# Patient Record
Sex: FEMALE | Race: WHITE | Employment: OTHER | URBAN - NONMETROPOLITAN AREA
[De-identification: names, ages, dates, MRNs, and addresses within clinical notes are randomized per-mention and may not be internally consistent; named-entity substitution may affect disease eponyms.]

---

## 2017-01-04 ENCOUNTER — OFFICE VISIT (OUTPATIENT)
Dept: UROLOGY | Age: 67
End: 2017-01-04

## 2017-01-04 VITALS
DIASTOLIC BLOOD PRESSURE: 78 MMHG | SYSTOLIC BLOOD PRESSURE: 144 MMHG | WEIGHT: 270 LBS | HEIGHT: 64 IN | BODY MASS INDEX: 46.1 KG/M2

## 2017-01-04 DIAGNOSIS — N39.0 RECURRENT UTI: ICD-10-CM

## 2017-01-04 DIAGNOSIS — N39.41 URGE INCONTINENCE OF URINE: Primary | ICD-10-CM

## 2017-01-04 LAB
BILIRUBIN URINE: NORMAL
BLOOD URINE, POC: NEGATIVE
CHARACTER, URINE: CLEAR
COLOR, URINE: YELLOW
GLUCOSE URINE: NEGATIVE MG/DL
KETONES, URINE: NEGATIVE
LEUKOCYTE CLUMPS, URINE: NEGATIVE
NITRITE, URINE: NEGATIVE
PH, URINE: 7
PROTEIN, URINE: NEGATIVE MG/DL
SPECIFIC GRAVITY, URINE: 1.02 (ref 1–1.03)
UROBILINOGEN, URINE: 0.2 EU/DL

## 2017-01-04 PROCEDURE — 81003 URINALYSIS AUTO W/O SCOPE: CPT | Performed by: UROLOGY

## 2017-01-04 PROCEDURE — 99213 OFFICE O/P EST LOW 20 MIN: CPT | Performed by: UROLOGY

## 2017-01-17 RX ORDER — METOPROLOL SUCCINATE 25 MG/1
TABLET, EXTENDED RELEASE ORAL
Qty: 30 TABLET | Refills: 11 | Status: SHIPPED | OUTPATIENT
Start: 2017-01-17 | End: 2017-03-16 | Stop reason: SDUPTHER

## 2017-01-27 PROBLEM — N76.6 ULCER OF GENITAL LABIA: Status: ACTIVE | Noted: 2017-01-27

## 2017-01-27 PROBLEM — L97.929 VENOUS ULCER OF LEFT LEG (HCC): Status: ACTIVE | Noted: 2017-01-27

## 2017-01-27 PROBLEM — I83.029 VENOUS ULCER OF LEFT LEG (HCC): Status: ACTIVE | Noted: 2017-01-27

## 2017-02-17 ENCOUNTER — TELEPHONE (OUTPATIENT)
Dept: CARDIOLOGY | Age: 67
End: 2017-02-17

## 2017-02-21 ENCOUNTER — OFFICE VISIT (OUTPATIENT)
Dept: PULMONOLOGY | Age: 67
End: 2017-02-21

## 2017-02-21 VITALS
HEART RATE: 70 BPM | OXYGEN SATURATION: 98 % | DIASTOLIC BLOOD PRESSURE: 60 MMHG | BODY MASS INDEX: 44.56 KG/M2 | TEMPERATURE: 97.7 F | SYSTOLIC BLOOD PRESSURE: 107 MMHG | HEIGHT: 64 IN | WEIGHT: 261 LBS

## 2017-02-21 DIAGNOSIS — J45.20 MILD INTERMITTENT ASTHMA WITHOUT COMPLICATION: Primary | ICD-10-CM

## 2017-02-21 PROCEDURE — G8427 DOCREV CUR MEDS BY ELIG CLIN: HCPCS | Performed by: INTERNAL MEDICINE

## 2017-02-21 PROCEDURE — 3017F COLORECTAL CA SCREEN DOC REV: CPT | Performed by: INTERNAL MEDICINE

## 2017-02-21 PROCEDURE — 1123F ACP DISCUSS/DSCN MKR DOCD: CPT | Performed by: INTERNAL MEDICINE

## 2017-02-21 PROCEDURE — G8598 ASA/ANTIPLAT THER USED: HCPCS | Performed by: INTERNAL MEDICINE

## 2017-02-21 PROCEDURE — 1090F PRES/ABSN URINE INCON ASSESS: CPT | Performed by: INTERNAL MEDICINE

## 2017-02-21 PROCEDURE — G8417 CALC BMI ABV UP PARAM F/U: HCPCS | Performed by: INTERNAL MEDICINE

## 2017-02-21 PROCEDURE — 4040F PNEUMOC VAC/ADMIN/RCVD: CPT | Performed by: INTERNAL MEDICINE

## 2017-02-21 PROCEDURE — 3014F SCREEN MAMMO DOC REV: CPT | Performed by: INTERNAL MEDICINE

## 2017-02-21 PROCEDURE — G8484 FLU IMMUNIZE NO ADMIN: HCPCS | Performed by: INTERNAL MEDICINE

## 2017-02-21 PROCEDURE — 1036F TOBACCO NON-USER: CPT | Performed by: INTERNAL MEDICINE

## 2017-02-21 PROCEDURE — G8400 PT W/DXA NO RESULTS DOC: HCPCS | Performed by: INTERNAL MEDICINE

## 2017-02-21 PROCEDURE — 99214 OFFICE O/P EST MOD 30 MIN: CPT | Performed by: INTERNAL MEDICINE

## 2017-03-16 ENCOUNTER — TELEPHONE (OUTPATIENT)
Age: 67
End: 2017-03-16

## 2017-03-16 ENCOUNTER — OFFICE VISIT (OUTPATIENT)
Age: 67
End: 2017-03-16

## 2017-03-16 VITALS
HEART RATE: 78 BPM | TEMPERATURE: 98.8 F | RESPIRATION RATE: 16 BRPM | HEIGHT: 64 IN | DIASTOLIC BLOOD PRESSURE: 76 MMHG | WEIGHT: 260 LBS | BODY MASS INDEX: 44.39 KG/M2 | SYSTOLIC BLOOD PRESSURE: 120 MMHG | OXYGEN SATURATION: 98 %

## 2017-03-16 DIAGNOSIS — K82.8 SLUDGE IN GALLBLADDER: Primary | ICD-10-CM

## 2017-03-16 PROCEDURE — G8417 CALC BMI ABV UP PARAM F/U: HCPCS | Performed by: SURGERY

## 2017-03-16 PROCEDURE — 1036F TOBACCO NON-USER: CPT | Performed by: SURGERY

## 2017-03-16 PROCEDURE — G8598 ASA/ANTIPLAT THER USED: HCPCS | Performed by: SURGERY

## 2017-03-16 PROCEDURE — G8484 FLU IMMUNIZE NO ADMIN: HCPCS | Performed by: SURGERY

## 2017-03-16 PROCEDURE — 3017F COLORECTAL CA SCREEN DOC REV: CPT | Performed by: SURGERY

## 2017-03-16 PROCEDURE — G8400 PT W/DXA NO RESULTS DOC: HCPCS | Performed by: SURGERY

## 2017-03-16 PROCEDURE — 3014F SCREEN MAMMO DOC REV: CPT | Performed by: SURGERY

## 2017-03-16 PROCEDURE — 1123F ACP DISCUSS/DSCN MKR DOCD: CPT | Performed by: SURGERY

## 2017-03-16 PROCEDURE — G8427 DOCREV CUR MEDS BY ELIG CLIN: HCPCS | Performed by: SURGERY

## 2017-03-16 PROCEDURE — 99214 OFFICE O/P EST MOD 30 MIN: CPT | Performed by: SURGERY

## 2017-03-16 PROCEDURE — 4040F PNEUMOC VAC/ADMIN/RCVD: CPT | Performed by: SURGERY

## 2017-03-16 PROCEDURE — 1090F PRES/ABSN URINE INCON ASSESS: CPT | Performed by: SURGERY

## 2017-03-16 ASSESSMENT — ENCOUNTER SYMPTOMS
CONSTIPATION: 1
EYE ITCHING: 0
ABDOMINAL PAIN: 1
STRIDOR: 0
NAUSEA: 0
SHORTNESS OF BREATH: 0
VOMITING: 0
CHEST TIGHTNESS: 0
SORE THROAT: 0
VOICE CHANGE: 0
PHOTOPHOBIA: 0
EYE PAIN: 0
COUGH: 0
EYE DISCHARGE: 0
EYE REDNESS: 0
COLOR CHANGE: 0
CHOKING: 0
ABDOMINAL DISTENTION: 0
DIARRHEA: 0
RHINORRHEA: 0
ANAL BLEEDING: 0
TROUBLE SWALLOWING: 0
SINUS PRESSURE: 0
WHEEZING: 0
FACIAL SWELLING: 0
BLOOD IN STOOL: 0
RECTAL PAIN: 0
APNEA: 0
BACK PAIN: 0

## 2017-07-05 RX ORDER — OMEPRAZOLE 40 MG/1
CAPSULE, DELAYED RELEASE ORAL
Qty: 30 CAPSULE | Refills: 5 | OUTPATIENT
Start: 2017-07-05

## 2017-12-12 ENCOUNTER — OFFICE VISIT (OUTPATIENT)
Dept: PULMONOLOGY | Age: 67
End: 2017-12-12
Payer: MEDICARE

## 2017-12-12 VITALS
WEIGHT: 278 LBS | DIASTOLIC BLOOD PRESSURE: 72 MMHG | TEMPERATURE: 97.9 F | BODY MASS INDEX: 46.32 KG/M2 | HEART RATE: 76 BPM | OXYGEN SATURATION: 96 % | SYSTOLIC BLOOD PRESSURE: 136 MMHG | HEIGHT: 65 IN

## 2017-12-12 DIAGNOSIS — J45.20 MILD INTERMITTENT ASTHMA WITHOUT COMPLICATION: Primary | ICD-10-CM

## 2017-12-12 DIAGNOSIS — R94.2 ABNORMAL PFTS: ICD-10-CM

## 2017-12-12 DIAGNOSIS — J98.4 RESTRICTIVE LUNG DISEASE: ICD-10-CM

## 2017-12-12 PROCEDURE — 99214 OFFICE O/P EST MOD 30 MIN: CPT | Performed by: NURSE PRACTITIONER

## 2017-12-12 ASSESSMENT — ENCOUNTER SYMPTOMS
BLURRED VISION: 0
DIARRHEA: 0
RHINORRHEA: 1
NAUSEA: 0
SPUTUM PRODUCTION: 0
DOUBLE VISION: 0
HEMOPTYSIS: 0
SHORTNESS OF BREATH: 1
WHEEZING: 0
VOMITING: 0
COUGH: 1

## 2017-12-12 NOTE — PROGRESS NOTES
[facility]    Patient: Marika Bhatt, 75 y.o.   : 2017    Pt of Dr. Alina Cintron   Patient presents with    Asthma     f/u for breathing issues, Fpt Dr. Mukul Michele seen PRN        Asthma   She complains of cough and shortness of breath. There is no hemoptysis, sputum production or wheezing. This is a chronic problem. The current episode started more than 1 year ago. The problem occurs 2 to 4 times per day. The problem has been gradually worsening. Associated symptoms include orthopnea, postnasal drip and rhinorrhea. Pertinent negatives include no chest pain, fever or weight loss. Her symptoms are aggravated by change in weather, strenuous activity and exposure to smoke. Her symptoms are alleviated by rest and beta-agonist. She reports significant improvement on treatment. Risk factors for lung disease include smoking/tobacco exposure. Her past medical history is significant for asthma. Jennifre Tomlinson is here for follow up for asthma. Patient was initially instructed to follow-up PRN was recently being evaluated at [facility] and was told that it looked like she was not oxygenating well despite her SpO2 of 99% on RA per patient report. Patient is here to follow-up and be certain that there was no respiratory issue. She is currently using Spiriva and albuterol MDI. Reports using albuterol MDI 1-2 times per week on avg. Patient follows with Dr. Eyal Hayes for CAD. Progress History:   Since last visit any new medical issues? No   New ER or hospital visits? No  Any new or changes in medicines? No  Using inhalers? Yes spiriva and albuterol MDI 1-2 a week  Are they helpful? Yes   Flu vaccine? No does not want   Pneumonia vaccine?  No does not want vaccine  Past Medical hx   PMH:  Past Medical History:   Diagnosis Date    Acid reflux     Arrhythmia     Arthritis     CAD (coronary artery disease)     NON-OBSTRUCTIVE    Chronic Javier Maul 18 MCG inhalation capsule inhale the contents of one capsule in the handihaler once daily 30 capsule 5    loratadine (CLARITIN) 10 MG tablet take 1 tablet by mouth once daily 30 tablet 11    hydrOXYzine (ATARAX) 50 MG tablet take 1 tablet by mouth at bedtime 30 tablet 5    metFORMIN (GLUCOPHAGE) 1000 MG tablet take 1 tablet by mouth twice a day with food 60 tablet 5    Magic Mouthwash (MIRACLE MOUTHWASH) Swish and spit 5 mLs 4 times daily as needed for Irritation 50 mL 0    gabapentin (NEURONTIN) 300 MG capsule Take 1 capsule by mouth 3 times daily (Patient taking differently: Take 800 mg by mouth 4 times daily ) 90 capsule 5    albuterol sulfate HFA (PROAIR HFA) 108 (90 BASE) MCG/ACT inhaler Inhale 2 puffs into the lungs every 4 hours as needed 1 Inhaler 2    FREESTYLE LANCETS MISC Test 1 time daily. Dx: 250.00-Type 2 Diabetes. Freestyle 28G Lancets 100 each 11    nystatin-triamcinolone (MYCOLOG II) 711662-2.1 UNIT/GM-% cream Apply topically 2 times daily. 1 Tube 3    metoprolol (TOPROL-XL) 25 MG XL tablet Take 25 mg by mouth daily      tiZANidine (ZANAFLEX) 4 MG tablet Take 4 mg by mouth every 8 hours as needed      Omega-3 Fatty Acids (FISH OIL) 1200 MG CAPS Take 1,200 mg by mouth daily      ASPIRIN LOW DOSE 81 MG EC tablet take 1 tablet by mouth once daily 30 tablet 11    clobetasol (TEMOVATE) 0.05 % cream Apply topically 2 times daily. 15 g 1    glucose blood VI test strips (FREESTYLE LITE) strip Test once daily 100 each 11    CICLOPIROX TREATMENT EX Apply  topically. 10.0 mL Ciclopirox-terbinafine-fluconazole  Fungal Free Nails Fungicidal solution      polyvinyl alcohol (ARTIFICIAL TEARS) 1.4 % ophthalmic solution 1 drop 2 times daily.  halcinonide (HALOG) 0.1 % CREA Apply  topically daily.  citalopram (CELEXA) 40 MG tablet Take 40 mg by mouth every morning.  diphenhydrAMINE (BENADRYL) 50 MG capsule Take 50 mg by mouth nightly as needed for Itching.       QUEtiapine (SEROQUEL) 50 MG tablet Take 50 mg by mouth nightly.  acetaminophen (TYLENOL) 500 MG tablet Take 500 mg by mouth every 6 hours as needed for Pain.  nabumetone (RELAFEN) 500 MG tablet Take 500 mg by mouth 2 times daily      TRADJENTA 5 MG tablet take 1 tablet by mouth daily 30 tablet 5    simvastatin (ZOCOR) 40 MG tablet take 1 tablet by mouth at bedtime 30 tablet 5    spironolactone (ALDACTONE) 25 MG tablet take 1-2 tablets by mouth once daily if needed (TAKE 2 TABS ON ANY DAY THERE IS SWELLING IN LEGS OR ANKLES) 60 tablet 5    glucose monitoring kit (FREESTYLE) monitoring kit 1 each by Does not apply route once for 1 dose. This is a prescription for all required glucometer supplies to check blood sugar once daily. Any brand device is acceptable. Dx: 250.00 1 kit 11     No current facility-administered medications for this visit. Ludwig BYRD   Review of Systems   Constitutional: Negative for chills, fever and weight loss. HENT: Positive for postnasal drip and rhinorrhea. Negative for hearing loss and tinnitus. Eyes: Negative for blurred vision and double vision. Respiratory: Positive for cough and shortness of breath. Negative for hemoptysis, sputum production and wheezing. Cardiovascular: Negative for chest pain. Gastrointestinal: Negative for diarrhea, nausea and vomiting. Genitourinary: Negative for dysuria. Physical exam   /72   Pulse 76   Temp 97.9 °F (36.6 °C) (Oral)   Ht 5' 4.75\" (1.645 m)   Wt 278 lb (126.1 kg)   LMP  (LMP Unknown)   SpO2 96% Comment: RA at rest  BMI 46.62 kg/m²        Physical Exam   Constitutional: She is oriented to person, place, and time and well-developed, well-nourished, and in no distress. No distress. HENT:   Head: Normocephalic and atraumatic. Eyes: Conjunctivae are normal. Pupils are equal, round, and reactive to light. Neck: Normal range of motion. Neck supple. No tracheal deviation present.    Cardiovascular:

## 2018-01-19 RX ORDER — METOPROLOL SUCCINATE 25 MG/1
TABLET, EXTENDED RELEASE ORAL
Qty: 30 TABLET | Refills: 4 | Status: SHIPPED | OUTPATIENT
Start: 2018-01-19 | End: 2018-06-09 | Stop reason: SDUPTHER

## 2018-03-28 ENCOUNTER — OFFICE VISIT (OUTPATIENT)
Dept: CARDIOLOGY CLINIC | Age: 68
End: 2018-03-28
Payer: MEDICARE

## 2018-03-28 VITALS
BODY MASS INDEX: 45.75 KG/M2 | SYSTOLIC BLOOD PRESSURE: 122 MMHG | HEART RATE: 63 BPM | WEIGHT: 268 LBS | DIASTOLIC BLOOD PRESSURE: 72 MMHG | HEIGHT: 64 IN

## 2018-03-28 DIAGNOSIS — I47.1 SVT (SUPRAVENTRICULAR TACHYCARDIA) (HCC): ICD-10-CM

## 2018-03-28 DIAGNOSIS — I25.10 CORONARY ARTERY DISEASE INVOLVING NATIVE CORONARY ARTERY OF NATIVE HEART WITHOUT ANGINA PECTORIS: Primary | ICD-10-CM

## 2018-03-28 DIAGNOSIS — I10 ESSENTIAL HYPERTENSION: ICD-10-CM

## 2018-03-28 PROCEDURE — 99213 OFFICE O/P EST LOW 20 MIN: CPT | Performed by: NUCLEAR MEDICINE

## 2018-03-28 PROCEDURE — 93000 ELECTROCARDIOGRAM COMPLETE: CPT | Performed by: NUCLEAR MEDICINE

## 2018-03-28 RX ORDER — POTASSIUM CHLORIDE 750 MG/1
10 CAPSULE, EXTENDED RELEASE ORAL DAILY
Status: ON HOLD | COMMUNITY
End: 2019-09-01 | Stop reason: HOSPADM

## 2018-03-28 NOTE — PROGRESS NOTES
Pt here for 1 year fu     Denies chest pain   States she gets SOB on exertion and resting   Also has dizziness
09/01/2017    A1C test (Diabetic or Prediabetic)  12/21/2017    Colon cancer screen colonoscopy  07/07/2019    DEXA (modify frequency per FRAX score)  Addressed       Subjective:  Review of Systems  General:   No fever, no chills, No fatigue or weight loss  Pulmonary:    No dyspnea, no wheezing  Cardiac:    Denies recent chest pain,   GI:     No nausea or vomiting, no abdominal pain  Neuro:    No dizziness or light headedness,   Musculoskeletal: Back issues   Extremities:   No edema, good peripheral pulses      Objective:  Physical Exam  /72   Pulse 63   Ht 5' 4\" (1.626 m)   Wt 268 lb (121.6 kg)   LMP  (LMP Unknown)   BMI 46.00 kg/m²   General:   Well developed, well nourished  Lungs:   Clear to auscultation  Heart:    Normal S1 S2, Slight murmur. no rubs, no gallops  Abdomen:   Soft, non tender, no organomegalies, positive bowel sounds  Extremities:   No edema, no cyanosis, good peripheral pulses  Neurological:   Awake, alert, oriented. No obvious focal deficits  Musculoskelatal:  No obvious deformities    Assessment:     1. Coronary artery disease involving native coronary artery of native heart without angina pectoris  EKG 12 Lead   2. Essential hypertension     3. SVT (supraventricular tachycardia) (HCC)     cardiac fair for now   ECG in office was done today. I reviewed the ECG. No acute findings      Plan:  No Follow-up on file. As above  Continue risk factor modification and medical management  Thank you for allowing me to participate in the care of your patient. Please don't hesitate to contact me regarding any further issues related to the patient care    Orders Placed:  Orders Placed This Encounter   Procedures    EKG 12 Lead     Order Specific Question:   Reason for Exam?     Answer: Other       Medications Prescribed:  No orders of the defined types were placed in this encounter. Discussed use, benefit, and side effects of prescribed medications. All patient questions answered.

## 2018-04-10 ENCOUNTER — TELEPHONE (OUTPATIENT)
Dept: CARDIOLOGY CLINIC | Age: 68
End: 2018-04-10

## 2018-05-09 ENCOUNTER — TELEPHONE (OUTPATIENT)
Dept: CARDIOLOGY CLINIC | Age: 68
End: 2018-05-09

## 2018-05-16 ENCOUNTER — HOSPITAL ENCOUNTER (OUTPATIENT)
Age: 68
Discharge: HOME | End: 2018-05-16
Payer: MEDICARE

## 2018-05-16 LAB
ALBUMIN SERPL-MCNC: 3.8 G/DL (ref 3.5–5.1)
ALP BLD-CCNC: 84 U/L (ref 38–126)
ALT SERPL-CCNC: 9 U/L (ref 11–66)
ANION GAP SERPL CALCULATED.3IONS-SCNC: 14 MEQ/L (ref 8–16)
ANISOCYTOSIS: ABNORMAL
AST SERPL-CCNC: 13 U/L (ref 5–40)
AVERAGE GLUCOSE: 120 MG/DL (ref 70–126)
BASOPHILS # BLD: 0.3 %
BASOPHILS ABSOLUTE: 0 THOU/MM3 (ref 0–0.1)
BILIRUB SERPL-MCNC: 0.4 MG/DL (ref 0.3–1.2)
BUN BLDV-MCNC: 32 MG/DL (ref 7–22)
CALCIUM SERPL-MCNC: 8.8 MG/DL (ref 8.5–10.5)
CHLORIDE BLD-SCNC: 110 MEQ/L (ref 98–111)
CHOLESTEROL, TOTAL: 114 MG/DL (ref 100–199)
CO2: 19 MEQ/L (ref 23–33)
CREAT SERPL-MCNC: 0.7 MG/DL (ref 0.4–1.2)
EOSINOPHIL # BLD: 3 %
EOSINOPHILS ABSOLUTE: 0.3 THOU/MM3 (ref 0–0.4)
GFR SERPL CREATININE-BSD FRML MDRD: 83 ML/MIN/1.73M2
GLUCOSE BLD-MCNC: 86 MG/DL (ref 70–108)
HBA1C MFR BLD: 6 % (ref 4.4–6.4)
HCT VFR BLD CALC: 34.3 % (ref 37–47)
HDLC SERPL-MCNC: 51 MG/DL
HEMOGLOBIN: 11.4 GM/DL (ref 12–16)
HYPOCHROMIA: ABNORMAL
LDL CHOLESTEROL CALCULATED: 48 MG/DL
LYMPHOCYTES # BLD: 25.2 %
LYMPHOCYTES ABSOLUTE: 2.2 THOU/MM3 (ref 1–4.8)
MCH RBC QN AUTO: 26 PG (ref 27–31)
MCHC RBC AUTO-ENTMCNC: 33.3 GM/DL (ref 33–37)
MCV RBC AUTO: 78.1 FL (ref 81–99)
MICROCYTES: ABNORMAL
MONOCYTES # BLD: 7.3 %
MONOCYTES ABSOLUTE: 0.6 THOU/MM3 (ref 0.4–1.3)
NUCLEATED RED BLOOD CELLS: 0 /100 WBC
PDW BLD-RTO: 19.1 % (ref 11.5–14.5)
PLATELET # BLD: 276 THOU/MM3 (ref 130–400)
PMV BLD AUTO: 9.5 FL (ref 7.4–10.4)
POTASSIUM SERPL-SCNC: 4.6 MEQ/L (ref 3.5–5.2)
RBC # BLD: 4.39 MILL/MM3 (ref 4.2–5.4)
SEG NEUTROPHILS: 64.2 %
SEGMENTED NEUTROPHILS ABSOLUTE COUNT: 5.6 THOU/MM3 (ref 1.8–7.7)
SODIUM BLD-SCNC: 143 MEQ/L (ref 135–145)
TOTAL PROTEIN: 7 G/DL (ref 6.1–8)
TRIGL SERPL-MCNC: 75 MG/DL (ref 0–199)
TSH SERPL DL<=0.05 MIU/L-ACNC: 1.84 UIU/ML (ref 0.4–4.2)
WBC # BLD: 8.7 THOU/MM3 (ref 4.8–10.8)

## 2018-05-16 PROCEDURE — 83036 HEMOGLOBIN GLYCOSYLATED A1C: CPT

## 2018-05-16 PROCEDURE — 80061 LIPID PANEL: CPT

## 2018-05-16 PROCEDURE — 84443 ASSAY THYROID STIM HORMONE: CPT

## 2018-05-16 PROCEDURE — 80053 COMPREHEN METABOLIC PANEL: CPT

## 2018-05-16 PROCEDURE — 85025 COMPLETE CBC W/AUTO DIFF WBC: CPT

## 2018-05-16 PROCEDURE — 36415 COLL VENOUS BLD VENIPUNCTURE: CPT

## 2018-06-11 RX ORDER — METOPROLOL SUCCINATE 25 MG/1
TABLET, EXTENDED RELEASE ORAL
Qty: 30 TABLET | Refills: 10 | Status: SHIPPED | OUTPATIENT
Start: 2018-06-11 | End: 2019-07-06 | Stop reason: SDUPTHER

## 2018-08-02 ENCOUNTER — TELEPHONE (OUTPATIENT)
Dept: CARDIOLOGY CLINIC | Age: 68
End: 2018-08-02

## 2018-08-02 NOTE — TELEPHONE ENCOUNTER
FYI  Patient called and stated she passed out \"sometime last week\". She called her PCP and they told her to let Dr Rodgers Phoenix know. She states she was sitting in her wheelchair felling fine then \"blacked out\". She states she felt fine right after that and has had no problems since. Notified pt Dr Sadie Moreau. Advised pt that she should go to ER or PCP and get check out if it should happen again.

## 2018-10-18 ENCOUNTER — TELEPHONE (OUTPATIENT)
Dept: SURGERY | Age: 68
End: 2018-10-18

## 2018-10-18 ENCOUNTER — OFFICE VISIT (OUTPATIENT)
Dept: SURGERY | Age: 68
End: 2018-10-18
Payer: MEDICARE

## 2018-10-18 VITALS
SYSTOLIC BLOOD PRESSURE: 134 MMHG | DIASTOLIC BLOOD PRESSURE: 80 MMHG | OXYGEN SATURATION: 96 % | HEART RATE: 73 BPM | TEMPERATURE: 97.7 F | WEIGHT: 267 LBS | RESPIRATION RATE: 16 BRPM | HEIGHT: 63 IN | BODY MASS INDEX: 47.31 KG/M2

## 2018-10-18 DIAGNOSIS — R10.84 GENERALIZED ABDOMINAL PAIN: ICD-10-CM

## 2018-10-18 DIAGNOSIS — K82.8 GALLBLADDER SLUDGE: Primary | ICD-10-CM

## 2018-10-18 DIAGNOSIS — I10 ESSENTIAL HYPERTENSION: ICD-10-CM

## 2018-10-18 DIAGNOSIS — Z86.14 HISTORY OF MRSA INFECTION: ICD-10-CM

## 2018-10-18 DIAGNOSIS — Z01.818 PRE-OP TESTING: ICD-10-CM

## 2018-10-18 PROCEDURE — 99214 OFFICE O/P EST MOD 30 MIN: CPT | Performed by: SURGERY

## 2018-10-27 ENCOUNTER — HOSPITAL ENCOUNTER (OUTPATIENT)
Age: 68
Discharge: HOME | End: 2018-10-27
Payer: MEDICARE

## 2018-10-27 LAB
ANION GAP SERPL CALCULATED.3IONS-SCNC: 18 MEQ/L (ref 8–16)
BUN BLDV-MCNC: 24 MG/DL (ref 7–22)
CALCIUM SERPL-MCNC: 9.7 MG/DL (ref 8.5–10.5)
CHLORIDE BLD-SCNC: 105 MEQ/L (ref 98–111)
CO2: 19 MEQ/L (ref 23–33)
CREAT SERPL-MCNC: 0.7 MG/DL (ref 0.4–1.2)
GFR SERPL CREATININE-BSD FRML MDRD: 83 ML/MIN/1.73M2
GLUCOSE BLD-MCNC: 103 MG/DL (ref 70–108)
HCT VFR BLD CALC: 39.1 % (ref 37–47)
HEMOGLOBIN: 11.6 GM/DL (ref 12–16)
POTASSIUM SERPL-SCNC: 4.8 MEQ/L (ref 3.5–5.2)
SODIUM BLD-SCNC: 142 MEQ/L (ref 135–145)

## 2018-10-27 PROCEDURE — 85018 HEMOGLOBIN: CPT

## 2018-10-27 PROCEDURE — 87081 CULTURE SCREEN ONLY: CPT

## 2018-10-27 PROCEDURE — 80048 BASIC METABOLIC PNL TOTAL CA: CPT

## 2018-10-27 PROCEDURE — 85014 HEMATOCRIT: CPT

## 2018-10-27 PROCEDURE — 36415 COLL VENOUS BLD VENIPUNCTURE: CPT

## 2018-10-28 LAB — MRSA SCREEN: NORMAL

## 2018-10-29 LAB — MRSA SCREEN: NORMAL

## 2018-10-29 ASSESSMENT — ENCOUNTER SYMPTOMS
STRIDOR: 0
EYE ITCHING: 0
EYES NEGATIVE: 1
BACK PAIN: 1
COUGH: 0
EYE REDNESS: 0
RHINORRHEA: 0
ABDOMINAL DISTENTION: 1
APNEA: 0
ALLERGIC/IMMUNOLOGIC NEGATIVE: 1
COLOR CHANGE: 0
ANAL BLEEDING: 1
VOICE CHANGE: 0
CHOKING: 0
WHEEZING: 0
BLOOD IN STOOL: 1
PHOTOPHOBIA: 0
SINUS PRESSURE: 0
SHORTNESS OF BREATH: 1
SINUS PAIN: 0
EYE DISCHARGE: 0
TROUBLE SWALLOWING: 0
FACIAL SWELLING: 0
EYE PAIN: 0
SORE THROAT: 0
CHEST TIGHTNESS: 1
ABDOMINAL PAIN: 1

## 2018-10-29 NOTE — PROGRESS NOTES
[address] [facility]. [address] [facility] [address]  Dept: [phone]  Dept Fax: [phone]  Loc: [phone]    Visit Date: 10/18/2018    Lamin Lai is a 70 y.o. female who presentstoday for:  Chief Complaint   Patient presents with    Surgical Consult     est pt-ready to schedule surgery-gallbladder sludge-       HPI:     HPI 79-year-old white female who is been seen previously in March of this year who has a long history of abdominal problems recurrent ventral hernia repairs he basically is found to have sludge and is symptomatic with gallbladder disease the patient at that time when described a difficult surgery wanted to wait however she's now having increasing symptoms of right upper quadrant pain and epigastric pain and is here to discuss potential cholecystectomy patient does have significant medical issues she also is obese and also has a history of MRSA infection of the abdominal wall    Past Medical History:   Diagnosis Date    Acid reflux     Arrhythmia     Arthritis     CAD (coronary artery disease)     NON-OBSTRUCTIVE    Chronic bronchitis ([address]     COPD (chronic obstructive pulmonary disease) ([address]     Depression     Diabetes mellitus ([address]     Dizziness - light-headed     Fall 10-15-13    left rib fracture and shoulder contusion     Falls frequently     Hyperlipidemia     Hypertension     Kidney disease     MDRO (multiple drug resistant organisms) resistance 2/14    MRSA ABDOMEN    Numbness and tingling     HX OF:    Swelling     Tinnitus       Past Surgical History:   Procedure Laterality Date    BACK SURGERY  2007    CARDIOVASCULAR STRESS TEST  04/03/2009    Persantine test associated w/non specific symptoms. EKG non diagnostic. no obvious stress induced ischemia. EF 63%. normal wall motion.     CARDIOVASCULAR STRESS TEST  03/31/2010    tilt table associated w/fluctuation of BP w/relatively low BP at bedtime 30 tablet 5    VESICARE 10 MG tablet take 1 tablet by mouth once daily 30 tablet 5    spironolactone (ALDACTONE) 25 MG tablet take 1-2 tablets by mouth once daily if needed (TAKE 2 TABS ON ANY DAY THERE IS SWELLING IN LEGS OR ANKLES) 60 tablet 5    etodolac (LODINE) 500 MG tablet take 1 tablet by mouth twice a day 60 tablet 5    pioglitazone (ACTOS) 45 MG tablet take 1 tablet by mouth once daily 30 tablet 5    SPIRIVA HANDIHALER 18 MCG inhalation capsule inhale the contents of one capsule in the handihaler once daily 30 capsule 5    loratadine (CLARITIN) 10 MG tablet take 1 tablet by mouth once daily 30 tablet 11    hydrOXYzine (ATARAX) 50 MG tablet take 1 tablet by mouth at bedtime 30 tablet 5    metFORMIN (GLUCOPHAGE) 1000 MG tablet take 1 tablet by mouth twice a day with food 60 tablet 5    gabapentin (NEURONTIN) 300 MG capsule Take 1 capsule by mouth 3 times daily (Patient taking differently: Take 800 mg by mouth 3 times daily .) 90 capsule 5    albuterol sulfate HFA (PROAIR HFA) 108 (90 BASE) MCG/ACT inhaler Inhale 2 puffs into the lungs every 4 hours as needed 1 Inhaler 2    FREESTYLE LANCETS MISC Test 1 time daily. Dx: 250.00-Type 2 Diabetes. Freestyle 28G Lancets 100 each 11    tiZANidine (ZANAFLEX) 4 MG tablet Take 4 mg by mouth every 8 hours as needed      Omega-3 Fatty Acids (FISH OIL) 1200 MG CAPS Take 1,200 mg by mouth daily      ASPIRIN LOW DOSE 81 MG EC tablet take 1 tablet by mouth once daily 30 tablet 11    clobetasol (TEMOVATE) 0.05 % cream Apply topically 2 times daily. 15 g 1    glucose blood VI test strips (FREESTYLE LITE) strip Test once daily 100 each 11    CICLOPIROX TREATMENT EX Apply  topically. 10.0 mL Ciclopirox-terbinafine-fluconazole  Fungal Free Nails Fungicidal solution      citalopram (CELEXA) 40 MG tablet Take 40 mg by mouth every morning.  diphenhydrAMINE (BENADRYL) 50 MG capsule Take 50 mg by mouth nightly as needed for Itching.       1+ Absent    Segs Absolute 5.6 1.8 - 7.7 thou/mm3    Lymphocytes # 2.2 1.0 - 4.8 thou/mm3    Monocytes # 0.6 0.4 - 1.3 thou/mm3    Eosinophils # 0.3 0.0 - 0.4 thou/mm3    Basophils # 0.0 0.0 - 0.1 thou/mm3   Anion Gap   Result Value Ref Range    Anion Gap 14.0 8.0 - 16.0 meq/L   Glomerular Filtration Rate, Estimated   Result Value Ref Range    Est, Glom Filt Rate 83 (A) ml/min/1.73m2       Assessment:     Symptomatic biliary colic discussed with patient the rather challenge of surgery given her size and medical issues we also discussed that is likely she will need an open procedure given her hernia repairs however it would be reasonable to attempt to get the scope into the abdominal cavity to see if this could be performed laparoscopically she is agreeable and wants to proceed    Plan:     1. Schedule Elian Mccloud for attempted laparoscopic cholecystectomy possible open  2. The risks, benefits and alternatives were discussed with Elian Mccloud. She understands and wishes to proceed with surgical intervention. 3. Restrictions discussed with Elian Mccloud and she expresses understanding. 4. She is advised to call back directly if there are further questions/concerns, or if her symptoms worsen prior to surgery.             Electronicallysigned by Vasyl Moody MD on 10/29/2018 at 11:57 AM

## 2018-11-02 ENCOUNTER — OFFICE VISIT (OUTPATIENT)
Dept: CARDIOLOGY CLINIC | Age: 68
End: 2018-11-02
Payer: MEDICARE

## 2018-11-02 ENCOUNTER — TELEPHONE (OUTPATIENT)
Dept: CARDIOLOGY CLINIC | Age: 68
End: 2018-11-02

## 2018-11-02 VITALS
SYSTOLIC BLOOD PRESSURE: 112 MMHG | HEART RATE: 71 BPM | BODY MASS INDEX: 48.83 KG/M2 | HEIGHT: 62 IN | DIASTOLIC BLOOD PRESSURE: 68 MMHG

## 2018-11-02 DIAGNOSIS — I47.1 SVT (SUPRAVENTRICULAR TACHYCARDIA) (HCC): ICD-10-CM

## 2018-11-02 DIAGNOSIS — R06.09 DYSPNEA ON EXERTION: ICD-10-CM

## 2018-11-02 DIAGNOSIS — I10 ESSENTIAL HYPERTENSION: ICD-10-CM

## 2018-11-02 DIAGNOSIS — I25.10 CORONARY ARTERY DISEASE INVOLVING NATIVE CORONARY ARTERY OF NATIVE HEART WITHOUT ANGINA PECTORIS: Primary | ICD-10-CM

## 2018-11-02 PROCEDURE — 99213 OFFICE O/P EST LOW 20 MIN: CPT | Performed by: NUCLEAR MEDICINE

## 2018-11-02 PROCEDURE — 93000 ELECTROCARDIOGRAM COMPLETE: CPT | Performed by: NUCLEAR MEDICINE

## 2018-11-02 NOTE — PROGRESS NOTES
tablet take 1 tablet by mouth twice a day 60 tablet 5    pioglitazone (ACTOS) 45 MG tablet take 1 tablet by mouth once daily 30 tablet 5    SPIRIVA HANDIHALER 18 MCG inhalation capsule inhale the contents of one capsule in the handihaler once daily 30 capsule 5    loratadine (CLARITIN) 10 MG tablet take 1 tablet by mouth once daily 30 tablet 11    hydrOXYzine (ATARAX) 50 MG tablet take 1 tablet by mouth at bedtime 30 tablet 5    metFORMIN (GLUCOPHAGE) 1000 MG tablet take 1 tablet by mouth twice a day with food 60 tablet 5    gabapentin (NEURONTIN) 300 MG capsule Take 1 capsule by mouth 3 times daily (Patient taking differently: Take 800 mg by mouth 3 times daily .) 90 capsule 5    albuterol sulfate HFA (PROAIR HFA) 108 (90 BASE) MCG/ACT inhaler Inhale 2 puffs into the lungs every 4 hours as needed 1 Inhaler 2    FREESTYLE LANCETS MISC Test 1 time daily. Dx: 250.00-Type 2 Diabetes. Freestyle 28G Lancets 100 each 11    tiZANidine (ZANAFLEX) 4 MG tablet Take 4 mg by mouth every 8 hours as needed      Omega-3 Fatty Acids (FISH OIL) 1200 MG CAPS Take 1,200 mg by mouth daily      ASPIRIN LOW DOSE 81 MG EC tablet take 1 tablet by mouth once daily 30 tablet 11    clobetasol (TEMOVATE) 0.05 % cream Apply topically 2 times daily. 15 g 1    glucose blood VI test strips (FREESTYLE LITE) strip Test once daily 100 each 11    CICLOPIROX TREATMENT EX Apply  topically. 10.0 mL Ciclopirox-terbinafine-fluconazole  Fungal Free Nails Fungicidal solution      citalopram (CELEXA) 40 MG tablet Take 40 mg by mouth every morning.  diphenhydrAMINE (BENADRYL) 50 MG capsule Take 50 mg by mouth nightly as needed for Itching.  QUEtiapine (SEROQUEL) 50 MG tablet Take 50 mg by mouth nightly.  acetaminophen (TYLENOL) 500 MG tablet Take 500 mg by mouth every 6 hours as needed for Pain.  glucose monitoring kit (FREESTYLE) monitoring kit 1 each by Does not apply route once for 1 dose.  This is a prescription for all edema, no cyanosis, good peripheral pulses  Neurological:   Awake, alert, oriented. No obvious focal deficits  Musculoskelatal:  No obvious deformities    Assessment:      Diagnosis Orders   1. Coronary artery disease involving native coronary artery of native heart without angina pectoris  EKG 12 lead    ECHO Complete 2D W Doppler W Color    Stress test, lexiscan   2. Essential hypertension  EKG 12 lead    ECHO Complete 2D W Doppler W Color    Stress test, lexiscan   3. Dyspnea on exertion  ECHO Complete 2D W Doppler W Color    Stress test, lexiscan   4. SVT (supraventricular tachycardia) (HCC)     cardiac fair for now  Higher risk patient  ECG in office was done today. I reviewed the ECG. No acute findings        Plan:  No Follow-up on file. As above  Non invasive cardiac testing will be ordered to further evaluate for any ischemic or structural heart disease as a cause of the patient symptoms. We will proceed with a Stress Cardiolite test and echo soon. Continue risk factor modification and medical management  Thank you for allowing me to participate in the care of your patient. Please don't hesitate to contact me regarding any further issues related to the patient care    Orders Placed:  Orders Placed This Encounter   Procedures    Stress test, lexiscan     Standing Status:   Future     Standing Expiration Date:   11/2/2019     Order Specific Question:   Reason for Exam?     Answer:   Shortness of breath    EKG 12 lead     Order Specific Question:   Reason for Exam?     Answer: Other    ECHO Complete 2D W Doppler W Color     Standing Status:   Future     Standing Expiration Date:   11/2/2019     Order Specific Question:   Reason for exam:     Answer:   dyspnea       Medications Prescribed:  No orders of the defined types were placed in this encounter. Discussed use, benefit, and side effects of prescribed medications. All patient questions answered. Pt voicedunderstanding.  Instructed to continue current medications, diet and exercise. Continue risk factor modification and medical management. Patient agreed with treatment plan. Follow up as directed.     Electronically signedby Thalia Luis MD on 11/2/2018 at 10:38 AM

## 2018-11-09 ENCOUNTER — HOSPITAL ENCOUNTER (OUTPATIENT)
Dept: NON INVASIVE DIAGNOSTICS | Age: 68
Discharge: HOME | End: 2018-11-09
Payer: MEDICARE

## 2018-11-09 VITALS — BODY MASS INDEX: 48.83 KG/M2 | WEIGHT: 267 LBS

## 2018-11-09 DIAGNOSIS — R06.09 DYSPNEA ON EXERTION: ICD-10-CM

## 2018-11-09 DIAGNOSIS — I25.10 CORONARY ARTERY DISEASE INVOLVING NATIVE CORONARY ARTERY OF NATIVE HEART WITHOUT ANGINA PECTORIS: ICD-10-CM

## 2018-11-09 DIAGNOSIS — I10 ESSENTIAL HYPERTENSION: ICD-10-CM

## 2018-11-09 LAB
LV EF: 60 %
LVEF MODALITY: NORMAL

## 2018-11-09 PROCEDURE — 93017 CV STRESS TEST TRACING ONLY: CPT | Performed by: NUCLEAR MEDICINE

## 2018-11-09 PROCEDURE — 6360000002 HC RX W HCPCS

## 2018-11-09 PROCEDURE — 93306 TTE W/DOPPLER COMPLETE: CPT

## 2018-11-09 PROCEDURE — 2709999900 HC NON-CHARGEABLE SUPPLY

## 2018-11-09 PROCEDURE — 3430000000 HC RX DIAGNOSTIC RADIOPHARMACEUTICAL: Performed by: NUCLEAR MEDICINE

## 2018-11-09 PROCEDURE — A9500 TC99M SESTAMIBI: HCPCS | Performed by: NUCLEAR MEDICINE

## 2018-11-09 PROCEDURE — 78452 HT MUSCLE IMAGE SPECT MULT: CPT

## 2018-11-09 RX ADMIN — Medication 8.2 MILLICURIE: at 12:03

## 2018-11-09 RX ADMIN — Medication 31.4 MILLICURIE: at 13:10

## 2018-11-13 ENCOUNTER — TELEPHONE (OUTPATIENT)
Dept: CARDIOLOGY CLINIC | Age: 68
End: 2018-11-13

## 2018-11-15 ENCOUNTER — TELEPHONE (OUTPATIENT)
Dept: SURGERY | Age: 68
End: 2018-11-15

## 2018-11-26 ENCOUNTER — HOSPITAL ENCOUNTER (OUTPATIENT)
Age: 68
Discharge: HOME | End: 2018-11-27
Attending: SURGERY | Admitting: SURGERY
Payer: MEDICARE

## 2018-11-26 ENCOUNTER — ANESTHESIA EVENT (OUTPATIENT)
Dept: OPERATING ROOM | Age: 68
End: 2018-11-26
Payer: MEDICARE

## 2018-11-26 ENCOUNTER — ANESTHESIA (OUTPATIENT)
Dept: OPERATING ROOM | Age: 68
End: 2018-11-26
Payer: MEDICARE

## 2018-11-26 VITALS
DIASTOLIC BLOOD PRESSURE: 110 MMHG | SYSTOLIC BLOOD PRESSURE: 205 MMHG | RESPIRATION RATE: 2 BRPM | OXYGEN SATURATION: 88 % | TEMPERATURE: 97.2 F

## 2018-11-26 PROBLEM — R10.9 ABDOMINAL PAIN: Status: ACTIVE | Noted: 2018-11-26

## 2018-11-26 LAB
GLUCOSE BLD-MCNC: 126 MG/DL (ref 70–108)
GLUCOSE BLD-MCNC: 93 MG/DL (ref 70–108)
GLUCOSE BLD-MCNC: 98 MG/DL (ref 70–108)
POTASSIUM SERPL-SCNC: 4.1 MEQ/L (ref 3.5–5.2)

## 2018-11-26 PROCEDURE — 6370000000 HC RX 637 (ALT 250 FOR IP): Performed by: SURGERY

## 2018-11-26 PROCEDURE — 94760 N-INVAS EAR/PLS OXIMETRY 1: CPT

## 2018-11-26 PROCEDURE — 2700000000 HC OXYGEN THERAPY PER DAY

## 2018-11-26 PROCEDURE — 7100000001 HC PACU RECOVERY - ADDTL 15 MIN: Performed by: SURGERY

## 2018-11-26 PROCEDURE — 7100000010 HC PHASE II RECOVERY - FIRST 15 MIN: Performed by: SURGERY

## 2018-11-26 PROCEDURE — 36415 COLL VENOUS BLD VENIPUNCTURE: CPT

## 2018-11-26 PROCEDURE — 84132 ASSAY OF SERUM POTASSIUM: CPT

## 2018-11-26 PROCEDURE — 2580000003 HC RX 258

## 2018-11-26 PROCEDURE — 3700000000 HC ANESTHESIA ATTENDED CARE: Performed by: SURGERY

## 2018-11-26 PROCEDURE — 2500000003 HC RX 250 WO HCPCS: Performed by: NURSE ANESTHETIST, CERTIFIED REGISTERED

## 2018-11-26 PROCEDURE — 88304 TISSUE EXAM BY PATHOLOGIST: CPT

## 2018-11-26 PROCEDURE — 7100000000 HC PACU RECOVERY - FIRST 15 MIN: Performed by: SURGERY

## 2018-11-26 PROCEDURE — 3700000001 HC ADD 15 MINUTES (ANESTHESIA): Performed by: SURGERY

## 2018-11-26 PROCEDURE — 2709999900 HC NON-CHARGEABLE SUPPLY: Performed by: SURGERY

## 2018-11-26 PROCEDURE — 6370000000 HC RX 637 (ALT 250 FOR IP)

## 2018-11-26 PROCEDURE — 47562 LAPAROSCOPIC CHOLECYSTECTOMY: CPT | Performed by: SURGERY

## 2018-11-26 PROCEDURE — 6360000002 HC RX W HCPCS: Performed by: NURSE ANESTHETIST, CERTIFIED REGISTERED

## 2018-11-26 PROCEDURE — 3600000004 HC SURGERY LEVEL 4 BASE: Performed by: SURGERY

## 2018-11-26 PROCEDURE — 7100000011 HC PHASE II RECOVERY - ADDTL 15 MIN: Performed by: SURGERY

## 2018-11-26 PROCEDURE — 2709999900 HC NON-CHARGEABLE SUPPLY

## 2018-11-26 PROCEDURE — 2500000003 HC RX 250 WO HCPCS: Performed by: SURGERY

## 2018-11-26 PROCEDURE — 3600000014 HC SURGERY LEVEL 4 ADDTL 15MIN: Performed by: SURGERY

## 2018-11-26 PROCEDURE — 2780000010 HC IMPLANT OTHER: Performed by: SURGERY

## 2018-11-26 PROCEDURE — 82948 REAGENT STRIP/BLOOD GLUCOSE: CPT

## 2018-11-26 DEVICE — FLOSEAL HEMOSTATIC MATRIX, 5 ML
Type: IMPLANTABLE DEVICE | Site: ABDOMEN | Status: FUNCTIONAL
Brand: FLOSEAL

## 2018-11-26 RX ORDER — HYDRALAZINE HYDROCHLORIDE 20 MG/ML
5 INJECTION INTRAMUSCULAR; INTRAVENOUS EVERY 10 MIN PRN
Status: DISCONTINUED | OUTPATIENT
Start: 2018-11-26 | End: 2018-11-26 | Stop reason: HOSPADM

## 2018-11-26 RX ORDER — HYDROXYZINE HYDROCHLORIDE 25 MG/1
25 TABLET, FILM COATED ORAL EVERY 6 HOURS PRN
Status: DISCONTINUED | OUTPATIENT
Start: 2018-11-26 | End: 2018-11-27 | Stop reason: HOSPADM

## 2018-11-26 RX ORDER — METOPROLOL TARTRATE 5 MG/5ML
INJECTION INTRAVENOUS PRN
Status: DISCONTINUED | OUTPATIENT
Start: 2018-11-26 | End: 2018-11-26 | Stop reason: SDUPTHER

## 2018-11-26 RX ORDER — FENTANYL CITRATE 50 UG/ML
50 INJECTION, SOLUTION INTRAMUSCULAR; INTRAVENOUS EVERY 5 MIN PRN
Status: DISCONTINUED | OUTPATIENT
Start: 2018-11-26 | End: 2018-11-26 | Stop reason: HOSPADM

## 2018-11-26 RX ORDER — GLYCOPYRROLATE 1 MG/5 ML
SYRINGE (ML) INTRAVENOUS PRN
Status: DISCONTINUED | OUTPATIENT
Start: 2018-11-26 | End: 2018-11-26 | Stop reason: SDUPTHER

## 2018-11-26 RX ORDER — METOPROLOL SUCCINATE 25 MG/1
25 TABLET, EXTENDED RELEASE ORAL ONCE
Status: DISCONTINUED | OUTPATIENT
Start: 2018-11-26 | End: 2018-11-27 | Stop reason: HOSPADM

## 2018-11-26 RX ORDER — SODIUM CHLORIDE 0.9 % (FLUSH) 0.9 %
10 SYRINGE (ML) INJECTION EVERY 12 HOURS SCHEDULED
Status: DISCONTINUED | OUTPATIENT
Start: 2018-11-26 | End: 2018-11-27 | Stop reason: HOSPADM

## 2018-11-26 RX ORDER — MEPERIDINE HYDROCHLORIDE 25 MG/ML
12.5 INJECTION INTRAMUSCULAR; INTRAVENOUS; SUBCUTANEOUS EVERY 5 MIN PRN
Status: DISCONTINUED | OUTPATIENT
Start: 2018-11-26 | End: 2018-11-26 | Stop reason: HOSPADM

## 2018-11-26 RX ORDER — SPIRONOLACTONE 25 MG/1
25 TABLET ORAL DAILY
Status: DISCONTINUED | OUTPATIENT
Start: 2018-11-27 | End: 2018-11-27 | Stop reason: HOSPADM

## 2018-11-26 RX ORDER — CITALOPRAM 40 MG/1
40 TABLET ORAL EVERY MORNING
Status: DISCONTINUED | OUTPATIENT
Start: 2018-11-27 | End: 2018-11-27 | Stop reason: HOSPADM

## 2018-11-26 RX ORDER — ARGININE HCL 1000 MG
1000 TABLET ORAL DAILY PRN
COMMUNITY
End: 2022-01-13

## 2018-11-26 RX ORDER — ROCURONIUM BROMIDE 10 MG/ML
INJECTION, SOLUTION INTRAVENOUS PRN
Status: DISCONTINUED | OUTPATIENT
Start: 2018-11-26 | End: 2018-11-26 | Stop reason: SDUPTHER

## 2018-11-26 RX ORDER — DIPHENHYDRAMINE HCL 50 MG
50 CAPSULE ORAL NIGHTLY PRN
Status: DISCONTINUED | OUTPATIENT
Start: 2018-11-26 | End: 2018-11-26 | Stop reason: SDUPTHER

## 2018-11-26 RX ORDER — OXYCODONE HYDROCHLORIDE AND ACETAMINOPHEN 5; 325 MG/1; MG/1
TABLET ORAL
Status: COMPLETED
Start: 2018-11-26 | End: 2018-11-26

## 2018-11-26 RX ORDER — OXYCODONE HYDROCHLORIDE AND ACETAMINOPHEN 5; 325 MG/1; MG/1
1 TABLET ORAL EVERY 4 HOURS PRN
Status: DISCONTINUED | OUTPATIENT
Start: 2018-11-26 | End: 2018-11-27 | Stop reason: HOSPADM

## 2018-11-26 RX ORDER — ONDANSETRON 2 MG/ML
INJECTION INTRAMUSCULAR; INTRAVENOUS PRN
Status: DISCONTINUED | OUTPATIENT
Start: 2018-11-26 | End: 2018-11-26 | Stop reason: SDUPTHER

## 2018-11-26 RX ORDER — ONDANSETRON 2 MG/ML
4 INJECTION INTRAMUSCULAR; INTRAVENOUS EVERY 6 HOURS PRN
Status: DISCONTINUED | OUTPATIENT
Start: 2018-11-26 | End: 2018-11-27 | Stop reason: HOSPADM

## 2018-11-26 RX ORDER — SUCCINYLCHOLINE CHLORIDE 20 MG/ML
INJECTION INTRAMUSCULAR; INTRAVENOUS PRN
Status: DISCONTINUED | OUTPATIENT
Start: 2018-11-26 | End: 2018-11-26 | Stop reason: SDUPTHER

## 2018-11-26 RX ORDER — SODIUM CHLORIDE 9 MG/ML
INJECTION, SOLUTION INTRAVENOUS CONTINUOUS
Status: DISCONTINUED | OUTPATIENT
Start: 2018-11-26 | End: 2018-11-27 | Stop reason: HOSPADM

## 2018-11-26 RX ORDER — GABAPENTIN 400 MG/1
800 CAPSULE ORAL 2 TIMES DAILY
Status: DISCONTINUED | OUTPATIENT
Start: 2018-11-27 | End: 2018-11-27 | Stop reason: HOSPADM

## 2018-11-26 RX ORDER — PANTOPRAZOLE SODIUM 40 MG/1
40 TABLET, DELAYED RELEASE ORAL
Status: DISCONTINUED | OUTPATIENT
Start: 2018-11-27 | End: 2018-11-27 | Stop reason: HOSPADM

## 2018-11-26 RX ORDER — ACETAMINOPHEN 325 MG/1
650 TABLET ORAL EVERY 4 HOURS PRN
Status: DISCONTINUED | OUTPATIENT
Start: 2018-11-26 | End: 2018-11-27 | Stop reason: HOSPADM

## 2018-11-26 RX ORDER — ALBUTEROL SULFATE 90 UG/1
2 AEROSOL, METERED RESPIRATORY (INHALATION) EVERY 6 HOURS PRN
Status: DISCONTINUED | OUTPATIENT
Start: 2018-11-26 | End: 2018-11-27 | Stop reason: HOSPADM

## 2018-11-26 RX ORDER — SODIUM CHLORIDE 9 MG/ML
INJECTION, SOLUTION INTRAVENOUS CONTINUOUS
Status: DISCONTINUED | OUTPATIENT
Start: 2018-11-26 | End: 2018-11-26

## 2018-11-26 RX ORDER — BUPIVACAINE HYDROCHLORIDE AND EPINEPHRINE 5; 5 MG/ML; UG/ML
INJECTION, SOLUTION EPIDURAL; INTRACAUDAL; PERINEURAL PRN
Status: DISCONTINUED | OUTPATIENT
Start: 2018-11-26 | End: 2018-11-26 | Stop reason: HOSPADM

## 2018-11-26 RX ORDER — FUROSEMIDE 20 MG/1
20 TABLET ORAL DAILY
Status: DISCONTINUED | OUTPATIENT
Start: 2018-11-26 | End: 2018-11-27 | Stop reason: HOSPADM

## 2018-11-26 RX ORDER — NEOSTIGMINE METHYLSULFATE 1 MG/ML
INJECTION, SOLUTION INTRAVENOUS PRN
Status: DISCONTINUED | OUTPATIENT
Start: 2018-11-26 | End: 2018-11-26 | Stop reason: SDUPTHER

## 2018-11-26 RX ORDER — HYDRALAZINE HYDROCHLORIDE 20 MG/ML
INJECTION INTRAMUSCULAR; INTRAVENOUS PRN
Status: DISCONTINUED | OUTPATIENT
Start: 2018-11-26 | End: 2018-11-26 | Stop reason: SDUPTHER

## 2018-11-26 RX ORDER — FENTANYL CITRATE 50 UG/ML
INJECTION, SOLUTION INTRAMUSCULAR; INTRAVENOUS PRN
Status: DISCONTINUED | OUTPATIENT
Start: 2018-11-26 | End: 2018-11-26 | Stop reason: SDUPTHER

## 2018-11-26 RX ORDER — NICOTINE POLACRILEX 4 MG
15 LOZENGE BUCCAL PRN
Status: DISCONTINUED | OUTPATIENT
Start: 2018-11-26 | End: 2018-11-27 | Stop reason: HOSPADM

## 2018-11-26 RX ORDER — METOPROLOL SUCCINATE 25 MG/1
25 TABLET, EXTENDED RELEASE ORAL DAILY
Status: DISCONTINUED | OUTPATIENT
Start: 2018-11-27 | End: 2018-11-27 | Stop reason: HOSPADM

## 2018-11-26 RX ORDER — DEXTROSE MONOHYDRATE 50 MG/ML
100 INJECTION, SOLUTION INTRAVENOUS PRN
Status: DISCONTINUED | OUTPATIENT
Start: 2018-11-26 | End: 2018-11-27 | Stop reason: HOSPADM

## 2018-11-26 RX ORDER — DEXTROSE MONOHYDRATE 25 G/50ML
12.5 INJECTION, SOLUTION INTRAVENOUS PRN
Status: DISCONTINUED | OUTPATIENT
Start: 2018-11-26 | End: 2018-11-27 | Stop reason: HOSPADM

## 2018-11-26 RX ORDER — ONDANSETRON 2 MG/ML
4 INJECTION INTRAMUSCULAR; INTRAVENOUS
Status: DISCONTINUED | OUTPATIENT
Start: 2018-11-26 | End: 2018-11-26 | Stop reason: HOSPADM

## 2018-11-26 RX ORDER — LIDOCAINE HYDROCHLORIDE 20 MG/ML
INJECTION, SOLUTION INFILTRATION; PERINEURAL PRN
Status: DISCONTINUED | OUTPATIENT
Start: 2018-11-26 | End: 2018-11-26 | Stop reason: SDUPTHER

## 2018-11-26 RX ORDER — DIPHENHYDRAMINE HCL 25 MG
50 TABLET ORAL NIGHTLY PRN
Status: DISCONTINUED | OUTPATIENT
Start: 2018-11-26 | End: 2018-11-27 | Stop reason: HOSPADM

## 2018-11-26 RX ORDER — GABAPENTIN 300 MG/1
300 CAPSULE ORAL 3 TIMES DAILY
Status: DISCONTINUED | OUTPATIENT
Start: 2018-11-26 | End: 2018-11-26

## 2018-11-26 RX ORDER — SODIUM CHLORIDE 0.9 % (FLUSH) 0.9 %
10 SYRINGE (ML) INJECTION PRN
Status: DISCONTINUED | OUTPATIENT
Start: 2018-11-26 | End: 2018-11-27 | Stop reason: HOSPADM

## 2018-11-26 RX ORDER — ASPIRIN 81 MG/1
81 TABLET ORAL DAILY
Status: DISCONTINUED | OUTPATIENT
Start: 2018-11-27 | End: 2018-11-27 | Stop reason: HOSPADM

## 2018-11-26 RX ORDER — QUETIAPINE FUMARATE 25 MG/1
50 TABLET, FILM COATED ORAL NIGHTLY
Status: DISCONTINUED | OUTPATIENT
Start: 2018-11-26 | End: 2018-11-27 | Stop reason: HOSPADM

## 2018-11-26 RX ORDER — PROPOFOL 10 MG/ML
INJECTION, EMULSION INTRAVENOUS PRN
Status: DISCONTINUED | OUTPATIENT
Start: 2018-11-26 | End: 2018-11-26 | Stop reason: SDUPTHER

## 2018-11-26 RX ORDER — TROSPIUM CHLORIDE 20 MG/1
20 TABLET, FILM COATED ORAL
Status: DISCONTINUED | OUTPATIENT
Start: 2018-11-27 | End: 2018-11-27 | Stop reason: HOSPADM

## 2018-11-26 RX ADMIN — Medication 0.8 MG: at 15:30

## 2018-11-26 RX ADMIN — ROCURONIUM BROMIDE 40 MG: 10 INJECTION INTRAVENOUS at 14:33

## 2018-11-26 RX ADMIN — GABAPENTIN 800 MG: 400 CAPSULE ORAL at 22:12

## 2018-11-26 RX ADMIN — ONDANSETRON HYDROCHLORIDE 4 MG: 4 INJECTION, SOLUTION INTRAMUSCULAR; INTRAVENOUS at 15:11

## 2018-11-26 RX ADMIN — SUCCINYLCHOLINE CHLORIDE 120 MG: 20 INJECTION, SOLUTION INTRAMUSCULAR; INTRAVENOUS at 14:23

## 2018-11-26 RX ADMIN — SODIUM CHLORIDE: 9 INJECTION, SOLUTION INTRAVENOUS at 12:25

## 2018-11-26 RX ADMIN — PROPOFOL 50 MG: 10 INJECTION, EMULSION INTRAVENOUS at 14:30

## 2018-11-26 RX ADMIN — FENTANYL CITRATE 50 MCG: 50 INJECTION INTRAMUSCULAR; INTRAVENOUS at 14:52

## 2018-11-26 RX ADMIN — OXYCODONE AND ACETAMINOPHEN 1 TABLET: 5; 325 TABLET ORAL at 21:43

## 2018-11-26 RX ADMIN — SODIUM CHLORIDE: 9 INJECTION, SOLUTION INTRAVENOUS at 18:20

## 2018-11-26 RX ADMIN — HYDRALAZINE HYDROCHLORIDE 4 MG: 20 INJECTION INTRAMUSCULAR; INTRAVENOUS at 15:04

## 2018-11-26 RX ADMIN — LIDOCAINE HYDROCHLORIDE 100 MG: 20 INJECTION, SOLUTION INFILTRATION; PERINEURAL at 14:23

## 2018-11-26 RX ADMIN — PROPOFOL 150 MG: 10 INJECTION, EMULSION INTRAVENOUS at 14:23

## 2018-11-26 RX ADMIN — METOPROLOL TARTRATE 1 MG: 5 INJECTION, SOLUTION INTRAVENOUS at 15:14

## 2018-11-26 RX ADMIN — OXYCODONE AND ACETAMINOPHEN 1 TABLET: 5; 325 TABLET ORAL at 15:55

## 2018-11-26 RX ADMIN — QUETIAPINE FUMARATE 50 MG: 25 TABLET ORAL at 21:43

## 2018-11-26 RX ADMIN — NEOSTIGMINE METHYLSULFATE 5 MG: 1 INJECTION, SOLUTION INTRAVENOUS at 15:30

## 2018-11-26 RX ADMIN — FENTANYL CITRATE 50 MCG: 50 INJECTION INTRAMUSCULAR; INTRAVENOUS at 14:55

## 2018-11-26 RX ADMIN — FENTANYL CITRATE 100 MCG: 50 INJECTION INTRAMUSCULAR; INTRAVENOUS at 14:19

## 2018-11-26 RX ADMIN — Medication 2 G: at 14:38

## 2018-11-26 ASSESSMENT — PAIN DESCRIPTION - PROGRESSION
CLINICAL_PROGRESSION: NOT CHANGED

## 2018-11-26 ASSESSMENT — PULMONARY FUNCTION TESTS
PIF_VALUE: 27
PIF_VALUE: 21
PIF_VALUE: 27
PIF_VALUE: 23
PIF_VALUE: 22
PIF_VALUE: 22
PIF_VALUE: 23
PIF_VALUE: 27
PIF_VALUE: 2
PIF_VALUE: 23
PIF_VALUE: 28
PIF_VALUE: 8
PIF_VALUE: 28
PIF_VALUE: 23
PIF_VALUE: 21
PIF_VALUE: 22
PIF_VALUE: 28
PIF_VALUE: 22
PIF_VALUE: 23
PIF_VALUE: 26
PIF_VALUE: 23
PIF_VALUE: 29
PIF_VALUE: 28
PIF_VALUE: 23
PIF_VALUE: 23
PIF_VALUE: 27
PIF_VALUE: 23
PIF_VALUE: 23
PIF_VALUE: 27
PIF_VALUE: 28
PIF_VALUE: 28
PIF_VALUE: 23
PIF_VALUE: 25
PIF_VALUE: 23
PIF_VALUE: 23
PIF_VALUE: 4
PIF_VALUE: 31
PIF_VALUE: 27
PIF_VALUE: 21
PIF_VALUE: 23
PIF_VALUE: 28
PIF_VALUE: 0
PIF_VALUE: 21
PIF_VALUE: 28
PIF_VALUE: 23
PIF_VALUE: 23
PIF_VALUE: 28
PIF_VALUE: 34
PIF_VALUE: 2
PIF_VALUE: 23
PIF_VALUE: 28
PIF_VALUE: 23
PIF_VALUE: 27
PIF_VALUE: 28
PIF_VALUE: 18
PIF_VALUE: 22
PIF_VALUE: 27
PIF_VALUE: 1
PIF_VALUE: 28
PIF_VALUE: 27
PIF_VALUE: 28
PIF_VALUE: 27
PIF_VALUE: 21
PIF_VALUE: 23
PIF_VALUE: 24
PIF_VALUE: 27
PIF_VALUE: 2
PIF_VALUE: 29
PIF_VALUE: 28

## 2018-11-26 ASSESSMENT — PAIN DESCRIPTION - PAIN TYPE
TYPE: SURGICAL PAIN
TYPE: CHRONIC PAIN

## 2018-11-26 ASSESSMENT — PAIN SCALES - GENERAL
PAINLEVEL_OUTOF10: 0
PAINLEVEL_OUTOF10: 3
PAINLEVEL_OUTOF10: 6
PAINLEVEL_OUTOF10: 4
PAINLEVEL_OUTOF10: 7
PAINLEVEL_OUTOF10: 3

## 2018-11-26 ASSESSMENT — PAIN DESCRIPTION - DESCRIPTORS
DESCRIPTORS: ACHING;DISCOMFORT
DESCRIPTORS: ACHING
DESCRIPTORS: ACHING;DISCOMFORT

## 2018-11-26 ASSESSMENT — PAIN DESCRIPTION - ONSET: ONSET: GRADUAL

## 2018-11-26 ASSESSMENT — PAIN DESCRIPTION - LOCATION
LOCATION: ABDOMEN
LOCATION: ABDOMEN

## 2018-11-26 ASSESSMENT — ENCOUNTER SYMPTOMS: SHORTNESS OF BREATH: 1

## 2018-11-26 ASSESSMENT — PAIN DESCRIPTION - FREQUENCY: FREQUENCY: INTERMITTENT

## 2018-11-26 ASSESSMENT — PAIN DESCRIPTION - ORIENTATION: ORIENTATION: RIGHT;LOWER

## 2018-11-26 NOTE — PROGRESS NOTES
Spoke to Dr Slava Smith in redness and edema of bilateral legs. Dr Sada Cardenas states is aware of redness and edema in bilateral room. Ok to proceed from surgery. Dr Sada Cardenas states patient is staying.

## 2018-11-26 NOTE — ANESTHESIA PRE PROCEDURE
daily if needed (TAKE 2 TABS ON ANY DAY THERE IS SWELLING IN LEGS OR ANKLES) 10/11/16  Yes Brett De Guzman MD   etodolac (LODINE) 500 MG tablet take 1 tablet by mouth twice a day 10/4/16  Yes Brett De Guzman MD   pioglitazone (ACTOS) 45 MG tablet take 1 tablet by mouth once daily 10/4/16  Yes Brett De Guzman MD   [facility] HAND[facility] 18 MCG inhalation capsule inhale the contents of one capsule in the handihaler once daily 8/18/16  Yes Brett De Guzman MD   hydrOXYzine (ATARAX) 50 MG tablet take 1 tablet by mouth at bedtime 7/25/16  Yes Brett De Guzman MD   gabapentin (NEURONTIN) 300 MG capsule Take 1 capsule by mouth 3 times daily  Patient taking differently: Take 800 mg by mouth 3 times daily . 6/8/16  Yes Brett De Guzman MD   albuterol sulfate HFA (PROAIR HFA) 108 (90 BASE) MCG/ACT inhaler Inhale 2 puffs into the lungs every 4 hours as needed 6/8/16  Yes Brett De Guzman MD   FREESTYLE LANCETS MISC Test 1 time daily. Dx: 250.00-Type 2 Diabetes. Freestyle 28G Lancets 3/17/16  Yes Brett De Guzman MD   tiZANidine (ZANAFLEX) 4 MG tablet Take 4 mg by mouth every 8 hours as needed   Yes Historical Provider, MD   Omega-3 Fatty Acids (FISH OIL) 1200 MG CAPS Take 1,200 mg by mouth daily   Yes Historical Provider, MD   ASPIRIN LOW DOSE 81 MG EC tablet take 1 tablet by mouth once daily 7/2/15  Yes Brett De Guzman MD   glucose blood VI test strips (FREESTYLE LITE) strip Test once daily 4/25/15  Yes Brett De Guzman MD   citalopram (CELEXA) 40 MG tablet Take 40 mg by mouth every morning. Yes CATE Cano - CNP   diphenhydrAMINE (BENADRYL) 50 MG capsule Take 50 mg by mouth nightly as needed for Itching. Yes Historical Provider, MD   QUEtiapine (SEROQUEL) 50 MG tablet Take 50 mg by mouth nightly.    Yes Historical Provider, MD   acetaminophen (TYLENOL) 500 MG tablet Take 500 mg by mouth every 6 hours as

## 2018-11-26 NOTE — PROGRESS NOTES
Oriented to sds       7  Refuses flu and pneumonia vaccine. Family updated to register with volunteer out at . Informed family to take belonging with them. Keep nothing of value in room unattended. Informed to get the case number for surgery from volunteer out front to be able to follow them through surgery.

## 2018-11-26 NOTE — H&P
[address] [place] [address]                       PREOPERATIVE HISTORY AND PHYSICAL    PATIENT NAME: Verenice Miller                   :        1950  MED REC NO:   [id number]                           ROOM:  ACCOUNT NO:   [de-identified]                           ADMIT DATE: 2018  PROVIDER:     Contreras Hernandez MD    CHIEF COMPLAINT:  Persistent chronic biliary colic. HISTORY OF PRESENT ILLNESS:  The patient is a 69-year white female who  has a long history of abdominal problems, recurrent hernia repairs, and  basically has been having symptomatic biliary colic symptoms for at  least a few years and was found to have a sludge in the gallbladder. The patient was felt to be a very high risk for surgery and basically it  has been held off, but now she is just having persistent right upper  quadrant pain. In spite of significant medical issues, she is being  admitted at this time for attempted laparoscopic cholecystectomy with  probable need for open cholecystectomy. She has as well a history of  MRSA infections in the past.    PAST MEDICAL HISTORY:  Positive for cardiac arrhythmias, coronary artery  disease, COPD, diabetes, hyperlipidemia, hypertension, chronic renal  disease, history of MRSA in the abdomen. PAST SURGICAL HISTORY:  Include a lumbar disk surgery in the past.  She  has had coronary angioplasties, cataract surgery. She had an umbilical  hernia repair at the age of 21 months. She has had ventral hernia  repair x2. She has had a hysterectomy, tonsillectomy. SOCIAL HISTORY:  She had previously smoked, but quit in . ALLERGIES:  Multiple including ADVAIR, CAFFEINE, PENICILLINS, ULTRAM,  WOOD ALCOHOL, LATEX and BEE VENOM. PHYSICAL EXAMINATION:  GENERAL:  The patient is an obese 69-year-old white female who appears  her age.   HEAD, EARS, EYES, NOSE, AND THROAT:  Pupils are equal.  EOMs are intact. Sclerae are clear. CARDIAC:  S1, S2.  LUNGS:  The respirations are clear, but distant. ABDOMEN:  Soft. Does have pain to palpation in the right upper  quadrant. ASSESSMENT AND PLAN:  Symptomatic sludge and biliary colic. A long  discussion was made with the patient regarding the risk of this  operation, the potential for bad outcome medically, also the most likely  need for open cholecystectomy. She voices understanding and would like  to proceed. We will attempt to get a laparoscope into the abdominal  cavity. The patient wants to proceed. THA MERCEDES [facility], MD    D: 11/25/2018 20:18:11       T: 11/25/2018 21:35:24     [email or web address]  Job#: [id number]     Doc#: [id number]    CC:

## 2018-11-26 NOTE — PROGRESS NOTES
5 Pt awake and oriented on arrival to PACU with o2 3L NC , HOB elevated , pt denies any pain   1550 pt states starting to have some discomfort   1555 pt medicated with 1 percocet tab  1615 Pt states pain tolerable   1624 meets criteria for discharge , transported to [facility] until room ready

## 2018-11-26 NOTE — BRIEF OP NOTE
Brief Postoperative Note  ______________________________________________________________    Patient: Adalgisa Adler  YOB: 1950  MRN: [id number]  Date of Procedure: 11/26/2018    Pre-Op Diagnosis: GALLBLADDER SLUDGE, ABDOMINAL PAIN    Post-Op Diagnosis: Same       Procedure(s):  LAPAROSCOPIC CHOLECYSTECTOMY    Anesthesia: General    Surgeon(s):  Felipe Marmolejo MD    Assistant:     Estimated Blood Loss 10 ML    Complications: NONE    Specimens:   ID Type Source Tests Collected by Time Destination   A : GALLBLADDER Tissue Gallbladder SURGICAL PATHOLOGY Felipe Marmolejo MD 11/26/2018 1503        Implants:    Implant Name Type Inv.  Item Serial No.  Lot No. LRB No. Used   FLOSEAL HEMOSTAT MATRX 5ML NEEDLE FREE Bone/Graft/Tissue FLOSEAL HEMOSTAT MATRX 5ML NEEDLE FREE  [employer] [id number] N/A 1         Drains:      Findings: SEE OP NOTE    Felipe Marmolejo MD  Date: 11/26/2018  Time: 3:41 PM

## 2018-11-27 VITALS
OXYGEN SATURATION: 92 % | TEMPERATURE: 98 F | HEIGHT: 63 IN | DIASTOLIC BLOOD PRESSURE: 91 MMHG | HEART RATE: 74 BPM | SYSTOLIC BLOOD PRESSURE: 130 MMHG | RESPIRATION RATE: 18 BRPM | BODY MASS INDEX: 47.91 KG/M2 | WEIGHT: 270.4 LBS

## 2018-11-27 PROBLEM — Z90.49 S/P LAPAROSCOPIC CHOLECYSTECTOMY: Status: ACTIVE | Noted: 2018-11-27

## 2018-11-27 PROBLEM — R10.9 ABDOMINAL PAIN: Status: RESOLVED | Noted: 2018-11-26 | Resolved: 2018-11-27

## 2018-11-27 LAB
ANION GAP SERPL CALCULATED.3IONS-SCNC: 11 MEQ/L (ref 8–16)
BASOPHILS # BLD: 0.4 %
BASOPHILS ABSOLUTE: 0 THOU/MM3 (ref 0–0.1)
BUN BLDV-MCNC: 16 MG/DL (ref 7–22)
CALCIUM SERPL-MCNC: 8.7 MG/DL (ref 8.5–10.5)
CHLORIDE BLD-SCNC: 106 MEQ/L (ref 98–111)
CO2: 21 MEQ/L (ref 23–33)
CREAT SERPL-MCNC: 0.7 MG/DL (ref 0.4–1.2)
EOSINOPHIL # BLD: 1.2 %
EOSINOPHILS ABSOLUTE: 0.1 THOU/MM3 (ref 0–0.4)
ERYTHROCYTE [DISTWIDTH] IN BLOOD BY AUTOMATED COUNT: 16.9 % (ref 11.5–14.5)
ERYTHROCYTE [DISTWIDTH] IN BLOOD BY AUTOMATED COUNT: 50.5 FL (ref 35–45)
GFR SERPL CREATININE-BSD FRML MDRD: 83 ML/MIN/1.73M2
GLUCOSE BLD-MCNC: 109 MG/DL (ref 70–108)
GLUCOSE BLD-MCNC: 111 MG/DL (ref 70–108)
GLUCOSE BLD-MCNC: 115 MG/DL (ref 70–108)
HCT VFR BLD CALC: 32.7 % (ref 37–47)
HEMOGLOBIN: 9.9 GM/DL (ref 12–16)
IMMATURE GRANS (ABS): 0.04 THOU/MM3 (ref 0–0.07)
IMMATURE GRANULOCYTES: 0.5 %
LYMPHOCYTES # BLD: 20.6 %
LYMPHOCYTES ABSOLUTE: 1.7 THOU/MM3 (ref 1–4.8)
MCH RBC QN AUTO: 25 PG (ref 26–33)
MCHC RBC AUTO-ENTMCNC: 30.3 GM/DL (ref 32.2–35.5)
MCV RBC AUTO: 82.6 FL (ref 81–99)
MONOCYTES # BLD: 10.2 %
MONOCYTES ABSOLUTE: 0.9 THOU/MM3 (ref 0.4–1.3)
NUCLEATED RED BLOOD CELLS: 0 /100 WBC
PLATELET # BLD: 254 THOU/MM3 (ref 130–400)
PMV BLD AUTO: 11.1 FL (ref 9.4–12.4)
POTASSIUM SERPL-SCNC: 4.1 MEQ/L (ref 3.5–5.2)
RBC # BLD: 3.96 MILL/MM3 (ref 4.2–5.4)
SEG NEUTROPHILS: 67.1 %
SEGMENTED NEUTROPHILS ABSOLUTE COUNT: 5.6 THOU/MM3 (ref 1.8–7.7)
SODIUM BLD-SCNC: 138 MEQ/L (ref 135–145)
WBC # BLD: 8.4 THOU/MM3 (ref 4.8–10.8)

## 2018-11-27 PROCEDURE — 82948 REAGENT STRIP/BLOOD GLUCOSE: CPT

## 2018-11-27 PROCEDURE — 96372 THER/PROPH/DIAG INJ SC/IM: CPT

## 2018-11-27 PROCEDURE — 80048 BASIC METABOLIC PNL TOTAL CA: CPT

## 2018-11-27 PROCEDURE — 6370000000 HC RX 637 (ALT 250 FOR IP): Performed by: SURGERY

## 2018-11-27 PROCEDURE — 36415 COLL VENOUS BLD VENIPUNCTURE: CPT

## 2018-11-27 PROCEDURE — 85025 COMPLETE CBC W/AUTO DIFF WBC: CPT

## 2018-11-27 PROCEDURE — 96360 HYDRATION IV INFUSION INIT: CPT

## 2018-11-27 PROCEDURE — 2580000003 HC RX 258: Performed by: SURGERY

## 2018-11-27 PROCEDURE — 99024 POSTOP FOLLOW-UP VISIT: CPT | Performed by: NURSE PRACTITIONER

## 2018-11-27 PROCEDURE — 94640 AIRWAY INHALATION TREATMENT: CPT

## 2018-11-27 PROCEDURE — 96361 HYDRATE IV INFUSION ADD-ON: CPT

## 2018-11-27 PROCEDURE — 6360000002 HC RX W HCPCS: Performed by: SURGERY

## 2018-11-27 RX ADMIN — OXYCODONE AND ACETAMINOPHEN 1 TABLET: 5; 325 TABLET ORAL at 06:47

## 2018-11-27 RX ADMIN — ENOXAPARIN SODIUM 40 MG: 40 INJECTION SUBCUTANEOUS at 12:47

## 2018-11-27 RX ADMIN — METOPROLOL SUCCINATE 25 MG: 25 TABLET, FILM COATED, EXTENDED RELEASE ORAL at 09:11

## 2018-11-27 RX ADMIN — CITALOPRAM 40 MG: 40 TABLET, FILM COATED ORAL at 09:11

## 2018-11-27 RX ADMIN — SPIRONOLACTONE 25 MG: 25 TABLET ORAL at 09:11

## 2018-11-27 RX ADMIN — PANTOPRAZOLE SODIUM 40 MG: 40 TABLET, DELAYED RELEASE ORAL at 06:47

## 2018-11-27 RX ADMIN — TIOTROPIUM BROMIDE 18 MCG: 18 CAPSULE ORAL; RESPIRATORY (INHALATION) at 10:10

## 2018-11-27 RX ADMIN — TROSPIUM CHLORIDE 20 MG: 20 TABLET, FILM COATED ORAL at 06:47

## 2018-11-27 RX ADMIN — FUROSEMIDE 20 MG: 20 TABLET ORAL at 09:11

## 2018-11-27 RX ADMIN — ASPIRIN 81 MG: 81 TABLET, COATED ORAL at 09:11

## 2018-11-27 RX ADMIN — GABAPENTIN 800 MG: 400 CAPSULE ORAL at 09:12

## 2018-11-27 RX ADMIN — SODIUM CHLORIDE: 9 INJECTION, SOLUTION INTRAVENOUS at 04:55

## 2018-11-27 ASSESSMENT — PAIN DESCRIPTION - PAIN TYPE
TYPE: SURGICAL PAIN
TYPE: SURGICAL PAIN

## 2018-11-27 ASSESSMENT — PAIN DESCRIPTION - PROGRESSION
CLINICAL_PROGRESSION: NOT CHANGED
CLINICAL_PROGRESSION: GRADUALLY IMPROVING
CLINICAL_PROGRESSION: NOT CHANGED

## 2018-11-27 ASSESSMENT — PAIN SCALES - GENERAL
PAINLEVEL_OUTOF10: 2
PAINLEVEL_OUTOF10: 4
PAINLEVEL_OUTOF10: 3
PAINLEVEL_OUTOF10: 3
PAINLEVEL_OUTOF10: 6

## 2018-11-27 ASSESSMENT — PAIN DESCRIPTION - LOCATION
LOCATION: ABDOMEN
LOCATION: ABDOMEN

## 2018-11-27 ASSESSMENT — PAIN DESCRIPTION - FREQUENCY
FREQUENCY: INTERMITTENT

## 2018-11-27 ASSESSMENT — PAIN DESCRIPTION - DESCRIPTORS
DESCRIPTORS: ACHING
DESCRIPTORS: ACHING

## 2018-11-27 ASSESSMENT — PAIN DESCRIPTION - ORIENTATION: ORIENTATION: RIGHT;LEFT

## 2018-11-27 ASSESSMENT — PAIN DESCRIPTION - ONSET: ONSET: GRADUAL

## 2018-11-27 NOTE — DISCHARGE INSTR - DIET

## 2018-11-27 NOTE — CARE COORDINATION
DISCHARGE BARRIERS  11/27/18, 10:53 AM    Reason for Referral: \"current [facility] [place] for RN & Aide\"  Mental Status: Patient is alert and oriented  Decision Making: Patient makes own decisions  Family/Social/Home Environment:  Spoke with patient, assessment completed. Patient lives alone at the [facility]. Patient is independent with sponge bathing but wants aide to assist with washing legs and back. Patient cooks, cleans and does her laundry, aide also assists with cleaning. [facility] is [facility] with [facility], RN and aide. Current Services: [facility], [facility] & aide  Current Equipment:cane, 2 walkers and wheelchair  Payment Source: [facility] Medicare and Medicaid  Concerns or Barriers to Discharge:  None noted  Collabrative List of ECF/HH were provided: no, current with [facility]    Teach Back Method used with patient regarding care plan and discharge planning. Patient  verbalize understanding of the plan of care and contribute to goal setting. Anticipated Needs/Discharge Plan: Patient plans to return home alone and resume [facility] RN & lainee. Spoke with Garo Tsang at [facility], confirmed services. Informed probable discharge today.     Electronically signed by SHONNA Montoya on 11/27/2018 at 10:53 AM

## 2018-11-27 NOTE — PROGRESS NOTES
Pt admitted from [facility] with normal saline infusing at 100ml/hr with 900ml left I bag steri strips x6 are dry and intact scds in place.

## 2018-11-27 NOTE — DISCHARGE INSTR - ACTIVITY
Continue to be up and walking at home  No pushing, pulling or tugging  No heavy lifting over 10-15 pounds  Shower daily using chlorhexidine soap

## 2018-11-27 NOTE — CARE COORDINATION
11/27/18 9:59 AM    OP in a bed. 11/26 LAPAROSCOPIC CHOLECYSTECTOMY    POD #1. On room air. Afebrile. IVF, lovenox, SSI ACHS. Prn percocet, inhaler. Hgb 9.9. Spoke with Arlen Lorenzo; states she lives at home alone in the [facility]. She has a cane, walker, and wheelchair. She is current with [facility] for RN and Aide. Arlen Lorenzo states she plans to return home at discharge and resume [facility] services as PTA. She denies any further needs at this time. SW consulted.

## 2018-11-27 NOTE — PLAN OF CARE
Problem: Falls - Risk of:  Goal: Will remain free from falls  Will remain free from falls   Outcome: Ongoing  Patient free from injury/harm this shift. Complying well with medical devices and following safety precautions. Fall risk continues to be assessed. Fall precautions in place, 5 P's used to provide safe environment. Bed in low and locked position, call light in reach, side rails upx2-3. Needs being met. Continuing to monitor. Problem: Pain:  Goal: Pain level will decrease  Pain level will decrease   Outcome: Met This Shift  Pt received oral pain which has allowed her to rest with eyes closed without noted,                                                                       Problem: Infection:  Goal: Will remain free from infection  Will remain free from infection  Outcome: Met This Shift  Pt has no s/s of infection    Problem: Daily Care:  Goal: Daily care needs are met  Daily care needs are met  Outcome: Met This Shift  Patient assessed as  1 assist to accomplish ADLs. Patient voicing needs appropriately. Encouraging and promoting as much  independence as possible per patient ability and assisting with ADLS and hygiene needs as needed. Skin and mucous membranes appropriate. Continuing to assess daily care needs. Problem: Skin Integrity:  Goal: Skin integrity will stabilize  Skin integrity will stabilize  Pt has surgical incisions that are clean dry and intact no breakdown noted to coccyx area    Comments: Care plan reviewed with patient. Patient does verbalize understanding of the plan of care and contribute to goal setting.

## 2018-11-27 NOTE — DISCHARGE INSTR - COC
Continuity of Care Form    Patient Name: Rizwan Mirza   :  1950  MRN:  [id number]    Admit date:  2018  Discharge date:  ***    Code Status Order: Full Code   Advance Directives:   Advance Care Flowsheet Documentation     Date/Time Healthcare Directive Type of Healthcare Directive Copy in [address] Agent's Name Healthcare Agent's Phone Number    18 1203  No, patient does not have an advance directive for healthcare treatment -- -- -- -- --          Admitting Physician: Lizeth Maradiaga MD  PCP: Benji Nash MD    Discharging Nurse: [facility] Unit/Room#: 5E-55/055-A  Discharging Unit Phone Number: ***    Emergency Contact:   Extended Emergency Contact Information  Primary Emergency Contact: Joe Ny [address] Phone: [phone]  Relation: Child  Secondary Emergency Contact: Alba Woods [address] Phone: [phone]  Relation: Child    Past Surgical History:  Past Surgical History:   Procedure Laterality Date    BACK SURGERY      CARDIOVASCULAR STRESS TEST  2009    Persantine test associated w/non specific symptoms. EKG non diagnostic. no obvious stress induced ischemia. EF 63%. normal wall motion.  CARDIOVASCULAR STRESS TEST  2010    tilt table associated w/fluctuation of BP w/relatively low BP throughout test. no significant symptoms during test. borderline tilt table test.    COLONOSCOPY      CORONARY ANGIOPLASTY  2009    EF 60%. nonobstructive CAD. normal LV function.     ENDOSCOPY, COLON, DIAGNOSTIC  over 5 years     Dr. Lokesh Garner      cataract   [address]  25 months old     umblilical    HERNIA REPAIR  14    Ventral Hernia Repair with Mesh - Dr. Guillaume Reasons    partial    OTHER SURGICAL HISTORY  2014    Left lower quadrant incision and drainage (Dr. Kirill Neal, [facility])    OTHER SURGICAL HISTORY  2/2014    I&D abd abscess Dr. Nick Angeles N/A 11/26/2018    LAPAROSCOPY EXPLORATORY, LAP LYLE performed by Patricia Tsang MD at [facility]  as a child     TOOTH EXTRACTION  4/16/2012    TRANSTHORACIC ECHOCARDIOGRAM  03/04/2009    EF 50-55%. normal LV global systolic funct. slight LVH. slight lt atrial enlargement. no obvious stenotic valves. no pericardial effusion. Immunization History:   Immunization History   Administered Date(s) Administered    Td 07/27/2013       Active Problems:  Patient Active Problem List   Diagnosis Code    CAD (coronary artery disease) I25.10    Hypertension I10    Hyperlipidemia E78.5    Diabetes mellitus ([address] E11.9    Arrhythmia I49.9    Kidney disease N28.9    Falls frequently R29.6    Incontinence of urine R32    DDD (degenerative disc disease), lumbar M51.36    Depression F32.9    Allergic rhinitis J30.9    Hypersomnolence G47.10    Fatigue R53.83    Arthritis M19.90    Seasonal allergies J30.2    Obesity, Class III, BMI 40-49.9 (morbid obesity) ([address] E66.01    Ventral hernia K43.9    Peripheral edema R60.9    Fall from other slipping, tripping, or stumbling W01. 0XXA    BMI 45.0-49.9, adult ([address] Z68.42    Venous (peripheral) insufficiency I87.2    Urinary incontinence, mixed N39.46    Grief at loss of child F45.21, Z63.4    MRSA infection (methicillin-resistant Staphylococcus aureus) A49.02    COPD with exacerbation ([facility]) J44.1    Venous stasis dermatitis I87.2    Chronic obstructive pulmonary disease (HCC) J44.9    Fungus infection B49    Boil, thigh L02.439    Ulcer of genital labia N76.6    Venous ulcer of left leg ([facility]) I83.029, U12.543    Biliary colic B83.43    Abdominal pain R10.9       Isolation/Infection:   Isolation          No Isolation        Patient Infection Status     Infection Encounter Level?  Added Added By Resolved Resolved By Review Date Onset Date    MRSA for the continuing treatment of the diagnosis listed and that she requires {Admit to Appropriate Level of Care:27088} for {GREATER/LESS:427045739} 30 days.      Update Admission H&P: {CHP DME Changes in AXLVC:607924296}    PHYSICIAN SIGNATURE:  {Esignature:321724199}

## 2018-11-27 NOTE — DISCHARGE SUMMARY
Discharge Summary     Patient Identification:  Aundrea Yost  : 1950  MRN: [id number]   Account: [de-identified]     Admit date: 2018  Discharge date: 18  Attending provider: Kate El MD        Primary care provider: Blake Ceballos MD     Discharge Diagnoses: Active Problems:    S/P laparoscopic cholecystectomy  Resolved Problems:    Biliary colic    Abdominal pain       Hospital Course: Aundrea Yost is a 70 y.o. female admitted to [address] [place] [address] on  for biliary colic symptoms. she was taken to the operative suite per Alexandre Turcios MD and the planned procedure was performed as noted above. She was admitted to [address] [place] [address] for postoperative care and was initially managed with  analgesics for pain control, IV fluid hydration, GI and DVT prophylaxis. Over the hospital stay she readily improved in ability to tolerate increasing levels of activity and to take po fluids, solid foods with evidence of returning bowel function, and spontaneously voiding. At the time of discharge she was able to tolerate pain with oral analgesic and was medically stable. Procedures:   DATE OF PROCEDURE:  2018     PREOPERATIVE DIAGNOSIS:  Biliary colic.     POSTOPERATIVE DIAGNOSIS:  Biliary colic.     OPERATION PERFORMED:  Laparoscopy with laparoscopic cholecystectomy.     SURGEON:  David Guadarrama. MD [place]     ANESTHESIA:  General.     COMPLICATIONS:  None.     INDICATION FOR PROCEDURE:  The patient is a 69-year-old white female  with biliary colic symptoms with known sludge, who also has significant  medical issues and has had multiple prior abdominal surgeries, including  hernia repairs with mesh.   It was felt that it was unlikely this  gallbladder would be able to be removed through the scope, but it was  felt that at least an attempt to place the scope in the left upper  quadrant to view the abdominal wall would be a reasonable thing to do.     FINDINGS:  We did evidence of  gallbladder at this time and I was able to find an opening in the  epigastric area to the right of the falciform ligament, where I placed  another port under direct visualization. We were then able to lift up  the edge of the liver, which was clearly enlarged from fatty  infiltration, but I saw what I felt to be the dome of the gallbladder,  we grasped it with one of the [facility] [place] retractors, elevated, there  was clearly adhesions up, and with electrocautery and with blunt  dissection, I was able to pull these adhesions down. We were then able  to grasp this very floppy gallbladder at its neck, began dissecting in  this area, and identified the cystic duct. I placed two clips on the  common duct side of the cystic duct, and one on the gallbladder side,  divided it, found the cystic artery, put two clips on it on the hepatic  artery side, one on the gallbladder side and then we started dissecting  the gallbladder off the liver bed. There was one area that was bleeding  and I placed another clip well above the hepatoduodenal ligament common  duct area, and I continued remove the gallbladder off the gallbladder  bed of the liver, and removed it through the epigastric port. There  appear to be no bleeding or bowel leakage. I did put Gianluca-Seal into the  gallbladder bed to help stop any bleeding, and then closure was begun. Trocars were removed as air was aspirated out of the abdominal cavity. Interrupted 4-0 Vicryl was used to close the skin and the subcuticular  fascia, and Steri-Strips were applied.           THA MERCEDES [facility], MD    Code Status: Full Code     Consults:   none    Examination:  Vitals:  Vitals:    11/27/18 0018 11/27/18 0426 11/27/18 0901 11/27/18 1053   BP: (!) 129/58 137/68 (!) 122/57 (!) 130/91   Pulse: 85 82 78 74   Resp: 16 18 18 18   Temp: 99.8 °F (37.7 °C) 97.3 °F (36.3 °C) 98.1 °F (36.7 °C) 98 °F (36.7 °C)   TempSrc: Oral Oral Oral Oral   SpO2: 93% 92% 92% 92% Weight:       Height:         Weight: Weight: 270 lb 6.4 oz (122.7 kg)     24 hour intake/output:    Intake/Output Summary (Last 24 hours) at 11/27/18 1443  Last data filed at 11/27/18 0442   Gross per 24 hour   Intake             2868 ml   Output             1485 ml   Net             1383 ml       General appearance - oriented to person, place, and time and overweight  Chest - clear to auscultation, no wheezes, rales or rhonchi, symmetric air entry  Heart - normal rate and regular rhythm  Abdomen - tenderness noted as expected, BS active, soft  Obese: Yes; Protuberant: Yes   Neurological - alert, oriented, normal speech, no focal findings or movement disorder noted  Extremities - peripheral pulses normal, no pedal edema, no clubbing or cyanosis  Skin - normal coloration and turgor, no rashes, no suspicious skin lesions noted  Surgical Incisions:  Look good, well approximated, no drainge steri strips intact     Significant Diagnostics:   Radiology: No results found.     Labs:   Recent Results (from the past 72 hour(s))   Potassium    Collection Time: 11/26/18 11:42 AM   Result Value Ref Range    Potassium 4.1 3.5 - 5.2 meq/L   POCT Glucose    Collection Time: 11/26/18 12:11 PM   Result Value Ref Range    POC Glucose 93 70 - 108 mg/dl   POCT Glucose    Collection Time: 11/26/18  4:01 PM   Result Value Ref Range    POC Glucose 126 (H) 70 - 108 mg/dl   POCT Glucose    Collection Time: 11/26/18  9:46 PM   Result Value Ref Range    POC Glucose 98 70 - 108 mg/dl   Basic Metabolic Panel    Collection Time: 11/27/18  5:37 AM   Result Value Ref Range    Sodium 138 135 - 145 meq/L    Potassium 4.1 3.5 - 5.2 meq/L    Chloride 106 98 - 111 meq/L    CO2 21 (L) 23 - 33 meq/L    Glucose 115 (H) 70 - 108 mg/dL    BUN 16 7 - 22 mg/dL    CREATININE 0.7 0.4 - 1.2 mg/dL    Calcium 8.7 8.5 - 10.5 mg/dL   CBC auto differential    Collection Time: 11/27/18  5:37 AM   Result Value Ref Range    WBC 8.4 4.8 - 10.8 thou/mm3    RBC 3.96 (L) (NEURONTIN) 300 MG capsule  Take 1 capsule by mouth 3 times daily             glucose blood VI test strips (FREESTYLE LITE) strip  Test once daily             HOMEOPATHIC PRODUCTS CO  550 mg by Combination route daily as needed Legatrin PM             hydrOXYzine (ATARAX) 50 MG tablet  take 1 tablet by mouth at bedtime             L-Arginine 1000 MG TABS  Take 1,000 mg by mouth daily as needed             loratadine (CLARITIN) 10 MG tablet  take 1 tablet by mouth once daily             Meloxicam (MOBIC PO)  Take by mouth daily             metFORMIN (GLUCOPHAGE) 1000 MG tablet  take 1 tablet by mouth twice a day with food             metoprolol succinate (TOPROL XL) 25 MG extended release tablet  take 1 tablet by mouth once daily             nystatin-triamcinolone (MYCOLOG II) 927219-2.1 UNIT/GM-% cream  Apply 1 applicator topically 4 times daily Apply topically 4 times daily. As needed             Omega-3 Fatty Acids (FISH OIL) 1200 MG CAPS  Take 1,200 mg by mouth daily             omeprazole (PRILOSEC) 40 MG delayed release capsule  take 1 capsule by mouth once daily             pioglitazone (ACTOS) 45 MG tablet  take 1 tablet by mouth once daily             potassium chloride (MICRO-K) 10 MEQ extended release capsule  Take 10 mEq by mouth daily             psyllium (KONSYL) 28.3 % PACK  Take 1 packet by mouth daily             QUEtiapine (SEROQUEL) 50 MG tablet  Take 50 mg by mouth nightly.              simvastatin (ZOCOR) 40 MG tablet  take 1 tablet by mouth at bedtime             SPIRIVA HANDIHALER 18 MCG inhalation capsule  inhale the contents of one capsule in the handihaler once daily             spironolactone (ALDACTONE) 25 MG tablet  take 1-2 tablets by mouth once daily if needed (TAKE 2 TABS ON ANY DAY THERE IS SWELLING IN LEGS OR ANKLES)             tiZANidine (ZANAFLEX) 4 MG tablet  Take 4 mg by mouth every 8 hours as needed             TRADJENTA 5 MG tablet  take 1 tablet by mouth daily VESICARE 10 MG tablet  take 1 tablet by mouth once daily               Daily progress Note:   Patient in chair this am, doing well. She states she has pain but also has chronic pain ans is controlled. The incisions look good, no drainage noted, steri strips intact. Patient tolerating meals having flatus. Patient has no concerns.   Medically stable for discharge   Electronically signed by CATE Dick CNP on 11/27/2018 at 2:43 PM

## 2018-11-28 NOTE — CARE COORDINATION
11/28/18, 7:31 AM    Discharge plan discussed by  and . Discharge plan reviewed with patient/ family. Patient/ family verbalize understanding of discharge plan and are in agreement with plan. Understanding was demonstrated using the teach back method. Late Entry  Patient discharged on 11/27 to home alone and resume [facility] and [facility]. [facility] notified of discharge, they can pull AVS from [facility].

## 2018-12-11 ENCOUNTER — OFFICE VISIT (OUTPATIENT)
Dept: SURGERY | Age: 68
End: 2018-12-11

## 2018-12-11 VITALS
RESPIRATION RATE: 18 BRPM | OXYGEN SATURATION: 97 % | HEIGHT: 63 IN | SYSTOLIC BLOOD PRESSURE: 120 MMHG | TEMPERATURE: 96.7 F | WEIGHT: 269 LBS | HEART RATE: 72 BPM | DIASTOLIC BLOOD PRESSURE: 80 MMHG | BODY MASS INDEX: 47.66 KG/M2

## 2018-12-11 DIAGNOSIS — Z90.49 S/P LAPAROSCOPIC CHOLECYSTECTOMY: Primary | ICD-10-CM

## 2018-12-11 PROCEDURE — 99024 POSTOP FOLLOW-UP VISIT: CPT | Performed by: NURSE PRACTITIONER

## 2018-12-11 ASSESSMENT — ENCOUNTER SYMPTOMS
COUGH: 0
ANAL BLEEDING: 0
APNEA: 0
EYE ITCHING: 0
CONSTIPATION: 0
VOICE CHANGE: 0
VOMITING: 0
RECTAL PAIN: 0
SORE THROAT: 0
CHEST TIGHTNESS: 0
EYE DISCHARGE: 0
PHOTOPHOBIA: 0
EYE REDNESS: 0
BLOOD IN STOOL: 0
EYE PAIN: 0
FACIAL SWELLING: 0
TROUBLE SWALLOWING: 0
SINUS PRESSURE: 0
SHORTNESS OF BREATH: 1
COLOR CHANGE: 0
BACK PAIN: 0
DIARRHEA: 0
STRIDOR: 0
CHOKING: 0
NAUSEA: 0
RHINORRHEA: 0
ABDOMINAL DISTENTION: 0
WHEEZING: 0
ABDOMINAL PAIN: 1

## 2019-01-24 ENCOUNTER — OFFICE VISIT (OUTPATIENT)
Dept: SURGERY | Age: 69
End: 2019-01-24

## 2019-01-24 VITALS
HEART RATE: 86 BPM | WEIGHT: 267 LBS | OXYGEN SATURATION: 98 % | DIASTOLIC BLOOD PRESSURE: 80 MMHG | SYSTOLIC BLOOD PRESSURE: 120 MMHG | BODY MASS INDEX: 49.13 KG/M2 | TEMPERATURE: 95.9 F | HEIGHT: 62 IN | RESPIRATION RATE: 18 BRPM

## 2019-01-24 DIAGNOSIS — Z90.49 S/P LAPAROSCOPIC CHOLECYSTECTOMY: Primary | ICD-10-CM

## 2019-01-24 PROCEDURE — 99024 POSTOP FOLLOW-UP VISIT: CPT | Performed by: SURGERY

## 2019-03-25 ENCOUNTER — OFFICE VISIT (OUTPATIENT)
Dept: CARDIOLOGY CLINIC | Age: 69
End: 2019-03-25
Payer: MEDICARE

## 2019-03-25 VITALS
WEIGHT: 271.6 LBS | DIASTOLIC BLOOD PRESSURE: 74 MMHG | BODY MASS INDEX: 48.12 KG/M2 | HEART RATE: 66 BPM | HEIGHT: 63 IN | SYSTOLIC BLOOD PRESSURE: 126 MMHG

## 2019-03-25 DIAGNOSIS — I47.1 PAROXYSMAL SVT (SUPRAVENTRICULAR TACHYCARDIA) (HCC): ICD-10-CM

## 2019-03-25 DIAGNOSIS — I10 ESSENTIAL HYPERTENSION: Primary | ICD-10-CM

## 2019-03-25 PROCEDURE — 99213 OFFICE O/P EST LOW 20 MIN: CPT | Performed by: PHYSICIAN ASSISTANT

## 2019-04-03 ENCOUNTER — OFFICE VISIT (OUTPATIENT)
Dept: CARDIOLOGY CLINIC | Age: 69
End: 2019-04-03
Payer: MEDICARE

## 2019-04-03 VITALS
DIASTOLIC BLOOD PRESSURE: 92 MMHG | HEIGHT: 62 IN | WEIGHT: 285 LBS | SYSTOLIC BLOOD PRESSURE: 166 MMHG | BODY MASS INDEX: 52.44 KG/M2 | HEART RATE: 84 BPM

## 2019-04-03 DIAGNOSIS — I10 ESSENTIAL HYPERTENSION: Primary | ICD-10-CM

## 2019-04-03 DIAGNOSIS — I47.1 SVT (SUPRAVENTRICULAR TACHYCARDIA) (HCC): ICD-10-CM

## 2019-04-03 PROCEDURE — 99213 OFFICE O/P EST LOW 20 MIN: CPT | Performed by: NUCLEAR MEDICINE

## 2019-04-03 RX ORDER — SPIRONOLACTONE 25 MG/1
TABLET ORAL
Qty: 60 TABLET | Refills: 11 | Status: SHIPPED | OUTPATIENT
Start: 2019-04-03 | End: 2020-03-23

## 2019-04-03 NOTE — PROGRESS NOTES
1 year follow up. C/o sanjuanita, LATISHA, cp when breathing, dizziness, lightheaded. Denies palpitations.

## 2019-04-03 NOTE — PROGRESS NOTES
[address] [facility] CARDIOLOGY  [address]  [address] [address]  Dept: [phone]  Dept Fax: [phone]  Loc: [phone]    Visit Date: 4/3/2019    Jennifer Chavez is a 70 y.o. female who presents todayfor:  Chief Complaint   Patient presents with    Hypertension    Palpitations     Had a low BP  Timo decreased aldactone  Did well after  Bp higher at times  Known SVT   No recent SVT episodes  No chest pain   Limited by weight      HPI:  HPI  Past Medical History:   Diagnosis Date    Acid reflux     Arrhythmia     Arthritis     CAD (coronary artery disease)     NON-OBSTRUCTIVE    Chronic bronchitis (HCC)     [facility]     Depression     Diabetes mellitus ([facility]     Dizziness - light-headed     Fall 10-15-13    left rib fracture and shoulder contusion     Falls frequently     Hyperlipidemia     Hypertension     Kidney disease     MDRO (multiple drug resistant organisms) resistance 2/14    MRSA ABDOMEN    Numbness and tingling     HX OF:    S/P laparoscopic cholecystectomy 11/27/2018    Swelling     Tinnitus       Past Surgical History:   Procedure Laterality Date    BACK SURGERY  2007    CARDIOVASCULAR STRESS TEST  04/03/2009    Persantine test associated w/non specific symptoms. EKG non diagnostic. no obvious stress induced ischemia. EF 63%. normal wall motion.  CARDIOVASCULAR STRESS TEST  03/31/2010    tilt table associated w/fluctuation of BP w/relatively low BP throughout test. no significant symptoms during test. borderline tilt table test.    COLONOSCOPY  2011    CORONARY ANGIOPLASTY  12/16/2009    EF 60%. nonobstructive CAD. normal LV function.     ENDOSCOPY, COLON, DIAGNOSTIC  over 5 years     Dr. Geraldo Moon  2004    cataract   [id number]  25 months old     umblilical    HERNIA REPAIR  9/8/14    Ventral Hernia Repair with Mesh - Dr. Mariana Barriga partial    OTHER SURGICAL HISTORY  2014    Left lower quadrant incision and drainage ([facility]. [facility] Douglass, [facility])    OTHER SURGICAL HISTORY  2014    I&D abd abscess Dr. Landy Ormond N/A 2018    LAPAROSCOPY EXPLORATORY, LAP LYLE performed by Aiden Steward MD [facility]  as a child     TOOTH EXTRACTION  2012    TRANSTHORACIC ECHOCARDIOGRAM  2009    EF 50-55%. normal LV global systolic funct. slight LVH. slight lt atrial enlargement. no obvious stenotic valves. no pericardial effusion. Family History   Problem Relation Age of Onset    Cancer Mother 28        uterine    Heart Disease Father     Cancer Father 48        lung    Diabetes Paternal Grandfather     Cancer Maternal Grandmother     Colon Cancer Neg Hx     Breast Cancer Neg Hx      Social History     Tobacco Use    Smoking status: Former Smoker     Years: 1.50     Types: Cigarettes     Last attempt to quit: 3/11/1993     Years since quittin.0    Smokeless tobacco: Never Used    Tobacco comment: 3 cigarettes a month--last smoked 20 yrs ago   Substance Use Topics    Alcohol use: No     Alcohol/week: 0.0 oz      Current Outpatient Medications   Medication Sig Dispense Refill    nystatin-triamcinolone (MYCOLOG II) 378890-4.1 UNIT/GM-% cream Apply 1 applicator topically 4 times daily Apply topically 4 times daily.  As needed      HOMEOPATHIC PRODUCTS  mg by Combination route daily as needed Legatrin PM      psyllium (KONSYL) 28.3 % PACK Take 1 packet by mouth daily      L-Arginine 1000 MG TABS Take 1,000 mg by mouth daily as needed      Artificial Saliva (BIOTENE MOISTURIZING MOUTH) SOLN Take 1 applicator by mouth as needed      Compression Stockings MISC 1 Package by Does not apply route Knee High 1 each 0    metoprolol succinate (TOPROL XL) 25 MG extended release tablet take 1 tablet by mouth once daily 30 tablet 10    potassium chloride (MICRO-K) 10 MEQ extended release capsule Take 10 mEq by mouth daily      Meloxicam (MOBIC PO) Take by mouth daily      furosemide (LASIX) 20 MG tablet Take 20 mg by mouth daily      omeprazole (PRILOSEC) 40 MG delayed release capsule take 1 capsule by mouth once daily 30 capsule 5    TRADJENTA 5 MG tablet take 1 tablet by mouth daily 30 tablet 5    simvastatin (ZOCOR) 40 MG tablet take 1 tablet by mouth at bedtime 30 tablet 5    VESICARE 10 MG tablet take 1 tablet by mouth once daily 30 tablet 5    spironolactone (ALDACTONE) 25 MG tablet take 1-2 tablets by mouth once daily if needed (TAKE 2 TABS ON ANY DAY THERE IS SWELLING IN LEGS OR ANKLES) 60 tablet 5    etodolac (LODINE) 500 MG tablet take 1 tablet by mouth twice a day 60 tablet 5    pioglitazone (ACTOS) 45 MG tablet take 1 tablet by mouth once daily 30 tablet 5    SPIRIVA HANDIHALER 18 MCG inhalation capsule inhale the contents of one capsule in the handihaler once daily 30 capsule 5    loratadine (CLARITIN) 10 MG tablet take 1 tablet by mouth once daily 30 tablet 11    hydrOXYzine (ATARAX) 50 MG tablet take 1 tablet by mouth at bedtime 30 tablet 5    metFORMIN (GLUCOPHAGE) 1000 MG tablet take 1 tablet by mouth twice a day with food 60 tablet 5    gabapentin (NEURONTIN) 300 MG capsule Take 1 capsule by mouth 3 times daily (Patient taking differently: Take 800 mg by mouth 2 times daily. Mendez Ashby ) 90 capsule 5    albuterol sulfate HFA (PROAIR HFA) 108 (90 BASE) MCG/ACT inhaler Inhale 2 puffs into the lungs every 4 hours as needed 1 Inhaler 2    FREESTYLE LANCETS MISC Test 1 time daily. Dx: 250.00-Type 2 Diabetes. Freestyle 28G Lancets 100 each 11    tiZANidine (ZANAFLEX) 4 MG tablet Take 4 mg by mouth every 8 hours as needed      Omega-3 Fatty Acids (FISH OIL) 1200 MG CAPS Take 1,200 mg by mouth daily      ASPIRIN LOW DOSE 81 MG EC tablet take 1 tablet by mouth once daily 30 tablet 11    clobetasol (TEMOVATE) 0.05 % cream Apply topically 2 times daily.  15 g 1  glucose blood VI test strips (FREESTYLE LITE) strip Test once daily 100 each 11    CICLOPIROX TREATMENT EX Apply  topically. 10.0 mL Ciclopirox-terbinafine-fluconazole  Fungal Free Nails Fungicidal solution      citalopram (CELEXA) 40 MG tablet Take 40 mg by mouth every morning.  diphenhydrAMINE (BENADRYL) 50 MG capsule Take 50 mg by mouth nightly as needed for Itching.  QUEtiapine (SEROQUEL) 50 MG tablet Take 50 mg by mouth nightly.  acetaminophen (TYLENOL) 500 MG tablet Take 500 mg by mouth every 6 hours as needed for Pain. No current facility-administered medications for this visit.       Allergies   Allergen Reactions    Latex Hives, Itching and Rash    Bee Venom Anaphylaxis    Advair [Fluticasone-Salmeterol]      Heart races     Caffeine Other (See Comments)     Bad kidney problems    Pcn [Penicillins] Hives    Ultram [Tramadol Hcl] Swelling    Fluticasone-Salmeterol Palpitations    Wool Alcohol [Lanolin Alcohol] Hives, Itching and Rash     Health Maintenance   Topic Date Due    Hepatitis C screen  1950    Shingles Vaccine (1 of 2) 04/24/1999    DTaP/Tdap/Td vaccine (1 - Tdap) 07/28/2013    Pneumococcal 65+ years Vaccine (1 of 2 - PCV13) 04/24/2014    Diabetic microalbuminuria test  08/01/2015    Diabetic foot exam  09/01/2015    Diabetic retinal exam  12/30/2015    Breast cancer screen  07/23/2016    A1C test (Diabetic or Prediabetic)  05/16/2019    Lipid screen  05/16/2019    Colon cancer screen colonoscopy  07/07/2019    Flu vaccine (Season Ended) 09/01/2019    Potassium monitoring  11/27/2019    Creatinine monitoring  11/27/2019    DEXA (modify frequency per FRAX score)  Addressed       Subjective:  Review of Systems  General:   No fever, no chills, some fatigue or weight loss  Pulmonary:    some dyspnea, no wheezing  Cardiac:    Denies recent chest pain,   GI:     No nausea or vomiting, no abdominal pain  Neuro:     some dizziness or light headedness, Musculoskeletal:  No recent active issues  Extremities:   No edema, good peripheral pulses      Objective:  Physical Exam  BP (!) 166/92   Pulse 84   Ht 5' 2\" (1.575 m)   Wt 285 lb (129.3 kg)   LMP  (LMP Unknown)   BMI 52.13 kg/m²   General:   Well developed, well nourished  Lungs:    Clear to auscultation  Heart:    Normal S1 S2, Slight murmur. no rubs, no gallops  Abdomen:   Soft, non tender, no organomegalies, positive bowel sounds  Extremities:   No edema, no cyanosis, good peripheral pulses  Neurological:   Awake, alert, oriented. No obvious focal deficits  Musculoskelatal:  No obvious deformities    Assessment:      Diagnosis Orders   1. Essential hypertension     2. SVT (supraventricular tachycardia) (HCC)     needs to get back on aldactone   Monitor the BP     Plan:  No follow-ups on file. Resume aldactone  Monitor the BP  Continue risk factor modification and medical management  Thank you for allowing me to participate in the care of your patient. Please don't hesitate to contact me regarding any further issues related to the patient care    Orders Placed:  No orders of the defined types were placed in this encounter. Medications Prescribed:  No orders of the defined types were placed in this encounter. Discussed use, benefit, and side effects of prescribed medications. All patient questions answered. Pt voicedunderstanding. Instructed to continue current medications, diet and exercise. Continue risk factor modification and medical management. Patient agreed with treatment plan. Follow up as directed.     Electronically signedby [address], MD on 4/3/2019 at 12:03 PM

## 2019-07-02 ENCOUNTER — OFFICE VISIT (OUTPATIENT)
Dept: PULMONOLOGY | Age: 69
End: 2019-07-02
Payer: MEDICARE

## 2019-07-02 VITALS
TEMPERATURE: 97.9 F | HEIGHT: 62 IN | WEIGHT: 284 LBS | DIASTOLIC BLOOD PRESSURE: 60 MMHG | SYSTOLIC BLOOD PRESSURE: 124 MMHG | BODY MASS INDEX: 52.26 KG/M2 | HEART RATE: 72 BPM | OXYGEN SATURATION: 96 %

## 2019-07-02 DIAGNOSIS — J45.20 MILD INTERMITTENT ASTHMA WITHOUT COMPLICATION: ICD-10-CM

## 2019-07-02 DIAGNOSIS — Z77.22 PASSIVE SMOKE EXPOSURE: ICD-10-CM

## 2019-07-02 DIAGNOSIS — Z87.09 H/O PULMONARY EMPHYSEMA: ICD-10-CM

## 2019-07-02 DIAGNOSIS — E66.01 MORBID OBESITY WITH BMI OF 50.0-59.9, ADULT (HCC): ICD-10-CM

## 2019-07-02 DIAGNOSIS — R05.8 COUGH PRESENT FOR GREATER THAN 3 WEEKS: Primary | ICD-10-CM

## 2019-07-02 PROCEDURE — 99214 OFFICE O/P EST MOD 30 MIN: CPT | Performed by: NURSE PRACTITIONER

## 2019-07-02 RX ORDER — BENZONATATE 100 MG/1
200 CAPSULE ORAL 3 TIMES DAILY PRN
Qty: 45 CAPSULE | Refills: 1 | Status: SHIPPED | OUTPATIENT
Start: 2019-07-02 | End: 2019-07-17

## 2019-07-02 ASSESSMENT — ENCOUNTER SYMPTOMS
VOMITING: 0
DIARRHEA: 0
CHEST TIGHTNESS: 0
COUGH: 1
STRIDOR: 0
WHEEZING: 0
SHORTNESS OF BREATH: 0
NAUSEA: 0

## 2019-07-02 NOTE — PROGRESS NOTES
Pleasant Female in NAD, MO BMI 51, in wheelchair   HENT:   Head: Normocephalic and atraumatic. Mouth/Throat: Oropharynx is clear and moist.   Neck: Neck supple. No tracheal deviation present. Cardiovascular: Normal rate, regular rhythm and normal heart sounds. No murmur heard. Pulmonary/Chest: Effort normal and breath sounds normal. No stridor. No respiratory distress. She has no wheezes. She exhibits no tenderness. Faint bibasilar rales difficult exam due to body habitus   Abdominal: Soft. Bowel sounds are normal. There is no tenderness. Musculoskeletal: She exhibits edema. BLE edema   Neurological: She is alert and oriented to person, place, and time. Skin: Skin is warm and dry. Capillary refill takes less than 2 seconds. There is erythema. Noted wound to RLE posterior calf PITO with serous drainage   Psychiatric: She has a normal mood and affect. Her behavior is normal. Judgment and thought content normal.   Nursing note and vitals reviewed. Test results   Lung Nodule Screening     [] Qualifies    [x] Does not qualify   [] Declined    [] Completed  Quit 1993   The USPSTF recommends annual screening for lung cancer with low-dose computed tomography (LDCT) in adults aged 54 to 80 years who have a 30 pack-year smoking history and currently smoke or have quit within the past 15 years. Screeningshould be discontinued once a person has not smoked for 15 years or develops a health problem that substantially limits life expectancy or the ability or willingness to have curative lung surgery. CT CHEST WO CONTRAST   2/13/2017   FINDINGS: The lung volumes are within normal limits, stable. There is no evidence of consolidative infiltrate. Minimal amounts of atelectatic change implied in both lower lobes. There is no evidence of abnormal mass. There is no pneumothorax or pleural effusion.  Multiple remote fractures of the right and left ribs  are again evident, similar to the prior pearls for cough until testing completed  -Patient afebrile with non-productive cough no strong indication for antibiotics at this time, patient with documented history of inability to tolerate inhaled steroids noted reaction needing transport via EMS with palpitations, unable to tolerate Flonase broke out in hive like rash on upper lip with use  -Continue Spiriva and PRN MERA   -Continue claritin for allergic rhinitis due to intolerance of inhaled steroids  -Following up with established Podiatrist regarding RLE wound  -Advised to maintain pneumonia vaccine with PCP and to take flu vaccine this coming season.  -Advised patient to call office with any changes, questions, or concerns regarding respiratory status    Will see Josefa Le in: 1 month CXR and Full PFT    Contreras Hernandez CNP  7/2/2019

## 2019-07-06 RX ORDER — METOPROLOL SUCCINATE 25 MG/1
TABLET, EXTENDED RELEASE ORAL
Qty: 30 TABLET | Refills: 10 | Status: SHIPPED | OUTPATIENT
Start: 2019-07-06 | End: 2020-05-19

## 2019-07-10 ENCOUNTER — HOSPITAL ENCOUNTER (OUTPATIENT)
Dept: WOMENS IMAGING | Age: 69
Discharge: HOME | End: 2019-07-10
Payer: MEDICARE

## 2019-07-10 ENCOUNTER — HOSPITAL ENCOUNTER (OUTPATIENT)
Age: 69
Discharge: HOME | End: 2019-07-10
Payer: MEDICARE

## 2019-07-10 ENCOUNTER — HOSPITAL ENCOUNTER (OUTPATIENT)
Dept: GENERAL RADIOLOGY | Age: 69
Discharge: HOME | End: 2019-07-10
Payer: MEDICARE

## 2019-07-10 DIAGNOSIS — Z87.09 H/O PULMONARY EMPHYSEMA: ICD-10-CM

## 2019-07-10 DIAGNOSIS — R05.8 COUGH PRESENT FOR GREATER THAN 3 WEEKS: ICD-10-CM

## 2019-07-10 DIAGNOSIS — Z12.31 VISIT FOR SCREENING MAMMOGRAM: ICD-10-CM

## 2019-07-10 PROCEDURE — 77063 BREAST TOMOSYNTHESIS BI: CPT

## 2019-07-10 PROCEDURE — 71046 X-RAY EXAM CHEST 2 VIEWS: CPT

## 2019-07-16 ENCOUNTER — HOSPITAL ENCOUNTER (OUTPATIENT)
Dept: WOMENS IMAGING | Age: 69
Discharge: HOME | End: 2019-07-16
Payer: MEDICARE

## 2019-07-16 DIAGNOSIS — R92.2 BREAST DENSITY: ICD-10-CM

## 2019-07-16 PROCEDURE — 76642 ULTRASOUND BREAST LIMITED: CPT

## 2019-07-23 ENCOUNTER — HOSPITAL ENCOUNTER (OUTPATIENT)
Dept: OCCUPATIONAL THERAPY | Age: 69
Setting detail: THERAPIES SERIES
Discharge: HOME | End: 2019-07-23
Payer: MEDICARE

## 2019-07-23 PROCEDURE — 97166 OT EVAL MOD COMPLEX 45 MIN: CPT

## 2019-07-23 ASSESSMENT — PAIN SCALES - GENERAL: PAINLEVEL_OUTOF10: 7

## 2019-07-23 ASSESSMENT — PAIN DESCRIPTION - PAIN TYPE: TYPE: CHRONIC PAIN

## 2019-07-23 ASSESSMENT — PAIN DESCRIPTION - LOCATION: LOCATION: BACK;HIP

## 2019-07-23 NOTE — FLOWSHEET NOTE
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 Takes metformen; Pt. Has neuropathy in feet  Kidney Problems: Yes per chart review, pt. Has stage 2 CKD    Acute/active infection (i.e.. Cellulitis):Yes - it appears that pt. Has cellulitis in left LE       Prior Lymphedema Treatment:  Prior treatment for Lymphedema:           Complete Decongestive Therapy/Manual Lymph Drainage:  No            Compression garments only:  Yes   And pt. States she does not like them and they won't stay up and hurt her legs. Sequential compression pumps:  No            OBJECTIVE    Physical Assessment:  Range of Motion: LLE is WFL; Right hip knee is stiff   Strength: LLE is WFL; Pt. demo's weakness in RLE due to back issues   Sensation: Impaired: due to neuropathy   Transfers: Pt. completed sit to stand and stand to sit independently   Ambulation: Impaired: Pt. was able to take 3 steps in clinic without a device but right leg is weak and pt. has difficulty lifting her leg. Hyperkeratosis: - Moderate bilateral lower legs  Hyperplasia:  Moderate bilateral lower legs  Hyperpigmentation:Severe on left lower leg - does appear to have cellulitis  Papillomas: None  Lymphorrhea (Weeping): None currently however pt.  Does have a history of this    Lymphedema Assessment:  Pitting Edema Moderate (+2) - left lower leg; right lower leg has an pola boot on up about 3 inches past ankle   Skin Appearance/Condition Moderate Redness  In left lower leg   Skin Condition Dry   Open Wound(s) Pt. states she has a wound on the back of her right leg however it is currently covered by [employer]; left leg does not have a wound but has blisters that appear to be cellulitis   Tissue Temperature Warm left lower leg is warm to the touch   Skin Folds None    Fibrotic

## 2019-07-24 ENCOUNTER — HOSPITAL ENCOUNTER (OUTPATIENT)
Dept: PULMONOLOGY | Age: 69
Discharge: HOME | End: 2019-07-24
Payer: MEDICARE

## 2019-07-24 DIAGNOSIS — Z87.09 H/O PULMONARY EMPHYSEMA: ICD-10-CM

## 2019-07-24 DIAGNOSIS — J45.20 MILD INTERMITTENT ASTHMA WITHOUT COMPLICATION: ICD-10-CM

## 2019-07-24 PROCEDURE — 94729 DIFFUSING CAPACITY: CPT

## 2019-07-24 PROCEDURE — 94726 PLETHYSMOGRAPHY LUNG VOLUMES: CPT

## 2019-07-24 PROCEDURE — 94060 EVALUATION OF WHEEZING: CPT

## 2019-07-25 ENCOUNTER — HOSPITAL ENCOUNTER (OUTPATIENT)
Dept: OCCUPATIONAL THERAPY | Age: 69
Setting detail: THERAPIES SERIES
End: 2019-07-25
Payer: MEDICARE

## 2019-07-30 ENCOUNTER — HOSPITAL ENCOUNTER (OUTPATIENT)
Dept: WOMENS IMAGING | Age: 69
Discharge: HOME | End: 2019-07-30
Payer: MEDICARE

## 2019-07-30 DIAGNOSIS — R59.9 ENLARGED LYMPH NODE: ICD-10-CM

## 2019-07-30 PROCEDURE — A4648 IMPLANTABLE TISSUE MARKER: HCPCS

## 2019-07-30 PROCEDURE — 2709999900 HC NON-CHARGEABLE SUPPLY

## 2019-07-30 PROCEDURE — C1894 INTRO/SHEATH, NON-LASER: HCPCS

## 2019-07-30 PROCEDURE — 38505 NEEDLE BIOPSY LYMPH NODES: CPT

## 2019-07-30 PROCEDURE — 88305 TISSUE EXAM BY PATHOLOGIST: CPT

## 2019-07-30 PROCEDURE — 77065 DX MAMMO INCL CAD UNI: CPT

## 2019-08-01 ENCOUNTER — HOSPITAL ENCOUNTER (OUTPATIENT)
Dept: OCCUPATIONAL THERAPY | Age: 69
Setting detail: THERAPIES SERIES
Discharge: HOME | End: 2019-08-01
Payer: MEDICARE

## 2019-08-01 PROCEDURE — 97110 THERAPEUTIC EXERCISES: CPT

## 2019-08-01 PROCEDURE — 97140 MANUAL THERAPY 1/> REGIONS: CPT

## 2019-08-01 PROCEDURE — 97535 SELF CARE MNGMENT TRAINING: CPT

## 2019-08-01 ASSESSMENT — PAIN DESCRIPTION - PAIN TYPE: TYPE: CHRONIC PAIN

## 2019-08-01 ASSESSMENT — PAIN DESCRIPTION - LOCATION: LOCATION: BACK;HIP

## 2019-08-01 ASSESSMENT — PAIN SCALES - GENERAL: PAINLEVEL_OUTOF10: 7

## 2019-08-05 ENCOUNTER — HOSPITAL ENCOUNTER (OUTPATIENT)
Dept: OCCUPATIONAL THERAPY | Age: 69
Setting detail: THERAPIES SERIES
Discharge: HOME | End: 2019-08-05
Payer: MEDICARE

## 2019-08-05 PROCEDURE — 97535 SELF CARE MNGMENT TRAINING: CPT

## 2019-08-05 PROCEDURE — 97140 MANUAL THERAPY 1/> REGIONS: CPT

## 2019-08-05 ASSESSMENT — PAIN SCALES - GENERAL: PAINLEVEL_OUTOF10: 5

## 2019-08-05 ASSESSMENT — PAIN DESCRIPTION - PAIN TYPE: TYPE: CHRONIC PAIN

## 2019-08-05 ASSESSMENT — PAIN DESCRIPTION - LOCATION: LOCATION: HIP;BACK

## 2019-08-08 ENCOUNTER — HOSPITAL ENCOUNTER (OUTPATIENT)
Dept: OCCUPATIONAL THERAPY | Age: 69
Setting detail: THERAPIES SERIES
Discharge: HOME | End: 2019-08-08
Payer: MEDICARE

## 2019-08-08 PROCEDURE — 97535 SELF CARE MNGMENT TRAINING: CPT

## 2019-08-08 PROCEDURE — 97140 MANUAL THERAPY 1/> REGIONS: CPT

## 2019-08-13 ENCOUNTER — HOSPITAL ENCOUNTER (OUTPATIENT)
Dept: OCCUPATIONAL THERAPY | Age: 69
Setting detail: THERAPIES SERIES
Discharge: HOME | End: 2019-08-13
Payer: MEDICARE

## 2019-08-13 PROCEDURE — 97140 MANUAL THERAPY 1/> REGIONS: CPT

## 2019-08-13 PROCEDURE — 97535 SELF CARE MNGMENT TRAINING: CPT

## 2019-08-13 PROCEDURE — 97110 THERAPEUTIC EXERCISES: CPT

## 2019-08-13 ASSESSMENT — PAIN DESCRIPTION - LOCATION: LOCATION: LEG

## 2019-08-13 ASSESSMENT — PAIN DESCRIPTION - ORIENTATION: ORIENTATION: RIGHT;LEFT

## 2019-08-13 ASSESSMENT — PAIN SCALES - GENERAL: PAINLEVEL_OUTOF10: 4

## 2019-08-16 ENCOUNTER — HOSPITAL ENCOUNTER (OUTPATIENT)
Dept: OCCUPATIONAL THERAPY | Age: 69
Setting detail: THERAPIES SERIES
Discharge: HOME | End: 2019-08-16
Payer: MEDICARE

## 2019-08-16 PROCEDURE — 97110 THERAPEUTIC EXERCISES: CPT

## 2019-08-16 PROCEDURE — 97140 MANUAL THERAPY 1/> REGIONS: CPT

## 2019-08-16 PROCEDURE — 97535 SELF CARE MNGMENT TRAINING: CPT

## 2019-08-16 NOTE — PROGRESS NOTES
[facility] SERVICES  OCCUPATIONAL THERAPY  DAILY NOTE               [    Date: 2019  Patient Name: Cristal Heaton        CSN: [id number]   : 1950  (78 y.o.)  Gender: female   Referring Practitioner: Dr. Jelena Hurtado DPM  Diagnosis: Bilateral LE lymphedema I89.0          General:  OT Visit Information  OT Insurance Information: [place] [employer] - Eval only then must get auth for visits; Approved for 6 visits  to   Total # of Visits Approved: 7  Total # of Visits to Date: 6  Certification Period Expiration Date: 19  Progress Note Counter:   Comments: Pt. see's Dr. Miranda Keith every 3 months to trim her toe nails (will see her again in Oct); See's pulmonologist Aug. 21       Restrictions/Precautions:  Restrictions/Precautions: General Precautions, Fall Risk              Subjective:  Subjective: Teodora Dockery remarks that her legs look better once the wraps were off. Pain:  Pain Assessment  Patient Currently in Pain: Yes    Restrictions/Precautions  Fall risk, Universal precautions,       OBJECTIVE  Skin Appearance/Texture: Normal  Skin Color: Reddish in appearance. Reports is normal for her. Hyperkeratosis: - Mild  Hyperplasia:  Mild   Hyperpigmentation:Moderate  Papillomas: None  Lymphorrhea (Weeping): None    TREATMENT SESSION  Manual Lymph Drainage  While flexi-touch pump was on bilateral legs, therapist completed stimulation of cervical, bilateral axillary, and inguinal lymph nodes. Completed MLD techniques from inguinal nodes to axillary nodes. Did not complete abdominal techniques due to hernia.      Skin Care Measures  Washed involved extremity/body part and applied Eucerin to moisturize skin    Compression Bandaging  Location: BLE  Compression Gradient: Moderate to Minimal  Supplies (per involved extremity):   Stockinette: 6\" on calf, 4\" on foot and ankle                                   Rosidal Soft 1 roll   Medigrip   Size G on calf, size E on foot

## 2019-08-21 ENCOUNTER — OFFICE VISIT (OUTPATIENT)
Dept: PULMONOLOGY | Age: 69
End: 2019-08-21
Payer: MEDICARE

## 2019-08-21 VITALS
WEIGHT: 283.6 LBS | HEIGHT: 62 IN | DIASTOLIC BLOOD PRESSURE: 82 MMHG | OXYGEN SATURATION: 95 % | HEART RATE: 65 BPM | BODY MASS INDEX: 52.19 KG/M2 | SYSTOLIC BLOOD PRESSURE: 136 MMHG | TEMPERATURE: 97.9 F

## 2019-08-21 DIAGNOSIS — J45.20 MILD INTERMITTENT ASTHMA WITHOUT COMPLICATION: ICD-10-CM

## 2019-08-21 DIAGNOSIS — E66.01 MORBID OBESITY WITH BMI OF 50.0-59.9, ADULT (HCC): ICD-10-CM

## 2019-08-21 DIAGNOSIS — R05.8 COUGH PRESENT FOR GREATER THAN 3 WEEKS: Primary | ICD-10-CM

## 2019-08-21 DIAGNOSIS — J32.9 RECURRENT SINUSITIS: ICD-10-CM

## 2019-08-21 DIAGNOSIS — Z87.09 H/O PULMONARY EMPHYSEMA: ICD-10-CM

## 2019-08-21 DIAGNOSIS — Z77.22 PASSIVE SMOKE EXPOSURE: ICD-10-CM

## 2019-08-21 PROCEDURE — 95012 NITRIC OXIDE EXP GAS DETER: CPT | Performed by: NURSE PRACTITIONER

## 2019-08-21 PROCEDURE — 99214 OFFICE O/P EST MOD 30 MIN: CPT | Performed by: NURSE PRACTITIONER

## 2019-08-21 RX ORDER — MONTELUKAST SODIUM 10 MG/1
10 TABLET ORAL NIGHTLY
Qty: 30 TABLET | Refills: 11 | Status: SHIPPED | OUTPATIENT
Start: 2019-08-21 | End: 2020-09-02

## 2019-08-21 RX ORDER — FEXOFENADINE HYDROCHLORIDE 60 MG/1
60 TABLET, FILM COATED ORAL 2 TIMES DAILY
Qty: 30 TABLET | Refills: 5 | Status: SHIPPED | OUTPATIENT
Start: 2019-08-21 | End: 2019-09-20

## 2019-08-21 ASSESSMENT — COPD QUESTIONNAIRES: COPD: 1

## 2019-08-21 ASSESSMENT — ENCOUNTER SYMPTOMS
HEMOPTYSIS: 0
STRIDOR: 0
VOMITING: 0
NAUSEA: 0
SPUTUM PRODUCTION: 0
COUGH: 1
DIARRHEA: 0
SHORTNESS OF BREATH: 1
CHEST TIGHTNESS: 0
WHEEZING: 0

## 2019-08-21 NOTE — PROGRESS NOTES
mcg Daily, claritin 10 mg Daily, and Albuterol PRN    Asthma control (Severity) questionnaire:  Asthma symptoms:  > 2 times per week:  No  Night time awakenings: > 2 times per month: No  Use of MERA for symptoms control (Other than pre exercise use):> 2times per week: No  Interference with normal activity: No  Lung function: Fev1 >60% Predicted: No    Number of exacerbations per year: > 2: No    History of significant tobacco smoke exposure approx 45 years from Father (17yrs) first  (28 yrs) and currently all of her 6 children smoke. Progress History:   Since last visit any new medical issues? No  New ER or hospital visits? No  Any new or changes in medicines? No  Using inhalers? Yes Spiriva albuterol only 3 times since last appointment  Are they helpful? Yes   Pneumonia vaccine? Past Medical hx   PMH:  Past Medical History:   Diagnosis Date    Acid reflux     Arrhythmia     Arthritis     CAD (coronary artery disease)     NON-OBSTRUCTIVE    Chronic bronchitis (HCC)     COPD (chronic obstructive pulmonary disease) (HCC)     Depression     Diabetes mellitus (HCC)     Dizziness - light-headed     Fall 10-15-13    left rib fracture and shoulder contusion     Falls frequently     Hyperlipidemia     Hypertension     Kidney disease     MDRO (multiple drug resistant organisms) resistance 2/14    MRSA ABDOMEN    Numbness and tingling     HX OF:    S/P laparoscopic cholecystectomy 11/27/2018    Swelling     Tinnitus      SURGICAL HISTORY:  Past Surgical History:   Procedure Laterality Date    BACK SURGERY  2007    CARDIOVASCULAR STRESS TEST  04/03/2009    Persantine test associated w/non specific symptoms. EKG non diagnostic. no obvious stress induced ischemia. EF 63%. normal wall motion.     CARDIOVASCULAR STRESS TEST  03/31/2010    tilt table associated w/fluctuation of BP w/relatively low BP throughout test. no significant symptoms during test. borderline tilt table test.    COLONOSCOPY 30 tablet 5    SPIRIVA HANDIHALER 18 MCG inhalation capsule inhale the contents of one capsule in the handihaler once daily 30 capsule 5    hydrOXYzine (ATARAX) 50 MG tablet take 1 tablet by mouth at bedtime 30 tablet 5    metFORMIN (GLUCOPHAGE) 1000 MG tablet take 1 tablet by mouth twice a day with food 60 tablet 5    gabapentin (NEURONTIN) 300 MG capsule Take 1 capsule by mouth 3 times daily (Patient taking differently: Take 800 mg by mouth 2 times daily. Rosie Austin ) 90 capsule 5    albuterol sulfate HFA (PROAIR HFA) 108 (90 BASE) MCG/ACT inhaler Inhale 2 puffs into the lungs every 4 hours as needed 1 Inhaler 2    FREESTYLE LANCETS MISC Test 1 time daily. Dx: 250.00-Type 2 Diabetes. Freestyle 28G Lancets 100 each 11    tiZANidine (ZANAFLEX) 4 MG tablet Take 4 mg by mouth every 8 hours as needed      Omega-3 Fatty Acids (FISH OIL) 1200 MG CAPS Take 1,200 mg by mouth daily      ASPIRIN LOW DOSE 81 MG EC tablet take 1 tablet by mouth once daily 30 tablet 11    clobetasol (TEMOVATE) 0.05 % cream Apply topically 2 times daily. 15 g 1    glucose blood VI test strips (FREESTYLE LITE) strip Test once daily 100 each 11    CICLOPIROX TREATMENT EX Apply  topically. 10.0 mL Ciclopirox-terbinafine-fluconazole  Fungal Free Nails Fungicidal solution      citalopram (CELEXA) 40 MG tablet Take 40 mg by mouth every morning.  diphenhydrAMINE (BENADRYL) 50 MG capsule Take 50 mg by mouth nightly as needed for Itching.  QUEtiapine (SEROQUEL) 50 MG tablet Take 50 mg by mouth nightly.  acetaminophen (TYLENOL) 500 MG tablet Take 500 mg by mouth every 6 hours as needed for Pain. No current facility-administered medications for this visit. Rosie BYRD   Review of Systems   Constitutional: Negative for chills, fever and unexpected weight change. HENT: Positive for postnasal drip. Respiratory: Positive for cough and shortness of breath.  Negative for hemoptysis, sputum production, chest tightness, wheezing and stridor. Cardiovascular: Positive for dyspnea on exertion and leg swelling. Negative for PND. Burning pain with strong cough   Gastrointestinal: Negative for diarrhea, nausea and vomiting. Genitourinary: Negative for dysuria. Physical exam   /82 (Site: Left Upper Arm, Position: Sitting, Cuff Size: Large Adult)   Pulse 65   Temp 97.9 °F (36.6 °C)   Ht 5' 2\" (1.575 m)   Wt 283 lb 9.6 oz (128.6 kg)   LMP  (LMP Unknown)   SpO2 95% Comment: on RA  BMI 51.87 kg/m²    Wt Readings from Last 3 Encounters:   08/21/19 283 lb 9.6 oz (128.6 kg)   07/02/19 284 lb (128.8 kg)   04/03/19 285 lb (129.3 kg)   271 March 19    Physical Exam   Constitutional: She is oriented to person, place, and time. She appears well-developed and well-nourished. No distress. HENT:   Head: Normocephalic and atraumatic. Mouth/Throat: Oropharynx is clear and moist.   Neck: Neck supple. No tracheal deviation present. Cardiovascular: Normal rate, regular rhythm and normal heart sounds. No murmur heard. Pulmonary/Chest: Effort normal and breath sounds normal. No stridor. No respiratory distress. She has no wheezes. She has no rales. She exhibits no tenderness. Abdominal: Soft. Bowel sounds are normal.   Rounded    Musculoskeletal: She exhibits no edema. Neurological: She is alert and oriented to person, place, and time. Skin: Skin is warm and dry. Capillary refill takes less than 2 seconds. Psychiatric: She has a normal mood and affect. Her behavior is normal. Judgment and thought content normal.   Nursing note and vitals reviewed. Test results   Lung Nodule Screening     [] Qualifies    [x] Does not qualify   [] Declined    [] Completed   Non smoker    The [employer]commends annual screening for lung cancer with low-dose computed tomography (LDCT) in adults aged 54 to 80 years who have a 30 pack-year smoking history and currently smoke or have quit within the past 15 years.  Screeningshould be discontinued once a person has not smoked for 15 years or develops a health problem that substantially limits life expectancy or the ability or willingness to have curative lung surgery. XR CHEST (2 VW)   7/10/2019   1. Normal heart size. There are a few old healed rib fractures bilaterally and some mild adjacent chronic pleural thickening bilaterally. Old healed granulomatous disease. Minimal fibrotic stranding left lower lung field. 2. No acute findings. No infiltrates or effusions are seen. Summary   Ejection fraction is visually estimated at 60%. Overall left ventricular function is normal.   Myxomatotic degeneration of mitral valve. Moderate calcification of the posterior leaflet of the mitral valve. Trace mitral regurgitation is present. Signature      ----------------------------------------------------------------   Electronically signed by Faby Diaz MD (Interpreting   physician) on 11/09/2018 at 05:12 PM     CT CHEST [address]     2/13/2017   FINDINGS: The lung volumes are within normal limits, stable. There is no evidence of consolidative infiltrate. Minimal amounts of atelectatic change implied in both lower lobes. There is no evidence of abnormal mass. There is no pneumothorax or pleural effusion. Multiple remote fractures of the right and left ribs  are again evident, similar to the prior exam.   There is very minimal emphysematous disease of both lungs. Very minimal, if any, evidence of septal thickening to indicate specific primary interstitial fibrosis. There is minimal bronchiectasis in both lower lobes, grossly stable. . There is no evidence of parenchymal cyst or bulla of either lung. No characteristics of pulmonary vascular congestion are demonstrated. No strong evidence of pathologically enlarged hilar or mediastinal lymph nodes. Very mild atherosclerotic disease of the thoracic aorta demonstrated, stable.  No evidence of acute cardiac process. Stable appearance of a small hiatal hernia. Previously inserted abnormalities of the esophagus to indicate possible polypoid lesions versus debris in the esophageal lumen has significantly improved/resolved in the interval to indicate likely debris rather than true polyps. Images of the superior abdomen show no concerning findings. Overlying soft tissues are satisfactory. Assessment      Diagnosis Orders   1. Cough present for greater than 3 weeks  POCT Nitric Oxide    External Referral To Pulmonology   2. H/O pulmonary emphysema  POCT Nitric Oxide   3. Passive smoke exposure     4. Morbid obesity with BMI of 50.0-59.9, adult ([address]     5. Mild intermittent asthma without complication  POCT Nitric Oxide    fexofenadine (ALLEGRA) 60 MG tablet    montelukast (SINGULAIR) 10 MG tablet   6.  Recurrent sinusitis           -Pulmonary emphysema on radiograph  -Chronic cough of unknown etiology-DDx: chronic sinusitis vs possible vocal cord dysfunction (flattening on flow volume loop) vs asthma/COPD overlap   -GERD on omeprazole  -PFT with pseudorestrictive pattern IC/ERV >6/1 consistent with asthma and obesity    Plan   -Reviewed PFT and CXR with patient PFT shows some decline in spirometry suggesting restrictive defect but lung volumes are near normal other than ERV suggesting relation to obesity, weight has increased by 12 #s since March this year BMI >50  -Continue spiriva  -Add singulair 10 mg PO Daily  -Change claritin to allegra 60 mg BID due to reduced GFR 83 at last check  -Continue albuterol  -Referral to [address] [place] [address] Pulmonary group for further evaluation of severe cough with possible vocal cord impairment reason for outside [facility] referral possible need for videostroboscopy   -Advised to maintain pneumonia vaccine with PCP and to take flu vaccine this coming season.  -Advised patient to call office with any changes, questions, or concerns regarding respiratory status    Will see Terry Moon

## 2019-08-22 ENCOUNTER — HOSPITAL ENCOUNTER (OUTPATIENT)
Dept: OCCUPATIONAL THERAPY | Age: 69
Setting detail: THERAPIES SERIES
Discharge: HOME | End: 2019-08-22
Payer: MEDICARE

## 2019-08-22 PROCEDURE — 97140 MANUAL THERAPY 1/> REGIONS: CPT

## 2019-08-22 ASSESSMENT — PAIN SCALES - GENERAL: PAINLEVEL_OUTOF10: 4

## 2019-08-22 ASSESSMENT — PAIN DESCRIPTION - ORIENTATION: ORIENTATION: RIGHT;LEFT

## 2019-08-22 ASSESSMENT — PAIN DESCRIPTION - PAIN TYPE: TYPE: CHRONIC PAIN

## 2019-08-22 ASSESSMENT — PAIN DESCRIPTION - LOCATION: LOCATION: LEG

## 2019-08-22 NOTE — FLOWSHEET NOTE
explanation       Outcome: acknowledged understanding     GOALS:   PATIENT GOAL: To control the swelling in her legs so she does not have to keep getting pola boots   SHORT-TERM GOALS:  to be met in 4 weeks   X  Patient will demonstrate a decrease in circumferential measurements of bilateral lower extremity by 2-3 cm working towards the lymphedema swelling stabilizing and patient being able to get measured and fitted for a new compression garment to be worn daily to keep swelling down. GOAL NOT MET 8/22/19 - Pt. Is unable to comply with home program, in part due to inability to reach her legs for compression bandaging, but also in part due to cognitive issues/not following directions   X  Patient will tolerate wearing the compression bandages from one treatment session until the next treatment session working towards meeting short-term goal #1. GOAL NOT MET 8/22/19 - Pt. Pushes the bandages down or if they slide down, she does not attempt to pull them up, then the bandages cause tight painful areas on her ankles. At last session, pt arrived with no compression on her legs at all and stated \"my legs can't tolerate compression\"   X Patient will demonstrate and verbalize 3-5 skin care precautionary measures to decrease dry skin and risk of infection. GOAL NOT MET 8/22/19 - Despite being educated on repeatedly and given handout with the precautions listed, pt. Does not follow the skin care precautions. Pt. Arrived at visit today with open areas on her legs where she had rubbed them raw. X Patient and therapist will explore possibility of obtaining velcro compression system for bilateral lower legs and feet to control edema of legs and allow for pt. To be able to apply the compression herself  GOAL MET 8/22/19 - Therapist has sent measurements to [facility] in [facility] [place] and submitted to a ady for [employer] of the the velcro wraps.  Unfortunately, this is not a quick process and so [facility] will not order the

## 2019-08-28 ENCOUNTER — TELEPHONE (OUTPATIENT)
Dept: CARDIOLOGY CLINIC | Age: 69
End: 2019-08-28

## 2019-08-29 ENCOUNTER — HOSPITAL ENCOUNTER (INPATIENT)
Age: 69
LOS: 3 days | Discharge: HOME | DRG: 191 | End: 2019-09-01
Attending: EMERGENCY MEDICINE | Admitting: INTERNAL MEDICINE
Payer: MEDICARE

## 2019-08-29 ENCOUNTER — APPOINTMENT (OUTPATIENT)
Dept: GENERAL RADIOLOGY | Age: 69
DRG: 191 | End: 2019-08-29
Payer: MEDICARE

## 2019-08-29 DIAGNOSIS — J44.1 COPD EXACERBATION (HCC): Primary | ICD-10-CM

## 2019-08-29 DIAGNOSIS — R05.9 COUGH: ICD-10-CM

## 2019-08-29 LAB
ALLEN TEST: POSITIVE
ANION GAP SERPL CALCULATED.3IONS-SCNC: 15 MEQ/L (ref 8–16)
BASE EXCESS (CALCULATED): -3.5 MMOL/L (ref -2.5–2.5)
BASOPHILS # BLD: 0.3 %
BASOPHILS ABSOLUTE: 0 THOU/MM3 (ref 0–0.1)
BUN BLDV-MCNC: 17 MG/DL (ref 7–22)
CALCIUM SERPL-MCNC: 9.6 MG/DL (ref 8.5–10.5)
CHLORIDE BLD-SCNC: 105 MEQ/L (ref 98–111)
CO2: 22 MEQ/L (ref 23–33)
COLLECTED BY:: ABNORMAL
CREAT SERPL-MCNC: 0.6 MG/DL (ref 0.4–1.2)
DEVICE: ABNORMAL
EKG ATRIAL RATE: 90 BPM
EKG P AXIS: 47 DEGREES
EKG P-R INTERVAL: 146 MS
EKG Q-T INTERVAL: 398 MS
EKG QRS DURATION: 112 MS
EKG QTC CALCULATION (BAZETT): 486 MS
EKG R AXIS: -26 DEGREES
EKG T AXIS: 93 DEGREES
EKG VENTRICULAR RATE: 90 BPM
EOSINOPHIL # BLD: 2.5 %
EOSINOPHILS ABSOLUTE: 0.2 THOU/MM3 (ref 0–0.4)
ERYTHROCYTE [DISTWIDTH] IN BLOOD BY AUTOMATED COUNT: 17.1 % (ref 11.5–14.5)
ERYTHROCYTE [DISTWIDTH] IN BLOOD BY AUTOMATED COUNT: 52.3 FL (ref 35–45)
GFR SERPL CREATININE-BSD FRML MDRD: > 90 ML/MIN/1.73M2
GLUCOSE BLD-MCNC: 138 MG/DL (ref 70–108)
HCO3: 20 MMOL/L (ref 23–28)
HCT VFR BLD CALC: 36.9 % (ref 37–47)
HEMOGLOBIN: 10.9 GM/DL (ref 12–16)
IMMATURE GRANS (ABS): 0.02 THOU/MM3 (ref 0–0.07)
IMMATURE GRANULOCYTES: 0 %
LYMPHOCYTES # BLD: 39.5 %
LYMPHOCYTES ABSOLUTE: 3.8 THOU/MM3 (ref 1–4.8)
MCH RBC QN AUTO: 24.7 PG (ref 26–33)
MCHC RBC AUTO-ENTMCNC: 29.5 GM/DL (ref 32.2–35.5)
MCV RBC AUTO: 83.7 FL (ref 81–99)
MONOCYTES # BLD: 13 %
MONOCYTES ABSOLUTE: 1.2 THOU/MM3 (ref 0.4–1.3)
NUCLEATED RED BLOOD CELLS: 0 /100 WBC
O2 SATURATION: 98 %
OSMOLALITY CALCULATION: 286.9 MOSMOL/KG (ref 275–300)
PCO2: 28 MMHG (ref 35–45)
PH BLOOD GAS: 7.45 (ref 7.35–7.45)
PLATELET # BLD: 316 THOU/MM3 (ref 130–400)
PMV BLD AUTO: 10.8 FL (ref 9.4–12.4)
PO2: 99 MMHG (ref 71–104)
POTASSIUM REFLEX MAGNESIUM: 3.8 MEQ/L (ref 3.5–5.2)
PRO-BNP: 173.4 PG/ML (ref 0–900)
PROCALCITONIN: 0.04 NG/ML (ref 0.01–0.09)
RBC # BLD: 4.41 MILL/MM3 (ref 4.2–5.4)
SEG NEUTROPHILS: 44.5 %
SEGMENTED NEUTROPHILS ABSOLUTE COUNT: 4.3 THOU/MM3 (ref 1.8–7.7)
SODIUM BLD-SCNC: 142 MEQ/L (ref 135–145)
SOURCE, BLOOD GAS: ABNORMAL
TROPONIN T: < 0.01 NG/ML
WBC # BLD: 9.6 THOU/MM3 (ref 4.8–10.8)

## 2019-08-29 PROCEDURE — 2580000003 HC RX 258: Performed by: EMERGENCY MEDICINE

## 2019-08-29 PROCEDURE — G0378 HOSPITAL OBSERVATION PER HR: HCPCS

## 2019-08-29 PROCEDURE — 1200000003 HC TELEMETRY R&B

## 2019-08-29 PROCEDURE — 84145 PROCALCITONIN (PCT): CPT

## 2019-08-29 PROCEDURE — 36600 WITHDRAWAL OF ARTERIAL BLOOD: CPT

## 2019-08-29 PROCEDURE — 94644 CONT INHLJ TX 1ST HOUR: CPT

## 2019-08-29 PROCEDURE — 36415 COLL VENOUS BLD VENIPUNCTURE: CPT

## 2019-08-29 PROCEDURE — 99285 EMERGENCY DEPT VISIT HI MDM: CPT

## 2019-08-29 PROCEDURE — 83880 ASSAY OF NATRIURETIC PEPTIDE: CPT

## 2019-08-29 PROCEDURE — 80048 BASIC METABOLIC PNL TOTAL CA: CPT

## 2019-08-29 PROCEDURE — 71045 X-RAY EXAM CHEST 1 VIEW: CPT

## 2019-08-29 PROCEDURE — 84484 ASSAY OF TROPONIN QUANT: CPT

## 2019-08-29 PROCEDURE — 85025 COMPLETE CBC W/AUTO DIFF WBC: CPT

## 2019-08-29 PROCEDURE — 6360000002 HC RX W HCPCS: Performed by: EMERGENCY MEDICINE

## 2019-08-29 PROCEDURE — 82803 BLOOD GASES ANY COMBINATION: CPT

## 2019-08-29 PROCEDURE — 93005 ELECTROCARDIOGRAM TRACING: CPT | Performed by: EMERGENCY MEDICINE

## 2019-08-29 PROCEDURE — 94761 N-INVAS EAR/PLS OXIMETRY MLT: CPT

## 2019-08-29 RX ORDER — 0.9 % SODIUM CHLORIDE 0.9 %
30 INTRAVENOUS SOLUTION INTRAVENOUS ONCE
Status: COMPLETED | OUTPATIENT
Start: 2019-08-29 | End: 2019-08-29

## 2019-08-29 RX ORDER — SODIUM CHLORIDE 9 MG/ML
INJECTION, SOLUTION INTRAVENOUS CONTINUOUS
Status: DISCONTINUED | OUTPATIENT
Start: 2019-08-29 | End: 2019-08-31

## 2019-08-29 RX ADMIN — ALBUTEROL SULFATE 15 MG/HR: 2.5 SOLUTION RESPIRATORY (INHALATION) at 19:58

## 2019-08-29 RX ADMIN — SODIUM CHLORIDE 1503 ML: 9 INJECTION, SOLUTION INTRAVENOUS at 19:51

## 2019-08-29 ASSESSMENT — PAIN DESCRIPTION - ONSET
ONSET: ON-GOING

## 2019-08-29 ASSESSMENT — PAIN DESCRIPTION - ORIENTATION
ORIENTATION: MID

## 2019-08-29 ASSESSMENT — PAIN DESCRIPTION - FREQUENCY
FREQUENCY: CONTINUOUS

## 2019-08-29 ASSESSMENT — ENCOUNTER SYMPTOMS
SORE THROAT: 0
ABDOMINAL PAIN: 0
SHORTNESS OF BREATH: 1
WHEEZING: 1
BACK PAIN: 0
NAUSEA: 0
DIARRHEA: 0
COUGH: 1
VOMITING: 0
BLOOD IN STOOL: 0

## 2019-08-29 ASSESSMENT — PAIN DESCRIPTION - PROGRESSION
CLINICAL_PROGRESSION: NOT CHANGED
CLINICAL_PROGRESSION: GRADUALLY IMPROVING

## 2019-08-29 ASSESSMENT — PAIN SCALES - GENERAL
PAINLEVEL_OUTOF10: 6
PAINLEVEL_OUTOF10: 5
PAINLEVEL_OUTOF10: 6
PAINLEVEL_OUTOF10: 6

## 2019-08-29 ASSESSMENT — PAIN DESCRIPTION - DESCRIPTORS
DESCRIPTORS: PRESSURE

## 2019-08-29 ASSESSMENT — PAIN DESCRIPTION - LOCATION
LOCATION: CHEST

## 2019-08-29 ASSESSMENT — PAIN DESCRIPTION - PAIN TYPE
TYPE: ACUTE PAIN

## 2019-08-29 NOTE — ED PROVIDER NOTES
are negative. PAST MEDICAL HISTORY    has a past medical history of Acid reflux, Arrhythmia, Arthritis, CAD (coronary artery disease), Chronic bronchitis ([facility] COPD (chronic obstructive pulmonary disease) ([facility] Depression, Diabetes mellitus ([facility] Dizziness - light-headed, Fall, Falls frequently, Hyperlipidemia, Hypertension, Kidney disease, MDRO (multiple drug resistant organisms) resistance, Numbness and tingling, S/P laparoscopic cholecystectomy, Swelling, and Tinnitus. SURGICAL HISTORY      has a past surgical history that includes Coronary angioplasty (12/16/2009); transthoracic echocardiogram (03/04/2009); cardiovascular stress test (04/03/2009); cardiovascular stress test (03/31/2010); back surgery (2007); eye surgery (2004); Hysterectomy (1995); Tooth Extraction (4/16/2012); Colonoscopy (2011); other surgical history (Feb 17, 2014); Endoscopy, colon, diagnostic (over 5 years ); Tonsillectomy (as a child ); other surgical history (2/2014); hernia repair (21 months old ); hernia repair (9/8/14); and pr lap,diagnostic abdomen (N/A, 11/26/2018). CURRENT MEDICATIONS       Previous Medications    ACETAMINOPHEN (TYLENOL) 500 MG TABLET    Take 500 mg by mouth every 6 hours as needed for Pain. ALBUTEROL SULFATE HFA (PROAIR HFA) 108 (90 BASE) MCG/ACT INHALER    Inhale 2 puffs into the lungs every 4 hours as needed    ARTIFICIAL SALIVA (BIOTENE MOISTURIZING MOUTH) SOLN    Take 1 applicator by mouth as needed    ASPIRIN LOW DOSE 81 MG EC TABLET    take 1 tablet by mouth once daily    CICLOPIROX TREATMENT EX    Apply  topically. 10.0 mL Ciclopirox-terbinafine-fluconazole  Fungal Free Nails Fungicidal solution    CITALOPRAM (CELEXA) 40 MG TABLET    Take 40 mg by mouth every morning. CLOBETASOL (TEMOVATE) 0.05 % CREAM    Apply topically 2 times daily.     COMPRESSION STOCKINGS MISC    1 Package by Does not apply route Knee High    DIPHENHYDRAMINE (BENADRYL) 50 MG CAPSULE    Take 50 mg by mouth nightly as needed for Itching. ETODOLAC (LODINE) 500 MG TABLET    take 1 tablet by mouth twice a day    FEXOFENADINE (ALLEGRA) 60 MG TABLET    Take 1 tablet by mouth 2 times daily    FREESTYLE LANCETS MISC    Test 1 time daily. Dx: 250.00-Type 2 Diabetes. Freestyle 28G Lancets    FUROSEMIDE (LASIX) 20 MG TABLET    Take 20 mg by mouth daily    GABAPENTIN (NEURONTIN) 300 MG CAPSULE    Take 1 capsule by mouth 3 times daily    GLUCOSE BLOOD VI TEST STRIPS (FREESTYLE LITE) STRIP    Test once daily    HOMEOPATHIC PRODUCTS CO    550 mg by Combination route daily as needed Legatrin PM    HYDROXYZINE (ATARAX) 50 MG TABLET    take 1 tablet by mouth at bedtime    L-ARGININE 1000 MG TABS    Take 1,000 mg by mouth daily as needed    MELOXICAM (MOBIC PO)    Take by mouth daily    METFORMIN (GLUCOPHAGE) 1000 MG TABLET    take 1 tablet by mouth twice a day with food    METOPROLOL SUCCINATE (TOPROL XL) 25 MG EXTENDED RELEASE TABLET    take 1 tablet by mouth once daily    MONTELUKAST (SINGULAIR) 10 MG TABLET    Take 1 tablet by mouth nightly    NYSTATIN-TRIAMCINOLONE (MYCOLOG II) 643982-4.1 UNIT/GM-% CREAM    Apply 1 applicator topically 4 times daily Apply topically 4 times daily. As needed    OMEGA-3 FATTY ACIDS (FISH OIL) 1200 MG CAPS    Take 1,200 mg by mouth daily    OMEPRAZOLE (PRILOSEC) 40 MG DELAYED RELEASE CAPSULE    take 1 capsule by mouth once daily    PIOGLITAZONE (ACTOS) 45 MG TABLET    take 1 tablet by mouth once daily    POTASSIUM CHLORIDE (MICRO-K) 10 MEQ EXTENDED RELEASE CAPSULE    Take 10 mEq by mouth daily    PSYLLIUM (KONSYL) 28.3 % PACK    Take 1 packet by mouth daily    QUETIAPINE (SEROQUEL) 50 MG TABLET    Take 50 mg by mouth nightly.     SIMVASTATIN (ZOCOR) 40 MG TABLET    take 1 tablet by mouth at bedtime    SPIRIVA HANDIHALER 18 MCG INHALATION CAPSULE    inhale the contents of one capsule in the handihaler once daily    SPIRONOLACTONE (ALDACTONE) 25 MG TABLET    take 1-2 tablets by mouth once daily if needed (TAKE Brain Natriuretic Peptide   Result Value Ref Range    Pro-.4 0.0 - 900.0 pg/mL   Troponin   Result Value Ref Range    Troponin T < 0.010 ng/ml   Blood Gas, Arterial   Result Value Ref Range    pH, Blood Gas 7.45 7.35 - 7.45    PCO2 28 (L) 35 - 45 mmhg    PO2 99 71 - 104 mmhg    HCO3 20 (L) 23 - 28 mmol/l    Base Excess (Calculated) -3.5 (L) -2.5 - 2.5 mmol/l    O2 Sat 98 %    DEVICE Cannula     Kurt Test Positive     Source: L Radial     COLLECTED BY: [id number]    Procalcitonin   Result Value Ref Range    Procalcitonin 0.04 0.01 - 0.09 ng/mL   Anion Gap   Result Value Ref Range    Anion Gap 15.0 8.0 - 16.0 meq/L   Osmolality   Result Value Ref Range    Osmolality Calc 286.9 275.0 - 300 mOsmol/kg   Glomerular Filtration Rate, Estimated   Result Value Ref Range    Est, Glom Filt Rate >90 ml/min/1.73m2       EMERGENCY DEPARTMENT COURSE:   Vitals:    Vitals:    08/29/19 2002 08/29/19 2039 08/29/19 2141 08/29/19 2230   BP:  (!) 154/79 (!) 146/61 (!) 122/57   Pulse:  128 123 118   Resp: 14 17 18 21   Temp:       TempSrc:       SpO2: 97% 97% 96% 98%   Weight:       Height:           Orders Placed This Encounter   Medications    0.9 % sodium chloride bolus    0.9 % sodium chloride infusion    DISCONTD: albuterol (PROVENTIL) nebulizer solution    albuterol (PROVENTIL) nebulizer solution       Patient was seen and evaluated emergency department. History and physical were completed. Diagnostic labs and imaging studies are reviewed. Workup demonstrates no evidence of a bacterial pneumonia or infection. The patient is experiencing tachycardia and continued dyspnea and shortness of breath despite aerosol treatments and a continuous med neb in ED. I contacted Dr. Divya Manzano, internal medicine. He graciously will admit and assume further care and orders for patient at this time. FINAL IMPRESSION      1. COPD exacerbation ([address]    2.  Cough          DISPOSITION/PLAN   DISPOSITION Admitted 08/29/2019 10:35:44

## 2019-08-29 NOTE — ED NOTES
Pt resting quietly in room no needs expressed. Side rails up x2 with call light in reach. Will continue to monitor.        [facility]  08/29/19 1950

## 2019-08-29 NOTE — ED TRIAGE NOTES
Pt to ED today via EMS with c/o SOB and cough. Pt states cough has been ongoing for six months now and she is currently seeing a specialist. Pt states she has been SOB and had CP since onset of cough. Pt states she took her inhaler with no relief. Pt with diminished breath sounds at bases of lungs. EMS reports 125 solumedrol and x2 breathing treatments prior to pt arrival. Pt states pain in chest is \"6/10\" and feels like pressure.

## 2019-08-30 PROBLEM — J44.1 COPD WITH ACUTE EXACERBATION (HCC): Status: ACTIVE | Noted: 2019-08-30

## 2019-08-30 LAB
GLUCOSE BLD-MCNC: 210 MG/DL (ref 70–108)
GLUCOSE BLD-MCNC: 225 MG/DL (ref 70–108)
GLUCOSE BLD-MCNC: 253 MG/DL (ref 70–108)

## 2019-08-30 PROCEDURE — 1200000003 HC TELEMETRY R&B

## 2019-08-30 PROCEDURE — 96365 THER/PROPH/DIAG IV INF INIT: CPT

## 2019-08-30 PROCEDURE — 96375 TX/PRO/DX INJ NEW DRUG ADDON: CPT

## 2019-08-30 PROCEDURE — G0378 HOSPITAL OBSERVATION PER HR: HCPCS

## 2019-08-30 PROCEDURE — 96376 TX/PRO/DX INJ SAME DRUG ADON: CPT

## 2019-08-30 PROCEDURE — 2700000000 HC OXYGEN THERAPY PER DAY

## 2019-08-30 PROCEDURE — 6370000000 HC RX 637 (ALT 250 FOR IP): Performed by: INTERNAL MEDICINE

## 2019-08-30 PROCEDURE — 82948 REAGENT STRIP/BLOOD GLUCOSE: CPT

## 2019-08-30 PROCEDURE — 2709999900 HC NON-CHARGEABLE SUPPLY

## 2019-08-30 PROCEDURE — 6360000002 HC RX W HCPCS: Performed by: INTERNAL MEDICINE

## 2019-08-30 PROCEDURE — 96372 THER/PROPH/DIAG INJ SC/IM: CPT

## 2019-08-30 PROCEDURE — 94640 AIRWAY INHALATION TREATMENT: CPT

## 2019-08-30 PROCEDURE — 2580000003 HC RX 258: Performed by: INTERNAL MEDICINE

## 2019-08-30 PROCEDURE — 93010 ELECTROCARDIOGRAM REPORT: CPT | Performed by: NUCLEAR MEDICINE

## 2019-08-30 RX ORDER — GABAPENTIN 300 MG/1
300 CAPSULE ORAL 3 TIMES DAILY
Status: DISCONTINUED | OUTPATIENT
Start: 2019-08-30 | End: 2019-09-01 | Stop reason: HOSPADM

## 2019-08-30 RX ORDER — DEXTROSE MONOHYDRATE 25 G/50ML
12.5 INJECTION, SOLUTION INTRAVENOUS PRN
Status: DISCONTINUED | OUTPATIENT
Start: 2019-08-30 | End: 2019-09-01 | Stop reason: HOSPADM

## 2019-08-30 RX ORDER — SODIUM CHLORIDE 0.9 % (FLUSH) 0.9 %
10 SYRINGE (ML) INJECTION EVERY 12 HOURS SCHEDULED
Status: DISCONTINUED | OUTPATIENT
Start: 2019-08-30 | End: 2019-09-01 | Stop reason: HOSPADM

## 2019-08-30 RX ORDER — IPRATROPIUM BROMIDE AND ALBUTEROL SULFATE 2.5; .5 MG/3ML; MG/3ML
1 SOLUTION RESPIRATORY (INHALATION)
Status: DISCONTINUED | OUTPATIENT
Start: 2019-08-30 | End: 2019-09-01 | Stop reason: HOSPADM

## 2019-08-30 RX ORDER — PREDNISONE 20 MG/1
40 TABLET ORAL DAILY
Status: DISCONTINUED | OUTPATIENT
Start: 2019-09-01 | End: 2019-09-01 | Stop reason: HOSPADM

## 2019-08-30 RX ORDER — DEXTROSE MONOHYDRATE 50 MG/ML
100 INJECTION, SOLUTION INTRAVENOUS PRN
Status: DISCONTINUED | OUTPATIENT
Start: 2019-08-30 | End: 2019-09-01 | Stop reason: HOSPADM

## 2019-08-30 RX ORDER — FUROSEMIDE 40 MG/1
20 TABLET ORAL DAILY
Status: DISCONTINUED | OUTPATIENT
Start: 2019-08-30 | End: 2019-09-01

## 2019-08-30 RX ORDER — SODIUM CHLORIDE 0.9 % (FLUSH) 0.9 %
10 SYRINGE (ML) INJECTION PRN
Status: DISCONTINUED | OUTPATIENT
Start: 2019-08-30 | End: 2019-08-30 | Stop reason: SDUPTHER

## 2019-08-30 RX ORDER — ACETAMINOPHEN 325 MG/1
650 TABLET ORAL EVERY 4 HOURS PRN
Status: DISCONTINUED | OUTPATIENT
Start: 2019-08-30 | End: 2019-09-01 | Stop reason: HOSPADM

## 2019-08-30 RX ORDER — CITALOPRAM 40 MG/1
40 TABLET ORAL EVERY MORNING
Status: DISCONTINUED | OUTPATIENT
Start: 2019-08-30 | End: 2019-09-01 | Stop reason: HOSPADM

## 2019-08-30 RX ORDER — ASPIRIN 81 MG/1
81 TABLET ORAL DAILY
Status: DISCONTINUED | OUTPATIENT
Start: 2019-08-30 | End: 2019-09-01 | Stop reason: HOSPADM

## 2019-08-30 RX ORDER — ONDANSETRON 2 MG/ML
4 INJECTION INTRAMUSCULAR; INTRAVENOUS EVERY 6 HOURS PRN
Status: DISCONTINUED | OUTPATIENT
Start: 2019-08-30 | End: 2019-09-01 | Stop reason: HOSPADM

## 2019-08-30 RX ORDER — HYDROCODONE BITARTRATE AND ACETAMINOPHEN 5; 325 MG/1; MG/1
2 TABLET ORAL EVERY 4 HOURS PRN
Status: DISCONTINUED | OUTPATIENT
Start: 2019-08-30 | End: 2019-09-01 | Stop reason: HOSPADM

## 2019-08-30 RX ORDER — SODIUM CHLORIDE 0.9 % (FLUSH) 0.9 %
10 SYRINGE (ML) INJECTION EVERY 12 HOURS SCHEDULED
Status: DISCONTINUED | OUTPATIENT
Start: 2019-08-30 | End: 2019-08-30 | Stop reason: SDUPTHER

## 2019-08-30 RX ORDER — HYDROCODONE BITARTRATE AND ACETAMINOPHEN 5; 325 MG/1; MG/1
1 TABLET ORAL EVERY 4 HOURS PRN
Status: DISCONTINUED | OUTPATIENT
Start: 2019-08-30 | End: 2019-09-01 | Stop reason: HOSPADM

## 2019-08-30 RX ORDER — ALBUTEROL SULFATE 2.5 MG/3ML
2.5 SOLUTION RESPIRATORY (INHALATION)
Status: DISCONTINUED | OUTPATIENT
Start: 2019-08-30 | End: 2019-09-01 | Stop reason: HOSPADM

## 2019-08-30 RX ORDER — ACETAMINOPHEN 325 MG/1
650 TABLET ORAL EVERY 4 HOURS PRN
Status: DISCONTINUED | OUTPATIENT
Start: 2019-08-30 | End: 2019-08-30 | Stop reason: SDUPTHER

## 2019-08-30 RX ORDER — METHYLPREDNISOLONE SODIUM SUCCINATE 40 MG/ML
40 INJECTION, POWDER, LYOPHILIZED, FOR SOLUTION INTRAMUSCULAR; INTRAVENOUS EVERY 6 HOURS
Status: COMPLETED | OUTPATIENT
Start: 2019-08-30 | End: 2019-08-31

## 2019-08-30 RX ORDER — SODIUM CHLORIDE 0.9 % (FLUSH) 0.9 %
10 SYRINGE (ML) INJECTION PRN
Status: DISCONTINUED | OUTPATIENT
Start: 2019-08-30 | End: 2019-09-01 | Stop reason: HOSPADM

## 2019-08-30 RX ORDER — NICOTINE POLACRILEX 4 MG
15 LOZENGE BUCCAL PRN
Status: DISCONTINUED | OUTPATIENT
Start: 2019-08-30 | End: 2019-09-01 | Stop reason: HOSPADM

## 2019-08-30 RX ADMIN — SODIUM CHLORIDE: 9 INJECTION, SOLUTION INTRAVENOUS at 22:17

## 2019-08-30 RX ADMIN — HYDROCODONE BITARTRATE AND ACETAMINOPHEN 2 TABLET: 5; 325 TABLET ORAL at 03:15

## 2019-08-30 RX ADMIN — INSULIN LISPRO 9 UNITS: 100 INJECTION, SOLUTION INTRAVENOUS; SUBCUTANEOUS at 14:07

## 2019-08-30 RX ADMIN — ENOXAPARIN SODIUM 40 MG: 40 INJECTION SUBCUTANEOUS at 20:28

## 2019-08-30 RX ADMIN — IPRATROPIUM BROMIDE AND ALBUTEROL SULFATE 1 AMPULE: .5; 3 SOLUTION RESPIRATORY (INHALATION) at 12:11

## 2019-08-30 RX ADMIN — GABAPENTIN 300 MG: 300 CAPSULE ORAL at 13:17

## 2019-08-30 RX ADMIN — METHYLPREDNISOLONE SODIUM SUCCINATE 40 MG: 40 INJECTION, POWDER, FOR SOLUTION INTRAMUSCULAR; INTRAVENOUS at 09:48

## 2019-08-30 RX ADMIN — SODIUM CHLORIDE: 9 INJECTION, SOLUTION INTRAVENOUS at 13:16

## 2019-08-30 RX ADMIN — IPRATROPIUM BROMIDE AND ALBUTEROL SULFATE 1 AMPULE: .5; 3 SOLUTION RESPIRATORY (INHALATION) at 08:37

## 2019-08-30 RX ADMIN — IPRATROPIUM BROMIDE AND ALBUTEROL SULFATE 1 AMPULE: .5; 3 SOLUTION RESPIRATORY (INHALATION) at 15:36

## 2019-08-30 RX ADMIN — INSULIN LISPRO 3 UNITS: 100 INJECTION, SOLUTION INTRAVENOUS; SUBCUTANEOUS at 20:29

## 2019-08-30 RX ADMIN — AZITHROMYCIN MONOHYDRATE 500 MG: 500 INJECTION, POWDER, LYOPHILIZED, FOR SOLUTION INTRAVENOUS at 04:22

## 2019-08-30 RX ADMIN — METHYLPREDNISOLONE SODIUM SUCCINATE 40 MG: 40 INJECTION, POWDER, FOR SOLUTION INTRAMUSCULAR; INTRAVENOUS at 20:27

## 2019-08-30 RX ADMIN — ASPIRIN 81 MG: 81 TABLET ORAL at 09:48

## 2019-08-30 RX ADMIN — METFORMIN HYDROCHLORIDE 1000 MG: 500 TABLET ORAL at 18:28

## 2019-08-30 RX ADMIN — HYDROCODONE BITARTRATE AND ACETAMINOPHEN 2 TABLET: 5; 325 TABLET ORAL at 20:35

## 2019-08-30 RX ADMIN — METFORMIN HYDROCHLORIDE 1000 MG: 500 TABLET ORAL at 09:48

## 2019-08-30 RX ADMIN — ONDANSETRON 4 MG: 2 INJECTION INTRAMUSCULAR; INTRAVENOUS at 04:36

## 2019-08-30 RX ADMIN — GABAPENTIN 300 MG: 300 CAPSULE ORAL at 09:48

## 2019-08-30 RX ADMIN — GABAPENTIN 300 MG: 300 CAPSULE ORAL at 20:26

## 2019-08-30 RX ADMIN — ENOXAPARIN SODIUM 40 MG: 40 INJECTION SUBCUTANEOUS at 09:48

## 2019-08-30 RX ADMIN — CITALOPRAM 40 MG: 40 TABLET, FILM COATED ORAL at 13:15

## 2019-08-30 RX ADMIN — INSULIN LISPRO 6 UNITS: 100 INJECTION, SOLUTION INTRAVENOUS; SUBCUTANEOUS at 18:28

## 2019-08-30 RX ADMIN — METHYLPREDNISOLONE SODIUM SUCCINATE 40 MG: 40 INJECTION, POWDER, FOR SOLUTION INTRAMUSCULAR; INTRAVENOUS at 04:22

## 2019-08-30 RX ADMIN — Medication 10 ML: at 09:49

## 2019-08-30 RX ADMIN — METHYLPREDNISOLONE SODIUM SUCCINATE 40 MG: 40 INJECTION, POWDER, FOR SOLUTION INTRAMUSCULAR; INTRAVENOUS at 18:28

## 2019-08-30 RX ADMIN — Medication 10 ML: at 20:31

## 2019-08-30 RX ADMIN — SODIUM CHLORIDE: 9 INJECTION, SOLUTION INTRAVENOUS at 02:00

## 2019-08-30 ASSESSMENT — PAIN DESCRIPTION - PROGRESSION
CLINICAL_PROGRESSION: GRADUALLY IMPROVING
CLINICAL_PROGRESSION: NOT CHANGED

## 2019-08-30 ASSESSMENT — PAIN DESCRIPTION - LOCATION
LOCATION: CHEST
LOCATION: BACK;CHEST
LOCATION: CHEST;BACK
LOCATION: CHEST
LOCATION: CHEST

## 2019-08-30 ASSESSMENT — PAIN DESCRIPTION - ONSET
ONSET: ON-GOING

## 2019-08-30 ASSESSMENT — PAIN SCALES - GENERAL
PAINLEVEL_OUTOF10: 7
PAINLEVEL_OUTOF10: 8
PAINLEVEL_OUTOF10: 7
PAINLEVEL_OUTOF10: 7
PAINLEVEL_OUTOF10: 5
PAINLEVEL_OUTOF10: 8

## 2019-08-30 ASSESSMENT — PAIN DESCRIPTION - FREQUENCY
FREQUENCY: INTERMITTENT
FREQUENCY: INTERMITTENT
FREQUENCY: CONTINUOUS
FREQUENCY: INTERMITTENT
FREQUENCY: INTERMITTENT

## 2019-08-30 ASSESSMENT — PAIN DESCRIPTION - DESCRIPTORS
DESCRIPTORS: PRESSURE
DESCRIPTORS: ACHING;BURNING
DESCRIPTORS: ACHING

## 2019-08-30 ASSESSMENT — PAIN DESCRIPTION - PAIN TYPE
TYPE: ACUTE PAIN

## 2019-08-30 ASSESSMENT — PAIN DESCRIPTION - ORIENTATION
ORIENTATION: MID
ORIENTATION: MID
ORIENTATION: MID;LOWER
ORIENTATION: MID
ORIENTATION: MID

## 2019-08-30 NOTE — ED NOTES
Pt resting quietly in room no needs expressed. Side rails up x2 with call light in reach. Will continue to monitor.        Dakotah Corona RN  08/30/19 0000

## 2019-08-30 NOTE — H&P
Internal Medicine  History and Physical    Patient:  Shar Ledesma  MRN: [id number]      History Obtained From:  patient  PCP: Governor Luis Fernando MD    CHIEF COMPLAINT:  Cough, SOB,     HISTORY OF PRESENT ILLNESS:   The patient is a 78 y.o. female who presents with worsening cough, SOB x 1 weeks. She denies chest pain, no fever, no chills, no N/V. She used her home Pro air and spiriva with no relief. Seen in ED, evaluated, treated and admitted for COPD exacerbation. She is a non smoker with exposure to 2nd hand smoking. Past Medical History:        Diagnosis Date    Acid reflux     Arrhythmia     Arthritis     CAD (coronary artery disease)     NON-OBSTRUCTIVE    Chronic bronchitis (HCC)     COPD (chronic obstructive pulmonary disease) (HCC)     Depression     Diabetes mellitus (HCC)     Dizziness - light-headed     Fall 10-15-13    left rib fracture and shoulder contusion     Falls frequently     Hyperlipidemia     Hypertension     Kidney disease     MDRO (multiple drug resistant organisms) resistance 2/14    MRSA ABDOMEN    Numbness and tingling     HX OF:    S/P laparoscopic cholecystectomy 11/27/2018    Swelling     Tinnitus        Past Surgical History:        Procedure Laterality Date    BACK SURGERY  2007    CARDIOVASCULAR STRESS TEST  04/03/2009    Persantine test associated w/non specific symptoms. EKG non diagnostic. no obvious stress induced ischemia. EF 63%. normal wall motion.  CARDIOVASCULAR STRESS TEST  03/31/2010    tilt table associated w/fluctuation of BP w/relatively low BP throughout test. no significant symptoms during test. borderline tilt table test.    COLONOSCOPY  2011    CORONARY ANGIOPLASTY  12/16/2009    EF 60%. nonobstructive CAD. normal LV function.     ENDOSCOPY, COLON, DIAGNOSTIC  over 5 years     Dr. Ulysses Solomon  2004    cataract   [id number]  25 months old     umblilical    HERNIA REPAIR  9/8/14    Ventral Hernia Repair with ASPIRIN LOW DOSE 81 MG EC tablet take 1 tablet by mouth once daily 7/2/15  Yes Liana Gibbs MD   fexofenadine TY [facility]) 60 MG tablet Take 1 tablet by mouth 2 times daily 8/21/19 9/20/19  Angelita Litten Neumeier, APRN - CNP   metoprolol succinate (TOPROL XL) 25 MG extended release tablet take 1 tablet by mouth once daily 7/6/19   Prerna Caicedo MD   spironolactone (ALDACTONE) 25 MG tablet take 1-2 tablets by mouth once daily if needed (TAKE 2 TABS ON ANY DAY THERE IS SWELLING IN LEGS OR ANKLES)  Patient taking differently: Take 25 mg by mouth daily take 1-2 tablets by mouth once daily if needed (TAKE 2 TABS ON ANY DAY THERE IS SWELLING IN LEGS OR ANKLES) 4/3/19   Prerna Caicedo MD   nystatin-triamcinolone (MYCOLOG II) 672659-8.7 UNIT/GM-% cream Apply 1 applicator topically 4 times daily Apply topically 4 times daily.  As needed    Historical Provider, MD   HOMEOPATHIC PRODUCTS  mg by Combination route daily as needed Legatrin PM    Historical Provider, MD   psyllium (KONSYL) 28.3 % PACK Take 1 packet by mouth daily    Historical Provider, MD   L-Arginine 1000 MG TABS Take 1,000 mg by mouth daily as needed    Historical Provider, MD   Artificial Saliva (BIOTENE MOISTURIZING MOUTH) SOLN Take 1 applicator by mouth as needed    Historical Provider, MD   Compression Stockings MISC 1 Package by Does not apply route Knee High 11/2/18   Prerna Caicedo MD   potassium chloride (MICRO-K) 10 MEQ extended release capsule Take 10 mEq by mouth daily Indications: pt splits dose in half and takes one half in am and one half in pm     Historical Provider, MD   Meloxicam (MOBIC PO) Take by mouth daily    Historical Provider, MD   omeprazole (PRILOSEC) 40 MG delayed release capsule take 1 capsule by mouth once daily 12/15/16   CATE Mcneil CNP   TRADJENTA 5 MG tablet take 1 tablet by mouth daily 12/6/16   Liana Gibbs MD   simvastatin (ZOCOR) 40 MG tablet take 1 tablet by mouth at

## 2019-08-30 NOTE — ED NOTES
Pt resting quietly in room no needs expressed. Side rails up x2 with call light in reach. Will continue to monitor.        [facility]  08/29/19 7330

## 2019-08-31 LAB
AVERAGE GLUCOSE: 126 MG/DL (ref 70–126)
GLUCOSE BLD-MCNC: 210 MG/DL (ref 70–108)
GLUCOSE BLD-MCNC: 232 MG/DL (ref 70–108)
GLUCOSE BLD-MCNC: 241 MG/DL (ref 70–108)
GLUCOSE BLD-MCNC: 250 MG/DL (ref 70–108)
HBA1C MFR BLD: 6.2 % (ref 4.4–6.4)

## 2019-08-31 PROCEDURE — 2580000003 HC RX 258: Performed by: INTERNAL MEDICINE

## 2019-08-31 PROCEDURE — 82948 REAGENT STRIP/BLOOD GLUCOSE: CPT

## 2019-08-31 PROCEDURE — 6370000000 HC RX 637 (ALT 250 FOR IP): Performed by: INTERNAL MEDICINE

## 2019-08-31 PROCEDURE — 94761 N-INVAS EAR/PLS OXIMETRY MLT: CPT

## 2019-08-31 PROCEDURE — 6360000002 HC RX W HCPCS: Performed by: INTERNAL MEDICINE

## 2019-08-31 PROCEDURE — 96376 TX/PRO/DX INJ SAME DRUG ADON: CPT

## 2019-08-31 PROCEDURE — 96372 THER/PROPH/DIAG INJ SC/IM: CPT

## 2019-08-31 PROCEDURE — G0378 HOSPITAL OBSERVATION PER HR: HCPCS

## 2019-08-31 PROCEDURE — 94640 AIRWAY INHALATION TREATMENT: CPT

## 2019-08-31 PROCEDURE — 83036 HEMOGLOBIN GLYCOSYLATED A1C: CPT

## 2019-08-31 PROCEDURE — 2700000000 HC OXYGEN THERAPY PER DAY

## 2019-08-31 PROCEDURE — 2709999900 HC NON-CHARGEABLE SUPPLY

## 2019-08-31 PROCEDURE — 36415 COLL VENOUS BLD VENIPUNCTURE: CPT

## 2019-08-31 PROCEDURE — 96366 THER/PROPH/DIAG IV INF ADDON: CPT

## 2019-08-31 PROCEDURE — 1200000003 HC TELEMETRY R&B

## 2019-08-31 RX ORDER — SIMVASTATIN 40 MG
40 TABLET ORAL NIGHTLY
Status: DISCONTINUED | OUTPATIENT
Start: 2019-08-31 | End: 2019-09-01 | Stop reason: HOSPADM

## 2019-08-31 RX ORDER — METOPROLOL SUCCINATE 25 MG/1
25 TABLET, EXTENDED RELEASE ORAL DAILY
Status: DISCONTINUED | OUTPATIENT
Start: 2019-08-31 | End: 2019-09-01 | Stop reason: HOSPADM

## 2019-08-31 RX ORDER — QUETIAPINE FUMARATE 25 MG/1
50 TABLET, FILM COATED ORAL NIGHTLY
Status: DISCONTINUED | OUTPATIENT
Start: 2019-08-31 | End: 2019-09-01 | Stop reason: HOSPADM

## 2019-08-31 RX ORDER — AMOXICILLIN 500 MG
1200 CAPSULE ORAL DAILY
Status: DISCONTINUED | OUTPATIENT
Start: 2019-08-31 | End: 2019-08-31 | Stop reason: CLARIF

## 2019-08-31 RX ORDER — PIOGLITAZONEHYDROCHLORIDE 45 MG/1
45 TABLET ORAL DAILY
Status: DISCONTINUED | OUTPATIENT
Start: 2019-08-31 | End: 2019-09-01 | Stop reason: HOSPADM

## 2019-08-31 RX ADMIN — Medication 10 ML: at 21:51

## 2019-08-31 RX ADMIN — GABAPENTIN 300 MG: 300 CAPSULE ORAL at 15:00

## 2019-08-31 RX ADMIN — IPRATROPIUM BROMIDE AND ALBUTEROL SULFATE 1 AMPULE: .5; 3 SOLUTION RESPIRATORY (INHALATION) at 19:46

## 2019-08-31 RX ADMIN — INSULIN LISPRO 9 UNITS: 100 INJECTION, SOLUTION INTRAVENOUS; SUBCUTANEOUS at 10:27

## 2019-08-31 RX ADMIN — AZITHROMYCIN MONOHYDRATE 500 MG: 500 INJECTION, POWDER, LYOPHILIZED, FOR SOLUTION INTRAVENOUS at 04:33

## 2019-08-31 RX ADMIN — GABAPENTIN 300 MG: 300 CAPSULE ORAL at 21:47

## 2019-08-31 RX ADMIN — IPRATROPIUM BROMIDE AND ALBUTEROL SULFATE 1 AMPULE: .5; 3 SOLUTION RESPIRATORY (INHALATION) at 07:41

## 2019-08-31 RX ADMIN — METOPROLOL SUCCINATE 25 MG: 25 TABLET, EXTENDED RELEASE ORAL at 15:00

## 2019-08-31 RX ADMIN — METHYLPREDNISOLONE SODIUM SUCCINATE 40 MG: 40 INJECTION, POWDER, FOR SOLUTION INTRAMUSCULAR; INTRAVENOUS at 21:47

## 2019-08-31 RX ADMIN — INSULIN LISPRO 6 UNITS: 100 INJECTION, SOLUTION INTRAVENOUS; SUBCUTANEOUS at 13:03

## 2019-08-31 RX ADMIN — IPRATROPIUM BROMIDE AND ALBUTEROL SULFATE 1 AMPULE: .5; 3 SOLUTION RESPIRATORY (INHALATION) at 00:04

## 2019-08-31 RX ADMIN — LINAGLIPTIN 5 MG: 5 TABLET, FILM COATED ORAL at 15:00

## 2019-08-31 RX ADMIN — INSULIN LISPRO 3 UNITS: 100 INJECTION, SOLUTION INTRAVENOUS; SUBCUTANEOUS at 21:48

## 2019-08-31 RX ADMIN — CITALOPRAM 40 MG: 40 TABLET, FILM COATED ORAL at 08:15

## 2019-08-31 RX ADMIN — GABAPENTIN 300 MG: 300 CAPSULE ORAL at 08:15

## 2019-08-31 RX ADMIN — IPRATROPIUM BROMIDE AND ALBUTEROL SULFATE 1 AMPULE: .5; 3 SOLUTION RESPIRATORY (INHALATION) at 16:27

## 2019-08-31 RX ADMIN — PIOGLITAZONE 45 MG: 45 TABLET ORAL at 15:00

## 2019-08-31 RX ADMIN — METFORMIN HYDROCHLORIDE 1000 MG: 500 TABLET ORAL at 08:15

## 2019-08-31 RX ADMIN — METHYLPREDNISOLONE SODIUM SUCCINATE 40 MG: 40 INJECTION, POWDER, FOR SOLUTION INTRAMUSCULAR; INTRAVENOUS at 04:33

## 2019-08-31 RX ADMIN — QUETIAPINE FUMARATE 50 MG: 25 TABLET ORAL at 21:47

## 2019-08-31 RX ADMIN — SIMVASTATIN 40 MG: 40 TABLET, FILM COATED ORAL at 21:47

## 2019-08-31 RX ADMIN — ASPIRIN 81 MG: 81 TABLET ORAL at 08:15

## 2019-08-31 RX ADMIN — INSULIN LISPRO 6 UNITS: 100 INJECTION, SOLUTION INTRAVENOUS; SUBCUTANEOUS at 17:21

## 2019-08-31 RX ADMIN — Medication 10 ML: at 08:16

## 2019-08-31 RX ADMIN — IPRATROPIUM BROMIDE AND ALBUTEROL SULFATE 1 AMPULE: .5; 3 SOLUTION RESPIRATORY (INHALATION) at 12:15

## 2019-08-31 RX ADMIN — METFORMIN HYDROCHLORIDE 1000 MG: 500 TABLET ORAL at 17:21

## 2019-08-31 RX ADMIN — METHYLPREDNISOLONE SODIUM SUCCINATE 40 MG: 40 INJECTION, POWDER, FOR SOLUTION INTRAMUSCULAR; INTRAVENOUS at 17:21

## 2019-08-31 RX ADMIN — ENOXAPARIN SODIUM 40 MG: 40 INJECTION SUBCUTANEOUS at 08:15

## 2019-08-31 RX ADMIN — SODIUM CHLORIDE: 9 INJECTION, SOLUTION INTRAVENOUS at 08:17

## 2019-08-31 RX ADMIN — ENOXAPARIN SODIUM 40 MG: 40 INJECTION SUBCUTANEOUS at 21:48

## 2019-08-31 RX ADMIN — METHYLPREDNISOLONE SODIUM SUCCINATE 40 MG: 40 INJECTION, POWDER, FOR SOLUTION INTRAMUSCULAR; INTRAVENOUS at 10:25

## 2019-08-31 ASSESSMENT — PAIN DESCRIPTION - LOCATION
LOCATION: CHEST
LOCATION: BACK;HEAD

## 2019-08-31 ASSESSMENT — PAIN DESCRIPTION - DESCRIPTORS
DESCRIPTORS: ACHING
DESCRIPTORS: ACHING

## 2019-08-31 ASSESSMENT — PAIN DESCRIPTION - PROGRESSION
CLINICAL_PROGRESSION: NOT CHANGED
CLINICAL_PROGRESSION: NOT CHANGED

## 2019-08-31 ASSESSMENT — PAIN DESCRIPTION - FREQUENCY
FREQUENCY: INTERMITTENT
FREQUENCY: INTERMITTENT

## 2019-08-31 ASSESSMENT — PAIN DESCRIPTION - ORIENTATION
ORIENTATION: MID
ORIENTATION: MID

## 2019-08-31 ASSESSMENT — PAIN DESCRIPTION - PAIN TYPE
TYPE: ACUTE PAIN
TYPE: ACUTE PAIN

## 2019-08-31 ASSESSMENT — PAIN - FUNCTIONAL ASSESSMENT
PAIN_FUNCTIONAL_ASSESSMENT: ACTIVITIES ARE NOT PREVENTED
PAIN_FUNCTIONAL_ASSESSMENT: ACTIVITIES ARE NOT PREVENTED

## 2019-08-31 ASSESSMENT — PAIN SCALES - GENERAL
PAINLEVEL_OUTOF10: 4
PAINLEVEL_OUTOF10: 5
PAINLEVEL_OUTOF10: 0

## 2019-08-31 ASSESSMENT — PAIN DESCRIPTION - ONSET
ONSET: ON-GOING
ONSET: ON-GOING

## 2019-08-31 NOTE — PLAN OF CARE
Problem: Pain:  Goal: Pain level will decrease  Description  Pain level will decrease  8/31/2019 1109 by Jose El RN  Outcome: Ongoing  Note:   Patient with complaints of pain. PRN medications available per patient request. Encouraging ice and repositioning for better pain control. Problem: Pain:  Goal: Control of acute pain  Description  Control of acute pain  8/31/2019 1109 by Jose El RN  Outcome: Ongoing  Note:   Patient states pain is 5/10 this AM in the chest from coughing. Patient denied need for PRN medications. Pain goal 4/10. Problem: Pain:  Goal: Control of chronic pain  Description  Control of chronic pain  8/31/2019 1109 by Jose El RN  Outcome: Ongoing  Note:   Patient with complaints of chronic back pain. Patient denies need for pain interventions at this time. PRN medications available if needed. Problem: Falls - Risk of:  Goal: Will remain free from falls  Description  Will remain free from falls  8/31/2019 1109 by Jose El RN  Outcome: Ongoing  Note:   No falls this shift. Falling star program and alarms in use. Patient uses call light appropriately. Problem: Falls - Risk of:  Goal: Absence of physical injury  Description  Absence of physical injury  8/31/2019 1109 by Jose El RN  Outcome: Ongoing  Note:   Patient free from falls. No injuries noted. Problem: Risk for Impaired Skin Integrity  Goal: Tissue integrity - skin and mucous membranes  Description  Structural intactness and normal physiological function of skin and  mucous membranes. 8/31/2019 1109 by Jose El RN  Outcome: Ongoing  Note:   Skin assessed every shift. Encouraging repositioning for pressure ulcer prevention. Stage II noted to buttocks. Excoriation under abdominal folds. Care plan reviewed with patient. Patient verbalizes understanding of the plan of care and contributes to goal setting.

## 2019-08-31 NOTE — PROGRESS NOTES
INTERNAL MEDICINE Progress Note  8/31/2019 12:31 PM  Subjective:   Admit Date: 8/29/2019  PCP: Opal Alcaraz MD  Interval History:   Cough++,   Objective:   Vitals: BP (!) 156/79   Pulse 98   Temp 98 °F (36.7 °C) (Oral)   Resp 18   Ht 5' 2\" (1.575 m)   Wt 281 lb 9.6 oz (127.7 kg)   LMP  (LMP Unknown)   SpO2 96%   Breastfeeding? No   BMI 51.51 kg/m²   General appearance: alert and cooperative with exam  HEENT: Head: atraumatic  Neck: no adenopathy, no carotid bruit and no JVD  Lungs: diminished breath sounds bilaterally  Heart: S1, S2 normal  Abdomen: soft, non-tender; bowel sounds normal; no masses,  no organomegaly  Extremities: no edema, redness or tenderness in the calves or thighs  Neurologic: Mental status: Alert, oriented, thought content appropriate      Medications:   Scheduled Meds:   aspirin  81 mg Oral Daily    citalopram  40 mg Oral QAM    furosemide  20 mg Oral Daily    gabapentin  300 mg Oral TID    metFORMIN  1,000 mg Oral BID WC    sodium chloride flush  10 mL Intravenous 2 times per day    ipratropium-albuterol  1 ampule Inhalation Q4H WA    methylPREDNISolone  40 mg Intravenous Q6H    Followed by   Juarez Mosher ON 9/1/2019] predniSONE  40 mg Oral Daily    azithromycin  500 mg Intravenous Q24H    insulin lispro  0-18 Units Subcutaneous TID     insulin lispro  0-9 Units Subcutaneous Nightly    enoxaparin  40 mg Subcutaneous BID     Continuous Infusions:   dextrose      sodium chloride 125 mL/hr at 08/31/19 0817       Lab Results:   CBC:   Recent Labs     08/29/19  1856   WBC 9.6   HGB 10.9*        BMP:    Recent Labs     08/29/19  1856      K 3.8      CO2 22*   BUN 17   CREATININE 0.6   GLUCOSE 138*     HgBA1c:    Lab Results   Component Value Date    LABA1C 6.2 08/31/2019           Assessment and Plan:   1. Acute exacerbation of COPD  2. DM 2  3. Morbid obesity  4. HTN     Solumedrol  duoneb aerosols.   acapella  cont oral hypoglycemics    [place]

## 2019-09-01 VITALS
TEMPERATURE: 98.1 F | HEART RATE: 96 BPM | WEIGHT: 248.2 LBS | HEIGHT: 62 IN | OXYGEN SATURATION: 92 % | RESPIRATION RATE: 18 BRPM | DIASTOLIC BLOOD PRESSURE: 60 MMHG | SYSTOLIC BLOOD PRESSURE: 124 MMHG | BODY MASS INDEX: 45.67 KG/M2

## 2019-09-01 LAB
GLUCOSE BLD-MCNC: 152 MG/DL (ref 70–108)
GLUCOSE BLD-MCNC: 229 MG/DL (ref 70–108)

## 2019-09-01 PROCEDURE — 6370000000 HC RX 637 (ALT 250 FOR IP): Performed by: INTERNAL MEDICINE

## 2019-09-01 PROCEDURE — G0378 HOSPITAL OBSERVATION PER HR: HCPCS

## 2019-09-01 PROCEDURE — 96366 THER/PROPH/DIAG IV INF ADDON: CPT

## 2019-09-01 PROCEDURE — 2709999900 HC NON-CHARGEABLE SUPPLY

## 2019-09-01 PROCEDURE — 2700000000 HC OXYGEN THERAPY PER DAY

## 2019-09-01 PROCEDURE — 96376 TX/PRO/DX INJ SAME DRUG ADON: CPT

## 2019-09-01 PROCEDURE — 94640 AIRWAY INHALATION TREATMENT: CPT

## 2019-09-01 PROCEDURE — 96372 THER/PROPH/DIAG INJ SC/IM: CPT

## 2019-09-01 PROCEDURE — 82948 REAGENT STRIP/BLOOD GLUCOSE: CPT

## 2019-09-01 PROCEDURE — 94760 N-INVAS EAR/PLS OXIMETRY 1: CPT

## 2019-09-01 PROCEDURE — 2580000003 HC RX 258: Performed by: INTERNAL MEDICINE

## 2019-09-01 PROCEDURE — 6360000002 HC RX W HCPCS: Performed by: INTERNAL MEDICINE

## 2019-09-01 RX ORDER — MONTELUKAST SODIUM 10 MG/1
10 TABLET ORAL NIGHTLY
Status: DISCONTINUED | OUTPATIENT
Start: 2019-09-01 | End: 2019-09-01 | Stop reason: HOSPADM

## 2019-09-01 RX ORDER — PREDNISONE 20 MG/1
TABLET ORAL
Qty: 20 TABLET | Refills: 0 | Status: SHIPPED | OUTPATIENT
Start: 2019-09-01 | End: 2019-09-13

## 2019-09-01 RX ORDER — SPIRONOLACTONE 25 MG/1
25 TABLET ORAL DAILY
Status: DISCONTINUED | OUTPATIENT
Start: 2019-09-01 | End: 2019-09-01 | Stop reason: HOSPADM

## 2019-09-01 RX ORDER — PANTOPRAZOLE SODIUM 40 MG/1
40 TABLET, DELAYED RELEASE ORAL
Status: DISCONTINUED | OUTPATIENT
Start: 2019-09-01 | End: 2019-09-01 | Stop reason: HOSPADM

## 2019-09-01 RX ADMIN — METFORMIN HYDROCHLORIDE 1000 MG: 500 TABLET ORAL at 11:52

## 2019-09-01 RX ADMIN — METOPROLOL SUCCINATE 25 MG: 25 TABLET, EXTENDED RELEASE ORAL at 11:51

## 2019-09-01 RX ADMIN — IPRATROPIUM BROMIDE AND ALBUTEROL SULFATE 1 AMPULE: .5; 3 SOLUTION RESPIRATORY (INHALATION) at 07:07

## 2019-09-01 RX ADMIN — IPRATROPIUM BROMIDE AND ALBUTEROL SULFATE 1 AMPULE: .5; 3 SOLUTION RESPIRATORY (INHALATION) at 11:20

## 2019-09-01 RX ADMIN — INSULIN LISPRO 6 UNITS: 100 INJECTION, SOLUTION INTRAVENOUS; SUBCUTANEOUS at 10:05

## 2019-09-01 RX ADMIN — ONDANSETRON 4 MG: 2 INJECTION INTRAMUSCULAR; INTRAVENOUS at 09:28

## 2019-09-01 RX ADMIN — PREDNISONE 40 MG: 20 TABLET ORAL at 11:51

## 2019-09-01 RX ADMIN — PANTOPRAZOLE SODIUM 40 MG: 40 TABLET, DELAYED RELEASE ORAL at 13:19

## 2019-09-01 RX ADMIN — AZITHROMYCIN MONOHYDRATE 500 MG: 500 INJECTION, POWDER, LYOPHILIZED, FOR SOLUTION INTRAVENOUS at 05:20

## 2019-09-01 RX ADMIN — Medication 10 ML: at 11:55

## 2019-09-01 RX ADMIN — LINAGLIPTIN 5 MG: 5 TABLET, FILM COATED ORAL at 11:51

## 2019-09-01 RX ADMIN — ENOXAPARIN SODIUM 40 MG: 40 INJECTION SUBCUTANEOUS at 11:52

## 2019-09-01 RX ADMIN — PIOGLITAZONE 45 MG: 45 TABLET ORAL at 11:52

## 2019-09-01 RX ADMIN — CITALOPRAM 40 MG: 40 TABLET, FILM COATED ORAL at 11:51

## 2019-09-01 RX ADMIN — SPIRONOLACTONE 25 MG: 25 TABLET ORAL at 13:19

## 2019-09-01 RX ADMIN — INSULIN LISPRO 3 UNITS: 100 INJECTION, SOLUTION INTRAVENOUS; SUBCUTANEOUS at 13:19

## 2019-09-01 RX ADMIN — ASPIRIN 81 MG: 81 TABLET ORAL at 11:52

## 2019-09-01 RX ADMIN — GABAPENTIN 300 MG: 300 CAPSULE ORAL at 11:51

## 2019-09-01 ASSESSMENT — PAIN DESCRIPTION - ONSET
ONSET: ON-GOING
ONSET: ON-GOING

## 2019-09-01 ASSESSMENT — PAIN DESCRIPTION - DESCRIPTORS
DESCRIPTORS: ACHING
DESCRIPTORS: ACHING

## 2019-09-01 ASSESSMENT — PAIN DESCRIPTION - ORIENTATION
ORIENTATION: MID
ORIENTATION: MID

## 2019-09-01 ASSESSMENT — PAIN SCALES - GENERAL
PAINLEVEL_OUTOF10: 4
PAINLEVEL_OUTOF10: 6
PAINLEVEL_OUTOF10: 0
PAINLEVEL_OUTOF10: 0

## 2019-09-01 ASSESSMENT — PAIN DESCRIPTION - FREQUENCY
FREQUENCY: CONTINUOUS
FREQUENCY: CONTINUOUS

## 2019-09-01 ASSESSMENT — PAIN DESCRIPTION - PAIN TYPE
TYPE: CHRONIC PAIN
TYPE: CHRONIC PAIN

## 2019-09-01 ASSESSMENT — PAIN DESCRIPTION - LOCATION
LOCATION: BACK
LOCATION: BACK

## 2019-09-01 ASSESSMENT — PAIN DESCRIPTION - PROGRESSION
CLINICAL_PROGRESSION: NOT CHANGED
CLINICAL_PROGRESSION: GRADUALLY IMPROVING
CLINICAL_PROGRESSION: NOT CHANGED
CLINICAL_PROGRESSION: NOT CHANGED

## 2019-09-01 NOTE — PROGRESS NOTES
INTERNAL MEDICINE Progress Note  9/1/2019 11:29 AM  Subjective:   Admit Date: 8/29/2019  PCP: Kinsey Choudhury MD  Interval History:   Cough is better, on RA, O2 sat 93%    Objective:   Vitals: BP (!) 159/74   Pulse 91   Temp 98.2 °F (36.8 °C) (Oral)   Resp 18   Ht 5' 2\" (1.575 m)   Wt 248 lb 3.2 oz (112.6 kg)   LMP  (LMP Unknown)   SpO2 92%   Breastfeeding? No   BMI 45.40 kg/m²   General appearance: alert and cooperative with exam  HEENT:  atraumatic  Neck:  no JVD  Lungs: improved AE yaron  Heart: S1, S2 normal  Abdomen: soft, non-tender; bowel sounds normal; no masses,  no organomegaly  Extremities: no edema,   Neurologic: Alert, oriented, thought content appropriate      Medications:   Scheduled Meds:   pantoprazole  40 mg Oral QAM AC    montelukast  10 mg Oral Nightly    spironolactone  25 mg Oral Daily    metoprolol succinate  25 mg Oral Daily    pioglitazone  45 mg Oral Daily    QUEtiapine  50 mg Oral Nightly    simvastatin  40 mg Oral Nightly    linagliptin  5 mg Oral Daily    aspirin  81 mg Oral Daily    citalopram  40 mg Oral QAM    gabapentin  300 mg Oral TID    metFORMIN  1,000 mg Oral BID WC    sodium chloride flush  10 mL Intravenous 2 times per day    ipratropium-albuterol  1 ampule Inhalation Q4H WA    predniSONE  40 mg Oral Daily    azithromycin  500 mg Intravenous Q24H    insulin lispro  0-18 Units Subcutaneous TID WC    insulin lispro  0-9 Units Subcutaneous Nightly    enoxaparin  40 mg Subcutaneous BID     Continuous Infusions:   dextrose         Lab Results:   CBC:   Recent Labs     08/29/19  1856   WBC 9.6   HGB 10.9*        BMP:    Recent Labs     08/29/19  1856      K 3.8      CO2 22*   BUN 17   CREATININE 0.6   GLUCOSE 138*     HgBA1c:    Lab Results   Component Value Date    LABA1C 6.2 08/31/2019           Assessment and Plan:   1. Acute exacerbation of COPD  2. DM 2  3. Morbid obesity  4.  HTN     Po prednisone  Cont bronchodilators,

## 2019-09-03 NOTE — CARE COORDINATION
9/3/19, 3:05 PM    Discharge plan discussed by  and . Discharge plan reviewed with patient/ family. Patient/ family verbalize understanding of discharge plan and are in agreement with plan. Understanding was demonstrated using the teach back method. IMM Letter  IMM Letter date given[de-identified] 08/30/19  IMM Letter time given[de-identified] 8899       Patient was discharged on 9/1 to home and resume [facility]. [facility] notified of discharge.

## 2019-09-26 ENCOUNTER — OFFICE VISIT (OUTPATIENT)
Dept: ENT CLINIC | Age: 69
End: 2019-09-26
Payer: MEDICARE

## 2019-09-26 VITALS
SYSTOLIC BLOOD PRESSURE: 130 MMHG | HEART RATE: 84 BPM | BODY MASS INDEX: 52.67 KG/M2 | DIASTOLIC BLOOD PRESSURE: 82 MMHG | TEMPERATURE: 97.5 F | HEIGHT: 62 IN | WEIGHT: 286.2 LBS | RESPIRATION RATE: 16 BRPM

## 2019-09-26 DIAGNOSIS — J44.9 CHRONIC OBSTRUCTIVE PULMONARY DISEASE, UNSPECIFIED COPD TYPE (HCC): ICD-10-CM

## 2019-09-26 DIAGNOSIS — E11.9 TYPE 2 DIABETES MELLITUS WITHOUT COMPLICATION, UNSPECIFIED WHETHER LONG TERM INSULIN USE (HCC): ICD-10-CM

## 2019-09-26 DIAGNOSIS — R05.3 CHRONIC COUGH: Primary | ICD-10-CM

## 2019-09-26 DIAGNOSIS — I25.10 CORONARY ARTERY DISEASE INVOLVING NATIVE HEART WITHOUT ANGINA PECTORIS, UNSPECIFIED VESSEL OR LESION TYPE: ICD-10-CM

## 2019-09-26 DIAGNOSIS — E66.01 OBESITY, CLASS III, BMI 40-49.9 (MORBID OBESITY) (HCC): ICD-10-CM

## 2019-09-26 PROCEDURE — 31575 DIAGNOSTIC LARYNGOSCOPY: CPT | Performed by: OTOLARYNGOLOGY

## 2019-09-26 PROCEDURE — 99203 OFFICE O/P NEW LOW 30 MIN: CPT | Performed by: OTOLARYNGOLOGY

## 2019-09-26 RX ORDER — FEXOFENADINE HYDROCHLORIDE 60 MG/1
60 TABLET, FILM COATED ORAL 2 TIMES DAILY
COMMUNITY
End: 2020-10-17

## 2019-09-26 RX ORDER — CITALOPRAM 40 MG/1
TABLET ORAL
Refills: 0 | COMMUNITY
Start: 2019-09-20

## 2019-09-26 RX ORDER — PREDNISONE 20 MG/1
20 TABLET ORAL DAILY
COMMUNITY
End: 2020-09-10

## 2019-09-26 ASSESSMENT — ENCOUNTER SYMPTOMS
CHOKING: 0
VOMITING: 0
SINUS PRESSURE: 0
FACIAL SWELLING: 0
RHINORRHEA: 0
SHORTNESS OF BREATH: 1
STRIDOR: 0
TROUBLE SWALLOWING: 0
NAUSEA: 0
COUGH: 0
SORE THROAT: 0
WHEEZING: 0
CHEST TIGHTNESS: 0
APNEA: 0
COLOR CHANGE: 0
VOICE CHANGE: 0
ABDOMINAL PAIN: 0
DIARRHEA: 0

## 2019-09-26 NOTE — PROGRESS NOTES
Take 1,200 mg by mouth daily      ASPIRIN LOW DOSE 81 MG EC tablet take 1 tablet by mouth once daily 30 tablet 11    clobetasol (TEMOVATE) 0.05 % cream Apply topically 2 times daily. 15 g 1    glucose blood VI test strips (FREESTYLE LITE) strip Test once daily 100 each 11    CICLOPIROX TREATMENT EX Apply  topically. 10.0 mL Ciclopirox-terbinafine-fluconazole  Fungal Free Nails Fungicidal solution      diphenhydrAMINE (BENADRYL) 50 MG capsule Take 50 mg by mouth nightly as needed for Itching.  QUEtiapine (SEROQUEL) 50 MG tablet Take 50 mg by mouth nightly.  acetaminophen (TYLENOL) 500 MG tablet Take 500 mg by mouth every 6 hours as needed for Pain. No current facility-administered medications for this visit. Past Medical History:   Diagnosis Date    Acid reflux     Arrhythmia     Arthritis     CAD (coronary artery disease)     NON-OBSTRUCTIVE    Chronic bronchitis (HCC)     COPD (chronic obstructive pulmonary disease) (HCC)     COPD (chronic obstructive pulmonary disease) (HCC)     Depression     Diabetes mellitus (HCC)     Dizziness - light-headed     Fall 10-15-13    left rib fracture and shoulder contusion     Falls frequently     Hyperlipidemia     Hypertension     Kidney disease     MDRO (multiple drug resistant organisms) resistance 2/14    MRSA ABDOMEN    Numbness and tingling     HX OF:    S/P laparoscopic cholecystectomy 11/27/2018    Swelling     Tinnitus       Past Surgical History:   Procedure Laterality Date    BACK SURGERY  2007    CARDIOVASCULAR STRESS TEST  04/03/2009    Persantine test associated w/non specific symptoms. EKG non diagnostic. no obvious stress induced ischemia. EF 63%. normal wall motion.     CARDIOVASCULAR STRESS TEST  03/31/2010    tilt table associated w/fluctuation of BP w/relatively low BP throughout test. no significant symptoms during test. borderline tilt table test.    CHOLECYSTECTOMY      COLONOSCOPY  2011    CORONARY

## 2019-10-10 ENCOUNTER — HOSPITAL ENCOUNTER (OUTPATIENT)
Dept: OCCUPATIONAL THERAPY | Age: 69
Setting detail: THERAPIES SERIES
Discharge: HOME | End: 2019-10-10
Payer: MEDICARE

## 2019-10-10 PROCEDURE — 97535 SELF CARE MNGMENT TRAINING: CPT

## 2019-10-16 ENCOUNTER — OFFICE VISIT (OUTPATIENT)
Dept: CARDIOLOGY CLINIC | Age: 69
End: 2019-10-16
Payer: MEDICARE

## 2019-10-16 VITALS
HEIGHT: 63 IN | BODY MASS INDEX: 51.51 KG/M2 | HEART RATE: 64 BPM | SYSTOLIC BLOOD PRESSURE: 134 MMHG | DIASTOLIC BLOOD PRESSURE: 80 MMHG

## 2019-10-16 DIAGNOSIS — I10 ESSENTIAL HYPERTENSION: Primary | ICD-10-CM

## 2019-10-16 DIAGNOSIS — R06.09 DYSPNEA ON EXERTION: ICD-10-CM

## 2019-10-16 DIAGNOSIS — I47.1 SVT (SUPRAVENTRICULAR TACHYCARDIA) (HCC): ICD-10-CM

## 2019-10-16 PROCEDURE — 99213 OFFICE O/P EST LOW 20 MIN: CPT | Performed by: NUCLEAR MEDICINE

## 2019-10-16 RX ORDER — ACETAMINOPHEN 500 MG
500 TABLET ORAL
Status: ON HOLD | COMMUNITY
End: 2020-10-30 | Stop reason: HOSPADM

## 2019-10-16 RX ORDER — FUROSEMIDE 20 MG/1
20 TABLET ORAL DAILY
COMMUNITY

## 2019-10-22 ENCOUNTER — HOSPITAL ENCOUNTER (OUTPATIENT)
Dept: OCCUPATIONAL THERAPY | Age: 69
Setting detail: THERAPIES SERIES
Discharge: HOME | End: 2019-10-22
Payer: MEDICARE

## 2019-10-22 PROCEDURE — 97535 SELF CARE MNGMENT TRAINING: CPT

## 2019-11-13 ENCOUNTER — OFFICE VISIT (OUTPATIENT)
Dept: PULMONOLOGY | Age: 69
End: 2019-11-13
Payer: MEDICARE

## 2019-11-13 VITALS
OXYGEN SATURATION: 94 % | HEIGHT: 63 IN | SYSTOLIC BLOOD PRESSURE: 124 MMHG | DIASTOLIC BLOOD PRESSURE: 78 MMHG | RESPIRATION RATE: 16 BRPM | TEMPERATURE: 97.8 F | WEIGHT: 285.2 LBS | HEART RATE: 84 BPM | BODY MASS INDEX: 50.53 KG/M2

## 2019-11-13 DIAGNOSIS — G47.30 SLEEP APNEA, UNSPECIFIED TYPE: ICD-10-CM

## 2019-11-13 DIAGNOSIS — Z87.09 H/O PULMONARY EMPHYSEMA: ICD-10-CM

## 2019-11-13 DIAGNOSIS — Z87.898 HISTORY OF SNORING: ICD-10-CM

## 2019-11-13 DIAGNOSIS — R05.8 COUGH PRESENT FOR GREATER THAN 3 WEEKS: Primary | ICD-10-CM

## 2019-11-13 DIAGNOSIS — J45.40 MODERATE PERSISTENT ASTHMA WITHOUT COMPLICATION: ICD-10-CM

## 2019-11-13 DIAGNOSIS — Z77.22 PASSIVE SMOKE EXPOSURE: ICD-10-CM

## 2019-11-13 PROCEDURE — 99214 OFFICE O/P EST MOD 30 MIN: CPT | Performed by: NURSE PRACTITIONER

## 2019-11-13 RX ORDER — FLUORIDE TOOTHPASTE
15 TOOTHPASTE DENTAL 3 TIMES DAILY PRN
COMMUNITY

## 2019-11-13 ASSESSMENT — ENCOUNTER SYMPTOMS
STRIDOR: 0
NAUSEA: 0
VOMITING: 0
SHORTNESS OF BREATH: 0
COUGH: 1
CHEST TIGHTNESS: 0
DIARRHEA: 0
WHEEZING: 0

## 2019-12-02 ENCOUNTER — TELEPHONE (OUTPATIENT)
Dept: SLEEP CENTER | Age: 69
End: 2019-12-02

## 2019-12-02 DIAGNOSIS — G47.30 SLEEP APNEA, UNSPECIFIED TYPE: Primary | ICD-10-CM

## 2019-12-02 DIAGNOSIS — J45.40 MODERATE PERSISTENT ASTHMA WITHOUT COMPLICATION: ICD-10-CM

## 2019-12-09 ENCOUNTER — TELEPHONE (OUTPATIENT)
Dept: PULMONOLOGY | Age: 69
End: 2019-12-09

## 2019-12-15 ENCOUNTER — HOSPITAL ENCOUNTER (EMERGENCY)
Age: 69
Discharge: HOME | End: 2019-12-15
Attending: EMERGENCY MEDICINE
Payer: MEDICARE

## 2019-12-15 ENCOUNTER — APPOINTMENT (OUTPATIENT)
Dept: GENERAL RADIOLOGY | Age: 69
End: 2019-12-15
Payer: MEDICARE

## 2019-12-15 VITALS
DIASTOLIC BLOOD PRESSURE: 57 MMHG | HEART RATE: 69 BPM | WEIGHT: 293 LBS | OXYGEN SATURATION: 96 % | HEIGHT: 62 IN | RESPIRATION RATE: 20 BRPM | SYSTOLIC BLOOD PRESSURE: 134 MMHG | TEMPERATURE: 97.6 F | BODY MASS INDEX: 53.92 KG/M2

## 2019-12-15 DIAGNOSIS — S20.212A CONTUSION OF RIB ON LEFT SIDE, INITIAL ENCOUNTER: Primary | ICD-10-CM
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
## 2019-12-15 LAB
ALBUMIN SERPL-MCNC: 4 G/DL (ref 3.5–5.1)
ALP BLD-CCNC: 73 U/L (ref 38–126)
ALT SERPL-CCNC: 19 U/L (ref 11–66)
ANION GAP SERPL CALCULATED.3IONS-SCNC: 15 MEQ/L (ref 8–16)
AST SERPL-CCNC: 25 U/L (ref 5–40)
BASOPHILS # BLD: 0.5 %
BASOPHILS ABSOLUTE: 0 THOU/MM3 (ref 0–0.1)
BILIRUB SERPL-MCNC: 0.4 MG/DL (ref 0.3–1.2)
BILIRUBIN DIRECT: < 0.2 MG/DL (ref 0–0.3)
BUN BLDV-MCNC: 26 MG/DL (ref 7–22)
CALCIUM SERPL-MCNC: 9.5 MG/DL (ref 8.5–10.5)
CHLORIDE BLD-SCNC: 103 MEQ/L (ref 98–111)
CO2: 19 MEQ/L (ref 23–33)
CREAT SERPL-MCNC: 1 MG/DL (ref 0.4–1.2)
EOSINOPHIL # BLD: 1.2 %
EOSINOPHILS ABSOLUTE: 0.1 THOU/MM3 (ref 0–0.4)
ERYTHROCYTE [DISTWIDTH] IN BLOOD BY AUTOMATED COUNT: 18.2 % (ref 11.5–14.5)
ERYTHROCYTE [DISTWIDTH] IN BLOOD BY AUTOMATED COUNT: 54.6 FL (ref 35–45)
GFR SERPL CREATININE-BSD FRML MDRD: 55 ML/MIN/1.73M2
GLUCOSE BLD-MCNC: 80 MG/DL (ref 70–108)
HCT VFR BLD CALC: 37.2 % (ref 37–47)
HEMOGLOBIN: 10.8 GM/DL (ref 12–16)
IMMATURE GRANS (ABS): 0.04 THOU/MM3 (ref 0–0.07)
IMMATURE GRANULOCYTES: 0.5 %
LIPASE: 50.4 U/L (ref 5.6–51.3)
LYMPHOCYTES # BLD: 27.4 %
LYMPHOCYTES ABSOLUTE: 2.3 THOU/MM3 (ref 1–4.8)
MCH RBC QN AUTO: 24.5 PG (ref 26–33)
MCHC RBC AUTO-ENTMCNC: 29 GM/DL (ref 32.2–35.5)
MCV RBC AUTO: 84.4 FL (ref 81–99)
MONOCYTES # BLD: 10.6 %
MONOCYTES ABSOLUTE: 0.9 THOU/MM3 (ref 0.4–1.3)
NUCLEATED RED BLOOD CELLS: 0 /100 WBC
OSMOLALITY CALCULATION: 277.6 MOSMOL/KG (ref 275–300)
PLATELET # BLD: 313 THOU/MM3 (ref 130–400)
PMV BLD AUTO: 10.6 FL (ref 9.4–12.4)
POTASSIUM SERPL-SCNC: 5.1 MEQ/L (ref 3.5–5.2)
RBC # BLD: 4.41 MILL/MM3 (ref 4.2–5.4)
SEG NEUTROPHILS: 59.8 %
SEGMENTED NEUTROPHILS ABSOLUTE COUNT: 5 THOU/MM3 (ref 1.8–7.7)
SODIUM BLD-SCNC: 137 MEQ/L (ref 135–145)
TOTAL PROTEIN: 7.4 G/DL (ref 6.1–8)
TROPONIN T: < 0.01 NG/ML
WBC # BLD: 8.4 THOU/MM3 (ref 4.8–10.8)

## 2019-12-15 PROCEDURE — 83690 ASSAY OF LIPASE: CPT

## 2019-12-15 PROCEDURE — 85025 COMPLETE CBC W/AUTO DIFF WBC: CPT

## 2019-12-15 PROCEDURE — 99284 EMERGENCY DEPT VISIT MOD MDM: CPT

## 2019-12-15 PROCEDURE — 82248 BILIRUBIN DIRECT: CPT

## 2019-12-15 PROCEDURE — 80053 COMPREHEN METABOLIC PANEL: CPT

## 2019-12-15 PROCEDURE — 6370000000 HC RX 637 (ALT 250 FOR IP): Performed by: EMERGENCY MEDICINE

## 2019-12-15 PROCEDURE — 71101 X-RAY EXAM UNILAT RIBS/CHEST: CPT

## 2019-12-15 PROCEDURE — 36415 COLL VENOUS BLD VENIPUNCTURE: CPT

## 2019-12-15 PROCEDURE — 84484 ASSAY OF TROPONIN QUANT: CPT

## 2019-12-15 RX ORDER — HYDROCODONE BITARTRATE AND ACETAMINOPHEN 5; 325 MG/1; MG/1
1 TABLET ORAL EVERY 8 HOURS PRN
Qty: 9 TABLET | Refills: 0 | Status: SHIPPED | OUTPATIENT
Start: 2019-12-15 | End: 2019-12-18

## 2019-12-15 RX ORDER — HYDROCODONE BITARTRATE AND ACETAMINOPHEN 5; 325 MG/1; MG/1
1 TABLET ORAL ONCE
Status: COMPLETED | OUTPATIENT
Start: 2019-12-15 | End: 2019-12-15

## 2019-12-15 RX ADMIN — HYDROCODONE BITARTRATE AND ACETAMINOPHEN 1 TABLET: 5; 325 TABLET ORAL at 18:45

## 2019-12-15 ASSESSMENT — PAIN DESCRIPTION - ORIENTATION
ORIENTATION: LEFT

## 2019-12-15 ASSESSMENT — ENCOUNTER SYMPTOMS
ABDOMINAL PAIN: 0
BACK PAIN: 0
VOMITING: 0
SHORTNESS OF BREATH: 0
NAUSEA: 0

## 2019-12-15 ASSESSMENT — PAIN SCALES - GENERAL
PAINLEVEL_OUTOF10: 9
PAINLEVEL_OUTOF10: 3
PAINLEVEL_OUTOF10: 4

## 2019-12-15 ASSESSMENT — PAIN DESCRIPTION - LOCATION
LOCATION: RIB CAGE

## 2019-12-15 ASSESSMENT — PAIN DESCRIPTION - PAIN TYPE
TYPE: ACUTE PAIN

## 2019-12-15 ASSESSMENT — PAIN DESCRIPTION - DESCRIPTORS
DESCRIPTORS: SHARP
DESCRIPTORS: SHARP

## 2019-12-15 ASSESSMENT — PAIN DESCRIPTION - FREQUENCY: FREQUENCY: CONTINUOUS

## 2020-01-26 NOTE — PROGRESS NOTES
[facility] [address] [facility] initial consultation note//She is an established patient of Dr. Jeffory Osler Neumier,CNP pulmonary clinic at [facility] for Pulmonary Disease. She came for Sleep medicine evaluation today. She was denied of home sleep testing in the past- requested by Mr. Jeffory Osler Neumier,CNP. Birdie Redding                                             Chief Complaint:  Levi Gibbons is here today due to insurance denying HST     Chief complaint: Birdie Redding is a 78 y. o.oldfemale came for further evaluation regarding her ?sleep apnea  with referral from Mr. Jeffory Osler Neumier,CNP     [place]:    Sleep/Wake schedule:  Usual time to go to bed during the work/regular day of week: 1:00 AM. Variable. Usual time to wake up during the work//regular day of week: 6:30 to 8:15AM.Variable. Over the weekends her sleep schedule: [x] Remain same. She usually falls a sleep in less than: 10 to 60 minutes. She takes naps: Yes  Number of naps per week: 2 times. During each nap she spends a total of: 15 to 60 Minutes. The naps were reported as refreshing: Yes. Sleep Hygiene:    Is the temperature and evironment in her bed room is acceptable to her: Yes. She watches Television in her bed room: No.  She read books, study, pay bills etc in the bed: No.  Frequency She wake up during night/sleep: 2 to 3  Majority of nocturnal awakenings are for urination: Yes. Difficulty in falling back to sleep after nocturnal awakenings: Yes- now and then. .  Do you drink coffee: No.   Do you drink caffeinated beverages i.e sodas: No. She drinks non caffeinated drinks 1 to 2 times per day. Do you drink decaffeinated tea:Yes. 2cup/s/glasse/s per week. Do you drink alcoholic beverages: No  History of recreational drug use: No.     History of tobacco smoking:No.      Sleep apnea symptoms:  Noticed to have loud snoring:Yes.  Noted by her family member- Ex spouse  Witnessed apneas during 3 cigarettes a month, Passive smoke exposure 39 years Father then ex    Substance Use Topics    Alcohol use: No     Alcohol/week: 0.0 standard drinks    Drug use: No   .  She is currently working: No.   [x]Disabled                            Past Medical History:   Diagnosis Date    Acid reflux     Arrhythmia     Arthritis     CAD (coronary artery disease)     NON-OBSTRUCTIVE    [facility] ([facility] [address]     [facility]) [facility]     COPD (chronic obstructive pulmonary disease) [facility] [address] [facility]     Depression     Diabetes mellitus ([facility]     Dizziness - light-headed     Fall 10-15-13    left rib fracture and shoulder contusion     Falls frequently     Hyperlipidemia     Hypertension     Kidney disease     MDRO (multiple drug resistant organisms) resistance 2/14    MRSA ABDOMEN    Numbness and tingling     HX OF:    S/P laparoscopic cholecystectomy 11/27/2018    Swelling     Tinnitus        Past Surgical History:   Procedure Laterality Date    BACK SURGERY  2007    CARDIOVASCULAR STRESS TEST  04/03/2009    Persantine test associated w/non specific symptoms. EKG non diagnostic. no obvious stress induced ischemia. EF 63%. normal wall motion.  CARDIOVASCULAR STRESS TEST  03/31/2010    tilt table associated w/fluctuation of BP w/relatively low BP throughout test. no significant symptoms during test. borderline tilt table test.    CHOLECYSTECTOMY      COLONOSCOPY  2011    CORONARY ANGIOPLASTY  12/16/2009    EF 60%. nonobstructive CAD. normal LV function.     ENDOSCOPY, COLON, DIAGNOSTIC  over 5 years     Dr. Dalila Mack  2004    cataract   [address]  25 months old     umblilical    HERNIA REPAIR  9/8/14    Ventral Hernia Repair with Mesh - Dr. Slava Silveira    partial    OTHER SURGICAL HISTORY  Feb 17, 2014    Left lower quadrant incision and drainage ([facility]. Nadege Bonds[facility])    OTHER SURGICAL HISTORY  2/2014    I&D abd abscess  Enedina ARELLANO LAP,DIAGNOSTIC ABDOMEN N/A 11/26/2018    LAPAROSCOPY EXPLORATORY, LAP YLLE performed by Nery Austin MD at [facility]  as a child     TOOTH EXTRACTION  4/16/2012    TRANSTHORACIC ECHOCARDIOGRAM  03/04/2009    EF 50-55%. normal LV global systolic funct. slight LVH. slight lt atrial enlargement. no obvious stenotic valves. no pericardial effusion. Allergies   Allergen Reactions    Latex Hives, Itching and Rash    Bee Venom Anaphylaxis    Advair [Fluticasone-Salmeterol]      Heart races     Caffeine Other (See Comments)     Bad kidney problems    Pcn [Penicillins] Hives    Ultram [Tramadol Hcl] Swelling     Has tolerated norco here many times     Fluticasone-Salmeterol Palpitations    Wool Alcohol [Lanolin Alcohol] Hives, Itching and Rash       Current Outpatient Medications   Medication Sig Dispense Refill    fluticasone (ARNUITY ELLIPTA) 100 MCG/ACT AEPB Inhale into the lungs      Mouthwashes (BIOTENE) LIQD oral solution Swish and spit 15 mLs 3 times daily as needed      furosemide (LASIX) 20 MG tablet Take 20 mg by mouth daily      predniSONE (DELTASONE) 20 MG tablet Take 20 mg by mouth daily Take as Directed.       fexofenadine (ALLEGRA) 60 MG tablet Take 60 mg by mouth 2 times daily      citalopram (CELEXA) 40 MG tablet take 1 tablet by mouth every morning  0    tiotropium (SPIRIVA HANDIHALER) 18 MCG inhalation capsule Inhale 1 capsule into the lungs daily 30 capsule 5    montelukast (SINGULAIR) 10 MG tablet Take 1 tablet by mouth nightly 30 tablet 11    metoprolol succinate (TOPROL XL) 25 MG extended release tablet take 1 tablet by mouth once daily 30 tablet 10    spironolactone (ALDACTONE) 25 MG tablet take 1-2 tablets by mouth once daily if needed (TAKE 2 TABS ON ANY DAY THERE IS SWELLING IN LEGS OR ANKLES) (Patient taking differently: Take 25 mg by mouth daily take 1-2 tablets by mouth once daily if needed (TAKE 2 TABS ON ANY DAY THERE IS SWELLING IN LEGS OR ANKLES)) 60 tablet 11    nystatin-triamcinolone (MYCOLOG II) 696576-3.4 UNIT/GM-% cream Apply 1 applicator topically 4 times daily Apply topically 4 times daily. As needed      HOMEOPATHIC PRODUCTS  mg by Combination route daily as needed Legatrin PM      psyllium (KONSYL) 28.3 % PACK Take 1 packet by mouth daily      L-Arginine 1000 MG TABS Take 1,000 mg by mouth daily as needed      Artificial Saliva (BIOTENE MOISTURIZING MOUTH) SOLN Take 1 applicator by mouth as needed      Compression Stockings MISC 1 Package by Does not apply route Knee High 1 each 0    omeprazole (PRILOSEC) 40 MG delayed release capsule take 1 capsule by mouth once daily 30 capsule 5    TRADJENTA 5 MG tablet take 1 tablet by mouth daily 30 tablet 5    simvastatin (ZOCOR) 40 MG tablet take 1 tablet by mouth at bedtime 30 tablet 5    VESICARE 10 MG tablet take 1 tablet by mouth once daily 30 tablet 5    etodolac (LODINE) 500 MG tablet take 1 tablet by mouth twice a day 60 tablet 5    pioglitazone (ACTOS) 45 MG tablet take 1 tablet by mouth once daily 30 tablet 5    metFORMIN (GLUCOPHAGE) 1000 MG tablet take 1 tablet by mouth twice a day with food 60 tablet 5    gabapentin (NEURONTIN) 300 MG capsule Take 1 capsule by mouth 3 times daily (Patient taking differently: Take 800 mg by mouth 2 times daily. Stanton Loots ) 90 capsule 5    albuterol sulfate HFA (PROAIR HFA) 108 (90 BASE) MCG/ACT inhaler Inhale 2 puffs into the lungs every 4 hours as needed 1 Inhaler 2    FREESTYLE LANCETS MISC Test 1 time daily. Dx: 250.00-Type 2 Diabetes. Freestyle 28G Lancets 100 each 11    tiZANidine (ZANAFLEX) 4 MG tablet Take 4 mg by mouth every 8 hours as needed      Omega-3 Fatty Acids (FISH OIL) 1200 MG CAPS Take 1,200 mg by mouth daily      ASPIRIN LOW DOSE 81 MG EC tablet take 1 tablet by mouth once daily 30 tablet 11    clobetasol (TEMOVATE) 0.05 % cream Apply topically 2 times daily.  15 g 1    glucose blood VI test strips (FREESTYLE LITE) strip Test once daily 100 each 11    CICLOPIROX TREATMENT EX Apply  topically. 10.0 mL Ciclopirox-terbinafine-fluconazole  Fungal Free Nails Fungicidal solution      diphenhydrAMINE (BENADRYL) 50 MG capsule Take 50 mg by mouth nightly as needed for Itching.  QUEtiapine (SEROQUEL) 50 MG tablet Take 50 mg by mouth nightly.  acetaminophen (TYLENOL) 500 MG tablet Take 500 mg by mouth every 6 hours as needed for Pain.  acetaminophen (TYLENOL) 500 MG tablet Take 500 mg by mouth      mometasone-formoterol (DULERA) 200-5 MCG/ACT inhaler Inhale 2 puffs into the lungs every 12 hours 18 g 3     No current facility-administered medications for this visit. Family History   Problem Relation Age of Onset    Cancer Mother 28        uterine    Heart Disease Father     Cancer Father 48        lung    Diabetes Paternal Grandfather     Cancer Maternal Grandmother         uterine    Cancer Maternal Aunt 28        uterine    Cancer Maternal Cousin 28        uterine    Colon Cancer Neg Hx     Breast Cancer Neg Hx         Review of Systems:   General/Constitutional: She gained 34lbs of weight from her last sleep study with normal appetite. No fever or chills. HENT: Negative. Eyes: Negative. Upper respiratory tract: Occasional nasal stuffiness with no post nasal drip. Lower respiratory tract/ lungs: Occasional cough with minimal sputum production. No hemoptysis. Cardiovascular: No palpitations or chest pain. Gastrointestinal: No nausea or vomiting. Neurological: No focal neurologiacal weakness. Extremities: Chronic leg edema. Musculoskeletal: No complaints. Genitourinary: No complaints. Hematological: Negative. Psychiatric/Behavioral: Negative. Skin: No itching.     /80 (Site: Left Upper Arm, Position: Sitting, Cuff Size: Large Adult)   Pulse 70   Ht 5' 2\" (1.575 m)   Wt 286 lb (129.7 kg)   LMP  (LMP Unknown)   SpO2 95% Comment: on room air at rest  BMI 52.31 Revealed the patient spent 30.5  minutes in wake state consisting of 8.1 percent, 14 minutes in Stage I  consisting of 3.7 percent and 232.5 minutes in Stage II consisting of 61.4  percent, 64.5 minutes in Stage III consisting of 17 percent and 37 minutes in  REM sleep consisting of 9.8 percent of total sleep time. RESPIRATORY EVENT ANALYSIS:  Revealed the patient had a total of 10 hypopneas  with no apneas. The total number of apneas and hypopneas recorded during the  study were 10 with apnea-hypopnea index of 1.7. The patient had a total of  21 respiratory disturbance events with the respiratory disturbance index of  5.9. The patient's REM sleep apnea-hypopnea index was 14.6. OXYGEN SATURATION MONITORING:  Revealed the patient had a maximum oxygen  desaturation to 89 percent with a mean oxygen saturation of 94 percent. During the entire sleep study, the patient's oxygen saturation been more than  88 percent. POSITION ANALYSIS:  Revealed the patient spent a total of 348 minutes in side  position with the side position apnea-hypopnea index was 1.7. During the  entire sleep study, the patient slept on side position. He did not have any  supine time. PERIODIC LIMB MOVEMENT ANALYSIS:  Revealed the patient had a total of 117  periodic limb movements. Out of 117, 25 of them are associated with arousals  with a PLM index of 20.2, PLM arousal index is 4.3. EKG monitoring revealed normal sinus rhythm. No obvious seizure activity was  noted by the technician. IMPRESSION:    1. Mild snoring with no clinically significant obstructive sleep apnea. 2.  This is technically a difficult study. The patient slept during the        entire study on side position. No supine sleep was noted. 3.  Decreased amount of REM sleep. 4.  Periodic limb movements with associated rare arousals. 5.  Hypertension. 6.  Coronary artery disease. RECOMMENDATIONS:    1.   The patient should be the current status of sleep apnea due to significant weight gain I.e 34lbs from her old sleep study done on 02/06/2013.  -I had a discussion with patient regarding avialable treatment options for her sleep disorder breathing including but not limited to CPAP titration in the sleep lab Vs.Dental appliance placement with referral to a local dentist Vs other available surgical options including Uvulopalatopharyngoplasty, maxillomandibular ostomy and tracheostomy as last option. At the end of discussion, she is not decided on her   treatment if she found to have obstructive sleep apnea at this time.  -We will see Jesus Cardona back in 1week after the sleep study to go over the sleep study results and further management options.  -She was educated to practice good sleep hygiene practices. She  was provided with a good sleep hygiene hand out.  -Carson Tan was advised to make earlier appointment with my clinic if she develops any worsening of sleep symptoms. She verbalizes understanding.  -Carson Tan was advised to not to drive any motor vehicles or operate heavy equipment until her sleep symptoms are under good control. Vandana Burt verbalizes understanding.  -She was advised to loose weight by controlling diet and doing exercise once cleared by her cardiologist.  - Jesus Cardona was educated about my impression and plan. She verbalizes understanding.

## 2020-02-12 ENCOUNTER — INITIAL CONSULT (OUTPATIENT)
Dept: PULMONOLOGY | Age: 70
End: 2020-02-12
Payer: MEDICARE

## 2020-02-12 VITALS
HEIGHT: 62 IN | HEART RATE: 70 BPM | WEIGHT: 286 LBS | OXYGEN SATURATION: 95 % | BODY MASS INDEX: 52.63 KG/M2 | SYSTOLIC BLOOD PRESSURE: 134 MMHG | DIASTOLIC BLOOD PRESSURE: 80 MMHG

## 2020-02-12 PROCEDURE — 1123F ACP DISCUSS/DSCN MKR DOCD: CPT | Performed by: INTERNAL MEDICINE

## 2020-02-12 PROCEDURE — G8400 PT W/DXA NO RESULTS DOC: HCPCS | Performed by: INTERNAL MEDICINE

## 2020-02-12 PROCEDURE — 4040F PNEUMOC VAC/ADMIN/RCVD: CPT | Performed by: INTERNAL MEDICINE

## 2020-02-12 PROCEDURE — 3017F COLORECTAL CA SCREEN DOC REV: CPT | Performed by: INTERNAL MEDICINE

## 2020-02-12 PROCEDURE — 1090F PRES/ABSN URINE INCON ASSESS: CPT | Performed by: INTERNAL MEDICINE

## 2020-02-12 PROCEDURE — 1036F TOBACCO NON-USER: CPT | Performed by: INTERNAL MEDICINE

## 2020-02-12 PROCEDURE — G8427 DOCREV CUR MEDS BY ELIG CLIN: HCPCS | Performed by: INTERNAL MEDICINE

## 2020-02-12 PROCEDURE — G8417 CALC BMI ABV UP PARAM F/U: HCPCS | Performed by: INTERNAL MEDICINE

## 2020-02-12 PROCEDURE — G8484 FLU IMMUNIZE NO ADMIN: HCPCS | Performed by: INTERNAL MEDICINE

## 2020-02-12 PROCEDURE — 99215 OFFICE O/P EST HI 40 MIN: CPT | Performed by: INTERNAL MEDICINE

## 2020-02-12 NOTE — PATIENT INSTRUCTIONS
Recommendations/Plan:  -Will schedule patient for polysomnogram in the sleep lab to check the current status of sleep apnea due to significant weight gain I.e 34lbs from her old sleep study done on 02/06/2013.  -I had a discussion with patient regarding avialable treatment options for her sleep disorder breathing including but not limited to CPAP titration in the sleep lab Vs.Dental appliance placement with referral to a local dentist Vs other available surgical options including Uvulopalatopharyngoplasty, maxillomandibular ostomy and tracheostomy as last option. At the end of discussion, she is not decided on her   treatment if she found to have obstructive sleep apnea at this time.  -We will see Ronda Lopez back in 1week after the sleep study to go over the sleep study results and further management options.  -She was educated to practice good sleep hygiene practices. She  was provided with a good sleep hygiene hand out.  -Shayla Gregory was advised to make earlier appointment with my clinic if she develops any worsening of sleep symptoms. She verbalizes understanding.  -Shayla Gregory was advised to not to drive any motor vehicles or operate heavy equipment until her sleep symptoms are under good control. Vandana Burt verbalizes understanding.  -She was advised to loose weight by controlling diet and doing exercise once cleared by her cardiologist.  - Ronda Lopez was educated about my impression and plan. She verbalizes understanding.

## 2020-03-03 ENCOUNTER — HOSPITAL ENCOUNTER (OUTPATIENT)
Dept: SLEEP CENTER | Age: 70
Discharge: HOME | End: 2020-03-05
Payer: MEDICARE

## 2020-03-03 PROCEDURE — 95810 POLYSOM 6/> YRS 4/> PARAM: CPT

## 2020-03-05 LAB — STATUS: NORMAL

## 2020-03-06 NOTE — PROGRESS NOTES
greater. INTERPRETATION:  This is a baseline sleep study and the study was  performed on 03/03/2020. The study was started at 09:47 p.m. and was  terminated at 05:22 a.m. with the total recording time was 454.3 minutes  and sleep period time was 447.7 minutes. Total sleep time was 416  minutes and overall sleep efficiency was 91.6%. The sleep-onset latency  was 6.6 minutes and wake after sleep onset was 31.7 minutes and REM  sleep latency was not available due to absent REM sleep. SLEEP STAGING AND DISTRIBUTION SUMMARY:  Revealed the patient spent 11.5  minutes in stage I consisting of 2.8%, 224.5 minutes in stage II  consisting of 54%, 180 minutes in stage III consisting of 43.3%. The  patient had absent REM sleep. RESPIRATORY EVENT ANALYSIS:  Revealed the patient did not have any  apneas. The patient had a total of 15 hypopneas, all of them were  obstructive in nature. The patient had a total of 15 apneas and  hypopneas with apnea-hypopnea index of 2.2. The patient's REM sleep  apnea-hypopnea index was not available. POSITIONAL ANALYSIS:  Revealed the patient spent 384.9 minutes in supine  position with the supine apnea-hypopnea index was 2.2. The patient  spent 31.1 minutes in right lateral position with right lateral position  apnea-hypopnea index was 1.9. PERIODIC LIMB MOVEMENT ANALYSIS:  Revealed the patient had a total of 0  periodic limb movements. The patient had a total of 10 spontaneous  arousals with a spontaneous arousal index of 1.4. OXYGEN SATURATION MONITORING:  Revealed the patient had a maximum oxygen  desaturation to 87% with a mean oxygen saturation of 91.9%. The patient  spent a total of 0.2 minutes below oxygen saturation less than 88%. EKG MONITORING:  Revealed normal sinus rhythm. The patient was found to have a mild-to-moderate snoring during the  sleep study. IMPRESSION:  1.   Mild-to-moderate snoring with no clinically significant obstructive  sleep

## 2020-03-18 ENCOUNTER — TELEPHONE (OUTPATIENT)
Dept: ENT CLINIC | Age: 70
End: 2020-03-18

## 2020-03-18 NOTE — TELEPHONE ENCOUNTER
Patient called complaining of a cough and sinuitis. The patient states that she was seen by Dr. Mendy Argueta recently and he prescribed her medication. The patient stated that she was taking dayquil and nyquil for her symptoms. Please advise.

## 2020-03-24 RX ORDER — SPIRONOLACTONE 25 MG/1
25 TABLET ORAL DAILY
Qty: 60 TABLET | Refills: 8 | Status: ON HOLD | OUTPATIENT
Start: 2020-03-24 | End: 2020-10-30 | Stop reason: HOSPADM

## 2020-05-19 RX ORDER — METOPROLOL SUCCINATE 25 MG/1
TABLET, EXTENDED RELEASE ORAL
Qty: 30 TABLET | Refills: 6 | Status: SHIPPED | OUTPATIENT
Start: 2020-05-19 | End: 2021-04-12

## 2020-05-25 NOTE — PROGRESS NOTES
uterine    Heart Disease Father     Cancer Father 48        lung    Diabetes Paternal Grandfather     Cancer Maternal Grandmother         uterine    Cancer Maternal Aunt 28        uterine    Cancer Maternal Cousin 32        uterine    Colon Cancer Neg Hx     Breast Cancer Neg Hx           /66 (Site: Right Lower Arm, Position: Sitting, Cuff Size: Large Adult)   Pulse 71   Temp 96.9 °F (36.1 °C) (Tympanic)   Ht 5' 2\" (1.575 m)   Wt 293 lb (132.9 kg)   LMP  (LMP Unknown)   SpO2 94% Comment: room air  BMI 53.59 kg/m²     BMI:  Body mass index is 53.59 kg/m². Mallampati airway Class:III  Neck Circumference:16.75 Inches  [place] sleepiness score 20: 4  Sleep apnea Quality of Life Questionnaire Score: 50    Physical Exam :  Constitutional: Patient appears well built, obese and well nourished. No distress. Patient is oriented to person, place, and time. HENT:   Head: Normocephalic and atraumatic. Right Ear: External ear normal.   Left Ear: External ear normal.   Mouth/Throat: Oropharynx is clear and moist.   Eyes: Conjunctivae are normal. Pupils are equal and reactive to light. No scleral icterus. Neck: Neck supple. No JVD present. Cardiovascular: Normal rate, regular rhythm, normal heart sounds. No murmur heard. Pulmonary/Chest: Effort normal and breath sounds normal. No stridor. No respiratory distress. No wheezes. No rales. Abdominal: Soft. Patient exhibits no distension. No tenderness. Musculoskeletal: Normal range of motion. Extremities: Patient exhibits chronic skin changes with bilateral leg edema 1+. Lymphadenopathy:  No cervical adenopathy. Neurological: Patient is alert and oriented to person, place, and time. Skin: Skin is warm and dry. Patient is not diaphoretic. Psychiatric: Patient  has a normal mood and affect.     Diagnostic Data:    Sleep study done on :  SLEEP STUDY REPORT     PATIENT NAME: Anisa Smith                   :        1950  MED REC NO:   [id number]                           ROOM:  ACCOUNT NO:   [de-identified]                           ADMIT DATE: 03/03/2020  PROVIDER:     Brayden Nicole. MD [place]     DATE OF STUDY:  03/03/2020     SLEEP STUDY REPORT     REFERRING PROVIDER:  Lila Chaves. ABDIRAHMAN Dyer    RESPIRATORY EVENT ANALYSIS:  Revealed the patient did not have any  apneas. The patient had a total of 15 hypopneas, all of them were  obstructive in nature. The patient had a total of 15 apneas and  hypopneas with apnea-hypopnea index of 2.2. The patient's REM sleep  apnea-hypopnea index was not available. IMPRESSION:  1. Mild-to-moderate snoring with no clinically significant obstructive  sleep apnea. 2.  Hypersomnia of uncertain etiology. 3.  Moderately severe bronchial asthma. 4.  Diabetes mellitus. 5.  Hypertension. 6.  Coronary artery disease. 7.  Depression. Assesment:  -Mild-to-moderate snoring with no clinically significant obstructive  sleep apnea. -Absent REM sleep noted during her base line sleep test- most likely due to her current anti depressant medication I.e Celexa  -Hypersomnia ( Excessive daytime sleepiness) due to Inadequate sleep hygiene- improved. -Moderate persistent asthma without complication. She follows with Mr. Anoop Case CNP at [facility].   -Diabetes mellitus ([address] -Hypertension under control  -CAD (coronary artery disease). -Depression on treatment with meds. Recommendations/Plan:  -She was advised to practice good sleep hygiene practices. Savanna Rowe was advised to make earlier appointment with my clinic if she develops any worsening of sleep symptoms. She verbalizes understanding.  -She was advised to loose weight by controlling diet and doing exercise once cleared by her cardiologist.  -Kalee Guerra was advised to keep her scheduled follow up at [facility] for pulmonary disease) with Mr. Anoop Dyer CNP. Pulmonary clinic for further evaluation and management of her Asthma.   -

## 2020-06-10 ENCOUNTER — TELEPHONE (OUTPATIENT)
Dept: PULMONOLOGY | Age: 70
End: 2020-06-10

## 2020-06-10 NOTE — TELEPHONE ENCOUNTER
Erin Mauricio with [employer] calling to notify you that Steffani Goltz cannot complete [facility] because [facility] is not doing currently due to [place]. Do you wish to order a different test. Steffani Goltz is scheduled to come back 6/25/20. Please advise.

## 2020-06-12 ENCOUNTER — HOSPITAL ENCOUNTER (OUTPATIENT)
Age: 70
Setting detail: SPECIMEN
Discharge: HOME | End: 2020-06-12
Payer: MEDICARE

## 2020-06-13 LAB
CULTURE: NORMAL
Lab: NORMAL
SPECIMEN DESCRIPTION: NORMAL

## 2020-06-24 ENCOUNTER — OFFICE VISIT (OUTPATIENT)
Dept: PULMONOLOGY | Age: 70
End: 2020-06-24
Payer: MEDICARE

## 2020-06-24 VITALS
OXYGEN SATURATION: 94 % | HEIGHT: 62 IN | HEART RATE: 71 BPM | DIASTOLIC BLOOD PRESSURE: 66 MMHG | TEMPERATURE: 96.9 F | WEIGHT: 293 LBS | SYSTOLIC BLOOD PRESSURE: 112 MMHG | BODY MASS INDEX: 53.92 KG/M2

## 2020-06-24 PROCEDURE — 4040F PNEUMOC VAC/ADMIN/RCVD: CPT | Performed by: INTERNAL MEDICINE

## 2020-06-24 PROCEDURE — 1123F ACP DISCUSS/DSCN MKR DOCD: CPT | Performed by: INTERNAL MEDICINE

## 2020-06-24 PROCEDURE — G8400 PT W/DXA NO RESULTS DOC: HCPCS | Performed by: INTERNAL MEDICINE

## 2020-06-24 PROCEDURE — 1036F TOBACCO NON-USER: CPT | Performed by: INTERNAL MEDICINE

## 2020-06-24 PROCEDURE — 99213 OFFICE O/P EST LOW 20 MIN: CPT | Performed by: INTERNAL MEDICINE

## 2020-06-24 PROCEDURE — G8417 CALC BMI ABV UP PARAM F/U: HCPCS | Performed by: INTERNAL MEDICINE

## 2020-06-24 PROCEDURE — G8427 DOCREV CUR MEDS BY ELIG CLIN: HCPCS | Performed by: INTERNAL MEDICINE

## 2020-06-24 PROCEDURE — 1090F PRES/ABSN URINE INCON ASSESS: CPT | Performed by: INTERNAL MEDICINE

## 2020-06-24 PROCEDURE — 3017F COLORECTAL CA SCREEN DOC REV: CPT | Performed by: INTERNAL MEDICINE

## 2020-06-25 ENCOUNTER — OFFICE VISIT (OUTPATIENT)
Dept: PULMONOLOGY | Age: 70
End: 2020-06-25
Payer: MEDICARE

## 2020-06-25 VITALS
WEIGHT: 293 LBS | HEART RATE: 76 BPM | SYSTOLIC BLOOD PRESSURE: 118 MMHG | DIASTOLIC BLOOD PRESSURE: 84 MMHG | OXYGEN SATURATION: 97 % | HEIGHT: 62 IN | BODY MASS INDEX: 53.92 KG/M2 | TEMPERATURE: 97.9 F

## 2020-06-25 PROCEDURE — G8417 CALC BMI ABV UP PARAM F/U: HCPCS | Performed by: NURSE PRACTITIONER

## 2020-06-25 PROCEDURE — G8400 PT W/DXA NO RESULTS DOC: HCPCS | Performed by: NURSE PRACTITIONER

## 2020-06-25 PROCEDURE — 1090F PRES/ABSN URINE INCON ASSESS: CPT | Performed by: NURSE PRACTITIONER

## 2020-06-25 PROCEDURE — G8427 DOCREV CUR MEDS BY ELIG CLIN: HCPCS | Performed by: NURSE PRACTITIONER

## 2020-06-25 PROCEDURE — 99214 OFFICE O/P EST MOD 30 MIN: CPT | Performed by: NURSE PRACTITIONER

## 2020-06-25 PROCEDURE — 4040F PNEUMOC VAC/ADMIN/RCVD: CPT | Performed by: NURSE PRACTITIONER

## 2020-06-25 PROCEDURE — 1036F TOBACCO NON-USER: CPT | Performed by: NURSE PRACTITIONER

## 2020-06-25 PROCEDURE — 1123F ACP DISCUSS/DSCN MKR DOCD: CPT | Performed by: NURSE PRACTITIONER

## 2020-06-25 PROCEDURE — 3017F COLORECTAL CA SCREEN DOC REV: CPT | Performed by: NURSE PRACTITIONER

## 2020-06-25 ASSESSMENT — ENCOUNTER SYMPTOMS
NAUSEA: 0
STRIDOR: 0
CHEST TIGHTNESS: 0
WHEEZING: 0
COUGH: 1
DIARRHEA: 0
HEMOPTYSIS: 0
SPUTUM PRODUCTION: 0
SHORTNESS OF BREATH: 1
VOMITING: 0

## 2020-06-25 NOTE — PROGRESS NOTES
History:   Diagnosis Date    Acid reflux     Arrhythmia     Arthritis     CAD (coronary artery disease)     NON-OBSTRUCTIVE    Chronic bronchitis (HCC)     COPD (chronic obstructive pulmonary disease) (HCC)     COPD (chronic obstructive pulmonary disease) (HCC)     Depression     Diabetes mellitus (HCC)     Dizziness - light-headed     Fall 10-15-13    left rib fracture and shoulder contusion     Falls frequently     Hyperlipidemia     Hypertension     Kidney disease     MDRO (multiple drug resistant organisms) resistance 2/14    MRSA ABDOMEN    Numbness and tingling     HX OF:    S/P laparoscopic cholecystectomy 11/27/2018    Swelling     Tinnitus      SURGICAL HISTORY:  Past Surgical History:   Procedure Laterality Date    BACK SURGERY  2007    CARDIOVASCULAR STRESS TEST  04/03/2009    Persantine test associated w/non specific symptoms. EKG non diagnostic. no obvious stress induced ischemia. EF 63%. normal wall motion.  CARDIOVASCULAR STRESS TEST  03/31/2010    tilt table associated w/fluctuation of BP w/relatively low BP throughout test. no significant symptoms during test. borderline tilt table test.    CHOLECYSTECTOMY      COLONOSCOPY  2011    CORONARY ANGIOPLASTY  12/16/2009    EF 60%. nonobstructive CAD. normal LV function.     ENDOSCOPY, COLON, DIAGNOSTIC  over 5 years     Dr. Khoa Dunbar  2004    cataract   [address] [facility]  25 months old     umblilical    HERNIA REPAIR  9/8/14    Ventral Hernia Repair with Mesh - Dr. Latrice Ramsey    partial    OTHER SURGICAL HISTORY  Feb 17, 2014    Left lower quadrant incision and drainage (Dr. Ban Mcnulty, [facility])    OTHER SURGICAL HISTORY  2/2014    I&D abd abscess Dr. Bj Espana N/A 11/26/2018    LAPAROSCOPY EXPLORATORY, LAP LYLE performed by Dahlia Yang MD at [address] [place] [address]  as a child     TOOTH EXTRACTION  4/16/2012    TRANSTHORACIC ECHOCARDIOGRAM  03/04/2009 EF 50-55%. normal LV global systolic funct. slight LVH. slight lt atrial enlargement. no obvious stenotic valves. no pericardial effusion.      SOCIAL HISTORY:  Social History     Tobacco Use    Smoking status: Former Smoker     Years: 3.00     Types: Cigarettes     Start date:      Last attempt to quit:      Years since quittin.4    Smokeless tobacco: Never Used    Tobacco comment: 3 cigarettes a month, Passive smoke exposure 45 years Father then ex    Substance Use Topics    Alcohol use: No     Alcohol/week: 0.0 standard drinks    Drug use: No     ALLERGIES:  Allergies   Allergen Reactions    Latex Hives, Itching and Rash    Bee Venom Anaphylaxis    Advair [Fluticasone-Salmeterol]      Heart races     Caffeine Other (See Comments)     Bad kidney problems    Pcn [Penicillins] Hives    Ultram [Tramadol Hcl] Swelling     Has tolerated norco here many times     Fluticasone-Salmeterol Palpitations    Wool Alcohol [Lanolin Alcohol] Hives, Itching and Rash     FAMILY HISTORY:  Family History   Problem Relation Age of Onset    Cancer Mother 28        uterine    Heart Disease Father     Cancer Father 48        lung    Diabetes Paternal Grandfather     Cancer Maternal Grandmother         uterine    Cancer Maternal Aunt 28        uterine    Cancer Maternal Cousin 32        uterine    Colon Cancer Neg Hx     Breast Cancer Neg Hx      CURRENT MEDICATIONS:  Current Outpatient Medications   Medication Sig Dispense Refill    metoprolol succinate (TOPROL XL) 25 MG extended release tablet take 1 tablet by mouth once daily 30 tablet 6    spironolactone (ALDACTONE) 25 MG tablet Take 1 tablet by mouth daily take 1-2 tablets by mouth once daily if needed (TAKE 2 TABS ON ANY DAY THERE IS SWELLING IN LEGS OR ANKLES) 60 tablet 8    fluticasone (ARNUITY ELLIPTA) 100 MCG/ACT AEPB Inhale into the lungs      Mouthwashes (BIOTENE) LIQD oral solution Swish and spit 15 mLs 3 times daily as needed  acetaminophen (TYLENOL) 500 MG tablet Take 500 mg by mouth      furosemide (LASIX) 20 MG tablet Take 20 mg by mouth daily      predniSONE (DELTASONE) 20 MG tablet Take 20 mg by mouth daily Take as Directed.  fexofenadine (ALLEGRA) 60 MG tablet Take 60 mg by mouth 2 times daily      citalopram (CELEXA) 40 MG tablet take 1 tablet by mouth every morning  0    tiotropium (SPIRIVA HANDIHALER) 18 MCG inhalation capsule Inhale 1 capsule into the lungs daily 30 capsule 5    montelukast (SINGULAIR) 10 MG tablet Take 1 tablet by mouth nightly 30 tablet 11    nystatin-triamcinolone (MYCOLOG II) 088441-9.1 UNIT/GM-% cream Apply 1 applicator topically 4 times daily Apply topically 4 times daily. As needed      HOMEOPATHIC PRODUCTS  mg by Combination route daily as needed Legatrin PM      psyllium (KONSYL) 28.3 % PACK Take 1 packet by mouth daily      L-Arginine 1000 MG TABS Take 1,000 mg by mouth daily as needed      Artificial Saliva (BIOTENE MOISTURIZING MOUTH) SOLN Take 1 applicator by mouth as needed      Compression Stockings MISC 1 Package by Does not apply route Knee High 1 each 0    omeprazole (PRILOSEC) 40 MG delayed release capsule take 1 capsule by mouth once daily 30 capsule 5    TRADJENTA 5 MG tablet take 1 tablet by mouth daily 30 tablet 5    simvastatin (ZOCOR) 40 MG tablet take 1 tablet by mouth at bedtime 30 tablet 5    VESICARE 10 MG tablet take 1 tablet by mouth once daily 30 tablet 5    etodolac (LODINE) 500 MG tablet take 1 tablet by mouth twice a day 60 tablet 5    pioglitazone (ACTOS) 45 MG tablet take 1 tablet by mouth once daily 30 tablet 5    metFORMIN (GLUCOPHAGE) 1000 MG tablet take 1 tablet by mouth twice a day with food 60 tablet 5    gabapentin (NEURONTIN) 300 MG capsule Take 1 capsule by mouth 3 times daily (Patient taking differently: Take 800 mg by mouth 2 times daily. Erven Hurl ) 90 capsule 5    albuterol sulfate HFA (PROAIR HFA) 108 (90 BASE) MCG/ACT inhaler Inhale 2 02/12/20 286 lb (129.7 kg)       Physical Exam  Vitals signs and nursing note reviewed. Constitutional:       General: She is not in acute distress. Appearance: She is well-developed. She is obese. Comments: In wheelchair   HENT:      Head: Normocephalic and atraumatic. Neck:      Musculoskeletal: Neck supple. Trachea: No tracheal deviation. Cardiovascular:      Rate and Rhythm: Normal rate and regular rhythm. Heart sounds: Normal heart sounds. No murmur. Pulmonary:      Effort: Pulmonary effort is normal. No respiratory distress. Breath sounds: Normal breath sounds. No stridor. No wheezing or rales. Comments: No added breath sounds diminished throughout difficult exam 2/2 body habitus  Chest:      Chest wall: No tenderness. Abdominal:      General: Bowel sounds are normal. There is no distension. Palpations: Abdomen is soft. Skin:     General: Skin is warm and dry. Capillary Refill: Capillary refill takes less than 2 seconds. Neurological:      Mental Status: She is alert and oriented to person, place, and time. Psychiatric:         Behavior: Behavior normal.         Thought Content: Thought content normal.       Results   Lung Nodule Screening     [] Qualifies    [x] Does not qualify   [] Declined    [] Completed     The USPSTF recommends annual screening for lung cancer with low-dose computed tomography (LDCT) in adults aged 54 to [de-identified] years who have a 30 pack-year smoking history and currently smoke or have quit within the past 15 years. Screening should be discontinued once a person has not smoked for 15 years or develops a health problem that substantially limits life expectancy or the ability or willingness to have curative lung surgery. Assessment      Diagnosis Orders   1. Moderate persistent asthma without complication     2. H/O pulmonary emphysema     3.  Passive smoke exposure       Chronic allergic sinusitis  MCCT with significant response and reversal post MERA  Hypersomnia with Negative PSG-noted snoring events without obstructive episodes  Plan   -Continue on dulera arnuity and spiriva needs dual ics to obtain higher dose had reaction to advair previously with tachycardia and hospital admission  -Will continue spiriva component of COPD with noted emphysema on radiograph   -Continue Allegra  -Continue Singulair  -Follow-up in 1 year  -Advised to maintain pneumonia vaccine with PCP and to take flu vaccine this coming season.  -Advised patient to call office with any changes, questions, or concerns regarding respiratory status    Will see Rickey garcia in: 12 months no test    Rashad Dyer CNP  6/25/2020

## 2020-07-07 ENCOUNTER — HOSPITAL ENCOUNTER (EMERGENCY)
Age: 70
Discharge: HOME | End: 2020-07-07
Attending: EMERGENCY MEDICINE
Payer: MEDICARE

## 2020-07-07 ENCOUNTER — APPOINTMENT (OUTPATIENT)
Dept: CT IMAGING | Age: 70
End: 2020-07-07
Payer: MEDICARE

## 2020-07-07 VITALS
TEMPERATURE: 101 F | BODY MASS INDEX: 53.92 KG/M2 | HEIGHT: 62 IN | WEIGHT: 293 LBS | DIASTOLIC BLOOD PRESSURE: 54 MMHG | OXYGEN SATURATION: 93 % | RESPIRATION RATE: 20 BRPM | SYSTOLIC BLOOD PRESSURE: 146 MMHG | HEART RATE: 88 BPM

## 2020-07-07 LAB
ALBUMIN SERPL-MCNC: 3.7 G/DL (ref 3.5–5.1)
ALP BLD-CCNC: 87 U/L (ref 38–126)
ALT SERPL-CCNC: 19 U/L (ref 11–66)
ANION GAP SERPL CALCULATED.3IONS-SCNC: 14 MEQ/L (ref 8–16)
AST SERPL-CCNC: 24 U/L (ref 5–40)
BACTERIA: ABNORMAL /HPF
BASOPHILS # BLD: 0.1 %
BASOPHILS ABSOLUTE: 0 THOU/MM3 (ref 0–0.1)
BILIRUB SERPL-MCNC: 0.6 MG/DL (ref 0.3–1.2)
BILIRUBIN DIRECT: < 0.2 MG/DL (ref 0–0.3)
BILIRUBIN URINE: NEGATIVE
BLOOD, URINE: NEGATIVE
BUN BLDV-MCNC: 13 MG/DL (ref 7–22)
CALCIUM SERPL-MCNC: 9.2 MG/DL (ref 8.5–10.5)
CASTS 2: ABNORMAL /LPF
CASTS UA: ABNORMAL /LPF
CHARACTER, URINE: CLEAR
CHLORIDE BLD-SCNC: 103 MEQ/L (ref 98–111)
CO2: 23 MEQ/L (ref 23–33)
COLOR: YELLOW
CREAT SERPL-MCNC: 0.9 MG/DL (ref 0.4–1.2)
CRYSTALS, UA: ABNORMAL
EKG ATRIAL RATE: 88 BPM
EKG P AXIS: 56 DEGREES
EKG P-R INTERVAL: 152 MS
EKG Q-T INTERVAL: 392 MS
EKG QRS DURATION: 108 MS
EKG QTC CALCULATION (BAZETT): 474 MS
EKG R AXIS: -29 DEGREES
EKG T AXIS: 67 DEGREES
EKG VENTRICULAR RATE: 88 BPM
EOSINOPHIL # BLD: 0.2 %
EOSINOPHILS ABSOLUTE: 0 THOU/MM3 (ref 0–0.4)
EPITHELIAL CELLS, UA: ABNORMAL /HPF
ERYTHROCYTE [DISTWIDTH] IN BLOOD BY AUTOMATED COUNT: 18.3 % (ref 11.5–14.5)
ERYTHROCYTE [DISTWIDTH] IN BLOOD BY AUTOMATED COUNT: 55.4 FL (ref 35–45)
GFR SERPL CREATININE-BSD FRML MDRD: 62 ML/MIN/1.73M2
GLUCOSE BLD-MCNC: 108 MG/DL (ref 70–108)
GLUCOSE URINE: NEGATIVE MG/DL
HCT VFR BLD CALC: 36.1 % (ref 37–47)
HEMOGLOBIN: 10.8 GM/DL (ref 12–16)
IMMATURE GRANS (ABS): 0.03 THOU/MM3 (ref 0–0.07)
IMMATURE GRANULOCYTES: 0.4 %
KETONES, URINE: NEGATIVE
LEUKOCYTE ESTERASE, URINE: ABNORMAL
LIPASE: 29.4 U/L (ref 5.6–51.3)
LYMPHOCYTES # BLD: 14.8 %
LYMPHOCYTES ABSOLUTE: 1.2 THOU/MM3 (ref 1–4.8)
MCH RBC QN AUTO: 25.3 PG (ref 26–33)
MCHC RBC AUTO-ENTMCNC: 29.9 GM/DL (ref 32.2–35.5)
MCV RBC AUTO: 84.5 FL (ref 81–99)
MISCELLANEOUS 2: ABNORMAL
MONOCYTES # BLD: 17.3 %
MONOCYTES ABSOLUTE: 1.4 THOU/MM3 (ref 0.4–1.3)
NITRITE, URINE: POSITIVE
NUCLEATED RED BLOOD CELLS: 0 /100 WBC
OSMOLALITY CALCULATION: 280 MOSMOL/KG (ref 275–300)
PH UA: 6.5 (ref 5–9)
PLATELET # BLD: 229 THOU/MM3 (ref 130–400)
PMV BLD AUTO: 10.5 FL (ref 9.4–12.4)
POTASSIUM SERPL-SCNC: 4.2 MEQ/L (ref 3.5–5.2)
PROTEIN UA: 30
RBC # BLD: 4.27 MILL/MM3 (ref 4.2–5.4)
RBC URINE: ABNORMAL /HPF
RENAL EPITHELIAL, UA: ABNORMAL
SEG NEUTROPHILS: 67.2 %
SEGMENTED NEUTROPHILS ABSOLUTE COUNT: 5.4 THOU/MM3 (ref 1.8–7.7)
SODIUM BLD-SCNC: 140 MEQ/L (ref 135–145)
SPECIFIC GRAVITY, URINE: 1.02 (ref 1–1.03)
TOTAL PROTEIN: 7 G/DL (ref 6.1–8)
TROPONIN T: < 0.01 NG/ML
UROBILINOGEN, URINE: 1 EU/DL (ref 0–1)
WBC # BLD: 8.1 THOU/MM3 (ref 4.8–10.8)
WBC UA: ABNORMAL /HPF
YEAST: ABNORMAL

## 2020-07-07 PROCEDURE — 85025 COMPLETE CBC W/AUTO DIFF WBC: CPT

## 2020-07-07 PROCEDURE — 2580000003 HC RX 258: Performed by: EMERGENCY MEDICINE

## 2020-07-07 PROCEDURE — 81001 URINALYSIS AUTO W/SCOPE: CPT

## 2020-07-07 PROCEDURE — 96367 TX/PROPH/DG ADDL SEQ IV INF: CPT

## 2020-07-07 PROCEDURE — 99283 EMERGENCY DEPT VISIT LOW MDM: CPT

## 2020-07-07 PROCEDURE — 93005 ELECTROCARDIOGRAM TRACING: CPT | Performed by: EMERGENCY MEDICINE

## 2020-07-07 PROCEDURE — 80053 COMPREHEN METABOLIC PANEL: CPT

## 2020-07-07 PROCEDURE — 6370000000 HC RX 637 (ALT 250 FOR IP): Performed by: EMERGENCY MEDICINE

## 2020-07-07 PROCEDURE — 83690 ASSAY OF LIPASE: CPT

## 2020-07-07 PROCEDURE — 96375 TX/PRO/DX INJ NEW DRUG ADDON: CPT

## 2020-07-07 PROCEDURE — 36415 COLL VENOUS BLD VENIPUNCTURE: CPT

## 2020-07-07 PROCEDURE — 74177 CT ABD & PELVIS W/CONTRAST: CPT

## 2020-07-07 PROCEDURE — 6360000002 HC RX W HCPCS: Performed by: EMERGENCY MEDICINE

## 2020-07-07 PROCEDURE — 6360000004 HC RX CONTRAST MEDICATION: Performed by: EMERGENCY MEDICINE

## 2020-07-07 PROCEDURE — 82248 BILIRUBIN DIRECT: CPT

## 2020-07-07 PROCEDURE — 84484 ASSAY OF TROPONIN QUANT: CPT

## 2020-07-07 PROCEDURE — 96365 THER/PROPH/DIAG IV INF INIT: CPT

## 2020-07-07 RX ORDER — SODIUM CHLORIDE 9 MG/ML
INJECTION, SOLUTION INTRAVENOUS CONTINUOUS
Status: DISCONTINUED | OUTPATIENT
Start: 2020-07-07 | End: 2020-07-08 | Stop reason: HOSPADM

## 2020-07-07 RX ORDER — ACETAMINOPHEN 325 MG/1
650 TABLET ORAL ONCE
Status: COMPLETED | OUTPATIENT
Start: 2020-07-07 | End: 2020-07-07

## 2020-07-07 RX ORDER — DIPHENHYDRAMINE HYDROCHLORIDE 50 MG/ML
25 INJECTION INTRAMUSCULAR; INTRAVENOUS ONCE
Status: COMPLETED | OUTPATIENT
Start: 2020-07-07 | End: 2020-07-07

## 2020-07-07 RX ORDER — LEVOFLOXACIN 5 MG/ML
500 INJECTION, SOLUTION INTRAVENOUS ONCE
Status: COMPLETED | OUTPATIENT
Start: 2020-07-07 | End: 2020-07-07

## 2020-07-07 RX ORDER — LEVOFLOXACIN 500 MG/1
500 TABLET, FILM COATED ORAL DAILY
Qty: 7 TABLET | Refills: 0 | Status: SHIPPED | OUTPATIENT
Start: 2020-07-07 | End: 2020-07-14

## 2020-07-07 RX ORDER — MORPHINE SULFATE 2 MG/ML
2 INJECTION, SOLUTION INTRAMUSCULAR; INTRAVENOUS ONCE
Status: COMPLETED | OUTPATIENT
Start: 2020-07-07 | End: 2020-07-07

## 2020-07-07 RX ADMIN — DIPHENHYDRAMINE HYDROCHLORIDE 25 MG: 50 INJECTION INTRAMUSCULAR; INTRAVENOUS at 21:15

## 2020-07-07 RX ADMIN — LEVOFLOXACIN 500 MG: 5 INJECTION, SOLUTION INTRAVENOUS at 21:47

## 2020-07-07 RX ADMIN — IOPAMIDOL 80 ML: 755 INJECTION, SOLUTION INTRAVENOUS at 19:59

## 2020-07-07 RX ADMIN — CEFTRIAXONE SODIUM 1 G: 1 INJECTION, POWDER, FOR SOLUTION INTRAMUSCULAR; INTRAVENOUS at 20:50

## 2020-07-07 RX ADMIN — MORPHINE SULFATE 2 MG: 2 INJECTION, SOLUTION INTRAMUSCULAR; INTRAVENOUS at 20:50

## 2020-07-07 RX ADMIN — SODIUM CHLORIDE: 9 INJECTION, SOLUTION INTRAVENOUS at 23:31

## 2020-07-07 RX ADMIN — SODIUM CHLORIDE 100 ML/HR: 9 INJECTION, SOLUTION INTRAVENOUS at 19:24

## 2020-07-07 RX ADMIN — ACETAMINOPHEN 650 MG: 325 TABLET ORAL at 21:44

## 2020-07-07 ASSESSMENT — ENCOUNTER SYMPTOMS
SHORTNESS OF BREATH: 0
VOICE CHANGE: 0
WHEEZING: 0
ABDOMINAL PAIN: 1
CHEST TIGHTNESS: 0
CONSTIPATION: 0
BACK PAIN: 0
DIARRHEA: 0
COUGH: 0
SORE THROAT: 0
RHINORRHEA: 0
NAUSEA: 0
VOMITING: 0
SINUS PRESSURE: 0
TROUBLE SWALLOWING: 0

## 2020-07-07 ASSESSMENT — PAIN SCALES - GENERAL
PAINLEVEL_OUTOF10: 5
PAINLEVEL_OUTOF10: 8

## 2020-07-07 ASSESSMENT — PAIN DESCRIPTION - LOCATION: LOCATION: ABDOMEN

## 2020-07-07 NOTE — ED PROVIDER NOTES
[address] [facility]        CHIEF COMPLAINT    Chief Complaint   Patient presents with    Abdominal Pain    Nausea       Nurses Notes reviewed and I agree except as noted in the HPI. HPI    Fany Luna is a 69 y.o. female who presents for evaluation of nausea and abdominal pain since 5:00 this afternoon. The patient admits that that she had problem with abdominal pain comes from the right upper quadrant going to an umbilical area for the past 2 to 3 months. Pain is been on and off and has been doing well until 530 this afternoon when the pain recurred character is crampy abdominal pain with accompanying nausea without any vomiting. Pain is not radiating to the back. She admits that she has gallbladder taken out in the past and had a ventral hernia. Patient did not have any diarrhea no fever, no chills however is having some shakes. The patient has breakfast about 10 AM this morning and no food since then. Denies any urinary symptoms no dysuria frequency hematuria no rash or jaundice. Patient went to the urgent care and was told to come here to be checked as he needs a CT scan. REVIEW OF SYSTEMS    Review of Systems   Constitutional: Negative for appetite change, chills, diaphoresis, fatigue and fever. HENT: Negative for congestion, ear pain, postnasal drip, rhinorrhea, sinus pressure, sneezing, sore throat, trouble swallowing and voice change. Respiratory: Negative for cough, chest tightness, shortness of breath and wheezing. Cardiovascular: Negative for chest pain, palpitations and leg swelling. Gastrointestinal: Positive for abdominal pain. Negative for constipation, diarrhea, nausea and vomiting. Musculoskeletal: Negative for arthralgias, back pain, joint swelling, myalgias, neck pain and neck stiffness. Neurological: Negative for dizziness, syncope, weakness, light-headedness, numbness and headaches. PAST MEDICAL HISTORY     has a past medical history of Acid reflux, Arrhythmia, Arthritis, CAD (coronary artery disease), Chronic bronchitis ([facility]), COPD (chronic obstructive pulmonary disease) ([facility] COPD (chronic obstructive pulmonary disease) ([facility] [address] Depression, Diabetes mellitus [facility] Dizziness - light-headed, Fall, Falls frequently, Hyperlipidemia, Hypertension, Kidney disease, MDRO (multiple drug resistant organisms) resistance, Numbness and tingling, S/P laparoscopic cholecystectomy, Swelling, and Tinnitus. SURGICAL HISTORY   has a past surgical history that includes Coronary angioplasty (12/16/2009); transthoracic echocardiogram (03/04/2009); cardiovascular stress test (04/03/2009); cardiovascular stress test (03/31/2010); back surgery (2007); eye surgery (2004); Hysterectomy (1995); Tooth Extraction (4/16/2012); Colonoscopy (2011); other surgical history (Feb 17, 2014); Endoscopy, colon, diagnostic (over 5 years ); Tonsillectomy (as a child ); other surgical history (2/2014); hernia repair (21 months old ); hernia repair (9/8/14); pr lap,diagnostic abdomen (N/A, 11/26/2018); and Cholecystectomy. CURRENT MEDICATIONS    Previous Medications    ACETAMINOPHEN (TYLENOL) 500 MG TABLET    Take 500 mg by mouth every 6 hours as needed for Pain. ACETAMINOPHEN (TYLENOL) 500 MG TABLET    Take 500 mg by mouth    ALBUTEROL SULFATE HFA (PROAIR HFA) 108 (90 BASE) MCG/ACT INHALER    Inhale 2 puffs into the lungs every 4 hours as needed    ARTIFICIAL SALIVA (BIOTENE MOISTURIZING MOUTH) SOLN    Take 1 applicator by mouth as needed    ASPIRIN LOW DOSE 81 MG EC TABLET    take 1 tablet by mouth once daily    CICLOPIROX TREATMENT EX    Apply  topically. 10.0 mL Ciclopirox-terbinafine-fluconazole  Fungal Free Nails Fungicidal solution    CITALOPRAM (CELEXA) 40 MG TABLET    take 1 tablet by mouth every morning    CLOBETASOL (TEMOVATE) 0.05 % CREAM    Apply topically 2 times daily.     COMPRESSION STOCKINGS MISC    1 MG TABLET    take 1 tablet by mouth at bedtime    SPIRONOLACTONE (ALDACTONE) 25 MG TABLET    Take 1 tablet by mouth daily take 1-2 tablets by mouth once daily if needed (TAKE 2 TABS ON ANY DAY THERE IS SWELLING IN LEGS OR ANKLES)    TIOTROPIUM (SPIRIVA HANDIHALER) 18 MCG INHALATION CAPSULE    Inhale 1 capsule into the lungs daily    TIZANIDINE (ZANAFLEX) 4 MG TABLET    Take 4 mg by mouth every 8 hours as needed    TRADJENTA 5 MG TABLET    take 1 tablet by mouth daily    VESICARE 10 MG TABLET    take 1 tablet by mouth once daily       ALLERGIES    is allergic to latex; bee venom; advair [fluticasone-salmeterol]; caffeine; pcn [penicillins]; ultram [tramadol hcl]; fluticasone-salmeterol; and wool alcohol [lanolin alcohol]. FAMILY HISTORY    She indicated that her mother is . She indicated that her father is . She indicated that the status of her maternal grandmother is unknown. She indicated that the status of her paternal grandfather is unknown. She indicated that the status of her maternal aunt is unknown. She indicated that the status of her neg hx is unknown. She indicated that the status of her maternal cousin is unknown.   family history includes Cancer in her maternal grandmother; Cancer (age of onset: 28) in her maternal aunt, maternal cousin, and mother; Cancer (age of onset: 48) in her father; Diabetes in her paternal grandfather; Heart Disease in her father. SOCIAL HISTORY     reports that she quit smoking about 27 years ago. Her smoking use included cigarettes. She started smoking about 30 years ago. She quit after 3.00 years of use. She has never used smokeless tobacco. She reports that she does not drink alcohol or use drugs.     PHYSICAL EXAM      INITIAL VITALS: BP (!) 166/93   Pulse 90   Temp 100.3 °F (37.9 °C) (Oral)   Resp 20   Ht 5' 2\" (1.575 m)   Wt 300 lb (136.1 kg)   LMP  (LMP Unknown)   SpO2 100%   BMI 54.87 kg/m² Estimated body mass index is 54.87 kg/m² as calculated from the following:    Height as of this encounter: 5' 2\" (1.575 m). Weight as of this encounter: 300 lb (136.1 kg). Physical Exam  Vitals signs reviewed. Constitutional:       Appearance: She is well-developed. HENT:      Head: Normocephalic and atraumatic. Right Ear: External ear normal.      Left Ear: External ear normal.      Nose: Nose normal.   Eyes:      General: No scleral icterus. Conjunctiva/sclera: Conjunctivae normal.      Pupils: Pupils are equal, round, and reactive to light. Neck:      Musculoskeletal: Normal range of motion and neck supple. Thyroid: No thyromegaly. Vascular: No JVD. Cardiovascular:      Rate and Rhythm: Normal rate and regular rhythm. Heart sounds: No murmur. No friction rub. Pulmonary:      Effort: Pulmonary effort is normal.      Breath sounds: Normal breath sounds. No wheezing or rales. Chest:      Chest wall: No tenderness. Abdominal:      General: Bowel sounds are decreased. Palpations: Abdomen is soft. There is no mass. Tenderness: There is abdominal tenderness in the right upper quadrant, epigastric area, periumbilical area, suprapubic area, left upper quadrant and left lower quadrant. There is no guarding or rebound. Negative signs include McBurney's sign. Lymphadenopathy:      Cervical: No cervical adenopathy. Skin:     Findings: No rash. Neurological:      Mental Status: She is alert and oriented to person, place, and time. Psychiatric:         Behavior: Behavior is cooperative.          MEDICAL DECISION MAKING    DIFFERENTIAL DIAGNOSIS:  Renal pain, ventral hernia bowel obstruction diverticulosis diverticulitis, UTI pyelonephritis      DIAGNOSTIC RESULTS    EKG   Interpreted by Munira Crooks MD      Rhythm: normal sinus   Rate: normal  Axis: normal  Ectopy: none  Conduction: normal  ST Segments: no acute change  T Waves: no acute change  Q Waves: none    Clinical Impression: Normal sinus rhythm with incomplete left bundle branch block, borderline EKG      Kellee Irizarry MD      RADIOLOGY:  I have reviewed radiologic plain film image(s). The plain films will be read or overread by the radiologist.All other non-plain film images(s) such as CT, Ultrasound and MRI have been read by the radiologist.  CT ABDOMEN PELVIS W IV CONTRAST Additional Contrast? None    (Results Pending)       LABS:   Labs Reviewed   CBC WITH AUTO DIFFERENTIAL - Abnormal; Notable for the following components:       Result Value    Hemoglobin 10.8 (*)     Hematocrit 36.1 (*)     MCH 25.3 (*)     MCHC 29.9 (*)     RDW-CV 18.3 (*)     RDW-SD 55.4 (*)     Monocytes Absolute 1.4 (*)     All other components within normal limits   GLOMERULAR FILTRATION RATE, ESTIMATED - Abnormal; Notable for the following components:    Est, Glom Filt Rate 62 (*)     All other components within normal limits   URINE WITH REFLEXED MICRO - Abnormal; Notable for the following components:    Protein, UA 30 (*)     Nitrite, Urine POSITIVE (*)     Leukocyte Esterase, Urine SMALL (*)     All other components within normal limits   BASIC METABOLIC PANEL   HEPATIC FUNCTION PANEL   LIPASE   TROPONIN   ANION GAP   OSMOLALITY     All other unresulted laboratory test above are normal:    Vitals:    Vitals:    07/07/20 1813 07/07/20 1925   BP: (!) 161/83 (!) 166/93   Pulse: 87 90   Resp: 16 20   Temp: 100.3 °F (37.9 °C)    TempSrc: Oral    SpO2: 93% 100%   Weight: 300 lb (136.1 kg)    Height: 5' 2\" (1.575 m)        EMERGENCY DEPARTMENT COURSE:    Medications   0.9 % sodium chloride infusion (100 mL/hr Intravenous New Bag 7/7/20 1924)   morphine (PF) injection 2 mg (has no administration in time range)   cefTRIAXone (ROCEPHIN) 1 g IVPB in 50 mL D5W minibag (has no administration in time range)   iopamidol (ISOVUE-370) 76 % injection 80 mL (80 mLs Intravenous Given 7/7/20 1959)       The pt was seen and evaluated by me.  Within the department, I observed the pt's vitalsigns to be within acceptable range . Laboratory and Radiological studies were performed, results were reviewed with the patient. Within the department, the pt was treated with IV fluid hydration, morphine, Rocephin. . I observed the pt's condition to be hemodynamically stable during the duration of their stay. Case is signed out to Dr Jazmyn Borjas for disposition as the CT scan is not yet done    20:30 PM signed out to Dr Jazmyn Borjas. CRITICAL CARE:   None. CONSULTS:  None    PROCEDURES:  None. FINAL IMPRESSION       1. Urinary tract infection without hematuria, site unspecified    2. Pain of upper abdomen          DISPOSITION/PLAN  PATIENT REFERRED TO:  No follow-up provider specified. DISCHARGE MEDICATIONS:  New Prescriptions    No medications on file         (Please note that portions of this note were completed with a voice recognition program and electronically transcribed. Efforts were [facility] edit the dictations but occasionally words are mis-transcribed . The transcription may contain errors not detected in proofreading.   This transcription was electronically signed.)     07/07/20 8:37 PM      Alicia Corral MD      Emergency room physician            Alicia Corral MD  07/07/20 [id number]

## 2020-07-07 NOTE — ED TRIAGE NOTES
Pt to ed sent from [facility] urgent care for epigastric pain intermittently for the last 3 months. Pt reports pain is 5/10 at this time. Pt denies bowel or urinary symptoms. Pt reports that she was sent for ct scan of the abd. ekg obtained and pt placed on the cardiac monitor. Lung sounds clear, regular and unlabored. Bowel sounds active x4 and pt reports ruq, epigastric pain. Dr. Collins Oldham into assess pt and discuss the POC.

## 2020-07-08 ENCOUNTER — CARE COORDINATION (OUTPATIENT)
Dept: CARE COORDINATION | Age: 70
End: 2020-07-08

## 2020-07-08 NOTE — ED NOTES
Dr. Delisa Xavier into update pt and family on the POC. Will monitor for further orders.       Cuong Winters RN  07/07/20 1481

## 2020-07-08 NOTE — CARE COORDINATION
Attempted to reach Evonne Bound today for ED f/u [place]. No answer. Message left to return call to this ACM.

## 2020-07-08 NOTE — CARE COORDINATION
Patient contacted regarding recent discharge and COVID-19 risk. Discussed COVID-19 related testing which was not done at this time. Test results were not done. Patient informed of results, if available? n/a     Care Transition Nurse/ Ambulatory Care Manager contacted the patient by telephone to perform post discharge assessment. Verified name and  with patient as identifiers. Patient has following risk factors of: COPD and diabetes. CTN/ACM reviewed discharge instructions, medical action plan and red flags related to discharge diagnosis. Reviewed and educated them on any new and changed medications related to discharge diagnosis. Advised obtaining a 90-day supply of all daily and as-needed medications. Education provided regarding infection prevention, and signs and symptoms of COVID-19 and when to seek medical attention with patient who verbalized understanding. Discussed exposure protocols and quarantine from [address] you at higher risk for severe illness  and given an opportunity for questions and concerns. The patient agrees to contact the COVID-19 hotline [phone] or PCP office for questions related to their healthcare. CTN/ACM provided contact information for future reference. From CDC: Are you at higher risk for severe illness?  Wash your hands often.  Avoid close contact (6 feet, which is about two arm lengths) with people who are sick.  Put distance between yourself and other people if COVID-19 is spreading in your community.  Clean and disinfect frequently touched surfaces.  Avoid all cruise travel and non-essential air travel.  Call your healthcare professional if you have concerns about COVID-19 and your underlying condition or if you are sick. For more information on steps you can take to protect yourself, see CDC's How to [id number] [place] [address] for follow-up call in 7-14 days based on severity of symptoms and risk factor. Pt returned call.   Unable

## 2020-07-08 NOTE — ED NOTES
Dr. Robledo Peer informed of pt's reaction to medication and verbal order for 25 mg benadryl obtained and medication discontinued. Will continue to monitor pt.       Andrew Quinonez RN  07/07/20 [id number]

## 2020-07-08 NOTE — ED NOTES
Pt resting in bed upon entering room. Pt denies pain at this time and vs reassessed and oral temp is 101.0. Dr. Sanjuana Haines notified that pt is pain free and antibiotics are complete and pt is requesting to go home. Will monitor for further orders. Call light remains in reach.      Chapo Montanez RN  07/07/20 5248

## 2020-07-08 NOTE — ED NOTES
Rocephin started and pt reports burning and itching and hives noted to the left arm medication stopped and flushed at this time and Dr. Karina Rothman to be informed.       Titi Reyes RN  07/07/20 8921

## 2020-07-08 NOTE — ED NOTES
Pt stable A&O x 3 given discharge and follow up info. Pt voiced no concerns and discharged from ER to self to home. Pt ambulated out of ER with no complications .        Cordell Rodriguez RN  07/07/20 9912

## 2020-07-22 ENCOUNTER — CARE COORDINATION (OUTPATIENT)
Dept: CARE COORDINATION | Age: 70
End: 2020-07-22

## 2020-07-22 NOTE — CARE COORDINATION
You Patient resolved from the Care Transitions episode on 7/22  Discussed COVID-19 related testing which was not done at this time. Test results were not done. Patient informed of results, if available? n/a    Patient/family has been provided the following resources and education related to COVID-19:                         Signs, symptoms and red flags related to COVID-19            [facility] exposure and quarantine guidelines            [employer] exposure contact - [phone]            Contact for their local Department of Health                 Patient currently reports that the following symptoms have improved:  pt reports that she is feeling better. Denies development of any new symptoms. No further outreach scheduled with this CTN/ACM. Episode of Care resolved. Patient has this CTN/ACM contact information if future needs arise.

## 2020-09-02 RX ORDER — MONTELUKAST SODIUM 10 MG/1
10 TABLET ORAL NIGHTLY
Qty: 30 TABLET | Refills: 11 | Status: SHIPPED | OUTPATIENT
Start: 2020-09-02 | End: 2020-10-17

## 2020-09-03 ENCOUNTER — HOSPITAL ENCOUNTER (OUTPATIENT)
Age: 70
Setting detail: SPECIMEN
Discharge: HOME | End: 2020-09-03
Payer: MEDICARE

## 2020-09-03 LAB
CULTURE: NORMAL
DIRECT EXAM: NORMAL
Lab: NORMAL
SPECIMEN DESCRIPTION: NORMAL

## 2020-09-06 LAB
CULTURE: NORMAL
CULTURE: NORMAL
Lab: NORMAL
SPECIMEN DESCRIPTION: NORMAL

## 2020-09-10 ENCOUNTER — APPOINTMENT (OUTPATIENT)
Dept: GENERAL RADIOLOGY | Age: 70
End: 2020-09-10
Payer: MEDICARE

## 2020-09-10 ENCOUNTER — HOSPITAL ENCOUNTER (EMERGENCY)
Age: 70
Discharge: HOME | End: 2020-09-10
Payer: MEDICARE

## 2020-09-10 VITALS
BODY MASS INDEX: 50.14 KG/M2 | SYSTOLIC BLOOD PRESSURE: 167 MMHG | HEART RATE: 69 BPM | OXYGEN SATURATION: 99 % | TEMPERATURE: 97.1 F | RESPIRATION RATE: 20 BRPM | WEIGHT: 283 LBS | HEIGHT: 63 IN | DIASTOLIC BLOOD PRESSURE: 84 MMHG

## 2020-09-10 PROCEDURE — 6370000000 HC RX 637 (ALT 250 FOR IP): Performed by: NURSE PRACTITIONER

## 2020-09-10 PROCEDURE — 73502 X-RAY EXAM HIP UNI 2-3 VIEWS: CPT

## 2020-09-10 PROCEDURE — 99285 EMERGENCY DEPT VISIT HI MDM: CPT

## 2020-09-10 RX ORDER — LIDOCAINE 4 G/G
1 PATCH TOPICAL ONCE
Status: DISCONTINUED | OUTPATIENT
Start: 2020-09-10 | End: 2020-09-10 | Stop reason: HOSPADM

## 2020-09-10 RX ORDER — SULFAMETHOXAZOLE AND TRIMETHOPRIM 400; 80 MG/1; MG/1
1 TABLET ORAL 2 TIMES DAILY
COMMUNITY
End: 2020-10-17 | Stop reason: ALTCHOICE

## 2020-09-10 RX ORDER — TIZANIDINE 4 MG/1
4 TABLET ORAL ONCE
Status: COMPLETED | OUTPATIENT
Start: 2020-09-10 | End: 2020-09-10

## 2020-09-10 RX ORDER — TIZANIDINE 4 MG/1
4 TABLET ORAL EVERY 6 HOURS PRN
Qty: 20 TABLET | Refills: 0 | Status: SHIPPED | OUTPATIENT
Start: 2020-09-10 | End: 2020-10-17 | Stop reason: SINTOL

## 2020-09-10 RX ORDER — PREDNISONE 20 MG/1
TABLET ORAL
Qty: 18 TABLET | Refills: 0 | Status: SHIPPED | OUTPATIENT
Start: 2020-09-10 | End: 2020-09-20

## 2020-09-10 RX ORDER — LIDOCAINE 4 G/G
1 PATCH TOPICAL DAILY
Qty: 30 PATCH | Refills: 0 | Status: SHIPPED | OUTPATIENT
Start: 2020-09-10 | End: 2020-10-10

## 2020-09-10 RX ORDER — HYDROCODONE BITARTRATE AND ACETAMINOPHEN 5; 325 MG/1; MG/1
1 TABLET ORAL ONCE
Status: COMPLETED | OUTPATIENT
Start: 2020-09-10 | End: 2020-09-10

## 2020-09-10 RX ADMIN — HYDROCODONE BITARTRATE AND ACETAMINOPHEN 1 TABLET: 5; 325 TABLET ORAL at 13:34

## 2020-09-10 RX ADMIN — TIZANIDINE 4 MG: 4 TABLET ORAL at 13:34

## 2020-09-10 ASSESSMENT — PAIN DESCRIPTION - ORIENTATION
ORIENTATION: LEFT

## 2020-09-10 ASSESSMENT — PAIN DESCRIPTION - LOCATION
LOCATION: HIP

## 2020-09-10 ASSESSMENT — PAIN SCALES - GENERAL
PAINLEVEL_OUTOF10: 5
PAINLEVEL_OUTOF10: 5
PAINLEVEL_OUTOF10: 6
PAINLEVEL_OUTOF10: 5

## 2020-09-10 ASSESSMENT — PAIN DESCRIPTION - DESCRIPTORS: DESCRIPTORS: BURNING;SHARP

## 2020-09-10 ASSESSMENT — PAIN DESCRIPTION - PAIN TYPE
TYPE: ACUTE PAIN

## 2020-09-10 NOTE — ED NOTES
[facility]        Varinder, [address] [facility]  09/10/20 0470

## 2020-09-10 NOTE — ED NOTES
Patient presents to ED via EMS with complaint of left hip pain. Patient denies fall. Patient states for past week left hip pain has radiated down left leg. Patient has bilateral lower leg swelling and redness. Patient states she is diabetic. Patient states her left hip keeps \"popping\". EMS reports patient was able to stand and pivot from wheelchair to cart.      Etelvina Ko RN  09/10/20 6580

## 2020-09-10 NOTE — ED NOTES
Patient resting on cart with complaint of left hip pain. Patient updated on plan of care by [facility] ABDIRAHMAN Diane. Respirations unlabored. Call light in reach.      Asher Guzman RN  09/10/20 2135

## 2020-09-10 NOTE — ED PROVIDER NOTES
[facility]    CHIEF COMPLAINT       Chief Complaint   Patient presents with    Hip Pain     left       Nurses Notes reviewed and I agree except as noted in the HPI. HISTORY OF PRESENT ILLNESS    Dc Rosen april 69 y.o. female who presents to the ED for evaluation of of left hip pain. Patient notes symptoms began 4 days ago. She notes she was getting into bed, when she lifted up her leg she felt a pulling sensation in her left hip. She notes since then she has had extreme pain the left hip. Any numbness or tingling down her leg, denies any traumatic injury to her leg, she denies any back pain. She denies fevers or chills. She notes she is currently on antibiotic for a lesion to her labia. HPI was provided by the patient. REVIEW OF SYSTEMS     Review of Systems   Constitutional: Negative for chills and fever. Respiratory: Negative for chest tightness and shortness of breath. Cardiovascular: Negative for chest pain. Gastrointestinal: Negative for abdominal distention, abdominal pain, nausea and vomiting. Genitourinary: Negative for decreased urine volume and dysuria. Musculoskeletal: Positive for arthralgias and gait problem. Negative for back pain, joint swelling and myalgias. Skin: Negative for color change and rash. Allergic/Immunologic: Negative for immunocompromised state. Neurological: Negative for dizziness, weakness, light-headedness, numbness and headaches. Hematological: Does not bruise/bleed easily. Psychiatric/Behavioral: Negative for agitation, behavioral problems and confusion.         PAST MEDICAL HISTORY     Past Medical History:   Diagnosis Date    Acid reflux     Arrhythmia     Arthritis     CAD (coronary artery disease)     NON-OBSTRUCTIVE    Chronic bronchitis (HCC)     COPD (chronic obstructive pulmonary disease) (HCC)     COPD (chronic obstructive pulmonary disease) (HCC)     Depression     Diabetes mellitus ([facility] [address]     Dizziness - light-headed     Fall 10-15-13    left rib fracture and shoulder contusion     Falls frequently     Hyperlipidemia     Hypertension     Kidney disease     MDRO (multiple drug resistant organisms) resistance 2/14    MRSA ABDOMEN    Numbness and tingling     HX OF:    S/P laparoscopic cholecystectomy 11/27/2018    Swelling     Tinnitus        SURGICALHISTORY      has a past surgical history that includes Coronary angioplasty (12/16/2009); transthoracic echocardiogram (03/04/2009); cardiovascular stress test (04/03/2009); cardiovascular stress test (03/31/2010); back surgery (2007); eye surgery (2004); Hysterectomy (1995); Tooth Extraction (4/16/2012); Colonoscopy (2011); other surgical history (Feb 17, 2014); Endoscopy, colon, diagnostic (over 5 years ); Tonsillectomy (as a child ); other surgical history (2/2014); hernia repair (21 months old ); hernia repair (9/8/14); pr lap,diagnostic abdomen (N/A, 11/26/2018); and Cholecystectomy. CURRENT MEDICATIONS       Previous Medications    ACETAMINOPHEN (TYLENOL) 500 MG TABLET    Take 500 mg by mouth every 6 hours as needed for Pain. ACETAMINOPHEN (TYLENOL) 500 MG TABLET    Take 500 mg by mouth    ALBUTEROL SULFATE HFA (PROAIR HFA) 108 (90 BASE) MCG/ACT INHALER    Inhale 2 puffs into the lungs every 4 hours as needed    ARTIFICIAL SALIVA (BIOTENE MOISTURIZING MOUTH) SOLN    Take 1 applicator by mouth as needed    ASPIRIN LOW DOSE 81 MG EC TABLET    take 1 tablet by mouth once daily    CICLOPIROX TREATMENT EX    Apply  topically. 10.0 mL Ciclopirox-terbinafine-fluconazole  Fungal Free Nails Fungicidal solution    CITALOPRAM (CELEXA) 40 MG TABLET    take 1 tablet by mouth every morning    CLOBETASOL (TEMOVATE) 0.05 % CREAM    Apply topically 2 times daily. COMPRESSION STOCKINGS MISC    1 Package by Does not apply route Knee High    DIPHENHYDRAMINE (BENADRYL) 50 MG CAPSULE    Take 50 mg by mouth nightly as needed for Itching.     ETODOLAC (LODINE) 500 MG TABLET    take 1 tablet by mouth twice a day    FEXOFENADINE (ALLEGRA) 60 MG TABLET    Take 60 mg by mouth 2 times daily    FLUTICASONE (ARNUITY ELLIPTA) 100 MCG/ACT AEPB    Inhale into the lungs    FREESTYLE LANCETS MISC    Test 1 time daily. Dx: 250.00-Type 2 Diabetes. Freestyle 28G Lancets    FUROSEMIDE (LASIX) 20 MG TABLET    Take 20 mg by mouth daily    GABAPENTIN (NEURONTIN) 300 MG CAPSULE    Take 1 capsule by mouth 3 times daily    GLUCOSE BLOOD VI TEST STRIPS (FREESTYLE LITE) STRIP    Test once daily    HOMEOPATHIC PRODUCTS CO    550 mg by Combination route daily as needed Legatrin PM    L-ARGININE 1000 MG TABS    Take 1,000 mg by mouth daily as needed    METFORMIN (GLUCOPHAGE) 1000 MG TABLET    take 1 tablet by mouth twice a day with food    METOPROLOL SUCCINATE (TOPROL XL) 25 MG EXTENDED RELEASE TABLET    take 1 tablet by mouth once daily    MOMETASONE-FORMOTEROL (DULERA) 200-5 MCG/ACT INHALER    Inhale 2 puffs into the lungs every 12 hours    MONTELUKAST (SINGULAIR) 10 MG TABLET    TAKE 1 TABLET BY MOUTH NIGHTLY    MOUTHWASHES (BIOTENE) LIQD ORAL SOLUTION    Swish and spit 15 mLs 3 times daily as needed    NYSTATIN-TRIAMCINOLONE (MYCOLOG II) 924101-5.1 UNIT/GM-% CREAM    Apply 1 applicator topically 4 times daily Apply topically 4 times daily. As needed    OMEGA-3 FATTY ACIDS (FISH OIL) 1200 MG CAPS    Take 1,200 mg by mouth daily    OMEPRAZOLE (PRILOSEC) 40 MG DELAYED RELEASE CAPSULE    take 1 capsule by mouth once daily    PIOGLITAZONE (ACTOS) 45 MG TABLET    take 1 tablet by mouth once daily    PSYLLIUM (KONSYL) 28.3 % PACK    Take 1 packet by mouth daily    QUETIAPINE (SEROQUEL) 50 MG TABLET    Take 50 mg by mouth nightly.     SIMVASTATIN (ZOCOR) 40 MG TABLET    take 1 tablet by mouth at bedtime    SPIRONOLACTONE (ALDACTONE) 25 MG TABLET    Take 1 tablet by mouth daily take 1-2 tablets by mouth once daily if needed (TAKE 2 TABS ON ANY DAY THERE IS SWELLING IN LEGS OR ANKLES) SULFAMETHOXAZOLE-TRIMETHOPRIM (BACTRIM;SEPTRA) 400-80 MG PER TABLET    Take 1 tablet by mouth 2 times daily    TIOTROPIUM (SPIRIVA HANDIHALER) 18 MCG INHALATION CAPSULE    Inhale 1 capsule into the lungs daily    TRADJENTA 5 MG TABLET    take 1 tablet by mouth daily    VESICARE 10 MG TABLET    take 1 tablet by mouth once daily       ALLERGIES     is allergic to latex; bee venom; advair [fluticasone-salmeterol]; caffeine; pcn [penicillins]; ultram [tramadol hcl]; fluticasone-salmeterol; and wool alcohol [lanolin alcohol]. FAMILY HISTORY     She indicated that her mother is . She indicated that her father is . She indicated that the status of her maternal grandmother is unknown. She indicated that the status of her paternal grandfather is unknown. She indicated that the status of her maternal aunt is unknown. She indicated that the status of her neg hx is unknown. She indicated that the status of her maternal cousin is unknown.   family history includes Cancer in her maternal grandmother; Cancer (age of onset: 28) in her maternal aunt, maternal cousin, and mother; Cancer (age of onset: 48) in her father; Diabetes in her paternal grandfather; Heart Disease in her father.     SOCIAL HISTORY       Social History     Socioeconomic History    Marital status:      Spouse name: Not on file    Number of children: 10    Years of education: 10    Highest education level: Not on file   Occupational History    Occupation: retired    Social Needs    Financial resource strain: Not on file    Food insecurity     Worry: Not on file     Inability: Not on file   [employer] needs     Medical: Not on file     Non-medical: Not on file   Tobacco Use    Smoking status: Former Smoker     Years: 3.00     Types: Cigarettes     Start date:      Last attempt to quit:      Years since quittin.7    Smokeless tobacco: Never Used    Tobacco comment: 3 cigarettes a month, Passive smoke exposure 44 Fracture, strain, sprain    DIAGNOSTIC RESULTS       RADIOLOGY: non-plainfilm images(s) such as CT, Ultrasound and MRI are read by the radiologist.  Plain radiographic images are visualized and preliminarily interpreted by the emergency physician unless otherwise stated below. XR HIP LEFT (2-3 VIEWS)   Final Result    IMPRESSION: No acute fracture or dislocation. **This report has been created using voice recognition software. It may contain minor errors which are inherent in voice recognition technology. **      Final report electronically signed by Dr. Neri Vee on 9/10/2020 1:41 PM            LABS:   Labs Reviewed - No data to display    EMERGENCY DEPARTMENT COURSE:   Vitals:    Vitals:    09/10/20 1236 09/10/20 1339 09/10/20 1451   BP: (!) 145/61 (!) 145/91 (!) 167/84   Pulse: 61 71 69   Resp: 20 20 20   Temp: 97.1 °F (36.2 °C)     TempSrc: Oral     SpO2: 97% 97% 99%   Weight: 283 lb (128.4 kg)     Height: 5' 2.5\" (1.588 m)           MDM    Patient was seen and evaluated in the emergency department, patient appeared to be no acute distress, vital signs were reviewed, no significant findings were noted. Physical exam was completed, there is exquisite tenderness to the left hip, it was very diffuse, there was pain with movement of the hip. She was difficult to examine. X-ray was obtained and no acute fractures or dislocation was noted. She was treated medications below I noted some improvement in her symptoms. I discussed my findings my plan of care the patient she is amenable discharge. We will place her on a steroid taper, she is advised to follow-up with her primary care provider for repeat evaluation. She verbalized understanding of plan of care.   Medications   lidocaine 4 % external patch 1 patch (1 patch Transdermal Patch Applied 9/10/20 1334)   HYDROcodone-acetaminophen (NORCO) 5-325 MG per tablet 1 tablet (1 tablet Oral Given 9/10/20 1334)   tiZANidine (ZANAFLEX) tablet 4 mg (4 mg Oral Given 9/10/20 6365)       Patient was seenindependently by myself. The patient's final impression and disposition and plan was determined by myself. CRITICAL CARE:   None    CONSULTS:  None    PROCEDURES:  None    FINAL IMPRESSION     1. Hip strain, left, initial encounter          DISPOSITION/PLAN   Patient discharged in stable condition  PATIENT REFERREDTO:  Bryon Hines MD  [id number]  [phone]    Call in 1 week  For follow up and evaluation      DISCHARGE MEDICATIONS:  New Prescriptions    LIDOCAINE 4 % EXTERNAL PATCH    Place 1 patch onto the skin daily    PREDNISONE (DELTASONE) 20 MG TABLET    Take 60 mg (3 tabs) once daily for 3 days then 40 mg (2 tabs) once daily for 3 days then 20 mg (1 tab) once daily for 3 days    TIZANIDINE (ZANAFLEX) 4 MG TABLET    Take 1 tablet by mouth every 6 hours as needed (muscle spasm)       (Please note that portions of this note were completed with a voice recognition program.  Efforts were made to edit the dictations but occasionally words are mis-transcribed.)      Provider:  I personally performed the services described in the documentation,reviewed and edited the documentation which was dictated to the scribe in my presence, and it accurately records my words and actions.     Bill Diane CNP 09/10/20 4:03 PM    Yvan Diane, APRN - CNP        MaxMilhas, APRNINO - CNP  09/11/20 6753

## 2020-09-11 ASSESSMENT — ENCOUNTER SYMPTOMS
NAUSEA: 0
CHEST TIGHTNESS: 0
ABDOMINAL PAIN: 0
SHORTNESS OF BREATH: 0
BACK PAIN: 0
ABDOMINAL DISTENTION: 0
COLOR CHANGE: 0
VOMITING: 0

## 2020-09-23 ENCOUNTER — HOSPITAL ENCOUNTER (OUTPATIENT)
Age: 70
Setting detail: SPECIMEN
Discharge: HOME | End: 2020-09-23
Payer: MEDICARE

## 2020-09-23 LAB
ABSOLUTE EOS #: 0.13 K/UL (ref 0–0.44)
ABSOLUTE IMMATURE GRANULOCYTE: 0.06 K/UL (ref 0–0.3)
ABSOLUTE LYMPH #: 2.49 K/UL (ref 1.1–3.7)
ABSOLUTE MONO #: 0.96 K/UL (ref 0.1–1.2)
ALBUMIN SERPL-MCNC: 3.6 G/DL (ref 3.5–5.2)
ALBUMIN/GLOBULIN RATIO: 1.2 (ref 1–2.5)
ALP BLD-CCNC: 67 U/L (ref 35–104)
ALT SERPL-CCNC: 18 U/L (ref 5–33)
ANION GAP SERPL CALCULATED.3IONS-SCNC: 13 MMOL/L (ref 9–17)
AST SERPL-CCNC: 15 U/L
BASOPHILS # BLD: 1 % (ref 0–2)
BASOPHILS ABSOLUTE: 0.05 K/UL (ref 0–0.2)
BILIRUB SERPL-MCNC: 0.53 MG/DL (ref 0.3–1.2)
BUN BLDV-MCNC: 20 MG/DL (ref 8–23)
BUN/CREAT BLD: ABNORMAL (ref 9–20)
CALCIUM SERPL-MCNC: 9.7 MG/DL (ref 8.6–10.4)
CHLORIDE BLD-SCNC: 104 MMOL/L (ref 98–107)
CHOLESTEROL/HDL RATIO: 2
CHOLESTEROL: 123 MG/DL
CO2: 24 MMOL/L (ref 20–31)
CREAT SERPL-MCNC: 0.71 MG/DL (ref 0.5–0.9)
DIFFERENTIAL TYPE: ABNORMAL
EOSINOPHILS RELATIVE PERCENT: 1 % (ref 1–4)
GFR AFRICAN AMERICAN: >60 ML/MIN
GFR NON-AFRICAN AMERICAN: >60 ML/MIN
GFR SERPL CREATININE-BSD FRML MDRD: ABNORMAL ML/MIN/{1.73_M2}
GFR SERPL CREATININE-BSD FRML MDRD: ABNORMAL ML/MIN/{1.73_M2}
GLUCOSE BLD-MCNC: 158 MG/DL (ref 70–99)
HCT VFR BLD CALC: 35.4 % (ref 36.3–47.1)
HDLC SERPL-MCNC: 62 MG/DL
HEMOGLOBIN: 10.8 G/DL (ref 11.9–15.1)
IMMATURE GRANULOCYTES: 1 %
LDL CHOLESTEROL: 30 MG/DL (ref 0–130)
LYMPHOCYTES # BLD: 27 % (ref 24–43)
MCH RBC QN AUTO: 26 PG (ref 25.2–33.5)
MCHC RBC AUTO-ENTMCNC: 30.5 G/DL (ref 28.4–34.8)
MCV RBC AUTO: 85.3 FL (ref 82.6–102.9)
MONOCYTES # BLD: 10 % (ref 3–12)
NRBC AUTOMATED: 0 PER 100 WBC
PDW BLD-RTO: 18.6 % (ref 11.8–14.4)
PLATELET # BLD: 282 K/UL (ref 138–453)
PLATELET ESTIMATE: ABNORMAL
PMV BLD AUTO: 10.4 FL (ref 8.1–13.5)
POTASSIUM SERPL-SCNC: 4 MMOL/L (ref 3.7–5.3)
RBC # BLD: 4.15 M/UL (ref 3.95–5.11)
RBC # BLD: ABNORMAL 10*6/UL
SEG NEUTROPHILS: 60 % (ref 36–65)
SEGMENTED NEUTROPHILS ABSOLUTE COUNT: 5.57 K/UL (ref 1.5–8.1)
SODIUM BLD-SCNC: 141 MMOL/L (ref 135–144)
TOTAL PROTEIN: 6.5 G/DL (ref 6.4–8.3)
TRIGL SERPL-MCNC: 153 MG/DL
TSH SERPL DL<=0.05 MIU/L-ACNC: 3.05 MIU/L (ref 0.3–5)
VLDLC SERPL CALC-MCNC: ABNORMAL MG/DL (ref 1–30)
WBC # BLD: 9.3 K/UL (ref 3.5–11.3)
WBC # BLD: ABNORMAL 10*3/UL

## 2020-10-01 ENCOUNTER — HOSPITAL ENCOUNTER (OUTPATIENT)
Age: 70
Setting detail: SPECIMEN
Discharge: HOME | End: 2020-10-01
Payer: MEDICARE

## 2020-10-01 LAB
ABSOLUTE EOS #: 0.15 K/UL (ref 0–0.44)
ABSOLUTE IMMATURE GRANULOCYTE: 0.03 K/UL (ref 0–0.3)
ABSOLUTE LYMPH #: 1.58 K/UL (ref 1.1–3.7)
ABSOLUTE MONO #: 0.91 K/UL (ref 0.1–1.2)
ABSOLUTE RETIC #: 0.07 M/UL (ref 0.03–0.08)
ALBUMIN SERPL-MCNC: 3.5 G/DL (ref 3.5–5.2)
ALBUMIN/GLOBULIN RATIO: 1.3 (ref 1–2.5)
ALP BLD-CCNC: 65 U/L (ref 35–104)
ALT SERPL-CCNC: 15 U/L (ref 5–33)
ANION GAP SERPL CALCULATED.3IONS-SCNC: 12 MMOL/L (ref 9–17)
AST SERPL-CCNC: 12 U/L
BASOPHILS # BLD: 0 % (ref 0–2)
BASOPHILS ABSOLUTE: <0.03 K/UL (ref 0–0.2)
BILIRUB SERPL-MCNC: 0.44 MG/DL (ref 0.3–1.2)
BUN BLDV-MCNC: 25 MG/DL (ref 8–23)
BUN/CREAT BLD: ABNORMAL (ref 9–20)
CALCIUM SERPL-MCNC: 9.1 MG/DL (ref 8.6–10.4)
CHLORIDE BLD-SCNC: 103 MMOL/L (ref 98–107)
CHOLESTEROL/HDL RATIO: 1.8
CHOLESTEROL: 107 MG/DL
CO2: 24 MMOL/L (ref 20–31)
CREAT SERPL-MCNC: 0.93 MG/DL (ref 0.5–0.9)
DIFFERENTIAL TYPE: ABNORMAL
EOSINOPHILS RELATIVE PERCENT: 2 % (ref 1–4)
ESTIMATED AVERAGE GLUCOSE: 143 MG/DL
FERRITIN: 12 UG/L (ref 13–150)
GFR AFRICAN AMERICAN: >60 ML/MIN
GFR NON-AFRICAN AMERICAN: 59 ML/MIN
GFR SERPL CREATININE-BSD FRML MDRD: ABNORMAL ML/MIN/{1.73_M2}
GFR SERPL CREATININE-BSD FRML MDRD: ABNORMAL ML/MIN/{1.73_M2}
GLUCOSE BLD-MCNC: 136 MG/DL (ref 70–99)
HBA1C MFR BLD: 6.6 % (ref 4–6)
HCT VFR BLD CALC: 33.2 % (ref 36.3–47.1)
HDLC SERPL-MCNC: 58 MG/DL
HEMOGLOBIN: 9.8 G/DL (ref 11.9–15.1)
IMMATURE GRANULOCYTES: 0 %
IMMATURE RETIC FRACT: 28 % (ref 2.7–18.3)
IRON SATURATION: 7 % (ref 20–55)
IRON: 25 UG/DL (ref 37–145)
LDL CHOLESTEROL: 23 MG/DL (ref 0–130)
LYMPHOCYTES # BLD: 21 % (ref 24–43)
MCH RBC QN AUTO: 25.5 PG (ref 25.2–33.5)
MCHC RBC AUTO-ENTMCNC: 29.5 G/DL (ref 28.4–34.8)
MCV RBC AUTO: 86.2 FL (ref 82.6–102.9)
MONOCYTES # BLD: 12 % (ref 3–12)
NRBC AUTOMATED: 0 PER 100 WBC
PDW BLD-RTO: 18.6 % (ref 11.8–14.4)
PLATELET # BLD: 244 K/UL (ref 138–453)
PLATELET ESTIMATE: ABNORMAL
PMV BLD AUTO: 11.2 FL (ref 8.1–13.5)
POTASSIUM SERPL-SCNC: 4.4 MMOL/L (ref 3.7–5.3)
RBC # BLD: 3.85 M/UL (ref 3.95–5.11)
RBC # BLD: ABNORMAL 10*6/UL
RETIC %: 1.9 % (ref 0.5–1.9)
RETIC HEMOGLOBIN: 27 PG (ref 28.2–35.7)
SEG NEUTROPHILS: 65 % (ref 36–65)
SEGMENTED NEUTROPHILS ABSOLUTE COUNT: 4.81 K/UL (ref 1.5–8.1)
SODIUM BLD-SCNC: 139 MMOL/L (ref 135–144)
TOTAL IRON BINDING CAPACITY: 342 UG/DL (ref 250–450)
TOTAL PROTEIN: 6.3 G/DL (ref 6.4–8.3)
TRIGL SERPL-MCNC: 128 MG/DL
TSH SERPL DL<=0.05 MIU/L-ACNC: 2.81 MIU/L (ref 0.3–5)
UNSATURATED IRON BINDING CAPACITY: 317 UG/DL (ref 112–347)
VLDLC SERPL CALC-MCNC: NORMAL MG/DL (ref 1–30)
WBC # BLD: 7.5 K/UL (ref 3.5–11.3)
WBC # BLD: ABNORMAL 10*3/UL

## 2020-10-17 ENCOUNTER — APPOINTMENT (OUTPATIENT)
Dept: GENERAL RADIOLOGY | Age: 70
DRG: 177 | End: 2020-10-17
Payer: MEDICARE

## 2020-10-17 ENCOUNTER — HOSPITAL ENCOUNTER (INPATIENT)
Age: 70
LOS: 4 days | Discharge: NURSING, REHAB OR LONG-TERM CARE | DRG: 177 | End: 2020-10-22
Attending: INTERNAL MEDICINE | Admitting: INTERNAL MEDICINE
Payer: MEDICARE

## 2020-10-17 LAB
ALBUMIN SERPL-MCNC: 3.8 G/DL (ref 3.5–5.1)
ALP BLD-CCNC: 90 U/L (ref 38–126)
ALT SERPL-CCNC: 16 U/L (ref 11–66)
ANION GAP SERPL CALCULATED.3IONS-SCNC: 16 MEQ/L (ref 8–16)
APTT: 34 SECONDS (ref 22–38)
AST SERPL-CCNC: 23 U/L (ref 5–40)
BACTERIA: ABNORMAL /HPF
BASOPHILS # BLD: 0 %
BASOPHILS ABSOLUTE: 0 THOU/MM3 (ref 0–0.1)
BILIRUB SERPL-MCNC: 0.4 MG/DL (ref 0.3–1.2)
BILIRUBIN DIRECT: < 0.2 MG/DL (ref 0–0.3)
BILIRUBIN URINE: NEGATIVE
BLOOD, URINE: NEGATIVE
BUN BLDV-MCNC: 14 MG/DL (ref 7–22)
CALCIUM SERPL-MCNC: 8.5 MG/DL (ref 8.5–10.5)
CASTS 2: ABNORMAL /LPF
CASTS UA: ABNORMAL /LPF
CHARACTER, URINE: CLEAR
CHLORIDE BLD-SCNC: 101 MEQ/L (ref 98–111)
CO2: 22 MEQ/L (ref 23–33)
COLOR: YELLOW
CREAT SERPL-MCNC: 0.8 MG/DL (ref 0.4–1.2)
CRYSTALS, UA: ABNORMAL
EKG ATRIAL RATE: 76 BPM
EKG P AXIS: 51 DEGREES
EKG P-R INTERVAL: 152 MS
EKG Q-T INTERVAL: 452 MS
EKG QRS DURATION: 116 MS
EKG QTC CALCULATION (BAZETT): 508 MS
EKG R AXIS: -10 DEGREES
EKG T AXIS: 50 DEGREES
EKG VENTRICULAR RATE: 76 BPM
EOSINOPHIL # BLD: 0.3 %
EOSINOPHILS ABSOLUTE: 0 THOU/MM3 (ref 0–0.4)
EPITHELIAL CELLS, UA: ABNORMAL /HPF
ERYTHROCYTE [DISTWIDTH] IN BLOOD BY AUTOMATED COUNT: 17.4 % (ref 11.5–14.5)
ERYTHROCYTE [DISTWIDTH] IN BLOOD BY AUTOMATED COUNT: 55.1 FL (ref 35–45)
GFR SERPL CREATININE-BSD FRML MDRD: 71 ML/MIN/1.73M2
GLUCOSE BLD-MCNC: 120 MG/DL (ref 70–108)
GLUCOSE URINE: NEGATIVE MG/DL
HCT VFR BLD CALC: 35 % (ref 37–47)
HEMOGLOBIN: 10.5 GM/DL (ref 12–16)
IMMATURE GRANS (ABS): 0.05 THOU/MM3 (ref 0–0.07)
IMMATURE GRANULOCYTES: 1.4 %
INR BLD: 1.07 (ref 0.85–1.13)
KETONES, URINE: NEGATIVE
LEUKOCYTE ESTERASE, URINE: ABNORMAL
LIPASE: 37.3 U/L (ref 5.6–51.3)
LYMPHOCYTES # BLD: 38.3 %
LYMPHOCYTES ABSOLUTE: 1.3 THOU/MM3 (ref 1–4.8)
MAGNESIUM: 1.1 MG/DL (ref 1.6–2.4)
MCH RBC QN AUTO: 25.7 PG (ref 26–33)
MCHC RBC AUTO-ENTMCNC: 30 GM/DL (ref 32.2–35.5)
MCV RBC AUTO: 85.8 FL (ref 81–99)
MISCELLANEOUS 2: ABNORMAL
MONOCYTES # BLD: 16.3 %
MONOCYTES ABSOLUTE: 0.6 THOU/MM3 (ref 0.4–1.3)
NITRITE, URINE: POSITIVE
NUCLEATED RED BLOOD CELLS: 0 /100 WBC
OSMOLALITY CALCULATION: 279.2 MOSMOL/KG (ref 275–300)
PH UA: 5 (ref 5–9)
PLATELET # BLD: 204 THOU/MM3 (ref 130–400)
PMV BLD AUTO: 10.7 FL (ref 9.4–12.4)
POTASSIUM REFLEX MAGNESIUM: 3.9 MEQ/L (ref 3.5–5.2)
PRO-BNP: 192 PG/ML (ref 0–900)
PROTEIN UA: NEGATIVE
RBC # BLD: 4.08 MILL/MM3 (ref 4.2–5.4)
RBC URINE: ABNORMAL /HPF
RENAL EPITHELIAL, UA: ABNORMAL
SARS-COV-2, NAAT: DETECTED
SEG NEUTROPHILS: 43.7 %
SEGMENTED NEUTROPHILS ABSOLUTE COUNT: 1.5 THOU/MM3 (ref 1.8–7.7)
SODIUM BLD-SCNC: 139 MEQ/L (ref 135–145)
SPECIFIC GRAVITY, URINE: 1.01 (ref 1–1.03)
TOTAL PROTEIN: 6.7 G/DL (ref 6.1–8)
TROPONIN T: < 0.01 NG/ML
TROPONIN T: < 0.01 NG/ML
UROBILINOGEN, URINE: 0.2 EU/DL (ref 0–1)
WBC # BLD: 3.5 THOU/MM3 (ref 4.8–10.8)
WBC UA: ABNORMAL /HPF
YEAST: ABNORMAL

## 2020-10-17 PROCEDURE — 96366 THER/PROPH/DIAG IV INF ADDON: CPT

## 2020-10-17 PROCEDURE — U0002 COVID-19 LAB TEST NON-CDC: HCPCS

## 2020-10-17 PROCEDURE — 93005 ELECTROCARDIOGRAM TRACING: CPT | Performed by: NURSE PRACTITIONER

## 2020-10-17 PROCEDURE — 36415 COLL VENOUS BLD VENIPUNCTURE: CPT

## 2020-10-17 PROCEDURE — 83880 ASSAY OF NATRIURETIC PEPTIDE: CPT

## 2020-10-17 PROCEDURE — 99285 EMERGENCY DEPT VISIT HI MDM: CPT

## 2020-10-17 PROCEDURE — 84484 ASSAY OF TROPONIN QUANT: CPT

## 2020-10-17 PROCEDURE — 80076 HEPATIC FUNCTION PANEL: CPT

## 2020-10-17 PROCEDURE — 96367 TX/PROPH/DG ADDL SEQ IV INF: CPT

## 2020-10-17 PROCEDURE — 96365 THER/PROPH/DIAG IV INF INIT: CPT

## 2020-10-17 PROCEDURE — 81003 URINALYSIS AUTO W/O SCOPE: CPT

## 2020-10-17 PROCEDURE — 83690 ASSAY OF LIPASE: CPT

## 2020-10-17 PROCEDURE — 85025 COMPLETE CBC W/AUTO DIFF WBC: CPT

## 2020-10-17 PROCEDURE — 81001 URINALYSIS AUTO W/SCOPE: CPT

## 2020-10-17 PROCEDURE — 6360000002 HC RX W HCPCS: Performed by: NURSE PRACTITIONER

## 2020-10-17 PROCEDURE — 71045 X-RAY EXAM CHEST 1 VIEW: CPT

## 2020-10-17 PROCEDURE — XW033E5 INTRODUCTION OF REMDESIVIR ANTI-INFECTIVE INTO PERIPHERAL VEIN, PERCUTANEOUS APPROACH, NEW TECHNOLOGY GROUP 5: ICD-10-PCS | Performed by: INTERNAL MEDICINE

## 2020-10-17 PROCEDURE — 80048 BASIC METABOLIC PNL TOTAL CA: CPT

## 2020-10-17 PROCEDURE — 85610 PROTHROMBIN TIME: CPT

## 2020-10-17 PROCEDURE — 85730 THROMBOPLASTIN TIME PARTIAL: CPT

## 2020-10-17 PROCEDURE — 83735 ASSAY OF MAGNESIUM: CPT

## 2020-10-17 RX ORDER — LORATADINE 10 MG/1
10 TABLET ORAL DAILY
COMMUNITY
End: 2022-03-08 | Stop reason: SDUPTHER

## 2020-10-17 RX ORDER — CIPROFLOXACIN 500 MG/1
500 TABLET, FILM COATED ORAL 2 TIMES DAILY
Qty: 14 TABLET | Refills: 0 | Status: ON HOLD | OUTPATIENT
Start: 2020-10-17 | End: 2020-10-30 | Stop reason: HOSPADM

## 2020-10-17 RX ORDER — CIPROFLOXACIN 2 MG/ML
400 INJECTION, SOLUTION INTRAVENOUS ONCE
Status: COMPLETED | OUTPATIENT
Start: 2020-10-17 | End: 2020-10-17

## 2020-10-17 RX ORDER — WITCH HAZEL 50 %
PADS, MEDICATED (EA) TOPICAL
COMMUNITY

## 2020-10-17 RX ORDER — MAGNESIUM SULFATE IN WATER 40 MG/ML
2 INJECTION, SOLUTION INTRAVENOUS ONCE
Status: COMPLETED | OUTPATIENT
Start: 2020-10-17 | End: 2020-10-17

## 2020-10-17 RX ORDER — AMPICILLIN TRIHYDRATE 500 MG
CAPSULE ORAL
COMMUNITY

## 2020-10-17 RX ADMIN — CIPROFLOXACIN 400 MG: 2 INJECTION, SOLUTION INTRAVENOUS at 22:16

## 2020-10-17 RX ADMIN — MAGNESIUM SULFATE HEPTAHYDRATE 2 G: 40 INJECTION, SOLUTION INTRAVENOUS at 19:40

## 2020-10-17 ASSESSMENT — ENCOUNTER SYMPTOMS
SORE THROAT: 0
TROUBLE SWALLOWING: 0
RHINORRHEA: 0
SHORTNESS OF BREATH: 1
WHEEZING: 0
PHOTOPHOBIA: 0
BACK PAIN: 0
COLOR CHANGE: 0
VOMITING: 0
ABDOMINAL PAIN: 0
CHEST TIGHTNESS: 1
DIARRHEA: 0
CONSTIPATION: 0
NAUSEA: 0
SINUS PAIN: 0
SINUS PRESSURE: 0
COUGH: 0
ABDOMINAL DISTENTION: 0

## 2020-10-17 ASSESSMENT — PAIN DESCRIPTION - FREQUENCY
FREQUENCY: CONTINUOUS

## 2020-10-17 ASSESSMENT — PAIN SCALES - GENERAL
PAINLEVEL_OUTOF10: 7
PAINLEVEL_OUTOF10: 2

## 2020-10-17 ASSESSMENT — PAIN DESCRIPTION - ORIENTATION
ORIENTATION: MID
ORIENTATION: LEFT;MID

## 2020-10-17 ASSESSMENT — PAIN DESCRIPTION - PAIN TYPE
TYPE: ACUTE PAIN

## 2020-10-17 ASSESSMENT — PAIN DESCRIPTION - DESCRIPTORS
DESCRIPTORS: PRESSURE
DESCRIPTORS: PRESSURE
DESCRIPTORS: HEAVINESS
DESCRIPTORS: PRESSURE

## 2020-10-17 ASSESSMENT — PAIN DESCRIPTION - LOCATION
LOCATION: CHEST

## 2020-10-17 NOTE — ED PROVIDER NOTES
dysuria, flank pain, hematuria, pelvic pain, vaginal bleeding, vaginal discharge and vaginal pain. Musculoskeletal: Negative for arthralgias, back pain, joint swelling, myalgias and neck stiffness. Skin: Positive for rash. Negative for color change and wound. Neurological: Negative. Negative for dizziness, weakness, light-headedness, numbness and headaches. Hematological: Negative. Psychiatric/Behavioral: Negative. Negative for agitation, behavioral problems, confusion, hallucinations, self-injury and suicidal ideas. The patient is not nervous/anxious. PAST MEDICAL HISTORY    has a past medical history of Acid reflux, Arrhythmia, Arthritis, CAD (coronary artery disease), Chronic bronchitis ([facility] [address] COPD (chronic obstructive pulmonary disease) ([facility] [address] Depression, Diabetes mellitus ([facility] [address] Dizziness - light-headed, Fall, Hyperlipidemia, Hypertension, Kidney disease, MDRO (multiple drug resistant organisms) resistance, Numbness and tingling, S/P laparoscopic cholecystectomy, Swelling, and Tinnitus. SURGICAL HISTORY      has a past surgical history that includes Coronary angioplasty (12/16/2009); transthoracic echocardiogram (03/04/2009); cardiovascular stress test (04/03/2009); cardiovascular stress test (03/31/2010); back surgery (2007); eye surgery (2004); Hysterectomy (1995); Tooth Extraction (4/16/2012); Colonoscopy (2011); other surgical history (Feb 17, 2014); Endoscopy, colon, diagnostic (over 5 years ); Tonsillectomy (as a child ); other surgical history (2/2014); hernia repair (21 months old ); hernia repair (9/8/14); pr lap,diagnostic abdomen (N/A, 11/26/2018); and Cholecystectomy. CURRENT MEDICATIONS       Discharge Medication List as of 10/17/2020 11:14 PM      CONTINUE these medications which have NOT CHANGED    Details   mupirocin (BACTROBAN) 2 % ointment Apply topically 3 times daily Apply topically 3 times daily. , Topical, 3 TIMES DAILY, Historical Med      loratadine (CLARITIN) 10 MG tablet Take 10 mg by mouth dailyHistorical Med      mometasone-formoterol (DULERA) 200-5 MCG/ACT inhaler Inhale 2 puffs into the lungs every 12 hoursHistorical Med      Cranberry 450 MG CAPS Take by mouthHistorical Med      Lactobacillus (ACIDOPHILUS) TABS Take by mouthHistorical Med      metoprolol succinate (TOPROL XL) 25 MG extended release tablet take 1 tablet by mouth once daily, Disp-30 tablet, R-6Normal      spironolactone (ALDACTONE) 25 MG tablet Take 1 tablet by mouth daily take 1-2 tablets by mouth once daily if needed (TAKE 2 TABS ON ANY DAY THERE IS SWELLING IN LEGS OR ANKLES), Disp-60 tablet, R-8Normal      fluticasone (ARNUITY ELLIPTA) 100 MCG/ACT AEPB Inhale into the lungsHistorical Med      furosemide (LASIX) 20 MG tablet Take 20 mg by mouth dailyHistorical Med      !! acetaminophen (TYLENOL) 500 MG tablet Take 500 mg by mouthHistorical Med      citalopram (CELEXA) 40 MG tablet take 1 tablet by mouth every morning, R-0Historical Med      tiotropium (SPIRIVA HANDIHALER) 18 MCG inhalation capsule Inhale 1 capsule into the lungs daily, Disp-30 capsule, R-5Normal      nystatin-triamcinolone (MYCOLOG II) 186666-3.1 UNIT/GM-% cream Apply 1 applicator topically 4 times daily Apply topically 4 times daily.  As needed, Topical, 4 TIMES DAILY, Historical Med      L-Arginine 1000 MG TABS Take 1,000 mg by mouth daily as neededHistorical Med      omeprazole (PRILOSEC) 40 MG delayed release capsule take 1 capsule by mouth once daily, Disp-30 capsule, R-5      TRADJENTA 5 MG tablet take 1 tablet by mouth daily, Disp-30 tablet, R-5      simvastatin (ZOCOR) 40 MG tablet take 1 tablet by mouth at bedtime, Disp-30 tablet, R-5      VESICARE 10 MG tablet take 1 tablet by mouth once daily, Disp-30 tablet, R-5      pioglitazone (ACTOS) 45 MG tablet take 1 tablet by mouth once daily, Disp-30 tablet, R-5      etodolac (LODINE) 500 MG tablet take 1 tablet by mouth twice a day, Disp-60 tablet, R-5      metFORMIN (GLUCOPHAGE) 1000 MG tablet take 1 tablet by mouth twice a day with food, Disp-60 tablet, R-5      gabapentin (NEURONTIN) 300 MG capsule Take 1 capsule by mouth 3 times daily, Disp-90 capsule, R-5      albuterol sulfate HFA (PROAIR HFA) 108 (90 BASE) MCG/ACT inhaler Inhale 2 puffs into the lungs every 4 hours as needed, Disp-1 Inhaler, R-2      Omega-3 Fatty Acids (FISH OIL) 1200 MG CAPS Take 1,200 mg by mouth daily      ASPIRIN LOW DOSE 81 MG EC tablet take 1 tablet by mouth once daily, Disp-30 tablet, R-11      clobetasol (TEMOVATE) 0.05 % cream Apply topically 2 times daily. , Disp-15 g, R-1, Normal      diphenhydrAMINE (BENADRYL) 50 MG capsule Take 50 mg by mouth nightly as needed for Itching. QUEtiapine (SEROQUEL) 50 MG tablet Take 50 mg by mouth nightly. !! acetaminophen (TYLENOL) 500 MG tablet Take 500 mg by mouth every 6 hours as needed for Pain. Mouthwashes (BIOTENE) LIQD oral solution Swish and spit 15 mLs 3 times daily as neededHistorical Med      HOMEOPATHIC PRODUCTS  mg by Combination route daily as needed Legatrin PMHistorical Med      psyllium (KONSYL) 28.3 % PACK Take 1 packet by mouth dailyHistorical Med      Artificial Saliva (BIOTENE MOISTURIZING MOUTH) SOLN Take 1 applicator by mouth as neededHistorical Med      Compression Stockings MISC Starting Fri 11/2/2018, Disp-1 each, R-0, PrintKnee High       FREESTYLE LANCETS MISC Disp-100 each, R-11, NormalTest 1 time daily. Dx: 250.00-Type 2 Diabetes. Freestyle 28G Lancets      glucose blood VI test strips (FREESTYLE LITE) strip Disp-100 each, R-11, NormalTest once daily      CICLOPIROX TREATMENT EX Apply  topically. 10.0 mL Ciclopirox-terbinafine-fluconazole  Fungal Free Nails Fungicidal solution       !! - Potential duplicate medications found. Please discuss with provider.           ALLERGIES     is allergic to latex; bee venom; advair [fluticasone-salmeterol]; caffeine; pcn [penicillins]; ultram [tramadol hcl]; fluticasone-salmeterol; and wool alcohol [lanolin alcohol]. FAMILY HISTORY     She indicated that her mother is . She indicated that her father is . She indicated that the status of her maternal grandmother is unknown. She indicated that the status of her paternal grandfather is unknown. She indicated that the status of her maternal aunt is unknown. She indicated that the status of her neg hx is unknown. She indicated that the status of her maternal cousin is unknown.   family history includes Cancer in her maternal grandmother; Cancer (age of onset: 28) in her maternal aunt, maternal cousin, and mother; Cancer (age of onset: 48) in her father; Diabetes in her paternal grandfather; Heart Disease in her father. SOCIAL HISTORY      reports that she quit smoking about 27 years ago. Her smoking use included cigarettes. She started smoking about 30 years ago. She quit after 3.00 years of use. She has never used smokeless tobacco. She reports that she does not drink alcohol or use drugs. PHYSICAL EXAM     INITIAL VITALS:  height is 5' 2.5\" (1.588 m) and weight is 300 lb 3.2 oz (136.2 kg) (abnormal). Her oral temperature is 97.6 °F (36.4 °C). Her blood pressure is 140/66 (abnormal) and her pulse is 69. Her respiration is 22 and oxygen saturation is 95%. Physical Exam  Vitals signs and nursing note reviewed. Constitutional:       General: She is awake. She is not in acute distress. Appearance: Normal appearance. She is well-developed. She is obese. She is ill-appearing. She is not toxic-appearing or diaphoretic. HENT:      Head: Normocephalic and atraumatic. Right Ear: Tympanic membrane normal.      Left Ear: Tympanic membrane normal.      Nose: Nose normal.      Mouth/Throat:      Mouth: Mucous membranes are moist.      Pharynx: Oropharynx is clear. Eyes:      Extraocular Movements: Extraocular movements intact. Pupils: Pupils are equal, round, and reactive to light.    Neck:      Musculoskeletal: Normal Mental status is at baseline. GCS: GCS eye subscore is 4. GCS verbal subscore is 5. GCS motor subscore is 6. Cranial Nerves: Cranial nerves are intact. Psychiatric:         Attention and Perception: Attention normal.         Mood and Affect: Mood normal.         Speech: Speech normal.         Behavior: Behavior normal. Behavior is cooperative. Thought Content: Thought content normal.         Cognition and Memory: Cognition and memory normal.         Judgment: Judgment normal.          DIFFERENTIAL DIAGNOSIS:   CHF, ACS, MI, pneumonia, COVID-19    DIAGNOSTIC RESULTS     EKG: All EKG's are interpreted by the Emergency Department Physician who either signs or Co-signs this chart in the absence of a cardiologist.    Sinus rhythm with rate of 76, , QRS of 116, QTc of 508. Compared with EKG from July 7, 2020 with no significant changes    EKG interpreted by ED physician    RADIOLOGY: non-plainfilm images(s) such as CT, Ultrasound and MRI are read by the radiologist.    CTA Chest W WO Contrast   Final Result   1. Patchy areas of groundglass and consolidation along the posterior lung    bases bilaterally. Findings are consistent with an infectious and/or    inflammatory process including aspiration. 2.  Hepatic steatosis. This document has been electronically signed by: Malathi Duarte MD on    10/18/2020 03:51 AM      All CT scans at this facility use dose modulation, iterative    reconstruction, and/or weight-based   dosing when appropriate to reduce radiation dose to as low as reasonably    achievable. XR CHEST PORTABLE   Final Result   Minimal airspace filling within the bilateral lower lungs which may be    secondary to atelectasis and/or infiltrate.       This document has been electronically signed by: Chalino Hammonds MD on    10/17/2020 10:23 PM             LABS:     Labs Reviewed   CBC WITH AUTO DIFFERENTIAL - Abnormal; Notable for the following components:       Result Value WBC 3.5 (*)     RBC 4.08 (*)     Hemoglobin 10.5 (*)     Hematocrit 35.0 (*)     MCH 25.7 (*)     MCHC 30.0 (*)     RDW-CV 17.4 (*)     RDW-SD 55.1 (*)     Segs Absolute 1.5 (*)     All other components within normal limits   BASIC METABOLIC PANEL W/ REFLEX TO MG FOR LOW K - Abnormal; Notable for the following components:    CO2 22 (*)     Glucose 120 (*)     All other components within normal limits   MAGNESIUM - Abnormal; Notable for the following components:    Magnesium 1.1 (*)     All other components within normal limits   COVID-19 - Abnormal; Notable for the following components:    SARS-CoV-2, NAAT DETECTED (*)     All other components within normal limits   GLOMERULAR FILTRATION RATE, ESTIMATED - Abnormal; Notable for the following components:    Est, Glom Filt Rate 71 (*)     All other components within normal limits   URINE WITH REFLEXED MICRO - Abnormal; Notable for the following components:    Nitrite, Urine POSITIVE (*)     Leukocyte Esterase, Urine TRACE (*)     All other components within normal limits   BLOOD GAS, ARTERIAL - Abnormal; Notable for the following components:    PO2 61 (*)     All other components within normal limits   C-REACTIVE PROTEIN - Abnormal; Notable for the following components:    CRP 5.91 (*)     All other components within normal limits   LACTATE DEHYDROGENASE - Abnormal; Notable for the following components:     (*)     All other components within normal limits   D-DIMER, QUANTITATIVE - Abnormal; Notable for the following components:    D-Dimer, Quant 1693.00 (*)     All other components within normal limits   BASIC METABOLIC PANEL - Abnormal; Notable for the following components:    CO2 21 (*)     Glucose 136 (*)     All other components within normal limits   CBC WITH AUTO DIFFERENTIAL - Abnormal; Notable for the following components:    WBC 3.4 (*)     Hemoglobin 11.2 (*)     MCH 25.1 (*)     MCHC 29.7 (*)     RDW-CV 17.3 (*)     RDW-SD 53.2 (*)     Lymphocytes around very well. Surprising to me that she still is able to perform ADLs. She arrives apologetic of being malodorous, has multiple areas of skin breakdown to groin and underneath breasts on physical exam.  AG completed by nursing staff, and identified prolonged QT with QTC of 508. She was given 2 g of magnesium for correction. Appropriate labs and imaging were ordered, troponin found to be negative. I initially anticipated that patient was likely going to require admission for moderate to high risk chest pain and so COVID-19 swab was ordered as part of my initial work-up. It was initially an incidental finding that was positive. She was also found to be nitrite positive in her urine and was treated with Rocephin for possible UTI. Initially discussed all findings with Dr. Dipti Milton from the hospitalist service, we discussed admission versus close outpatient follow-up due to patient's chest pain and UTI. As she was maintaining her saturations it was recommended that she could be discharged home and managed as an outpatient. Transport was arranged to take patient back to her place of residence however while waiting for ambulance transport, patient was placed on bedside commode and saturations were noted to drop to 83% and patient experienced some significant dyspnea. I discussed this incident with Dr. Natasha Beckham, patient was reevaluated, ABG and CTA of the chest were ordered subsequently and patient will be admitted to the hospitalist service. CRITICAL CARE:   None    CONSULTS:  Dr. Dipti Milton, hospitalist    PROCEDURES:  None    FINAL IMPRESSION      1. Chest pain, unspecified type    2. COVID-19 virus infection    3. Acute cystitis without hematuria          DISPOSITION/PLAN   Admit    PATIENT REFERRED TO:  No follow-up provider specified.     DISCHARGE MEDICATIONS:  Discharge Medication List as of 10/17/2020 11:14 PM      START taking these medications    Details   ciprofloxacin (CIPRO) 500 MG tablet Take 1 tablet by mouth 2 times daily for 7 days, Disp-14 tablet,R-0Print             (Please note that portions of this note were completed with a voice recognition program.  Efforts were made to edit the dictations but occasionally words are mis-transcribed.)    The patient was given an opportunity to see the Emergency Attending. The patient voiced understanding that I was a Mid-LevelProvider and was in agreement with being seen independently by myself.      CATE Devlin - CNP, 10/17/20, 10:43 AM       CATE Devlin CNP  10/19/20 1801 CATE Agrawal CNP  11/18/20 [id number]

## 2020-10-17 NOTE — ED NOTES
Upon first contact with patient this RN receives bedside shift report from Barry Naranjo [facility].      Jasper Manzanares RN  10/17/20 1953

## 2020-10-17 NOTE — ED NOTES
Bed: 016A  Expected date: 10/17/20  Expected time: 6:29 PM  Means of arrival: [facility]  Comments:     Marcia Mendoza RN  10/17/20 [id number]

## 2020-10-18 ENCOUNTER — APPOINTMENT (OUTPATIENT)
Dept: CT IMAGING | Age: 70
DRG: 177 | End: 2020-10-18
Payer: MEDICARE

## 2020-10-18 PROBLEM — U07.1 COVID-19: Status: ACTIVE | Noted: 2020-10-18

## 2020-10-18 PROBLEM — R07.9 CHEST PAIN: Status: ACTIVE | Noted: 2020-10-18

## 2020-10-18 PROBLEM — J96.01 ACUTE RESPIRATORY FAILURE WITH HYPOXIA (HCC): Status: ACTIVE | Noted: 2020-10-18

## 2020-10-18 LAB
ABO: NORMAL
ALLEN TEST: POSITIVE
ANION GAP SERPL CALCULATED.3IONS-SCNC: 16 MEQ/L (ref 8–16)
ANTIBODY SCREEN: NORMAL
BASE EXCESS (CALCULATED): 0.1 MMOL/L (ref -2.5–2.5)
BASOPHILS # BLD: 0.3 %
BASOPHILS ABSOLUTE: 0 THOU/MM3 (ref 0–0.1)
BUN BLDV-MCNC: 8 MG/DL (ref 7–22)
C-REACTIVE PROTEIN: 5.91 MG/DL (ref 0–1)
CALCIUM SERPL-MCNC: 8.5 MG/DL (ref 8.5–10.5)
CHLORIDE BLD-SCNC: 107 MEQ/L (ref 98–111)
CO2: 21 MEQ/L (ref 23–33)
COLLECTED BY:: ABNORMAL
CREAT SERPL-MCNC: 0.6 MG/DL (ref 0.4–1.2)
D-DIMER QUANTITATIVE: 1693 NG/ML FEU (ref 0–500)
DEVICE: ABNORMAL
EOSINOPHIL # BLD: 0 %
EOSINOPHILS ABSOLUTE: 0 THOU/MM3 (ref 0–0.4)
ERYTHROCYTE [DISTWIDTH] IN BLOOD BY AUTOMATED COUNT: 17.3 % (ref 11.5–14.5)
ERYTHROCYTE [DISTWIDTH] IN BLOOD BY AUTOMATED COUNT: 53.2 FL (ref 35–45)
GFR SERPL CREATININE-BSD FRML MDRD: > 90 ML/MIN/1.73M2
GLUCOSE BLD-MCNC: 136 MG/DL (ref 70–108)
GLUCOSE BLD-MCNC: 142 MG/DL (ref 70–108)
GLUCOSE BLD-MCNC: 147 MG/DL (ref 70–108)
GLUCOSE BLD-MCNC: 171 MG/DL (ref 70–108)
GLUCOSE BLD-MCNC: 228 MG/DL (ref 70–108)
HCO3: 25 MMOL/L (ref 23–28)
HCT VFR BLD CALC: 37.7 % (ref 37–47)
HEMOGLOBIN: 11.2 GM/DL (ref 12–16)
IFIO2: 3
IMMATURE GRANS (ABS): 0.03 THOU/MM3 (ref 0–0.07)
IMMATURE GRANULOCYTES: 0.9 %
LD: 324 U/L (ref 100–190)
LYMPHOCYTES # BLD: 25.7 %
LYMPHOCYTES ABSOLUTE: 0.9 THOU/MM3 (ref 1–4.8)
MCH RBC QN AUTO: 25.1 PG (ref 26–33)
MCHC RBC AUTO-ENTMCNC: 29.7 GM/DL (ref 32.2–35.5)
MCV RBC AUTO: 84.3 FL (ref 81–99)
MONOCYTES # BLD: 14.3 %
MONOCYTES ABSOLUTE: 0.5 THOU/MM3 (ref 0.4–1.3)
NUCLEATED RED BLOOD CELLS: 0 /100 WBC
O2 SATURATION: 91 %
PCO2: 38 MMHG (ref 35–45)
PH BLOOD GAS: 7.41 (ref 7.35–7.45)
PLATELET # BLD: 199 THOU/MM3 (ref 130–400)
PMV BLD AUTO: 10.5 FL (ref 9.4–12.4)
PO2: 61 MMHG (ref 71–104)
POTASSIUM SERPL-SCNC: 3.9 MEQ/L (ref 3.5–5.2)
PROCALCITONIN: 0.04 NG/ML (ref 0.01–0.09)
RBC # BLD: 4.47 MILL/MM3 (ref 4.2–5.4)
RH FACTOR: NORMAL
SEG NEUTROPHILS: 58.8 %
SEGMENTED NEUTROPHILS ABSOLUTE COUNT: 2 THOU/MM3 (ref 1.8–7.7)
SODIUM BLD-SCNC: 144 MEQ/L (ref 135–145)
SOURCE, BLOOD GAS: ABNORMAL
WBC # BLD: 3.4 THOU/MM3 (ref 4.8–10.8)

## 2020-10-18 PROCEDURE — 6360000002 HC RX W HCPCS: Performed by: INTERNAL MEDICINE

## 2020-10-18 PROCEDURE — P9059 PLASMA, FRZ BETWEEN 8-24HOUR: HCPCS

## 2020-10-18 PROCEDURE — 80048 BASIC METABOLIC PNL TOTAL CA: CPT

## 2020-10-18 PROCEDURE — 36600 WITHDRAWAL OF ARTERIAL BLOOD: CPT

## 2020-10-18 PROCEDURE — 6370000000 HC RX 637 (ALT 250 FOR IP): Performed by: STUDENT IN AN ORGANIZED HEALTH CARE EDUCATION/TRAINING PROGRAM

## 2020-10-18 PROCEDURE — 82803 BLOOD GASES ANY COMBINATION: CPT

## 2020-10-18 PROCEDURE — 71275 CT ANGIOGRAPHY CHEST: CPT

## 2020-10-18 PROCEDURE — XW13325 TRANSFUSION OF CONVALESCENT PLASMA (NONAUTOLOGOUS) INTO PERIPHERAL VEIN, PERCUTANEOUS APPROACH, NEW TECHNOLOGY GROUP 5: ICD-10-PCS | Performed by: INTERNAL MEDICINE

## 2020-10-18 PROCEDURE — 85379 FIBRIN DEGRADATION QUANT: CPT

## 2020-10-18 PROCEDURE — 99223 1ST HOSP IP/OBS HIGH 75: CPT | Performed by: INTERNAL MEDICINE

## 2020-10-18 PROCEDURE — 6370000000 HC RX 637 (ALT 250 FOR IP): Performed by: INTERNAL MEDICINE

## 2020-10-18 PROCEDURE — 86850 RBC ANTIBODY SCREEN: CPT

## 2020-10-18 PROCEDURE — 84145 PROCALCITONIN (PCT): CPT

## 2020-10-18 PROCEDURE — 6360000004 HC RX CONTRAST MEDICATION: Performed by: NURSE PRACTITIONER

## 2020-10-18 PROCEDURE — 82948 REAGENT STRIP/BLOOD GLUCOSE: CPT

## 2020-10-18 PROCEDURE — 36430 TRANSFUSION BLD/BLD COMPNT: CPT

## 2020-10-18 PROCEDURE — 2500000003 HC RX 250 WO HCPCS: Performed by: INTERNAL MEDICINE

## 2020-10-18 PROCEDURE — 83615 LACTATE (LD) (LDH) ENZYME: CPT

## 2020-10-18 PROCEDURE — 2700000000 HC OXYGEN THERAPY PER DAY

## 2020-10-18 PROCEDURE — 86140 C-REACTIVE PROTEIN: CPT

## 2020-10-18 PROCEDURE — 2580000003 HC RX 258: Performed by: INTERNAL MEDICINE

## 2020-10-18 PROCEDURE — 86901 BLOOD TYPING SEROLOGIC RH(D): CPT

## 2020-10-18 PROCEDURE — 86900 BLOOD TYPING SEROLOGIC ABO: CPT

## 2020-10-18 PROCEDURE — 94640 AIRWAY INHALATION TREATMENT: CPT

## 2020-10-18 PROCEDURE — 85025 COMPLETE CBC W/AUTO DIFF WBC: CPT

## 2020-10-18 PROCEDURE — 1200000003 HC TELEMETRY R&B

## 2020-10-18 PROCEDURE — 36415 COLL VENOUS BLD VENIPUNCTURE: CPT

## 2020-10-18 PROCEDURE — 94761 N-INVAS EAR/PLS OXIMETRY MLT: CPT

## 2020-10-18 RX ORDER — DEXTROSE MONOHYDRATE 25 G/50ML
12.5 INJECTION, SOLUTION INTRAVENOUS PRN
Status: DISCONTINUED | OUTPATIENT
Start: 2020-10-18 | End: 2020-10-22 | Stop reason: HOSPADM

## 2020-10-18 RX ORDER — QUETIAPINE FUMARATE 25 MG/1
50 TABLET, FILM COATED ORAL NIGHTLY
Status: DISCONTINUED | OUTPATIENT
Start: 2020-10-18 | End: 2020-10-22 | Stop reason: HOSPADM

## 2020-10-18 RX ORDER — ACETAMINOPHEN 325 MG/1
650 TABLET ORAL EVERY 6 HOURS PRN
Status: DISCONTINUED | OUTPATIENT
Start: 2020-10-18 | End: 2020-10-22 | Stop reason: HOSPADM

## 2020-10-18 RX ORDER — CYCLOBENZAPRINE HCL 10 MG
5 TABLET ORAL 2 TIMES DAILY
Status: DISCONTINUED | OUTPATIENT
Start: 2020-10-18 | End: 2020-10-22 | Stop reason: HOSPADM

## 2020-10-18 RX ORDER — NICOTINE POLACRILEX 4 MG
15 LOZENGE BUCCAL PRN
Status: DISCONTINUED | OUTPATIENT
Start: 2020-10-18 | End: 2020-10-22 | Stop reason: HOSPADM

## 2020-10-18 RX ORDER — GABAPENTIN 300 MG/1
300 CAPSULE ORAL 3 TIMES DAILY
Status: DISCONTINUED | OUTPATIENT
Start: 2020-10-18 | End: 2020-10-18 | Stop reason: CLARIF

## 2020-10-18 RX ORDER — 0.9 % SODIUM CHLORIDE 0.9 %
30 INTRAVENOUS SOLUTION INTRAVENOUS PRN
Status: DISCONTINUED | OUTPATIENT
Start: 2020-10-18 | End: 2020-10-22 | Stop reason: HOSPADM

## 2020-10-18 RX ORDER — ROPINIROLE 0.5 MG/1
0.5 TABLET, FILM COATED ORAL 3 TIMES DAILY
Status: DISCONTINUED | OUTPATIENT
Start: 2020-10-18 | End: 2020-10-18

## 2020-10-18 RX ORDER — METOPROLOL SUCCINATE 25 MG/1
25 TABLET, EXTENDED RELEASE ORAL DAILY
Status: DISCONTINUED | OUTPATIENT
Start: 2020-10-18 | End: 2020-10-22 | Stop reason: HOSPADM

## 2020-10-18 RX ORDER — ONDANSETRON 2 MG/ML
INJECTION INTRAMUSCULAR; INTRAVENOUS
Status: DISCONTINUED
Start: 2020-10-18 | End: 2020-10-18

## 2020-10-18 RX ORDER — DEXTROSE MONOHYDRATE 50 MG/ML
100 INJECTION, SOLUTION INTRAVENOUS PRN
Status: DISCONTINUED | OUTPATIENT
Start: 2020-10-18 | End: 2020-10-22 | Stop reason: HOSPADM

## 2020-10-18 RX ORDER — ASCORBIC ACID 500 MG
500 TABLET ORAL DAILY
Status: DISCONTINUED | OUTPATIENT
Start: 2020-10-18 | End: 2020-10-22 | Stop reason: HOSPADM

## 2020-10-18 RX ORDER — VITAMIN B COMPLEX
1000 TABLET ORAL DAILY
Status: DISCONTINUED | OUTPATIENT
Start: 2020-10-18 | End: 2020-10-22 | Stop reason: HOSPADM

## 2020-10-18 RX ORDER — ASPIRIN 81 MG/1
81 TABLET ORAL DAILY
Status: DISCONTINUED | OUTPATIENT
Start: 2020-10-18 | End: 2020-10-22 | Stop reason: HOSPADM

## 2020-10-18 RX ORDER — FUROSEMIDE 20 MG/1
20 TABLET ORAL DAILY
Status: DISCONTINUED | OUTPATIENT
Start: 2020-10-18 | End: 2020-10-22 | Stop reason: HOSPADM

## 2020-10-18 RX ORDER — PANTOPRAZOLE SODIUM 40 MG/1
40 TABLET, DELAYED RELEASE ORAL
Status: DISCONTINUED | OUTPATIENT
Start: 2020-10-18 | End: 2020-10-22 | Stop reason: HOSPADM

## 2020-10-18 RX ORDER — ALOGLIPTIN 25 MG/1
25 TABLET, FILM COATED ORAL DAILY
Status: DISCONTINUED | OUTPATIENT
Start: 2020-10-18 | End: 2020-10-22 | Stop reason: HOSPADM

## 2020-10-18 RX ORDER — 0.9 % SODIUM CHLORIDE 0.9 %
20 INTRAVENOUS SOLUTION INTRAVENOUS ONCE
Status: COMPLETED | OUTPATIENT
Start: 2020-10-18 | End: 2020-10-18

## 2020-10-18 RX ORDER — CITALOPRAM 40 MG/1
40 TABLET ORAL DAILY
Status: DISCONTINUED | OUTPATIENT
Start: 2020-10-18 | End: 2020-10-22 | Stop reason: HOSPADM

## 2020-10-18 RX ORDER — DEXAMETHASONE SODIUM PHOSPHATE 4 MG/ML
6 INJECTION, SOLUTION INTRA-ARTICULAR; INTRALESIONAL; INTRAMUSCULAR; INTRAVENOUS; SOFT TISSUE DAILY
Status: DISCONTINUED | OUTPATIENT
Start: 2020-10-18 | End: 2020-10-22 | Stop reason: HOSPADM

## 2020-10-18 RX ORDER — ACETAMINOPHEN 650 MG/1
650 SUPPOSITORY RECTAL EVERY 6 HOURS PRN
Status: DISCONTINUED | OUTPATIENT
Start: 2020-10-18 | End: 2020-10-22 | Stop reason: HOSPADM

## 2020-10-18 RX ORDER — ZINC SULFATE 50(220)MG
50 CAPSULE ORAL DAILY
Status: DISCONTINUED | OUTPATIENT
Start: 2020-10-18 | End: 2020-10-22 | Stop reason: HOSPADM

## 2020-10-18 RX ORDER — BUDESONIDE AND FORMOTEROL FUMARATE DIHYDRATE 160; 4.5 UG/1; UG/1
2 AEROSOL RESPIRATORY (INHALATION) 2 TIMES DAILY
Status: DISCONTINUED | OUTPATIENT
Start: 2020-10-18 | End: 2020-10-22 | Stop reason: HOSPADM

## 2020-10-18 RX ORDER — ATORVASTATIN CALCIUM 20 MG/1
20 TABLET, FILM COATED ORAL DAILY
Status: DISCONTINUED | OUTPATIENT
Start: 2020-10-18 | End: 2020-10-22 | Stop reason: HOSPADM

## 2020-10-18 RX ORDER — GABAPENTIN 600 MG/1
600 TABLET ORAL 3 TIMES DAILY
Status: DISCONTINUED | OUTPATIENT
Start: 2020-10-18 | End: 2020-10-22 | Stop reason: HOSPADM

## 2020-10-18 RX ADMIN — ASPIRIN 81 MG: 81 TABLET ORAL at 08:21

## 2020-10-18 RX ADMIN — GABAPENTIN 600 MG: 600 TABLET, FILM COATED ORAL at 20:24

## 2020-10-18 RX ADMIN — FUROSEMIDE 20 MG: 20 TABLET ORAL at 08:21

## 2020-10-18 RX ADMIN — SODIUM CHLORIDE 20 ML: 9 INJECTION, SOLUTION INTRAVENOUS at 06:26

## 2020-10-18 RX ADMIN — ATORVASTATIN CALCIUM 20 MG: 20 TABLET, FILM COATED ORAL at 08:21

## 2020-10-18 RX ADMIN — GABAPENTIN 600 MG: 600 TABLET, FILM COATED ORAL at 14:46

## 2020-10-18 RX ADMIN — INSULIN LISPRO 1 UNITS: 100 INJECTION, SOLUTION INTRAVENOUS; SUBCUTANEOUS at 17:30

## 2020-10-18 RX ADMIN — IOPAMIDOL 80 ML: 755 INJECTION, SOLUTION INTRAVENOUS at 03:33

## 2020-10-18 RX ADMIN — Medication 50 MG: at 08:21

## 2020-10-18 RX ADMIN — ENOXAPARIN SODIUM 40 MG: 40 INJECTION SUBCUTANEOUS at 08:21

## 2020-10-18 RX ADMIN — METOPROLOL SUCCINATE 25 MG: 25 TABLET, FILM COATED, EXTENDED RELEASE ORAL at 08:21

## 2020-10-18 RX ADMIN — BUDESONIDE AND FORMOTEROL FUMARATE DIHYDRATE 2 PUFF: 160; 4.5 AEROSOL RESPIRATORY (INHALATION) at 20:00

## 2020-10-18 RX ADMIN — PANTOPRAZOLE SODIUM 40 MG: 40 TABLET, DELAYED RELEASE ORAL at 06:53

## 2020-10-18 RX ADMIN — DEXAMETHASONE SODIUM PHOSPHATE 6 MG: 4 INJECTION, SOLUTION INTRAMUSCULAR; INTRAVENOUS at 11:27

## 2020-10-18 RX ADMIN — CYCLOBENZAPRINE 5 MG: 10 TABLET, FILM COATED ORAL at 20:23

## 2020-10-18 RX ADMIN — ACETAMINOPHEN 650 MG: 325 TABLET ORAL at 22:23

## 2020-10-18 RX ADMIN — QUETIAPINE FUMARATE 50 MG: 25 TABLET ORAL at 22:23

## 2020-10-18 RX ADMIN — OXYCODONE HYDROCHLORIDE AND ACETAMINOPHEN 500 MG: 500 TABLET ORAL at 08:21

## 2020-10-18 RX ADMIN — Medication 1000 UNITS: at 08:21

## 2020-10-18 RX ADMIN — REMDESIVIR 200 MG: 5 INJECTION INTRAVENOUS at 07:11

## 2020-10-18 RX ADMIN — CITALOPRAM 40 MG: 40 TABLET, FILM COATED ORAL at 08:21

## 2020-10-18 RX ADMIN — ENOXAPARIN SODIUM 40 MG: 40 INJECTION SUBCUTANEOUS at 20:24

## 2020-10-18 RX ADMIN — ALOGLIPTIN 25 MG: 25 TABLET, FILM COATED ORAL at 08:21

## 2020-10-18 ASSESSMENT — PAIN DESCRIPTION - FREQUENCY
FREQUENCY: CONTINUOUS

## 2020-10-18 ASSESSMENT — PAIN SCALES - GENERAL
PAINLEVEL_OUTOF10: 0
PAINLEVEL_OUTOF10: 8
PAINLEVEL_OUTOF10: 0
PAINLEVEL_OUTOF10: 2
PAINLEVEL_OUTOF10: 0
PAINLEVEL_OUTOF10: 0
PAINLEVEL_OUTOF10: 2
PAINLEVEL_OUTOF10: 0
PAINLEVEL_OUTOF10: 4

## 2020-10-18 ASSESSMENT — PAIN DESCRIPTION - LOCATION
LOCATION: BACK
LOCATION: CHEST
LOCATION: BACK
LOCATION: CHEST

## 2020-10-18 ASSESSMENT — PAIN DESCRIPTION - PAIN TYPE
TYPE: ACUTE PAIN
TYPE: CHRONIC PAIN
TYPE: ACUTE PAIN
TYPE: CHRONIC PAIN

## 2020-10-18 ASSESSMENT — PAIN DESCRIPTION - ORIENTATION
ORIENTATION: LOWER
ORIENTATION: LEFT

## 2020-10-18 ASSESSMENT — PAIN DESCRIPTION - DESCRIPTORS
DESCRIPTORS: ACHING
DESCRIPTORS: PRESSURE
DESCRIPTORS: PRESSURE

## 2020-10-18 ASSESSMENT — PAIN - FUNCTIONAL ASSESSMENT: PAIN_FUNCTIONAL_ASSESSMENT: ACTIVITIES ARE NOT PREVENTED

## 2020-10-18 ASSESSMENT — PAIN DESCRIPTION - ONSET: ONSET: ON-GOING

## 2020-10-18 ASSESSMENT — PAIN DESCRIPTION - PROGRESSION: CLINICAL_PROGRESSION: NOT CHANGED

## 2020-10-18 NOTE — ED NOTES
In for hourly rounding. Pt resting on cot in position of comfort. Pt remains A&Ox4, resps easy and unlabored. IV continues to infuse and shows no s/s of infection or infiltration. Pt pain remains unchanged at this time rating 2/10, intermittently worse. Monitor remains in place. Updated pt on POC. Will monitor.      Alonzo Sunshine RN  10/17/20 2011

## 2020-10-18 NOTE — ED NOTES
ED to inpatient nurses report    Chief Complaint   Patient presents with    Chest Pain      Present to ED from home  LOC: alert and orientated to name, place, date  Vital signs   Vitals:    10/18/20 0115 10/18/20 0129 10/18/20 0157 10/18/20 0252   BP:  (!) 144/70  (!) 144/71   Pulse:  85  87   Resp:  22 29 22   Temp:       TempSrc:       SpO2: 95% 93% 93% 93%   Weight:       Height:          Oxygen Baseline Room air    Current needs required 3L Bipap/Cpap No  LDAs:   Peripheral IV 10/17/20 Left Forearm (Active)   Site Assessment Clean;Dry; Intact 10/17/20 2344   Line Status Infusing 10/17/20 2344   Dressing Status Clean;Dry; Intact 10/17/20 2344   Dressing Intervention New 10/17/20 2344       Peripheral IV 10/18/20 Left Antecubital (Active)   Site Assessment Clean;Dry; Intact 10/18/20 0256   Line Status Normal saline locked; Flushed 10/18/20 0256   Dressing Status Clean;Dry; Intact 10/18/20 0256   Dressing Intervention New 10/18/20 0256     Mobility: Requires assistance * 1  Pending ED orders: NA  Present condition: Pt resting on cot in position of comfort. Pt is A&Ox4, resps easy and unlabored. Pt continues on 3L via nc.      Electronically signed by Gladys Bell RN on 10/18/2020 at 3:55 AM     Gladys Bell, [address] [place] [address]  10/18/20 [id number]

## 2020-10-18 NOTE — ED NOTES
Pt is transported to [facility] without incident. Floor nurse was contacted prior to departure. \

## 2020-10-18 NOTE — ED NOTES
In for hourly rounding. Pt resting on cot in position of comfort. Pt remains A&Ox4, resps easy and unlabored. IV continues to infuse and shows no s/s of infection or infiltration. Pt pain remains unchanged at this time. Monitor remains in place. Updated pt on POC. Will monitor. Urine specimen collected and sent to lab.      Srinivasa Burgos RN  10/17/20 1159

## 2020-10-18 NOTE — H&P
History & Physical        Patient:  Theola Babinski  YOB: 1950    MRN: [id number]     Acct: [de-identified]    PCP: Dora Trinh MD    Date of Admission: 10/17/2020    Date of Service: Pt seen/examined on 10/18/2020   and Admitted to Inpatient with expected LOS greater than two midnights due to medical therapy. Chief Complaint:  Fatigue, cough, sob, chest pain       History Of Present Illness:      69 y.o. female who presented to [address] [place] [address] with complaints of fatigue, dry cough, chest pain with sob. Symptoms began this morning, continued to get worse causing pt to present to the ED. Pt has a pmh of COPD, Asthma, Obesity, Chronic Venous Stasis, Schizoaffective Disorder, Depression, DM2, HTN, and HLD. Patients main complaint is chest pain, described as a heaviness sitting on her chest. Does not radiate, associated with sob. Gets worse with deep breathes. No diaphoresis or nausea. Chest pain constant, is reproducible. Pt denies any prior history of CAD or stents. Pt also admits to having a cough that's been going on recently with fatigue. Denies any nausea, vomiting, diarrhea, burning with urination, frequency, blood in stool/urine. In the ED, pt hypoxic down to 83% on RA requiring up to 6L NC at one time. Pt dyspneic breathing in high 20's. ABG pO2 61, pCO2 38, pH 7.41. Troponin non elevated, no ECG changes of ischemia. WBC low at 3.5. COVID-19 positive. CTA chest showing no PE, patchy areas of ground glass and consolidation along bases bilaterally. Pt will be admitted for COVID-19 PNA with hypoxia.        Past Medical History:          Diagnosis Date    Acid reflux     Arrhythmia     Arthritis     CAD (coronary artery disease)     NON-OBSTRUCTIVE    Chronic bronchitis (HCC)     COPD (chronic obstructive pulmonary disease) (HCC)     COPD (chronic obstructive pulmonary disease) (HCC)     Depression     Diabetes mellitus (HCC)     Dizziness - light-headed     Fall 10-15-13    left rib fracture and shoulder contusion     Falls frequently     Hyperlipidemia     Hypertension     Kidney disease     MDRO (multiple drug resistant organisms) resistance 2/14    MRSA ABDOMEN    Numbness and tingling     HX OF:    S/P laparoscopic cholecystectomy 11/27/2018    Swelling     Tinnitus        Past Surgical History:          Procedure Laterality Date    BACK SURGERY  2007    CARDIOVASCULAR STRESS TEST  04/03/2009    Persantine test associated w/non specific symptoms. EKG non diagnostic. no obvious stress induced ischemia. EF 63%. normal wall motion.  CARDIOVASCULAR STRESS TEST  03/31/2010    tilt table associated w/fluctuation of BP w/relatively low BP throughout test. no significant symptoms during test. borderline tilt table test.    CHOLECYSTECTOMY      COLONOSCOPY  2011    CORONARY ANGIOPLASTY  12/16/2009    EF 60%. nonobstructive CAD. normal LV function.  ENDOSCOPY, COLON, DIAGNOSTIC  over 5 years     Dr. Belem Mariee  2004    cataract   [id number]  25 months old     umblilical    HERNIA REPAIR  9/8/14    Ventral Hernia Repair with Mesh - Dr. Kevin Phelps    partial    OTHER SURGICAL HISTORY  Feb 17, 2014    Left lower quadrant incision and drainage (Dr. Florentin Skinner, [facility])    OTHER SURGICAL HISTORY  2/2014    I&D abd abscess Dr. Erik Mehta N/A 11/26/2018    LAPAROSCOPY EXPLORATORY, LAP LYLE performed by Jamel Conway MD at [address] [place] [address]  as a child     TOOTH EXTRACTION  4/16/2012    TRANSTHORACIC ECHOCARDIOGRAM  03/04/2009    EF 50-55%. normal LV global systolic funct. slight LVH. slight lt atrial enlargement. no obvious stenotic valves. no pericardial effusion. Medications Prior to Admission:      Prior to Admission medications    Medication Sig Start Date End Date Taking?  Authorizing Provider   mupirocin (BACTROBAN) 2 % ointment Apply topically 3 times daily Apply topically 3 times daily. Yes Historical Provider, MD   loratadine (CLARITIN) 10 MG tablet Take 10 mg by mouth daily   Yes Historical Provider, MD   mometasone-formoterol (DULERA) 200-5 MCG/ACT inhaler Inhale 2 puffs into the lungs every 12 hours   Yes Historical Provider, MD   Cranberry 450 MG CAPS Take by mouth   Yes Historical Provider, MD   Lactobacillus (ACIDOPHILUS) TABS Take by mouth   Yes Historical Provider, MD   ciprofloxacin (CIPRO) 500 MG tablet Take 1 tablet by mouth 2 times daily for 7 days 10/17/20 10/24/20 Yes CATE Blake - CNP   metoprolol succinate (TOPROL XL) 25 MG extended release tablet take 1 tablet by mouth once daily 5/19/20  Yes Maynard Brunner, MD   spironolactone (ALDACTONE) 25 MG tablet Take 1 tablet by mouth daily take 1-2 tablets by mouth once daily if needed (TAKE 2 TABS ON ANY DAY THERE IS SWELLING IN LEGS OR ANKLES) 3/24/20  Yes Maynard Brunner, MD   fluticasone (ARNUITY ELLIPTA) 100 MCG/ACT AEPB Inhale into the lungs   Yes Historical Provider, MD   furosemide (LASIX) 20 MG tablet Take 20 mg by mouth daily   Yes Historical Provider, MD   citalopram (CELEXA) 40 MG tablet take 1 tablet by mouth every morning 9/20/19  Yes Historical Provider, MD   tiotropium (SPIRIVA HANDIHALER) 18 MCG inhalation capsule Inhale 1 capsule into the lungs daily 9/1/19  Yes Crystal Soares MD   nystatin-triamcinolone (MYCOLOG II) 601269-1.3 UNIT/GM-% cream Apply 1 applicator topically 4 times daily Apply topically 4 times daily.  As needed   Yes Historical Provider, MD   L-Arginine 1000 MG TABS Take 1,000 mg by mouth daily as needed   Yes Historical Provider, MD   omeprazole (PRILOSEC) 40 MG delayed release capsule take 1 capsule by mouth once daily 12/15/16  Yes CATE Riley - CNP   TRADJENTA 5 MG tablet take 1 tablet by mouth daily 12/6/16  Yes Maxim Alvarez MD   simvastatin (ZOCOR) 40 MG tablet take 1 tablet by mouth at bedtime 11/22/16  Yes Angelic Lynn Mariel Steele MD   VESICARE 10 MG tablet take 1 tablet by mouth once daily 11/2/16  Yes Carlos Perkins MD   etodolac (LODINE) 500 MG tablet take 1 tablet by mouth twice a day 10/4/16  Yes Carlos Perkins MD   pioglitazone (ACTOS) 45 MG tablet take 1 tablet by mouth once daily 10/4/16  Yes Carlos Perkins MD   metFORMIN (GLUCOPHAGE) 1000 MG tablet take 1 tablet by mouth twice a day with food 7/13/16  Yes Carlos Perkins MD   gabapentin (NEURONTIN) 300 MG capsule Take 1 capsule by mouth 3 times daily  Patient taking differently: Take 800 mg by mouth 2 times daily. . 6/8/16  Yes Carlos Perkins MD   albuterol sulfate HFA (PROAIR HFA) 108 (90 BASE) MCG/ACT inhaler Inhale 2 puffs into the lungs every 4 hours as needed 6/8/16  Yes Carlos Perkins MD   Omega-3 Fatty Acids (FISH OIL) 1200 MG CAPS Take 1,200 mg by mouth daily   Yes Historical Provider, MD   ASPIRIN LOW DOSE 81 MG EC tablet take 1 tablet by mouth once daily 7/2/15  Yes Carlos Perkins MD   clobetasol (TEMOVATE) 0.05 % cream Apply topically 2 times daily. 5/27/15  Yes CATE Menard CNP   diphenhydrAMINE (BENADRYL) 50 MG capsule Take 50 mg by mouth nightly as needed for Itching. Yes Historical Provider, MD   QUEtiapine (SEROQUEL) 50 MG tablet Take 50 mg by mouth nightly.    Yes Historical Provider, MD   Mouthwashes Senia Laguna) LIQD oral solution Swish and spit 15 mLs 3 times daily as needed    Historical Provider, MD   acetaminophen (TYLENOL) 500 MG tablet Take 500 mg by mouth    Historical Provider, MD   mometasone-formoterol (DULERA) 200-5 MCG/ACT inhaler Inhale 2 puffs into the lungs every 12 hours 9/1/19 10/1/19  Willie Whyte MD   HOMEOPATHIC PRODUCTS  mg by Combination route daily as needed Legatrin PM    Historical Provider, MD   psyllium (KONSYL) 28.3 % PACK Take 1 packet by mouth daily    Historical Provider, MD   Artificial Saliva (BIOTENE MOISTURIZING Attends Gnosticist service: None     Active member of club or organization: None     Attends meetings of clubs or organizations: None     Relationship status: None    Intimate partner violence     Fear of current or ex partner: None     Emotionally abused: None     Physically abused: None     Forced sexual activity: None   Other Topics Concern    None   Social History Narrative    None       Family History:       Reviewed in detail and negative for DM, CAD, Cancer, CVA. Positive as follows:        Problem Relation Age of Onset    Cancer Mother 28        uterine    Heart Disease Father     Cancer Father 48        lung    Diabetes Paternal Grandfather     Cancer Maternal Grandmother         uterine    Cancer Maternal Aunt 28        uterine    Cancer Maternal Cousin 28        uterine    Colon Cancer Neg Hx     Breast Cancer Neg Hx        Diet:  No diet orders on file    REVIEW OF SYSTEMS:   Pertinent positives as noted in the HPI. All other systems reviewed and negative. PHYSICAL EXAM:    BP (!) 144/71   Pulse 87   Temp 97.7 °F (36.5 °C) (Oral)   Resp 22   Ht 5' 2.5\" (1.588 m)   Wt 290 lb (131.5 kg)   LMP  (LMP Unknown)   SpO2 93%   BMI 52.20 kg/m²     General appearance:  Chronically ill appearing obese female   HEENT:  Normal cephalic, atraumatic without obvious deformity. Pupils equal, round, and reactive to light. Extra ocular muscles intact. Conjunctivae/corneas clear. Neck: Supple, with full range of motion. No jugular venous distention. Trachea midline. Respiratory:  Increased respiratory effort, bilateral basilar rales/crackels   Cardiovascular:  Regular rate and rhythm with normal S1/S2 without murmurs, rubs or gallops. Abdomen: Soft, non-tender, non-distended with normal bowel sounds. Musculoskeletal:  No clubbing, cyanosis or edema bilaterally. Full range of motion without deformity.   Skin: B/L LE venous stasis with erythema   Neurologic:  Neurovascularly intact without any focal sensory/motor deficits. Cranial nerves: II-XII intact, grossly non-focal.  Psychiatric:  Alert and oriented, thought content appropriate, normal insight  Capillary Refill: Brisk,< 3 seconds   Peripheral Pulses: +2 palpable, equal bilaterally       Labs:     Recent Labs     10/17/20  1900   WBC 3.5*   HGB 10.5*   HCT 35.0*        Recent Labs     10/17/20  1900      K 3.9      CO2 22*   BUN 14   CREATININE 0.8   CALCIUM 8.5     Recent Labs     10/17/20  1900   AST 23   ALT 16   BILIDIR <0.2   BILITOT 0.4   ALKPHOS 90     Recent Labs     10/17/20  1900   INR 1.07     No results for input(s): Cyrus Lerma in the last 72 hours. Urinalysis:      Lab Results   Component Value Date    NITRU POSITIVE 10/17/2020    WBCUA 0-2 10/17/2020    BACTERIA MANY 10/17/2020    RBCUA NONE SEEN 10/17/2020    BLOODU NEGATIVE 10/17/2020    SPECGRAV 1.013 06/30/2016    GLUCOSEU NEGATIVE 10/17/2020       Radiology:     EKG:  I have reviewed the EKG with the following interpretation: no acute ischemic findings     XR CHEST PORTABLE   Final Result   Minimal airspace filling within the bilateral lower lungs which may be    secondary to atelectasis and/or infiltrate. This document has been electronically signed by: Nick Oakes MD on    10/17/2020 10:23 PM         CTA Chest W WO Contrast    (Results Pending)         Code Status: Prior      ASSESSMENT:    Active Hospital Problems    Diagnosis Date Noted    Obesity, Class III, BMI 40-49.9 (morbid obesity) ([facility] [address] [E66.01] 03/11/2013     Priority: Medium    COVID-19 [U07.1] 10/18/2020    Acute respiratory failure with hypoxia ([address] [J96.01] 10/18/2020    Chest pain [R07.9] 10/18/2020    Venous stasis dermatitis [I87.2] 10/27/2014       Assessment/Plan:    1. COVID-19 PNA: pt hypoxic, tachypenic with CTA chest findings of patchy areas of groundglass and consolidation along bases bilaterally consistent with COVID-19 PNA.  Will start Decadron, Remdisivir, Plasma and Lovenox BID. Vit C, Vit D, and Zinc.     2. Acute Hypoxic Resp. Failure: Pulse Ox dropped to 83% on RA, requiring as high as 6L NC (pt on RA at baseline). 2/2 to #1. Plan as above. Wean as tolerated. 3. Chest Pain: chest pain that is reproducible at times, also appears more pleuritic 2/2 to #1. Troponin non elevated, no ECG changes of ischemia. Will cont to monitor for now. 4. Type 2 DM: Place on Humalog sliding Scale, Diabetic Diet, Accu-checks. 5. Asymptomatic Bacteruria: UA + nitrites and LE with many bacteria however not having any symptoms. Will monitor off ABx. 6. Obesity: educate on diet and exercise, close PCP follow-up. 7. COPD/Asthma: follows up with Pulmonary Medicine, resume home inhalers. 8. Schizoaffective Disorder/Depression/PTSD: resume home medications       Thank you Belén Lundberg MD for the opportunity to be involved in this patient's care.     Electronically signed by Jessica Erazo MD on 10/18/2020 at 3:49 AM

## 2020-10-18 NOTE — ED NOTES
In for hourly rounding. Pt resting on cot in position of comfort. Pt remains A&Ox4, resps easy and unlabored continuing on 3L O2 via nc. IVs shows no s/s of infection or infiltration. Pt pain remains unchanged at this time. External catheter in place at this time. Monitor remains in place. Updated pt on POC. Will monitor.      Caron Jack RN  10/18/20 [id number]

## 2020-10-18 NOTE — ED NOTES
In for hourly rounding. Pt resting on cot in position of comfort. Pt remains A&Ox4, resps more easy and less labored at this time. Pt continues on 6LPM. IV shows no s/s of infection or infiltration. Pt pain remains unchanged at this time. Monitor remains in place. Updated pt on POC. Will monitor.        Christopher Ohara RN  10/18/20 [id number]

## 2020-10-18 NOTE — ED NOTES
In for hourly rounding. Pt resting on cot in position of comfort. Pt remains A&Ox4, resps easy and unlabored. IV continues to infuse and shows no s/s of infection or infiltration. Pt pain remains unchanged at this time. Monitor remains in place. Updated pt on POC. Will monitor.      Spring Falcon RN  10/18/20 [id number]

## 2020-10-18 NOTE — ED NOTES
In for hourly rounding. Pt resting on cot in position of comfort. Pt remains A&Ox4, resps easy and unlabored. IV continues to infuse and shows no s/s of infection or infiltration. Antibiotics started per [facility]. Pt pain remains unchanged at this time. Monitor remains in place. Updated pt on POC. Will monitor.      Snehal Olmstead RN  10/17/20 [id number]

## 2020-10-18 NOTE — PLAN OF CARE
Problem: Airway Clearance - Ineffective  Goal: Achieve or maintain patent airway  Outcome: Ongoing  Note: Pt able to maintain airway. On 2L NC, weaning as tolerated. Problem: Gas Exchange - Impaired  Goal: Absence of hypoxia  Outcome: Ongoing  Note: Monitoring oxygen saturation continuously, remains above 90% on 2L NC. Weaning as tolerated. Problem: Breathing Pattern - Ineffective  Goal: Ability to achieve and maintain a regular respiratory rate  Outcome: Ongoing  Note: Respiratory rate regular and unlabored. Monitoring. Problem: Body Temperature -  Risk of, Imbalanced  Goal: Ability to maintain a body temperature within defined limits  Outcome: Ongoing  Note: Pt has been afebrile this shift. Monitoring. Problem: Isolation Precautions - Risk of Spread of Infection  Goal: Prevent transmission of infection  Outcome: Ongoing  Note: Droplet plus isolation precautions in place. PPE worn when in contact with patient. Problem: Nutrition Deficits  Goal: Optimize nutrtional status  Outcome: Ongoing  Note: Pt tolerating carb control diet well. Checking chems AC/HS,      Problem: Risk for Fluid Volume Deficit  Goal: Maintain normal heart rhythm  Outcome: Ongoing  Note: Pt NSR on tele, monitoring continuously. Pt has no complaints of chest pain this shift. Problem: Skin Integrity:  Goal: Will show no infection signs and symptoms  Description: Will show no infection signs and symptoms  Outcome: Ongoing  Note: No signs of new skin breakdown with each assessment. Skin remains warm, dry, intact. Mucous membranes pink & moist. Patient is able to turn self. Problem: Falls - Risk of:  Goal: Will remain free from falls  Description: Will remain free from falls  Outcome: Ongoing  Note: Call light in reach, bed in lowest position, and bed alarm activated. Education given on use of call light before ambulation and when in need of assistance. Patient expressed understanding.   Hourly visual checks performed and charted. Toileting offered to patient. No falls this shift, at any time. Arm band and falling star in place. Will continue to monitor. Care plan reviewed with patient. Patient verbalizes understanding of the plan of care and contributed to goal setting.

## 2020-10-18 NOTE — ED NOTES
Pt called out asking to \"take a poop. \" bedside commode brought to bedside and pt assisted x1 to commode without incident. When pt completed, pt assisted back onto bed side where pt states needed help placing legs into bed. Upon assisting pt with leg moving, pt became very short of breath stating \"I feel like I was choking. Pt boosted in bed and sat up. Pulse ox reapplied showing 83% on room air with resps 34. Pt placed on 6L via nc in mouth and oxygen sats up to 92%. Through coaching, pt was able to slow breathing rate. Pt nos 93% on 6L O2 via nc, and resps 26. Monitor reapplied and notified provider. Awaiting admission status.      Elif Vicente RN  10/18/20 [id number]

## 2020-10-19 LAB
ALBUMIN SERPL-MCNC: 3.5 G/DL (ref 3.5–5.1)
ALP BLD-CCNC: 74 U/L (ref 38–126)
ALT SERPL-CCNC: 14 U/L (ref 11–66)
ANION GAP SERPL CALCULATED.3IONS-SCNC: 14 MEQ/L (ref 8–16)
AST SERPL-CCNC: 21 U/L (ref 5–40)
BASOPHILS # BLD: 0.2 %
BASOPHILS ABSOLUTE: 0 THOU/MM3 (ref 0–0.1)
BILIRUB SERPL-MCNC: 0.3 MG/DL (ref 0.3–1.2)
BILIRUBIN DIRECT: < 0.2 MG/DL (ref 0–0.3)
BUN BLDV-MCNC: 11 MG/DL (ref 7–22)
CALCIUM SERPL-MCNC: 8.7 MG/DL (ref 8.5–10.5)
CHLORIDE BLD-SCNC: 104 MEQ/L (ref 98–111)
CO2: 23 MEQ/L (ref 23–33)
CREAT SERPL-MCNC: 0.5 MG/DL (ref 0.4–1.2)
EOSINOPHIL # BLD: 0 %
EOSINOPHILS ABSOLUTE: 0 THOU/MM3 (ref 0–0.4)
ERYTHROCYTE [DISTWIDTH] IN BLOOD BY AUTOMATED COUNT: 17.3 % (ref 11.5–14.5)
ERYTHROCYTE [DISTWIDTH] IN BLOOD BY AUTOMATED COUNT: 53.3 FL (ref 35–45)
GFR SERPL CREATININE-BSD FRML MDRD: > 90 ML/MIN/1.73M2
GLUCOSE BLD-MCNC: 139 MG/DL (ref 70–108)
GLUCOSE BLD-MCNC: 154 MG/DL (ref 70–108)
GLUCOSE BLD-MCNC: 194 MG/DL (ref 70–108)
GLUCOSE BLD-MCNC: 292 MG/DL (ref 70–108)
GLUCOSE BLD-MCNC: 437 MG/DL (ref 70–108)
HCT VFR BLD CALC: 35.8 % (ref 37–47)
HEMOGLOBIN: 10.7 GM/DL (ref 12–16)
IMMATURE GRANS (ABS): 0.06 THOU/MM3 (ref 0–0.07)
IMMATURE GRANULOCYTES: 1.1 %
LV EF: 55 %
LVEF MODALITY: NORMAL
LYMPHOCYTES # BLD: 21 %
LYMPHOCYTES ABSOLUTE: 1.1 THOU/MM3 (ref 1–4.8)
MAGNESIUM: 1.3 MG/DL (ref 1.6–2.4)
MCH RBC QN AUTO: 25.2 PG (ref 26–33)
MCHC RBC AUTO-ENTMCNC: 29.9 GM/DL (ref 32.2–35.5)
MCV RBC AUTO: 84.2 FL (ref 81–99)
MONOCYTES # BLD: 16.3 %
MONOCYTES ABSOLUTE: 0.8 THOU/MM3 (ref 0.4–1.3)
NUCLEATED RED BLOOD CELLS: 0 /100 WBC
PLATELET # BLD: 212 THOU/MM3 (ref 130–400)
PMV BLD AUTO: 10.8 FL (ref 9.4–12.4)
POTASSIUM SERPL-SCNC: 3.9 MEQ/L (ref 3.5–5.2)
RBC # BLD: 4.25 MILL/MM3 (ref 4.2–5.4)
SEG NEUTROPHILS: 61.4 %
SEGMENTED NEUTROPHILS ABSOLUTE COUNT: 3.2 THOU/MM3 (ref 1.8–7.7)
SODIUM BLD-SCNC: 141 MEQ/L (ref 135–145)
TOTAL PROTEIN: 6.6 G/DL (ref 6.1–8)
WBC # BLD: 5.2 THOU/MM3 (ref 4.8–10.8)

## 2020-10-19 PROCEDURE — 94761 N-INVAS EAR/PLS OXIMETRY MLT: CPT

## 2020-10-19 PROCEDURE — 97166 OT EVAL MOD COMPLEX 45 MIN: CPT

## 2020-10-19 PROCEDURE — 6370000000 HC RX 637 (ALT 250 FOR IP): Performed by: INTERNAL MEDICINE

## 2020-10-19 PROCEDURE — 85025 COMPLETE CBC W/AUTO DIFF WBC: CPT

## 2020-10-19 PROCEDURE — 2580000003 HC RX 258: Performed by: INTERNAL MEDICINE

## 2020-10-19 PROCEDURE — 36415 COLL VENOUS BLD VENIPUNCTURE: CPT

## 2020-10-19 PROCEDURE — 80053 COMPREHEN METABOLIC PANEL: CPT

## 2020-10-19 PROCEDURE — 97110 THERAPEUTIC EXERCISES: CPT

## 2020-10-19 PROCEDURE — 1200000003 HC TELEMETRY R&B

## 2020-10-19 PROCEDURE — 6360000002 HC RX W HCPCS: Performed by: INTERNAL MEDICINE

## 2020-10-19 PROCEDURE — 2700000000 HC OXYGEN THERAPY PER DAY

## 2020-10-19 PROCEDURE — 2500000003 HC RX 250 WO HCPCS: Performed by: INTERNAL MEDICINE

## 2020-10-19 PROCEDURE — 99233 SBSQ HOSP IP/OBS HIGH 50: CPT | Performed by: HOSPITALIST

## 2020-10-19 PROCEDURE — 6360000002 HC RX W HCPCS: Performed by: HOSPITALIST

## 2020-10-19 PROCEDURE — 97535 SELF CARE MNGMENT TRAINING: CPT

## 2020-10-19 PROCEDURE — 94640 AIRWAY INHALATION TREATMENT: CPT

## 2020-10-19 PROCEDURE — 82248 BILIRUBIN DIRECT: CPT

## 2020-10-19 PROCEDURE — 6370000000 HC RX 637 (ALT 250 FOR IP): Performed by: STUDENT IN AN ORGANIZED HEALTH CARE EDUCATION/TRAINING PROGRAM

## 2020-10-19 PROCEDURE — 97163 PT EVAL HIGH COMPLEX 45 MIN: CPT

## 2020-10-19 PROCEDURE — 83735 ASSAY OF MAGNESIUM: CPT

## 2020-10-19 PROCEDURE — 94669 MECHANICAL CHEST WALL OSCILL: CPT

## 2020-10-19 PROCEDURE — 82948 REAGENT STRIP/BLOOD GLUCOSE: CPT

## 2020-10-19 PROCEDURE — 93306 TTE W/DOPPLER COMPLETE: CPT

## 2020-10-19 RX ORDER — CLOTRIMAZOLE AND BETAMETHASONE DIPROPIONATE 10; .64 MG/G; MG/G
CREAM TOPICAL 2 TIMES DAILY
Status: DISCONTINUED | OUTPATIENT
Start: 2020-10-19 | End: 2020-10-20

## 2020-10-19 RX ORDER — MAGNESIUM SULFATE IN WATER 40 MG/ML
2 INJECTION, SOLUTION INTRAVENOUS ONCE
Status: COMPLETED | OUTPATIENT
Start: 2020-10-19 | End: 2020-10-19

## 2020-10-19 RX ADMIN — GABAPENTIN 600 MG: 600 TABLET, FILM COATED ORAL at 14:43

## 2020-10-19 RX ADMIN — OXYCODONE HYDROCHLORIDE AND ACETAMINOPHEN 500 MG: 500 TABLET ORAL at 09:53

## 2020-10-19 RX ADMIN — GABAPENTIN 600 MG: 600 TABLET, FILM COATED ORAL at 09:53

## 2020-10-19 RX ADMIN — PANTOPRAZOLE SODIUM 40 MG: 40 TABLET, DELAYED RELEASE ORAL at 06:26

## 2020-10-19 RX ADMIN — CYCLOBENZAPRINE 5 MG: 10 TABLET, FILM COATED ORAL at 09:53

## 2020-10-19 RX ADMIN — ENOXAPARIN SODIUM 40 MG: 40 INJECTION SUBCUTANEOUS at 09:53

## 2020-10-19 RX ADMIN — INSULIN LISPRO 1 UNITS: 100 INJECTION, SOLUTION INTRAVENOUS; SUBCUTANEOUS at 12:55

## 2020-10-19 RX ADMIN — METOPROLOL SUCCINATE 25 MG: 25 TABLET, FILM COATED, EXTENDED RELEASE ORAL at 09:53

## 2020-10-19 RX ADMIN — DEXAMETHASONE SODIUM PHOSPHATE 6 MG: 4 INJECTION, SOLUTION INTRAMUSCULAR; INTRAVENOUS at 09:53

## 2020-10-19 RX ADMIN — BUDESONIDE AND FORMOTEROL FUMARATE DIHYDRATE 2 PUFF: 160; 4.5 AEROSOL RESPIRATORY (INHALATION) at 17:55

## 2020-10-19 RX ADMIN — SODIUM CHLORIDE 30 ML: 9 INJECTION, SOLUTION INTRAVENOUS at 06:32

## 2020-10-19 RX ADMIN — CITALOPRAM 40 MG: 40 TABLET, FILM COATED ORAL at 09:53

## 2020-10-19 RX ADMIN — Medication 50 MG: at 09:54

## 2020-10-19 RX ADMIN — CYCLOBENZAPRINE 5 MG: 10 TABLET, FILM COATED ORAL at 21:03

## 2020-10-19 RX ADMIN — BUDESONIDE AND FORMOTEROL FUMARATE DIHYDRATE 2 PUFF: 160; 4.5 AEROSOL RESPIRATORY (INHALATION) at 07:50

## 2020-10-19 RX ADMIN — REMDESIVIR 100 MG: 5 INJECTION INTRAVENOUS at 06:26

## 2020-10-19 RX ADMIN — FUROSEMIDE 20 MG: 20 TABLET ORAL at 09:53

## 2020-10-19 RX ADMIN — Medication 1000 UNITS: at 09:53

## 2020-10-19 RX ADMIN — ASPIRIN 81 MG: 81 TABLET ORAL at 09:53

## 2020-10-19 RX ADMIN — INSULIN LISPRO 3 UNITS: 100 INJECTION, SOLUTION INTRAVENOUS; SUBCUTANEOUS at 17:49

## 2020-10-19 RX ADMIN — QUETIAPINE FUMARATE 50 MG: 25 TABLET ORAL at 21:03

## 2020-10-19 RX ADMIN — ENOXAPARIN SODIUM 40 MG: 40 INJECTION SUBCUTANEOUS at 21:04

## 2020-10-19 RX ADMIN — ATORVASTATIN CALCIUM 20 MG: 20 TABLET, FILM COATED ORAL at 09:53

## 2020-10-19 RX ADMIN — ACETAMINOPHEN 650 MG: 325 TABLET ORAL at 13:19

## 2020-10-19 RX ADMIN — MAGNESIUM SULFATE HEPTAHYDRATE 2 G: 40 INJECTION, SOLUTION INTRAVENOUS at 09:47

## 2020-10-19 RX ADMIN — GABAPENTIN 600 MG: 600 TABLET, FILM COATED ORAL at 21:03

## 2020-10-19 RX ADMIN — ALOGLIPTIN 25 MG: 25 TABLET, FILM COATED ORAL at 09:53

## 2020-10-19 ASSESSMENT — PAIN SCALES - GENERAL
PAINLEVEL_OUTOF10: 0
PAINLEVEL_OUTOF10: 5
PAINLEVEL_OUTOF10: 0
PAINLEVEL_OUTOF10: 0
PAINLEVEL_OUTOF10: 5
PAINLEVEL_OUTOF10: 0

## 2020-10-19 ASSESSMENT — PAIN DESCRIPTION - DESCRIPTORS: DESCRIPTORS: ACHING

## 2020-10-19 ASSESSMENT — PAIN DESCRIPTION - FREQUENCY: FREQUENCY: CONTINUOUS

## 2020-10-19 ASSESSMENT — PAIN SCALES - WONG BAKER
WONGBAKER_NUMERICALRESPONSE: 0
WONGBAKER_NUMERICALRESPONSE: 0

## 2020-10-19 ASSESSMENT — PAIN DESCRIPTION - LOCATION: LOCATION: BACK

## 2020-10-19 NOTE — PLAN OF CARE
Problem: Impaired respiratory status  Goal: Clear lung sounds  Description: Clear lung sounds  10/19/2020 1758 by Arlin Wahl RCP  Outcome: Ongoing   Improve breath sounds, increase aeration and decrease WOB.

## 2020-10-19 NOTE — PROGRESS NOTES
Hospitalist Progress Note      Patient:  Kristina Hong    Unit/Bed:4B-07/007-A  YOB: 1950  MRN: [id number]   Acct: [de-identified]   PCP: Chrissy Asher MD  Date of Admission: 10/17/2020    Assessment/Plan:    1. COVID-19 PNA: pt hypoxic, tachypenic with CTA chest findings of patchy areas of groundglass and consolidation along bases bilaterally consistent with COVID-19 PNA. On Decadron, Remdisivir, s/p convalescence Plasma x1. Also on Vit C, Vit D, and Zinc which is Ok to continue. 10/20: Improving. RN aware to wean down aggressively to SaO2 greater than 90%. CCM. Acapella and incentive spirometry added       2. Acute Hypoxic Resp. Failure: Pulse Ox dropped to 83% on RA, requiring as high as 6L NC (pt on RA at baseline). 2/2 to #1. Managing as above     3. Chest Pain: chest pain that is reproducible at times, also appears more pleuritic 2/2 to #1. Serial trop negative and ECG negative for any new/dynamic ischemic changes  Denies CP now. Will monitor    4. Type 2 DM: A1C 6.6%. on metformin from home which is now held given CTA on 10/18. Will resume tomorrow. BS fairly controlled on SSI. CCM and monitor. 5. Asymptomatic Bacteruria: UA + nitrites and LE with many bacteria however not having any symptoms. Will monitor off ABx.      6. Morbid Obesity w/ BMI 53.67 counseling offered     7. Hx of COPD/Asthma: standing and prn home inhalers resumed.      8. Hx of Schizoaffective    9. Disorder/Depression/PTSD: home medications resumed    10. HypoMg: Mg 1.3. likely nutritional, will replace and trend        Chief Complaint: SOB    Initial H and P:-    Admitted for management of Acute hypoxic respiratory failure secondary to COVID 19      Subjective (past 24 hours):   Feeling improved. Denies CP/SOB/fever/chills      Past medical history, family history, social history and allergies reviewed again and is unchanged since admission.     ROS (12 point review of systems completed. Pertinent positives noted. Otherwise ROS is negative)     Medications:  Reviewed    Infusion Medications    dextrose       Scheduled Medications    gabapentin  600 mg Oral TID    aspirin  81 mg Oral Daily    citalopram  40 mg Oral Daily    furosemide  20 mg Oral Daily    metoprolol succinate  25 mg Oral Daily    budesonide-formoterol  2 puff Inhalation BID    pantoprazole  40 mg Oral QAM AC    QUEtiapine  50 mg Oral Nightly    atorvastatin  20 mg Oral Daily    tiotropium  2 puff Inhalation Daily    alogliptin  25 mg Oral Daily    vitamin C  500 mg Oral Daily    Vitamin D  1,000 Units Oral Daily    zinc sulfate  50 mg Oral Daily    enoxaparin  40 mg Subcutaneous BID    dexamethasone  6 mg Intravenous Daily    insulin lispro  0-6 Units Subcutaneous TID WC    insulin lispro  0-3 Units Subcutaneous Nightly    remdesivir IVPB  100 mg Intravenous Q24H    cyclobenzaprine  5 mg Oral BID     PRN Meds: acetaminophen **OR** acetaminophen, glucose, dextrose, glucagon (rDNA), dextrose, sodium chloride      Intake/Output Summary (Last 24 hours) at 10/19/2020 0756  Last data filed at 10/19/2020 0342  Gross per 24 hour   Intake 300 ml   Output 3250 ml   Net -2950 ml       Diet:  DIET CARB CONTROL; Carb Control: 3 carb choices (45 gms)/meal; Low Sodium (2 GM)    Exam:  /86   Pulse 66   Temp 98.6 °F (37 °C) (Oral)   Resp 24   Ht 5' 2.5\" (1.588 m)   Wt 298 lb 3.2 oz (135.3 kg)   LMP  (LMP Unknown)   SpO2 92%   BMI 53.67 kg/m²   General appearance: Obese, No apparent distress, appears stated age and cooperative. HEENT: Pupils equal, round, and reactive to light. Conjunctivae/corneas clear. Neck: Supple, with full range of motion. No jugular venous distention. Trachea midline. Respiratory:  Normal respiratory effort. Clear to auscultation, bilaterally without Rales/Wheezes/Rhonchi.   Cardiovascular: Regular rate and rhythm with normal S1/S2 without murmurs, rubs or gallops. Abdomen: Soft, non-tender, non-distended with normal bowel sounds. Musculoskeletal: passive and active ROM x 4 extremities. Skin: Skin color, texture, turgor normal.  No rashes or lesions. Neurologic:  Neurovascularly intact without any focal sensory/motor deficits. Cranial nerves: II-XII intact, grossly non-focal.  Psychiatric: Alert and oriented, thought content appropriate, normal insight  Capillary Refill: Brisk,< 3 seconds   Peripheral Pulses: +2 palpable, equal bilaterally     Labs:   Recent Labs     10/17/20  1900 10/18/20  1024 10/19/20  0609   WBC 3.5* 3.4* 5.2   HGB 10.5* 11.2* 10.7*   HCT 35.0* 37.7 35.8*    199 212     Recent Labs     10/17/20  1900 10/18/20  1024 10/19/20  0609    144 141   K 3.9 3.9 3.9    107 104   CO2 22* 21* 23   BUN 14 8 11   CREATININE 0.8 0.6 0.5   CALCIUM 8.5 8.5 8.7     Recent Labs     10/17/20  1900   AST 23   ALT 16   BILIDIR <0.2   BILITOT 0.4   ALKPHOS 90     Recent Labs     10/17/20  1900   INR 1.07     No results for input(s): Veena Ward in the last 72 hours. Microbiology:    Blood culture #1:   Lab Results   Component Value Date    BC No growth-preliminary  No growth   06/30/2016    BC No growth-preliminary  No growth   06/30/2016       Blood culture #2:No results found for: Shiloh Lim    Organism:  Lab Results   Component Value Date    ORG Klebsiella pneumoniae 06/28/2016    ORG Proteus mirabilis 06/28/2016         Lab Results   Component Value Date    LABGRAM  05/04/2015     Rare segmented neutrophils observed. Rare epithelial cells observed. Moderate gram positive cocci occurring singly and in pairs.          MRSA culture only:No results found for: [facility]    Urine culture:   Lab Results   Component Value Date    LABURIN [place] count: >100,000 CFU/mL 12/04/2015    LABURIN [place] count: 50,000-90,000 CFU/mL 12/04/2015       Respiratory culture: No results found for: CULTRESP    Aerobic and Anaerobic :  Lab Results   Component Value Date    LABAERO  05/04/2015     Culture also yielded moderate growth of Staphylococcus  (coagulase negative). LABAERO  05/04/2015     moderate growth  In the treatment of gram positive infections, GENTAMICIN  should be CONSIDERED a SYNERGYSTIC agent ONLY. No results found for: LABANAE    Urinalysis:      Lab Results   Component Value Date    NITRU POSITIVE 10/17/2020    WBCUA 0-2 10/17/2020    BACTERIA MANY 10/17/2020    RBCUA NONE SEEN 10/17/2020    BLOODU NEGATIVE 10/17/2020    SPECGRAV 1.013 06/30/2016    GLUCOSEU NEGATIVE 10/17/2020       Radiology:  CTA Chest W WO Contrast   Final Result   1. Patchy areas of groundglass and consolidation along the posterior lung    bases bilaterally. Findings are consistent with an infectious and/or    inflammatory process including aspiration. 2.  Hepatic steatosis. This document has been electronically signed by: Cecil Mccrary MD on    10/18/2020 03:51 AM      All CT scans at this facility use dose modulation, iterative    reconstruction, and/or weight-based   dosing when appropriate to reduce radiation dose to as low as reasonably    achievable. XR CHEST PORTABLE   Final Result   Minimal airspace filling within the bilateral lower lungs which may be    secondary to atelectasis and/or infiltrate. This document has been electronically signed by: Julieth Milton MD on    10/17/2020 10:23 PM           Cta Chest W Wo Contrast    Result Date: 10/18/2020  CT angiography of the chest with contrast. 3D/MIP post processing reconstructions were performed. Isovue-370 IV contrast was administered. COMPARISON: Single view of the chest dated 10/17/2020, CT of the chest dated 2/13/2017 FINDINGS: There are no intraluminal filling defects to suggest pulmonary embolism. No evidence of right heart strain. No evidence of thoracic aortic dissection. Visualized thyroid is unremarkable. Ascending aorta and main pulmonary artery are normal in caliber.  No

## 2020-10-19 NOTE — PROGRESS NOTES
Loretta Laguna 59  INPATIENT OCCUPATIONAL THERAPY  [facility]  [facility]ALUATION    Time:    Time In: 1010  Time Out: 1105  Timed Code Treatment Minutes: 45 Minutes  Minutes: 55          Date: 10/19/2020  Patient Name: Arely Coleman,   Gender: female      MRN: [id number]  : 1950  (69 y.o.)  Referring Practitioner: Dr. Xiao Brown  Diagnosis: COVID 23  Additional Pertinent Hx: 69 y.o. female who presents to the Emergency Department on 10/17/20 for the evaluation of sternal chest pain for the past few days. Patient is vague with her symptom description and is a poor historian, states that it feels like a elephant is sitting on her chest, states that she has some chronic shortness of breath. Asked about assistive devices for ambulation, states that she uses all of them, asked about respiratory illness history, states that she has all of them. Denies exacerbating or alleviating factors, describes pain as 9 out of 10 chest pressure, denies nausea or emesis, denies fever or chills. Patient has chronic leg swelling and redness, states that she has a history of diabetes and that her leg redness has been persistent since her diagnosis. States that she checks her sugars multiple times daily, unsure of what her sugars have been. She denies cough. Pt sfeel day prior as well. Pt testing COVID +. Restrictions/Precautions:  Restrictions/Precautions: General Precautions, Fall Risk, Isolation(Droplet Plus, COVID-19)  Position Activity Restriction  Other position/activity restrictions: droplet plus, COVID +    Subjective  Chart Reviewed: Yes, Orders, Progress Notes, History and Physical  Patient assessed for rehabilitation services?: Yes    Subjective: Pt lying in bed stating her back was startign to hurt her.  Pt agreeable to OT session    Pain:  Pain Assessment  Patient Currently in Pain: Yes  Pain Assessment: 0-10  Pain Level: 5  Pain Location: Back  Pain Descriptors: Aching  Pain Frequency: Continuous    Social/Functional History:  Lives With: Alone  Type of Home: Apartment  Home Layout: One level  Home Access: Elevator  Home Equipment: [employer], 4 wheeled walker   Bathroom Shower/Tub: Tub/Shower unit, Shower chair with back  Bathroom Toilet: Handicap height  Bathroom Equipment: Grab bars in shower       ADL Assistance: Needs assistance  [address]: Needs assistance  Ambulation Assistance: (Pt stating she uses her wheelchair mainly to get around her apartment)  Transfer Assistance: Independent    Active : Yes     Additional Comments: lift chair, able to transfers from lift chair to w/c and uses w/c as mode of mobility through apartment. only ambulates short distance (~10 ft). aide 3x/week to assist with homemaking tasks and any ADL tasks as neede. pt reporting she is able to dress herself and compelting toileting tasks when aide not present. reports 5 herniated vertebra and soft tissure damage in L thigh    VISION:WNL    HEARING:  WNL    COGNITION: Decreased Insight    RANGE OF MOTION:  Bilateral Upper Extremity:  WNL    STRENGTH:  Bilateral Upper Extremity:  bilateral UE general weakness and deconditioning throughout    ADL:   Grooming: Stand By Assistance. seated EOB  Lower Extremity Dressing: Maximum Assistance. Lázaro Krause BALANCE:  Sitting Balance:  Stand By Assistance. at EOB for approx 30 min with   Standing Balance: Minimal Assistance. wit h1 UE support on IV pole with increased weakness and shaking througout. Pts O2 sats decreased to low 80% with O2 increased frm 3L to 4L to recover. RN aware    BED MOBILITY:  Supine to Sit: Moderate Assistance  increased in SOB equiring time for recovery    TRANSFERS:  Sit to Stand: Moderate Assistance. from eOB   Stand to Sit: Moderate Assistance. onto EOB    FUNCTIONAL MOBILITY:  OTR to further assess tranfsers     Activity Tolerance:  Patient tolerance of  treatment: fair.  Increased fatigue and decrease in O2 sats during activity ronak blairrelucretia time to complete tasks       Assessment:  Assessment: Pt demo decreased endruance and strength for ADL tasks and tranfsers. Pt would benefit from skilled OT ervices to increase her abiliyt to be more indep with ADL asks and transfers during for safe return home. Performance deficits / Impairments: Decreased endurance, Decreased ADL status, Decreased balance, Decreased strength  Prognosis: Fair  REQUIRES OT FOLLOW UP: Yes  Decision Making: Medium Complexity    Treatment Initiated: Treatment and education initiated within context of evaluation. Evaluation time included review of current medical information, gathering information related to past medical, social and functional history, completion of standardized testing, formal and informal observation of tasks, assessment of data and development of plan of care and goals. Treatment time included skilled education and facilitation of tasks to increase safety and independence with ADL's for improved functional independence and quality of life. Discharge Recommendations:  Subacute/Skilled Nursing Facility(due to weakness and increased fall risk, pt is not safe to return home at this time.)    Patient Education:  OT Education: OT Role, Plan of Care    Equipment Recommendations:  Equipment Needed: No    Plan:  Times per week: 5x  Current Treatment Recommendations: Strengthening, Patient/Caregiver Education & Training, Endurance Training, Balance Training, Safety Education & Training, Self-Care / ADL. See long-term goal time frame for expected duration of plan of care. If no long-term goals established, a short length of stay is anticipated.     Goals:  Patient goals : go home  Short term goals  Time Frame for Short term goals: by discharge  Short term goal 1: Pt to complete UB ADL tasks with SBA and maintaining O2 sats above 90%  Short term goal 2: Pt to complete bilateral UE light strengthening HEP usng handout to increase endurance and strength for ADL asks  Short term goal 3: pt to increase standing tolerance > 2 min with CGA and 1 UE support to cimplete ADL tasks and trasfers during  Short term goal 4: pt to b educated on enregy conservation tech and dmeo or verbalize 1-2 tech during aDL asks wit min cus  Short term goal 5: OTR to further assess stand pivot transfers from various surfaces as able. Following session, patient left in safe position with all fall risk precautions in place.

## 2020-10-19 NOTE — PLAN OF CARE
phosphorus, and pH  10/18/2020 2235 by Renata Granger RN  Outcome: Ongoing  Note: Monitoring labs in AM.      Problem: Fatigue  Goal: Verbalize increase energy and improved vitality  10/18/2020 2235 by Renata Granger RN  Outcome: Ongoing  Note: Patient appears to be resting with eyes closed. Problem: Skin Integrity:  Goal: Will show no infection signs and symptoms  Description: Will show no infection signs and symptoms  10/18/2020 2235 by Renata Granger RN  Outcome: Ongoing  Note: Labs and vitals monitored. Patient getting antiviral medications. Problem: Skin Integrity:  Goal: Absence of new skin breakdown  Description: Absence of new skin breakdown  10/18/2020 2235 by Renata Granger RN  Outcome: Ongoing  Note: No skin breakdown noted this shift. Patient encouraged to change position frequently. Problem: Falls - Risk of:  Goal: Will remain free from falls  Description: Will remain free from falls  10/18/2020 2235 by Renata Granger RN  Outcome: Ongoing  Note: Patient free from falls. Fall precautions in place. Patient encouraged to use call light. Problem: Falls - Risk of:  Goal: Absence of physical injury  Description: Absence of physical injury  10/18/2020 2235 by Renata Granger RN  Outcome: Ongoing  Note: Patient free from physical injury. Problem: Pain:  Goal: Pain level will decrease  Description: Pain level will decrease  Outcome: Ongoing  Note: Patient will have decrease pain. Patient will rate pain on a 0-10 scale. Non-pharmaceutical pain interventions will be used before medications. Problem: Pain:  Goal: Control of acute pain  Description: Control of acute pain  Outcome: Ongoing  Note: Patient rates pain as a 8/10 back pain. Patient has chronic back pain. Patient encouraged to rest and reposition. Patient given prn tylenol.       Problem: Pain:  Goal: Control of chronic pain  Description: Control of chronic pain  Outcome: Ongoing  Note: Patient rates pain as a

## 2020-10-19 NOTE — PLAN OF CARE
Problem: Impaired respiratory status  Goal: Clear lung sounds  Description: Clear lung sounds  Outcome: Ongoing    Improve breath sounds, increase aeration and decrease WOB.

## 2020-10-19 NOTE — PROGRESS NOTES
[address] [place] [address]  INPATIENT PHYSICAL THERAPY  EVALUATION  [facility]    Time In: 9467  Time Out: 7776  Timed Code Treatment Minutes: 18 Minutes  Minutes: 34          Date: 10/19/2020  Patient Name: Gopal Garcia,  Gender:  female        MRN: [id number]  : 1950  (69 y.o.)      Referring Practitioner: Fany Boudreaux MD  Diagnosis: COVID-19  Additional Pertinent Hx: Per H&P, \"75 y.o. female who presented to [address] [place] [address] with complaints of fatigue, dry cough, chest pain with sob. Symptoms began this morning, continued to get worse causing pt to present to the ED. Pt has a pmh of COPD, Asthma, Obesity, Chronic Venous Stasis, Schizoaffective Disorder, Depression, DM2, HTN, and HLD. Patients main complaint is chest pain, described as a heaviness sitting on her chest. Does not radiate, associated with sob. Gets worse with deep breathes. No diaphoresis or nausea. Chest pain constant, is reproducible. Admitted for management of Acute hypoxic respiratory failure secondary to COVID 19.\"     Restrictions/Precautions:  Restrictions/Precautions: General Precautions, Fall Risk, Isolation(Droplet Plus, COVID-19)  Position Activity Restriction  Other position/activity restrictions: droplet plus, COVID +    Subjective:  Chart Reviewed: Yes  Patient assessed for rehabilitation services?: Yes  Family / Caregiver Present: No  Subjective: RN approved PT evaluation. Upon entry, pt seated EOB, pleasant and agreeable to participate in therapy. Reported B LE shaking uncontrollably and requested to be returned to bed at the conclusion of the session. Pt on 3.5L O2 via nc. Post session, pt supine in bed with all needs within reach.     General:  Overall Orientation Status: Within Functional Limits  Follows Commands: Within Functional Limits    Vision: Impaired  Vision Exceptions: Wears glasses for reading    Hearing: Within functional limits         Pain: 4/10: L thigh secondary to soft tissue damage    Social/Functional History:    Lives With: Alone  Type of Home: Apartment  Home Layout: One level  Home Access: Elevator  Home Equipment: [address], 4 wheeled walker     Bathroom Shower/Tub: Tub/Shower unit, Shower chair with back  Bathroom Toilet: Handicap height  Bathroom Equipment: Grab bars in shower       ADL Assistance: Needs assistance  [address]: Needs assistance  Ambulation Assistance: (Pt stating she uses her wheelchair mainly to get around her apartment)  Transfer Assistance: Independent    Active : Yes     Additional Comments: lift chair, able to transfers from lift chair to w/c and uses w/c as mode of mobility through apartment. only ambulates short distance (~10 ft). aide 3x/week to assist with homemaking tasks and any ADL tasks as neede. pt reporting she is able to dress herself and compelting toileting tasks when aide not present. reports 5 herniated vertebra and soft tissure damage in L thigh    OBJECTIVE:  Range of Motion:  Right Lower Extremity: WFL  Left Lower Extremity: Impaired - limited mobility secondary to increased thigh pain with mobility    Strength:  Bilateral Lower Extremity: Impaired - grossly deconditioned, unable to fully test MMT secondary to B LE shaking, decreased strength B with L > R    Balance:  Static Sitting Balance:  Modified Independent  Dynamic Sitting Balance: Stand By Assistance    Bed Mobility:  Sit to Supine: Maximum Assistance, with head of bed raised, assist at B LE, pt reports sleeping in recliner at home   Scooting: Dependent    Transfers:  attempted sit-stand, however unable secondary to B LE shaking and increased pain in L LE and low back: Max A on attempt. Ambulation:  Not Tested    Exercise:  Patient was guided in 1 set(s) 10 reps of exercise to both lower extremities.   Ankle pumps, Glut sets, Heelslides, Hip abduction/adduction, Straight leg raises, Seated marches, Seated heel/toe raises, Long arc quads and Seated isometric hip adduction. Exercises were completed for increased independence with functional mobility. Increased difficulty performing there ex on L LE, requiring active assist to complete exercise. Functional Outcome Measures: Completed  AM-PAC Inpatient Mobility Raw Score : 6  AM-PAC Inpatient T-Scale Score : 23.55    ASSESSMENT:  Activity Tolerance:  Patient tolerance of  treatment: fair. Limited by B LE shaking and decreased B LE strength      Treatment Initiated: Treatment and education initiated within context of evaluation. Evaluation time included review of current medical information, gathering information related to past medical, social and functional history, completion of standardized testing, formal and informal observation of tasks, assessment of data and development of plan of care and goals. Treatment time included skilled education and facilitation of tasks to increase safety and independence with functional mobility for improved independence and quality of life. Pt education provided regarding the importance of mobility during admission. Pt encouraged to sit in bedside chair or EOB for all meals. Assessment: Body structures, Functions, Activity limitations: Decreased functional mobility , Decreased ROM, Decreased strength, Decreased endurance, Increased pain  Assessment: Pt presents with COVID-19. Demonstrates a decrease in B LE strength with L>R, resulting in a decrease in baseline functional mobility of bed mobility and transfers. She reports multiple herniated discs and soft tissue damage to the L LE. She demonstrates uncontrollable B LE shaking and is unable to stand on B LE to ambulate at this time. Pt to benefit from skilled PT services to promote increased indep during admission and post discharge for return to [facility].   Prognosis: Fair    REQUIRES PT FOLLOW UP: Yes    Discharge Recommendations:  Discharge Recommendations: Continue to assess pending progress, Subacute/Skilled Nursing Facility, IP Rehab, Patient would benefit from continued therapy after discharge *Pt is unsafe to return home at this time, pt unable to transfer and has limited support    Patient Education:  PT Education: Goals, PT Role, Plan of Care, Home Exercise Program    Equipment Recommendations:  Equipment Needed: No    Plan:  Times per week: 4-5x GM  Current Treatment Recommendations: Strengthening, ROM, Balance Training, Functional Mobility Training, Transfer Training, Endurance Training, Gait Training, Home Exercise Program, Safety Education & Training, Patient/Caregiver Education & Training    Goals:  Patient goals : to return home  Short term goals  Time Frame for Short term goals: by discharge  Short term goal 1: Pt to perform supine<>sit with HOB raised (pt sleeps in recliner at home) with Min A to improve ability to get in and out of bed during admission. Short term goal 2: Pt to perform sit<>stand with min A in prep for OOB activity. Short term goal 3: Pt to perform stand pivot with min A to improve ability to transfer to and from w/c. Short term goal 4: PT to assess gait. Long term goals  Time Frame for Long term goals : n/a secondary to short ELOS    Following session, patient left in safe position with all fall risk precautions in place.

## 2020-10-20 LAB
ALBUMIN SERPL-MCNC: 3.2 G/DL (ref 3.5–5.1)
ALP BLD-CCNC: 73 U/L (ref 38–126)
ALT SERPL-CCNC: 14 U/L (ref 11–66)
ANION GAP SERPL CALCULATED.3IONS-SCNC: 12 MEQ/L (ref 8–16)
AST SERPL-CCNC: 19 U/L (ref 5–40)
BASOPHILS # BLD: 0 %
BASOPHILS ABSOLUTE: 0 THOU/MM3 (ref 0–0.1)
BILIRUB SERPL-MCNC: 0.3 MG/DL (ref 0.3–1.2)
BILIRUBIN DIRECT: < 0.2 MG/DL (ref 0–0.3)
BUN BLDV-MCNC: 16 MG/DL (ref 7–22)
CALCIUM SERPL-MCNC: 8.6 MG/DL (ref 8.5–10.5)
CHLORIDE BLD-SCNC: 103 MEQ/L (ref 98–111)
CO2: 24 MEQ/L (ref 23–33)
CREAT SERPL-MCNC: 0.6 MG/DL (ref 0.4–1.2)
EOSINOPHIL # BLD: 0 %
EOSINOPHILS ABSOLUTE: 0 THOU/MM3 (ref 0–0.4)
ERYTHROCYTE [DISTWIDTH] IN BLOOD BY AUTOMATED COUNT: 17.2 % (ref 11.5–14.5)
ERYTHROCYTE [DISTWIDTH] IN BLOOD BY AUTOMATED COUNT: 51.8 FL (ref 35–45)
GFR SERPL CREATININE-BSD FRML MDRD: > 90 ML/MIN/1.73M2
GLUCOSE BLD-MCNC: 191 MG/DL (ref 70–108)
GLUCOSE BLD-MCNC: 221 MG/DL (ref 70–108)
GLUCOSE BLD-MCNC: 272 MG/DL (ref 70–108)
GLUCOSE BLD-MCNC: 311 MG/DL (ref 70–108)
GLUCOSE BLD-MCNC: 353 MG/DL (ref 70–108)
HCT VFR BLD CALC: 34.9 % (ref 37–47)
HEMOGLOBIN: 10.6 GM/DL (ref 12–16)
IMMATURE GRANS (ABS): 0.05 THOU/MM3 (ref 0–0.07)
IMMATURE GRANULOCYTES: 1.2 %
LYMPHOCYTES # BLD: 26.3 %
LYMPHOCYTES ABSOLUTE: 1.1 THOU/MM3 (ref 1–4.8)
MAGNESIUM: 1.5 MG/DL (ref 1.6–2.4)
MCH RBC QN AUTO: 25.2 PG (ref 26–33)
MCHC RBC AUTO-ENTMCNC: 30.4 GM/DL (ref 32.2–35.5)
MCV RBC AUTO: 82.9 FL (ref 81–99)
MONOCYTES # BLD: 20.4 %
MONOCYTES ABSOLUTE: 0.8 THOU/MM3 (ref 0.4–1.3)
NUCLEATED RED BLOOD CELLS: 0 /100 WBC
PLATELET # BLD: 243 THOU/MM3 (ref 130–400)
PMV BLD AUTO: 10.9 FL (ref 9.4–12.4)
POTASSIUM SERPL-SCNC: 3.7 MEQ/L (ref 3.5–5.2)
RBC # BLD: 4.21 MILL/MM3 (ref 4.2–5.4)
SEG NEUTROPHILS: 52.1 %
SEGMENTED NEUTROPHILS ABSOLUTE COUNT: 2.1 THOU/MM3 (ref 1.8–7.7)
SODIUM BLD-SCNC: 139 MEQ/L (ref 135–145)
TOTAL PROTEIN: 6.6 G/DL (ref 6.1–8)
WBC # BLD: 4.1 THOU/MM3 (ref 4.8–10.8)

## 2020-10-20 PROCEDURE — 97530 THERAPEUTIC ACTIVITIES: CPT

## 2020-10-20 PROCEDURE — 82248 BILIRUBIN DIRECT: CPT

## 2020-10-20 PROCEDURE — 6360000002 HC RX W HCPCS: Performed by: INTERNAL MEDICINE

## 2020-10-20 PROCEDURE — 97116 GAIT TRAINING THERAPY: CPT

## 2020-10-20 PROCEDURE — 6370000000 HC RX 637 (ALT 250 FOR IP): Performed by: INTERNAL MEDICINE

## 2020-10-20 PROCEDURE — 2500000003 HC RX 250 WO HCPCS: Performed by: INTERNAL MEDICINE

## 2020-10-20 PROCEDURE — 80053 COMPREHEN METABOLIC PANEL: CPT

## 2020-10-20 PROCEDURE — 94640 AIRWAY INHALATION TREATMENT: CPT

## 2020-10-20 PROCEDURE — 1200000003 HC TELEMETRY R&B

## 2020-10-20 PROCEDURE — 97535 SELF CARE MNGMENT TRAINING: CPT

## 2020-10-20 PROCEDURE — 85025 COMPLETE CBC W/AUTO DIFF WBC: CPT

## 2020-10-20 PROCEDURE — 97110 THERAPEUTIC EXERCISES: CPT

## 2020-10-20 PROCEDURE — 6370000000 HC RX 637 (ALT 250 FOR IP): Performed by: HOSPITALIST

## 2020-10-20 PROCEDURE — 82948 REAGENT STRIP/BLOOD GLUCOSE: CPT

## 2020-10-20 PROCEDURE — 83735 ASSAY OF MAGNESIUM: CPT

## 2020-10-20 PROCEDURE — 6360000002 HC RX W HCPCS: Performed by: HOSPITALIST

## 2020-10-20 PROCEDURE — 2700000000 HC OXYGEN THERAPY PER DAY

## 2020-10-20 PROCEDURE — 2500000003 HC RX 250 WO HCPCS: Performed by: HOSPITALIST

## 2020-10-20 PROCEDURE — 36415 COLL VENOUS BLD VENIPUNCTURE: CPT

## 2020-10-20 PROCEDURE — 6370000000 HC RX 637 (ALT 250 FOR IP): Performed by: STUDENT IN AN ORGANIZED HEALTH CARE EDUCATION/TRAINING PROGRAM

## 2020-10-20 PROCEDURE — 99233 SBSQ HOSP IP/OBS HIGH 50: CPT | Performed by: HOSPITALIST

## 2020-10-20 PROCEDURE — 2580000003 HC RX 258: Performed by: INTERNAL MEDICINE

## 2020-10-20 RX ORDER — MAGNESIUM SULFATE HEPTAHYDRATE 40 MG/ML
2000 INJECTION, SOLUTION INTRAVENOUS ONCE
Status: DISCONTINUED | OUTPATIENT
Start: 2020-10-20 | End: 2020-10-20

## 2020-10-20 RX ORDER — MAGNESIUM SULFATE IN WATER 40 MG/ML
2 INJECTION, SOLUTION INTRAVENOUS ONCE
Status: COMPLETED | OUTPATIENT
Start: 2020-10-20 | End: 2020-10-20

## 2020-10-20 RX ADMIN — ALOGLIPTIN 25 MG: 25 TABLET, FILM COATED ORAL at 08:46

## 2020-10-20 RX ADMIN — PANTOPRAZOLE SODIUM 40 MG: 40 TABLET, DELAYED RELEASE ORAL at 06:07

## 2020-10-20 RX ADMIN — MAGNESIUM SULFATE HEPTAHYDRATE 2 G: 40 INJECTION, SOLUTION INTRAVENOUS at 14:24

## 2020-10-20 RX ADMIN — CYCLOBENZAPRINE 5 MG: 10 TABLET, FILM COATED ORAL at 08:46

## 2020-10-20 RX ADMIN — BUDESONIDE AND FORMOTEROL FUMARATE DIHYDRATE 2 PUFF: 160; 4.5 AEROSOL RESPIRATORY (INHALATION) at 09:44

## 2020-10-20 RX ADMIN — INSULIN LISPRO 5 UNITS: 100 INJECTION, SOLUTION INTRAVENOUS; SUBCUTANEOUS at 16:53

## 2020-10-20 RX ADMIN — CLOTRIMAZOLE AND BETAMETHASONE DIPROPIONATE: 10; .5 CREAM TOPICAL at 08:56

## 2020-10-20 RX ADMIN — GABAPENTIN 600 MG: 600 TABLET, FILM COATED ORAL at 12:58

## 2020-10-20 RX ADMIN — CITALOPRAM 40 MG: 40 TABLET, FILM COATED ORAL at 08:46

## 2020-10-20 RX ADMIN — ENOXAPARIN SODIUM 40 MG: 40 INJECTION SUBCUTANEOUS at 08:46

## 2020-10-20 RX ADMIN — MICONAZOLE NITRATE: 20 POWDER TOPICAL at 16:52

## 2020-10-20 RX ADMIN — FUROSEMIDE 20 MG: 20 TABLET ORAL at 08:46

## 2020-10-20 RX ADMIN — OXYCODONE HYDROCHLORIDE AND ACETAMINOPHEN 500 MG: 500 TABLET ORAL at 08:46

## 2020-10-20 RX ADMIN — ACETAMINOPHEN 650 MG: 325 TABLET ORAL at 08:46

## 2020-10-20 RX ADMIN — MICONAZOLE NITRATE: 20 POWDER TOPICAL at 21:11

## 2020-10-20 RX ADMIN — REMDESIVIR 100 MG: 5 INJECTION INTRAVENOUS at 08:45

## 2020-10-20 RX ADMIN — DEXAMETHASONE SODIUM PHOSPHATE 6 MG: 4 INJECTION, SOLUTION INTRAMUSCULAR; INTRAVENOUS at 08:46

## 2020-10-20 RX ADMIN — Medication 50 MG: at 08:45

## 2020-10-20 RX ADMIN — METFORMIN HYDROCHLORIDE 1000 MG: 500 TABLET ORAL at 16:53

## 2020-10-20 RX ADMIN — GABAPENTIN 600 MG: 600 TABLET, FILM COATED ORAL at 21:01

## 2020-10-20 RX ADMIN — ENOXAPARIN SODIUM 40 MG: 40 INJECTION SUBCUTANEOUS at 21:01

## 2020-10-20 RX ADMIN — ATORVASTATIN CALCIUM 20 MG: 20 TABLET, FILM COATED ORAL at 08:46

## 2020-10-20 RX ADMIN — Medication 1000 UNITS: at 08:46

## 2020-10-20 RX ADMIN — INSULIN LISPRO 1 UNITS: 100 INJECTION, SOLUTION INTRAVENOUS; SUBCUTANEOUS at 08:45

## 2020-10-20 RX ADMIN — QUETIAPINE FUMARATE 50 MG: 25 TABLET ORAL at 21:01

## 2020-10-20 RX ADMIN — ASPIRIN 81 MG: 81 TABLET ORAL at 08:46

## 2020-10-20 RX ADMIN — METOPROLOL SUCCINATE 25 MG: 25 TABLET, FILM COATED, EXTENDED RELEASE ORAL at 08:46

## 2020-10-20 RX ADMIN — CYCLOBENZAPRINE 5 MG: 10 TABLET, FILM COATED ORAL at 21:01

## 2020-10-20 RX ADMIN — GABAPENTIN 600 MG: 600 TABLET, FILM COATED ORAL at 08:46

## 2020-10-20 RX ADMIN — INSULIN LISPRO 3 UNITS: 100 INJECTION, SOLUTION INTRAVENOUS; SUBCUTANEOUS at 12:58

## 2020-10-20 RX ADMIN — BUDESONIDE AND FORMOTEROL FUMARATE DIHYDRATE 2 PUFF: 160; 4.5 AEROSOL RESPIRATORY (INHALATION) at 20:19

## 2020-10-20 ASSESSMENT — PAIN DESCRIPTION - PAIN TYPE
TYPE: CHRONIC PAIN
TYPE: CHRONIC PAIN

## 2020-10-20 ASSESSMENT — PAIN SCALES - GENERAL
PAINLEVEL_OUTOF10: 8
PAINLEVEL_OUTOF10: 4
PAINLEVEL_OUTOF10: 4
PAINLEVEL_OUTOF10: 3
PAINLEVEL_OUTOF10: 0

## 2020-10-20 ASSESSMENT — PAIN - FUNCTIONAL ASSESSMENT
PAIN_FUNCTIONAL_ASSESSMENT: PREVENTS OR INTERFERES SOME ACTIVE ACTIVITIES AND ADLS
PAIN_FUNCTIONAL_ASSESSMENT: PREVENTS OR INTERFERES SOME ACTIVE ACTIVITIES AND ADLS

## 2020-10-20 ASSESSMENT — PAIN DESCRIPTION - LOCATION
LOCATION: BACK
LOCATION: BACK

## 2020-10-20 ASSESSMENT — PAIN SCALES - WONG BAKER
WONGBAKER_NUMERICALRESPONSE: 0

## 2020-10-20 ASSESSMENT — PAIN DESCRIPTION - ONSET
ONSET: ON-GOING
ONSET: ON-GOING

## 2020-10-20 ASSESSMENT — PAIN DESCRIPTION - PROGRESSION
CLINICAL_PROGRESSION: NOT CHANGED
CLINICAL_PROGRESSION: GRADUALLY WORSENING

## 2020-10-20 ASSESSMENT — PAIN DESCRIPTION - FREQUENCY
FREQUENCY: INTERMITTENT
FREQUENCY: CONTINUOUS

## 2020-10-20 ASSESSMENT — PAIN DESCRIPTION - ORIENTATION
ORIENTATION: MID;LOWER
ORIENTATION: POSTERIOR

## 2020-10-20 ASSESSMENT — PAIN DESCRIPTION - DESCRIPTORS
DESCRIPTORS: CONSTANT
DESCRIPTORS: CONSTANT;ACHING

## 2020-10-20 NOTE — PROGRESS NOTES
[facility]  INPATIENT PHYSICAL THERAPY  DAILY NOTE  [facility] 4B - 4B-07/007-A    Time In: 8131  Time Out: 1107  Timed Code Treatment Minutes: 45 Minutes  Minutes: 38          Date: 10/20/2020  Patient Name: Freddie Hurley,  Gender:  female        MRN: [id number]  : 1950  (69 y.o.)     Referring Practitioner: Treasure Apodaca MD  Diagnosis: COVID-19  Additional Pertinent Hx: Per H&P, \"75 y.o. female who presented to [facility] with complaints of fatigue, dry cough, chest pain with sob. Symptoms began this morning, continued to get worse causing pt to present to the ED. Pt has a pmh of COPD, Asthma, Obesity, Chronic Venous Stasis, Schizoaffective Disorder, Depression, DM2, HTN, and HLD. Patients main complaint is chest pain, described as a heaviness sitting on her chest. Does not radiate, associated with sob. Gets worse with deep breathes. No diaphoresis or nausea. Chest pain constant, is reproducible. Admitted for management of Acute hypoxic respiratory failure secondary to COVID 19.\"     Prior Level of Function:  Lives With: Alone  Type of Home: Apartment  Home Layout: One level  Home Access: Elevator  Home Equipment: [employer], 4 wheeled walker   Bathroom Shower/Tub: Tub/Shower unit, Shower chair with back  Bathroom Toilet: Handicap height  Bathroom Equipment: Grab bars in shower    ADL Assistance: Needs assistance  [address]: Needs assistance  Ambulation Assistance: (Pt stating she uses her wheelchair mainly to get around her apartment)  Transfer Assistance: Independent  Active : Yes  Additional Comments: lift chair, able to transfers from lift chair to w/c and uses w/c as mode of mobility through apartment. only ambulates short distance (~10 ft). aide 3x/week to assist with homemaking tasks and any ADL tasks as neede. pt reporting she is able to dress herself and compelting toileting tasks when aide not present.  reports 5 herniated vertebra and soft tissure damage in L thigh    Restrictions/Precautions:  Restrictions/Precautions: General Precautions, Fall Risk, Isolation(Droplet Plus, COVID-19)  Position Activity Restriction  Other position/activity restrictions: droplet plus, COVID +    SUBJECTIVE: RN approved PT treatment. Upon entry, pt seated EOB, pleasant and agreeable to participate in therapy. Pt on 3L O2. SpO2 monitored throughout session. Post session, pt seated in bedside chair with all needs within reach. Vitals:  Resting BP: 135/85  HR 70bpm  SpO2 93% on 3L via nc    PAIN: 4/10: LBP and L thigh    OBJECTIVE:  Bed Mobility:  Not Tested    Transfers:  Sit to Stand: Stand By Assistance, [address], with increased time for completion, cues for hand placement, to/from chair with arms, initially CGA, progressed to SBA  Stand to Sit:Stand By Assistance, [address], with increased time for completion, cues for hand placement, initially CGA, progressed to SBA   *multiple trials performed throughout session. Gait Training:  Pre-gait activity of marching in place at RW x10 marches at [employer]. Initial difficulty clearing L foot, however with continued trials pt able to clear L foot. Ambulation:  Contact Guard Assistance, with increased time for completion  Distance: 12 ft  Surface: Level Tile  Device:Rolling Walker  Gait Deviations: Forward Flexed Posture, Slow Areli, Decreased Step Length on Right, Decreased Gait Speed, Decreased Heel Strike Bilaterally, Wide Base of Support and increased difficulty initiating step on L secondary to R thigh soft tissue damage however step height improved with increased amb distance. Steady throughout. Pt become SOB with ambulation, however SpO2 remained at 92% during ambulation. Increased time to recover. Balance:  Static Standing Balance: Stand By Assistance, static standing x ~2 min. Cues for erect posture. Pt reported standing \"felt good\". Upon sitting, pt became SOB. Increased time to recover. Dynamic Standing Balance: Contact Guard Assistance    Exercise:  Patient was guided in 1 set(s) 10 reps of exercise to both lower extremities. Seated marches, Seated heel/toe raises, Long arc quads and Seated isometric hip adduction. Exercises were completed for increased independence with functional mobility. Improved ability to perform there ex on L LE this date. Functional Outcome Measures: Completed  AM-PAC Inpatient Mobility Raw Score : 15  AM-PAC Inpatient T-Scale Score : 39.45    ASSESSMENT:  Assessment: Patient progressing toward established goals. Demonstrates improved functional mobility this date, able to ambulate within pt room which pt was previously unable to do. L LE pain and SOB limit ambulation at this time. Pt becomes SOB with all mobility, requiring increased time to recover however SpO2 remained >90% throughout all functional mobility. Pt to benefit from cont skilled PT services to further promote indep with functional mobility tasks for return to PLOF prior to d/c. Activity Tolerance:  Patient tolerance of  treatment: good.       Equipment Recommendations:Equipment Needed: No  Discharge Recommendations:  Continue to assess pending progress, Subacute/Skilled Nursing Facility, Patient would benefit from continued therapy after discharge(TCU?)    Plan: Times per week: 4-5x GM  Current Treatment Recommendations: Strengthening, ROM, Balance Training, Functional Mobility Training, Transfer Training, Endurance Training, Gait Training, Home Exercise Program, Safety Education & Training, Patient/Caregiver Education & Training    Patient Education  Patient Education: Plan of Care, Home Exercise Program, Transfers, Gait    Goals:  Patient goals : to return home  Short term goals  Time Frame for Short term goals: by discharge  Short term goal 1: Pt to perform supine<>sit with [facility] raised (pt sleeps in recliner at home) with Min A to improve ability to get in and out of bed during admission. Short term goal 2: Pt to perform sit<>stand with mod I in prep for OOB activity. Short term goal 3: Pt to ambulate >20ft with RW at supervision to improve ability to navigate within home. Short term goal 4: PT to assess gait. Long term goals  Time Frame for Long term goals : n/a secondary to short ELOS    Following session, patient left in safe position with all fall risk precautions in place.

## 2020-10-20 NOTE — PROGRESS NOTES
Consult received. Chart reviewed. Patient is appropriate for TCU once patient is done receiving IV dexamethasone and Remdesivir. Patient will require a precert.

## 2020-10-20 NOTE — PROGRESS NOTES
history, family history, social history and allergies reviewed again and is unchanged since admission. ROS (12 point review of systems completed. Pertinent positives noted. Otherwise ROS is negative)     Medications:  Reviewed    Infusion Medications    dextrose       Scheduled Medications    clotrimazole-betamethasone   Topical BID    gabapentin  600 mg Oral TID    aspirin  81 mg Oral Daily    citalopram  40 mg Oral Daily    furosemide  20 mg Oral Daily    metoprolol succinate  25 mg Oral Daily    budesonide-formoterol  2 puff Inhalation BID    pantoprazole  40 mg Oral QAM AC    QUEtiapine  50 mg Oral Nightly    atorvastatin  20 mg Oral Daily    tiotropium  2 puff Inhalation Daily    alogliptin  25 mg Oral Daily    vitamin C  500 mg Oral Daily    Vitamin D  1,000 Units Oral Daily    zinc sulfate  50 mg Oral Daily    enoxaparin  40 mg Subcutaneous BID    dexamethasone  6 mg Intravenous Daily    insulin lispro  0-6 Units Subcutaneous TID WC    insulin lispro  0-3 Units Subcutaneous Nightly    remdesivir IVPB  100 mg Intravenous Q24H    cyclobenzaprine  5 mg Oral BID     PRN Meds: acetaminophen **OR** acetaminophen, glucose, dextrose, glucagon (rDNA), dextrose, sodium chloride      Intake/Output Summary (Last 24 hours) at 10/20/2020 0804  Last data filed at 10/20/2020 0313  Gross per 24 hour   Intake 1060 ml   Output 1600 ml   Net -540 ml       Diet:  DIET CARB CONTROL; Carb Control: 3 carb choices (45 gms)/meal; Low Sodium (2 GM)    Exam:  /78   Pulse 63   Temp 98.5 °F (36.9 °C) (Oral)   Resp 18   Ht 5' 2.5\" (1.588 m)   Wt 299 lb 1.6 oz (135.7 kg)   LMP  (LMP Unknown)   SpO2 95%   BMI 53.83 kg/m²   General appearance: Obese, No apparent distress, appears stated age and cooperative. HEENT: Pupils equal, round, and reactive to light. Conjunctivae/corneas clear. Neck: Supple, with full range of motion. No jugular venous distention. Trachea midline.   Respiratory:  Normal respiratory effort. Clear to auscultation, bilaterally without Rales/Wheezes/Rhonchi. Cardiovascular: Regular rate and rhythm with normal S1/S2 without murmurs, rubs or gallops. Abdomen: Soft, non-tender, non-distended with normal bowel sounds. Musculoskeletal: passive and active ROM x 4 extremities. Skin: Skin color, texture, turgor normal.  No rashes or lesions. Neurologic:  Neurovascularly intact without any focal sensory/motor deficits. Cranial nerves: II-XII intact, grossly non-focal.  Psychiatric: Alert and oriented, thought content appropriate, normal insight  Capillary Refill: Brisk,< 3 seconds   Peripheral Pulses: +2 palpable, equal bilaterally     Labs:   Recent Labs     10/18/20  1024 10/19/20  0609 10/20/20  0436   WBC 3.4* 5.2 4.1*   HGB 11.2* 10.7* 10.6*   HCT 37.7 35.8* 34.9*    212 243     Recent Labs     10/18/20  1024 10/19/20  0609 10/20/20  0436    141 139   K 3.9 3.9 3.7    104 103   CO2 21* 23 24   BUN 8 11 16   CREATININE 0.6 0.5 0.6   CALCIUM 8.5 8.7 8.6     Recent Labs     10/17/20  1900 10/19/20  0609 10/20/20  0436   AST 23 21 19   ALT 16 14 14   BILIDIR <0.2 <0.2 <0.2   BILITOT 0.4 0.3 0.3   ALKPHOS 90 74 73     Recent Labs     10/17/20  1900   INR 1.07     No results for input(s): Jose Bailon in the last 72 hours. Microbiology:    Blood culture #1:   Lab Results   Component Value Date    BC No growth-preliminary  No growth   06/30/2016    BC No growth-preliminary  No growth   06/30/2016       Blood culture #2:No results found for: Abhijit Slater    Organism:  Lab Results   Component Value Date    ORG Klebsiella pneumoniae 06/28/2016    ORG Proteus mirabilis 06/28/2016         Lab Results   Component Value Date    LABGRAM  05/04/2015     Rare segmented neutrophils observed. Rare epithelial cells observed. Moderate gram positive cocci occurring singly and in pairs.          MRSA culture only:No results found for: [facility]    Urine culture:   Lab Results Component Value Date    LABURIN Atkinson count: >100,000 CFU/mL 12/04/2015    LABURIN Atkinson count: 50,000-90,000 CFU/mL 12/04/2015       Respiratory culture: No results found for: CULTRESP    Aerobic and Anaerobic :  Lab Results   Component Value Date    LABAERO  05/04/2015     Culture also yielded moderate growth of Staphylococcus  (coagulase negative). LABAERO  05/04/2015     moderate growth  In the treatment of gram positive infections, GENTAMICIN  should be CONSIDERED a SYNERGYSTIC agent ONLY. No results found for: LABANAE    Urinalysis:      Lab Results   Component Value Date    NITRU POSITIVE 10/17/2020    WBCUA 0-2 10/17/2020    BACTERIA MANY 10/17/2020    RBCUA NONE SEEN 10/17/2020    BLOODU NEGATIVE 10/17/2020    SPECGRAV 1.013 06/30/2016    GLUCOSEU NEGATIVE 10/17/2020       Radiology:  CTA Chest W WO Contrast   Final Result   1. Patchy areas of groundglass and consolidation along the posterior lung    bases bilaterally. Findings are consistent with an infectious and/or    inflammatory process including aspiration. 2.  Hepatic steatosis. This document has been electronically signed by: Trenton Cabral MD on    10/18/2020 03:51 AM      All CT scans at this facility use dose modulation, iterative    reconstruction, and/or weight-based   dosing when appropriate to reduce radiation dose to as low as reasonably    achievable. XR CHEST PORTABLE   Final Result   Minimal airspace filling within the bilateral lower lungs which may be    secondary to atelectasis and/or infiltrate. This document has been electronically signed by: Nick Oakes MD on    10/17/2020 10:23 PM           Cta Chest W Wo Contrast    Result Date: 10/18/2020  CT angiography of the chest with contrast. 3D/MIP post processing reconstructions were performed. Isovue-370 IV contrast was administered.   COMPARISON: Single view of the chest dated 10/17/2020, CT of the chest dated 2/13/2017 FINDINGS: There are no intraluminal filling defects to suggest pulmonary embolism. No evidence of right heart strain. No evidence of thoracic aortic dissection. Visualized thyroid is unremarkable. Ascending aorta and main pulmonary artery are normal in caliber. No visualized coronary artery calcifications. Mitral annular calcifications. No pericardial effusion. Normal esophagus. Multiple normal-sized mediastinal lymph nodes, nonspecific but likely reactive. No axillary lymphadenopathy. Normal chest wall. Upper Abdomen: Hepatic steatosis. Lungs: Areas of groundglass and consolidation along the posterior lung bases bilaterally. No pleural effusion. Minimal secretions present within the distal trachea. No significant bronchial wall thickening. No bronchiectasis. Musculoskeletal: Prominent thoracic kyphosis. Moderate spondylosis. Old healed bilateral rib fractures. 1.  Patchy areas of groundglass and consolidation along the posterior lung bases bilaterally. Findings are consistent with an infectious and/or inflammatory process including aspiration. 2.  Hepatic steatosis. This document has been electronically signed by: Anshul Michele MD on 10/18/2020 03:51 AM All CT scans at this facility use dose modulation, iterative reconstruction, and/or weight-based dosing when appropriate to reduce radiation dose to as low as reasonably achievable. Xr Chest Portable    Result Date: 10/17/2020  1 view chest x-ray Comparison:  CR,SR  - XR RIBS LEFT INCLUDE CHEST (MIN 3 VIEWS)  - 12/15/2019 06:15 PM EST  CR,SR  - XR CHEST PORTABLE  - 08/29/2019 06:55 PM EDT Findings: No consolidation or effusion. Normal size heart. Multiple chronic left-sided rib fractures     Minimal airspace filling within the bilateral lower lungs which may be secondary to atelectasis and/or infiltrate.  This document has been electronically signed by: Lee Chaney MD on 10/17/2020 10:23 PM     With RN in room, patient was updated about and agreed upon the treatment

## 2020-10-20 NOTE — PROGRESS NOTES
[facility] CVU 4B  Occupational Therapy  Daily Note  Time:   Time In:   Time Out: 1213  Timed Code Treatment Minutes: 29 Minutes  Minutes: 29          Date: 10/20/2020  Patient Name: Jimbo Akins,   Gender: female      Room: -007-A  MRN: [id number]  : 1950  (69 y.o.)  Referring Practitioner: Dr. Gregory Guerra  Diagnosis: COVID 23  Additional Pertinent Hx: 69 y.o. female who presents to the Emergency Department on 10/17/20 for the evaluation of sternal chest pain for the past few days. Patient is vague with her symptom description and is a poor historian, states that it feels like a elephant is sitting on her chest, states that she has some chronic shortness of breath. Asked about assistive devices for ambulation, states that she uses all of them, asked about respiratory illness history, states that she has all of them. Denies exacerbating or alleviating factors, describes pain as 9 out of 10 chest pressure, denies nausea or emesis, denies fever or chills. Patient has chronic leg swelling and redness, states that she has a history of diabetes and that her leg redness has been persistent since her diagnosis. States that she checks her sugars multiple times daily, unsure of what her sugars have been. She denies cough. Pt sfeel day prior as well. Pt testing COVID +. Restrictions/Precautions:  Restrictions/Precautions: General Precautions, Fall Risk, Isolation(Droplet Plus, COVID-19)  Position Activity Restriction  Other position/activity restrictions: droplet plus, COVID +     SUBJECTIVE: Pt seated in bedside chair upon arrival stating she walked around pt room today and pleased with her performance, agreeable to OT session. PAIN: No c/o. COGNITION: WFL for activity within chair. ADL:   Grooming: with set-up. Washing face while seated in bedside chair. Bathing: with set-up. Washing BUE and underarms seated in bedside chair.  Pt states nursing assisted with LB

## 2020-10-21 LAB
ALBUMIN SERPL-MCNC: 3.8 G/DL (ref 3.5–5.1)
ALP BLD-CCNC: 74 U/L (ref 38–126)
ALT SERPL-CCNC: 14 U/L (ref 11–66)
ANION GAP SERPL CALCULATED.3IONS-SCNC: 12 MEQ/L (ref 8–16)
AST SERPL-CCNC: 17 U/L (ref 5–40)
BASOPHILS # BLD: 0.2 %
BASOPHILS ABSOLUTE: 0 THOU/MM3 (ref 0–0.1)
BILIRUB SERPL-MCNC: 0.4 MG/DL (ref 0.3–1.2)
BILIRUBIN DIRECT: < 0.2 MG/DL (ref 0–0.3)
BUN BLDV-MCNC: 18 MG/DL (ref 7–22)
CALCIUM SERPL-MCNC: 8.7 MG/DL (ref 8.5–10.5)
CHLORIDE BLD-SCNC: 103 MEQ/L (ref 98–111)
CO2: 24 MEQ/L (ref 23–33)
CREAT SERPL-MCNC: 0.6 MG/DL (ref 0.4–1.2)
EOSINOPHIL # BLD: 0 %
EOSINOPHILS ABSOLUTE: 0 THOU/MM3 (ref 0–0.4)
ERYTHROCYTE [DISTWIDTH] IN BLOOD BY AUTOMATED COUNT: 17 % (ref 11.5–14.5)
ERYTHROCYTE [DISTWIDTH] IN BLOOD BY AUTOMATED COUNT: 51.8 FL (ref 35–45)
GFR SERPL CREATININE-BSD FRML MDRD: > 90 ML/MIN/1.73M2
GLUCOSE BLD-MCNC: 195 MG/DL (ref 70–108)
GLUCOSE BLD-MCNC: 270 MG/DL (ref 70–108)
GLUCOSE BLD-MCNC: 306 MG/DL (ref 70–108)
GLUCOSE BLD-MCNC: 329 MG/DL (ref 70–108)
HCT VFR BLD CALC: 35.8 % (ref 37–47)
HEMOGLOBIN: 11 GM/DL (ref 12–16)
IMMATURE GRANS (ABS): 0.07 THOU/MM3 (ref 0–0.07)
IMMATURE GRANULOCYTES: 1.3 %
LYMPHOCYTES # BLD: 28.3 %
LYMPHOCYTES ABSOLUTE: 1.6 THOU/MM3 (ref 1–4.8)
MCH RBC QN AUTO: 25.7 PG (ref 26–33)
MCHC RBC AUTO-ENTMCNC: 30.7 GM/DL (ref 32.2–35.5)
MCV RBC AUTO: 83.6 FL (ref 81–99)
MONOCYTES # BLD: 17 %
MONOCYTES ABSOLUTE: 0.9 THOU/MM3 (ref 0.4–1.3)
NUCLEATED RED BLOOD CELLS: 0 /100 WBC
PLATELET # BLD: 273 THOU/MM3 (ref 130–400)
PMV BLD AUTO: 10.6 FL (ref 9.4–12.4)
POTASSIUM SERPL-SCNC: 3.9 MEQ/L (ref 3.5–5.2)
RBC # BLD: 4.28 MILL/MM3 (ref 4.2–5.4)
REASON FOR REJECTION: NORMAL
REJECTED TEST: NORMAL
SCAN OF BLOOD SMEAR: NORMAL
SEG NEUTROPHILS: 53.2 %
SEGMENTED NEUTROPHILS ABSOLUTE COUNT: 2.9 THOU/MM3 (ref 1.8–7.7)
SODIUM BLD-SCNC: 139 MEQ/L (ref 135–145)
TOTAL PROTEIN: 6.5 G/DL (ref 6.1–8)
WBC # BLD: 5.5 THOU/MM3 (ref 4.8–10.8)

## 2020-10-21 PROCEDURE — 94669 MECHANICAL CHEST WALL OSCILL: CPT

## 2020-10-21 PROCEDURE — 6370000000 HC RX 637 (ALT 250 FOR IP): Performed by: STUDENT IN AN ORGANIZED HEALTH CARE EDUCATION/TRAINING PROGRAM

## 2020-10-21 PROCEDURE — 82248 BILIRUBIN DIRECT: CPT

## 2020-10-21 PROCEDURE — 6370000000 HC RX 637 (ALT 250 FOR IP): Performed by: HOSPITALIST

## 2020-10-21 PROCEDURE — 6370000000 HC RX 637 (ALT 250 FOR IP): Performed by: INTERNAL MEDICINE

## 2020-10-21 PROCEDURE — 97116 GAIT TRAINING THERAPY: CPT

## 2020-10-21 PROCEDURE — 85025 COMPLETE CBC W/AUTO DIFF WBC: CPT

## 2020-10-21 PROCEDURE — 36415 COLL VENOUS BLD VENIPUNCTURE: CPT

## 2020-10-21 PROCEDURE — 94761 N-INVAS EAR/PLS OXIMETRY MLT: CPT

## 2020-10-21 PROCEDURE — 80053 COMPREHEN METABOLIC PANEL: CPT

## 2020-10-21 PROCEDURE — 6360000002 HC RX W HCPCS: Performed by: INTERNAL MEDICINE

## 2020-10-21 PROCEDURE — 99233 SBSQ HOSP IP/OBS HIGH 50: CPT | Performed by: HOSPITALIST

## 2020-10-21 PROCEDURE — 94640 AIRWAY INHALATION TREATMENT: CPT

## 2020-10-21 PROCEDURE — 1200000003 HC TELEMETRY R&B

## 2020-10-21 PROCEDURE — 2580000003 HC RX 258: Performed by: INTERNAL MEDICINE

## 2020-10-21 PROCEDURE — 82948 REAGENT STRIP/BLOOD GLUCOSE: CPT

## 2020-10-21 PROCEDURE — 2500000003 HC RX 250 WO HCPCS: Performed by: INTERNAL MEDICINE

## 2020-10-21 RX ORDER — PIOGLITAZONEHYDROCHLORIDE 45 MG/1
45 TABLET ORAL DAILY
Status: DISCONTINUED | OUTPATIENT
Start: 2020-10-21 | End: 2020-10-22 | Stop reason: HOSPADM

## 2020-10-21 RX ADMIN — ENOXAPARIN SODIUM 40 MG: 40 INJECTION SUBCUTANEOUS at 09:39

## 2020-10-21 RX ADMIN — PANTOPRAZOLE SODIUM 40 MG: 40 TABLET, DELAYED RELEASE ORAL at 03:13

## 2020-10-21 RX ADMIN — MICONAZOLE NITRATE: 20 POWDER TOPICAL at 15:30

## 2020-10-21 RX ADMIN — MICONAZOLE NITRATE: 20 POWDER TOPICAL at 22:06

## 2020-10-21 RX ADMIN — ALOGLIPTIN 25 MG: 25 TABLET, FILM COATED ORAL at 09:40

## 2020-10-21 RX ADMIN — ENOXAPARIN SODIUM 40 MG: 40 INJECTION SUBCUTANEOUS at 22:05

## 2020-10-21 RX ADMIN — MICONAZOLE NITRATE: 20 POWDER TOPICAL at 09:41

## 2020-10-21 RX ADMIN — GABAPENTIN 600 MG: 600 TABLET, FILM COATED ORAL at 09:39

## 2020-10-21 RX ADMIN — FUROSEMIDE 20 MG: 20 TABLET ORAL at 09:39

## 2020-10-21 RX ADMIN — OXYCODONE HYDROCHLORIDE AND ACETAMINOPHEN 500 MG: 500 TABLET ORAL at 09:39

## 2020-10-21 RX ADMIN — INSULIN LISPRO 3 UNITS: 100 INJECTION, SOLUTION INTRAVENOUS; SUBCUTANEOUS at 12:49

## 2020-10-21 RX ADMIN — QUETIAPINE FUMARATE 50 MG: 25 TABLET ORAL at 22:06

## 2020-10-21 RX ADMIN — GABAPENTIN 600 MG: 600 TABLET, FILM COATED ORAL at 15:30

## 2020-10-21 RX ADMIN — CYCLOBENZAPRINE 5 MG: 10 TABLET, FILM COATED ORAL at 09:40

## 2020-10-21 RX ADMIN — ASPIRIN 81 MG: 81 TABLET ORAL at 09:40

## 2020-10-21 RX ADMIN — ATORVASTATIN CALCIUM 20 MG: 20 TABLET, FILM COATED ORAL at 09:40

## 2020-10-21 RX ADMIN — GABAPENTIN 600 MG: 600 TABLET, FILM COATED ORAL at 22:06

## 2020-10-21 RX ADMIN — METOPROLOL SUCCINATE 25 MG: 25 TABLET, FILM COATED, EXTENDED RELEASE ORAL at 09:39

## 2020-10-21 RX ADMIN — METFORMIN HYDROCHLORIDE 1000 MG: 500 TABLET ORAL at 09:39

## 2020-10-21 RX ADMIN — DEXAMETHASONE SODIUM PHOSPHATE 6 MG: 4 INJECTION, SOLUTION INTRAMUSCULAR; INTRAVENOUS at 09:40

## 2020-10-21 RX ADMIN — Medication 1000 UNITS: at 09:39

## 2020-10-21 RX ADMIN — Medication 50 MG: at 09:39

## 2020-10-21 RX ADMIN — PIOGLITAZONE 45 MG: 45 TABLET ORAL at 16:25

## 2020-10-21 RX ADMIN — BUDESONIDE AND FORMOTEROL FUMARATE DIHYDRATE 2 PUFF: 160; 4.5 AEROSOL RESPIRATORY (INHALATION) at 19:41

## 2020-10-21 RX ADMIN — BUDESONIDE AND FORMOTEROL FUMARATE DIHYDRATE 2 PUFF: 160; 4.5 AEROSOL RESPIRATORY (INHALATION) at 08:27

## 2020-10-21 RX ADMIN — METFORMIN HYDROCHLORIDE 1000 MG: 500 TABLET ORAL at 18:24

## 2020-10-21 RX ADMIN — INSULIN LISPRO 4 UNITS: 100 INJECTION, SOLUTION INTRAVENOUS; SUBCUTANEOUS at 18:20

## 2020-10-21 RX ADMIN — REMDESIVIR 100 MG: 5 INJECTION INTRAVENOUS at 09:39

## 2020-10-21 RX ADMIN — CYCLOBENZAPRINE 5 MG: 10 TABLET, FILM COATED ORAL at 22:05

## 2020-10-21 RX ADMIN — CITALOPRAM 40 MG: 40 TABLET, FILM COATED ORAL at 09:40

## 2020-10-21 ASSESSMENT — PAIN DESCRIPTION - FREQUENCY: FREQUENCY: CONTINUOUS

## 2020-10-21 ASSESSMENT — PAIN DESCRIPTION - LOCATION: LOCATION: BACK

## 2020-10-21 ASSESSMENT — PAIN DESCRIPTION - DESCRIPTORS: DESCRIPTORS: CONSTANT;ACHING

## 2020-10-21 ASSESSMENT — PAIN DESCRIPTION - PAIN TYPE: TYPE: CHRONIC PAIN

## 2020-10-21 ASSESSMENT — PAIN DESCRIPTION - ORIENTATION: ORIENTATION: LOWER

## 2020-10-21 ASSESSMENT — PAIN - FUNCTIONAL ASSESSMENT: PAIN_FUNCTIONAL_ASSESSMENT: PREVENTS OR INTERFERES SOME ACTIVE ACTIVITIES AND ADLS

## 2020-10-21 ASSESSMENT — PAIN SCALES - GENERAL
PAINLEVEL_OUTOF10: 0
PAINLEVEL_OUTOF10: 6

## 2020-10-21 ASSESSMENT — PAIN DESCRIPTION - ONSET: ONSET: ON-GOING

## 2020-10-21 ASSESSMENT — PAIN DESCRIPTION - PROGRESSION: CLINICAL_PROGRESSION: NOT CHANGED

## 2020-10-21 NOTE — PLAN OF CARE
Problem: Airway Clearance - Ineffective  Goal: Achieve or maintain patent airway  Outcome: Ongoing  Note: Patient has maintained a patient airway throughout this shift. Patient is currently on 3 L nasal cannula, and maintaining O2 sats above 90%. Will continue to monitor. Problem: Gas Exchange - Impaired  Goal: Absence of hypoxia  Outcome: Ongoing  Note: Patient has remained free from hypoxia and  maintained a patient airway throughout this shift. Patient is currently on 3 L nasal cannula, and maintaining O2 sats above 90%. Will continue to monitor. Problem: Gas Exchange - Impaired  Goal: Promote optimal lung function  Outcome: Ongoing  Note: Patient is currently on 3 L nasal cannula, and maintaining O2 sats above 90%. Patient is encouraged to cough and deep breathe frequently. Will continue to monitor. Problem: Breathing Pattern - Ineffective  Goal: Ability to achieve and maintain a regular respiratory rate  Outcome: Ongoing  Note: Patient has maintained a regular respiratory pattern throughout this shift. Will continue to monitor. Problem: Body Temperature -  Risk of, Imbalanced  Goal: Ability to maintain a body temperature within defined limits  Outcome: Ongoing  Note: Patient has remained afebrile throughout this shift. Will continue to monitor. Problem: Body Temperature -  Risk of, Imbalanced  Goal: Will regain or maintain usual level of consciousness  Outcome: Ongoing  Note: Patient is alert and oriented x4. Will continue to monitor. Problem: Body Temperature -  Risk of, Imbalanced  Goal: Complications related to the disease process, condition or treatment will be avoided or minimized  Outcome: Ongoing  Note: Patient has maintained stable vital signs throughout this shift. Will continue to monitor.       Problem: Isolation Precautions - Risk of Spread of Infection  Goal: Prevent transmission of infection  Outcome: Ongoing  Note: Proper PPE and hand hygiene are practiced before Will continue to monitor. Problem: Skin Integrity:  Goal: Absence of new skin breakdown  Description: Absence of new skin breakdown  Outcome: Ongoing  Note: Patient is educated on the importance of turning every two hours to decrease the risk of new skin breakdwn. Will continue to monitor. Problem: Falls - Risk of:  Goal: Will remain free from falls  Description: Will remain free from falls  Outcome: Ongoing  Note: Patient has remained free of falls throughout this shift. Patient educated on proper use of call light and to wait for assistance. Will continue to monitor. Problem: Falls - Risk of:  Goal: Absence of physical injury  Description: Absence of physical injury  Outcome: Ongoing  Note: Patient has remained free of physical injury throughout this shift. Patient educated on proper use of call light and to wait for assistance. Will continue to monitor. Problem: Pain:  Goal: Pain level will decrease  Description: Pain level will decrease  Outcome: Ongoing  Note: Patient reports a pain 4/10 in her back. Patient states this is chronic. Patient repositioned to promote comfort. Patient wishes to have no pain. Will continue to monitor. Problem: Pain:  Goal: Control of acute pain  Description: Control of acute pain  Outcome: Ongoing  Note: Patient states she is not having any acute pain at this time. Will continue to monitor. Problem: Pain:  Goal: Control of chronic pain  Description: Control of chronic pain  Outcome: Ongoing  Note: Patient states she has chronic pain in her back. Patient states it is a 4/10. Will continue to monitor. Care plan reviewed with patient. Patient verbalize understanding of the plan of care and contribute to goal setting.

## 2020-10-21 NOTE — PLAN OF CARE
Problem: Gas Exchange - Impaired  Goal: Promote optimal lung function  10/21/2020 1016 by Dimple Hernandez RN  Outcome: Met This Shift  Note: Pt remains on supplemental oxygen via nasal cannula to maintain sats >90% this shift. Pt encouraged to perform cough and deep breathing throughout shift. Patient continues to have non-productive cough occasionally. 10/21/2020 0008 by Ana Barksdale  Outcome: Ongoing  Note: Patient is currently on 3 L nasal cannula, and maintaining O2 sats above 90%. Patient is encouraged to cough and deep breathe frequently. Will continue to monitor. Problem: Breathing Pattern - Ineffective  Goal: Ability to achieve and maintain a regular respiratory rate  10/21/2020 1016 by Dimple Hernandez RN  Outcome: Met This Shift  Note: Pt able to maintain reg respiratory rate this shift. 10/21/2020 0008 by Ana Barksdale  Outcome: Ongoing  Note: Patient has maintained a regular respiratory pattern throughout this shift. Will continue to monitor. Problem: Body Temperature -  Risk of, Imbalanced  Goal: Ability to maintain a body temperature within defined limits  10/21/2020 1016 by Dimple Hernandez RN  Outcome: Met This Shift  Note: Pt remains afebrile this shift. 10/21/2020 0008 by Ana Barksdale  Outcome: Ongoing  Note: Patient has remained afebrile throughout this shift. Will continue to monitor. Problem: Isolation Precautions - Risk of Spread of Infection  Goal: Prevent transmission of infection  10/21/2020 1016 by Dimple Hernandez RN  Outcome: Met This Shift  Note: Pt remains in droplet plus precautions throughout this shift. 10/21/2020 0008 by Ana Barksdale  Outcome: Ongoing  Note: Proper PPE and hand hygiene are practiced before entering and exiting patients room. Patients door closed to decrease spread of infection. Will continue to monitor.        Problem: Skin Integrity:  Goal: Will show no infection signs and symptoms  Description: Will show no infection signs and symptoms  10/21/2020 1016 by Prakash Gaytan RN  Outcome: Met This Shift  Note: BP (!) 143/55   Pulse 80   Temp 97.7 °F (36.5 °C) (Oral)   Resp 20   Ht 5' 2.5\" (1.588 m)   Wt 297 lb 3.2 oz (134.8 kg)   LMP  (LMP Unknown)   SpO2 92%   BMI 53.49 kg/m²   Pt afebrile this shift. 10/21/2020 0008 by Danuta Drew  Outcome: Ongoing  Note: Patient has remained afebrile throughout his shift. Patient shows no new signs of infection. Will continue to monitor. Goal: Absence of new skin breakdown  Description: Absence of new skin breakdown  10/21/2020 1016 by Prakash Gaytan RN  Outcome: Met This Shift  Note: No signs of new skin breakdown noted with each assessment this shift. Patient able to turn self  without difficulty. Medicated powder applied to bilat breast folds. 10/21/2020 0008 by Danuta Drew  Outcome: Ongoing  Note: Patient is educated on the importance of turning every two hours to decrease the risk of new skin breakdwn. Will continue to monitor. Problem: Falls - Risk of:  Goal: Will remain free from falls  Description: Will remain free from falls  10/21/2020 1016 by Prakash Gaytan RN  Outcome: Met This Shift  Note: Patient remained free from falls this shift. Bed is in low position with alarm on and siderails up x2. Patient ambulates with one assist. Education given on use of call light and patient voiced understanding. Patient uses call light appropriately. Call light and beside table within reach. Arm band and falling star in place. 10/21/2020 0008 by Danuta Drew  Outcome: Ongoing  Note: Patient has remained free of falls throughout this shift. Patient educated on proper use of call light and to wait for assistance. Will continue to monitor. Goal: Absence of physical injury  Description: Absence of physical injury  10/21/2020 1016 by Prakash Gaytan RN  Outcome: Met This Shift  Note: Patient remained free from falls this shift.  Bed is in pain  Description: Control of chronic pain  10/21/2020 1016 by Estrella Fritz RN  Outcome: Met This Shift  Note: Patient complained of chronic pain in lower back rating it a 6 on the 0-10 scale. Pain medication given as ordered and needed. Patient voiced satisfaction with this. Also instructed patient on distraction, repositioning, and rest as non-pharmacological methods of pain relief. Patient voiced understanding. 10/21/2020 0008 by Endy Fernandez  Outcome: Ongoing  Note: Patient states she has chronic pain in her back. Patient states it is a 4/10. Will continue to monitor. Plan of Care discussed with patient and family. They verbalized understanding.

## 2020-10-21 NOTE — PROGRESS NOTES
soft tissure damage in L thigh    Restrictions/Precautions:  Restrictions/Precautions: General Precautions, Fall Risk, Isolation(Droplet Plus, COVID-19)  Position Activity Restriction  Other position/activity restrictions: droplet plus, COVID +    SUBJECTIVE: Pt in chair, agreeable to PT but wasn't sure how much she could do stating she just came to chair from bathroom. PAIN: 7/10: back chronic issues     OBJECTIVE:  Bed Mobility:  Not Tested    Transfers:  Sit to Stand: Stand By Assistance, [employer], cues for hand placement, to/from chair with arms  Stand to Sit:Stand By Assistance, X 1, cues for hand placement, to/from chair with arms    Ambulation:  Stand By Assistance, Contact Guard Assistance  Distance: forward 6 feet and back 6 feet, not much room to turn around and pt was fatiguing  Surface: Level Tile  Device:Rolling Walker  Gait Deviations: Forward Flexed Posture, Slow Areli and Decreased Step Length Bilaterally    Balance:  Static Sitting Balance:  Supervision  Static Standing Balance: Stand By Assistance  Dynamic Standing Balance: Stand By Assistance, Contact Guard Assistance    Exercise:  Patient was guided in 1 set(s) 10 reps of exercise to both lower extremities. Ankle pumps, Hip abduction/adduction, Seated marches and Long arc quads. Exercises were completed for increased independence with functional mobility. Functional Outcome Measures: Completed  -PAC Inpatient Mobility Raw Score : 15  -PAC Inpatient T-Scale Score : 39.45    ASSESSMENT:  Assessment: Patient progressing toward established goals. Activity Tolerance:  Patient tolerance of  treatment: good. Pt continues to need continued PT for decreased activity tolerance, decreased functional mobility.   Equipment Recommendations:Equipment Needed: No  Discharge Recommendations:  Continue to assess pending progress, Subacute/Skilled Nursing Facility, Patient would benefit from continued therapy after discharge(TCU?)    Plan: Times per week: 4-5x GM  Current Treatment Recommendations: Strengthening, ROM, Balance Training, Functional Mobility Training, Transfer Training, Endurance Training, Gait Training, Home Exercise Program, Safety Education & Training, Patient/Caregiver Education & Training    Patient Education  Patient Education: Plan of Care, Home Exercise Program, Transfers, Gait    Goals:  Patient goals : to return home  Short term goals  Time Frame for Short term goals: by discharge  Short term goal 1: Pt to perform supine<>sit with HOB raised (pt sleeps in recliner at home) with Min A to improve ability to get in and out of bed during admission. Short term goal 2: Pt to perform sit<>stand with mod I in prep for OOB activity. Short term goal 3: Pt to ambulate >20ft with RW at supervision to improve ability to navigate within home. Short term goal 4: PT to assess gait. Long term goals  Time Frame for Long term goals : n/a secondary to short ELOS    Following session, patient left in safe position with all fall risk precautions in place.

## 2020-10-21 NOTE — PROGRESS NOTES
Hospitalist Progress Note      Patient:  Collette Smack    Unit/Bed:4B-07/007-A  YOB: 1950  MRN: [id number]   Acct: [de-identified]   PCP: Viki Dickerson MD  Date of Admission: 10/17/2020    Assessment/Plan:    1. COVID-19 PNA: pt hypoxic, tachypenic with CTA chest findings of patchy areas of groundglass and consolidation along bases bilaterally consistent with COVID-19 PNA. On Decadron, Remdisivir, s/p convalescence Plasma x1. Also on Vit C, Vit D, and Zinc which is Ok to continue. 10/20: Improving. RN aware to wean down aggressively to SaO2 greater than 90%. CCM. Acapella and incentive spirometry added    10/21: improving. CCM. RN to wean aggressively.        2. Acute Hypoxic Resp. Failure: Pulse Ox dropped to 83% on RA, requiring as high as 6L NC (pt on RA at baseline). 2/2 to #1. Managing as above     3. Chest Pain: chest pain that is reproducible at times, also appears more pleuritic 2/2 to #1. Serial trop negative and ECG negative for any new/dynamic ischemic changes  Denies CP now. Will monitor     4. Type 2 DM: A1C 6.6%. on metformin from home which is now held given CTA on 10/18. Will resume tomorrow. BS fairly controlled on SSI. CCM and monitor. 10/20: resumed home Metformin plus SSI. Will monitor  20/21: suboptimally controlled. Noted pt also on Pioglitazone which now added. Will reassess response. Systemic CS also contributing. 5. Asymptomatic Bacteruria: UA + nitrites and LE with many bacteria however not having any symptoms. Will monitor off ABx.      6. Morbid Obesity w/ BMI 53.67 counseling offered     7. Hx of COPD/Asthma: standing and prn home inhalers resumed.      8. Hx of Schizoaffective    9. Disorder/Depression/PTSD: home medications resumed    10. HypoMg: Mg 1.3. likely nutritional, will replace and trend   10/20: repeat Mg level pending.  Will replace as needed     Chief Complaint: SOB    Initial H and P:- Admitted for management of Acute hypoxic respiratory failure secondary to COVID 19      Subjective (past 24 hours):   Feeling improved. No new issues overnight. Denies CP/SOB/fever/chills      Past medical history, family history, social history and allergies reviewed again and is unchanged since admission. ROS (12 point review of systems completed. Pertinent positives noted.  Otherwise ROS is negative)     Medications:  Reviewed    Infusion Medications    dextrose       Scheduled Medications    miconazole   Topical TID    metFORMIN  1,000 mg Oral BID WC    gabapentin  600 mg Oral TID    aspirin  81 mg Oral Daily    citalopram  40 mg Oral Daily    furosemide  20 mg Oral Daily    metoprolol succinate  25 mg Oral Daily    budesonide-formoterol  2 puff Inhalation BID    pantoprazole  40 mg Oral QAM AC    QUEtiapine  50 mg Oral Nightly    atorvastatin  20 mg Oral Daily    tiotropium  2 puff Inhalation Daily    alogliptin  25 mg Oral Daily    vitamin C  500 mg Oral Daily    Vitamin D  1,000 Units Oral Daily    zinc sulfate  50 mg Oral Daily    enoxaparin  40 mg Subcutaneous BID    dexamethasone  6 mg Intravenous Daily    insulin lispro  0-6 Units Subcutaneous TID WC    insulin lispro  0-3 Units Subcutaneous Nightly    remdesivir IVPB  100 mg Intravenous Q24H    cyclobenzaprine  5 mg Oral BID     PRN Meds: acetaminophen **OR** acetaminophen, glucose, dextrose, glucagon (rDNA), dextrose, sodium chloride      Intake/Output Summary (Last 24 hours) at 10/21/2020 0724  Last data filed at 10/21/2020 0318  Gross per 24 hour   Intake 636.77 ml   Output 2550 ml   Net -1913.23 ml       Diet:  DIET CARB CONTROL; Carb Control: 3 carb choices (45 gms)/meal; Low Sodium (2 GM)    Exam:  /72   Pulse 65   Temp 97.8 °F (36.6 °C) (Oral)   Resp 20   Ht 5' 2.5\" (1.588 m)   Wt 297 lb 3.2 oz (134.8 kg)   LMP  (LMP Unknown)   SpO2 94%   BMI 53.49 kg/m²   General appearance: Obese, No apparent distress, appears stated age and cooperative. HEENT: Pupils equal, round, and reactive to light. Conjunctivae/corneas clear. Neck: Supple, with full range of motion. No jugular venous distention. Trachea midline. Respiratory:  Normal respiratory effort. Clear to auscultation, bilaterally without Rales/Wheezes/Rhonchi. Cardiovascular: Regular rate and rhythm with normal S1/S2 without murmurs, rubs or gallops. Abdomen: Soft, non-tender, non-distended with normal bowel sounds. Musculoskeletal: passive and active ROM x 4 extremities. Skin: Skin color, texture, turgor normal.  No rashes or lesions. Neurologic:  Neurovascularly intact without any focal sensory/motor deficits. Cranial nerves: II-XII intact, grossly non-focal.  Psychiatric: Alert and oriented, thought content appropriate, normal insight  Capillary Refill: Brisk,< 3 seconds   Peripheral Pulses: +2 palpable, equal bilaterally     Labs:   Recent Labs     10/18/20  1024 10/19/20  0609 10/20/20  0436   WBC 3.4* 5.2 4.1*   HGB 11.2* 10.7* 10.6*   HCT 37.7 35.8* 34.9*    212 243     Recent Labs     10/18/20  1024 10/19/20  0609 10/20/20  0436    141 139   K 3.9 3.9 3.7    104 103   CO2 21* 23 24   BUN 8 11 16   CREATININE 0.6 0.5 0.6   CALCIUM 8.5 8.7 8.6     Recent Labs     10/19/20  0609 10/20/20  0436   AST 21 19   ALT 14 14   BILIDIR <0.2 <0.2   BILITOT 0.3 0.3   ALKPHOS 74 73     No results for input(s): INR in the last 72 hours. No results for input(s): Shellia Bugler in the last 72 hours.     Microbiology:    Blood culture #1:   Lab Results   Component Value Date    BC No growth-preliminary  No growth   06/30/2016    BC No growth-preliminary  No growth   06/30/2016       Blood culture #2:No results found for: Cristian Dunk    Organism:  Lab Results   Component Value Date    ORG Klebsiella pneumoniae 06/28/2016    ORG Proteus mirabilis 06/28/2016         Lab Results   Component Value Date    LABGRAM  05/04/2015     Rare segmented neutrophils observed. Rare epithelial cells observed. Moderate gram positive cocci occurring singly and in pairs. MRSA culture only:No results found for: [address]    Urine culture:   Lab Results   Component Value Date    LABURIN Cranks count: >100,000 CFU/mL 12/04/2015    LABURIN Cranks count: 50,000-90,000 CFU/mL 12/04/2015       Respiratory culture: No results found for: CULTRESP    Aerobic and Anaerobic :  Lab Results   Component Value Date    LABAERO  05/04/2015     Culture also yielded moderate growth of Staphylococcus  (coagulase negative). LABAERO  05/04/2015     moderate growth  In the treatment of gram positive infections, GENTAMICIN  should be CONSIDERED a SYNERGYSTIC agent ONLY. No results found for: LABANAE    Urinalysis:      Lab Results   Component Value Date    NITRU POSITIVE 10/17/2020    WBCUA 0-2 10/17/2020    BACTERIA MANY 10/17/2020    RBCUA NONE SEEN 10/17/2020    BLOODU NEGATIVE 10/17/2020    SPECGRAV 1.013 06/30/2016    GLUCOSEU NEGATIVE 10/17/2020       Radiology:  CTA Chest W WO Contrast   Final Result   1. Patchy areas of groundglass and consolidation along the posterior lung    bases bilaterally. Findings are consistent with an infectious and/or    inflammatory process including aspiration. 2.  Hepatic steatosis. This document has been electronically signed by: Baljit Balbuena MD on    10/18/2020 03:51 AM      All CT scans at this facility use dose modulation, iterative    reconstruction, and/or weight-based   dosing when appropriate to reduce radiation dose to as low as reasonably    achievable. XR CHEST PORTABLE   Final Result   Minimal airspace filling within the bilateral lower lungs which may be    secondary to atelectasis and/or infiltrate.       This document has been electronically signed by: Heriberto Ramos MD on    10/17/2020 10:23 PM           Cta Chest W Wo Contrast    Result Date: 10/18/2020  CT angiography of the chest with contrast. 3D/MIP post processing reconstructions were performed. Isovue-370 IV contrast was administered. COMPARISON: Single view of the chest dated 10/17/2020, CT of the chest dated 2/13/2017 FINDINGS: There are no intraluminal filling defects to suggest pulmonary embolism. No evidence of right heart strain. No evidence of thoracic aortic dissection. Visualized thyroid is unremarkable. Ascending aorta and main pulmonary artery are normal in caliber. No visualized coronary artery calcifications. Mitral annular calcifications. No pericardial effusion. Normal esophagus. Multiple normal-sized mediastinal lymph nodes, nonspecific but likely reactive. No axillary lymphadenopathy. Normal chest wall. Upper Abdomen: Hepatic steatosis. Lungs: Areas of groundglass and consolidation along the posterior lung bases bilaterally. No pleural effusion. Minimal secretions present within the distal trachea. No significant bronchial wall thickening. No bronchiectasis. Musculoskeletal: Prominent thoracic kyphosis. Moderate spondylosis. Old healed bilateral rib fractures. 1.  Patchy areas of groundglass and consolidation along the posterior lung bases bilaterally. Findings are consistent with an infectious and/or inflammatory process including aspiration. 2.  Hepatic steatosis. This document has been electronically signed by: Kia Norman MD on 10/18/2020 03:51 AM All CT scans at this facility use dose modulation, iterative reconstruction, and/or weight-based dosing when appropriate to reduce radiation dose to as low as reasonably achievable. Xr Chest Portable    Result Date: 10/17/2020  1 view chest x-ray Comparison:  CR,SR  - XR RIBS LEFT INCLUDE CHEST (MIN 3 VIEWS)  - 12/15/2019 06:15 PM EST  CR,SR  - XR CHEST PORTABLE  - 08/29/2019 06:55 PM EDT Findings: No consolidation or effusion. Normal size heart.  Multiple chronic left-sided rib fractures     Minimal airspace filling within the bilateral lower lungs which may be secondary to atelectasis and/or infiltrate. This document has been electronically signed by: Bernard Mercedes MD on 10/17/2020 10:23 PM     With RN in room, patient was updated about and agreed upon the treatment plan, all the questions and concerns were addressed. Patient was seen in PPE (PERSONAL PROTECTIVE EQUIPMENT). Patient was interviewed in Strict Airborne and Droplet Precautions.     Electronically signed by Marie Ng MD on 10/21/2020 at 7:24 AM

## 2020-10-22 ENCOUNTER — HOSPITAL ENCOUNTER (INPATIENT)
Age: 70
LOS: 8 days | Discharge: HOME | DRG: 177 | End: 2020-10-30
Attending: PHYSICAL MEDICINE & REHABILITATION | Admitting: FAMILY MEDICINE
Payer: MEDICARE

## 2020-10-22 VITALS
HEIGHT: 63 IN | RESPIRATION RATE: 22 BRPM | WEIGHT: 293 LBS | BODY MASS INDEX: 51.91 KG/M2 | HEART RATE: 69 BPM | SYSTOLIC BLOOD PRESSURE: 140 MMHG | DIASTOLIC BLOOD PRESSURE: 66 MMHG | TEMPERATURE: 97.6 F | OXYGEN SATURATION: 95 %

## 2020-10-22 PROBLEM — J12.82 PNEUMONIA DUE TO COVID-19 VIRUS: Status: ACTIVE | Noted: 2020-10-22

## 2020-10-22 PROBLEM — F25.9 SCHIZOAFFECTIVE DISORDER (HCC): Status: ACTIVE | Noted: 2020-10-22

## 2020-10-22 PROBLEM — E66.01 CLASS 3 SEVERE OBESITY DUE TO EXCESS CALORIES WITH SERIOUS COMORBIDITY AND BODY MASS INDEX (BMI) OF 50.0 TO 59.9 IN ADULT (HCC): Status: ACTIVE | Noted: 2020-10-22

## 2020-10-22 PROBLEM — K76.0 FATTY LIVER: Status: ACTIVE | Noted: 2020-10-22

## 2020-10-22 PROBLEM — E66.813 CLASS 3 SEVERE OBESITY DUE TO EXCESS CALORIES WITH SERIOUS COMORBIDITY AND BODY MASS INDEX (BMI) OF 50.0 TO 59.9 IN ADULT: Status: ACTIVE | Noted: 2020-10-22

## 2020-10-22 PROBLEM — F43.10 PTSD (POST-TRAUMATIC STRESS DISORDER): Status: ACTIVE | Noted: 2020-10-22

## 2020-10-22 PROBLEM — U07.1 PNEUMONIA DUE TO COVID-19 VIRUS: Status: ACTIVE | Noted: 2020-10-22

## 2020-10-22 LAB
ALBUMIN SERPL-MCNC: 3.5 G/DL (ref 3.5–5.1)
ALP BLD-CCNC: 73 U/L (ref 38–126)
ALT SERPL-CCNC: 17 U/L (ref 11–66)
ANION GAP SERPL CALCULATED.3IONS-SCNC: 12 MEQ/L (ref 8–16)
AST SERPL-CCNC: 32 U/L (ref 5–40)
BASOPHILS # BLD: 0.1 %
BASOPHILS ABSOLUTE: 0 THOU/MM3 (ref 0–0.1)
BILIRUB SERPL-MCNC: 0.4 MG/DL (ref 0.3–1.2)
BILIRUBIN DIRECT: < 0.2 MG/DL (ref 0–0.3)
BUN BLDV-MCNC: 20 MG/DL (ref 7–22)
CALCIUM SERPL-MCNC: 8.8 MG/DL (ref 8.5–10.5)
CHLORIDE BLD-SCNC: 99 MEQ/L (ref 98–111)
CO2: 24 MEQ/L (ref 23–33)
CREAT SERPL-MCNC: 0.6 MG/DL (ref 0.4–1.2)
EOSINOPHIL # BLD: 0.1 %
EOSINOPHILS ABSOLUTE: 0 THOU/MM3 (ref 0–0.4)
ERYTHROCYTE [DISTWIDTH] IN BLOOD BY AUTOMATED COUNT: 17.1 % (ref 11.5–14.5)
ERYTHROCYTE [DISTWIDTH] IN BLOOD BY AUTOMATED COUNT: 53.1 FL (ref 35–45)
GFR SERPL CREATININE-BSD FRML MDRD: > 90 ML/MIN/1.73M2
GLUCOSE BLD-MCNC: 139 MG/DL (ref 70–108)
GLUCOSE BLD-MCNC: 158 MG/DL (ref 70–108)
GLUCOSE BLD-MCNC: 158 MG/DL (ref 70–108)
GLUCOSE BLD-MCNC: 168 MG/DL (ref 70–108)
GLUCOSE BLD-MCNC: 207 MG/DL (ref 70–108)
HCT VFR BLD CALC: 37 % (ref 37–47)
HEMOGLOBIN: 10.9 GM/DL (ref 12–16)
IMMATURE GRANS (ABS): 0.08 THOU/MM3 (ref 0–0.07)
IMMATURE GRANULOCYTES: 1.1 %
LYMPHOCYTES # BLD: 30.2 %
LYMPHOCYTES ABSOLUTE: 2.1 THOU/MM3 (ref 1–4.8)
MAGNESIUM: 1.5 MG/DL (ref 1.6–2.4)
MCH RBC QN AUTO: 25.1 PG (ref 26–33)
MCHC RBC AUTO-ENTMCNC: 29.5 GM/DL (ref 32.2–35.5)
MCV RBC AUTO: 85.3 FL (ref 81–99)
MONOCYTES # BLD: 13.6 %
MONOCYTES ABSOLUTE: 1 THOU/MM3 (ref 0.4–1.3)
NUCLEATED RED BLOOD CELLS: 0 /100 WBC
PLATELET # BLD: 313 THOU/MM3 (ref 130–400)
PLATELET ESTIMATE: ADEQUATE
PMV BLD AUTO: 11.3 FL (ref 9.4–12.4)
POTASSIUM SERPL-SCNC: 4.4 MEQ/L (ref 3.5–5.2)
RBC # BLD: 4.34 MILL/MM3 (ref 4.2–5.4)
SCAN OF BLOOD SMEAR: NORMAL
SEG NEUTROPHILS: 54.9 %
SEGMENTED NEUTROPHILS ABSOLUTE COUNT: 3.9 THOU/MM3 (ref 1.8–7.7)
SODIUM BLD-SCNC: 135 MEQ/L (ref 135–145)
TOTAL PROTEIN: 6.9 G/DL (ref 6.1–8)
WBC # BLD: 7.1 THOU/MM3 (ref 4.8–10.8)

## 2020-10-22 PROCEDURE — 6370000000 HC RX 637 (ALT 250 FOR IP): Performed by: INTERNAL MEDICINE

## 2020-10-22 PROCEDURE — 2500000003 HC RX 250 WO HCPCS: Performed by: INTERNAL MEDICINE

## 2020-10-22 PROCEDURE — 6370000000 HC RX 637 (ALT 250 FOR IP): Performed by: HOSPITALIST

## 2020-10-22 PROCEDURE — 85025 COMPLETE CBC W/AUTO DIFF WBC: CPT

## 2020-10-22 PROCEDURE — 99239 HOSP IP/OBS DSCHRG MGMT >30: CPT | Performed by: HOSPITALIST

## 2020-10-22 PROCEDURE — 6360000002 HC RX W HCPCS: Performed by: HOSPITALIST

## 2020-10-22 PROCEDURE — 82248 BILIRUBIN DIRECT: CPT

## 2020-10-22 PROCEDURE — 83735 ASSAY OF MAGNESIUM: CPT

## 2020-10-22 PROCEDURE — 6360000002 HC RX W HCPCS: Performed by: INTERNAL MEDICINE

## 2020-10-22 PROCEDURE — 6370000000 HC RX 637 (ALT 250 FOR IP): Performed by: STUDENT IN AN ORGANIZED HEALTH CARE EDUCATION/TRAINING PROGRAM

## 2020-10-22 PROCEDURE — 0220000000 HC SKILLED NURSING FACILITY

## 2020-10-22 PROCEDURE — 36415 COLL VENOUS BLD VENIPUNCTURE: CPT

## 2020-10-22 PROCEDURE — 2580000003 HC RX 258: Performed by: HOSPITALIST

## 2020-10-22 PROCEDURE — 94761 N-INVAS EAR/PLS OXIMETRY MLT: CPT

## 2020-10-22 PROCEDURE — 2580000003 HC RX 258: Performed by: INTERNAL MEDICINE

## 2020-10-22 PROCEDURE — 80053 COMPREHEN METABOLIC PANEL: CPT

## 2020-10-22 PROCEDURE — 94640 AIRWAY INHALATION TREATMENT: CPT

## 2020-10-22 PROCEDURE — 1290000000 HC SEMI PRIVATE OTHER R&B

## 2020-10-22 PROCEDURE — 2500000003 HC RX 250 WO HCPCS: Performed by: HOSPITALIST

## 2020-10-22 PROCEDURE — 82948 REAGENT STRIP/BLOOD GLUCOSE: CPT

## 2020-10-22 PROCEDURE — 2700000000 HC OXYGEN THERAPY PER DAY

## 2020-10-22 RX ORDER — DEXTROSE MONOHYDRATE 50 MG/ML
100 INJECTION, SOLUTION INTRAVENOUS PRN
Status: DISCONTINUED | OUTPATIENT
Start: 2020-10-22 | End: 2020-10-30 | Stop reason: HOSPADM

## 2020-10-22 RX ORDER — FUROSEMIDE 20 MG/1
20 TABLET ORAL DAILY
Status: DISCONTINUED | OUTPATIENT
Start: 2020-10-23 | End: 2020-10-30 | Stop reason: HOSPADM

## 2020-10-22 RX ORDER — ASCORBIC ACID 500 MG
500 TABLET ORAL DAILY
Status: DISCONTINUED | OUTPATIENT
Start: 2020-10-23 | End: 2020-10-30 | Stop reason: HOSPADM

## 2020-10-22 RX ORDER — GABAPENTIN 600 MG/1
600 TABLET ORAL 3 TIMES DAILY
Status: DISCONTINUED | OUTPATIENT
Start: 2020-10-22 | End: 2020-10-30 | Stop reason: HOSPADM

## 2020-10-22 RX ORDER — QUETIAPINE FUMARATE 25 MG/1
50 TABLET, FILM COATED ORAL NIGHTLY
Status: DISCONTINUED | OUTPATIENT
Start: 2020-10-22 | End: 2020-10-30 | Stop reason: HOSPADM

## 2020-10-22 RX ORDER — CYCLOBENZAPRINE HCL 10 MG
5 TABLET ORAL 2 TIMES DAILY
Status: CANCELLED | OUTPATIENT
Start: 2020-10-22

## 2020-10-22 RX ORDER — VITAMIN B COMPLEX
1000 TABLET ORAL DAILY
Status: DISCONTINUED | OUTPATIENT
Start: 2020-10-23 | End: 2020-10-30 | Stop reason: HOSPADM

## 2020-10-22 RX ORDER — PIOGLITAZONEHYDROCHLORIDE 45 MG/1
45 TABLET ORAL DAILY
Status: CANCELLED | OUTPATIENT
Start: 2020-10-23

## 2020-10-22 RX ORDER — NICOTINE POLACRILEX 4 MG
15 LOZENGE BUCCAL PRN
Status: CANCELLED | OUTPATIENT
Start: 2020-10-22

## 2020-10-22 RX ORDER — DOCUSATE SODIUM 100 MG/1
100 CAPSULE, LIQUID FILLED ORAL 2 TIMES DAILY PRN
Status: DISCONTINUED | OUTPATIENT
Start: 2020-10-22 | End: 2020-10-30 | Stop reason: HOSPADM

## 2020-10-22 RX ORDER — METOPROLOL SUCCINATE 25 MG/1
25 TABLET, EXTENDED RELEASE ORAL DAILY
Status: CANCELLED | OUTPATIENT
Start: 2020-10-23

## 2020-10-22 RX ORDER — CYCLOBENZAPRINE HCL 10 MG
5 TABLET ORAL 2 TIMES DAILY
Status: DISCONTINUED | OUTPATIENT
Start: 2020-10-22 | End: 2020-10-30 | Stop reason: HOSPADM

## 2020-10-22 RX ORDER — ALOGLIPTIN 25 MG/1
25 TABLET, FILM COATED ORAL DAILY
Status: CANCELLED | OUTPATIENT
Start: 2020-10-23

## 2020-10-22 RX ORDER — ASPIRIN 81 MG/1
81 TABLET ORAL DAILY
Status: DISCONTINUED | OUTPATIENT
Start: 2020-10-23 | End: 2020-10-30 | Stop reason: HOSPADM

## 2020-10-22 RX ORDER — VITAMIN B COMPLEX
1000 TABLET ORAL DAILY
Status: CANCELLED | OUTPATIENT
Start: 2020-10-23

## 2020-10-22 RX ORDER — FUROSEMIDE 20 MG/1
20 TABLET ORAL DAILY
Status: CANCELLED | OUTPATIENT
Start: 2020-10-23

## 2020-10-22 RX ORDER — PIOGLITAZONEHYDROCHLORIDE 45 MG/1
45 TABLET ORAL DAILY
Status: DISCONTINUED | OUTPATIENT
Start: 2020-10-23 | End: 2020-10-30 | Stop reason: HOSPADM

## 2020-10-22 RX ORDER — CITALOPRAM 40 MG/1
40 TABLET ORAL DAILY
Status: CANCELLED | OUTPATIENT
Start: 2020-10-23

## 2020-10-22 RX ORDER — SENNA PLUS 8.6 MG/1
1 TABLET ORAL NIGHTLY PRN
Status: DISCONTINUED | OUTPATIENT
Start: 2020-10-22 | End: 2020-10-30 | Stop reason: HOSPADM

## 2020-10-22 RX ORDER — NICOTINE POLACRILEX 4 MG
15 LOZENGE BUCCAL PRN
Status: DISCONTINUED | OUTPATIENT
Start: 2020-10-22 | End: 2020-10-30 | Stop reason: HOSPADM

## 2020-10-22 RX ORDER — BUDESONIDE AND FORMOTEROL FUMARATE DIHYDRATE 160; 4.5 UG/1; UG/1
2 AEROSOL RESPIRATORY (INHALATION) 2 TIMES DAILY
Status: DISCONTINUED | OUTPATIENT
Start: 2020-10-22 | End: 2020-10-30 | Stop reason: HOSPADM

## 2020-10-22 RX ORDER — ATORVASTATIN CALCIUM 20 MG/1
20 TABLET, FILM COATED ORAL DAILY
Status: CANCELLED | OUTPATIENT
Start: 2020-10-23

## 2020-10-22 RX ORDER — QUETIAPINE FUMARATE 25 MG/1
50 TABLET, FILM COATED ORAL NIGHTLY
Status: CANCELLED | OUTPATIENT
Start: 2020-10-22

## 2020-10-22 RX ORDER — ASCORBIC ACID 500 MG
500 TABLET ORAL DAILY
Status: CANCELLED | OUTPATIENT
Start: 2020-10-23

## 2020-10-22 RX ORDER — ACETAMINOPHEN 325 MG/1
650 TABLET ORAL EVERY 6 HOURS PRN
Status: DISCONTINUED | OUTPATIENT
Start: 2020-10-22 | End: 2020-10-30 | Stop reason: HOSPADM

## 2020-10-22 RX ORDER — ACETAMINOPHEN 650 MG/1
650 SUPPOSITORY RECTAL EVERY 6 HOURS PRN
Status: CANCELLED | OUTPATIENT
Start: 2020-10-22

## 2020-10-22 RX ORDER — POLYETHYLENE GLYCOL 3350 17 G/17G
17 POWDER, FOR SOLUTION ORAL DAILY PRN
Status: DISCONTINUED | OUTPATIENT
Start: 2020-10-22 | End: 2020-10-30 | Stop reason: HOSPADM

## 2020-10-22 RX ORDER — ASPIRIN 81 MG/1
81 TABLET ORAL DAILY
Status: CANCELLED | OUTPATIENT
Start: 2020-10-23

## 2020-10-22 RX ORDER — ZINC SULFATE 50(220)MG
50 CAPSULE ORAL DAILY
Status: DISCONTINUED | OUTPATIENT
Start: 2020-10-23 | End: 2020-10-30 | Stop reason: HOSPADM

## 2020-10-22 RX ORDER — ZINC SULFATE 50(220)MG
50 CAPSULE ORAL DAILY
Status: CANCELLED | OUTPATIENT
Start: 2020-10-23

## 2020-10-22 RX ORDER — METOPROLOL SUCCINATE 25 MG/1
25 TABLET, EXTENDED RELEASE ORAL DAILY
Status: DISCONTINUED | OUTPATIENT
Start: 2020-10-23 | End: 2020-10-30 | Stop reason: HOSPADM

## 2020-10-22 RX ORDER — GABAPENTIN 600 MG/1
600 TABLET ORAL 3 TIMES DAILY
Status: CANCELLED | OUTPATIENT
Start: 2020-10-22

## 2020-10-22 RX ORDER — PANTOPRAZOLE SODIUM 40 MG/1
40 TABLET, DELAYED RELEASE ORAL
Status: DISCONTINUED | OUTPATIENT
Start: 2020-10-23 | End: 2020-10-30 | Stop reason: HOSPADM

## 2020-10-22 RX ORDER — DEXTROSE MONOHYDRATE 25 G/50ML
12.5 INJECTION, SOLUTION INTRAVENOUS PRN
Status: DISCONTINUED | OUTPATIENT
Start: 2020-10-22 | End: 2020-10-30 | Stop reason: HOSPADM

## 2020-10-22 RX ORDER — DEXTROSE MONOHYDRATE 50 MG/ML
100 INJECTION, SOLUTION INTRAVENOUS PRN
Status: CANCELLED | OUTPATIENT
Start: 2020-10-22

## 2020-10-22 RX ORDER — ACETAMINOPHEN 325 MG/1
650 TABLET ORAL EVERY 6 HOURS PRN
Status: CANCELLED | OUTPATIENT
Start: 2020-10-22

## 2020-10-22 RX ORDER — ALOGLIPTIN 25 MG/1
25 TABLET, FILM COATED ORAL DAILY
Status: DISCONTINUED | OUTPATIENT
Start: 2020-10-23 | End: 2020-10-30 | Stop reason: HOSPADM

## 2020-10-22 RX ORDER — BUDESONIDE AND FORMOTEROL FUMARATE DIHYDRATE 160; 4.5 UG/1; UG/1
2 AEROSOL RESPIRATORY (INHALATION) 2 TIMES DAILY
Status: CANCELLED | OUTPATIENT
Start: 2020-10-22

## 2020-10-22 RX ORDER — DEXTROSE MONOHYDRATE 25 G/50ML
12.5 INJECTION, SOLUTION INTRAVENOUS PRN
Status: CANCELLED | OUTPATIENT
Start: 2020-10-22

## 2020-10-22 RX ORDER — CITALOPRAM 40 MG/1
40 TABLET ORAL DAILY
Status: DISCONTINUED | OUTPATIENT
Start: 2020-10-23 | End: 2020-10-30 | Stop reason: HOSPADM

## 2020-10-22 RX ORDER — ATORVASTATIN CALCIUM 20 MG/1
20 TABLET, FILM COATED ORAL DAILY
Status: DISCONTINUED | OUTPATIENT
Start: 2020-10-23 | End: 2020-10-30 | Stop reason: HOSPADM

## 2020-10-22 RX ORDER — ACETAMINOPHEN 650 MG/1
650 SUPPOSITORY RECTAL EVERY 6 HOURS PRN
Status: DISCONTINUED | OUTPATIENT
Start: 2020-10-22 | End: 2020-10-30 | Stop reason: HOSPADM

## 2020-10-22 RX ORDER — PANTOPRAZOLE SODIUM 40 MG/1
40 TABLET, DELAYED RELEASE ORAL
Status: CANCELLED | OUTPATIENT
Start: 2020-10-23

## 2020-10-22 RX ADMIN — FUROSEMIDE 20 MG: 20 TABLET ORAL at 09:03

## 2020-10-22 RX ADMIN — METOPROLOL SUCCINATE 25 MG: 25 TABLET, FILM COATED, EXTENDED RELEASE ORAL at 09:03

## 2020-10-22 RX ADMIN — CYCLOBENZAPRINE 5 MG: 10 TABLET, FILM COATED ORAL at 08:55

## 2020-10-22 RX ADMIN — QUETIAPINE FUMARATE 50 MG: 25 TABLET ORAL at 23:03

## 2020-10-22 RX ADMIN — CYCLOBENZAPRINE 5 MG: 10 TABLET, FILM COATED ORAL at 23:04

## 2020-10-22 RX ADMIN — MICONAZOLE NITRATE: 20 POWDER TOPICAL at 23:13

## 2020-10-22 RX ADMIN — PIOGLITAZONE 45 MG: 45 TABLET ORAL at 09:03

## 2020-10-22 RX ADMIN — INSULIN LISPRO 1 UNITS: 100 INJECTION, SOLUTION INTRAVENOUS; SUBCUTANEOUS at 13:31

## 2020-10-22 RX ADMIN — ATORVASTATIN CALCIUM 20 MG: 20 TABLET, FILM COATED ORAL at 09:02

## 2020-10-22 RX ADMIN — CITALOPRAM 40 MG: 40 TABLET, FILM COATED ORAL at 09:03

## 2020-10-22 RX ADMIN — GABAPENTIN 600 MG: 600 TABLET, FILM COATED ORAL at 23:03

## 2020-10-22 RX ADMIN — Medication 50 MG: at 09:02

## 2020-10-22 RX ADMIN — MICONAZOLE NITRATE: 20 POWDER TOPICAL at 14:20

## 2020-10-22 RX ADMIN — METFORMIN HYDROCHLORIDE 1000 MG: 500 TABLET ORAL at 23:12

## 2020-10-22 RX ADMIN — BUDESONIDE AND FORMOTEROL FUMARATE DIHYDRATE 2 PUFF: 160; 4.5 AEROSOL RESPIRATORY (INHALATION) at 08:36

## 2020-10-22 RX ADMIN — INSULIN LISPRO 1 UNITS: 100 INJECTION, SOLUTION INTRAVENOUS; SUBCUTANEOUS at 09:05

## 2020-10-22 RX ADMIN — GABAPENTIN 600 MG: 600 TABLET, FILM COATED ORAL at 14:20

## 2020-10-22 RX ADMIN — ASPIRIN 81 MG: 81 TABLET ORAL at 08:56

## 2020-10-22 RX ADMIN — MAGNESIUM GLUCONATE 500 MG ORAL TABLET 400 MG: 500 TABLET ORAL at 18:34

## 2020-10-22 RX ADMIN — OXYCODONE HYDROCHLORIDE AND ACETAMINOPHEN 500 MG: 500 TABLET ORAL at 09:02

## 2020-10-22 RX ADMIN — ENOXAPARIN SODIUM 40 MG: 40 INJECTION SUBCUTANEOUS at 23:03

## 2020-10-22 RX ADMIN — PANTOPRAZOLE SODIUM 40 MG: 40 TABLET, DELAYED RELEASE ORAL at 08:56

## 2020-10-22 RX ADMIN — ALOGLIPTIN 25 MG: 25 TABLET, FILM COATED ORAL at 09:02

## 2020-10-22 RX ADMIN — ENOXAPARIN SODIUM 40 MG: 40 INJECTION SUBCUTANEOUS at 08:56

## 2020-10-22 RX ADMIN — MAGNESIUM SULFATE HEPTAHYDRATE 2 G: 500 INJECTION, SOLUTION INTRAMUSCULAR; INTRAVENOUS at 14:55

## 2020-10-22 RX ADMIN — GABAPENTIN 600 MG: 600 TABLET, FILM COATED ORAL at 09:03

## 2020-10-22 RX ADMIN — BUDESONIDE AND FORMOTEROL FUMARATE DIHYDRATE 2 PUFF: 160; 4.5 AEROSOL RESPIRATORY (INHALATION) at 23:09

## 2020-10-22 RX ADMIN — ACETAMINOPHEN 650 MG: 325 TABLET ORAL at 09:30

## 2020-10-22 RX ADMIN — METFORMIN HYDROCHLORIDE 1000 MG: 500 TABLET ORAL at 09:03

## 2020-10-22 RX ADMIN — ACETAMINOPHEN 650 MG: 325 TABLET ORAL at 23:03

## 2020-10-22 RX ADMIN — Medication 1000 UNITS: at 09:02

## 2020-10-22 RX ADMIN — MICONAZOLE NITRATE: 20 POWDER TOPICAL at 08:55

## 2020-10-22 ASSESSMENT — PAIN DESCRIPTION - PROGRESSION: CLINICAL_PROGRESSION: NOT CHANGED

## 2020-10-22 ASSESSMENT — PAIN DESCRIPTION - PAIN TYPE
TYPE: CHRONIC PAIN
TYPE: ACUTE PAIN
TYPE: CHRONIC PAIN

## 2020-10-22 ASSESSMENT — PAIN DESCRIPTION - LOCATION
LOCATION: BACK
LOCATION: BACK
LOCATION: HEAD

## 2020-10-22 ASSESSMENT — PAIN DESCRIPTION - FREQUENCY
FREQUENCY: CONTINUOUS
FREQUENCY: CONTINUOUS

## 2020-10-22 ASSESSMENT — PAIN DESCRIPTION - ORIENTATION
ORIENTATION: LOWER
ORIENTATION: LOWER
ORIENTATION: ANTERIOR

## 2020-10-22 ASSESSMENT — PAIN SCALES - GENERAL
PAINLEVEL_OUTOF10: 5
PAINLEVEL_OUTOF10: 4
PAINLEVEL_OUTOF10: 0
PAINLEVEL_OUTOF10: 4
PAINLEVEL_OUTOF10: 0

## 2020-10-22 ASSESSMENT — PAIN DESCRIPTION - DESCRIPTORS
DESCRIPTORS: ACHING;CONSTANT
DESCRIPTORS: ACHING;CONSTANT

## 2020-10-22 ASSESSMENT — PAIN DESCRIPTION - ONSET: ONSET: ON-GOING

## 2020-10-22 ASSESSMENT — PAIN - FUNCTIONAL ASSESSMENT: PAIN_FUNCTIONAL_ASSESSMENT: PREVENTS OR INTERFERES SOME ACTIVE ACTIVITIES AND ADLS

## 2020-10-22 NOTE — PROGRESS NOTES
Signed and held orders double-checked with TCU. Report called to Cheryl Kuo. 1630: Pt transferred to  via wheelchair with all belongings.

## 2020-10-22 NOTE — DISCHARGE SUMMARY
Hospital Medicine Discharge Summary      Patient Identification:   Dimitry Dawkins   : [birth year]  MRN: [id number]   Account: [de-identified]      Patient's PCP: Maxim Alvarez MD    Admit Date: 10/17/2020     Discharge Date:   10/22/2020    Admitting Physician: Ce Mcclellan MD     Discharge Physician: David Mendiola MD     Discharge Diagnoses: Active Hospital Problems    Diagnosis Date Noted    Obesity, Class III, BMI 40-49.9 (morbid obesity) ([facility] [address] [E66.01] 2013     Priority: Medium    COVID-19 [U07.1] 10/18/2020    Acute respiratory failure with hypoxia ([facility] [address] [J96.01] 10/18/2020    Chest pain [R07.9] 10/18/2020    Venous stasis dermatitis [I87.2] 10/27/2014       The patient was seen and examined on day of discharge and this discharge summary is in conjunction with any daily progress note from day of discharge. Hospital Course: Dimitry Dawkins is a 69 y.o. female admitted to [address] [place] [address] on 10/17/2020 for management of Acute hypoxic respiratory failure secondary to COVID 19. Pt responded well to medical management, remained clinically stable and was discharged in stable condition. Assessment/Plan:    1. COVID-19 PNA: pt hypoxic, tachypenic with CTA chest findings of patchy areas of groundglass and consolidation along bases bilaterally consistent with COVID-19 PNA. On Decadron, Remdisivir, s/p convalescence Plasma x1. Also on Vit C, Vit D, and Zinc which is Ok to continue.      10/20: Improving. RN aware to wean down aggressively to SaO2 greater than 90%. CCM. Acapella and incentive spirometry added     10/22: improving. Completed 4-day course of Remdesivir/Dexamethasone today. CCM. RN to wean aggressively to SaO2 greater than 90%. Anticipate recovery. Monitor for superimposed bacterial PNA.        2. Acute Hypoxic Resp. Failure: improving. Managing as above     3.  Chest Pain: chest pain that is reproducible at times, also appears more pleuritic 2/2 to #1. Serial trop negative and ECG negative for any new/dynamic ischemic changes  Denies CP now. Monitor clinical status      4. Type 2 DM: A1C 6.6%. on metformin from home which is now held given CTA on 10/18. Will resume tomorrow. BS fairly controlled on SSI. CCM and monitor.     10/20: resumed home Metformin plus SSI. Will monitor  20/21: suboptimally controlled. Noted pt also on Pioglitazone which now added. Will reassess response. Systemic CS also contributing. 10/22: BS improved. CCM. Systemic CS d/c'ed today. Pt and PCP to monitor BS and adjust regimen accordingly.       5. Asymptomatic Bacteruria: UA + nitrites and LE with many bacteria however not having any symptoms. Will monitor off ABx.      6. Morbid Obesity w/ BMI 53.67 counseling offered     7. Hx of COPD/Asthma: standing and prn home inhalers resumed.      8. Hx of Schizoaffective     9. Disorder/Depression/PTSD: home medications resumed     10. HypoMg: Mg 1.3. likely nutritional, will replace and trend   10/22: repeat Mg level 1. 5. will give another Mg sulfate 2 gr IV. Will also place on Mg Oxide 400 mg QD at discharge. PCP to repeat Mg and further replace as needed         Exam:     Vitals:  Vitals:    10/21/20 1515 10/21/20 2200 10/22/20 0002 10/22/20 0418   BP: (!) 123/56 129/63 138/67 124/66   Pulse: 71 65 72 77   Resp: 20 20 18 18   Temp: 97.8 °F (36.6 °C) 97.9 °F (36.6 °C) 97.6 °F (36.4 °C) 98.1 °F (36.7 °C)   TempSrc: Oral Oral Oral Oral   SpO2: 93% 94% 95% 96%   Weight:    (!) 300 lb 3.2 oz (136.2 kg)   Height:         Weight: Weight: (!) 300 lb 3.2 oz (136.2 kg)     24 hour intake/output:    Intake/Output Summary (Last 24 hours) at 10/22/2020 0857  Last data filed at 10/22/2020 0417  Gross per 24 hour   Intake 1355 ml   Output 1150 ml   Net 205 ml         General appearance: Obese, No apparent distress, appears stated age and cooperative. HEENT: Pupils equal, round, and reactive to light. Conjunctivae/corneas clear.   Neck: Supple, with full range of motion. No jugular venous distention. Trachea midline. Respiratory:  Normal respiratory effort. Clear to auscultation, bilaterally without Rales/Wheezes/Rhonchi. Cardiovascular: Regular rate and rhythm with normal S1/S2 without murmurs, rubs or gallops. Abdomen: Soft, non-tender, non-distended with normal bowel sounds. Musculoskeletal: passive and active ROM x 4 extremities. Skin: Skin color, texture, turgor normal.  No rashes or lesions. Neurologic:  Neurovascularly intact without any focal sensory/motor deficits. Cranial nerves: II-XII intact, grossly non-focal.  Psychiatric: Alert and oriented, thought content appropriate, normal insight  Capillary Refill: Brisk,< 3 seconds   Peripheral Pulses: +2 palpable, equal bilaterally       Labs: For convenience and continuity at follow-up the following most recent labs are provided:      CBC:    Lab Results   Component Value Date    WBC 7.1 10/22/2020    HGB 10.9 10/22/2020    HCT 37.0 10/22/2020     10/22/2020       Renal:    Lab Results   Component Value Date     10/22/2020    K 4.4 10/22/2020    K 3.9 10/17/2020    CL 99 10/22/2020    CO2 24 10/22/2020    BUN 20 10/22/2020    CREATININE 0.6 10/22/2020    CALCIUM 8.8 10/22/2020         Significant Diagnostic Studies    Radiology:   CTA Chest W WO Contrast   Final Result   1. Patchy areas of groundglass and consolidation along the posterior lung    bases bilaterally. Findings are consistent with an infectious and/or    inflammatory process including aspiration. 2.  Hepatic steatosis. This document has been electronically signed by: Janet Montgomery MD on    10/18/2020 03:51 AM      All CT scans at this facility use dose modulation, iterative    reconstruction, and/or weight-based   dosing when appropriate to reduce radiation dose to as low as reasonably    achievable.          XR CHEST PORTABLE   Final Result   Minimal airspace filling within the bilateral lower lungs which may be secondary to atelectasis and/or infiltrate. This document has been electronically signed by: Regi Javier MD on    10/17/2020 10:23 PM                Consults:     [address]  IP CONSULT TO REHAB/TCU ADMISSION COORDINATOR    Disposition:    [] Home       [x] TCU       [] Rehab       [] Psych       [] SNF       [] [place]       [] Other-    Condition at Discharge: Stable    Code Status:  Prior     Patient Instructions:    Discharge lab work: CBC, BMP, Mg in 2 days  Activity: activity as tolerated  Diet: DIET CARB CONTROL; Carb Control: 3 carb choices (45 gms)/meal; Low Sodium (2 GM)      Follow-up visits:   [address] [place] [address]  [address]  Go to   If symptoms worsen         Discharge Medications:      [address] Medication Instructions EFM:[id number]    Printed on:10/22/20 4399   Medication Information                      acetaminophen (TYLENOL) 500 MG tablet  Take 500 mg by mouth every 6 hours as needed for Pain. acetaminophen (TYLENOL) 500 MG tablet  Take 500 mg by mouth             albuterol sulfate HFA (PROAIR HFA) 108 (90 BASE) MCG/ACT inhaler  Inhale 2 puffs into the lungs every 4 hours as needed             Artificial Saliva (BIOTENE MOISTURIZING MOUTH) SOLN  Take 1 applicator by mouth as needed             ASPIRIN LOW DOSE 81 MG EC tablet  take 1 tablet by mouth once daily             CICLOPIROX TREATMENT EX  Apply  topically. 10.0 mL Ciclopirox-terbinafine-fluconazole  Fungal Free Nails Fungicidal solution             ciprofloxacin (CIPRO) 500 MG tablet  Take 1 tablet by mouth 2 times daily for 7 days             citalopram (CELEXA) 40 MG tablet  take 1 tablet by mouth every morning             clobetasol (TEMOVATE) 0.05 % cream  Apply topically 2 times daily.              Compression Stockings MISC  1 Package by Does not apply route Knee High             Cranberry 450 MG CAPS  Take by mouth             diphenhydrAMINE (BENADRYL) 50 MG capsule  Take 50 mg by mouth nightly as needed for Itching.             etodolac (LODINE) 500 MG tablet  take 1 tablet by mouth twice a day             fluticasone (ARNUITY ELLIPTA) 100 MCG/ACT AEPB  Inhale into the lungs             FREESTYLE LANCETS MISC  Test 1 time daily. Dx: 250.00-Type 2 Diabetes. Freestyle 28G Lancets             furosemide (LASIX) 20 MG tablet  Take 20 mg by mouth daily             gabapentin (NEURONTIN) 300 MG capsule  Take 1 capsule by mouth 3 times daily             glucose blood VI test strips (FREESTYLE LITE) strip  Test once daily             HOMEOPATHIC PRODUCTS CO  550 mg by Combination route daily as needed Legatrin PM             L-Arginine 1000 MG TABS  Take 1,000 mg by mouth daily as needed             Lactobacillus (ACIDOPHILUS) TABS  Take by mouth             loratadine (CLARITIN) 10 MG tablet  Take 10 mg by mouth daily             metFORMIN (GLUCOPHAGE) 1000 MG tablet  take 1 tablet by mouth twice a day with food             metoprolol succinate (TOPROL XL) 25 MG extended release tablet  take 1 tablet by mouth once daily             mometasone-formoterol (DULERA) 200-5 MCG/ACT inhaler  Inhale 2 puffs into the lungs every 12 hours             mometasone-formoterol (DULERA) 200-5 MCG/ACT inhaler  Inhale 2 puffs into the lungs every 12 hours             Mouthwashes (BIOTENE) LIQD oral solution  Swish and spit 15 mLs 3 times daily as needed             mupirocin (BACTROBAN) 2 % ointment  Apply topically 3 times daily Apply topically 3 times daily. nystatin-triamcinolone (MYCOLOG II) 162614-4.1 UNIT/GM-% cream  Apply 1 applicator topically 4 times daily Apply topically 4 times daily.  As needed             Omega-3 Fatty Acids (FISH OIL) 1200 MG CAPS  Take 1,200 mg by mouth daily             omeprazole (PRILOSEC) 40 MG delayed release capsule  take 1 capsule by mouth once daily             pioglitazone (ACTOS) 45 MG tablet  take 1 tablet by mouth once daily             psyllium (KONSYL) 28.3 % PACK  Take 1 packet by mouth daily             QUEtiapine (SEROQUEL) 50 MG tablet  Take 50 mg by mouth nightly. simvastatin (ZOCOR) 40 MG tablet  take 1 tablet by mouth at bedtime             spironolactone (ALDACTONE) 25 MG tablet  Take 1 tablet by mouth daily take 1-2 tablets by mouth once daily if needed (TAKE 2 TABS ON ANY DAY THERE IS SWELLING IN LEGS OR ANKLES)             tiotropium (SPIRIVA HANDIHALER) 18 MCG inhalation capsule  Inhale 1 capsule into the lungs daily             TRADJENTA 5 MG tablet  take 1 tablet by mouth daily             VESICARE 10 MG tablet  take 1 tablet by mouth once daily                 Time Spent on discharge is more than 45 minutes in the examination, evaluation, counseling and review of medications and discharge plan. With RN in room, patient was updated about the treatment plan, all the questions and concerns were addressed. Alarming signs and symptoms to return to ED were explained in length. Signed: Thank you Jayden Mora MD for the opportunity to be involved in this patient's care.     Electronically signed by Varun Christian MD on 10/22/2020 at 8:25 AM

## 2020-10-22 NOTE — PLAN OF CARE
Problem: Gas Exchange - Impaired  Goal: Absence of hypoxia  Outcome: Ongoing  Note: Oxygen sats > 90% on 3 L NC     Problem: Gas Exchange - Impaired  Goal: Promote optimal lung function  Outcome: Ongoing     Problem: Breathing Pattern - Ineffective  Goal: Ability to achieve and maintain a regular respiratory rate  Outcome: Ongoing  Note: Respirations regular and unlabored. Patient dyspnea with exertion. Oxygen sats > 90% on 3 L NC. Problem: Body Temperature -  Risk of, Imbalanced  Goal: Ability to maintain a body temperature within defined limits  Outcome: Ongoing  Note: Patient afebrile during this shift. Problem: Isolation Precautions - Risk of Spread of Infection  Goal: Prevent transmission of infection  Outcome: Ongoing  Note: Patient placed in droplet plus isolation. All necessary precautions taken to help minimize potential spread. Problem: Skin Integrity:  Goal: Will show no infection signs and symptoms  Description: Will show no infection signs and symptoms  Outcome: Ongoing  Note: No signs and/or symptoms of infection noted during this shift. IV site clean, dry and intact. No redness or swelling noted. Patient afebrile during this shift. Problem: Skin Integrity:  Goal: Absence of new skin breakdown  Description: Absence of new skin breakdown  Outcome: Ongoing  Note: No new skin integrity issues noted during this shift. Patient has previously noted redness and excoriation to bilateral breasts. Miconazole powder applied. Patient able to turn self in bed; staff assistance provided as needed with pillow support. Foot of bed raised. Problem: Falls - Risk of:  Goal: Will remain free from falls  Description: Will remain free from falls  Outcome: Ongoing  Note: Patient educated on and encouraged to use the call light for assistance from staff with needs. Patient verbalizes an understanding of this. Bed wheels locked and bed in lowest position; bed alarm on.  Patient possessions are within reach; pathways are clear. Problem: Falls - Risk of:  Goal: Absence of physical injury  Description: Absence of physical injury  Outcome: Ongoing  Note: Patient educated on and encouraged to use the call light for assistance from staff with needs. Patient verbalizes an understanding of this. Bed wheels locked and bed in lowest position; bed alarm on. Patient possessions are within reach; pathways are clear. Problem: Pain:  Goal: Pain level will decrease  Description: Pain level will decrease  Outcome: Ongoing  Note: Patient denies pain during this shift. Will continue to monitor. Problem: Pain:  Goal: Control of acute pain  Description: Control of acute pain  Outcome: Ongoing  Note: Patient denies pain during this shift. Will continue to monitor. Problem: Pain:  Goal: Control of chronic pain  Description: Control of chronic pain  Outcome: Ongoing  Note: Patient denies pain during this shift. Will continue to monitor. Care plan reviewed with patient. Patient verbalizes understanding of the plan of care and contributes to goal setting.

## 2020-10-22 NOTE — CARE COORDINATION
10/20/20, 12:09 PM EDT    DISCHARGE ONGOING EVALUATION:     Jade Rolling day: 2  Location: [facility]07/007-A Reason for admit: COVID-19 [U07.1]   Treatment Plan of Care: Tmax 98.9, O2 at 3 liters nasal cannula with saturations at 93%. IV Decadron, diabetes management, IV Remdesivir, Vitamin C,D and zinc. PT/OT. Barriers to Discharge: medical stability. PCP: Chrissy Asher MD  Readmission Risk Score: 18%  Patient Goals/Plan/Treatment Preferences: from home alone with passport services, SW following TCU consult placed but no beds are available at this time.
10/21/20, 12:25 PM EDT    DISCHARGE ONGOING EVALUATION:     Zohreh Pakrer day: 3  Location: [facility]07/007-A Reason for admit: COVID-19 [U07.1]   Treatment Plan of Care: Tmax 98.0, O2 at 3 liters nasal cannula with O2 saturations at 93%. PT/OT. IV Decadron, diabetes management, IV Remdesivir, Vitamin C,D,and zinc.  Barriers to Discharge: medical stability. [employer] Medicare precert  PCP: Yahir Khoury MD  Readmission Risk Score: 18%  Patient Goals/Plan/Treatment Preferences: From ome alone. Plan is TCU. Precert initiated.
10/22/20, 12:29 PM EDT    Patient goals/plan/ treatment preferences discussed by  and . Patient goals/plan/ treatment preferences reviewed with patient/ family. Patient/ family verbalize understanding of discharge plan and are in agreement with goal/plan/treatment preferences. Understanding was demonstrated using the teach back method. AVS provided by RN at time of discharge, which includes all necessary medical information pertaining to the patients current course of illness, treatment, post-discharge goals of care, and treatment preferences. IMM Letter  IMM Letter given to Patient/Family/Significant other/Guardian/POA/by[de-identified]   IMM Letter date given[de-identified] 10/22/20  IMM Letter time given[de-identified] 3323     Pt to be discharged to TCU today. She is in agreement. TCU disclosure letter provided, [facility].
DISCHARGE/PLANNING EVALUATION  10/19/20, 11:34 AM EDT    Reason for Referral: Current with Passport. Mental Status: alert and oriented. Decision Making: makes own decisions. Family/Social/Home Environment: Assessment completed with pt, states she lives alone at the [facility] and has Passport services, pt has Continued [employer] RN 1 x wk and Aide 3 x's a week. Pt states she prefers to return home as PTA however, pt is agreeable to [facility] TCU if needed. Pt refusing ECF. Current Services including food security, transportation and housekeeping: See note above. Current Equipment: pt states she has 2 walkers , w/c, tub/shower combination with a seat and grab bars and a hospital bed. Payment Source: [facility] and Medicaid. Concerns or Barriers to Discharge: Pt denies barriers. Post acute provider list with quality measures, geographic area and applicable managed care information provided. Questions regarding selection process answered: current with Continued Care. Teach Back Method used with pt regarding care plan and discharge planning. Patient verbalized understanding of the plan of care and contribute to goal setting. Patient goals, treatment preferences and discharge plan: pt planning home with Passport services including Continued Care RN and Aide. Pt agreeable to TCU if needed. Therapy on case.      Electronically signed by SHONNA Orladno on 10/19/2020 at 11:34 AM
Intravenous Q24H    cyclobenzaprine  5 mg Oral BID     Continuous Infusions:   dextrose        Pertinent Info/Orders/Treatment Plan:  COVID (+), D-dimer 1693.00, diabetes management, IV Remdesivir, IV Decadron, Tmax 99.5, O2 at 3 liters per nasal cannula at 92% saturation. Telemetry, PT/OT, Convalwscent plasma transfusion. Diet: DIET CARB CONTROL; Carb Control: 3 carb choices (45 gms)/meal; Low Sodium (2 GM)   Smoking status:  reports that she quit smoking about 27 years ago. Her smoking use included cigarettes. She started smoking about 30 years ago. She quit after 3.00 years of use. She has never used smokeless tobacco.   PCP: John Felix MD  Readmission 30 days or less: no  Readmission Risk Score: 18%    Discharge Planning Evaluation  Current Residence:  Private Residence  Living Arrangements:  Alone   Support Systems:  Family Members  Current Services PTA:     Potential Assistance Needed:  N/A  Potential Assistance Purchasing Medications:  No  Does patient want to participate in local refill/ meds to beds program?  No  Type of Home Care Services:  Housekeeping, [address]  Patient expects to be discharged to:  home  Expected Discharge date:  10/22/20  Follow Up Appointment: Best Day/ Time: Tuesday AM    Patient Goals/Plan/Treatment Preferences: Met with Shasha Loomis. She currently lives alone at [facility]. She is current with [facility] Services, Continued Care for RN. She does have children who can help. Shasha Loomis would prefer to return home at discharge, but is open to TCU if needed. Will follow PT/OT for discharge recommendations. Transportation/Food Security/Housekeeping Addressed:  No issues identified.     Evaluation: yes

## 2020-10-23 LAB
GLUCOSE BLD-MCNC: 146 MG/DL (ref 70–108)
MAGNESIUM: 1.5 MG/DL (ref 1.6–2.4)

## 2020-10-23 PROCEDURE — 82948 REAGENT STRIP/BLOOD GLUCOSE: CPT

## 2020-10-23 PROCEDURE — 6360000002 HC RX W HCPCS: Performed by: HOSPITALIST

## 2020-10-23 PROCEDURE — 6370000000 HC RX 637 (ALT 250 FOR IP): Performed by: HOSPITALIST

## 2020-10-23 PROCEDURE — 97530 THERAPEUTIC ACTIVITIES: CPT

## 2020-10-23 PROCEDURE — 97162 PT EVAL MOD COMPLEX 30 MIN: CPT

## 2020-10-23 PROCEDURE — 97542 WHEELCHAIR MNGMENT TRAINING: CPT

## 2020-10-23 PROCEDURE — 6360000002 HC RX W HCPCS: Performed by: FAMILY MEDICINE

## 2020-10-23 PROCEDURE — 1290000000 HC SEMI PRIVATE OTHER R&B

## 2020-10-23 PROCEDURE — 97110 THERAPEUTIC EXERCISES: CPT

## 2020-10-23 PROCEDURE — 97166 OT EVAL MOD COMPLEX 45 MIN: CPT

## 2020-10-23 PROCEDURE — 2500000003 HC RX 250 WO HCPCS: Performed by: HOSPITALIST

## 2020-10-23 PROCEDURE — 2580000003 HC RX 258: Performed by: FAMILY MEDICINE

## 2020-10-23 PROCEDURE — 94760 N-INVAS EAR/PLS OXIMETRY 1: CPT

## 2020-10-23 PROCEDURE — 2700000000 HC OXYGEN THERAPY PER DAY

## 2020-10-23 PROCEDURE — 83735 ASSAY OF MAGNESIUM: CPT

## 2020-10-23 PROCEDURE — 36415 COLL VENOUS BLD VENIPUNCTURE: CPT

## 2020-10-23 PROCEDURE — 97535 SELF CARE MNGMENT TRAINING: CPT

## 2020-10-23 PROCEDURE — 94640 AIRWAY INHALATION TREATMENT: CPT

## 2020-10-23 RX ORDER — SODIUM CHLORIDE 0.9 % (FLUSH) 0.9 %
10 SYRINGE (ML) INJECTION 2 TIMES DAILY
Status: DISCONTINUED | OUTPATIENT
Start: 2020-10-23 | End: 2020-10-27

## 2020-10-23 RX ORDER — MAGNESIUM SULFATE IN WATER 40 MG/ML
2 INJECTION, SOLUTION INTRAVENOUS ONCE
Status: COMPLETED | OUTPATIENT
Start: 2020-10-23 | End: 2020-10-23

## 2020-10-23 RX ADMIN — MAGNESIUM GLUCONATE 500 MG ORAL TABLET 400 MG: 500 TABLET ORAL at 01:00

## 2020-10-23 RX ADMIN — METFORMIN HYDROCHLORIDE 1000 MG: 500 TABLET ORAL at 16:31

## 2020-10-23 RX ADMIN — PANTOPRAZOLE SODIUM 40 MG: 40 TABLET, DELAYED RELEASE ORAL at 05:20

## 2020-10-23 RX ADMIN — MICONAZOLE NITRATE: 20 POWDER TOPICAL at 22:13

## 2020-10-23 RX ADMIN — CITALOPRAM 40 MG: 40 TABLET, FILM COATED ORAL at 10:53

## 2020-10-23 RX ADMIN — Medication 1000 UNITS: at 10:52

## 2020-10-23 RX ADMIN — GABAPENTIN 600 MG: 600 TABLET, FILM COATED ORAL at 13:29

## 2020-10-23 RX ADMIN — Medication 10 ML: at 22:18

## 2020-10-23 RX ADMIN — MAGNESIUM SULFATE HEPTAHYDRATE 2 G: 40 INJECTION, SOLUTION INTRAVENOUS at 14:43

## 2020-10-23 RX ADMIN — ATORVASTATIN CALCIUM 20 MG: 20 TABLET, FILM COATED ORAL at 10:50

## 2020-10-23 RX ADMIN — Medication 50 MG: at 10:53

## 2020-10-23 RX ADMIN — GABAPENTIN 600 MG: 600 TABLET, FILM COATED ORAL at 10:52

## 2020-10-23 RX ADMIN — CYCLOBENZAPRINE 5 MG: 10 TABLET, FILM COATED ORAL at 22:12

## 2020-10-23 RX ADMIN — FUROSEMIDE 20 MG: 20 TABLET ORAL at 10:52

## 2020-10-23 RX ADMIN — ASPIRIN 81 MG: 81 TABLET ORAL at 10:53

## 2020-10-23 RX ADMIN — PIOGLITAZONE 45 MG: 45 TABLET ORAL at 10:53

## 2020-10-23 RX ADMIN — ACETAMINOPHEN 650 MG: 325 TABLET ORAL at 05:20

## 2020-10-23 RX ADMIN — ALOGLIPTIN 25 MG: 25 TABLET, FILM COATED ORAL at 10:50

## 2020-10-23 RX ADMIN — OXYCODONE HYDROCHLORIDE AND ACETAMINOPHEN 500 MG: 500 TABLET ORAL at 10:52

## 2020-10-23 RX ADMIN — ENOXAPARIN SODIUM 40 MG: 40 INJECTION SUBCUTANEOUS at 22:14

## 2020-10-23 RX ADMIN — GABAPENTIN 600 MG: 600 TABLET, FILM COATED ORAL at 22:12

## 2020-10-23 RX ADMIN — CYCLOBENZAPRINE 5 MG: 10 TABLET, FILM COATED ORAL at 10:50

## 2020-10-23 RX ADMIN — MICONAZOLE NITRATE: 20 POWDER TOPICAL at 13:31

## 2020-10-23 RX ADMIN — MICONAZOLE NITRATE: 20 POWDER TOPICAL at 10:55

## 2020-10-23 RX ADMIN — QUETIAPINE FUMARATE 50 MG: 25 TABLET ORAL at 22:12

## 2020-10-23 RX ADMIN — ENOXAPARIN SODIUM 40 MG: 40 INJECTION SUBCUTANEOUS at 10:54

## 2020-10-23 RX ADMIN — METFORMIN HYDROCHLORIDE 1000 MG: 500 TABLET ORAL at 10:51

## 2020-10-23 RX ADMIN — METOPROLOL SUCCINATE 25 MG: 25 TABLET, FILM COATED, EXTENDED RELEASE ORAL at 10:51

## 2020-10-23 RX ADMIN — BUDESONIDE AND FORMOTEROL FUMARATE DIHYDRATE 2 PUFF: 160; 4.5 AEROSOL RESPIRATORY (INHALATION) at 09:05

## 2020-10-23 RX ADMIN — Medication 5 UNITS: at 11:15

## 2020-10-23 ASSESSMENT — PAIN DESCRIPTION - LOCATION
LOCATION: BACK

## 2020-10-23 ASSESSMENT — PAIN DESCRIPTION - DESCRIPTORS
DESCRIPTORS: ACHING;CONSTANT
DESCRIPTORS: ACHING

## 2020-10-23 ASSESSMENT — PAIN SCALES - GENERAL
PAINLEVEL_OUTOF10: 0
PAINLEVEL_OUTOF10: 5
PAINLEVEL_OUTOF10: 6

## 2020-10-23 ASSESSMENT — PAIN DESCRIPTION - ORIENTATION
ORIENTATION: LOWER

## 2020-10-23 ASSESSMENT — PAIN DESCRIPTION - FREQUENCY
FREQUENCY: CONTINUOUS
FREQUENCY: CONTINUOUS

## 2020-10-23 ASSESSMENT — PAIN DESCRIPTION - PAIN TYPE
TYPE: CHRONIC PAIN

## 2020-10-23 NOTE — PLAN OF CARE
[facility]  Physical Medicine Case Management Assessment    [] Inpatient Rehabilitation Unit  [x] Transitional Care Unit    Patient Name: Sara Segura        MRN: [id number]    : 1950  (69 y.o.)  Gender: female   Date of Admission: 10/22/2020  5:12 PM    Family/Social/Home Environment: Social/Functional History: Per physician note: Sara Segura is a 69 y.o. female admitted to the transitional care unit on 10/22/2020. She came to the ED with concern of CP, SOB and fatigue. She was found to have covid-19 pneumonia and was admitted. She was treated with convalescent plasma, steroids and antiviral meds. She became stronger and eventually able to come to the TCU. She states she feels some SOB today. Lives With: Alone  Type of Home: Apartment  Home Layout: Multi-level  Home Access: Level entry, Elevator  Bathroom Shower/Tub: Tub/Shower unit, Shower chair with back  Bathroom Toilet: Handicap height  Bathroom Equipment: Grab bars in [address] [place]: [employer], 4 wheeled walker  Receives Help From: Personal care attendant(MWF aide)  ADL Assistance: Needs assistance  Homemaking Assistance: Needs assistance  Ambulation Assistance: Independent  Transfer Assistance: Needs assistance  Active : No  Patient's  Info: Children provide transportation  Mode of Transportation: Car  Education: [employer]  Occupation: Retired  Additional Comments: lift chair, able to transfers from lift chair to w/c and uses w/c as mode of mobility through apartment. only ambulates short distance (~10 ft). aide 3x/week to assist with homemaking tasks and any ADL tasks as needed. pt reporting she is able to dress herself and compelting toileting tasks when aide not present.  reports 5 herniated vertebra and soft tissure damage in L thigh    Contact/Guardian Information: Emergency Contacts  Healthcare Agent Appointed: Trevon Raymond Agent's Name: [address] [place] [address] Utilized: Home Health       Anticipated Needs/Discharge Plans: Anticipate patient will need ongoing skilled therapies and possibly medical equipment at discharge. Discharge Planning:SW met with patient gave an overview of TCU,Team Conference/Team Conference schedule and this SW's role in discharge planning. SW will monitor progress and maintain contact with patient and patient's family regarding length of stay and recommendations. SW to follow and maintain involvement in discharge planning. Care plan reviewed with patient. Patient verbalized understanding of the plan of care and contributed to goal setting.     Living Arrangements: Alone  Support Systems: Children, Friends/Neighbors  Potential Assistance Needed: Home Care  Potential Assistance Purchasing Medications: No  Meds-to-Beds: Does the patient want to have any new prescriptions delivered to bedside prior to discharge?: Not Assessed  Type of Home Care Services: Aide Services, Housekeeping  Patient expects to be discharged to[de-identified] Home  Expected Discharge Date: (undetermined)      Richi Crooks 10/23/2020 4:42 PM

## 2020-10-23 NOTE — PLAN OF CARE
Problem: Nutrition  Goal: Optimal nutrition therapy  Outcome: Ongoing   Nutrition Problem #1: Inadequate oral intake  Intervention: Food and/or Nutrient Delivery: Continue Current Diet, Vitamin Supplement  Nutritional Goals: Pt will consume 75% or more of meals during LOS

## 2020-10-23 NOTE — PROGRESS NOTES
Loretta Laguna 59  INPATIENT OCCUPATIONAL THERAPY  [facility] SKILLED NURSING UNIT  EVALUATION    Time:   Time In: 930  Time Out: 1018  Timed Code Treatment Minutes: 45 Minutes  Minutes: 48          Date: 10/23/2020  Patient Name: Tika Xavier,   Gender: female      MRN: [id number]  : 1950  (69 y.o.)  Referring Practitioner: Dr. Naresh Casillas  Diagnosis: COVID 23  Additional Pertinent Hx: 69 y.o. female who presents to the Emergency Department on 10/17/20 for the evaluation of sternal chest pain for the past few days. Patient is vague with her symptom description and is a poor historian, states that it feels like a elephant is sitting on her chest, states that she has some chronic shortness of breath. Asked about assistive devices for ambulation, states that she uses all of them, asked about respiratory illness history, states that she has all of them. Denies exacerbating or alleviating factors, describes pain as 9 out of 10 chest pressure, denies nausea or emesis, denies fever or chills. Patient has chronic leg swelling and redness, states that she has a history of diabetes and that her leg redness has been persistent since her diagnosis. States that she checks her sugars multiple times daily, unsure of what her sugars have been. She denies cough. Pt sfeel day prior as well. Pt testing COVID +. Transfer to [facility] 10/22/2020.     Restrictions/Precautions:  Restrictions/Precautions: General Precautions, Fall Risk, Isolation  Position Activity Restriction  Other position/activity restrictions: droplet plus, COVID +    Subjective  Patient assessed for rehabilitation services?: Yes    Subjective: Pt with call light on requesting to use BR upon OT arrival.    Pain:  Pain Assessment  Patient Currently in Pain: Yes  Pain Assessment: 0-10  Pain Level: 5  Pain Location: Back    Social/Functional History:  Lives With: Alone  Type of Home: Apartment  Home Layout: One level  Home Access: [address] [place] [address] Equipment: [employer], 4 wheeled walker   Bathroom Shower/Tub: Tub/Shower unit, Shower chair with back  [employer]: Handicap height  Bathroom Equipment: Grab bars in shower    Receives Help From: Personal care attendant(MWF aide)  ADL Assistance: Needs assistance  Homemaking Assistance: Needs assistance  Ambulation Assistance: (pt has mainly been using w/c for mobility)  Transfer Assistance: Independent    Active : Yes     Additional Comments: lift chair, able to transfers from lift chair to w/c and uses w/c as mode of mobility through apartment. only ambulates short distance (~10 ft). aide 3x/week to assist with homemaking tasks and any ADL tasks as needed. pt reporting she is able to dress herself and compelting toileting tasks when aide not present. reports 5 herniated vertebra and soft tissure damage in L thigh     Pt on 4.5L  O2 upon arrival to room. Pt O2 sats at 96%. Upon activity pt maintaining O2 between 94-96%. Pt requiring min rest break to recover. VISION:WFL    HEARING:  WFL    COGNITION: WFL    RANGE OF MOTION:  Bilateral Upper Extremity:  WFL    STRENGTH:  Bilateral Upper Extremity:  WFL    ADL:     EATING:Setup or clean-up assistance. ,  CARE Score: 5. ORAL HYGIENE:Supervision or touching assistance. Kathrene Bolk CARE Score: 4. TOILETING HYGIENE:Partial/moderate assistance. Kathrene Bolk CARE Score: 3.   managing clothing  SHOWERING/BATHING:Partial/moderate assistance. Kathrene Bolk CARE Score: 3.   assist with B feet and lower legs  UPPER BODY DRESSING:Partial/moderate assistance. Kathrene Bolk CARE Score: 3.   managing hospital gown  LOWER BODY DRESSING:Substantial/maximal assistance. Kathrene Bolk CARE Score: 2. FOOTWEAR:Dependent   . CARE Score: 1.   socks  TOILET TRANSFER: Partial/moderate assistance     CARE Score: 3     *extended time required    BALANCE:  Sitting Balance:  Supervision. Standing Balance: Contact Guard Assistance.       BED MOBILITY:  Not Tested    TRANSFERS:  Sit to Stand: Contact Guard Assistance. Stand to Sit: Contact Guard Assistance. FUNCTIONAL MOBILITY:  Assistive Device: Rolling Walker  Assist Level:  Contact Guard Assistance. Distance: To and from bathroom  Slow pace, increased back pain       Activity Tolerance:  Patient tolerance of  treatment: fair. Back pain and SOB limiting session, however, able to maintain O2      Assessment:    Pt s/p admission for covid-19. Pt exhibiting deficits detailed below, requiring additional OT intervention to restore PLOF. Pt now requiring assist for ADLs, IADLs, mobility, t/fs, and standing balance. Pt with significant increase in burden of care. Without skilled OT intervention pt at risk for functional decline, increased falls, and readmission to hospital.    Performance deficits / Impairments: Decreased endurance, Decreased ADL status, Decreased balance, Decreased strength, Decreased functional mobility , Decreased safe awareness, Decreased high-level IADLs  Prognosis: Fair    Treatment Initiated: Treatment and education initiated within context of evaluation. Evaluation time included review of current medical information, gathering information related to past medical, social and functional history, completion of standardized testing, formal and informal observation of tasks, assessment of data and development of plan of care and goals. Treatment time included skilled education and facilitation of tasks to increase safety and independence with ADL's for improved functional independence and quality of life. Discharge Recommendations:  Continue to assess pending progress    Patient Education:  OT Education: OT Role, Plan of Care, ADL Adaptive Strategies, Transfer Training, Energy Conservation    Equipment Recommendations:   Other: continue to assess pending discharge disposition    Plan:  Times per week: 6x  Current Treatment Recommendations: Strengthening, Patient/Caregiver Education & Training, Endurance Training, Balance Training, Safety Education & Training, Self-Care / ADL. See long-term goal time frame for expected duration of plan of care. If no long-term goals established, a short length of stay is anticipated. Goals:     Short term goals  Time Frame for Short term goals: 2 weeks  Short term goal 1: Pt to complete LB ADL tasks with Janine and maintaining O2 sats above 90% to increase indep with self care. Short term goal 2: Pt will complete toileting routine including t/f with SBA to increase indep with self care  Short term goal 3: Pt to increase standing tolerance > 2 min with CGA and 0-1 UE support to complete ADL tasks. Short term goal 4: Pt to verbalize/demo 3 enregy conservation tech to decrease SOB and fatigue during ADLs  Short term goal 5: Pt will complete functional mobility to/from BR with SBA to increase indep with accessing environment during ADLs. Long term goals  Time Frame for Long term goals : 3 weeks  Long term goal 1: Pt will complete ADL routine with Bin to increase indep with returning to completing self care on own when aide not present at home. Long term goal 2: Pt will complete light homemaking task with Bin to increase indep with preparing light meals and snack to restore PLOF. Following session, patient left in safe position with all fall risk precautions in place.

## 2020-10-23 NOTE — PROGRESS NOTES
Certification for TCU Admission    Name:  Veronica Peerira    MR#:    [id number]  Olinda Whitten: [de-identified]      :   1950    Long Term Care Facility Type: Transitional Care Unit     Dx:   BINLT-31     Patient Active Problem List   Diagnosis    CAD (coronary artery disease)    Hypertension    Hyperlipidemia    Diabetes mellitus [facility]    Arrhythmia    Kidney disease    Falls frequently    Incontinence of urine    DDD (degenerative disc disease), lumbar    Depression    Hypersomnolence    Fatigue    Arthritis    Seasonal allergies    Obesity, Class III, BMI 40-49.9 (morbid obesity) ([facility]    Ventral hernia    Peripheral edema    Fall from other slipping, tripping, or stumbling    BMI 45.0-49.9, adult ([facility]    Venous (peripheral) insufficiency    Urinary incontinence, mixed    Grief at loss of child    MRSA infection (methicillin-resistant Staphylococcus aureus)    COPD with exacerbation ([facility]    Venous stasis dermatitis    COPD (chronic obstructive pulmonary disease) (HCC)    Chronic obstructive pulmonary disease (HCC)    Fungus infection    Boil, thigh    Ulcer of genital labia    Venous ulcer of left leg (HCC)    S/P laparoscopic cholecystectomy    COPD exacerbation ([facility]    COPD with acute exacerbation ([facility]    COVID-19    Acute respiratory failure with hypoxia (HCC)    Chest pain    Class 3 severe obesity due to excess calories with serious comorbidity and body mass index (BMI) of 50.0 to 59.9 in adult (HCC)    Schizoaffective disorder (HCC)    PTSD (post-traumatic stress disorder)    Pneumonia due to COVID-19 virus    Fatty liver       Code Status: Full Code      Rehabilitation Potential: Good     Prognosis: Good     Stability: Stable     History & Physical current: Yes   If No, note changes in H&P update     Level of Care Recommendation/Request: Skilled     Physician Certification: I certify that I have reviewed the information contained in the discharge summary and that the information is a true and accurate reflection of the individual's condition. I certify this resident is appropriate for skilled services provided in the TCU/ECF for a condition which the individual received inpatient care. Certification: I certify the orders, information, and transfer of said patient is necessary for the continuing treatment of the diagnosis listed in a skilled care setting providing skilled nursing and/or skilled rehabilitative services. The patient will require on a daily basis SNF covered care.     Electronically signed by Forest Corral MD on 10/23/2020 at 5:47 PM

## 2020-10-23 NOTE — PROGRESS NOTES
Patient withdrawn and stated \" I feel so tired of being stuck inside this hospital. Im tired of sitting here. I am bored. \"

## 2020-10-23 NOTE — PROGRESS NOTES
[address] [place] [address]  INPATIENT PHYSICAL THERAPY  EVALUATION  [facility] SKILLED NURSING UNIT - [id number]    Time In: 1330  Time Out: 1430  Timed Code Treatment Minutes: 45 Minutes  Minutes: 60          Date: 10/23/2020  Patient Name: Gianluca Carias,  Gender:  female        MRN: [id number]  : 1950  (69 y.o.)      Referring Practitioner: Attending: Alfred Balderas. Lonell Simmonds, MD; Ordering: Felipe Austin MD  Diagnosis: COVID-19  Additional Pertinent Hx: Per Acute H&P, \"75 y.o. female who presented to [address] [place] [address] with complaints of fatigue, dry cough, chest pain with sob. Symptoms began this morning, continued to get worse causing pt to present to the ED. Pt has a pmh of COPD, Asthma, Obesity, Chronic Venous Stasis, Schizoaffective Disorder, Depression, DM2, HTN, and HLD. Patients main complaint is chest pain, described as a heaviness sitting on her chest. Does not radiate, associated with sob. Gets worse with deep breathes. No diaphoresis or nausea. Chest pain constant, is reproducible. Admitted for management of Acute hypoxic respiratory failure secondary to COVID 19.\"     Restrictions/Precautions:  Restrictions/Precautions: General Precautions, Fall Risk, Isolation  Position Activity Restriction  Other position/activity restrictions: droplet plus, COVID +    Subjective:  Chart Reviewed: Yes  Patient assessed for rehabilitation services?: Yes  Subjective: Pt resting in recliner and agrees to therapy. Pt on O2 at 4 lpm and saturation at 95%.  Pt reports that she feels that her mobility is fairly close to her baseline, but gets short of breath more than usual.    General:  Overall Orientation Status: Within Functional Limits    Vision: Impaired  Vision Exceptions: Wears glasses for reading    Hearing: Within functional limits         Pain: 5/10: back, chronic    Social/Functional History:    Lives With: Alone  Type of Home: Apartment(3rd floor)  Home Layout: One level  Home Access: Level entry, Elevator  Home Equipment: [address], 4 wheeled walker     Bathroom Shower/Tub: Tub/Shower unit, Shower chair with back  [employer]: Handicap height  Bathroom Equipment: Grab bars in shower    Receives Help From: Personal care attendant(MWF aide)  ADL Assistance: Needs assistance  Homemaking Assistance: Needs assistance  Ambulation Assistance: Independent  Transfer Assistance: Independent    Active : No  Mode of Transportation: Car  Education: [employer]  Occupation: Retired  Additional Comments: lift chair, able to transfers from lift chair to w/c and uses w/c as mode of mobility through apartment. only ambulates short distance (~10 ft). aide 3x/week to assist with homemaking tasks and any ADL tasks as needed. pt reporting she is able to dress herself and compelting toileting tasks when aide not present. reports 5 herniated vertebra and soft tissure damage in L thigh    OBJECTIVE:  Range of Motion:  Bilateral Lower Extremity: WFL, but Left leg is slower to move due to pain from \"soft tissue damage\" recently    Strength:  Right Lower Extremity: grossly 4-/5  Left Lower Extremity: Impaired - grossly 3+/5    Balance:  Static Sitting Balance:  Modified Independent  Dynamic Sitting Balance: Supervision  Static Standing Balance: Stand By Assistance  Dynamic Standing Balance: Stand By Assistance  Pt uses reacher to retrieve object from floor with Mod I. Pt reported that she uses reachers at home regularly.     Bed Mobility:  Rolling to Left: Stand By Assistance   Rolling to Right: Stand By Assistance   Supine to Sit: Stand By Assistance, with increased time for completion, and use of bedrail  Sit to Supine: Minimal Assistance, with increased time for completion, assist for L LE onto bed     Transfers:  Sit to Stand: Contact Guard Assistance  Stand to Sit:Stand By Assistance  Stand Pivot:Contact Guard Assistance    Ambulation:  Contact Guard Assistance  Distance: 12 ft and 8 ft  Surface: Level Tile  Device:Rolling Walker  Gait Deviations:  Slow Areli, Decreased Step Length Bilaterally and Decreased Gait Speed    Wheelchair Mobility:  Supervision  Extremities Used: Bilateral Upper Extremities and Bilateral Lower Extremities  Type of Chair:Manual  Surface: Level Tile  Distance: 180 ft and 70 ft  Quality: Slow Velocity and Decreased Fluidity      Exercise:  Patient was guided in 1 set(s) 10 reps of exercise to both lower extremities. Ankle pumps, Hip abduction/adduction, Seated marches, Seated hamstring curls, Seated heel/toe raises and Long arc quads. Exercises were completed for increased independence with functional mobility. Functional Outcome Measures: Not completed       ASSESSMENT:  Activity Tolerance:  Patient tolerance of  treatment: good. O2 sats remained >93% throughout despite pt showing some shortness of breath signs. Pt reports that she has always had that with activity. Treatment Initiated: Treatment and education initiated within context of evaluation. Evaluation time included review of current medical information, gathering information related to past medical, social and functional history, completion of standardized testing, formal and informal observation of tasks, assessment of data and development of plan of care and goals. Treatment time included skilled education and facilitation of tasks to increase safety and independence with functional mobility for improved independence and quality of life. Assessment: Body structures, Functions, Activity limitations: Decreased functional mobility , Decreased ROM, Decreased strength, Decreased endurance, Increased pain  Assessment: Pt is a 69 y.o. female that is deconditioned and has decreased LE strength with L>R. Pt generally uses w/c for mobility with limited ambulation with RW upto about 20-25 ft per report. Pt was at [place] [address] to close SBA for sit <> stand and ambulation today and Supervision for w/c mobility.  Pt would benefit from continued skilled PT to address endurance building, strengthening, and functional mobility training. Prognosis: Good         Discharge Recommendations:  Discharge Recommendations: Continue to assess pending progress    Patient Education:  PT Education: Goals, PT Role, Plan of Care, Home Exercise Program    Equipment Recommendations:  Equipment Needed: No    Plan:  Times per week: 6x  Current Treatment Recommendations: Strengthening, ROM, Balance Training, Functional Mobility Training, Transfer Training, Endurance Training, Gait Training, Home Exercise Program, Safety Education & Training, Patient/Caregiver Education & Training, Wheelchair Mobility Training    Goals:  Patient goals : to return home  Short term goals  Time Frame for Short term goals: 10 days  Short term goal 1: Pt to be Supervision for supine<>sit with HOB raised (pt sleeps in recliner at home) to get in/out of bed during admission. Short term goal 2: Pt to be Mod I for sit<>stand to get up to ambulate or pivot  Short term goal 3: Pt to ambulate >20ft with RW at supervision to improve ability to navigate within home. Short term goal 4: Pt to be Mod I to propel and manage w/c for >200 ft for household and community distances  Long term goals  Time Frame for Long term goals : n/a secondary to short ELOS    Following session, patient left in safe position with all fall risk precautions in place. Irene [place].  Valentina Michaud [address]

## 2020-10-23 NOTE — CONSULTS
Comprehensive Nutrition Assessment    Type and Reason for Visit:  Initial, Consult, Positive Nutrition Screen(New TCU Admit/Decreased Appetite/Intake)    Nutrition Recommendations/Plan:   *Consider a Multivitamin w/minerals daily. *Continue current diet. *Countinue routine weight checks. *Pt declines ONS during LOS. Nutrition Assessment: Pt. nutritionally compromised AEB pt with some po intake less than 75% and -5% unplanned weight loss in one week; however weights are from different floors- question weight accuracy? .  At risk for further nutrition compromise r/t COVID+ and underlying medical condition (hx CAD, DM, HTN, COPD, Depression). Nutrition recommendations/interventions as per above. Malnutrition Assessment:  Malnutrition Status:  Insufficient data(pt on COVID unit)    Context:  Acute Illness     Findings of the 6 clinical characteristics of malnutrition:  Energy Intake:  Mild decrease in energy intake (Comment)(some po intake less than 75%)  Weight Loss:  (-5% weight loss in one week; however weights from different floors- question weight accuracy?)     Body Fat Loss:  Unable to assess(pt on COVID unit)     Muscle Mass Loss:  Unable to assess(pt on COVID unit)    Fluid Accumulation:  No significant fluid accumulation Extremities   Strength:  Not Performed    Estimated Daily Nutrient Needs:  Energy (kcal):  0349-8147 kcal/day (30-32); Weight Used for Energy Requirements:  (51.4 kg IBW)     Protein (g):   g/day (1.2-2 g/kg); Weight Used for Protein Requirements:  (51.4 kg IBW)        Fluid (ml/day):  per MD    Nutrition Related Findings:  pneumonia d/t COVID; unable to see pt d/t being on the COVID unit; called pt on phone; pt reports good appetite and intake of meals PTA and since admit despite COVID; pt denies N/V; x1 on 10/22.  Rx includes: Lasix, Metformin, Vitamin C, Vitamin D, Zinc      Wounds:  (right vagina wound)       Current Nutrition Therapies:    DIET CARB CONTROL; Carb Control: 3 carb choices (45 gms)/meal; Low Sodium (2 GM)    Anthropometric Measures:  · Height: 5' 2.5\" (158.8 cm)  · Current Body Weight: 285 lb 3.2 oz (129.4 kg)(10/22; no edema noted)   · Admission Body Weight: 285 lb 3.2 oz (129.4 kg)(10/22; no edema noted)    · Usual Body Weight: (Per EMR: 300 lb 3.2 oz on 10/17/20; 285 lb 3.2 oz on 11/13/19)     · Ideal Body Weight: 113 lbs  · BMI: 51.3  · BMI Categories: Obese Class 3 (BMI 40.0 or greater)       Nutrition Diagnosis:   · Inadequate oral intake related to inadequate protein-energy intake as evidenced by weight loss(-5% in one weel- question weight accuracy d/t weights from different floors; some po intake less than 75%)    Nutrition Interventions:   Food and/or Nutrient Delivery:  Continue Current Diet, Vitamin Supplement  Nutrition Education/Counseling:  Education initiated(Encouraged po intake of meals at best effort)   Coordination of Nutrition Care:  Continued Inpatient Monitoring    Goals:  Pt will consume 75% or more of meals during LOS       Nutrition Monitoring and Evaluation:   Food/Nutrient Intake Outcomes:  Food and Nutrient Intake, Supplement Intake, Vitamin/Mineral Intake  Physical Signs/Symptoms Outcomes:  Biochemical Data, Weight, Skin, Fluid Status or Edema     Discharge Planning:    Continue current diet     Electronically signed by Sonya Heart RD, LD on 10/23/20 at 10:14 AM EDT    Contact: [phone]

## 2020-10-23 NOTE — PLAN OF CARE
Problem: Impaired respiratory status  Goal: Clear lung sounds  Outcome: Ongoing  Note: Pt continues on MDI for maintenance of COPD and pt does MDI at home.

## 2020-10-23 NOTE — H&P
TCU History and Physical      Chief Complaint and Reason for TCU Admission:   The need to continue time with therapies following the acute hospital stay for covid 19 pneumonia. History of Present Illness:  Collette Smack  is a 69 y.o. female admitted to the transitional care unit on 10/22/2020. She came to the ED with concern of CP, SOB and fatigue. She was found to have covid-19 pneumonia and was admitted. She was treated with convalescent plasma, steroids and antiviral meds. She became stronger and eventually able to come to the TCU. She states she feels some SOB today. Past Medical History:      Diagnosis Date    Acid reflux     Arrhythmia     Arthritis     CAD (coronary artery disease)     NON-OBSTRUCTIVE    Chronic bronchitis (HCC)     COPD (chronic obstructive pulmonary disease) (HCC)     COPD (chronic obstructive pulmonary disease) (HCC)     Depression     Diabetes mellitus (HCC)     Dizziness - light-headed     Fall 10-15-13    left rib fracture and shoulder contusion     Falls frequently     Hyperlipidemia     Hypertension     Kidney disease     MDRO (multiple drug resistant organisms) resistance 2/14    MRSA ABDOMEN    Numbness and tingling     HX OF:    S/P laparoscopic cholecystectomy 11/27/2018    Swelling     Tinnitus        Primary care provider: Viki Dickerson MD     Past Surgical History:      Procedure Laterality Date    BACK SURGERY  2007    CARDIOVASCULAR STRESS TEST  04/03/2009    Persantine test associated w/non specific symptoms. EKG non diagnostic. no obvious stress induced ischemia. EF 63%. normal wall motion.  CARDIOVASCULAR STRESS TEST  03/31/2010    tilt table associated w/fluctuation of BP w/relatively low BP throughout test. no significant symptoms during test. borderline tilt table test.    CHOLECYSTECTOMY      COLONOSCOPY  2011    CORONARY ANGIOPLASTY  12/16/2009    EF 60%. nonobstructive CAD. normal LV function.     ENDOSCOPY, COLON, DIAGNOSTIC  over 5 years     Dr. Amalia Tomas  2004    cataract   [id number]  25 months old     umblilical    HERNIA REPAIR  9/8/14    Ventral Hernia Repair with Mesh - Dr. Tamara Lorenzo    partial    OTHER SURGICAL HISTORY  Feb 17, 2014    Left lower quadrant incision and drainage (Dr. Elida Sofia, [facility])    OTHER SURGICAL HISTORY  2/2014    I&D abd abscess Dr. Allegra Carrasco N/A 11/26/2018    LAPAROSCOPY EXPLORATORY, LAP LYLE performed by Sylvia Swift MD [facility]  as a child     TOOTH EXTRACTION  4/16/2012    TRANSTHORACIC ECHOCARDIOGRAM  03/04/2009    EF 50-55%. normal LV global systolic funct. slight LVH. slight lt atrial enlargement. no obvious stenotic valves. no pericardial effusion. Allergies:    Latex; Bee venom; Advair [fluticasone-salmeterol]; Caffeine; Pcn [penicillins]; Ultram [tramadol hcl];  Fluticasone-salmeterol; and Wool alcohol [lanolin alcohol]    Current Medications:    Current Facility-Administered Medications: magnesium oxide (MAG-OX) tablet 400 mg, 400 mg, Oral, Daily  acetaminophen (TYLENOL) tablet 650 mg, 650 mg, Oral, Q6H PRN **OR** acetaminophen (TYLENOL) suppository 650 mg, 650 mg, Rectal, Q6H PRN  enoxaparin (LOVENOX) injection 40 mg, 40 mg, Subcutaneous, Q12H  dextrose 5 % solution, 100 mL/hr, Intravenous, PRN  dextrose 50 % IV solution, 12.5 g, Intravenous, PRN  glucagon (rDNA) injection 1 mg, 1 mg, Intramuscular, PRN  glucose (GLUTOSE) 40 % oral gel 15 g, 15 g, Oral, PRN  [START ON 10/23/2020] alogliptin (NESINA) tablet 25 mg, 25 mg, Oral, Daily  [START ON 10/23/2020] aspirin EC tablet 81 mg, 81 mg, Oral, Daily  [START ON 10/23/2020] atorvastatin (LIPITOR) tablet 20 mg, 20 mg, Oral, Daily  budesonide-formoterol (SYMBICORT) 160-4.5 MCG/ACT inhaler 2 puff, 2 puff, Inhalation, BID  [START ON 10/23/2020] citalopram (CELEXA) tablet 40 mg, 40 mg, Oral, Daily  cyclobenzaprine (FLEXERIL) tablet 5 Occupation: retired    Social Needs    Financial resource strain: Not on file    Food insecurity     Worry: Not on file     Inability: Not on file   [employer] needs     Medical: Not on file     Non-medical: Not on file   Tobacco Use    Smoking status: Former Smoker     Years: 3.00     Types: Cigarettes     Start date:      Last attempt to quit:      Years since quittin.8    Smokeless tobacco: Never Used    Tobacco comment: 3 cigarettes a month, Passive smoke exposure 39 years Father then ex    Substance and Sexual Activity    Alcohol use: No     Alcohol/week: 0.0 standard drinks    Drug use: No    Sexual activity: Not Currently   Lifestyle    Physical activity     Days per week: Not on file     Minutes per session: Not on file    Stress: Not on file   Relationships    Social connections     Talks on phone: Not on file     Gets together: Not on file     Attends [employer] service: Not on file     Active member of club or organization: Not on file     Attends meetings of clubs or organizations: Not on file     Relationship status: Not on file    Intimate partner violence     Fear of current or ex partner: Not on file     Emotionally abused: Not on file     Physically abused: Not on file     Forced sexual activity: Not on file   Other Topics Concern    Not on file   Social History Narrative    Not on file           Family History:       Problem Relation Age of Onset    Cancer Mother 28        uterine    Heart Disease Father     Cancer Father 48        lung    Diabetes Paternal Grandfather     Cancer Maternal Grandmother         uterine    Cancer Maternal Aunt 32        uterine    Cancer Maternal Cousin 28        uterine    Colon Cancer Neg Hx     Breast Cancer Neg Hx        Review of Systems:  CONSTITUTIONAL:  positive for  fatigue  EYES:  positive for  glasses  HEENT:  negative for  epistaxis  RESPIRATORY:  positive for  dyspnea  CARDIOVASCULAR:  negative for  chest pain  GASTROINTESTINAL:  negative for diarrhea  GENITOURINARY:  negative for dysuria  SKIN:  Pigmentation of lower legs  HEMATOLOGIC/LYMPHATIC:  negative for petechiae  MUSCULOSKELETAL:  positive for  myalgias, arthralgias and decreased range of motion  NEUROLOGICAL:  positive for gait problems and weakness  BEHAVIOR/PSYCH:  negative for increased agitation  10 point system review otherwise negative    Physical Exam:  BP (!) 108/54   Pulse 64   Temp 98.6 °F (37 °C) (Oral)   Resp 16   Ht 5' 2.5\" (1.588 m)   Wt 285 lb 3.2 oz (129.4 kg)   LMP  (LMP Unknown)   SpO2 94%   BMI 51.33 kg/m²   Well developed well nourished white female who is awake alert and cooperative sitting on the side of the bed  Skin atrophic with stasis changes to the lower legs  Membranes moist  Head normocephalic  Neck without mass  Chest symmetrical expansion  Heart S1S2 without murmur  Lungs CTA  Abd soft, non tender, normoactive BS and no mass  Ext with 1-2+ edema  Neuro weak  Psy pleasant        Diagnostics:  Recent Results (from the past 24 hour(s))   Basic Metabolic Panel    Collection Time: 10/22/20  4:34 AM   Result Value Ref Range    Sodium 135 135 - 145 meq/L    Potassium 4.4 3.5 - 5.2 meq/L    Chloride 99 98 - 111 meq/L    CO2 24 23 - 33 meq/L    Glucose 207 (H) 70 - 108 mg/dL    BUN 20 7 - 22 mg/dL    CREATININE 0.6 0.4 - 1.2 mg/dL    Calcium 8.8 8.5 - 10.5 mg/dL   CBC Auto Differential    Collection Time: 10/22/20  4:34 AM   Result Value Ref Range    WBC 7.1 4.8 - 10.8 thou/mm3    RBC 4.34 4.20 - 5.40 mill/mm3    Hemoglobin 10.9 (L) 12.0 - 16.0 gm/dl    Hematocrit 37.0 37.0 - 47.0 %    MCV 85.3 81.0 - 99.0 fL    MCH 25.1 (L) 26.0 - 33.0 pg    MCHC 29.5 (L) 32.2 - 35.5 gm/dl    RDW-CV 17.1 (H) 11.5 - 14.5 %    RDW-SD 53.1 (H) 35.0 - 45.0 fL    Platelets 704 489 - 208 thou/mm3    MPV 11.3 9.4 - 12.4 fL    Seg Neutrophils 54.9 %    Lymphocytes 30.2 %    Monocytes 13.6 %    Eosinophils 0.1 %    Basophils 0.1 %    Immature Granulocytes 1.1 %    Platelet Estimate ADEQUATE Adequate    Segs Absolute 3.9 1.8 - 7.7 thou/mm3    Lymphocytes Absolute 2.1 1.0 - 4.8 thou/mm3    Monocytes Absolute 1.0 0.4 - 1.3 thou/mm3    Eosinophils Absolute 0.0 0.0 - 0.4 thou/mm3    Basophils Absolute 0.0 0.0 - 0.1 thou/mm3    Immature Grans (Abs) 0.08 (H) 0.00 - 0.07 thou/mm3    nRBC 0 /100 wbc   Hepatic Function Panel    Collection Time: 10/22/20  4:34 AM   Result Value Ref Range    Alb 3.5 3.5 - 5.1 g/dL    Total Bilirubin 0.4 0.3 - 1.2 mg/dL    Bilirubin, Direct <0.2 0.0 - 0.3 mg/dL    Alkaline Phosphatase 73 38 - 126 U/L    AST 32 5 - 40 U/L    ALT 17 11 - 66 U/L    Total Protein 6.9 6.1 - 8.0 g/dL   Anion Gap    Collection Time: 10/22/20  4:34 AM   Result Value Ref Range    Anion Gap 12.0 8.0 - 16.0 meq/L   Glomerular Filtration Rate, Estimated    Collection Time: 10/22/20  4:34 AM   Result Value Ref Range    Est, Glom Filt Rate >90 ml/min/1.73m2   Scan of Blood Smear    Collection Time: 10/22/20  4:34 AM   Result Value Ref Range    SCAN OF BLOOD SMEAR see below    Magnesium    Collection Time: 10/22/20  4:34 AM   Result Value Ref Range    Magnesium 1.5 (L) 1.6 - 2.4 mg/dL   POCT Glucose    Collection Time: 10/22/20  8:23 AM   Result Value Ref Range    POC Glucose 168 (H) 70 - 108 mg/dl   POCT Glucose    Collection Time: 10/22/20 12:48 PM   Result Value Ref Range    POC Glucose 158 (H) 70 - 108 mg/dl   POCT glucose    Collection Time: 10/22/20  6:08 PM   Result Value Ref Range    POC Glucose 139 (H) 70 - 108 mg/dl   POCT glucose    Collection Time: 10/22/20  9:10 PM   Result Value Ref Range    POC Glucose 158 (H) 70 - 108 mg/dl       Impression:  Active Hospital Problems    Diagnosis Date Noted    [facility] [E11.9]      Priority: Medium    CAD (coronary artery disease) [I25.10]      Priority: Medium    Class 3 severe obesity due to excess calories with serious comorbidity and body mass index (BMI) of 50.0 to 59.9 in adult ([facility] [E66.01, Z68.43] 10/22/2020    [facility] ([facility] [F25.9] 10/22/2020    PTSD (post-traumatic stress disorder) [F43.10] 10/22/2020    Pneumonia due to COVID-19 virus [U07.1, J12.89] 10/22/2020    Fatty liver [K76.0] 10/22/2020    Acute respiratory failure with hypoxia (HCC) [J96.01] 10/18/2020    COPD (chronic obstructive pulmonary disease) ([facility] [J44.9]     Venous stasis dermatitis [I87.2] 10/27/2014    Venous (peripheral) insufficiency [I87.2] 11/26/2013    Depression [F32.9] 10/29/2012    Hyperlipidemia [E78.5]    ·      Plan:   · The patient demonstrates good potential to participate in a skilled rehabilitation program on the transitional care unit. Rehabilitation services will include PT and OT/RT in order to improve functional status prior to discharge. Family education and training will be completed. Equipment evaluations and recommendations will be completed as appropriate. · Nursing will be involved for bowel, bladder, skin, and pain management. Nursing will also provide education and training to patient and family. · Prophylaxis:  DVT: lovenox. GI: protonix. · Pain: neurontin, tylenol  · Nutrition:  Consultation to dietician for nutritional counseling and recommendations.    · Bladder: continent  · Bowel: colace, glycolax, senokot  ·  and case management consultations for coordination of care and discharge planning    Mone Hayward MD

## 2020-10-23 NOTE — FLOWSHEET NOTE
Jefferson Bello is a 68 yo admitted to [facility]. She resisted anything more than surface/ social conversation. She did say she speaks to family every day and is not interested in Zoom at this time. She lives with her cat, who is being cared for by \"someone who lives in the building\".           10/23/20 8769   Encounter Summary   Services provided to: Patient   [facility]   Support System Children   Continue Visiting Yes  (10/23 p)   Complexity of Encounter Low   Length of Encounter 15 minutes   Spiritual/Restoration   Type Spiritual support   Assessment Approachable   Intervention Active listening;Explored feelings, thoughts, concerns   Outcome Expressed gratitude;Receptive

## 2020-10-23 NOTE — PLAN OF CARE
Problem: Airway Clearance - Ineffective  Goal: Achieve or maintain patent airway  10/23/2020 1816 by Dale Hernandez RN  Outcome: Ongoing  Note: Patent airway. Patient currently on O2 therapy. 10/23/2020 1406 by Dale Hernandez RN  Outcome: Ongoing     Problem: Gas Exchange - Impaired  Goal: Absence of hypoxia  10/23/2020 1816 by Dale Hernanedz RN  Outcome: Ongoing  Note: No s/s of hypoxia noted. On 3L of O2  10/23/2020 1406 by Dale Hernandez RN  Outcome: Ongoing  Goal: Promote optimal lung function  10/23/2020 1816 by Dale Hernandez RN  Outcome: Ongoing       Problem: Breathing Pattern - Ineffective  Goal: Ability to achieve and maintain a regular respiratory rate  10/23/2020 1816 by Dale Hernandez RN  Outcome: Ongoing  10/23/2020 1406 by Dale Hernandez RN  Outcome: Ongoing     Problem: Body Temperature -  Risk of, Imbalanced  Goal: Ability to maintain a body temperature within defined limits  10/23/2020 1816 by Dale Hernandez RN  Outcome: Ongoing  Note: Temperature within normal limits. 10/23/2020 1406 by Dale Hernandez RN  Outcome: Ongoing  Goal: Will regain or maintain usual level of consciousness  10/23/2020 1816 by Dale Hernandez RN  Outcome: Ongoing  Note: Patient alert and oriented. 10/23/2020 1406 by Dale Hernandez RN  Outcome: Ongoing    Problem: Isolation Precautions - Risk of Spread of Infection  Goal: Prevent transmission of infection  10/23/2020 1816 by Dale Hernandez RN  Outcome: Ongoing  Note: Patient currently in Droplet plus isolation    Problem: Nutrition Deficits  Goal: Optimize nutrtional status  10/23/2020 1816 by Dale Hernandez RN  Outcome: Ongoing      Problem: Loneliness or Risk for Loneliness  Goal: Demonstrate positive use of time alone when socialization is not possible  10/23/2020 1816 by Dale Hernandez RN  Outcome: Ongoing  Note: Patient withdrawn this shift. Stated she was tired of being in the hospital and she was bored.   10/23/2020 1406 by [employer] Biju Lott RN  Outcome: Ongoing           Problem: Bleeding:  Goal: Will show no signs and symptoms of excessive bleeding  Description: Will show no signs and symptoms of excessive bleeding  10/23/2020 1816 by Gualberto Dobson RN  Outcome: Ongoing  Note: No s/s of excessive bleeding noted at this time. 10/23/2020 1406 by Gualberto Dobson RN  Outcome: Ongoing     Problem: Pain:  Goal: Control of chronic pain  Description: Control of chronic pain  10/23/2020 1816 by Gualberto Dobson RN  Outcome: Ongoing  Note: Pt report pain at 5/10 on scale. Pt offered PRN medication and patient stated she would try to get some rest.    10/23/2020 1406 by Gualberto Dobson RN  Outcome: Ongoing     Problem: Activity:  Goal: Sleeping patterns will improve  Description: Sleeping patterns will improve  10/23/2020 1816 by Gualberto Dobson RN  Outcome: Ongoing  10/23/2020 1406 by Gualberto Dobson RN  Outcome: Ongoing     Problem: Falls - Risk of:  Goal: Will remain free from falls  Description: Will remain free from falls  10/23/2020 1816 by Gualberto Dobson RN  Outcome: Ongoing  Note: Pt using call light appropriately to call for assistance with ambulation to the bathroom and to chair. Pt is also compliant with use of non-skid slippers. Pt reports understanding of fall prevention when discussed. Goal: Absence of physical injury  Description: Absence of physical injury  10/23/2020 1816 by Gualberto Dobson RN  Outcome: Ongoing  Note: NO s/s of physical injury noted at this time. Problem: Mood - Altered:  Goal: Mood stable  Description: Mood stable  10/23/2020 1816 by Gualberto Dobson RN  Outcome: Ongoing. Patient withdrawn    Problem: Impaired respiratory status  Goal: Clear lung sounds  10/23/2020 1816 by Gualberto Dobson RN  Outcome: Ongoing  Note: Lung sounds clear and diminshed. Encouraged patient to cough and deep breathe every 1-2 hours.    10/23/2020 1406 by Gualberto Dobson RN         Problem: Nutrition  Goal: Optimal nutrition therapy  10/23/2020 1816 by Miley Wilcox RN  Outcome: Ongoing  Note: Patient eating adequate amounts of her meals. 10/23/2020 1406 by Miley Wilcox RN    Care plan reviewed with patient. Patient verbalize understanding of the plan of care and contribute to goal setting.

## 2020-10-24 LAB
GLUCOSE BLD-MCNC: 133 MG/DL (ref 70–108)
MAGNESIUM: 1.7 MG/DL (ref 1.6–2.4)

## 2020-10-24 PROCEDURE — 1290000000 HC SEMI PRIVATE OTHER R&B

## 2020-10-24 PROCEDURE — 2580000003 HC RX 258: Performed by: FAMILY MEDICINE

## 2020-10-24 PROCEDURE — 97110 THERAPEUTIC EXERCISES: CPT

## 2020-10-24 PROCEDURE — 83735 ASSAY OF MAGNESIUM: CPT

## 2020-10-24 PROCEDURE — 97535 SELF CARE MNGMENT TRAINING: CPT

## 2020-10-24 PROCEDURE — 36415 COLL VENOUS BLD VENIPUNCTURE: CPT

## 2020-10-24 PROCEDURE — 6370000000 HC RX 637 (ALT 250 FOR IP): Performed by: HOSPITALIST

## 2020-10-24 PROCEDURE — 6360000002 HC RX W HCPCS: Performed by: HOSPITALIST

## 2020-10-24 PROCEDURE — 97542 WHEELCHAIR MNGMENT TRAINING: CPT

## 2020-10-24 PROCEDURE — 82948 REAGENT STRIP/BLOOD GLUCOSE: CPT

## 2020-10-24 RX ADMIN — ENOXAPARIN SODIUM 40 MG: 40 INJECTION SUBCUTANEOUS at 20:47

## 2020-10-24 RX ADMIN — CYCLOBENZAPRINE 5 MG: 10 TABLET, FILM COATED ORAL at 20:47

## 2020-10-24 RX ADMIN — MICONAZOLE NITRATE: 20 POWDER TOPICAL at 09:22

## 2020-10-24 RX ADMIN — Medication 10 ML: at 20:49

## 2020-10-24 RX ADMIN — GABAPENTIN 600 MG: 600 TABLET, FILM COATED ORAL at 09:19

## 2020-10-24 RX ADMIN — MICONAZOLE NITRATE: 20 POWDER TOPICAL at 14:37

## 2020-10-24 RX ADMIN — PIOGLITAZONE 45 MG: 45 TABLET ORAL at 09:18

## 2020-10-24 RX ADMIN — ASPIRIN 81 MG: 81 TABLET ORAL at 09:19

## 2020-10-24 RX ADMIN — CITALOPRAM 40 MG: 40 TABLET, FILM COATED ORAL at 09:19

## 2020-10-24 RX ADMIN — QUETIAPINE FUMARATE 50 MG: 25 TABLET ORAL at 20:46

## 2020-10-24 RX ADMIN — MICONAZOLE NITRATE: 20 POWDER TOPICAL at 20:48

## 2020-10-24 RX ADMIN — PANTOPRAZOLE SODIUM 40 MG: 40 TABLET, DELAYED RELEASE ORAL at 05:48

## 2020-10-24 RX ADMIN — MAGNESIUM GLUCONATE 500 MG ORAL TABLET 400 MG: 500 TABLET ORAL at 09:19

## 2020-10-24 RX ADMIN — BUDESONIDE AND FORMOTEROL FUMARATE DIHYDRATE 2 PUFF: 160; 4.5 AEROSOL RESPIRATORY (INHALATION) at 20:50

## 2020-10-24 RX ADMIN — ALOGLIPTIN 25 MG: 25 TABLET, FILM COATED ORAL at 09:19

## 2020-10-24 RX ADMIN — METFORMIN HYDROCHLORIDE 1000 MG: 500 TABLET ORAL at 17:56

## 2020-10-24 RX ADMIN — ENOXAPARIN SODIUM 40 MG: 40 INJECTION SUBCUTANEOUS at 09:19

## 2020-10-24 RX ADMIN — ATORVASTATIN CALCIUM 20 MG: 20 TABLET, FILM COATED ORAL at 09:19

## 2020-10-24 RX ADMIN — Medication 10 ML: at 09:21

## 2020-10-24 RX ADMIN — OXYCODONE HYDROCHLORIDE AND ACETAMINOPHEN 500 MG: 500 TABLET ORAL at 09:19

## 2020-10-24 RX ADMIN — CYCLOBENZAPRINE 5 MG: 10 TABLET, FILM COATED ORAL at 09:19

## 2020-10-24 RX ADMIN — METOPROLOL SUCCINATE 25 MG: 25 TABLET, FILM COATED, EXTENDED RELEASE ORAL at 09:19

## 2020-10-24 RX ADMIN — METFORMIN HYDROCHLORIDE 1000 MG: 500 TABLET ORAL at 09:18

## 2020-10-24 RX ADMIN — GABAPENTIN 600 MG: 600 TABLET, FILM COATED ORAL at 20:47

## 2020-10-24 RX ADMIN — Medication 50 MG: at 09:21

## 2020-10-24 RX ADMIN — FUROSEMIDE 20 MG: 20 TABLET ORAL at 09:19

## 2020-10-24 RX ADMIN — Medication 1000 UNITS: at 09:19

## 2020-10-24 RX ADMIN — GABAPENTIN 600 MG: 600 TABLET, FILM COATED ORAL at 14:37

## 2020-10-24 ASSESSMENT — PAIN DESCRIPTION - PROGRESSION

## 2020-10-24 ASSESSMENT — PAIN SCALES - GENERAL
PAINLEVEL_OUTOF10: 3
PAINLEVEL_OUTOF10: 0

## 2020-10-24 ASSESSMENT — PAIN DESCRIPTION - LOCATION: LOCATION: BACK

## 2020-10-24 ASSESSMENT — PAIN SCALES - WONG BAKER: WONGBAKER_NUMERICALRESPONSE: 0

## 2020-10-24 ASSESSMENT — PAIN DESCRIPTION - ONSET: ONSET: ON-GOING

## 2020-10-24 ASSESSMENT — PAIN DESCRIPTION - FREQUENCY: FREQUENCY: CONTINUOUS

## 2020-10-24 ASSESSMENT — PAIN DESCRIPTION - PAIN TYPE: TYPE: CHRONIC PAIN

## 2020-10-24 ASSESSMENT — PAIN - FUNCTIONAL ASSESSMENT: PAIN_FUNCTIONAL_ASSESSMENT: PREVENTS OR INTERFERES SOME ACTIVE ACTIVITIES AND ADLS

## 2020-10-24 ASSESSMENT — PAIN DESCRIPTION - ORIENTATION: ORIENTATION: LOWER

## 2020-10-24 ASSESSMENT — PAIN DESCRIPTION - DESCRIPTORS: DESCRIPTORS: ACHING

## 2020-10-24 NOTE — PROGRESS NOTES
Patient: Simran Milton  Unit/Bed: 8E-57/057-A  YOB: 1950  MRN: [id number] Acct: [de-identified]   Admitting Diagnosis: COVID-23  Admit Date:  10/22/2020  Hospital Day: 2    Assessment:     Principal Problem:    Pneumonia due to COVID-19 virus  Active Problems:    CAD (coronary artery disease)    Diabetes mellitus ([facility]    Hyperlipidemia    Depression    Venous (peripheral) insufficiency    Venous stasis dermatitis    COPD (chronic obstructive pulmonary disease) (HCC)    Acute respiratory failure with hypoxia ([facility])    Class 3 severe obesity due to excess calories with serious comorbidity and body mass index (BMI) of 50.0 to 59.9 in adult ([facility]    Schizoaffective disorder (HCC)    PTSD (post-traumatic stress disorder)    Fatty liver  Resolved Problems:    * No resolved hospital problems. *      Plan:     Follow the CS for now  Continue therapies        Subjective:     Patient has no complaint of CP, SOB over her baseline or GI upset. .   Medication side effects: none    Scheduled Meds:   magnesium replacement protocol   Other RX Placeholder    sodium chloride flush  10 mL Intravenous BID    magnesium oxide  400 mg Oral Daily    enoxaparin  40 mg Subcutaneous Q12H    alogliptin  25 mg Oral Daily    aspirin  81 mg Oral Daily    atorvastatin  20 mg Oral Daily    budesonide-formoterol  2 puff Inhalation BID    citalopram  40 mg Oral Daily    cyclobenzaprine  5 mg Oral BID    furosemide  20 mg Oral Daily    gabapentin  600 mg Oral TID    metFORMIN  1,000 mg Oral BID WC    metoprolol succinate  25 mg Oral Daily    miconazole   Topical TID    pantoprazole  40 mg Oral QAM AC    pioglitazone  45 mg Oral Daily    [START ON 10/25/2020] ppd   Does not apply Weekly    QUEtiapine  50 mg Oral Nightly    tiotropium  2 puff Inhalation Daily    tuberculin  5 Units Intradermal Weekly    vitamin C  500 mg Oral Daily    Vitamin D  1,000 Units Oral Daily    zinc sulfate  50 mg Oral Daily     Continuous Infusions:   dextrose       PRN Meds:acetaminophen **OR** acetaminophen, dextrose, dextrose, glucagon (rDNA), glucose, docusate sodium, senna, polyethylene glycol    Review of Systems  Pertinent items are noted in HPI. Objective:     No data found. I/O last 3 completed shifts: In: 250 [P.O.:240; I.V.:10]  Out: -   No intake/output data recorded. /74   Pulse 82   Temp 98 °F (36.7 °C) (Oral)   Resp 17   Ht 5' 2.5\" (1.588 m)   Wt 287 lb 4.8 oz (130.3 kg)   LMP  (LMP Unknown)   SpO2 98%   BMI 51.71 kg/m²     /74   Pulse 82   Temp 98 °F (36.7 °C) (Oral)   Resp 17   Ht 5' 2.5\" (1.588 m)   Wt 287 lb 4.8 oz (130.3 kg)   LMP  (LMP Unknown)   SpO2 98%   BMI 51.71 kg/m²   General appearance: alert, appears stated age and cooperative  Head: Normocephalic, without obvious abnormality, atraumatic  Lungs: diminished breath sounds bilaterally  Heart: regular rate and rhythm, S1, S2 normal, no murmur, click, rub or gallop  Abdomen: soft, non-tender; bowel sounds normal; no masses,  no organomegaly  Extremities: edema 1-2+ bilaterally  Skin: stasis changes to the lower legs  Neurologic: Grossly normal    Data Review:     Results for Semaj Guzman (MRN [id number]) as of 10/24/2020 18:57   Ref.  Range 10/22/2020 21:10 10/23/2020 08:58 10/23/2020 18:34 10/24/2020 07:23 10/24/2020 08:05   Magnesium Latest Ref Range: 1.6 - 2.4 mg/dL  1.5 (L)   1.7   POC Glucose Latest Ref Range: 70 - 108 mg/dl 158 (H)  146 (H) 133 (H)      Electronically signed by You George MD on 10/24/2020 at 6:55 PM

## 2020-10-24 NOTE — PROGRESS NOTES
Meds:acetaminophen **OR** acetaminophen, dextrose, dextrose, glucagon (rDNA), glucose, docusate sodium, senna, polyethylene glycol    Review of Systems  Pertinent items are noted in HPI. Objective:     No data found. I/O last 3 completed shifts: In: 230 [P.O.:220; I.V.:10]  Out: -   No intake/output data recorded. /80   Pulse 85   Temp 96 °F (35.6 °C) (Oral)   Resp 18   Ht 5' 2.5\" (1.588 m)   Wt 285 lb 3.2 oz (129.4 kg)   LMP  (LMP Unknown)   SpO2 95%   BMI 51.33 kg/m²     /80   Pulse 85   Temp 96 °F (35.6 °C) (Oral)   Resp 18   Ht 5' 2.5\" (1.588 m)   Wt 285 lb 3.2 oz (129.4 kg)   LMP  (LMP Unknown)   SpO2 95%   BMI 51.33 kg/m²   General appearance: alert, appears stated age and cooperative  Head: Normocephalic, without obvious abnormality, atraumatic  Lungs: clear to auscultation bilaterally  Heart: regular rate and rhythm, S1, S2 normal, no murmur, click, rub or gallop  Abdomen: soft, non-tender; bowel sounds normal; no masses,  no organomegaly  Extremities: edema 2+ bilaterally with stasis changes  Skin: Skin color, texture, turgor normal. No rashes or lesions  Neurologic: weak    Data Review:   Results for Radames Domínguez (MRN [id number]) as of 10/23/2020 22:02   Ref.  Range 10/22/2020 18:08 10/22/2020 21:10 10/23/2020 08:58 10/23/2020 12:34 10/23/2020 18:34   POC Glucose Latest Ref Range: 70 - 108 mg/dl 139 (H) 158 (H)   146 (H)       Electronically signed by Britton Perdue MD on 10/23/2020 at 10:00 PM

## 2020-10-24 NOTE — PROGRESS NOTES
Supervision. Standing Balance: Contact Guard Assistance. BED MOBILITY:  Not Tested    TRANSFERS:  Sit to Stand:  Contact Guard Assistance.  c   Stand to Sit: [employer]. FUNCTIONAL MOBILITY:  Assistive Device: Rolling Walker  Assist Level:  Contact Guard Assistance. Distance: To and from bathroom  Seated extended rest breaks due to min SOB and back pain. ADDITIONAL ACTIVITIES:  Patient identified a personal goal to increase UB strength and improve overall endurance so they can complete their toilet & shower transfers; skilled edu on UE strengthening and patient completed BUE strengthening exercises x 8  reps x1  set this date with a medium resistence lime green theraband  in all joints and all planes. Patient tolerated fair, requiring  rest breaks between each ex . Pt required min  cues for technique and purse lip breathing tech. .      ASSESSMENT:     Activity Tolerance:  Patient tolerance of  treatment: fair. Discharge Recommendations: Continue to assess pending progress    Equipment Recommendations: Other: continue to assess pending discharge disposition  Plan: Times per week: 6x  Current Treatment Recommendations: Strengthening, Patient/Caregiver Education & Training, Endurance Training, Balance Training, Safety Education & Training, Self-Care / ADL    Patient Education  Patient Education: Precautions, HEP , purse lip breathing. Goals  Short term goals  Time Frame for Short term goals: 2 weeks  Short term goal 1: Pt to complete LB ADL tasks with Janine and maintaining O2 sats above 90% to increase indep with self care. Short term goal 2: Pt will complete toileting routine including t/f with SBA to increase indep with self care  Short term goal 3: Pt to increase standing tolerance > 2 min with CGA and 0-1 UE support to complete ADL tasks.   Short term goal 4: Pt to verbalize/demo 3 enregy conservation tech to decrease SOB and fatigue during ADLs  Short term goal 5: Pt will complete functional mobility to/from BR with SBA to increase indep with accessing environment during ADLs. Long term goals  Time Frame for Long term goals : 3 weeks  Long term goal 1: Pt will complete ADL routine with Bin to increase indep with returning to completing self care on own when aide not present at home. Long term goal 2: Pt will complete light homemaking task with Bin to increase indep with preparing light meals and snack to restore PLOF. Following session, patient left in safe position with all fall risk precautions in place.

## 2020-10-24 NOTE — PROGRESS NOTES
[address] [place] [address]  INPATIENT PHYSICAL THERAPY  DAILY NOTE  - [facility] - [id number]-57/057-A    Time In: 0504  Time Out: 1425  Timed Code Treatment Minutes: 31 Minutes  Minutes: 31          Date: 10/24/2020  Patient Name: Tyler Lyn,  Gender:  female        MRN: [id number]  : 1950  (69 y.o.)     Referring Practitioner: Attending: Lorie Ramires. Rosaura Richter MD; Ordering: John Thomas MD  Diagnosis: COVID-19  Additional Pertinent Hx: Per Acute H&P, \"75 y.o. female who presented to [address] [place] [address] with complaints of fatigue, dry cough, chest pain with sob. Symptoms began this morning, continued to get worse causing pt to present to the ED. Pt has a pmh of COPD, Asthma, Obesity, Chronic Venous Stasis, Schizoaffective Disorder, Depression, DM2, HTN, and HLD. Patients main complaint is chest pain, described as a heaviness sitting on her chest. Does not radiate, associated with sob. Gets worse with deep breathes. No diaphoresis or nausea. Chest pain constant, is reproducible. Admitted for management of Acute hypoxic respiratory failure secondary to COVID 19.\"     Prior Level of Function:  Lives With: Alone  Type of Home: Apartment(3rd floor)  Home Layout: One level  Home Access: Level entry, Elevator  Home Equipment: [employer], 4 wheeled walker   Bathroom Shower/Tub: Tub/Shower unit, Shower chair with back  [address] Toilet: Handicap height  Bathroom Equipment: Grab bars in shower    Receives Help From: Personal care attendant(MWF aide)  ADL Assistance: Needs assistance  Homemaking Assistance: Needs assistance  Ambulation Assistance: Independent  Transfer Assistance: Independent  Active : No  Additional Comments: lift chair, able to transfers from lift chair to w/c and uses w/c as mode of mobility through apartment. only ambulates short distance (~10 ft). aide 3x/week to assist with homemaking tasks and any ADL tasks as needed.   pt reporting she is able to dress herself and compelting toileting tasks when aide not present. reports 5 herniated vertebra and soft tissure damage in L thigh    Restrictions/Precautions:  Restrictions/Precautions: General Precautions, Fall Risk, Isolation  Position Activity Restriction  Other position/activity restrictions: droplet plus, COVID +    SUBJECTIVE: Patient in the bathroom upon arrival, agreeable to therapy. Patient on 2.5L O2 upon arrival, O2 sats 96-97%, RN okayed decrease to 2L within 15min patient O2 sats 97%, decreased to 2L O2 at end of session. PAIN: L hip/muscle pain 6/10 with mobility    OBJECTIVE:  Bed Mobility:  Not Tested    Transfers:  Sit to Stand: Stand By Assistance, with increased time for completion  Stand to Sit:Contact Guard Assistance    Ambulation:  Contact Guard Assistance  Distance: ~12ft x1, 3ft x2  Surface: Level Tile  Device:Rolling Walker  Gait Deviations: Forward Flexed Posture, Slow Areli, Decreased Step Length Bilaterally, Decreased Gait Speed, Decreased Heel Strike Bilaterally and no LOB, reports L hip/muscle pain with ambulation    Wheelchair Mobility:  Stand By Assistance  Extremities Used: Bilateral Upper Extremities and Bilateral Lower Extremities  Type of Chair:Manual  Surface: Level Tile  Distance: ~200ft x1, 50ft x1  Quality: Slow Velocity, Short Strokes, Decreased Fluidity and occasional rest breaks due to fatigue, SOB noted, patient reports \"its normal\"    Balance:  Static Standing Balance: Stand By Assistance, during pericare, BUE support    Exercise:  Patient was guided in 1 set(s) 10-12 reps of exercise to both lower extremities. Ankle pumps, Quad sets, Heelslides, Hip abduction/adduction, Seated marches, Seated heel/toe raises and Long arc quads. Exercises were completed for increased independence with functional mobility. Functional Outcome Measures: Not completed       ASSESSMENT:  Assessment: Patient progressing toward established goals.   Activity Tolerance:  Patient tolerance of

## 2020-10-25 LAB — GLUCOSE BLD-MCNC: 132 MG/DL (ref 70–108)

## 2020-10-25 PROCEDURE — 94640 AIRWAY INHALATION TREATMENT: CPT

## 2020-10-25 PROCEDURE — 6360000002 HC RX W HCPCS: Performed by: HOSPITALIST

## 2020-10-25 PROCEDURE — 1290000000 HC SEMI PRIVATE OTHER R&B

## 2020-10-25 PROCEDURE — 82948 REAGENT STRIP/BLOOD GLUCOSE: CPT

## 2020-10-25 PROCEDURE — 6370000000 HC RX 637 (ALT 250 FOR IP): Performed by: HOSPITALIST

## 2020-10-25 PROCEDURE — 2580000003 HC RX 258: Performed by: FAMILY MEDICINE

## 2020-10-25 RX ADMIN — GABAPENTIN 600 MG: 600 TABLET, FILM COATED ORAL at 08:22

## 2020-10-25 RX ADMIN — BUDESONIDE AND FORMOTEROL FUMARATE DIHYDRATE 2 PUFF: 160; 4.5 AEROSOL RESPIRATORY (INHALATION) at 17:55

## 2020-10-25 RX ADMIN — Medication 10 ML: at 08:24

## 2020-10-25 RX ADMIN — METOPROLOL SUCCINATE 25 MG: 25 TABLET, FILM COATED, EXTENDED RELEASE ORAL at 08:22

## 2020-10-25 RX ADMIN — GABAPENTIN 600 MG: 600 TABLET, FILM COATED ORAL at 13:42

## 2020-10-25 RX ADMIN — BUDESONIDE AND FORMOTEROL FUMARATE DIHYDRATE 2 PUFF: 160; 4.5 AEROSOL RESPIRATORY (INHALATION) at 07:58

## 2020-10-25 RX ADMIN — Medication 1000 UNITS: at 08:22

## 2020-10-25 RX ADMIN — FUROSEMIDE 20 MG: 20 TABLET ORAL at 08:20

## 2020-10-25 RX ADMIN — ENOXAPARIN SODIUM 40 MG: 40 INJECTION SUBCUTANEOUS at 20:25

## 2020-10-25 RX ADMIN — MICONAZOLE NITRATE: 20 POWDER TOPICAL at 20:29

## 2020-10-25 RX ADMIN — METFORMIN HYDROCHLORIDE 1000 MG: 500 TABLET ORAL at 16:43

## 2020-10-25 RX ADMIN — CYCLOBENZAPRINE 5 MG: 10 TABLET, FILM COATED ORAL at 20:26

## 2020-10-25 RX ADMIN — ATORVASTATIN CALCIUM 20 MG: 20 TABLET, FILM COATED ORAL at 08:20

## 2020-10-25 RX ADMIN — PANTOPRAZOLE SODIUM 40 MG: 40 TABLET, DELAYED RELEASE ORAL at 05:59

## 2020-10-25 RX ADMIN — MAGNESIUM GLUCONATE 500 MG ORAL TABLET 400 MG: 500 TABLET ORAL at 08:20

## 2020-10-25 RX ADMIN — METFORMIN HYDROCHLORIDE 1000 MG: 500 TABLET ORAL at 08:20

## 2020-10-25 RX ADMIN — MICONAZOLE NITRATE: 20 POWDER TOPICAL at 08:23

## 2020-10-25 RX ADMIN — Medication 50 MG: at 08:22

## 2020-10-25 RX ADMIN — OXYCODONE HYDROCHLORIDE AND ACETAMINOPHEN 500 MG: 500 TABLET ORAL at 08:22

## 2020-10-25 RX ADMIN — ENOXAPARIN SODIUM 40 MG: 40 INJECTION SUBCUTANEOUS at 08:23

## 2020-10-25 RX ADMIN — BUDESONIDE AND FORMOTEROL FUMARATE DIHYDRATE 2 PUFF: 160; 4.5 AEROSOL RESPIRATORY (INHALATION) at 07:57

## 2020-10-25 RX ADMIN — ASPIRIN 81 MG: 81 TABLET ORAL at 08:20

## 2020-10-25 RX ADMIN — CITALOPRAM 40 MG: 40 TABLET, FILM COATED ORAL at 08:20

## 2020-10-25 RX ADMIN — Medication 10 ML: at 20:26

## 2020-10-25 RX ADMIN — MICONAZOLE NITRATE: 20 POWDER TOPICAL at 13:42

## 2020-10-25 RX ADMIN — PIOGLITAZONE 45 MG: 45 TABLET ORAL at 08:22

## 2020-10-25 RX ADMIN — ALOGLIPTIN 25 MG: 25 TABLET, FILM COATED ORAL at 08:20

## 2020-10-25 RX ADMIN — GABAPENTIN 600 MG: 600 TABLET, FILM COATED ORAL at 20:26

## 2020-10-25 RX ADMIN — QUETIAPINE FUMARATE 50 MG: 25 TABLET ORAL at 20:26

## 2020-10-25 RX ADMIN — CYCLOBENZAPRINE 5 MG: 10 TABLET, FILM COATED ORAL at 05:00

## 2020-10-25 ASSESSMENT — PAIN SCALES - GENERAL
PAINLEVEL_OUTOF10: 4
PAINLEVEL_OUTOF10: 0

## 2020-10-25 ASSESSMENT — PAIN DESCRIPTION - PROGRESSION

## 2020-10-25 ASSESSMENT — PAIN DESCRIPTION - DESCRIPTORS: DESCRIPTORS: ACHING

## 2020-10-25 ASSESSMENT — PAIN DESCRIPTION - PAIN TYPE: TYPE: CHRONIC PAIN

## 2020-10-25 ASSESSMENT — PAIN DESCRIPTION - ORIENTATION: ORIENTATION: LOWER

## 2020-10-25 ASSESSMENT — PAIN DESCRIPTION - LOCATION: LOCATION: BACK

## 2020-10-25 ASSESSMENT — PAIN - FUNCTIONAL ASSESSMENT: PAIN_FUNCTIONAL_ASSESSMENT: PREVENTS OR INTERFERES SOME ACTIVE ACTIVITIES AND ADLS

## 2020-10-25 ASSESSMENT — PAIN DESCRIPTION - FREQUENCY: FREQUENCY: CONTINUOUS

## 2020-10-25 ASSESSMENT — PAIN DESCRIPTION - ONSET: ONSET: ON-GOING

## 2020-10-25 NOTE — PLAN OF CARE
Problem: Gas Exchange - Impaired  Goal: Absence of hypoxia  Outcome: Ongoing  Remains on oxygen at 1L with saturations > 92%    Problem: Isolation Precautions - Risk of Spread of Infection  Goal: Prevent transmission of infection  Outcome: Ongoing   Patient remains in contact isolation for Covid 19. Educated on Covid 19 and ways to prevent transmission to others. Educated on contact isolation procedures for visitors and staff. Problem: Nutrition Deficits  Goal: Optimize nutrtional status  Outcome: Ongoing  Tolerating current diet and po fluids well. Problem: Loneliness or Risk for Loneliness  Goal: Demonstrate positive use of time alone when socialization is not possible  Outcome: Ongoing  Watches television and talks with family and friends     Problem: Bleeding:  Goal: Will show no signs and symptoms of excessive bleeding  Description: Will show no signs and symptoms of excessive bleeding  Outcome: Ongoing  No signs of excessive bleeding noted this shift. Problem: Pain:  Goal: Pain level will decrease  Description: Pain level will decrease  Outcome: Ongoing  Pain decreases with rest, repositioning, ice application, and prn pain medications. Problem: Activity:  Goal: Sleeping patterns will improve  Description: Sleeping patterns will improve  Outcome: Ongoing  Sleep pattern has improved since admission. Problem: Falls - Risk of:  Goal: Will remain free from falls  Description: Will remain free from falls  Outcome: Ongoing  Falling star prevention in place. Bed and chair alarms in use. Call light in reach. Purposeful hourly rounding. Problem: Metabolic:  Goal: Ability to maintain appropriate glucose levels will improve  Description: Ability to maintain appropriate glucose levels will improve  Outcome: Ongoing  Ability to maintain appropriate glucose levels improving since admission.     Problem: Mood - Altered:  Goal: Mood stable  Description: Mood stable  Outcome: Ongoing  Patient pleasant, calm, and cooperative with staff this shift.

## 2020-10-26 LAB — GLUCOSE BLD-MCNC: 155 MG/DL (ref 70–108)

## 2020-10-26 PROCEDURE — 97530 THERAPEUTIC ACTIVITIES: CPT

## 2020-10-26 PROCEDURE — 2700000000 HC OXYGEN THERAPY PER DAY

## 2020-10-26 PROCEDURE — 97535 SELF CARE MNGMENT TRAINING: CPT

## 2020-10-26 PROCEDURE — 97542 WHEELCHAIR MNGMENT TRAINING: CPT

## 2020-10-26 PROCEDURE — 82948 REAGENT STRIP/BLOOD GLUCOSE: CPT

## 2020-10-26 PROCEDURE — 97110 THERAPEUTIC EXERCISES: CPT

## 2020-10-26 PROCEDURE — 94760 N-INVAS EAR/PLS OXIMETRY 1: CPT

## 2020-10-26 PROCEDURE — 6360000002 HC RX W HCPCS: Performed by: HOSPITALIST

## 2020-10-26 PROCEDURE — 6370000000 HC RX 637 (ALT 250 FOR IP): Performed by: HOSPITALIST

## 2020-10-26 PROCEDURE — 94640 AIRWAY INHALATION TREATMENT: CPT

## 2020-10-26 PROCEDURE — 2580000003 HC RX 258: Performed by: FAMILY MEDICINE

## 2020-10-26 PROCEDURE — 1290000000 HC SEMI PRIVATE OTHER R&B

## 2020-10-26 RX ADMIN — METFORMIN HYDROCHLORIDE 1000 MG: 500 TABLET ORAL at 17:36

## 2020-10-26 RX ADMIN — GABAPENTIN 600 MG: 600 TABLET, FILM COATED ORAL at 09:21

## 2020-10-26 RX ADMIN — GABAPENTIN 600 MG: 600 TABLET, FILM COATED ORAL at 21:03

## 2020-10-26 RX ADMIN — Medication 50 MG: at 09:20

## 2020-10-26 RX ADMIN — Medication 1000 UNITS: at 09:20

## 2020-10-26 RX ADMIN — ENOXAPARIN SODIUM 40 MG: 40 INJECTION SUBCUTANEOUS at 21:04

## 2020-10-26 RX ADMIN — METOPROLOL SUCCINATE 25 MG: 25 TABLET, FILM COATED, EXTENDED RELEASE ORAL at 09:20

## 2020-10-26 RX ADMIN — BUDESONIDE AND FORMOTEROL FUMARATE DIHYDRATE 2 PUFF: 160; 4.5 AEROSOL RESPIRATORY (INHALATION) at 07:36

## 2020-10-26 RX ADMIN — PANTOPRAZOLE SODIUM 40 MG: 40 TABLET, DELAYED RELEASE ORAL at 05:29

## 2020-10-26 RX ADMIN — CYCLOBENZAPRINE 5 MG: 10 TABLET, FILM COATED ORAL at 09:22

## 2020-10-26 RX ADMIN — MAGNESIUM GLUCONATE 500 MG ORAL TABLET 400 MG: 500 TABLET ORAL at 09:21

## 2020-10-26 RX ADMIN — Medication 10 ML: at 21:04

## 2020-10-26 RX ADMIN — OXYCODONE HYDROCHLORIDE AND ACETAMINOPHEN 500 MG: 500 TABLET ORAL at 09:21

## 2020-10-26 RX ADMIN — ALOGLIPTIN 25 MG: 25 TABLET, FILM COATED ORAL at 09:21

## 2020-10-26 RX ADMIN — ACETAMINOPHEN 650 MG: 325 TABLET ORAL at 21:03

## 2020-10-26 RX ADMIN — ASPIRIN 81 MG: 81 TABLET ORAL at 09:20

## 2020-10-26 RX ADMIN — FUROSEMIDE 20 MG: 20 TABLET ORAL at 09:21

## 2020-10-26 RX ADMIN — QUETIAPINE FUMARATE 50 MG: 25 TABLET ORAL at 21:03

## 2020-10-26 RX ADMIN — ATORVASTATIN CALCIUM 20 MG: 20 TABLET, FILM COATED ORAL at 09:21

## 2020-10-26 RX ADMIN — PIOGLITAZONE 45 MG: 45 TABLET ORAL at 09:20

## 2020-10-26 RX ADMIN — BUDESONIDE AND FORMOTEROL FUMARATE DIHYDRATE 2 PUFF: 160; 4.5 AEROSOL RESPIRATORY (INHALATION) at 18:04

## 2020-10-26 RX ADMIN — MICONAZOLE NITRATE: 20 POWDER TOPICAL at 21:06

## 2020-10-26 RX ADMIN — GABAPENTIN 600 MG: 600 TABLET, FILM COATED ORAL at 15:03

## 2020-10-26 RX ADMIN — MICONAZOLE NITRATE: 20 POWDER TOPICAL at 15:04

## 2020-10-26 RX ADMIN — MICONAZOLE NITRATE: 20 POWDER TOPICAL at 09:24

## 2020-10-26 RX ADMIN — METFORMIN HYDROCHLORIDE 1000 MG: 500 TABLET ORAL at 09:21

## 2020-10-26 RX ADMIN — CITALOPRAM 40 MG: 40 TABLET, FILM COATED ORAL at 09:24

## 2020-10-26 RX ADMIN — ENOXAPARIN SODIUM 40 MG: 40 INJECTION SUBCUTANEOUS at 09:21

## 2020-10-26 RX ADMIN — Medication 10 ML: at 09:22

## 2020-10-26 ASSESSMENT — PAIN DESCRIPTION - PROGRESSION
CLINICAL_PROGRESSION: NOT CHANGED

## 2020-10-26 ASSESSMENT — PAIN DESCRIPTION - LOCATION: LOCATION: BACK

## 2020-10-26 ASSESSMENT — PAIN SCALES - GENERAL: PAINLEVEL_OUTOF10: 5

## 2020-10-26 NOTE — PATIENT CARE CONFERENCE
[address] [address]  Transitional Care Unit  Team Conference Note    Patient Name: Susy Ards   MRN: [id number]    : 1950  (69 y.o.)  Gender: female   Referring Practitioner: Attending: Elida Clemens. Nick Mratin MD; Ordering: Guillermo Mcconnell MD  Diagnosis: COVID-19    Team Conference Date: 10/27/2020   Admission Date:     1:79 PM  Re-Certification Date:     :  Tentative Discharge Plan and current psychosocial issues: Per physician note: David Burt is a 69 y. o. female admitted to the transitional care unit on 10/22/2020.  She came to the ED with concern of CP, SOB and fatigue. She was found to have covid-19 pneumonia and was admitted. She was treated with convalescent plasma, steroids and antiviral meds. She became stronger and eventually able to come to the TCU. SW will continue to monitor progress and maintain contact with patient and patient's family regarding length of stay and recommendations. SW will continue to assist with ongoing discharge planning. Nursing:     Weight:  Weight: has been stable     Wounds/Incisions/Ulcers:  Skin/Wound Issues: fold area redness  Bowel: continent  Bladder:continent     Pain: Patient's pain is currently controlled with neurontin and tylenol    Education Provided:  Diabetic:  Yes on orals  Peg Tube:No    Goncalves Care:  Education:No  Removal Necessary:  NA  Supplies Needed: NA     Anticoagulant Use:  Patient on lovenox for anticoagulation, which is being managed by Primary Care Physician    Antibiotic Use:  Patient not on antibiotics    Psychotropic Medication Use:  Patient on celexa and seroquel     Oxygen Use: No    Home Medications Reconciled: N/A    Physical Therapy:   Pt is a 69 y.o. female that is deconditioned and has decreased LE strength with L>R. Pt generally uses w/c for mobility with limited ambulation with RW upto about 20-25 ft per report.  Pt was at [facility] to close SBA for sit <> stand and ambulation today and Supervision for w/c mobility. Pt would benefit from continued skilled PT to address endurance building, strengthening, and functional mobility training. PT Equipment Recommendations  Equipment Needed: No    Occupational  Therapy:  Pt is making good progress towards goals. Factors facilitating goal achievement include: pleasant, cooperative. Barriers to goal achievement include: endurance. Family education has been addressed on the following topics: N/A. Pt continues to require skilled Occupational Therapy to address the following performance deficits dynamic standing balance, endurance, safety awareness, and activity tolerance. Without skilled OT intervention pt is at risk for functional decline, increased falls, and readmission to hospital.     Equipment: Bariatric [facility]         Nutrition:    Weight: 270 lb 4.8 oz (122.6 kg) / Body mass index is 48.65 kg/m². Please see nutrition note for details.     Family Education: No family available for education  Fall Risk:  Falling star program initiated    Goal Achievement:   Patient Continues to progress toward goal achievement    Discharge Plan   Estimated Length of Stay: 10/30/2020  Destination: home health  Services at Discharge: [address] [place] [address], Occupational Therapy, Nursing and aide 3x week  Equipment at Discharge: Needs: pulse ox,reacher, bariatric BSC  Factors facilitating achievement of predicted outcomes: Family support, Motivated, Cooperative and Pleasant  Barriers to the achievement of predicted outcomes: Decreased endurance, Upper extremity weakness and Lower extremity weakness    Readmission Risk              Risk of Unplanned Readmission:        20         High (20-31)     Moderate (10-19)     Low (0-9)    Team Members Present at Conference:  Physician: Dr. Kobe You MD  Nurse: Chayo Rush RN, Cathryn Ashby RN  :  [place], [place]  Occupational Therapist:  LINDSAY Au  Physical Therapist:    Miranda Kraus PTA  Speech Therapist: Speech

## 2020-10-26 NOTE — PROGRESS NOTES
[address] [place] [address]  INPATIENT PHYSICAL THERAPY  DAILY NOTE  - [facility] NURSING UNIT - 8E-57/057-A    Time In: 8810  Time Out: 7289  Timed Code Treatment Minutes: 39 Minutes  Minutes: 45          Date: 10/26/2020  Patient Name: Brijesh Lam,  Gender:  female        MRN: [id number]  : 1950  (69 y.o.)     Referring Practitioner: Attending: Vasu Lowery. Tato Quintero MD; Ordering: Hernando Gonzalez MD  Diagnosis: COVID-19  Additional Pertinent Hx: Per Acute H&P, \"75 y.o. female who presented to [address] [place] [address] with complaints of fatigue, dry cough, chest pain with sob. Symptoms began this morning, continued to get worse causing pt to present to the ED. Pt has a pmh of COPD, Asthma, Obesity, Chronic Venous Stasis, Schizoaffective Disorder, Depression, DM2, HTN, and HLD. Patients main complaint is chest pain, described as a heaviness sitting on her chest. Does not radiate, associated with sob. Gets worse with deep breathes. No diaphoresis or nausea. Chest pain constant, is reproducible. Admitted for management of Acute hypoxic respiratory failure secondary to COVID 19.\"     Prior Level of Function:  Lives With: Alone  Type of Home: Apartment(3rd floor)  Home Layout: One level  Home Access: Level entry, Elevator  Home Equipment: [employer], 4 wheeled walker   Bathroom Shower/Tub: Tub/Shower unit, Shower chair with back  [place] Toilet: Handicap height  Bathroom Equipment: Grab bars in shower    Receives Help From: Personal care attendant(MWF aide)  ADL Assistance: Needs assistance  Homemaking Assistance: Needs assistance  Ambulation Assistance: Independent  Transfer Assistance: Independent  Active : No  Additional Comments: lift chair, able to transfers from lift chair to w/c and uses w/c as mode of mobility through apartment. only ambulates short distance (~10 ft). aide 3x/week to assist with homemaking tasks and any ADL tasks as needed.   pt reporting she is able to dress herself and compelting toileting tasks when aide not present. reports 5 herniated vertebra and soft tissure damage in L thigh    Restrictions/Precautions:  Restrictions/Precautions: General Precautions, Fall Risk, Isolation  Position Activity Restriction  Other position/activity restrictions: droplet plus, COVID +    SUBJECTIVE: Patient in bedside chair upon arrival and agreeable to therapy. Patient on 1.5L upon arrival, O2 sats 93% per RN, RN approved decrease to 1L, O2 sats >90% throughout session. PAIN: 6/10: back, chronic    OBJECTIVE:  Bed Mobility:  Not Tested    Transfers:  Sit to Stand: Stand By Assistance  Stand to Sit:Stand By Assistance  Stand Pivot:Stand By Assistance, chair<>w/c, used RW    Ambulation:  Contact Guard Assistance  Distance: 3ft x1 chair>w/c  Surface: Level Tile  Device:Rolling Walker  Gait Deviations: Forward Flexed Posture, Slow Aerli, Decreased Step Length Bilaterally and Decreased Gait Speed    Wheelchair Mobility:  Supervision  Extremities Used: Bilateral Upper Extremities and Bilateral Lower Extremities  Type of Chair:Manual  Surface: Level Tile  Distance: ~50ft, 80ft, 120ft  Quality: Slow Velocity and Decreased Fluidity    Exercise:  Patient was guided in 1 set(s) 10 reps of exercise to both lower extremities. Seated marches, Seated hamstring curls, Seated heel/toe raises, Long arc quads and Standing heel/toe raises. Exercises were completed for increased independence with functional mobility. Unable to continue standing exercises due to increased back pain. Functional Outcome Measures: Not completed       ASSESSMENT:  Assessment: Patient progressing toward established goals. Activity Tolerance:  Patient tolerance of  treatment: good.       Equipment Recommendations:Equipment Needed: No  Discharge Recommendations:  Continue to assess pending progress    Plan: Times per week: 6x  Current Treatment Recommendations: Strengthening, ROM, Balance Training, Functional Mobility Training,

## 2020-10-26 NOTE — PLAN OF CARE
Problem: Impaired respiratory status  Goal: Clear lung sounds  10/26/2020 0743 by Martínez Linn RCP  Outcome: Ongoing   Continue therapy to improve lung sounds.

## 2020-10-26 NOTE — PROGRESS NOTES
[address] [address]  [address] [place] [facility] NURSING UN[facility]  Occupational Therapy  Daily Note  Time:   Time In: 1400  Time Out: 1440  Timed Code Treatment Minutes: 40 Minutes  Minutes: 40    Date: 10/26/2020  Patient Name: Tyler Lyn,   Gender: female      Room: [facility]57/057-A  MRN: [id number]  : 1950  (69 y.o.)  Referring Practitioner: Dr. Marco Argueta  Diagnosis: COVID 23  Additional Pertinent Hx: 69 y.o. female who presents to the Emergency Department on 10/17/20 for the evaluation of sternal chest pain for the past few days. Patient is vague with her symptom description and is a poor historian, states that it feels like a elephant is sitting on her chest, states that she has some chronic shortness of breath. Asked about assistive devices for ambulation, states that she uses all of them, asked about respiratory illness history, states that she has all of them. Denies exacerbating or alleviating factors, describes pain as 9 out of 10 chest pressure, denies nausea or emesis, denies fever or chills. Patient has chronic leg swelling and redness, states that she has a history of diabetes and that her leg redness has been persistent since her diagnosis. States that she checks her sugars multiple times daily, unsure of what her sugars have been. She denies cough. Pt sfeel day prior as well. Pt testing COVID +. Transfer to [facility] 10/22/2020. Restrictions/Precautions:  Restrictions/Precautions: General Precautions, Fall Risk, Isolation  Position Activity Restriction  Other position/activity restrictions: droplet plus, COVID +     SUBJECTIVE: Upon arrival patient was sitting in recliner. Pt was agreeable to OT session. PAIN: denies    COGNITION: Decreased Problem Solving and Decreased Safety Awareness    ADL:   Grooming: Contact Guard Assistance. Toileting: Contact Guard Assistance. Toilet Transfer: Contact Guard Assistance. Jennifer Money BALANCE:  Sitting Balance:  Supervision.     Standing Balance: Contact Guard Assistance. BED MOBILITY:  Not Tested    TRANSFERS:  Sit to Stand:  [employer], with increased time for completion, cues for hand placement. Stand to Sit: Contact Guard Assistance. FUNCTIONAL MOBILITY:  Assistive Device: Rolling Walker  Assist Level:  Contact Guard Assistance. Distance: To and from bathroom  Slow pace, No LOB, O2 stats remained above 90%. ADDITIONAL ACTIVITIES:  Pt completed B UE exs to increase strength required to complete ADL routine, x 1 set, x 10 reps,  Resistance band: shoulder flexion, chest press, bicep curls, horizontal AB/ADduction. slow pace, exhibits min fatigue, requires  RBs during exs. Exercises completed to increase indep and endurance with all self cares and transfers. Pt was educated on acapella to complete 10x daily to increase endurance with all self cares. Pt denies questions. ASSESSMENT: Pt is making good progress towards goals. Factors facilitating goal achievement include: pleasant, cooperative. Barriers to goal achievement include: endurance. Family education has been addressed on the following topics: N/A. Pt continues to require skilled Occupational Therapy to address the following performance deficits dynamic standing balance, endurance, safety awareness, and activity tolerance. Without skilled OT intervention pt is at risk for functional decline, increased falls, and readmission to hospital.       Activity Tolerance:  Patient tolerance of  treatment: good. Discharge Recommendations: Continue to assess pending progress, Home with [facility]  Equipment Recommendations:  Other: continue to assess pending discharge disposition  Plan: Times per week: 6x  Current Treatment Recommendations: Strengthening, Patient/Caregiver Education & Training, Endurance Training, Balance Training, Safety Education & Training, Self-Care / ADL    Patient Education  Patient Education: Plan of Care, ADL's, Energy Conservation, Home Exercise Program and Precautions    Goals  Short term goals  Time Frame for Short term goals: 2 weeks  Short term goal 1: Pt to complete LB ADL tasks with Janine and maintaining O2 sats above 90% to increase indep with self care. Short term goal 2: Pt will complete toileting routine including t/f with SBA to increase indep with self care  Short term goal 3: Pt to increase standing tolerance > 2 min with CGA and 0-1 UE support to complete ADL tasks. Short term goal 4: Pt to verbalize/demo 3 enregy conservation tech to decrease SOB and fatigue during ADLs  Short term goal 5: Pt will complete functional mobility to/from BR with SBA to increase indep with accessing environment during ADLs. Long term goals  Time Frame for Long term goals : 3 weeks  Long term goal 1: Pt will complete ADL routine with Bin to increase indep with returning to completing self care on own when aide not present at home. Long term goal 2: Pt will complete light homemaking task with Bin to increase indep with preparing light meals and snack to restore PLOF. Following session, patient left in safe position with all fall risk precautions in place.

## 2020-10-26 NOTE — PROGRESS NOTES
[address] [address]  Transitional Care Unit Preadmission Assessment    Patient Name: Reggie Arce        MRN: [id number]    [place]: [de-identified]    : 1950  (74 y.o.)  Gender: female     Admitted From:  [x][facility] []Outside Admission - Location:  Date of Admission to the hospital:10/22/2020  Date patient eligible for admission:10-20-20  Primary Diagnosis Pneumonia due to COVID-19 virus    Did patient have surgery?   [] Yes- Explain:   [x] No    Physicians:Hospitalist  Risk for clinical complications/co-morbidities:   Past Medical History:   Diagnosis Date    Acid reflux     Arrhythmia     Arthritis     CAD (coronary artery disease)     NON-OBSTRUCTIVE    Chronic bronchitis (HCC)     COPD (chronic obstructive pulmonary disease) (HCC)     Depression     [facility]     Dizziness - light-headed     Fall 10-15-13    left rib fracture and shoulder contusion     Hyperlipidemia     Hypertension     Kidney disease     MDRO (multiple drug resistant organisms) resistance     MRSA ABDOMEN    Numbness and tingling     HX OF:    S/P laparoscopic cholecystectomy 2018    Swelling     Tinnitus        Financial Information  Primary insurance:  []Medicare [x] Medicare HMO  []Commercial insurance    []Medicaid   []Workers Compensation      Secondary Insurance:  []Medicare [] Medicare HMO  []Commercial insurance    []Medicaid   []Workers Compensation [x]None    Precautions:   []Cardiac Precautions  []Total hip precautions    []Weight Bearing status:  [x]Safety Precautions/Concerns fall precautions  []Visually impaired   []Hard of Hearing     Isolation Precautions:         [x]Yes  []No  If Yes:  [x] Droplet  []Contact []Airborne                   []VRE          []MRSA               []C-diff         [] TB  [x] Other: COVID      Skills:   [x]Physical therapy     [x]Occupational Therapy   []IV Antibiotics   [] IV meds   [] TPN     []PEG tube Feedings      []New Colostomy   [] Ileostomy care/teaching    [] Speech Therapy   []Wound Vac   []Complex Dressing Changes    []Terminal care    [x]Other   Oxygen weaning  Has patient had Prior Skilled care in the Last 60 days:  []Yes  [x]NO   If Yes:   Skilled Facility:    How many skilled days were used?

## 2020-10-27 LAB — GLUCOSE BLD-MCNC: 146 MG/DL (ref 70–108)

## 2020-10-27 PROCEDURE — 97530 THERAPEUTIC ACTIVITIES: CPT

## 2020-10-27 PROCEDURE — 6360000002 HC RX W HCPCS: Performed by: HOSPITALIST

## 2020-10-27 PROCEDURE — 82948 REAGENT STRIP/BLOOD GLUCOSE: CPT

## 2020-10-27 PROCEDURE — 97110 THERAPEUTIC EXERCISES: CPT

## 2020-10-27 PROCEDURE — 6370000000 HC RX 637 (ALT 250 FOR IP): Performed by: HOSPITALIST

## 2020-10-27 PROCEDURE — 94760 N-INVAS EAR/PLS OXIMETRY 1: CPT

## 2020-10-27 PROCEDURE — 1290000000 HC SEMI PRIVATE OTHER R&B

## 2020-10-27 PROCEDURE — 94640 AIRWAY INHALATION TREATMENT: CPT

## 2020-10-27 RX ADMIN — BUDESONIDE AND FORMOTEROL FUMARATE DIHYDRATE 2 PUFF: 160; 4.5 AEROSOL RESPIRATORY (INHALATION) at 08:07

## 2020-10-27 RX ADMIN — METFORMIN HYDROCHLORIDE 1000 MG: 500 TABLET ORAL at 17:27

## 2020-10-27 RX ADMIN — ATORVASTATIN CALCIUM 20 MG: 20 TABLET, FILM COATED ORAL at 08:36

## 2020-10-27 RX ADMIN — PANTOPRAZOLE SODIUM 40 MG: 40 TABLET, DELAYED RELEASE ORAL at 05:48

## 2020-10-27 RX ADMIN — Medication 1000 UNITS: at 08:36

## 2020-10-27 RX ADMIN — PIOGLITAZONE 45 MG: 45 TABLET ORAL at 08:41

## 2020-10-27 RX ADMIN — BUDESONIDE AND FORMOTEROL FUMARATE DIHYDRATE 2 PUFF: 160; 4.5 AEROSOL RESPIRATORY (INHALATION) at 18:07

## 2020-10-27 RX ADMIN — ASPIRIN 81 MG: 81 TABLET ORAL at 08:36

## 2020-10-27 RX ADMIN — METFORMIN HYDROCHLORIDE 1000 MG: 500 TABLET ORAL at 08:36

## 2020-10-27 RX ADMIN — GABAPENTIN 600 MG: 600 TABLET, FILM COATED ORAL at 13:58

## 2020-10-27 RX ADMIN — MAGNESIUM GLUCONATE 500 MG ORAL TABLET 400 MG: 500 TABLET ORAL at 08:36

## 2020-10-27 RX ADMIN — OXYCODONE HYDROCHLORIDE AND ACETAMINOPHEN 500 MG: 500 TABLET ORAL at 08:36

## 2020-10-27 RX ADMIN — ACETAMINOPHEN 650 MG: 325 TABLET ORAL at 05:48

## 2020-10-27 RX ADMIN — GABAPENTIN 600 MG: 600 TABLET, FILM COATED ORAL at 20:12

## 2020-10-27 RX ADMIN — ENOXAPARIN SODIUM 40 MG: 40 INJECTION SUBCUTANEOUS at 08:36

## 2020-10-27 RX ADMIN — MICONAZOLE NITRATE: 20 POWDER TOPICAL at 20:12

## 2020-10-27 RX ADMIN — MICONAZOLE NITRATE: 20 POWDER TOPICAL at 08:41

## 2020-10-27 RX ADMIN — Medication 50 MG: at 08:36

## 2020-10-27 RX ADMIN — MICONAZOLE NITRATE: 20 POWDER TOPICAL at 13:58

## 2020-10-27 RX ADMIN — ALOGLIPTIN 25 MG: 25 TABLET, FILM COATED ORAL at 08:36

## 2020-10-27 RX ADMIN — QUETIAPINE FUMARATE 50 MG: 25 TABLET ORAL at 20:11

## 2020-10-27 RX ADMIN — ENOXAPARIN SODIUM 40 MG: 40 INJECTION SUBCUTANEOUS at 20:12

## 2020-10-27 RX ADMIN — CYCLOBENZAPRINE 5 MG: 10 TABLET, FILM COATED ORAL at 08:36

## 2020-10-27 RX ADMIN — CITALOPRAM 40 MG: 40 TABLET, FILM COATED ORAL at 08:36

## 2020-10-27 RX ADMIN — METOPROLOL SUCCINATE 25 MG: 25 TABLET, FILM COATED, EXTENDED RELEASE ORAL at 08:36

## 2020-10-27 RX ADMIN — CYCLOBENZAPRINE 5 MG: 10 TABLET, FILM COATED ORAL at 20:12

## 2020-10-27 RX ADMIN — GABAPENTIN 600 MG: 600 TABLET, FILM COATED ORAL at 08:36

## 2020-10-27 RX ADMIN — FUROSEMIDE 20 MG: 20 TABLET ORAL at 08:36

## 2020-10-27 ASSESSMENT — PAIN SCALES - GENERAL
PAINLEVEL_OUTOF10: 3
PAINLEVEL_OUTOF10: 8

## 2020-10-27 NOTE — PLAN OF CARE
Problem: Gas Exchange - Impaired  Goal: Absence of hypoxia  Outcome: Ongoing  Remains on room air with saturations > 92%    Problem: Body Temperature -  Risk of, Imbalanced  Goal: Ability to maintain a body temperature within defined limits  Outcome: Ongoing  Afebrile    Problem: Isolation Precautions - Risk of Spread of Infection  Goal: Prevent transmission of infection  10/27/2020 1447 by Vineet Corrigan RN  Outcome: Ongoing  Patient remains in contact isolation for Covid 19. Educated on Covid 19 and ways to prevent transmission to others. Educated on contact isolation procedures for visitors and staff. Problem: Bleeding:  Goal: Will show no signs and symptoms of excessive bleeding  Description: Will show no signs and symptoms of excessive bleeding  Outcome: Ongoing  No signs of excessive bleeding noted this shift. Problem: Pain:  Goal: Pain level will decrease  Description: Pain level will decrease  Outcome: Ongoing  Pain decreases with rest, repositioning, ice application, and prn pain medications. Problem: Activity:  Goal: Sleeping patterns will improve  Description: Sleeping patterns will improve  Outcome: Ongoing  Sleep pattern has improved since admission. Problem: Falls - Risk of:  Goal: Will remain free from falls  Description: Will remain free from falls  Outcome: Ongoing  Falling star prevention in place. Bed and chair alarms in use. Call light in reach. Purposeful hourly rounding. Problem: Mood - Altered:  Goal: Mood stable  Description: Mood stable  Outcome: Ongoing  Patient pleasant, calm, and cooperative with staff this shift. Problem: Metabolic:  Goal: Ability to maintain appropriate glucose levels will improve  Description: Ability to maintain appropriate glucose levels will improve  Outcome: Ongoing  Ability to maintain appropriate glucose levels improving since admission.

## 2020-10-27 NOTE — PROGRESS NOTES
[address] [address]  [address] [place] SKILLED NURSING UNIT  Occupational Therapy  Daily Note  Time:   Time In: 18  Time Out: 1669  Timed Code Treatment Minutes: 27 Minutes  Minutes: 27          Date: 10/27/2020  Patient Name: Deven Hernandez,   Gender: female      Room: [facility]057-A  MRN: [id number]  : 1950  (69 y.o.)  Referring Practitioner: Dr. Neeta Chatman  Diagnosis: COVID 23  Additional Pertinent Hx: 69 y.o. female who presents to the Emergency Department on 10/17/20 for the evaluation of sternal chest pain for the past few days. Patient is vague with her symptom description and is a poor historian, states that it feels like a elephant is sitting on her chest, states that she has some chronic shortness of breath. Asked about assistive devices for ambulation, states that she uses all of them, asked about respiratory illness history, states that she has all of them. Denies exacerbating or alleviating factors, describes pain as 9 out of 10 chest pressure, denies nausea or emesis, denies fever or chills. Patient has chronic leg swelling and redness, states that she has a history of diabetes and that her leg redness has been persistent since her diagnosis. States that she checks her sugars multiple times daily, unsure of what her sugars have been. She denies cough. Pt sfeel day prior as well. Pt testing COVID +. Transfer to [facility] operated by [facility] 10/22/2020. Restrictions/Precautions:  Restrictions/Precautions: General Precautions, Fall Risk  Position Activity Restriction  Other position/activity restrictions: COVID +     SUBJECTIVE: Pt up in chair upon OT arrival, pt reporting that she has already been up and to the BR and reports increased pain. Declining ADLs and activity outside room. Provided encouragement, pt engaged in [facility]. Patient on 1 L O2 via Nasal Canula  upon arrival to room. Patient O2 sats at 95 %. Upon activity patient maintaining O2 at 94-95 %. Pt requiring min rest breaks to recover.        PAIN: light homemaking task with Bin to increase indep with preparing light meals and snack to restore PLOF. Following session, patient left in safe position with all fall risk precautions in place.

## 2020-10-27 NOTE — PROGRESS NOTES
[address] [place] [address]  INPATIENT PHYSICAL THERAPY  DAILY NOTE  - [facility]ILLED NURSING UNIT - 8E-57/057-A    Time In: 6564  Time Out: 1457  Timed Code Treatment Minutes: 43 Minutes  Minutes: 43          Date: 10/27/2020  Patient Name: Shelbie Schafer,  Gender:  female        MRN: [id number]  : 1950  (69 y.o.)     Referring Practitioner: Attending: Robbie Corral. Abigail Felix MD; Ordering: Varun Christian MD  Diagnosis: COVID-19  Additional Pertinent Hx: Per Acute H&P, \"75 y.o. female who presented to [address] [place] [address] with complaints of fatigue, dry cough, chest pain with sob. Symptoms began this morning, continued to get worse causing pt to present to the ED. Pt has a pmh of COPD, Asthma, Obesity, Chronic Venous Stasis, Schizoaffective Disorder, Depression, DM2, HTN, and HLD. Patients main complaint is chest pain, described as a heaviness sitting on her chest. Does not radiate, associated with sob. Gets worse with deep breathes. No diaphoresis or nausea. Chest pain constant, is reproducible. Admitted for management of Acute hypoxic respiratory failure secondary to COVID 19.\"     Prior Level of Function:  Lives With: Alone  Type of Home: Apartment(3rd floor)  Home Layout: One level  Home Access: Level entry, Elevator  Home Equipment: [employer], 4 wheeled walker   Bathroom Shower/Tub: Tub/Shower unit, Shower chair with back  [employer] Toilet: Handicap height  Bathroom Equipment: Grab bars in shower    Receives Help From: Personal care attendant(MWF aide)  ADL Assistance: Needs assistance  Homemaking Assistance: Needs assistance  Ambulation Assistance: Independent  Transfer Assistance: Independent  Active : No  Additional Comments: lift chair, able to transfers from lift chair to w/c and uses w/c as mode of mobility through apartment. only ambulates short distance (~10 ft). aide 3x/week to assist with homemaking tasks and any ADL tasks as needed.   pt reporting she is able to dress herself and compelting toileting tasks when aide not present. reports 5 herniated vertebra and soft tissure damage in L thigh    Restrictions/Precautions:  Restrictions/Precautions: General Precautions, Fall Risk  Position Activity Restriction  Other position/activity restrictions: COVID + , on room air 10/27, O2 sats 95%. SUBJECTIVE: Patient in bedside chair upon arrival and agreeable to therapy with little encouragement. PAIN: back pain not rated     OBJECTIVE:  Bed Mobility:  Not Tested    Transfers:  Sit to Stand: Stand By Assistance  Stand to Sit:Stand By Assistance    Ambulation:  Stand By Assistance, [address], X 1  Distance: ~3ft x1, 5ft x1  Surface: Level Tile  Device:Rolling Walker  Gait Deviations: Forward Flexed Posture, Slow Areli, Decreased Step Length Bilaterally, Decreased Gait Speed and Decreased Heel Strike Bilaterally    Wheelchair Mobility:  Supervision  Extremities Used: Bilateral Lower Extremities with shoes donned  Type of Chair:Manual  Surface: Level Tile  Distance: ~200ft x1, 50ft x1  Quality: Decreased Fluidity and occasional stops due to fatigue, shoes donned    Balance:  Dynamic Standing Balance: Contact Guard Assistance, standing in parallel bars, performed B marches    Exercise:  Patient was guided in 1 set(s) 10 reps of exercise to both lower extremities. Standing marches and seated back stretches forward and side-to-side using ball at waist level. Exercises were completed for increased independence with functional mobility. Functional Outcome Measures: Not completed       ASSESSMENT:  Assessment: Patient progressing toward established goals. Activity Tolerance:  Patient tolerance of  treatment: good.       Equipment Recommendations:Equipment Needed: No  Discharge Recommendations:  Continue to assess pending progress    Plan: Times per week: 6x  Current Treatment Recommendations: Strengthening, ROM, Balance Training, Functional Mobility Training, Transfer Training,

## 2020-10-28 LAB — GLUCOSE BLD-MCNC: 147 MG/DL (ref 70–108)

## 2020-10-28 PROCEDURE — 1290000000 HC SEMI PRIVATE OTHER R&B

## 2020-10-28 PROCEDURE — 6360000002 HC RX W HCPCS: Performed by: HOSPITALIST

## 2020-10-28 PROCEDURE — 97535 SELF CARE MNGMENT TRAINING: CPT

## 2020-10-28 PROCEDURE — 82948 REAGENT STRIP/BLOOD GLUCOSE: CPT

## 2020-10-28 PROCEDURE — 97542 WHEELCHAIR MNGMENT TRAINING: CPT

## 2020-10-28 PROCEDURE — 6370000000 HC RX 637 (ALT 250 FOR IP): Performed by: HOSPITALIST

## 2020-10-28 PROCEDURE — 94640 AIRWAY INHALATION TREATMENT: CPT

## 2020-10-28 PROCEDURE — 94760 N-INVAS EAR/PLS OXIMETRY 1: CPT

## 2020-10-28 PROCEDURE — 97110 THERAPEUTIC EXERCISES: CPT

## 2020-10-28 PROCEDURE — 97530 THERAPEUTIC ACTIVITIES: CPT

## 2020-10-28 RX ADMIN — ASPIRIN 81 MG: 81 TABLET ORAL at 09:37

## 2020-10-28 RX ADMIN — CYCLOBENZAPRINE 5 MG: 10 TABLET, FILM COATED ORAL at 09:37

## 2020-10-28 RX ADMIN — CYCLOBENZAPRINE 5 MG: 10 TABLET, FILM COATED ORAL at 20:35

## 2020-10-28 RX ADMIN — METFORMIN HYDROCHLORIDE 1000 MG: 500 TABLET ORAL at 17:51

## 2020-10-28 RX ADMIN — Medication 1000 UNITS: at 09:37

## 2020-10-28 RX ADMIN — METFORMIN HYDROCHLORIDE 1000 MG: 500 TABLET ORAL at 09:38

## 2020-10-28 RX ADMIN — ENOXAPARIN SODIUM 40 MG: 40 INJECTION SUBCUTANEOUS at 09:37

## 2020-10-28 RX ADMIN — ATORVASTATIN CALCIUM 20 MG: 20 TABLET, FILM COATED ORAL at 09:38

## 2020-10-28 RX ADMIN — GABAPENTIN 600 MG: 600 TABLET, FILM COATED ORAL at 14:35

## 2020-10-28 RX ADMIN — PIOGLITAZONE 45 MG: 45 TABLET ORAL at 09:38

## 2020-10-28 RX ADMIN — BUDESONIDE AND FORMOTEROL FUMARATE DIHYDRATE 2 PUFF: 160; 4.5 AEROSOL RESPIRATORY (INHALATION) at 18:28

## 2020-10-28 RX ADMIN — METOPROLOL SUCCINATE 25 MG: 25 TABLET, FILM COATED, EXTENDED RELEASE ORAL at 09:38

## 2020-10-28 RX ADMIN — MAGNESIUM GLUCONATE 500 MG ORAL TABLET 400 MG: 500 TABLET ORAL at 09:38

## 2020-10-28 RX ADMIN — MICONAZOLE NITRATE: 20 POWDER TOPICAL at 20:36

## 2020-10-28 RX ADMIN — QUETIAPINE FUMARATE 50 MG: 25 TABLET ORAL at 20:34

## 2020-10-28 RX ADMIN — PANTOPRAZOLE SODIUM 40 MG: 40 TABLET, DELAYED RELEASE ORAL at 06:00

## 2020-10-28 RX ADMIN — OXYCODONE HYDROCHLORIDE AND ACETAMINOPHEN 500 MG: 500 TABLET ORAL at 09:37

## 2020-10-28 RX ADMIN — MICONAZOLE NITRATE: 20 POWDER TOPICAL at 09:41

## 2020-10-28 RX ADMIN — BUDESONIDE AND FORMOTEROL FUMARATE DIHYDRATE 2 PUFF: 160; 4.5 AEROSOL RESPIRATORY (INHALATION) at 07:33

## 2020-10-28 RX ADMIN — ENOXAPARIN SODIUM 40 MG: 40 INJECTION SUBCUTANEOUS at 20:35

## 2020-10-28 RX ADMIN — MICONAZOLE NITRATE: 20 POWDER TOPICAL at 14:35

## 2020-10-28 RX ADMIN — GABAPENTIN 600 MG: 600 TABLET, FILM COATED ORAL at 20:34

## 2020-10-28 RX ADMIN — ACETAMINOPHEN 650 MG: 325 TABLET ORAL at 06:34

## 2020-10-28 RX ADMIN — GABAPENTIN 600 MG: 600 TABLET, FILM COATED ORAL at 09:38

## 2020-10-28 RX ADMIN — FUROSEMIDE 20 MG: 20 TABLET ORAL at 09:38

## 2020-10-28 RX ADMIN — ALOGLIPTIN 25 MG: 25 TABLET, FILM COATED ORAL at 09:38

## 2020-10-28 RX ADMIN — ACETAMINOPHEN 650 MG: 325 TABLET ORAL at 20:35

## 2020-10-28 RX ADMIN — CITALOPRAM 40 MG: 40 TABLET, FILM COATED ORAL at 09:38

## 2020-10-28 RX ADMIN — Medication 50 MG: at 09:38

## 2020-10-28 ASSESSMENT — PAIN SCALES - GENERAL
PAINLEVEL_OUTOF10: 5
PAINLEVEL_OUTOF10: 8

## 2020-10-28 ASSESSMENT — PAIN DESCRIPTION - ORIENTATION: ORIENTATION: LEFT

## 2020-10-28 ASSESSMENT — PAIN DESCRIPTION - LOCATION: LOCATION: LEG

## 2020-10-28 NOTE — PLAN OF CARE
Problem: Gas Exchange - Impaired  Goal: Absence of hypoxia  10/27/2020 2255 by Machelle Yang LPN  Outcome: Ongoing     Problem: Body Temperature -  Risk of, Imbalanced  Goal: Ability to maintain a body temperature within defined limits  10/27/2020 2255 by Machelle Yang LPN  Outcome: Ongoing  Remains afebrile      Problem: Gas Exchange - Impaired  Goal: Promote optimal lung function  Outcome: Ongoing     Problem: Nutrition Deficits  Goal: Optimize nutrtional status  Outcome: Ongoing     Problem: Bleeding:  Goal: Will show no signs and symptoms of excessive bleeding  Description: Will show no signs and symptoms of excessive bleeding  10/27/2020 2255 by Machelle Yang LPN  Outcome: Ongoing  No bleeding or bruising noted    Problem: Pain:  Goal: Pain level will decrease  Description: Pain level will decrease  10/27/2020 2255 by Machelle Yang LPN  Outcome: Ongoing  Pain level kept at a satisfactory level. PRN pain meds.  offered

## 2020-10-28 NOTE — PLAN OF CARE
Problem: Gas Exchange - Impaired  Goal: Absence of hypoxia  10/28/2020 1246 by Rogelio Andino RN  Outcome: Ongoing  Note: Remains on room air. Patient uses acapella at the bedside and the yellow check off sheet to track her daily progress     Problem: Body Temperature -  Risk of, Imbalanced  Goal: Ability to maintain a body temperature within defined limits  10/28/2020 1246 by Rogelio Andino RN  Outcome: Ongoing  Note: VS stable /63   Pulse 85   Temp 97.4 °F (36.3 °C) (Oral)   Resp 16   Ht 5' 2.5\" (1.588 m)   Wt 264 lb 9.6 oz (120 kg)   LMP  (LMP Unknown)   SpO2 94%   BMI 47.62 kg/m²        Problem: Fatigue  Goal: Verbalize increase energy and improved vitality  10/28/2020 1246 by Rogelio Andino RN  Outcome: Ongoing  Note: Patient tired today, did not sleep well last night. States due to the weather she was having pain throughout the night. Problem: Pain:  Goal: Pain level will decrease  Description: Pain level will decrease  10/28/2020 1246 by Rogelio Andino RN  Outcome: Ongoing  Note: C/o intermittent pain to BLE. Flexeril scheduled TID. Problem: Falls - Risk of:  Goal: Will remain free from falls  Description: Will remain free from falls  10/28/2020 1246 by Rogelio Andino RN  Outcome: Ongoing  Note: No falls sustained at this time. Patient alert to call light.  Chair alarm in use     Problem: Metabolic:  Goal: Ability to maintain appropriate glucose levels will improve  Description: Ability to maintain appropriate glucose levels will improve  10/28/2020 1246 by Rogelio Andino RN  Outcome: Ongoing  Note: AM chem 147     Problem: Discharge Planning:  Goal: Patients continuum of care needs are met  Description: Patients continuum of care needs are met  10/28/2020 1246 by Rogelio Andino RN  Outcome: Ongoing  Note: Plan to discharge on Friday 10/30 with home health services     Problem: Skin Integrity:  Goal: Absence of new skin breakdown  Description: Absence of new skin breakdown  Outcome: Ongoing  Note: No new skin issues. Abdominal folds and bilat breast still with some redness/excoriation from moisture.  Continues with application of micotin powder

## 2020-10-28 NOTE — PLAN OF CARE
Deven Hernandez  [id number]    This document includes baseline careplan information as well as current active orders for this admission to Transitional Care Unit (8E). A copy was given to the patient and/or patient representative today, 10/28/2020.     Current Active Orders:  Orders Placed This Encounter   Procedures    Magnesium    Magnesium    DIET CARB CONTROL; Carb Control: 3 carb choices (45 gms)/meal; Low Sodium (2 GM)    HYPOGLYCEMIA TREATMENT: blood glucose less than 50 mg/dL and patient  ALERT and TOLERATING PO    HYPOGLYCEMIA TREATMENT: blood glucose less than 70 mg/dL and patient ALERT and TOLERATING PO    HYPOGLYCEMIA TREATMENT: blood glucose less than 70 mg/dL and patient NOT ALERT or NPO    Incentive spirometry nursing    Place PPD    Vital signs    Waffle cushion in chair when up   [place] patient weekly    nursing communication - magnesium replacement protocol    Full code    Consult to Recreation Therapy    Consult to Social Work    Inpatient consult to Dietitian    OT eval and treat    PT eval and treat    Acapella    Nasal Cannula Oxygen    Initiate Oxygen Therapy Protocol    MDI Treatment    POCT glucose       Current Medications:  Current Facility-Administered Medications   Medication Dose Route Frequency Provider Last Rate Last Dose    magnesium replacement protocol   Other RX Placeholder Eben Bonilla MD        magnesium oxide (MAG-OX) tablet 400 mg  400 mg Oral Daily Antonia Herrera MD   400 mg at 10/28/20 0938    acetaminophen (TYLENOL) tablet 650 mg  650 mg Oral Q6H PRN Antonia Herrera MD   650 mg at 10/28/20 0769    Or    acetaminophen (TYLENOL) suppository 650 mg  650 mg Rectal Q6H PRN Antonia Herrera MD        enoxaparin (LOVENOX) injection 40 mg  40 mg Subcutaneous Q12H Antonia Herrera MD   40 mg at 10/28/20 0937    dextrose 5 % solution  100 mL/hr Intravenous PRN Antonia Herrera MD        dextrose 50 % IV solution  12.5 g Intravenous PRN Audra RIVERS Chasidy Gottlieb MD        glucagon (rDNA) injection 1 mg  1 mg Intramuscular PRN Analy Robin MD        glucose (GLUTOSE) 40 % oral gel 15 g  15 g Oral PRN Analy Robin MD        alogliptin (NESINA) tablet 25 mg  25 mg Oral Daily Analy Robin MD   25 mg at 10/28/20 7244    aspirin EC tablet 81 mg  81 mg Oral Daily Analy Robin MD   81 mg at 10/28/20 1312    atorvastatin (LIPITOR) tablet 20 mg  20 mg Oral Daily Analy Robin MD   20 mg at 10/28/20 0938    budesonide-formoterol (SYMBICORT) 160-4.5 MCG/ACT inhaler 2 puff  2 puff Inhalation BID Analy Robin MD   2 puff at 10/28/20 0733    citalopram (CELEXA) tablet 40 mg  40 mg Oral Daily Analy Robin MD   40 mg at 10/28/20 7570    cyclobenzaprine (FLEXERIL) tablet 5 mg  5 mg Oral BID Analy Robin MD   5 mg at 10/28/20 2345    furosemide (LASIX) tablet 20 mg  20 mg Oral Daily Analy Robin MD   20 mg at 10/28/20 8914    gabapentin (NEURONTIN) tablet 600 mg  600 mg Oral TID Analy Robin MD   600 mg at 10/28/20 6026    metFORMIN (GLUCOPHAGE) tablet 1,000 mg  1,000 mg Oral BID WC Analy Robin MD   1,000 mg at 10/28/20 2260    metoprolol succinate (TOPROL XL) extended release tablet 25 mg  25 mg Oral Daily Analy Robin MD   25 mg at 10/28/20 0938    miconazole (MICOTIN) 2 % powder   Topical TID Analy Robin MD        pantoprazole (PROTONIX) tablet 40 mg  40 mg Oral QAM AC Analy Robin MD   40 mg at 10/28/20 0600    pioglitazone (ACTOS) tablet 45 mg  45 mg Oral Daily Analy Robin MD   45 mg at 10/28/20 4593    ppd (tuberculin skin test) read   Does not apply Weekly Analy Robin MD        QUEtiapine (SEROQUEL) tablet 50 mg  50 mg Oral Nightly Analy Robin MD   50 mg at 10/27/20 2011    tiotropium (SPIRIVA RESPIMAT) 2.5 MCG/ACT inhaler 2 puff  2 puff Inhalation Daily Analy Robin MD        tuberculin injection 5 Units  5 Units Intradermal Weekly Analy Robin MD   5 Units at 10/23/20 1119    vitamin C (ASCORBIC ACID) tablet 500 mg  500 mg Oral Daily Kaylyn Madrigal MD   500 mg at 10/28/20 6928    Vitamin D (CHOLECALCIFEROL) tablet 1,000 Units  1,000 Units Oral Daily Kaylyn Madrigal MD   1,000 Units at 10/28/20 0444    zinc sulfate (ZINCATE) capsule 50 mg  50 mg Oral Daily Kaylyn Madrigal MD   50 mg at 10/28/20 6015    docusate sodium (COLACE) capsule 100 mg  100 mg Oral BID PRN Edie Olszewski, MD        [facility]) tablet 8.6 mg  1 tablet Oral Nightly PRN Edie Olszewski, MD        polyethylene glycol [facility]) packet 17 g  17 g Oral Daily PRN Edie Olszewski, MD           Plan of Care Problems and Goals      Problem: Pain    Goal: Pain Level will decrease   Problem: Falls- Risk of    Goal: Absence of falls    Goal: Absence of physical injury   Problem: Serum Glucose Level- Abnormal    Goal: Ability to maintain appropriate glucose levels will improve   Problem: Risk of Impaired Skin Integrity    Goal: Absence of new skin breakdown  Goal: Will show no signs and symptoms of excessive bleeding     Problem: Nutrition    Goal: Optimal nutrition therapy   Problem: Fluid Volume- Risk of, Imbalanced    Goal: Will remain free of signs and symptoms of dehydration   Problem: Impaired Mobility    Goal: Mobility will improve   Problem: Discharge Planning    Goal: Continuum of care needs are met   Problem: Risk of Altered Mood    Goal: Mood stable   Problem: Sleep Pattern Disturbance    Goal: Sleeping patterns will improve   Problem: Respiratory     Goal: Clear lung sounds    Goal: Adequate oxygenation   Problem: Covid-19 Infection    Goal: Prevent transmission of infection

## 2020-10-28 NOTE — PLAN OF CARE
VIK spoke with patient this date, she is requesting to discharge home. She feels she is ready and has adequate help at home. Patient is on the Passport program and receives PCA,homemaking ,ERS and home delivered meals. Patient will be eligible for skilled medicare services, RN,PT,OT. Patient is requesting [facility] for her skilled home health. Patient will need transportation home form the hospital.VIK to make arrangements. 10/28 VIK made referral this date for RN,PT,OT left a message for Lizandro henson.

## 2020-10-28 NOTE — PROGRESS NOTES
Commode  Other: continue to assess pending discharge disposition  Plan: Times per week: 6x  Current Treatment Recommendations: Strengthening, Patient/Caregiver Education & Training, Endurance Training, Balance Training, Safety Education & Training, Self-Care / ADL    Patient Education  Patient Education: Role of OT, Plan of Care, IADL's, Energy Conservation and Home Exercise Program    Goals  Short term goals  Time Frame for Short term goals: 2 weeks  Short term goal 1: Pt to complete LB ADL tasks with Janine and maintaining O2 sats above 90% to increase indep with self care. Short term goal 2: Pt will complete toileting routine including t/f with SBA to increase indep with self care  Short term goal 3: Pt to increase standing tolerance > 2 min with CGA and 0-1 UE support to complete ADL tasks. Short term goal 4: Pt to verbalize/demo 3 enregy conservation tech to decrease SOB and fatigue during ADLs  Short term goal 5: Pt will complete functional mobility to/from BR with SBA to increase indep with accessing environment during ADLs. Long term goals  Time Frame for Long term goals : 3 weeks  Long term goal 1: Pt will complete ADL routine with Bin to increase indep with returning to completing self care on own when aide not present at home. Long term goal 2: Pt will complete light homemaking task with Bin to increase indep with preparing light meals and snack to restore PLOF. Following session, patient left in safe position with all fall risk precautions in place.

## 2020-10-28 NOTE — PLAN OF CARE
Problem: Impaired respiratory status  Goal: Clear lung sounds  10/28/2020 0734 by Arlin Wahl RCP  Outcome: Ongoing    Improve breath sounds, increase aeration and decrease WOB.

## 2020-10-28 NOTE — PROGRESS NOTES
[facility]  INPATIENT PHYSICAL THERAPY  DAILY NOTE  [facility] SKILLED NURSING UNIT - 8E-57/057-A    Time In: 1340  Time Out: 1420  Timed Code Treatment Minutes: 40 Minutes  Minutes: 40          Date: 10/28/2020  Patient Name: Megan Galindo,  Gender:  female        MRN: [id number]  : 1950  (69 y.o.)     Referring Practitioner: Attending: Emily Mooney. Aye Mendoza MD; Ordering: Yusuf Medina MD  Diagnosis: COVID-19  Additional Pertinent Hx: Per Acute H&P, \"75 y.o. female who presented to [facility] with complaints of fatigue, dry cough, chest pain with sob. Symptoms began this morning, continued to get worse causing pt to present to the ED. Pt has a pmh of COPD, Asthma, Obesity, Chronic Venous Stasis, Schizoaffective Disorder, Depression, DM2, HTN, and HLD. Patients main complaint is chest pain, described as a heaviness sitting on her chest. Does not radiate, associated with sob. Gets worse with deep breathes. No diaphoresis or nausea. Chest pain constant, is reproducible. Admitted for management of Acute hypoxic respiratory failure secondary to COVID 19.\"     Prior Level of Function:  Lives With: Alone  Type of Home: Apartment(3rd floor)  Home Layout: One level  Home Access: Level entry, Elevator  Home Equipment: [employer], 4 wheeled walker   Bathroom Shower/Tub: Tub/Shower unit, Shower chair with back  [place] Toilet: Handicap height  Bathroom Equipment: Grab bars in shower    Receives Help From: Personal care attendant(MWF aide)  ADL Assistance: Needs assistance  Homemaking Assistance: Needs assistance  Ambulation Assistance: Independent  Transfer Assistance: Independent  Active : No  Additional Comments: lift chair, able to transfers from lift chair to w/c and uses w/c as mode of mobility through apartment. only ambulates short distance (~10 ft). aide 3x/week to assist with homemaking tasks and any ADL tasks as needed.   pt reporting she is able to dress herself and compelting toileting tasks when aide not present. reports 5 herniated vertebra and soft tissure damage in L thigh    Restrictions/Precautions:  Restrictions/Precautions: General Precautions, Fall Risk  Position Activity Restriction  Other position/activity restrictions: COVID +, (10/26/20)1.5L O2 sats 93% per RN, RN approved decrease to 1L, O2 sats >90%, on room air 10/27, O2 sats 95%     SUBJECTIVE: Patient in bedside chair upon arrival and agreeable to therapy. PAIN: back pain, not rated, occasional LE \"cramps\" during w/c mobility    OBJECTIVE:  Bed Mobility:  Rolling to Left: Stand By Assistance, bed flat, used bed rails, increased time to complete   Rolling to Right: Stand By Assistance, with verbal cues , with increased time for completion, no rails, bed flat   Supine to Sit: Contact Guard Assistance, Minimal Assistance, bed flat, used bed rails, educated on log roll technique, patient uses bedrail to sit up in long sitting  Sit to Supine: Minimal Assistance, bed flat, assist to bring LLE onto bed   Scooting: Supervision    Transfers:  Sit to Stand: Stand By Assistance, with increased time for completion  Stand to Sit:Stand By Assistance, with increased time for completion  Stand Pivot:Stand By Assistance, with increased time for completion, bed>w/c, w/c>chair, no device, good hand placement  chair>bed: Stand By Assistance, [employer], X 1, used RW    Ambulation:  Stand By Assistance, [employer], X 1  Distance: 4ft x1  Surface: Level Tile  Device:Rolling Walker  Gait Deviations:   Forward Flexed Posture, Slow Areli, Decreased Step Length Bilaterally, Decreased Gait Speed and Decreased Heel Strike Bilaterally    Wheelchair Mobility:  Modified Independent  Extremities Used: Bilateral Lower Extremities  Type of Chair:Manual  Surface: Level Tile  Distance: ~180ft, 80ft   Quality: Slow Velocity, Decreased Fluidity and occasional breaks due to BLE cramps      Balance:  Dynamic

## 2020-10-29 LAB — GLUCOSE BLD-MCNC: 155 MG/DL (ref 70–108)

## 2020-10-29 PROCEDURE — 1290000000 HC SEMI PRIVATE OTHER R&B

## 2020-10-29 PROCEDURE — 82948 REAGENT STRIP/BLOOD GLUCOSE: CPT

## 2020-10-29 PROCEDURE — 6360000002 HC RX W HCPCS: Performed by: HOSPITALIST

## 2020-10-29 PROCEDURE — 97530 THERAPEUTIC ACTIVITIES: CPT

## 2020-10-29 PROCEDURE — 6370000000 HC RX 637 (ALT 250 FOR IP): Performed by: HOSPITALIST

## 2020-10-29 PROCEDURE — 97110 THERAPEUTIC EXERCISES: CPT

## 2020-10-29 PROCEDURE — 6370000000 HC RX 637 (ALT 250 FOR IP): Performed by: FAMILY MEDICINE

## 2020-10-29 PROCEDURE — 94640 AIRWAY INHALATION TREATMENT: CPT

## 2020-10-29 PROCEDURE — 0220000000 HC SKILLED NURSING FACILITY

## 2020-10-29 PROCEDURE — 97535 SELF CARE MNGMENT TRAINING: CPT

## 2020-10-29 RX ADMIN — ENOXAPARIN SODIUM 40 MG: 40 INJECTION SUBCUTANEOUS at 20:26

## 2020-10-29 RX ADMIN — CYCLOBENZAPRINE 5 MG: 10 TABLET, FILM COATED ORAL at 09:31

## 2020-10-29 RX ADMIN — ATORVASTATIN CALCIUM 20 MG: 20 TABLET, FILM COATED ORAL at 09:31

## 2020-10-29 RX ADMIN — QUETIAPINE FUMARATE 50 MG: 25 TABLET ORAL at 20:26

## 2020-10-29 RX ADMIN — GABAPENTIN 600 MG: 600 TABLET, FILM COATED ORAL at 20:26

## 2020-10-29 RX ADMIN — ENOXAPARIN SODIUM 40 MG: 40 INJECTION SUBCUTANEOUS at 09:30

## 2020-10-29 RX ADMIN — FUROSEMIDE 20 MG: 20 TABLET ORAL at 09:31

## 2020-10-29 RX ADMIN — PIOGLITAZONE 45 MG: 45 TABLET ORAL at 09:30

## 2020-10-29 RX ADMIN — MICONAZOLE NITRATE: 20 POWDER TOPICAL at 09:34

## 2020-10-29 RX ADMIN — METFORMIN HYDROCHLORIDE 1000 MG: 500 TABLET ORAL at 09:30

## 2020-10-29 RX ADMIN — Medication 50 MG: at 09:30

## 2020-10-29 RX ADMIN — GABAPENTIN 600 MG: 600 TABLET, FILM COATED ORAL at 09:31

## 2020-10-29 RX ADMIN — MICONAZOLE NITRATE: 20 POWDER TOPICAL at 18:02

## 2020-10-29 RX ADMIN — MAGNESIUM GLUCONATE 500 MG ORAL TABLET 400 MG: 500 TABLET ORAL at 09:31

## 2020-10-29 RX ADMIN — DOCUSATE SODIUM 100 MG: 100 CAPSULE, LIQUID FILLED ORAL at 09:31

## 2020-10-29 RX ADMIN — GABAPENTIN 600 MG: 600 TABLET, FILM COATED ORAL at 18:01

## 2020-10-29 RX ADMIN — Medication 1000 UNITS: at 09:30

## 2020-10-29 RX ADMIN — ACETAMINOPHEN 650 MG: 325 TABLET ORAL at 04:36

## 2020-10-29 RX ADMIN — CYCLOBENZAPRINE 5 MG: 10 TABLET, FILM COATED ORAL at 20:26

## 2020-10-29 RX ADMIN — PANTOPRAZOLE SODIUM 40 MG: 40 TABLET, DELAYED RELEASE ORAL at 06:02

## 2020-10-29 RX ADMIN — METOPROLOL SUCCINATE 25 MG: 25 TABLET, FILM COATED, EXTENDED RELEASE ORAL at 09:30

## 2020-10-29 RX ADMIN — ASPIRIN 81 MG: 81 TABLET ORAL at 09:31

## 2020-10-29 RX ADMIN — BUDESONIDE AND FORMOTEROL FUMARATE DIHYDRATE 2 PUFF: 160; 4.5 AEROSOL RESPIRATORY (INHALATION) at 07:38

## 2020-10-29 RX ADMIN — OXYCODONE HYDROCHLORIDE AND ACETAMINOPHEN 500 MG: 500 TABLET ORAL at 09:30

## 2020-10-29 RX ADMIN — METFORMIN HYDROCHLORIDE 1000 MG: 500 TABLET ORAL at 18:02

## 2020-10-29 RX ADMIN — ALOGLIPTIN 25 MG: 25 TABLET, FILM COATED ORAL at 09:31

## 2020-10-29 RX ADMIN — MICONAZOLE NITRATE: 20 POWDER TOPICAL at 22:50

## 2020-10-29 RX ADMIN — CITALOPRAM 40 MG: 40 TABLET, FILM COATED ORAL at 09:31

## 2020-10-29 ASSESSMENT — PAIN SCALES - GENERAL
PAINLEVEL_OUTOF10: 5
PAINLEVEL_OUTOF10: 3
PAINLEVEL_OUTOF10: 0

## 2020-10-29 NOTE — DISCHARGE INSTR - PHARMACY
Unused medications, especially pain medications, should be disposed to ensure medications do not end up being misused in the future, as well as helping to ensure drugs are not impacting the environment. [facility] and many local police agencies have medication take-back bins to properly destroy these medications. Please see the site https://apps. [employer]. [employer].gov/pubdispsearch/spring/main?execution=e2s1 for additional take-back bins located in your area. · Be safe with medicines. If your doctor prescribed medicine, take it exactly as prescribed. Call your doctor if you think you are having a problem with your medicine. You will get more details on the specific medicines your doctor prescribes.

## 2020-10-29 NOTE — DISCHARGE INSTR - OTHER ORDERS
Chest Pain: Care Instructions  Your Care Instructions     There are many things that can cause chest pain. Some are not serious and will get better on their own in a few days. But some kinds of chest pain need more testing and treatment. Your doctor may have recommended a follow-up visit in the next 8 to 12 hours. If you are not getting better, you may need more tests or treatment. Even though your doctor has released you, you still need to watch for any problems. The doctor carefully checked you, but sometimes problems can develop later. If you have new symptoms or if your symptoms do not get better, get medical care right away. If you have worse or different chest pain or pressure that lasts more than 5 minutes or you passed out (lost consciousness), call 911 or seek other emergency help right away. A medical visit is only one step in your treatment. Even if you feel better, you still need to do what your doctor recommends, such as going to all suggested follow-up appointments and taking medicines exactly as directed. This will help you recover and help prevent future problems. How can you care for yourself at home? · Rest until you feel better. · Take your medicine exactly as prescribed. Call your doctor if you think you are having a problem with your medicine. · Do not drive after taking a prescription pain medicine. When should you call for help? Call 911 if:     · You passed out (lost consciousness).     · You have severe difficulty breathing.     · You have symptoms of a heart attack. These may include:  ? Chest pain or pressure, or a strange feeling in your chest.  ? Sweating. ? Shortness of breath. ? Nausea or vomiting. ? Pain, pressure, or a strange feeling in your back, neck, jaw, or upper belly or in one or both shoulders or arms. ? Lightheadedness or sudden weakness. ? A fast or irregular heartbeat.   After you call 911, the  may tell you to chew 1 adult-strength or 2 to 4 low-dose aspirin. Wait for an ambulance. Do not try to drive yourself. Call your doctor today if:     · You have any trouble breathing.     · Your chest pain gets worse.     · You are dizzy or lightheaded, or you feel like you may faint.     · You are not getting better as expected.     · You are having new or different chest pain.

## 2020-10-29 NOTE — PROGRESS NOTES
[facility]  INPATIENT PHYSICAL THERAPY  DISCHARGE NOTE  [facility] SKILLED NURSING UNIT - [id number]-[id number]     Time In: 1350  Time Out: 1428  Timed Code Treatment Minutes: 38 Minutes  Minutes: 38          Date: 10/29/2020  Patient Name: Theola Babinski,  Gender:  female        MRN: [id number]  : 1950  (69 y.o.)     Referring Practitioner: Attending: Luis Griggs. 1200 Spencer Diaz MD; Ordering: Kyleigh Lambert MD  Diagnosis: COVID-19  Additional Pertinent Hx: Per Acute H&P, \"75 y.o. female who presented to [facility] with complaints of fatigue, dry cough, chest pain with sob. Symptoms began this morning, continued to get worse causing pt to present to the ED. Pt has a pmh of COPD, Asthma, Obesity, Chronic Venous Stasis, Schizoaffective Disorder, Depression, DM2, HTN, and HLD. Patients main complaint is chest pain, described as a heaviness sitting on her chest. Does not radiate, associated with sob. Gets worse with deep breathes. No diaphoresis or nausea. Chest pain constant, is reproducible. Admitted for management of Acute hypoxic respiratory failure secondary to COVID 19.\"     Prior Level of Function:  Lives With: Alone  Type of Home: Apartment(3rd floor)  Home Layout: One level  Home Access: Level entry, Elevator  Home Equipment: [employer], 4 wheeled walker   Bathroom Shower/Tub: Tub/Shower unit, Shower chair with back  [place]real Toilet: Handicap height  Bathroom Equipment: Grab bars in shower    Receives Help From: Personal care attendant(MWF aide)  ADL Assistance: Needs assistance  Homemaking Assistance: Needs assistance  Ambulation Assistance: Independent  Transfer Assistance: Independent  Active : No  Additional Comments: lift chair, able to transfers from lift chair to w/c and uses w/c as mode of mobility through apartment. only ambulates short distance (~10 ft). aide 3x/week to assist with homemaking tasks and any ADL tasks as needed.   pt reporting she is able to dress herself and compelting toileting tasks when aide not present. reports 5 herniated vertebra and soft tissure damage in L thigh    Restrictions/Precautions:  Restrictions/Precautions: General Precautions, Fall Risk  Position Activity Restriction  Other position/activity restrictions: COVID +, (10/26/20)1.5L O2 sats 93% per RN, RN approved decrease to 1L, O2 sats >90%, on room air 10/27, O2 sats 95%      SUBJECTIVE: Pt resting in room chair and reports that she is feeling good about going home tomorrow. Pt reports that she feels that she is generally back to her baseline for mobility. PAIN: not reported    OBJECTIVE:  Bed Mobility:  Not Tested    Transfers:  Sit to Stand: Modified Independent  Stand to Sit:Modified Independent    Ambulation:  Supervision  Distance: 25 ft  Surface: Level Tile  Device:Rolling Walker  Gait Deviations:  Decreased Step Length Bilaterally, Decreased Gait Speed and Decreased Heel Strike Bilaterally    Balance:  Static Sitting Balance:  Modified Independent  Dynamic Sitting Balance: Modified Independent  Static Standing Balance: Supervision  Dynamic Standing Balance: Supervision    Exercise:  Patient was guided in 1 set(s) 10-12 reps of exercise to both lower extremities. Ankle pumps, Glut sets, Quad sets, Hip abduction/adduction, Seated marches, Seated heel/toe raises, Long arc quads and reviewed HEP handout of supine and seated exercises. .  Exercises were completed for increased independence with functional mobility. Functional Outcome Measures: Not completed       ASSESSMENT:  Assessment: Pt progressed well toward goals during admission and has met 3/4. Pt is at SBA to Mod I for mobility except has difficulty with bringing L LE onto bed. Pt sleeps in recliner mostly at home. Activity Tolerance:  Patient tolerance of  treatment: good. With occasional rest breaks.      Equipment Recommendations:Equipment Needed: No  Discharge Recommendations:  Home with Home Exercise Program and [address] [place] [address] PT.    Plan: Times per week: 6x  Current Treatment Recommendations: Strengthening, ROM, Balance Training, Functional Mobility Training, Transfer Training, Endurance Training, Gait Training, Home Exercise Program, Safety Education & Training, Patient/Caregiver Education & Training, Wheelchair Mobility Training    Patient Education  Patient Education: Plan of Care, Home Exercise Program    Goals:  Patient goals : to return home  Short term goals  Time Frame for Short term goals: 10 days  Short term goal 1: Pt to be Supervision for supine<>sit with HOB raised (pt sleeps in recliner at home) to get in/out of bed during admission. - NOT MET  Short term goal 2: Pt to be Mod I for sit<>stand to get up to ambulate or pivot - MET  Short term goal 3: Pt to ambulate >20ft with RW at supervision to improve ability to navigate within home. - MET  Short term goal 4: Pt to be Mod I to propel and manage w/c for >200 ft for household and community distances - MET  Long term goals  Time Frame for Long term goals : n/a secondary to short ELOS    Following session, patient left in safe position with all fall risk precautions in place. Inderjit Peacock.  Valentina Duffy [address]

## 2020-10-29 NOTE — DISCHARGE INSTR - COC
Continuity of Care Form    Patient Name: Jimbo Akins   :  1950  MRN:  [id number]    Admit date:  10/22/2020  Discharge date:  10/30/2020    Code Status Order: Full Code   Advance Directives: Not on file  Advance Care Flowsheet Documentation     Date/Time Healthcare Directive Type of Healthcare Directive Copy in [address] Agent's Name Healthcare Agent's Phone Number    10/22/20 7628  Yes, patient has an advance directive for healthcare treatment  Durable power of  for health care  --  Adult [address]  --          Admitting Physician:  Elizabeth Erwin MD  PCP: Ferny Guerra MD    Discharging Nurse: [facility] Unit/Room#: 8E-57/057-A  Discharging Unit Phone Number: [phone]    Emergency Contact:   Extended Emergency Contact Information  Primary Emergency Contact: Gonzalo Goode [address] Phone: [phone]  Relation: Child  Secondary Emergency Contact: Gloria Carlton[address] Phone: [phone]  Relation: Child    Past Surgical History:  Past Surgical History:   Procedure Laterality Date    BACK SURGERY      CARDIOVASCULAR STRESS TEST  2009    Persantine test associated w/non specific symptoms. EKG non diagnostic. no obvious stress induced ischemia. EF 63%. normal wall motion.  CARDIOVASCULAR STRESS TEST  2010    tilt table associated w/fluctuation of BP w/relatively low BP throughout test. no significant symptoms during test. borderline tilt table test.    CHOLECYSTECTOMY      COLONOSCOPY      CORONARY ANGIOPLASTY  2009    EF 60%. nonobstructive CAD. normal LV function.     ENDOSCOPY, COLON, DIAGNOSTIC  over 5 years     Dr. Deny Jessica  2004    cataract   [address]  25 months old     umblilical    HERNIA REPAIR  14    Ventral Hernia Repair with Mesh - Dr. Gurpreet Finch    partial    OTHER SURGICAL HISTORY  Feb 17, 2014    Left lower quadrant incision and drainage (Dr. Erika Wood, [facility])    OTHER SURGICAL HISTORY  2/2014    I&D abd abscess Dr. Alba Drain N/A 11/26/2018    LAPAROSCOPY EXPLORATORY, LAP LYLE performed by Ivonne Friedman MD at [address] [place] [address]  as a child     TOOTH EXTRACTION  4/16/2012    TRANSTHORACIC ECHOCARDIOGRAM  03/04/2009    EF 50-55%. normal LV global systolic funct. slight LVH. slight lt atrial enlargement. no obvious stenotic valves. no pericardial effusion. Immunization History:   Immunization History   Administered Date(s) Administered    PPD Test 10/23/2020    Td, unspecified formulation 07/27/2013       Active Problems:  Patient Active Problem List   Diagnosis Code    CAD (coronary artery disease) I25.10    Hypertension I10    Hyperlipidemia E78.5    Diabetes mellitus ([facility] E11.9    Arrhythmia I49.9    Kidney disease N28.9    Falls frequently R29.6    Incontinence of urine R32    DDD (degenerative disc disease), lumbar M51.36    Depression F32.9    Hypersomnolence G47.10    Fatigue R53.83    Arthritis M19.90    Seasonal allergies J30.2    Obesity, Class III, BMI 40-49.9 (morbid obesity) ([facility] E66.01    Ventral hernia K43.9    Peripheral edema R60.9    Fall from other slipping, tripping, or stumbling W01. 0XXA    BMI 45.0-49.9, adult ([facility] Z68.42    Venous (peripheral) insufficiency I87.2    Urinary incontinence, mixed N39.46    Grief at loss of child F45.21, Z63.4    MRSA infection (methicillin-resistant Staphylococcus aureus) A49.02    COPD with exacerbation ([facility]) J44.1    Venous stasis dermatitis I87.2    COPD (chronic obstructive pulmonary disease) (HCC) J44.9    Chronic obstructive pulmonary disease (HCC) J44.9    Fungus infection B49    Boil, thigh L02.429    Ulcer of genital labia N76.6    Venous ulcer of left leg ([facility]) I83.029, L97.929    S/P laparoscopic cholecystectomy Z90.49    COPD exacerbation ([facility] J44.1    COPD with acute exacerbation ([facility] J44.1    COVID-19 U07.1    Acute respiratory failure with hypoxia ([facility]) J96.01    Chest pain R07.9    Class 3 severe obesity due to excess calories with serious comorbidity and body mass index (BMI) of 50.0 to 59.9 in adult ([facility] E66.01, Z68.43    Schizoaffective disorder (HCC) F25.9    PTSD (post-traumatic stress disorder) F43.10    Pneumonia due to COVID-19 virus U07.1, J12.89    Fatty liver K76.0       Isolation/Infection:   Isolation          No Isolation        Patient Infection Status     Infection Onset Added Last Indicated Last Indicated By Review Planned Expiration Resolved Resolved By    None active    Resolved    COVID-19 10/17/20 10/17/20 10/17/20 COVID-19   10/27/20 [address] Davi Mandel RN    COVID-19 Rule Out 10/17/20 10/17/20 10/17/20 COVID-19 (Ordered)   10/17/20 Rule-Out Test Resulted    MRSA  02/20/14 02/20/14 Aguila Hernandez RN   09/05/14 Young Dvei RN          Nurse Assessment:  Last Vital Signs: /73   Pulse 81   Temp 96.8 °F (36 °C) (Oral)   Resp 18   Ht 5' 2.5\" (1.588 m)   Wt 264 lb 9.6 oz (120 kg)   LMP  (LMP Unknown)   SpO2 94%   BMI 47.62 kg/m²     Last documented pain score (0-10 scale): Pain Level: 3  Last Weight:   Wt Readings from Last 1 Encounters:   10/28/20 264 lb 9.6 oz (120 kg)     Mental Status:  Alert and oriented    IV Access:  None    Nursing Mobility/ADLs:  Walking   Up with stand by assist.   Transfer  Transfers with stand by assist.   Bathing  Bathes with minimal assist.    Dressing  Dresses with minimal/moderate assist.   Toileting  Able to toilet per self. Feeding  Able to feed self. Med Admin  Takes medications whole. Med Delivery    Takes medications per self.      Wound Care Documentation and Therapy:  Wound 10/18/20 Vagina Right (Active)   Dressing/Treatment Open to air 10/29/20 0915   Wound Assessment Other (Comment) 10/28/20 2052   Drainage Amount None 10/28/20 2052   Sandra-wound Assessment Intact 10/28/20 2052   Number of days: 11        Elimination:  Continence:   · Bowel:  Continent  · Bladder: Continent     Date of Last BM: 10/28/2020    Intake/Output Summary (Last 24 hours) at 10/29/2020 1158  Last data filed at 10/28/2020 1839  Gross per 24 hour   Intake 870 ml   Output --   Net 870 ml     I/O last 3 completed shifts: In: 1050 [P.O.:1050]  Out: -     Safety Concerns:     Overestimates limitations at times      Nutrition Therapy:  Current Nutrition Therapy:   Carb counting diet, check blood sugars in the morning before eating. Routes of Feeding: Oral   Liquids: Thin liquids  Daily Fluid Restriction: No fluid restrictions  Last Modified Barium Swallow with Video (Video Swallowing Test): Not applicable this visit    Treatments at the Time of Hospital Discharge:   Respiratory Treatments:  budesonide-formoterol (SYMBICORT) 160-4.5 MCG/ACT inhaler 2 puffs daily. tiotropium (SPIRIVA RESPIMAT) 2.5 MCG/ACT inhaler 2 puffs daily. Oxygen Therapy:  As needed. Resume home CPAP as prior to admission. Rehab Therapies: PT, OT  Weight Bearing Status/Restrictions: Weight bearing as tolerated  Other Treatments:  Home Health RN, PT, OT    Patient's personal belongings (please select all that are sent with patient):  Belongings brought into hospital.      RN SIGNATURE: Jonna Schilder RN    CASE MANAGEMENT/SOCIAL WORK SECTION    Inpatient Status Date: 10/22/2020    Readmission Risk Assessment Score:  Readmission Risk              Risk of Unplanned Readmission:        20         / signature: {Esignature:[id number]}    PHYSICIAN SECTION    Prognosis: Fair    Condition at Discharge: Stable    Rehab Potential (if transferring to Rehab): Fair    Recommended Labs or Other Treatments After Discharge: Keep follow up appointment with PCP as scheduled.       Physician Certification: I certify the above information and transfer of Lj Barrera Debbie Herbert  is necessary for the continuing treatment of the diagnosis listed and that she requires [address] for less 30 days.      Update Admission H&P: No change in H&P     PHYSICIAN SIGNATURE:  {Esignature:[id number]}

## 2020-10-30 VITALS
OXYGEN SATURATION: 92 % | WEIGHT: 264.6 LBS | TEMPERATURE: 98.2 F | SYSTOLIC BLOOD PRESSURE: 138 MMHG | DIASTOLIC BLOOD PRESSURE: 74 MMHG | BODY MASS INDEX: 46.88 KG/M2 | RESPIRATION RATE: 17 BRPM | HEART RATE: 91 BPM | HEIGHT: 63 IN

## 2020-10-30 LAB — GLUCOSE BLD-MCNC: 151 MG/DL (ref 70–108)

## 2020-10-30 PROCEDURE — 6360000002 HC RX W HCPCS: Performed by: HOSPITALIST

## 2020-10-30 PROCEDURE — 6370000000 HC RX 637 (ALT 250 FOR IP): Performed by: HOSPITALIST

## 2020-10-30 PROCEDURE — 94761 N-INVAS EAR/PLS OXIMETRY MLT: CPT

## 2020-10-30 PROCEDURE — 94640 AIRWAY INHALATION TREATMENT: CPT

## 2020-10-30 PROCEDURE — 82948 REAGENT STRIP/BLOOD GLUCOSE: CPT

## 2020-10-30 RX ORDER — ZINC SULFATE 50(220)MG
50 CAPSULE ORAL DAILY
COMMUNITY
Start: 2020-10-30

## 2020-10-30 RX ORDER — CHOLECALCIFEROL (VITAMIN D3) 25 MCG
1000 TABLET ORAL DAILY
COMMUNITY
Start: 2020-10-30

## 2020-10-30 RX ORDER — ASCORBIC ACID 500 MG
500 TABLET ORAL DAILY
COMMUNITY
Start: 2020-10-30

## 2020-10-30 RX ADMIN — ENOXAPARIN SODIUM 40 MG: 40 INJECTION SUBCUTANEOUS at 09:23

## 2020-10-30 RX ADMIN — MAGNESIUM GLUCONATE 500 MG ORAL TABLET 400 MG: 500 TABLET ORAL at 09:23

## 2020-10-30 RX ADMIN — ATORVASTATIN CALCIUM 20 MG: 20 TABLET, FILM COATED ORAL at 09:23

## 2020-10-30 RX ADMIN — CYCLOBENZAPRINE 5 MG: 10 TABLET, FILM COATED ORAL at 09:23

## 2020-10-30 RX ADMIN — Medication 50 MG: at 09:23

## 2020-10-30 RX ADMIN — MICONAZOLE NITRATE: 20 POWDER TOPICAL at 09:25

## 2020-10-30 RX ADMIN — METFORMIN HYDROCHLORIDE 1000 MG: 500 TABLET ORAL at 09:23

## 2020-10-30 RX ADMIN — ACETAMINOPHEN 650 MG: 325 TABLET ORAL at 04:03

## 2020-10-30 RX ADMIN — GABAPENTIN 600 MG: 600 TABLET, FILM COATED ORAL at 09:23

## 2020-10-30 RX ADMIN — GABAPENTIN 600 MG: 600 TABLET, FILM COATED ORAL at 14:02

## 2020-10-30 RX ADMIN — OXYCODONE HYDROCHLORIDE AND ACETAMINOPHEN 500 MG: 500 TABLET ORAL at 09:23

## 2020-10-30 RX ADMIN — FUROSEMIDE 20 MG: 20 TABLET ORAL at 09:23

## 2020-10-30 RX ADMIN — ASPIRIN 81 MG: 81 TABLET ORAL at 09:23

## 2020-10-30 RX ADMIN — PANTOPRAZOLE SODIUM 40 MG: 40 TABLET, DELAYED RELEASE ORAL at 06:09

## 2020-10-30 RX ADMIN — METOPROLOL SUCCINATE 25 MG: 25 TABLET, FILM COATED, EXTENDED RELEASE ORAL at 09:23

## 2020-10-30 RX ADMIN — ALOGLIPTIN 25 MG: 25 TABLET, FILM COATED ORAL at 09:23

## 2020-10-30 RX ADMIN — BUDESONIDE AND FORMOTEROL FUMARATE DIHYDRATE 2 PUFF: 160; 4.5 AEROSOL RESPIRATORY (INHALATION) at 07:32

## 2020-10-30 RX ADMIN — Medication 1000 UNITS: at 09:23

## 2020-10-30 RX ADMIN — PIOGLITAZONE 45 MG: 45 TABLET ORAL at 09:23

## 2020-10-30 RX ADMIN — CITALOPRAM 40 MG: 40 TABLET, FILM COATED ORAL at 09:23

## 2020-10-30 ASSESSMENT — PAIN SCALES - GENERAL: PAINLEVEL_OUTOF10: 8

## 2020-10-30 NOTE — PLAN OF CARE
Problem: Discharge Planning:10/30/20, 10:52 AM EDT    Patient goals/plan/ treatment preferences discussed by Elizabeth Fernandez / . Patient is going home with skilled services under medicare through requested [facility], PT,OT,RN. Patient has PP services in place that provide aide services,homed delivered meals,homemaking/housekeeping services and and ERS unit. Patient goals/plan/ treatment preferences reviewed with patient/ family. Patient/ family verbalize understanding of discharge plan and are in agreement with goal/plan/treatment preferences. Understanding was demonstrated using the teach back method. AVS provided by RN at time of discharge, which includes all necessary medical information pertaining to the patients current course of illness, treatment, post-discharge goals of care, and treatment preferences. IMM Letter  Notice of Medicare Non-Coverage Letter Not Given?  (Post Acute): Patient Initiated Discharge      Goal: Patients continuum of care needs are met  Description: Patients continuum of care needs are met  Outcome: Completed

## 2020-10-30 NOTE — PROGRESS NOTES
Patient: Brijesh Lam  Unit/Bed: 8E-57/057-A  YOB: 1950  MRN: [id number] Acct: [de-identified]   Admitting Diagnosis: COVID-23  Admit Date:  10/22/2020  Hospital Day: 8    Assessment:     Principal Problem:    Pneumonia due to COVID-19 virus  Active Problems:    CAD (coronary artery disease)    Diabetes mellitus ([facility]    Hyperlipidemia    Depression    Venous (peripheral) insufficiency    Venous stasis dermatitis    COPD (chronic obstructive pulmonary disease) (HCC)    Acute respiratory failure with hypoxia ([facility])    Class 3 severe obesity due to excess calories with serious comorbidity and body mass index (BMI) of 50.0 to 59.9 in adult ([facility]    Schizoaffective disorder (HCC)    PTSD (post-traumatic stress disorder)    Fatty liver  Resolved Problems:    * No resolved hospital problems. *      Plan:     Medically ready for discharge 10/30/2020        Subjective:     Patient has no complaint of CP, SOB over baseline or GI upset. .   Medication side effects: none    Scheduled Meds:   magnesium replacement protocol   Other RX Placeholder    magnesium oxide  400 mg Oral Daily    enoxaparin  40 mg Subcutaneous Q12H    alogliptin  25 mg Oral Daily    aspirin  81 mg Oral Daily    atorvastatin  20 mg Oral Daily    budesonide-formoterol  2 puff Inhalation BID    citalopram  40 mg Oral Daily    cyclobenzaprine  5 mg Oral BID    furosemide  20 mg Oral Daily    gabapentin  600 mg Oral TID    metFORMIN  1,000 mg Oral BID WC    metoprolol succinate  25 mg Oral Daily    miconazole   Topical TID    pantoprazole  40 mg Oral QAM AC    pioglitazone  45 mg Oral Daily    ppd   Does not apply Weekly    QUEtiapine  50 mg Oral Nightly    tiotropium  2 puff Inhalation Daily    tuberculin  5 Units Intradermal Weekly    vitamin C  500 mg Oral Daily    Vitamin D  1,000 Units Oral Daily    zinc sulfate  50 mg Oral Daily     Continuous Infusions:   dextrose       PRN Meds:acetaminophen **OR** acetaminophen, dextrose, dextrose, glucagon (rDNA), glucose, docusate sodium, senna, polyethylene glycol    Review of Systems  Pertinent items are noted in HPI. Objective:     Patient Vitals for the past 8 hrs:   BP Temp Temp src Pulse Resp SpO2   10/29/20 2015 138/74 98.2 °F (36.8 °C) Oral 91 17 93 %     No intake/output data recorded. No intake/output data recorded.     /74   Pulse 91   Temp 98.2 °F (36.8 °C) (Oral)   Resp 17   Ht 5' 2.5\" (1.588 m)   Wt 264 lb 9.6 oz (120 kg)   LMP  (LMP Unknown)   SpO2 93%   BMI 47.62 kg/m²     /74   Pulse 91   Temp 98.2 °F (36.8 °C) (Oral)   Resp 17   Ht 5' 2.5\" (1.588 m)   Wt 264 lb 9.6 oz (120 kg)   LMP  (LMP Unknown)   SpO2 93%   BMI 47.62 kg/m²   General appearance: alert, appears stated age and cooperative  Head: Normocephalic, without obvious abnormality, atraumatic  Lungs: clear to auscultation bilaterally  Heart: regular rate and rhythm, S1, S2 normal, no murmur, click, rub or gallop  Abdomen: soft, non-tender; bowel sounds normal; no masses,  no organomegaly  Extremities: edema firm lower legs  Skin: stasis changes to the lower legs  Neurologic: Grossly normal      Electronically signed by Collins Olivier MD on 10/30/2020 at 12:09 AM

## 2020-10-30 NOTE — PROGRESS NOTES
Patient Name: Shelbie Schafer        MRN: [id number]    : 1950  (74 y.o.)  Gender: female   Principal Problem: Pneumonia due to COVID-19 virus    Section D - Mood     Should Resident Mood Interview be Conducted? - Attempt to conduct interview with all residents   0. No (resident is rarely/never understood) à Skip to and complete -, Staff Assessment of Mood (PHQ 9-OV)  1. Yes à Continue to , Resident Mood Interview (PHQ-9) Enter Code    1   . Resident Mood Interview (PHQ-9)   Say to resident: Barry Hook the last 2 weeks, have you been bothered by any of the following problems?    If symptom is present, enter 1(yes) in column 1, Symptom Presence. Then move to column 2, Symptom Frequency, and indicate symptom frequency. 1. Symptom Presence                   2. Symptom                                                                  Frequency  0. No (enter 0 in column 2)          0. Never or 1 day          1. Yes (enter 0-3 in column 2)      1. 2-6 days           9. No Response                               2.  7-11 days                                                 3.  12-14 days   1. Symptom Presence 2. Symptom  Frequency        Enter Scores in boxes below   A. Little interest or pleasure in doing things 1 2   B. Feeling down, depressed, or hopeless 1 2   C. Trouble falling or staying asleep, or sleeping too much 1 2   D. Feeling tired or having little energy 1 2   E. Poor appetite or overeating 1 1   F. Feeling bad about yourself - or that you are a failure, or have let your family down 1 1   G. Trouble concentrating on things, such as reading the newspaper or watching television 0 0   H. Moving or speaking so slowly that other people have noticed. Or the opposite-being so fidgety or restless that s/he has been moving around a lot more than usual   0   0   I. Thoughts that you would be better off dead, or of hurting yourself in some way.  0 0              Patient Name: Biju Little

## 2020-12-02 NOTE — DISCHARGE SUMMARY
TCU  Discharge Summary     Patient Identification:  Gianluca Carias  : 1950  Admit date: 10/22/2020  Discharge date: 10/30/2020  Attending provider: Michelle Adhikari MD        Primary care provider: Britney Patino MD     Discharge Diagnoses: Active Hospital Problems    Diagnosis Date Noted    Diabetes mellitus ([facility] [E11.9]      Priority: Medium    CAD (coronary artery disease) [I25.10]      Priority: Medium    Class 3 severe obesity due to excess calories with serious comorbidity and body mass index (BMI) of 50.0 to 59.9 in adult ([facility] [E66.01, Z68.43] 10/22/2020    Schizoaffective disorder ([facility] [F25.9] 10/22/2020    PTSD (post-traumatic stress disorder) [F43.10] 10/22/2020    Pneumonia due to COVID-19 virus [U07.1, J12.89] 10/22/2020    Fatty liver [K76.0] 10/22/2020    Acute respiratory failure with hypoxia ([facility] [J96.01] 10/18/2020    COPD (chronic obstructive pulmonary disease) ([facility] [J44.9]     Venous stasis dermatitis [I87.2] 10/27/2014    Venous (peripheral) insufficiency [I87.2] 2013    Depression [F32.9] 10/29/2012    Hyperlipidemia [E78.5]        TCU Course: Gianluca Carias is a 69 y.o. female admitted to the transitional care unit on 10/22/2020 for the continued time with therapies following the acute hospital stay for covid 19 pneumonia. She had the blood sugars followed and the meds were adjusted as need. She worked with therapies and showed progress in strength and independence. She will still need Home Health services following discharge she was medically stable during the stay and will be discharged in stable condition. Consults:   none    Significant Diagnostics:   Results for Bharat Downs (MRN [id number]) as of 2020 21:16   Ref.  Range 10/22/2020 04:34   Sodium Latest Ref Range: 135 - 145 meq/L 135   Potassium Latest Ref Range: 3.5 - 5.2 meq/L 4.4   Chloride Latest Ref Range: 98 - 111 meq/L 99   CO2 Latest Ref Range: 23 - 33 meq/L 24 SCAN OF BLOOD SMEAR Unknown see below       No results for input(s): POCGLU in the last 72 hours. Patient Instructions: Follow-up visits: See after visit summary from hospitalization    Discharge Medications:   Wong Yeboah   Home Medication Instructions SONIYAJING:[id number]    Printed on:12/01/20 2112   Medication Information                      acetaminophen (TYLENOL) 500 MG tablet  Take 500 mg by mouth every 6 hours as needed for Pain. albuterol sulfate HFA (PROAIR HFA) 108 (90 BASE) MCG/ACT inhaler  Inhale 2 puffs into the lungs every 4 hours as needed             Artificial Saliva (BIOTENE MOISTURIZING MOUTH) SOLN  Take 1 applicator by mouth as needed             ASPIRIN LOW DOSE 81 MG EC tablet  take 1 tablet by mouth once daily             Cholecalciferol (VITAMIN D) 25 MCG TABS  Take 1 tablet by mouth daily             CICLOPIROX TREATMENT EX  Apply  topically. 10.0 mL Ciclopirox-terbinafine-fluconazole  Fungal Free Nails Fungicidal solution             citalopram (CELEXA) 40 MG tablet  take 1 tablet by mouth every morning             clobetasol (TEMOVATE) 0.05 % cream  Apply topically 2 times daily. Compression Stockings MISC  1 Package by Does not apply route Knee High             Cranberry 450 MG CAPS  Take by mouth             diphenhydrAMINE (BENADRYL) 50 MG capsule  Take 50 mg by mouth nightly as needed for Itching. FREESTYLE LANCETS MISC  Test 1 time daily. Dx: 250.00-Type 2 Diabetes.   Freestyle 28G Lancets             furosemide (LASIX) 20 MG tablet  Take 20 mg by mouth daily             gabapentin (NEURONTIN) 300 MG capsule  Take 1 capsule by mouth 3 times daily             glucose blood VI test strips (FREESTYLE LITE) strip  Test once daily             HOMEOPATHIC PRODUCTS CO  550 mg by Combination route daily as needed Legatrin PM             L-Arginine 1000 MG TABS  Take 1,000 mg by mouth daily as needed             Lactobacillus (ACIDOPHILUS) TABS  Take by mouth             loratadine (CLARITIN) 10 MG tablet  Take 10 mg by mouth daily             magnesium oxide (MAG-OX) 400 (241.3 Mg) MG TABS tablet  Take 1 tablet by mouth daily             metFORMIN (GLUCOPHAGE) 1000 MG tablet  take 1 tablet by mouth twice a day with food             metoprolol succinate (TOPROL XL) 25 MG extended release tablet  take 1 tablet by mouth once daily             mometasone-formoterol (DULERA) 200-5 MCG/ACT inhaler  Inhale 2 puffs into the lungs every 12 hours             Mouthwashes (BIOTENE) LIQD oral solution  Swish and spit 15 mLs 3 times daily as needed             mupirocin (BACTROBAN) 2 % ointment  Apply topically 3 times daily Apply topically 3 times daily. nystatin-triamcinolone (MYCOLOG II) 985434-6.1 UNIT/GM-% cream  Apply 1 applicator topically 4 times daily Apply topically 4 times daily. As needed             Omega-3 Fatty Acids (FISH OIL) 1200 MG CAPS  Take 1,200 mg by mouth daily             omeprazole (PRILOSEC) 40 MG delayed release capsule  take 1 capsule by mouth once daily             pioglitazone (ACTOS) 45 MG tablet  take 1 tablet by mouth once daily             psyllium (KONSYL) 28.3 % PACK  Take 1 packet by mouth daily             QUEtiapine (SEROQUEL) 50 MG tablet  Take 50 mg by mouth nightly.              simvastatin (ZOCOR) 40 MG tablet  take 1 tablet by mouth at bedtime             tiotropium (SPIRIVA HANDIHALER) 18 MCG inhalation capsule  Inhale 1 capsule into the lungs daily             TRADJENTA 5 MG tablet  take 1 tablet by mouth daily             VESICARE 10 MG tablet  take 1 tablet by mouth once daily             vitamin C (VITAMIN C) 500 MG tablet  Take 1 tablet by mouth daily             zinc sulfate (ZINCATE) 220 (50 Zn) MG capsule  Take 1 capsule by mouth daily                 35 minutes spent preparing the patient for discharge    Gogo Reyna MD

## 2020-12-17 ENCOUNTER — NURSE ONLY (OUTPATIENT)
Dept: LAB | Age: 70
End: 2020-12-17

## 2021-01-11 NOTE — PROGRESS NOTES
[facility] PROFESSIONAL SERVICES  [facility]   [address] [place] [address]   [address] [address]   Dept: [phone]   Dept Fax: [phone]   Loc: [phone]      Chief Complaint   Patient presents with    1 Year Follow Up     F/U ov in 70 y/o female with history of SVT r/t inhaler used, nonobstructive CAD, HTN, HLD, emphysema, asthma, CKD, DM type 2, chronic LE swelling, venous insufficiency and recent COVID infection a couple of months ago. Checks her O2 sat daily and states it is usually around %. Feels run down at times since COVID. Reports has had intermittent lightheadedness and dizziness that she has had all of her life. She has some muscle aches when she doesn't wear her bra. Denies chest pain, palpitations, sob, or syncope. Hasn't fallen in the last 6 months.      Cardiologist:  Dr. Sarah Squires:   No fever, no chills, No fatigue or weight loss  Pulmonary:    No dyspnea, no wheezing  Cardiac:    Denies recent chest pain   GI:     No nausea or vomiting, no abdominal pain  Neuro:    + chronic intermittent dizziness and light headedness  Musculoskeletal:  No recent active issues  Extremities:   + BLE edema, good peripheral pulses      Past Medical History:   Diagnosis Date    Acid reflux     Arrhythmia     Arthritis     CAD (coronary artery disease)     NON-OBSTRUCTIVE    Chronic bronchitis (HCC)     COPD (chronic obstructive pulmonary disease) (HCC)     Depression     Diabetes mellitus ([address]     Dizziness - light-headed     Fall 10-15-13    left rib fracture and shoulder contusion     Hyperlipidemia     Hypertension     Kidney disease     MDRO (multiple drug resistant organisms) resistance 2/14    MRSA ABDOMEN    Numbness and tingling     HX OF:    S/P laparoscopic cholecystectomy 11/27/2018    Swelling     Tinnitus        Allergies   Allergen Reactions    Latex Hives, Itching and Rash    Bee Venom Anaphylaxis    Advair [Fluticasone-Salmeterol] Heart races     Caffeine Other (See Comments)     Bad kidney problems    Pcn [Penicillins] Hives    Ultram [Tramadol Hcl] Swelling     Has tolerated norco here many times     Fluticasone-Salmeterol Palpitations    Wool Alcohol [Lanolin Alcohol] Hives, Itching and Rash       Current Outpatient Medications   Medication Sig Dispense Refill    magnesium oxide (MAG-OX) 400 (241.3 Mg) MG TABS tablet Take 1 tablet by mouth daily      zinc sulfate (ZINCATE) 220 (50 Zn) MG capsule Take 1 capsule by mouth daily      vitamin C (VITAMIN C) 500 MG tablet Take 1 tablet by mouth daily      Cholecalciferol (VITAMIN D) 25 MCG TABS Take 1 tablet by mouth daily      mupirocin (BACTROBAN) 2 % ointment Apply topically 3 times daily Apply topically 3 times daily.  loratadine (CLARITIN) 10 MG tablet Take 10 mg by mouth daily      mometasone-formoterol (DULERA) 200-5 MCG/ACT inhaler Inhale 2 puffs into the lungs every 12 hours      Cranberry 450 MG CAPS Take by mouth      Lactobacillus (ACIDOPHILUS) TABS Take by mouth      metoprolol succinate (TOPROL XL) 25 MG extended release tablet take 1 tablet by mouth once daily 30 tablet 6    Mouthwashes (BIOTENE) LIQD oral solution Swish and spit 15 mLs 3 times daily as needed      furosemide (LASIX) 20 MG tablet Take 20 mg by mouth daily      citalopram (CELEXA) 40 MG tablet take 1 tablet by mouth every morning  0    tiotropium (SPIRIVA HANDIHALER) 18 MCG inhalation capsule Inhale 1 capsule into the lungs daily 30 capsule 5    nystatin-triamcinolone (MYCOLOG II) 519938-7.1 UNIT/GM-% cream Apply 1 applicator topically 4 times daily Apply topically 4 times daily.  As needed      HOMEOPATHIC PRODUCTS  mg by Combination route daily as needed Legatrin PM      psyllium (KONSYL) 28.3 % PACK Take 1 packet by mouth daily      L-Arginine 1000 MG TABS Take 1,000 mg by mouth daily as needed  Artificial Saliva (BIOTENE MOISTURIZING MOUTH) SOLN Take 1 applicator by mouth as needed      Compression Stockings MISC 1 Package by Does not apply route Knee High 1 each 0    omeprazole (PRILOSEC) 40 MG delayed release capsule take 1 capsule by mouth once daily 30 capsule 5    TRADJENTA 5 MG tablet take 1 tablet by mouth daily 30 tablet 5    simvastatin (ZOCOR) 40 MG tablet take 1 tablet by mouth at bedtime 30 tablet 5    VESICARE 10 MG tablet take 1 tablet by mouth once daily 30 tablet 5    pioglitazone (ACTOS) 45 MG tablet take 1 tablet by mouth once daily 30 tablet 5    metFORMIN (GLUCOPHAGE) 1000 MG tablet take 1 tablet by mouth twice a day with food 60 tablet 5    gabapentin (NEURONTIN) 300 MG capsule Take 1 capsule by mouth 3 times daily (Patient taking differently: Take 800 mg by mouth 2 times daily. Misa Dues ) 90 capsule 5    albuterol sulfate HFA (PROAIR HFA) 108 (90 BASE) MCG/ACT inhaler Inhale 2 puffs into the lungs every 4 hours as needed 1 Inhaler 2    FREESTYLE LANCETS MISC Test 1 time daily. Dx: 250.00-Type 2 Diabetes. Freestyle 28G Lancets 100 each 11    Omega-3 Fatty Acids (FISH OIL) 1200 MG CAPS Take 1,200 mg by mouth daily      ASPIRIN LOW DOSE 81 MG EC tablet take 1 tablet by mouth once daily 30 tablet 11    clobetasol (TEMOVATE) 0.05 % cream Apply topically 2 times daily. 15 g 1    glucose blood VI test strips (FREESTYLE LITE) strip Test once daily 100 each 11    CICLOPIROX TREATMENT EX Apply  topically. 10.0 mL Ciclopirox-terbinafine-fluconazole  Fungal Free Nails Fungicidal solution      diphenhydrAMINE (BENADRYL) 50 MG capsule Take 50 mg by mouth nightly as needed for Itching.  QUEtiapine (SEROQUEL) 50 MG tablet Take 50 mg by mouth nightly.  acetaminophen (TYLENOL) 500 MG tablet Take 500 mg by mouth every 6 hours as needed for Pain. No current facility-administered medications for this visit.         Social History     Socioeconomic History  Marital status:      Spouse name: None    Number of children: 10    Years of education: 1 [address] [employer]    Highest education level: None   Occupational History    Occupation: retired    Social Needs    Financial resource strain: None    Food insecurity     Worry: None     Inability: None    Transportation needs     Medical: None     Non-medical: None   Tobacco Use    Smoking status: Former Smoker     Years: 3.00     Types: Cigarettes     Start date:      Quit date:      Years since quittin.0    Smokeless tobacco: Never Used    Tobacco comment: 3 cigarettes a month, Passive smoke exposure 39 years Father then ex    Substance and Sexual Activity    Alcohol use: No     Alcohol/week: 0.0 standard drinks    Drug use: No    Sexual activity: Not Currently   Lifestyle    Physical activity     Days per week: None     Minutes per session: None    Stress: None   Relationships    Social connections     Talks on phone: None     Gets together: None     Attends Yazidism service: None     Active member of club or organization: None     Attends meetings of clubs or organizations: None     Relationship status: None    Intimate partner violence     Fear of current or ex partner: None     Emotionally abused: None     Physically abused: None     Forced sexual activity: None   Other Topics Concern    None   Social History Narrative    None       Family History   Problem Relation Age of Onset    Cancer Mother 28        uterine    Heart Disease Father     Cancer Father 48        lung    Diabetes Paternal Grandfather     Cancer Maternal Grandmother         uterine    Cancer Maternal Aunt 32        uterine    Cancer Maternal Cousin 32        uterine    Colon Cancer Neg Hx     Breast Cancer Neg Hx        Blood pressure 106/60, pulse 64, height 5' 2.5\" (1.588 m), weight 275 lb (124.7 kg), not currently breastfeeding.     General:   Well developed, well nourished  Lungs:   Clear to auscultation Heart:    Normal S1 S2, No murmur, rubs, or gallops  Abdomen:   Soft, non tender, no organomegalies, positive bowel sounds  Extremities:   No edema, no cyanosis, good peripheral pulses  Neurological:   Awake, alert, oriented. No obvious focal deficits  Musculoskeletal:  No obvious deformities    EKG:     Echo: 10/19/20  Summary   Technically difficult examination. Normal left ventricle size and systolic function. Ejection fraction was   estimated at 50- to 55 %. There were no regional left ventricular wall   motion abnormalities and wall thickness was within normal limits. The left atrium is Mildly dilated. Signature      ----------------------------------------------------------------   Electronically signed by Stalin Hernandez MD     Stress Test: 11/9/18  Summary   This Nuclear Medicine study was negative for ischemia . difficult scan   normal EF      Recommendation   Medical management. Signatures      ----------------------------------------------------------------   Electronically signed by Adryan Blum MD      Diagnosis Orders   1. Nonobstructive atherosclerosis of coronary artery     2. Essential hypertension     3. Hyperlipidemia, unspecified hyperlipidemia type     4. Obesity, Class III, BMI 40-49.9 (morbid obesity) ([facility]     5. Diabetes mellitus type 2, noninsulin dependent ([facility]         No orders of the defined types were placed in this encounter. Nonobstructive CAD, HTN, HLD, emphysema, asthma, CKD, DM type 2, chronic LE swelling, venous insufficiency, obesity and recent COVID infection a couple of months ago. Feeling better as recovering from COVID. Denies chest pain or sob. HR, B/P recordings stable.    On asa, toprol, zocor, lasix   LDL-C 23 on 10/1/2020 Discussed use, benefit, and side effects of prescribed medications. All patient questions answered. Pt voiced understanding. Instructed to continue current medications, diet and exercise. Continue risk factor modification and medical management. Patient agreed with treatment plan. Follow up as directed.     Continue Dr Ramila Hines current treatment plan  Follow up with Dr Marcia Albarran as scheduled or sooner if needed

## 2021-01-12 ENCOUNTER — OFFICE VISIT (OUTPATIENT)
Dept: CARDIOLOGY CLINIC | Age: 71
End: 2021-01-12
Payer: MEDICARE

## 2021-01-12 VITALS
WEIGHT: 275 LBS | SYSTOLIC BLOOD PRESSURE: 106 MMHG | BODY MASS INDEX: 48.73 KG/M2 | HEIGHT: 63 IN | HEART RATE: 64 BPM | DIASTOLIC BLOOD PRESSURE: 60 MMHG

## 2021-01-12 DIAGNOSIS — E11.9 DIABETES MELLITUS TYPE 2, NONINSULIN DEPENDENT (HCC): ICD-10-CM

## 2021-01-12 DIAGNOSIS — I10 ESSENTIAL HYPERTENSION: ICD-10-CM

## 2021-01-12 DIAGNOSIS — E78.5 HYPERLIPIDEMIA, UNSPECIFIED HYPERLIPIDEMIA TYPE: ICD-10-CM

## 2021-01-12 DIAGNOSIS — E66.01 OBESITY, CLASS III, BMI 40-49.9 (MORBID OBESITY) (HCC): ICD-10-CM

## 2021-01-12 DIAGNOSIS — I25.10 NONOBSTRUCTIVE ATHEROSCLEROSIS OF CORONARY ARTERY: Primary | ICD-10-CM

## 2021-01-12 PROCEDURE — 1123F ACP DISCUSS/DSCN MKR DOCD: CPT | Performed by: NURSE PRACTITIONER

## 2021-01-12 PROCEDURE — G8400 PT W/DXA NO RESULTS DOC: HCPCS | Performed by: NURSE PRACTITIONER

## 2021-01-12 PROCEDURE — 1036F TOBACCO NON-USER: CPT | Performed by: NURSE PRACTITIONER

## 2021-01-12 PROCEDURE — 3046F HEMOGLOBIN A1C LEVEL >9.0%: CPT | Performed by: NURSE PRACTITIONER

## 2021-01-12 PROCEDURE — G8427 DOCREV CUR MEDS BY ELIG CLIN: HCPCS | Performed by: NURSE PRACTITIONER

## 2021-01-12 PROCEDURE — 4040F PNEUMOC VAC/ADMIN/RCVD: CPT | Performed by: NURSE PRACTITIONER

## 2021-01-12 PROCEDURE — G8417 CALC BMI ABV UP PARAM F/U: HCPCS | Performed by: NURSE PRACTITIONER

## 2021-01-12 PROCEDURE — 1090F PRES/ABSN URINE INCON ASSESS: CPT | Performed by: NURSE PRACTITIONER

## 2021-01-12 PROCEDURE — G8484 FLU IMMUNIZE NO ADMIN: HCPCS | Performed by: NURSE PRACTITIONER

## 2021-01-12 PROCEDURE — 3017F COLORECTAL CA SCREEN DOC REV: CPT | Performed by: NURSE PRACTITIONER

## 2021-01-12 PROCEDURE — 2022F DILAT RTA XM EVC RTNOPTHY: CPT | Performed by: NURSE PRACTITIONER

## 2021-01-12 PROCEDURE — 99213 OFFICE O/P EST LOW 20 MIN: CPT | Performed by: NURSE PRACTITIONER

## 2021-03-31 ENCOUNTER — TELEPHONE (OUTPATIENT)
Dept: CARDIOLOGY CLINIC | Age: 71
End: 2021-03-31

## 2021-03-31 NOTE — TELEPHONE ENCOUNTER
Patient called stating sitting in her wheelchair she felt dizzy, lightheaded, and no energy. Patient states her BP right arm 75/44 HR 63, Left arm 89/37 HR 61. 30 minutes later BP right arm 76/41 HR 64. Left arm 78/39 HR 58. Patient states she drinks flavor carbonated water. Advised patient to drink un- carbonated water. Patient will try. If BP and symptoms don't improve patient will follow up in ER. Patient was unable to come to office for an appointment tomorrow. Patient will follow up in ER if symptoms don't improve or worsen.

## 2021-04-08 ENCOUNTER — HOSPITAL ENCOUNTER (OUTPATIENT)
Dept: WOMENS IMAGING | Age: 71
Discharge: HOME | End: 2021-04-08
Payer: MEDICARE

## 2021-04-08 DIAGNOSIS — Z12.31 VISIT FOR SCREENING MAMMOGRAM: ICD-10-CM

## 2021-04-08 PROCEDURE — 77063 BREAST TOMOSYNTHESIS BI: CPT

## 2021-04-12 RX ORDER — METOPROLOL SUCCINATE 25 MG/1
TABLET, EXTENDED RELEASE ORAL
Qty: 30 TABLET | Refills: 10 | Status: SHIPPED | OUTPATIENT
Start: 2021-04-12 | End: 2022-05-09

## 2021-04-26 ENCOUNTER — NURSE TRIAGE (OUTPATIENT)
Dept: OTHER | Facility: CLINIC | Age: 71
End: 2021-04-26

## 2021-04-26 ENCOUNTER — APPOINTMENT (OUTPATIENT)
Dept: GENERAL RADIOLOGY | Age: 71
End: 2021-04-26
Payer: MEDICARE

## 2021-04-26 ENCOUNTER — TELEPHONE (OUTPATIENT)
Dept: CARDIOLOGY CLINIC | Age: 71
End: 2021-04-26

## 2021-04-26 ENCOUNTER — HOSPITAL ENCOUNTER (EMERGENCY)
Age: 71
Discharge: HOME | End: 2021-04-26
Attending: EMERGENCY MEDICINE
Payer: MEDICARE

## 2021-04-26 VITALS
RESPIRATION RATE: 22 BRPM | TEMPERATURE: 98.3 F | HEART RATE: 77 BPM | WEIGHT: 276 LBS | HEIGHT: 62 IN | BODY MASS INDEX: 50.79 KG/M2 | SYSTOLIC BLOOD PRESSURE: 145 MMHG | DIASTOLIC BLOOD PRESSURE: 70 MMHG | OXYGEN SATURATION: 100 %

## 2021-04-26 DIAGNOSIS — R07.89 CHEST WALL PAIN: Primary | ICD-10-CM

## 2021-04-26 DIAGNOSIS — L03.116 CELLULITIS OF LEFT LOWER EXTREMITY: ICD-10-CM

## 2021-04-26 LAB
ALBUMIN SERPL-MCNC: 3.6 G/DL (ref 3.5–5.1)
ALP BLD-CCNC: 99 U/L (ref 38–126)
ALT SERPL-CCNC: 14 U/L (ref 11–66)
ANION GAP SERPL CALCULATED.3IONS-SCNC: 12 MEQ/L (ref 8–16)
AST SERPL-CCNC: 16 U/L (ref 5–40)
BACTERIA: ABNORMAL /HPF
BASOPHILS # BLD: 0.5 %
BASOPHILS ABSOLUTE: 0 THOU/MM3 (ref 0–0.1)
BILIRUB SERPL-MCNC: 0.4 MG/DL (ref 0.3–1.2)
BILIRUBIN DIRECT: < 0.2 MG/DL (ref 0–0.3)
BILIRUBIN URINE: NEGATIVE
BLOOD, URINE: NEGATIVE
BUN BLDV-MCNC: 26 MG/DL (ref 7–22)
CALCIUM SERPL-MCNC: 9.5 MG/DL (ref 8.5–10.5)
CASTS 2: ABNORMAL /LPF
CASTS UA: ABNORMAL /LPF
CHARACTER, URINE: CLEAR
CHLORIDE BLD-SCNC: 102 MEQ/L (ref 98–111)
CO2: 24 MEQ/L (ref 23–33)
COLOR: YELLOW
CREAT SERPL-MCNC: 1 MG/DL (ref 0.4–1.2)
CRYSTALS, UA: ABNORMAL
EOSINOPHIL # BLD: 2.6 %
EOSINOPHILS ABSOLUTE: 0.2 THOU/MM3 (ref 0–0.4)
EPITHELIAL CELLS, UA: ABNORMAL /HPF
ERYTHROCYTE [DISTWIDTH] IN BLOOD BY AUTOMATED COUNT: 19.2 % (ref 11.5–14.5)
ERYTHROCYTE [DISTWIDTH] IN BLOOD BY AUTOMATED COUNT: 60.9 FL (ref 35–45)
GFR SERPL CREATININE-BSD FRML MDRD: 54 ML/MIN/1.73M2
GLUCOSE BLD-MCNC: 98 MG/DL (ref 70–108)
GLUCOSE URINE: NEGATIVE MG/DL
HCT VFR BLD CALC: 38.5 % (ref 37–47)
HEMOGLOBIN: 12.2 GM/DL (ref 12–16)
IMMATURE GRANS (ABS): 0.03 THOU/MM3 (ref 0–0.07)
IMMATURE GRANULOCYTES: 0.4 %
KETONES, URINE: NEGATIVE
LEUKOCYTE ESTERASE, URINE: ABNORMAL
LIPASE: 41.3 U/L (ref 5.6–51.3)
LYMPHOCYTES # BLD: 32.1 %
LYMPHOCYTES ABSOLUTE: 2.5 THOU/MM3 (ref 1–4.8)
MAGNESIUM: 2.1 MG/DL (ref 1.6–2.4)
MCH RBC QN AUTO: 28.5 PG (ref 26–33)
MCHC RBC AUTO-ENTMCNC: 31.7 GM/DL (ref 32.2–35.5)
MCV RBC AUTO: 90 FL (ref 81–99)
MISCELLANEOUS 2: ABNORMAL
MONOCYTES # BLD: 13.3 %
MONOCYTES ABSOLUTE: 1.1 THOU/MM3 (ref 0.4–1.3)
NITRITE, URINE: NEGATIVE
NUCLEATED RED BLOOD CELLS: 0 /100 WBC
OSMOLALITY CALCULATION: 280.4 MOSMOL/KG (ref 275–300)
PH UA: 5 (ref 5–9)
PLATELET # BLD: 314 THOU/MM3 (ref 130–400)
PMV BLD AUTO: 12.5 FL (ref 9.4–12.4)
POTASSIUM SERPL-SCNC: 4.6 MEQ/L (ref 3.5–5.2)
PRO-BNP: 236.9 PG/ML (ref 0–900)
PROTEIN UA: NEGATIVE
RBC # BLD: 4.28 MILL/MM3 (ref 4.2–5.4)
RBC URINE: ABNORMAL /HPF
RENAL EPITHELIAL, UA: ABNORMAL
SEG NEUTROPHILS: 51.1 %
SEGMENTED NEUTROPHILS ABSOLUTE COUNT: 4 THOU/MM3 (ref 1.8–7.7)
SODIUM BLD-SCNC: 138 MEQ/L (ref 135–145)
SPECIFIC GRAVITY, URINE: 1.01 (ref 1–1.03)
TOTAL PROTEIN: 6.9 G/DL (ref 6.1–8)
TROPONIN T: < 0.01 NG/ML
UROBILINOGEN, URINE: 0.2 EU/DL (ref 0–1)
WBC # BLD: 7.9 THOU/MM3 (ref 4.8–10.8)
WBC UA: ABNORMAL /HPF
YEAST: ABNORMAL

## 2021-04-26 PROCEDURE — 96365 THER/PROPH/DIAG IV INF INIT: CPT

## 2021-04-26 PROCEDURE — 81001 URINALYSIS AUTO W/SCOPE: CPT

## 2021-04-26 PROCEDURE — 85025 COMPLETE CBC W/AUTO DIFF WBC: CPT

## 2021-04-26 PROCEDURE — 99284 EMERGENCY DEPT VISIT MOD MDM: CPT

## 2021-04-26 PROCEDURE — 36415 COLL VENOUS BLD VENIPUNCTURE: CPT

## 2021-04-26 PROCEDURE — 82248 BILIRUBIN DIRECT: CPT

## 2021-04-26 PROCEDURE — 2580000003 HC RX 258: Performed by: EMERGENCY MEDICINE

## 2021-04-26 PROCEDURE — 80053 COMPREHEN METABOLIC PANEL: CPT

## 2021-04-26 PROCEDURE — 93005 ELECTROCARDIOGRAM TRACING: CPT | Performed by: EMERGENCY MEDICINE

## 2021-04-26 PROCEDURE — 83880 ASSAY OF NATRIURETIC PEPTIDE: CPT

## 2021-04-26 PROCEDURE — 71045 X-RAY EXAM CHEST 1 VIEW: CPT

## 2021-04-26 PROCEDURE — 83690 ASSAY OF LIPASE: CPT

## 2021-04-26 PROCEDURE — 6360000002 HC RX W HCPCS: Performed by: EMERGENCY MEDICINE

## 2021-04-26 PROCEDURE — 83735 ASSAY OF MAGNESIUM: CPT

## 2021-04-26 PROCEDURE — 84484 ASSAY OF TROPONIN QUANT: CPT

## 2021-04-26 RX ORDER — CEPHALEXIN 500 MG/1
500 CAPSULE ORAL 4 TIMES DAILY
Qty: 40 CAPSULE | Refills: 0 | Status: SHIPPED | OUTPATIENT
Start: 2021-04-26 | End: 2021-05-06

## 2021-04-26 RX ORDER — 0.9 % SODIUM CHLORIDE 0.9 %
1000 INTRAVENOUS SOLUTION INTRAVENOUS ONCE
Status: COMPLETED | OUTPATIENT
Start: 2021-04-26 | End: 2021-04-26

## 2021-04-26 RX ADMIN — CEFAZOLIN 2000 MG: 10 INJECTION, POWDER, FOR SOLUTION INTRAVENOUS at 17:26

## 2021-04-26 RX ADMIN — SODIUM CHLORIDE 1000 ML: 9 INJECTION, SOLUTION INTRAVENOUS at 19:30

## 2021-04-26 ASSESSMENT — ENCOUNTER SYMPTOMS
EYE PAIN: 0
CHEST TIGHTNESS: 0
SORE THROAT: 0
BACK PAIN: 0
EYE REDNESS: 0
COUGH: 0
NAUSEA: 0
EYE ITCHING: 0
WHEEZING: 0
VOMITING: 0
EYE DISCHARGE: 0
CONSTIPATION: 0
RHINORRHEA: 0
DIARRHEA: 0
ABDOMINAL PAIN: 0
STRIDOR: 0
PHOTOPHOBIA: 0
ABDOMINAL DISTENTION: 0
SHORTNESS OF BREATH: 0

## 2021-04-26 ASSESSMENT — PAIN SCALES - GENERAL: PAINLEVEL_OUTOF10: 7

## 2021-04-26 ASSESSMENT — PAIN DESCRIPTION - PAIN TYPE: TYPE: ACUTE PAIN

## 2021-04-26 ASSESSMENT — PAIN DESCRIPTION - DESCRIPTORS: DESCRIPTORS: ACHING

## 2021-04-26 NOTE — ED PROVIDER NOTES
2004    cataract    HERNIA REPAIR  25 months old     umblilical    HERNIA REPAIR  9/8/14    Ventral Hernia Repair with Mesh - Dr. Mane Ramos    partial    OTHER SURGICAL HISTORY  Feb 17, 2014    Left lower quadrant incision and drainage (Dr. Emma Franco, [facility])    OTHER SURGICAL HISTORY  2/2014    I&D abd abscess Dr. Ac Malhotra, NEEDLE CORE  2019    Benign    WA LAP,DIAGNOSTIC ABDOMEN N/A 11/26/2018    LAPAROSCOPY EXPLORATORY, LAP LYLE performed by Aline Deluca MD at [address] [place] [address]  as a child     TOOTH EXTRACTION  4/16/2012    TRANSTHORACIC ECHOCARDIOGRAM  03/04/2009    EF 50-55%. normal LV global systolic funct. slight LVH. slight lt atrial enlargement. no obvious stenotic valves. no pericardial effusion. CURRENT MEDICATIONS       Discharge Medication List as of 4/26/2021  7:48 PM      CONTINUE these medications which have NOT CHANGED    Details   metoprolol succinate (TOPROL XL) 25 MG extended release tablet take 1 tablet by mouth once daily, Disp-30 tablet, R-10Normal      magnesium oxide (MAG-OX) 400 (241.3 Mg) MG TABS tablet Take 1 tablet by mouth dailyOTC      zinc sulfate (ZINCATE) 220 (50 Zn) MG capsule Take 1 capsule by mouth dailyOTC      vitamin C (VITAMIN C) 500 MG tablet Take 1 tablet by mouth dailyOTC      Cholecalciferol (VITAMIN D) 25 MCG TABS Take 1 tablet by mouth dailyLabeling may look different. 25 mcg=1000 Units. Please double check dosages. OTC      mupirocin (BACTROBAN) 2 % ointment Apply topically 3 times daily Apply topically 3 times daily. , Topical, 3 TIMES DAILY, Historical Med      loratadine (CLARITIN) 10 MG tablet Take 10 mg by mouth dailyHistorical Med      mometasone-formoterol (DULERA) 200-5 MCG/ACT inhaler Inhale 2 puffs into the lungs every 12 hoursHistorical Med      Cranberry 450 MG CAPS Take by mouthHistorical Med      Lactobacillus (ACIDOPHILUS) TABS Take by mouthHistorical Med      Mouthwashes (BIOTENE) LIQD oral solution Swish and spit 15 mLs 3 times daily as neededHistorical Med      furosemide (LASIX) 20 MG tablet Take 20 mg by mouth dailyHistorical Med      citalopram (CELEXA) 40 MG tablet take 1 tablet by mouth every morning, R-0Historical Med      tiotropium (SPIRIVA HANDIHALER) 18 MCG inhalation capsule Inhale 1 capsule into the lungs daily, Disp-30 capsule, R-5Normal      nystatin-triamcinolone (MYCOLOG II) 119795-2.1 UNIT/GM-% cream Apply 1 applicator topically 4 times daily Apply topically 4 times daily. As needed, Topical, 4 TIMES DAILY, Historical Med      HOMEOPATHIC PRODUCTS  mg by Combination route daily as needed Legatrin PMHistorical Med      psyllium (KONSYL) 28.3 % PACK Take 1 packet by mouth dailyHistorical Med      L-Arginine 1000 MG TABS Take 1,000 mg by mouth daily as neededHistorical Med      Artificial Saliva (BIOTENE MOISTURIZING MOUTH) SOLN Take 1 applicator by mouth as neededHistorical Med      Compression Stockings MISC Starting Fri 11/2/2018, Disp-1 each, R-0, PrintKnee High       omeprazole (PRILOSEC) 40 MG delayed release capsule take 1 capsule by mouth once daily, Disp-30 capsule, R-5      TRADJENTA 5 MG tablet take 1 tablet by mouth daily, Disp-30 tablet, R-5      simvastatin (ZOCOR) 40 MG tablet take 1 tablet by mouth at bedtime, Disp-30 tablet, R-5      VESICARE 10 MG tablet take 1 tablet by mouth once daily, Disp-30 tablet, R-5      pioglitazone (ACTOS) 45 MG tablet take 1 tablet by mouth once daily, Disp-30 tablet, R-5      metFORMIN (GLUCOPHAGE) 1000 MG tablet take 1 tablet by mouth twice a day with food, Disp-60 tablet, R-5      gabapentin (NEURONTIN) 300 MG capsule Take 1 capsule by mouth 3 times daily, Disp-90 capsule, R-5      albuterol sulfate HFA (PROAIR HFA) 108 (90 BASE) MCG/ACT inhaler Inhale 2 puffs into the lungs every 4 hours as needed, Disp-1 Inhaler, R-2      FREESTYLE LANCETS MISC Disp-100 each, R-11, NormalTest 1 time daily.    Dx: 250.00-Type 2 drugs.    PHYSICAL EXAM     INITIAL VITALS:    height is 5' 2\" (1.575 m) and weight is 276 lb (125.2 kg). Her oral temperature is 98.3 °F (36.8 °C). Her blood pressure is 145/70 (abnormal) and her pulse is 77. Her respiration is 22 and oxygen saturation is 100%. Physical Exam  Vitals signs and nursing note reviewed. Constitutional:       Appearance: She is well-developed. She is not diaphoretic. HENT:      Head: Normocephalic and atraumatic. Nose: Nose normal.   Eyes:      General: No scleral icterus. Right eye: No discharge. Left eye: No discharge. Conjunctiva/sclera: Conjunctivae normal.      Pupils: Pupils are equal, round, and reactive to light. Neck:      Musculoskeletal: Normal range of motion and neck supple. Vascular: No JVD. Trachea: No tracheal deviation. Cardiovascular:      Rate and Rhythm: Normal rate and regular rhythm. Heart sounds: Normal heart sounds. No murmur. No friction rub. No gallop. Pulmonary:      Effort: Pulmonary effort is normal. No respiratory distress. Breath sounds: Normal breath sounds. No stridor. No wheezing or rales. Comments: Mid and lower sternal chest wall tenderness  Chest:      Chest wall: Tenderness present. Abdominal:      General: Bowel sounds are normal. There is no distension. Palpations: Abdomen is soft. There is no mass. Tenderness: There is no abdominal tenderness. There is no guarding or rebound. Hernia: No hernia is present. Musculoskeletal:         General: No tenderness or deformity. Comments: Bilateral lower extremity chronic swelling, erythema and, multiple sores consistent with chronic cellulitis, there are yellow crusts to left lower extremity consistent with impetigo   Lymphadenopathy:      Cervical: No cervical adenopathy. Skin:     General: Skin is warm and dry. Capillary Refill: Capillary refill takes less than 2 seconds. Coloration: Skin is not pale. Findings: Erythema, lesion and rash present. Comments: Bilateral lower extremity chronic swelling, erythema and, multiple sores consistent with chronic cellulitis, there are yellow crusts to left lower extremity consistent with impetigo   Neurological:      Mental Status: She is alert and oriented to person, place, and time. Cranial Nerves: No cranial nerve deficit. Sensory: No sensory deficit. Motor: No abnormal muscle tone. Coordination: Coordination normal.      Deep Tendon Reflexes: Reflexes normal.   Psychiatric:         Behavior: Behavior normal.         Thought Content: Thought content normal.         Judgment: Judgment normal.       MEDICAL DEDISION MAKINGS:   5:19 PM: Patient is seen and evaluated in a timely fashion. ED nurse's documentations are reviewed. DIFFERENTIAL DIAGNOSIS:  Include but not limited to: Unstable angina, angina, anxiety, PE, atypical chest pain, aortic dissection, pneumonia, GERD, costochondritis, pleurisy, chest wall pain, dyspnea, CHF, COPD, bronchitis, biliary disease. EKG:    Interpreted by ED physician  Normal sinus rhythm  Ventricular rate 63 bpm  NE interval 152 ms  QRS duration 112 ms   ms  Incomplete LBBB  No ST elevation or acute T wave    LAB RESULTS:  Results for orders placed or performed during the hospital encounter of 25/31/65   Basic Metabolic Panel   Result Value Ref Range    Sodium 138 135 - 145 meq/L    Potassium 4.6 3.5 - 5.2 meq/L    Chloride 102 98 - 111 meq/L    CO2 24 23 - 33 meq/L    Glucose 98 70 - 108 mg/dL    BUN 26 (H) 7 - 22 mg/dL    CREATININE 1.0 0.4 - 1.2 mg/dL    Calcium 9.5 8.5 - 10.5 mg/dL   Hepatic function panel   Result Value Ref Range    Albumin 3.6 3.5 - 5.1 g/dL    Total Bilirubin 0.4 0.3 - 1.2 mg/dL    Bilirubin, Direct <0.2 0.0 - 0.3 mg/dL    Alkaline Phosphatase 99 38 - 126 U/L    AST 16 5 - 40 U/L    ALT 14 11 - 66 U/L    Total Protein 6.9 6.1 - 8.0 g/dL   Lipase   Result Value Ref Range    Lipase 41.3 5.6 - 51.3 U/L   Brain Natriuretic Peptide   Result Value Ref Range    Pro-.9 0.0 - 900.0 pg/mL   Troponin   Result Value Ref Range    Troponin T < 0.010 ng/ml   Magnesium   Result Value Ref Range    Magnesium 2.1 1.6 - 2.4 mg/dL   CBC Auto Differential   Result Value Ref Range    WBC 7.9 4.8 - 10.8 thou/mm3    RBC 4.28 4.20 - 5.40 mill/mm3    Hemoglobin 12.2 12.0 - 16.0 gm/dl    Hematocrit 38.5 37.0 - 47.0 %    MCV 90.0 81.0 - 99.0 fL    MCH 28.5 26.0 - 33.0 pg    MCHC 31.7 (L) 32.2 - 35.5 gm/dl    RDW-CV 19.2 (H) 11.5 - 14.5 %    RDW-SD 60.9 (H) 35.0 - 45.0 fL    Platelets 348 733 - 433 thou/mm3    MPV 12.5 (H) 9.4 - 12.4 fL    Seg Neutrophils 51.1 %    Lymphocytes 32.1 %    Monocytes 13.3 %    Eosinophils 2.6 %    Basophils 0.5 %    Immature Granulocytes 0.4 %    Segs Absolute 4.0 1 - 7 thou/mm3    Lymphocytes Absolute 2.5 1.0 - 4.8 thou/mm3    Monocytes Absolute 1.1 0.4 - 1.3 thou/mm3    Eosinophils Absolute 0.2 0.0 - 0.4 thou/mm3    Basophils Absolute 0.0 0.0 - 0.1 thou/mm3    Immature Grans (Abs) 0.03 0.00 - 0.07 thou/mm3    nRBC 0 /100 wbc   Anion Gap   Result Value Ref Range    Anion Gap 12.0 8.0 - 16.0 meq/L   Glomerular Filtration Rate, Estimated   Result Value Ref Range    Est, Glom Filt Rate 54 (A) ml/min/1.73m2   Osmolality   Result Value Ref Range    Osmolality Calc 280.4 275.0 - 300.0 mOsmol/kg   Urine with Reflexed Micro   Result Value Ref Range    Glucose, Ur NEGATIVE NEGATIVE mg/dl    Bilirubin Urine NEGATIVE NEGATIVE    Ketones, Urine NEGATIVE NEGATIVE    Specific Gravity, Urine 1.012 1.002 - 1.030    Blood, Urine NEGATIVE NEGATIVE    pH, UA 5.0 5.0 - 9.0    Protein, UA NEGATIVE NEGATIVE    Urobilinogen, Urine 0.2 0.0 - 1.0 eu/dl    Nitrite, Urine NEGATIVE NEGATIVE    Leukocyte Esterase, Urine SMALL (A) NEGATIVE    Color, UA YELLOW STRAW-YELLOW    Character, Urine CLEAR CLEAR-SL CLOUD    RBC, UA 0-2 0-2/hpf /hpf    WBC, UA 5-9 0-4/hpf /hpf    Epithelial Cells, UA 0-2 3-5/hpf /hpf Bacteria, UA MODERATE FEW/NONE SEEN /hpf    Casts UA NONE SEEN NONE SEEN /lpf    Crystals, UA NONE SEEN NONE SEEN    Renal Epithelial, UA NONE SEEN NONE SEEN    Yeast, UA NONE SEEN NONE SEEN    CASTS 2 NONE SEEN NONE SEEN /lpf    MISCELLANEOUS 2 NONE SEEN    EKG Chest Pain   Result Value Ref Range    Ventricular Rate 63 BPM    Atrial Rate 63 BPM    P-R Interval 152 ms    QRS Duration 112 ms    Q-T Interval 462 ms    QTc Calculation (Bazett) 472 ms    P Axis 46 degrees    R Axis -21 degrees    T Axis 38 degrees       RADIOLOGY  XR CHEST PORTABLE   Final Result   No acute cardiopulmonary disease            **This report has been created using voice recognition software. It may contain minor errors which are inherent in voice recognition technology. **      Final report electronically signed by Dr. Cassidy Chavarria on 4/26/2021 5:24 PM          RATIONALE:    She has 2 weeks chest pain which is chest wall tenderness. Normal troponin and normal EKG. No suspicion this is ACS. Left lower extremity has cellulitis changes with impetigo. This is treated with IV Ancef in the ED. Patient is discharged with Keflex prescribed. PCP follow-up in 3 days. Medications   ceFAZolin (ANCEF) 2000 mg in dextrose 5 % 50 mL IVPB (0 mg Intravenous Stopped 4/26/21 1828)   0.9 % sodium chloride bolus (0 mLs Intravenous Stopped 4/26/21 2020)       CRITICAL CARE:   None    CONSULTS:  none    PROCEDURES:  None    RE-EXAMINATION AND RE-EVALUATION   Stable and feeling better. VITALS IN ED  Vitals:    04/26/21 1625 04/26/21 1833 04/26/21 2006   BP: 139/69 (!) 142/71 (!) 145/70   Pulse: 73 72 77   Resp: 20 18 22   Temp: 98.3 °F (36.8 °C)     TempSrc: Oral     SpO2: 94% 94% 100%   Weight: 276 lb (125.2 kg)     Height: 5' 2\" (1.575 m)         FINAL IMPRESSION      1. Chest wall pain    2.  Cellulitis of left lower extremity          DISPOSITION/PLAN   Home    PATIENT REFERRED TO:  Mikael Pavon MD  [address] [place] [address] [address] [address]    In 3 days  ED discharge follow-up      DISCHARGE MEDICATIONS:  Discharge Medication List as of 4/26/2021  7:48 PM      START taking these medications    Details   cephALEXin (KEFLEX) 500 MG capsule Take 1 capsule by mouth 4 times daily for 10 days, Disp-40 capsule, R-0Normal             (Please note that portions of this note were completed with a voice recognition program.  Efforts were made to edit the dictations but occasionally words aremis-transcribed.)    MD Bishop Tracy Shah MD  04/27/21 0322

## 2021-04-26 NOTE — ED NOTES
Bed: 015A  Expected date:   Expected time:   [facility]  Comments:     Reid Hinds RN  04/26/21 8984

## 2021-04-26 NOTE — TELEPHONE ENCOUNTER
Spoke to patient. Patient instructed to follow up in ER. Patient states she will call 911 to be transported.

## 2021-04-26 NOTE — TELEPHONE ENCOUNTER
Patient transferred from [facility]) nurse triage dept for an appt with Dr Clementine Espinoza or Godfrey William in approx 3 days. Her blood pressures have been running low, she said the top number has been 89-57 and she was unsure the top number. She has been feeling rundown at times and felt like something was sitting on her chest. Nurse triage recommended she be seen in the office within 3 business days but no open appts at time of call. Please call her directly to advise.

## 2021-04-26 NOTE — ED NOTES
Patient resting in bed, no concerns voiced, call light at bedside      Eze Byrd [facility]  04/26/21 5514

## 2021-04-27 LAB
EKG ATRIAL RATE: 63 BPM
EKG P AXIS: 46 DEGREES
EKG P-R INTERVAL: 152 MS
EKG Q-T INTERVAL: 462 MS
EKG QRS DURATION: 112 MS
EKG QTC CALCULATION (BAZETT): 472 MS
EKG R AXIS: -21 DEGREES
EKG T AXIS: 38 DEGREES
EKG VENTRICULAR RATE: 63 BPM

## 2021-06-22 ENCOUNTER — HOSPITAL ENCOUNTER (OUTPATIENT)
Age: 71
Setting detail: SPECIMEN
Discharge: HOME | End: 2021-06-22
Payer: MEDICARE

## 2021-06-22 LAB
ALBUMIN SERPL-MCNC: 4 G/DL (ref 3.5–5.2)
ALBUMIN/GLOBULIN RATIO: 1.3 (ref 1–2.5)
ALP BLD-CCNC: 116 U/L (ref 35–104)
ALT SERPL-CCNC: 14 U/L (ref 5–33)
ANION GAP SERPL CALCULATED.3IONS-SCNC: 11 MMOL/L (ref 9–17)
AST SERPL-CCNC: 16 U/L
BILIRUB SERPL-MCNC: 0.38 MG/DL (ref 0.3–1.2)
BUN BLDV-MCNC: 23 MG/DL (ref 8–23)
BUN/CREAT BLD: ABNORMAL (ref 9–20)
CALCIUM SERPL-MCNC: 9.2 MG/DL (ref 8.6–10.4)
CHLORIDE BLD-SCNC: 103 MMOL/L (ref 98–107)
CO2: 26 MMOL/L (ref 20–31)
CREAT SERPL-MCNC: 0.88 MG/DL (ref 0.5–0.9)
GFR AFRICAN AMERICAN: >60 ML/MIN
GFR NON-AFRICAN AMERICAN: >60 ML/MIN
GFR SERPL CREATININE-BSD FRML MDRD: ABNORMAL ML/MIN/{1.73_M2}
GFR SERPL CREATININE-BSD FRML MDRD: ABNORMAL ML/MIN/{1.73_M2}
GLUCOSE BLD-MCNC: 107 MG/DL (ref 70–99)
HCT VFR BLD CALC: 40.2 % (ref 36.3–47.1)
HEMOGLOBIN: 12.1 G/DL (ref 11.9–15.1)
MCH RBC QN AUTO: 27.8 PG (ref 25.2–33.5)
MCHC RBC AUTO-ENTMCNC: 30.1 G/DL (ref 28.4–34.8)
MCV RBC AUTO: 92.2 FL (ref 82.6–102.9)
NRBC AUTOMATED: 0 PER 100 WBC
PDW BLD-RTO: 16.9 % (ref 11.8–14.4)
PLATELET # BLD: 262 K/UL (ref 138–453)
PMV BLD AUTO: 11.7 FL (ref 8.1–13.5)
POTASSIUM SERPL-SCNC: 5 MMOL/L (ref 3.7–5.3)
RBC # BLD: 4.36 M/UL (ref 3.95–5.11)
SODIUM BLD-SCNC: 140 MMOL/L (ref 135–144)
TOTAL PROTEIN: 7.2 G/DL (ref 6.4–8.3)
WBC # BLD: 7.4 K/UL (ref 3.5–11.3)

## 2021-06-23 LAB
HAV IGM SER IA-ACNC: ABNORMAL
HEPATITIS B CORE IGM ANTIBODY: NONREACTIVE
HEPATITIS B SURFACE ANTIGEN: NONREACTIVE
HEPATITIS C ANTIBODY: NONREACTIVE

## 2021-07-06 ENCOUNTER — HOSPITAL ENCOUNTER (OUTPATIENT)
Age: 71
Setting detail: SPECIMEN
Discharge: HOME | End: 2021-07-06
Payer: MEDICARE

## 2021-07-06 LAB
ALBUMIN SERPL-MCNC: 3.7 G/DL (ref 3.5–5.2)
ALBUMIN/GLOBULIN RATIO: 1.1 (ref 1–2.5)
ALP BLD-CCNC: 104 U/L (ref 35–104)
ALT SERPL-CCNC: 14 U/L (ref 5–33)
ANION GAP SERPL CALCULATED.3IONS-SCNC: 12 MMOL/L (ref 9–17)
AST SERPL-CCNC: 15 U/L
BILIRUB SERPL-MCNC: 0.42 MG/DL (ref 0.3–1.2)
BUN BLDV-MCNC: 32 MG/DL (ref 8–23)
BUN/CREAT BLD: ABNORMAL (ref 9–20)
CALCIUM SERPL-MCNC: 9 MG/DL (ref 8.6–10.4)
CHLORIDE BLD-SCNC: 103 MMOL/L (ref 98–107)
CO2: 23 MMOL/L (ref 20–31)
CREAT SERPL-MCNC: 0.95 MG/DL (ref 0.5–0.9)
GFR AFRICAN AMERICAN: >60 ML/MIN
GFR NON-AFRICAN AMERICAN: 58 ML/MIN
GFR SERPL CREATININE-BSD FRML MDRD: ABNORMAL ML/MIN/{1.73_M2}
GFR SERPL CREATININE-BSD FRML MDRD: ABNORMAL ML/MIN/{1.73_M2}
GLUCOSE BLD-MCNC: 165 MG/DL (ref 70–99)
HAV IGM SER IA-ACNC: NONREACTIVE
HCT VFR BLD CALC: 39 % (ref 36.3–47.1)
HEMOGLOBIN: 12.4 G/DL (ref 11.9–15.1)
HEPATITIS B CORE IGM ANTIBODY: NONREACTIVE
HEPATITIS B SURFACE ANTIGEN: NONREACTIVE
HEPATITIS C ANTIBODY: NONREACTIVE
MCH RBC QN AUTO: 28.7 PG (ref 25.2–33.5)
MCHC RBC AUTO-ENTMCNC: 31.8 G/DL (ref 28.4–34.8)
MCV RBC AUTO: 90.3 FL (ref 82.6–102.9)
NRBC AUTOMATED: 0 PER 100 WBC
PDW BLD-RTO: 16 % (ref 11.8–14.4)
PLATELET # BLD: 250 K/UL (ref 138–453)
PMV BLD AUTO: 11.8 FL (ref 8.1–13.5)
POTASSIUM SERPL-SCNC: 4.6 MMOL/L (ref 3.7–5.3)
RBC # BLD: 4.32 M/UL (ref 3.95–5.11)
SODIUM BLD-SCNC: 138 MMOL/L (ref 135–144)
TOTAL PROTEIN: 7 G/DL (ref 6.4–8.3)
WBC # BLD: 7.3 K/UL (ref 3.5–11.3)

## 2021-08-31 ENCOUNTER — HOSPITAL ENCOUNTER (EMERGENCY)
Age: 71
Discharge: HOME | End: 2021-08-31
Attending: EMERGENCY MEDICINE
Payer: MEDICARE

## 2021-08-31 ENCOUNTER — APPOINTMENT (OUTPATIENT)
Dept: GENERAL RADIOLOGY | Age: 71
End: 2021-08-31
Payer: MEDICARE

## 2021-08-31 VITALS
BODY MASS INDEX: 49.02 KG/M2 | SYSTOLIC BLOOD PRESSURE: 129 MMHG | TEMPERATURE: 98.2 F | WEIGHT: 268 LBS | RESPIRATION RATE: 18 BRPM | OXYGEN SATURATION: 96 % | DIASTOLIC BLOOD PRESSURE: 61 MMHG | HEART RATE: 73 BPM

## 2021-08-31 DIAGNOSIS — R07.9 CHEST PAIN, UNSPECIFIED TYPE: ICD-10-CM

## 2021-08-31 DIAGNOSIS — J44.1 COPD EXACERBATION (HCC): Primary | ICD-10-CM

## 2021-08-31 DIAGNOSIS — R06.00 DYSPNEA, UNSPECIFIED TYPE: ICD-10-CM

## 2021-08-31 LAB
ALBUMIN SERPL-MCNC: 3.8 G/DL (ref 3.5–5.1)
ALP BLD-CCNC: 104 U/L (ref 38–126)
ALT SERPL-CCNC: 17 U/L (ref 11–66)
ANION GAP SERPL CALCULATED.3IONS-SCNC: 15 MEQ/L (ref 8–16)
AST SERPL-CCNC: 18 U/L (ref 5–40)
BACTERIA: ABNORMAL /HPF
BASOPHILS # BLD: 0.3 %
BASOPHILS ABSOLUTE: 0 THOU/MM3 (ref 0–0.1)
BILIRUB SERPL-MCNC: 0.4 MG/DL (ref 0.3–1.2)
BILIRUBIN URINE: NEGATIVE
BLOOD, URINE: NEGATIVE
BUN BLDV-MCNC: 12 MG/DL (ref 7–22)
CALCIUM SERPL-MCNC: 9.8 MG/DL (ref 8.5–10.5)
CASTS 2: ABNORMAL /LPF
CASTS UA: ABNORMAL /LPF
CHARACTER, URINE: CLEAR
CHLORIDE BLD-SCNC: 109 MEQ/L (ref 98–111)
CO2: 20 MEQ/L (ref 23–33)
COLOR: YELLOW
CREAT SERPL-MCNC: 0.7 MG/DL (ref 0.4–1.2)
CRYSTALS, UA: ABNORMAL
EKG ATRIAL RATE: 62 BPM
EKG P AXIS: 42 DEGREES
EKG P-R INTERVAL: 158 MS
EKG Q-T INTERVAL: 480 MS
EKG QRS DURATION: 102 MS
EKG QTC CALCULATION (BAZETT): 487 MS
EKG R AXIS: -25 DEGREES
EKG T AXIS: 21 DEGREES
EKG VENTRICULAR RATE: 62 BPM
EOSINOPHIL # BLD: 1.9 %
EOSINOPHILS ABSOLUTE: 0.2 THOU/MM3 (ref 0–0.4)
EPITHELIAL CELLS, UA: ABNORMAL /HPF
ERYTHROCYTE [DISTWIDTH] IN BLOOD BY AUTOMATED COUNT: 14.6 % (ref 11.5–14.5)
ERYTHROCYTE [DISTWIDTH] IN BLOOD BY AUTOMATED COUNT: 50.5 FL (ref 35–45)
GFR SERPL CREATININE-BSD FRML MDRD: 82 ML/MIN/1.73M2
GLUCOSE BLD-MCNC: 107 MG/DL (ref 70–108)
GLUCOSE URINE: NEGATIVE MG/DL
HCT VFR BLD CALC: 44 % (ref 37–47)
HEMOGLOBIN: 13.8 GM/DL (ref 12–16)
IMMATURE GRANS (ABS): 0.02 THOU/MM3 (ref 0–0.07)
IMMATURE GRANULOCYTES: 0.2 %
KETONES, URINE: NEGATIVE
LEUKOCYTE ESTERASE, URINE: ABNORMAL
LYMPHOCYTES # BLD: 30.6 %
LYMPHOCYTES ABSOLUTE: 2.7 THOU/MM3 (ref 1–4.8)
MCH RBC QN AUTO: 29.2 PG (ref 26–33)
MCHC RBC AUTO-ENTMCNC: 31.4 GM/DL (ref 32.2–35.5)
MCV RBC AUTO: 93.2 FL (ref 81–99)
MISCELLANEOUS 2: ABNORMAL
MONOCYTES # BLD: 10.9 %
MONOCYTES ABSOLUTE: 1 THOU/MM3 (ref 0.4–1.3)
NITRITE, URINE: NEGATIVE
NUCLEATED RED BLOOD CELLS: 0 /100 WBC
OSMOLALITY CALCULATION: 287.1 MOSMOL/KG (ref 275–300)
PH UA: 6.5 (ref 5–9)
PLATELET # BLD: 270 THOU/MM3 (ref 130–400)
PMV BLD AUTO: 11 FL (ref 9.4–12.4)
POTASSIUM REFLEX MAGNESIUM: 4.4 MEQ/L (ref 3.5–5.2)
PRO-BNP: 625.1 PG/ML (ref 0–900)
PROTEIN UA: NEGATIVE
RBC # BLD: 4.72 MILL/MM3 (ref 4.2–5.4)
RBC URINE: ABNORMAL /HPF
REASON FOR REJECTION: NORMAL
REASON FOR REJECTION: NORMAL
REJECTED TEST: NORMAL
REJECTED TEST: NORMAL
RENAL EPITHELIAL, UA: ABNORMAL
SEG NEUTROPHILS: 56.1 %
SEGMENTED NEUTROPHILS ABSOLUTE COUNT: 4.9 THOU/MM3 (ref 1.8–7.7)
SODIUM BLD-SCNC: 144 MEQ/L (ref 135–145)
SPECIFIC GRAVITY, URINE: 1.01 (ref 1–1.03)
TOTAL PROTEIN: 6.8 G/DL (ref 6.1–8)
TROPONIN T: < 0.01 NG/ML
UROBILINOGEN, URINE: 0.2 EU/DL (ref 0–1)
WBC # BLD: 8.8 THOU/MM3 (ref 4.8–10.8)
WBC UA: ABNORMAL /HPF
YEAST: ABNORMAL

## 2021-08-31 PROCEDURE — 93010 ELECTROCARDIOGRAM REPORT: CPT | Performed by: NUCLEAR MEDICINE

## 2021-08-31 PROCEDURE — 85025 COMPLETE CBC W/AUTO DIFF WBC: CPT

## 2021-08-31 PROCEDURE — 94640 AIRWAY INHALATION TREATMENT: CPT

## 2021-08-31 PROCEDURE — 93005 ELECTROCARDIOGRAM TRACING: CPT | Performed by: EMERGENCY MEDICINE

## 2021-08-31 PROCEDURE — 84484 ASSAY OF TROPONIN QUANT: CPT

## 2021-08-31 PROCEDURE — 99285 EMERGENCY DEPT VISIT HI MDM: CPT

## 2021-08-31 PROCEDURE — 36415 COLL VENOUS BLD VENIPUNCTURE: CPT

## 2021-08-31 PROCEDURE — 6370000000 HC RX 637 (ALT 250 FOR IP): Performed by: STUDENT IN AN ORGANIZED HEALTH CARE EDUCATION/TRAINING PROGRAM

## 2021-08-31 PROCEDURE — 81001 URINALYSIS AUTO W/SCOPE: CPT

## 2021-08-31 PROCEDURE — 83880 ASSAY OF NATRIURETIC PEPTIDE: CPT

## 2021-08-31 PROCEDURE — 71045 X-RAY EXAM CHEST 1 VIEW: CPT

## 2021-08-31 PROCEDURE — 80053 COMPREHEN METABOLIC PANEL: CPT

## 2021-08-31 RX ORDER — IPRATROPIUM BROMIDE AND ALBUTEROL SULFATE 2.5; .5 MG/3ML; MG/3ML
1 SOLUTION RESPIRATORY (INHALATION) ONCE
Status: COMPLETED | OUTPATIENT
Start: 2021-08-31 | End: 2021-08-31

## 2021-08-31 RX ORDER — IPRATROPIUM BROMIDE AND ALBUTEROL SULFATE 2.5; .5 MG/3ML; MG/3ML
1 SOLUTION RESPIRATORY (INHALATION)
Status: DISCONTINUED | OUTPATIENT
Start: 2021-08-31 | End: 2021-08-31

## 2021-08-31 RX ADMIN — IPRATROPIUM BROMIDE AND ALBUTEROL SULFATE 1 AMPULE: .5; 3 SOLUTION RESPIRATORY (INHALATION) at 17:25

## 2021-08-31 ASSESSMENT — ENCOUNTER SYMPTOMS
WHEEZING: 1
ABDOMINAL PAIN: 0
CONSTIPATION: 0
BLOOD IN STOOL: 0
DIARRHEA: 1
SHORTNESS OF BREATH: 1
ABDOMINAL DISTENTION: 0
CHEST TIGHTNESS: 1
EYES NEGATIVE: 1
SINUS PRESSURE: 0
RHINORRHEA: 0
NAUSEA: 0
VOICE CHANGE: 0
DIARRHEA: 0
COLOR CHANGE: 1
COUGH: 0
COLOR CHANGE: 0
TROUBLE SWALLOWING: 0
RECTAL PAIN: 0
VOMITING: 0
FACIAL SWELLING: 0
SORE THROAT: 0

## 2021-08-31 NOTE — ED TRIAGE NOTES
Pt presents to ED via EMS c/c \"feels like an elephant is on my chest and I get light headed and my fingers start to tingle\" x 1 month.

## 2021-08-31 NOTE — ED NOTES
**This is a Medical/ PA/ APRN Student Note and is charted for educational purposes. The non-physician staff attested note is not to be used for billing purposes or to guide patient care. Please see the physician modifications/ attestation for treatment plan/suggestions. This note has been reviewed and feedback has been provided to the student. **    Kevin Claros physician, Dr. Ruthie Johnson  ED visit Note  Pt Name: Nia Mack Record Number: [id number]  Date of Birth 1950   Today's Date: 8/31/2021    CHIEF COMPLAINT:     Chief Complaint   Patient presents with    Shortness of Breath    Dizziness       HISTORY OF PRESENT ILLNESS   Shasha Loomis is a 66 y.o. female who presents to the ED c/o of SOB with Cx pressure since 1 pm. The patient states that she was sitting at home when she started feeling funny. The patient states that she then took her blood pressure on a home portable machine and found it to be around 85/45. She then called her cardiologist who advised her to go to the hospital. Before the ambulance arrived the patient began having chest pressure, as if someone was sitting on her chest. The patient rates the pain as a 3/10, non-radiating, and described it as a weight on her chest.              REVIEW OF SYSTEMS:    Review of Systems   Constitutional: Negative for activity change, appetite change, chills and fever. HENT: Negative for congestion, drooling, ear pain, facial swelling, sinus pressure, sneezing, sore throat, trouble swallowing and voice change. Respiratory: Positive for chest tightness, shortness of breath and wheezing. Cardiovascular: Positive for chest pain, palpitations and leg swelling. Gastrointestinal: Positive for diarrhea. Negative for abdominal distention, abdominal pain, nausea and rectal pain. Endocrine: Negative for polydipsia and polyuria.    Genitourinary: Negative for decreased urine volume, difficulty urinating, dyspareunia, dysuria, enuresis, flank pain, frequency, hematuria and urgency. Skin: Negative for color change, pallor, rash and wound. Neurological: Positive for dizziness and light-headedness. Negative for syncope, facial asymmetry, speech difficulty, numbness and headaches. PAST MEDICAL HISTORY:     Past Medical History:   Diagnosis Date    Acid reflux     Arrhythmia     Arthritis     CAD (coronary artery disease)     NON-OBSTRUCTIVE    Chronic bronchitis (HCC)     COPD (chronic obstructive pulmonary disease) (HCC)     Depression     Diabetes mellitus (HCC)     Dizziness - light-headed     Fall 10-15-13    left rib fracture and shoulder contusion     Hyperlipidemia     Hypertension     Kidney disease     MDRO (multiple drug resistant organisms) resistance 2/14    MRSA ABDOMEN    Numbness and tingling     HX OF:    S/P laparoscopic cholecystectomy 11/27/2018    Swelling     Tinnitus          SURGICAL HISTORY:     Past Surgical History:   Procedure Laterality Date    BACK SURGERY  2007    CARDIOVASCULAR STRESS TEST  04/03/2009    Persantine test associated w/non specific symptoms. EKG non diagnostic. no obvious stress induced ischemia. EF 63%. normal wall motion.  CARDIOVASCULAR STRESS TEST  03/31/2010    tilt table associated w/fluctuation of BP w/relatively low BP throughout test. no significant symptoms during test. borderline tilt table test.    CHOLECYSTECTOMY      COLONOSCOPY  2011    CORONARY ANGIOPLASTY  12/16/2009    EF 60%. nonobstructive CAD. normal LV function.     ENDOSCOPY, COLON, DIAGNOSTIC  over 5 years     Dr. Elijah Reagan  2004    cataract   [id number] [facility]  25 months old     umblilical    HERNIA REPAIR  9/8/14    Ventral Hernia Repair with Mesh - Dr. Linda Lu    partial    OTHER SURGICAL HISTORY  Feb 17, 2014    Left lower quadrant incision and drainage (Dr. Jacqueline Saleh, [facility])   [address] HISTORY  2014    I&D abd abscess Dr. Kandi Bosworth, NEEDLE CORE  2019    Benign    NM LAP,DIAGNOSTIC ABDOMEN N/A 2018    LAPAROSCOPY EXPLORATORY, LAP LYLE performed by Doris F[facility] MD at [facility]  as a child     TOOTH EXTRACTION  2012    TRANSTHORACIC ECHOCARDIOGRAM  2009    EF 50-55%. normal LV global systolic funct. slight LVH. slight lt atrial enlargement. no obvious stenotic valves. no pericardial effusion. MEDICATIONS   Scheduled Meds:   ipratropium-albuterol  1 ampule Inhalation Q4H WA     Continuous Infusions:  PRN Meds:. ALLERGIES:     Allergies   Allergen Reactions    Latex Hives, Itching and Rash    Bee Venom Anaphylaxis    Advair [Fluticasone-Salmeterol]      Heart races     Caffeine Other (See Comments)     Bad kidney problems    Pcn [Penicillins] Hives    Ultram [Tramadol Hcl] Swelling     Has tolerated norco here many times     Fluticasone-Salmeterol Palpitations    Wool Alcohol [Lanolin Alcohol] Hives, Itching and Rash       FAMILY HISTORY:     Family History   Problem Relation Age of Onset    Cancer Mother 28        uterine    Heart Disease Father     Cancer Father 48        lung    Diabetes Paternal Grandfather     Cancer Maternal Grandmother         uterine    Cancer Maternal Aunt 28        uterine    Cancer Maternal Cousin 32        uterine    Colon Cancer Neg Hx     Breast Cancer Neg Hx        SOCIAL HISTORY:     Social History     Tobacco Use    Smoking status: Former Smoker     Years: 3.00     Types: Cigarettes     Start date:      Quit date:      Years since quittin.6    Smokeless tobacco: Never Used    Tobacco comment: 3 cigarettes a month, Passive smoke exposure 39 years Father then ex    Substance Use Topics    Alcohol use: No     Alcohol/week: 0.0 standard drinks           VITAL SIGNS   CURRENT VITALS:  weight is 268 lb (121.6 kg).  Her oral temperature is 98.2 °F (36.8 Examination of the right-upper field reveals wheezing. Examination of the left-upper field reveals wheezing. Examination of the right-middle field reveals wheezing. Examination of the left-middle field reveals wheezing. Examination of the right-lower field reveals decreased breath sounds and wheezing. Examination of the left-lower field reveals wheezing. Decreased breath sounds and wheezing present. Chest:      Chest wall: No tenderness. There is no dullness to percussion. Abdominal:      General: Bowel sounds are normal.      Palpations: Abdomen is soft. Musculoskeletal:      Cervical back: Normal range of motion and neck supple. Right lower leg: No tenderness. Edema present. Left lower leg: Tenderness present. Edema present. Lymphadenopathy:      Cervical: No cervical adenopathy. Skin:     General: Skin is warm and dry. Capillary Refill: Capillary refill takes less than 2 seconds. Coloration: Skin is not cyanotic or pale. Findings: No ecchymosis, erythema or rash. Nails: There is no clubbing. Neurological:      General: No focal deficit present. Mental Status: She is alert. LABS     Labs Reviewed   CBC WITH AUTO DIFFERENTIAL - Abnormal; Notable for the following components:       Result Value    MCHC 31.4 (*)     RDW-CV 14.6 (*)     RDW-SD 50.5 (*)     All other components within normal limits   COMPREHENSIVE METABOLIC PANEL W/ REFLEX TO MG FOR LOW K   TROPONIN   BRAIN NATRIURETIC PEPTIDE       RADIOLOGY     XR CHEST PORTABLE   Final Result   1. Borderline heart size. Suboptimal inflation of the lungs. 2. There are a few old healed rib fractures bilaterally. 3. No acute findings. No infiltrates or effusions are seen            **This report has been created using voice recognition software. It may contain minor errors which are inherent in voice recognition technology. **      Final report electronically signed by Dr. Gracy Rodriguez on 8/31/2021 4:52 PM DIFFERENTIALS:   1. CHF/COPD Exacerbation  2. Atypical Pneumonia  3. Allergies  4. AAA - Unlikely. Equal bilateral pulses, no deviation in BP's between arms. 5. Pulmonary Embolism - Unlikely. Unable to rule out by [facility] Rule due to age. Wells criteria scores it as a 1 being unlikely. 6. Acute Coronary Syndrome - Unlikely. 12 lead revealed regular rate and rhythm, pace at 62, left sided axis, normal QT/QTc with no ectopy noted. PLAN:   1. 12 Lead ECG  2. IV  3. O2, Titrate to 95% PRN  4. Cardiac Monitor  5. Chest X-Ray  6. CBC, CMP, Troponins, Procalcitonin, BNP  7. UA  8. Blood Cultures x 2  9. Ipratropium Bromide/Albuterol UDN  10. 125 mg. Methylprednisolone IVP    Electronically signed by Richard Faust on 8/31/2021 at 5:11 PM   **This is a Medical/ PA/ APRN Student Note and is charted for educational purposes. The non-physician staff attested note is not to be used for billing purposes or to guide patient care. Please see the physician modifications/ attestation for treatment plan/suggestions. This note has been reviewed and feedback has been provided to the student.  Sheryle Pepper Dahl  08/31/21 1672

## 2021-08-31 NOTE — ED PROVIDER NOTES
[address] [address]          Pt Name: Dennis Gallardo  MRN: [id number]  [place] 1950  Date of evaluation: 8/31/2021  Treating Resident Physician: Lawanda Ray DO  Supervising Physician: Dr. Kaylin Umaña       Chief Complaint   Patient presents with    Shortness of Breath    Dizziness     History obtained from chart review and the patient. HISTORY OF PRESENT ILLNESS    HPI  Dennis Gallardo is a 66 y.o. female who presents to the emergency department for evaluation of chest pain with shortness of breath that started today around 1300. He has a home blood pressure monitoring kit and it was reportedly that her systolic was 80. Called her cardiologist office and then she was instructed to come to the ED. In route to the ED she noticed some chest pressure. She says it is center of the chest, described as pressure, 3/10, no exacerbating or alleviating factors, no radiation. She does states she has some wheezing. And she does use an inhaler for COPD. She was concerned about her heart so she came into the ED for further evaluation. The patient has no other acute complaints at this time. REVIEW OF SYSTEMS   Review of Systems   Constitutional: Negative for chills, diaphoresis and fever. HENT: Positive for postnasal drip. Eyes: Negative. Respiratory: Positive for shortness of breath and wheezing. Cardiovascular: Positive for chest pain and leg swelling (Chronic). Negative for palpitations. Gastrointestinal: Negative for abdominal pain, constipation, diarrhea and nausea. Endocrine: Negative. Genitourinary: Negative for difficulty urinating, dysuria, flank pain, hematuria and urgency. Musculoskeletal: Positive for myalgias (Chronic bilateral lower extremities). Skin: Positive for color change (Chronic changes bilateral lower extremities). Neurological: Positive for light-headedness.  Negative for dizziness, tremors, syncope, weakness, numbness and headaches. Hematological: Negative. Psychiatric/Behavioral: Negative. PAST MEDICAL AND SURGICAL HISTORY     Past Medical History:   Diagnosis Date    Acid reflux     Arrhythmia     Arthritis     CAD (coronary artery disease)     NON-OBSTRUCTIVE    Chronic bronchitis (HCC)     COPD (chronic obstructive pulmonary disease) (HCC)     Depression     Diabetes mellitus (HCC)     Dizziness - light-headed     Fall 10-15-13    left rib fracture and shoulder contusion     Hyperlipidemia     Hypertension     Kidney disease     MDRO (multiple drug resistant organisms) resistance 2/14    MRSA ABDOMEN    Numbness and tingling     HX OF:    S/P laparoscopic cholecystectomy 11/27/2018    Swelling     Tinnitus      Past Surgical History:   Procedure Laterality Date    BACK SURGERY  2007    CARDIOVASCULAR STRESS TEST  04/03/2009    Persantine test associated w/non specific symptoms. EKG non diagnostic. no obvious stress induced ischemia. EF 63%. normal wall motion.  CARDIOVASCULAR STRESS TEST  03/31/2010    tilt table associated w/fluctuation of BP w/relatively low BP throughout test. no significant symptoms during test. borderline tilt table test.    CHOLECYSTECTOMY      COLONOSCOPY  2011    CORONARY ANGIOPLASTY  12/16/2009    EF 60%. nonobstructive CAD. normal LV function.     ENDOSCOPY, COLON, DIAGNOSTIC  over 5 years     Dr. David Arnold  2004    cataract   [address] [facility]  25 months old     umblilical    HERNIA REPAIR  9/8/14    Ventral Hernia Repair with Mesh - Dr. Azalia Barrera    partial    OTHER SURGICAL HISTORY  Feb 17, 2014    Left lower quadrant incision and drainage (Dr. Herlinda Weaver, [facility])    OTHER SURGICAL HISTORY  2/2014    I&D abd abscess Dr. Tamica Rey, NEEDLE CORE  2019    Benign    CT LAP,DIAGNOSTIC ABDOMEN N/A 11/26/2018    LAPAROSCOPY EXPLORATORY, LAP LYLE performed by Tonya Sandoval MD at [address] [place] [address]  as a child     TOOTH EXTRACTION  4/16/2012    TRANSTHORACIC ECHOCARDIOGRAM  03/04/2009    EF 50-55%. normal LV global systolic funct. slight LVH. slight lt atrial enlargement. no obvious stenotic valves. no pericardial effusion. MEDICATIONS   No current facility-administered medications for this encounter. Current Outpatient Medications:     metoprolol succinate (TOPROL XL) 25 MG extended release tablet, take 1 tablet by mouth once daily, Disp: 30 tablet, Rfl: 10    magnesium oxide (MAG-OX) 400 (241.3 Mg) MG TABS tablet, Take 1 tablet by mouth daily, Disp: , Rfl:     zinc sulfate (ZINCATE) 220 (50 Zn) MG capsule, Take 1 capsule by mouth daily, Disp: , Rfl:     vitamin C (VITAMIN C) 500 MG tablet, Take 1 tablet by mouth daily, Disp: , Rfl:     Cholecalciferol (VITAMIN D) 25 MCG TABS, Take 1 tablet by mouth daily, Disp: , Rfl:     mupirocin (BACTROBAN) 2 % ointment, Apply topically 3 times daily Apply topically 3 times daily. , Disp: , Rfl:     loratadine (CLARITIN) 10 MG tablet, Take 10 mg by mouth daily, Disp: , Rfl:     mometasone-formoterol (DULERA) 200-5 MCG/ACT inhaler, Inhale 2 puffs into the lungs every 12 hours, Disp: , Rfl:     Cranberry 450 MG CAPS, Take by mouth, Disp: , Rfl:     Lactobacillus (ACIDOPHILUS) TABS, Take by mouth, Disp: , Rfl:     Mouthwashes (BIOTENE) LIQD oral solution, Swish and spit 15 mLs 3 times daily as needed, Disp: , Rfl:     furosemide (LASIX) 20 MG tablet, Take 20 mg by mouth daily, Disp: , Rfl:     citalopram (CELEXA) 40 MG tablet, take 1 tablet by mouth every morning, Disp: , Rfl: 0    tiotropium (SPIRIVA HANDIHALER) 18 MCG inhalation capsule, Inhale 1 capsule into the lungs daily, Disp: 30 capsule, Rfl: 5    nystatin-triamcinolone (MYCOLOG II) 942691-2.1 UNIT/GM-% cream, Apply 1 applicator topically 4 times daily Apply topically 4 times daily.  As needed, Disp: , Rfl:     HOMEOPATHIC PRODUCTS CO, 550 mg by Combination route daily as needed Legatrin PM, Disp: , Rfl:     psyllium (KONSYL) 28.3 % PACK, Take 1 packet by mouth daily, Disp: , Rfl:     L-Arginine 1000 MG TABS, Take 1,000 mg by mouth daily as needed, Disp: , Rfl:     Artificial Saliva (BIOTENE MOISTURIZING MOUTH) SOLN, Take 1 applicator by mouth as needed, Disp: , Rfl:     Compression Stockings MISC, 1 Package by Does not apply route Knee High, Disp: 1 each, Rfl: 0    omeprazole (PRILOSEC) 40 MG delayed release capsule, take 1 capsule by mouth once daily, Disp: 30 capsule, Rfl: 5    TRADJENTA 5 MG tablet, take 1 tablet by mouth daily, Disp: 30 tablet, Rfl: 5    simvastatin (ZOCOR) 40 MG tablet, take 1 tablet by mouth at bedtime, Disp: 30 tablet, Rfl: 5    VESICARE 10 MG tablet, take 1 tablet by mouth once daily, Disp: 30 tablet, Rfl: 5    pioglitazone (ACTOS) 45 MG tablet, take 1 tablet by mouth once daily, Disp: 30 tablet, Rfl: 5    metFORMIN (GLUCOPHAGE) 1000 MG tablet, take 1 tablet by mouth twice a day with food, Disp: 60 tablet, Rfl: 5    gabapentin (NEURONTIN) 300 MG capsule, Take 1 capsule by mouth 3 times daily (Patient taking differently: Take 800 mg by mouth 2 times daily. Jennifer Montana ), Disp: 90 capsule, Rfl: 5    albuterol sulfate HFA (PROAIR HFA) 108 (90 BASE) MCG/ACT inhaler, Inhale 2 puffs into the lungs every 4 hours as needed, Disp: 1 Inhaler, Rfl: 2    FREESTYLE LANCETS [facility] [place], Test 1 time daily. Dx: 250.00-Type 2 Diabetes. Freestyle 28G Lancets, Disp: 100 each, Rfl: 11    Omega-3 Fatty Acids (FISH OIL) 1200 MG CAPS, Take 1,200 mg by mouth daily, Disp: , Rfl:     ASPIRIN LOW DOSE 81 MG EC tablet, take 1 tablet by mouth once daily, Disp: 30 tablet, Rfl: 11    clobetasol (TEMOVATE) 0.05 % cream, Apply topically 2 times daily. , Disp: 15 g, Rfl: 1    glucose blood VI test strips (FREESTYLE LITE) strip, Test once daily, Disp: 100 each, Rfl: 11    CICLOPIROX TREATMENT EX, Apply  topically.  10.0 mL Ciclopirox-terbinafine-fluconazole  Fungal Free Nails Fungicidal solution, Disp: , Rfl:     diphenhydrAMINE (BENADRYL) 50 MG capsule, Take 50 mg by mouth nightly as needed for Itching., Disp: , Rfl:     QUEtiapine (SEROQUEL) 50 MG tablet, Take 50 mg by mouth nightly., Disp: , Rfl:     acetaminophen (TYLENOL) 500 MG tablet, Take 500 mg by mouth every 6 hours as needed for Pain., Disp: , Rfl:       SOCIAL HISTORY     Social History     Social History Narrative    Not on file     Social History     Tobacco Use    Smoking status: Former Smoker     Years: 3.00     Types: Cigarettes     Start date:      Quit date:      Years since quittin.6    Smokeless tobacco: Never Used    Tobacco comment: 3 cigarettes a month, Passive smoke exposure 39 years Father then ex    Vaping Use    Vaping Use: Never used   Substance Use Topics    Alcohol use: No     Alcohol/week: 0.0 standard drinks    Drug use: No         ALLERGIES     Allergies   Allergen Reactions    Latex Hives, Itching and Rash    Bee Venom Anaphylaxis    Advair [Fluticasone-Salmeterol]      Heart races     Caffeine Other (See Comments)     Bad kidney problems    Pcn [Penicillins] Hives    Ultram [Tramadol Hcl] Swelling     Has tolerated norco here many times     Fluticasone-Salmeterol Palpitations    Wool Alcohol [Lanolin Alcohol] Hives, Itching and Rash         FAMILY HISTORY     Family History   Problem Relation Age of Onset    Cancer Mother 28        uterine    Heart Disease Father     Cancer Father 48        lung    Diabetes Paternal Grandfather     Cancer Maternal Grandmother         uterine    Cancer Maternal Aunt 28        uterine    Cancer Maternal Cousin 28        uterine    Colon Cancer Neg Hx     Breast Cancer Neg Hx          PREVIOUS RECORDS   Previous records reviewed.         PHYSICAL EXAM     ED Triage Vitals   BP Temp Temp Source Pulse Resp SpO2 Height Weight   21 1623 21 1621 21 1621 08/31/21 1621 08/31/21 1623 08/31/21 1623 -- 08/31/21 1623   (!) 118/91 98.2 °F (36.8 °C) Oral 65 20 95 %  268 lb (121.6 kg)     Initial vital signs and nursing assessment reviewed and normal. Body mass index is 49.02 kg/m². Pulsoximetry is normal per my interpretation. Additional Vital Signs:  Vitals:    08/31/21 1908   BP: 129/61   Pulse: 73   Resp: 18   Temp:    SpO2: 96%       Physical Exam  Constitutional:       General: She is not in acute distress. Appearance: She is well-developed. She is obese. She is not ill-appearing, toxic-appearing or diaphoretic. HENT:      Head: Normocephalic and atraumatic. Mouth/Throat:      Mouth: Mucous membranes are moist.      Pharynx: Oropharynx is clear. Eyes:      Extraocular Movements: Extraocular movements intact. Pupils: Pupils are equal, round, and reactive to light. Cardiovascular:      Rate and Rhythm: Normal rate and regular rhythm. Heart sounds: Normal heart sounds. No murmur heard. No gallop. Pulmonary:      Effort: Pulmonary effort is normal.      Breath sounds: Decreased breath sounds and wheezing present. Chest:      Chest wall: Tenderness (Sternum) present. Musculoskeletal:      Cervical back: Normal range of motion. Right lower leg: Edema present. Left lower leg: Edema present. Comments: Chronic venous stasis changes bilateral lower extremities   Neurological:      Mental Status: She is alert. MEDICAL DECISION MAKING   Initial Assessment:   3 65-year-old  female presents the ED for chest pressure and shortness of breath started today around 1300. She describes it as an \"elephant sitting on my chest\". She denies any radiation. Upon physical exam patient was not in acute distress she was alert and oriented x3. She had diminished breath sounds throughout and wheezing was noted bilaterally. Physical exam showed reproduction of pain upon palpation at the center of the sternum.   She states she has a history of COPD though she has not been taking her inhalers routinely because they do not make her feel well. Plan:    CBC, CMP, troponin, BNP, UA reflex culture   EKG, CXR   Duoneb treatment x1    MDM:    Troponin negative, BNP negative. Was given 1 DuoNeb treatment with improvement of wheezing symptoms. CBC demonstrated no leukopenia or leukocytosis. There was no anemia present. Chest x-ray demonstrated a borderline heart size. There was suboptimal inflation of lungs. There are a few old healed rib fractures bilaterally and there were no acute findings. No infiltrates or effusions were seen. KG showed a sinus rhythm with a ventricular rate of 62 bpm,  No ST segment changes noted. No T wave abnormalities noted to suggest acute ischemia. Urinalysis did show mild leukocyte Estrace positive. There was 5-9 WBCs. Nitrites were negative. Patient is asymptomatic at this time. No treatment indicated for UTI at this time. Based on imaging, lab work and presenting symptoms this is likely a COPD exacerbation. Patient did show improvement with the DuoNeb treatment. He did state her noncompliance or intermittent use of inhalers. Patient was encouraged to follow-up with pulmonology. She states she has an appointment in 2 weeks however she can call earlier if need be. Patient was encouraged to use her inhalers as prescribed and if they are causing adverse symptoms to contact her pulmonologist for further direction. Patient states she has a sensitivity to steroids and they do make her heart race. Due to this reason no discharge steroids were prescribed.          ED RESULTS   Laboratory results:  Labs Reviewed   CBC WITH AUTO DIFFERENTIAL - Abnormal; Notable for the following components:       Result Value    MCHC 31.4 (*)     RDW-CV 14.6 (*)     RDW-SD 50.5 (*)     All other components within normal limits   COMPREHENSIVE METABOLIC PANEL W/ REFLEX TO MG FOR LOW K - Abnormal; Notable for the following components:    CO2 20 (*)     All other components within normal limits   URINE WITH REFLEXED MICRO - Abnormal; Notable for the following components:    Leukocyte Esterase, Urine SMALL (*)     All other components within normal limits   GLOMERULAR FILTRATION RATE, ESTIMATED - Abnormal; Notable for the following components:    Est, Glom Filt Rate 82 (*)     All other components within normal limits   SPECIMEN REJECTION   SPECIMEN REJECTION   TROPONIN   BRAIN NATRIURETIC PEPTIDE   ANION GAP   OSMOLALITY       Radiologic studies results:  XR CHEST PORTABLE   Final Result   1. Borderline heart size. Suboptimal inflation of the lungs. 2. There are a few old healed rib fractures bilaterally. 3. No acute findings. No infiltrates or effusions are seen            **This report has been created using voice recognition software. It may contain minor errors which are inherent in voice recognition technology. **      Final report electronically signed by Dr. Parviz Gibbs on 8/31/2021 4:52 PM          ED Medications administered this visit:   Medications   ipratropium-albuterol (DUONEB) nebulizer solution 1 ampule (1 ampule Inhalation Given 8/31/21 1725)         ED COURSE     ED Course as of Aug 31 2002   Tue Aug 31, 2021   1649 WBC: 8.8 [SM]   1649 Hemoglobin Quant: 13.8 [SM]   1824 Troponin T: < 0.010 [SM]   1825 Pro-BNP: 625.1 [SM]   1825 Leukocyte Esterase, Urine(!): SMALL [SM]   1825 WBC, UA: 5-9 [SM]   1825 Bacteria, UA: MANY [SM]   1956 MPV: 11.0 [SM]      ED Course User Index  [SM] Alesha Walsh DO       Strict return precautions and follow up instructions were discussed with the patient prior to discharge, with which the patient agrees.       MEDICATION CHANGES     New Prescriptions    No medications on file         FINAL DISPOSITION     Final diagnoses:   Chest pain, unspecified type   Dyspnea, unspecified type   COPD exacerbation ([facility] [address]     Condition: condition: stable  Dispo: Discharge to home      This transcription was electronically signed. Parts of this transcriptions may have been dictated by use of voice recognition software and electronically transcribed, and parts may have been transcribed with the assistance of an ED scribe. The transcription may contain errors not detected in proofreading. Please refer to my supervising physician's documentation if my documentation differs.     Electronically Signed: Kaylin Overton DO, 08/31/21, 8:02 PM         Darin Calixto DO  Resident  08/31/21 2002

## 2021-08-31 NOTE — ED PROVIDER NOTES
rash.   Neurological: Negative for seizures, syncope, facial asymmetry, speech difficulty, weakness and headaches. Hematological: Negative for adenopathy. Psychiatric/Behavioral: Negative for confusion. All other systems reviewed and are negative. PAST MEDICAL HISTORY    has a past medical history of Acid reflux, Arrhythmia, Arthritis, CAD (coronary artery disease), Chronic bronchitis ([facility] [address] COPD (chronic obstructive pulmonary disease) ([facility] [address] Depression, Diabetes mellitus ([facility] [address] Dizziness - light-headed, Fall, Hyperlipidemia, Hypertension, Kidney disease, MDRO (multiple drug resistant organisms) resistance, Numbness and tingling, S/P laparoscopic cholecystectomy, Swelling, and Tinnitus. SURGICAL HISTORY      has a past surgical history that includes Coronary angioplasty (12/16/2009); transthoracic echocardiogram (03/04/2009); cardiovascular stress test (04/03/2009); cardiovascular stress test (03/31/2010); back surgery (2007); eye surgery (2004); Tooth Extraction (4/16/2012); Colonoscopy (2011); other surgical history (Feb 17, 2014); Endoscopy, colon, diagnostic (over 5 years ); Tonsillectomy (as a child ); other surgical history (2/2014); hernia repair (21 months old ); hernia repair (9/8/14); pr lap,diagnostic abdomen (N/A, 11/26/2018); Cholecystectomy; pr biopsy of breast, needle core (2019); and Hysterectomy (1995). CURRENT MEDICATIONS       Previous Medications    ACETAMINOPHEN (TYLENOL) 500 MG TABLET    Take 500 mg by mouth every 6 hours as needed for Pain. ALBUTEROL SULFATE HFA (PROAIR HFA) 108 (90 BASE) MCG/ACT INHALER    Inhale 2 puffs into the lungs every 4 hours as needed    ARTIFICIAL SALIVA (BIOTENE MOISTURIZING MOUTH) SOLN    Take 1 applicator by mouth as needed    ASPIRIN LOW DOSE 81 MG EC TABLET    take 1 tablet by mouth once daily    CHOLECALCIFEROL (VITAMIN D) 25 MCG TABS    Take 1 tablet by mouth daily    CICLOPIROX TREATMENT EX    Apply  topically.  10.0 mL Ciclopirox-terbinafine-fluconazole  Fungal Free Nails Fungicidal solution    CITALOPRAM (CELEXA) 40 MG TABLET    take 1 tablet by mouth every morning    CLOBETASOL (TEMOVATE) 0.05 % CREAM    Apply topically 2 times daily. COMPRESSION STOCKINGS MISC    1 Package by Does not apply route Knee High    CRANBERRY 450 MG CAPS    Take by mouth    DIPHENHYDRAMINE (BENADRYL) 50 MG CAPSULE    Take 50 mg by mouth nightly as needed for Itching. FREESTYLE LANCETS MISC    Test 1 time daily. Dx: 250.00-Type 2 Diabetes. Freestyle 28G Lancets    FUROSEMIDE (LASIX) 20 MG TABLET    Take 20 mg by mouth daily    GABAPENTIN (NEURONTIN) 300 MG CAPSULE    Take 1 capsule by mouth 3 times daily    GLUCOSE BLOOD VI TEST STRIPS (FREESTYLE LITE) STRIP    Test once daily    HOMEOPATHIC PRODUCTS CO    550 mg by Combination route daily as needed Legatrin PM    L-ARGININE 1000 MG TABS    Take 1,000 mg by mouth daily as needed    LACTOBACILLUS (ACIDOPHILUS) TABS    Take by mouth    LORATADINE (CLARITIN) 10 MG TABLET    Take 10 mg by mouth daily    MAGNESIUM OXIDE (MAG-OX) 400 (241.3 MG) MG TABS TABLET    Take 1 tablet by mouth daily    METFORMIN (GLUCOPHAGE) 1000 MG TABLET    take 1 tablet by mouth twice a day with food    METOPROLOL SUCCINATE (TOPROL XL) 25 MG EXTENDED RELEASE TABLET    take 1 tablet by mouth once daily    MOMETASONE-FORMOTEROL (DULERA) 200-5 MCG/ACT INHALER    Inhale 2 puffs into the lungs every 12 hours    MOUTHWASHES (BIOTENE) LIQD ORAL SOLUTION    Swish and spit 15 mLs 3 times daily as needed    MUPIROCIN (BACTROBAN) 2 % OINTMENT    Apply topically 3 times daily Apply topically 3 times daily. NYSTATIN-TRIAMCINOLONE (MYCOLOG II) 592191-5.1 UNIT/GM-% CREAM    Apply 1 applicator topically 4 times daily Apply topically 4 times daily.  As needed    OMEGA-3 FATTY ACIDS (FISH OIL) 1200 MG CAPS    Take 1,200 mg by mouth daily    OMEPRAZOLE (PRILOSEC) 40 MG DELAYED RELEASE CAPSULE    take 1 capsule by mouth once daily PIOGLITAZONE (ACTOS) 45 MG TABLET    take 1 tablet by mouth once daily    PSYLLIUM (KONSYL) 28.3 % PACK    Take 1 packet by mouth daily    QUETIAPINE (SEROQUEL) 50 MG TABLET    Take 50 mg by mouth nightly. SIMVASTATIN (ZOCOR) 40 MG TABLET    take 1 tablet by mouth at bedtime    TIOTROPIUM (SPIRIVA HANDIHALER) 18 MCG INHALATION CAPSULE    Inhale 1 capsule into the lungs daily    TRADJENTA 5 MG TABLET    take 1 tablet by mouth daily    VESICARE 10 MG TABLET    take 1 tablet by mouth once daily    VITAMIN C (VITAMIN C) 500 MG TABLET    Take 1 tablet by mouth daily    ZINC SULFATE (ZINCATE) 220 (50 ZN) MG CAPSULE    Take 1 capsule by mouth daily       ALLERGIES     is allergic to latex, bee venom, advair [fluticasone-salmeterol], caffeine, pcn [penicillins], ultram [tramadol hcl], fluticasone-salmeterol, and wool alcohol [lanolin alcohol]. FAMILY HISTORY     She indicated that her mother is . She indicated that her father is . She indicated that the status of her maternal grandmother is unknown. She indicated that the status of her paternal grandfather is unknown. She indicated that the status of her maternal aunt is unknown. She indicated that the status of her neg hx is unknown. She indicated that the status of her maternal cousin is unknown.   family history includes Cancer in her maternal grandmother; Cancer (age of onset: 28) in her maternal aunt, maternal cousin, and mother; Cancer (age of onset: 48) in her father; Diabetes in her paternal grandfather; Heart Disease in her father. SOCIAL HISTORY      reports that she quit smoking about 28 years ago. Her smoking use included cigarettes. She started smoking about 31 years ago. She quit after 3.00 years of use. She has never used smokeless tobacco. She reports that she does not drink alcohol and does not use drugs. PHYSICAL EXAM     INITIAL VITALS:  weight is 268 lb (121.6 kg). Her oral temperature is 98.2 °F (36.8 °C).  Her blood pressure is 129/61 and her pulse is 73. Her respiration is 18 and oxygen saturation is 96%. CONSTITUTIONAL: [Awake, alert, non toxic, well developed, well nourished, no acute distress. Watching bluegrass music station, smiling and pleasant.]  HEAD: [Normocephalic, atraumatic]  EYES: [Pupils equal, round & reactive to light, extraocular movements intact, no nystagmus, clear conjunctiva, non-icteric sclera]  ENT: [External ear canal clear without evidence of cerumen impaction or foreign body, TM's clear without erythema or bulging. Nares patent without drainage, septum appears midline. Moist mucus membranes, oropharynx clear without exudate, erythema, or mass. Uvula midline]  NECK: [Nontender and supple. No meningismus, no appreciated lymphadenopathy. Intact full range of motion. C-spine midline without vertebral tenderness. Trachea midline.]  CHEST: [Inspection normal, no lesions, equal rise. No crepitus or tenderness upon palpation.]  CARDIOVASCULAR: [Regular rate, rhythm, normal S1 and S2. No appreciated murmurs, rubs, or gallops. No pulse deficits appreciated. Intact distal perfusion. JVD not appreciated.]  PULMONARY: [Respiratory distress absent. Respiratory effort normal. Breath sounds with wheezing R>L, diminished, no rhonchi, rales. No accessory muscle use. No stridor]  ABDOMEN: [Inspection normal, without surgical scars. Soft, non-tender, non-distended, with normoactive bowel sounds. No palpable masses, rebound, or guarding]  BACK: [Intact ROM. No midline vertebral tenderness, step off, or crepitus. No CVA tenderness.]  MUSCULOSKELETAL: [Extremities nontender to palpation. No gross deformity or evidence of external trauma. Intact range of motion. Sensation intact. No clubbing, cyanosis. Chronic venous stasis changes with non pitting edema bilaterally, at baseline per patient.]  SKIN: [Warm, dry. No jaundice, rash, urticaria, or petechiae]  NEUROLOGIC: [Alert and oriented x 3, GCS 15, normal mentation for age. Moves all four extremities. No gross sensory deficit. Cerebellar function grossly normal.]  PSYCHIATRIC: [Normal mood and affect, thought process is clear and linear]     DIFFERENTIAL DIAGNOSIS:   ?copd exacerbation, ?pneumonia, ?anemia, ?less likely acs, pneumothorax    DIAGNOSTIC RESULTS     EKG: All EKG's are interpreted by the Emergency Department Physician who either signs or Co-signsthis chart in the absence of a cardiologist.  EKG shows sinus rhythm with a rate of 62 bpm, IN interval 158 ms, QRS duration 102 ms,  ms. No ST elevation or depression, my interpretation. RADIOLOGY: non-plain film images(s) such as CT,Ultrasound and MRI are read by the radiologist.    XR CHEST PORTABLE   Final Result   1. Borderline heart size. Suboptimal inflation of the lungs. 2. There are a few old healed rib fractures bilaterally. 3. No acute findings. No infiltrates or effusions are seen            **This report has been created using voice recognition software. It may contain minor errors which are inherent in voice recognition technology. **      Final report electronically signed by Dr. Elizabeth Almanza on 8/31/2021 4:52 PM        [] Visualized and interpreted by me   [x] Radiologist's Wet Read Report Reviewed   [] Discussed withRadiologist.    LABS:   Labs Reviewed   CBC WITH AUTO DIFFERENTIAL - Abnormal; Notable for the following components:       Result Value    MCHC 31.4 (*)     RDW-CV 14.6 (*)     RDW-SD 50.5 (*)     All other components within normal limits   COMPREHENSIVE METABOLIC PANEL W/ REFLEX TO MG FOR LOW K - Abnormal; Notable for the following components:    CO2 20 (*)     All other components within normal limits   URINE WITH REFLEXED MICRO - Abnormal; Notable for the following components:    Leukocyte Esterase, Urine SMALL (*)     All other components within normal limits   GLOMERULAR FILTRATION RATE, ESTIMATED - Abnormal; Notable for the following components:    Est, Glom Filt Rate 82 (*) All other components within normal limits   SPECIMEN REJECTION   SPECIMEN REJECTION   TROPONIN   BRAIN NATRIURETIC PEPTIDE   ANION GAP   OSMOLALITY       EMERGENCY DEPARTMENT COURSE:   Vitals:    Vitals:    08/31/21 1727 08/31/21 1743 08/31/21 1823 08/31/21 1908   BP:  106/83 125/68 129/61   Pulse:  70 73 73   Resp: 17 16 18 18   Temp:       TempSrc:       SpO2: 100% 94% 94% 96%   Weight:           The results of pertinent diagnostic studies and exam findings were discussed. The patients provisional diagnosis and plan of care were discussed with the patient and present family. The patient and/or present family expressed understanding of the diagnosis and plan. The nurse was instructed to provide written instructions and appropriate follow-up information. The patient understands their need and responsibility to obtain additional follow-up as instructed. The patient is comfortable with the plan and discharge. The risks of medications administered and prescribed were discussed with the patient and family present. Later patient was reinterviewed and admits that she is supposed to be on inhalers chronically for her COPD but she has not been taking them. Suspect between her noncompliance and wheezing today this likely caused the chest discomfort that she experienced. We monitored blood pressure closely here for hours in the emergency department and she had no episodes of hypotension, suspect that her home cuff could be giving her incorrect readings and should be calibrated at her family doctor's office. She reports that she does not do well on steroids therefore we will not start her on any as her wheezing resolved after 1 DuoNeb. She is going to be encouraged to go back on her inhalers and to follow-up closely with her PCP on an outpatient basis. CRITICAL CARE:   None    CONSULTS:  None    PROCEDURES:  None    FINAL IMPRESSION      1. Chest pain, unspecified type    2.  Dyspnea, unspecified type DISPOSITION/PLAN   Discharge    PATIENT REFERRED TO:  Trina Pierre MD  [address] [place] [address]      As needed, If symptoms worsen      DISCHARGE MEDICATIONS:  New Prescriptions    No medications on file       (Please note that portions of this note were completed with a voice recognition program.  Efforts were made to edit the dictations but occasionally words are mis-transcribed.)    Provider:  I personally performed the services described in the documentation, reviewed and edited the documentation which was dictated, and it accurately records my words and actions.     Thierno Portillo MD 8/31/21 7:54 PM               Thierno Portillo MD  08/31/21 1958

## 2021-08-31 NOTE — ED NOTES
ED nurse-to-nurse bedside report    Chief Complaint   Patient presents with    Shortness of Breath    Dizziness      LOC: alert and orientated to name, place, date  Vital signs   Vitals:    08/31/21 1623 08/31/21 1727 08/31/21 1743 08/31/21 1823   BP: (!) 118/91  106/83 125/68   Pulse:   70 73   Resp: 20 17 16 18   Temp:       TempSrc:       SpO2: 95% 100% 94% 94%   Weight: 268 lb (121.6 kg)         Pain:    Pain Interventions: none  Pain Goal: 2/10  Oxygen: No    Current needs required room air   Telemetry: Yes  LDAs:   Peripheral IV 08/31/21 Right Forearm (Active)   Site Assessment Clean;Dry; Intact 08/31/21 1627   Line Status Normal saline locked 08/31/21 1627   Dressing Status Clean;Dry; Intact 08/31/21 1627   Dressing Intervention New 08/31/21 1627     Continuous Infusions:   Mobility: Requires assistance * 1  Contreras Fall Risk Score:    Fall Risk 6/15/2016 1/21/2015   2 or more falls in past year? no no   Fall with injury in past year? no no     Fall Interventions: side rails up x 2, call light within reach  Report given to: GUILLE Nava RN  08/31/21 [id number]

## 2021-09-16 ENCOUNTER — OFFICE VISIT (OUTPATIENT)
Dept: PULMONOLOGY | Age: 71
End: 2021-09-16
Payer: MEDICARE

## 2021-09-16 VITALS
HEIGHT: 62 IN | HEART RATE: 75 BPM | SYSTOLIC BLOOD PRESSURE: 112 MMHG | DIASTOLIC BLOOD PRESSURE: 82 MMHG | BODY MASS INDEX: 49.32 KG/M2 | WEIGHT: 268 LBS | OXYGEN SATURATION: 98 %

## 2021-09-16 DIAGNOSIS — J45.30 MILD PERSISTENT ASTHMA WITHOUT COMPLICATION: Primary | ICD-10-CM

## 2021-09-16 DIAGNOSIS — J30.2 SEASONAL ALLERGIC RHINITIS, UNSPECIFIED TRIGGER: ICD-10-CM

## 2021-09-16 DIAGNOSIS — Z87.09 H/O PULMONARY EMPHYSEMA: ICD-10-CM

## 2021-09-16 PROCEDURE — 1036F TOBACCO NON-USER: CPT | Performed by: NURSE PRACTITIONER

## 2021-09-16 PROCEDURE — 3017F COLORECTAL CA SCREEN DOC REV: CPT | Performed by: NURSE PRACTITIONER

## 2021-09-16 PROCEDURE — 1123F ACP DISCUSS/DSCN MKR DOCD: CPT | Performed by: NURSE PRACTITIONER

## 2021-09-16 PROCEDURE — G8427 DOCREV CUR MEDS BY ELIG CLIN: HCPCS | Performed by: NURSE PRACTITIONER

## 2021-09-16 PROCEDURE — 1090F PRES/ABSN URINE INCON ASSESS: CPT | Performed by: NURSE PRACTITIONER

## 2021-09-16 PROCEDURE — G8417 CALC BMI ABV UP PARAM F/U: HCPCS | Performed by: NURSE PRACTITIONER

## 2021-09-16 PROCEDURE — 4040F PNEUMOC VAC/ADMIN/RCVD: CPT | Performed by: NURSE PRACTITIONER

## 2021-09-16 PROCEDURE — G8400 PT W/DXA NO RESULTS DOC: HCPCS | Performed by: NURSE PRACTITIONER

## 2021-09-16 PROCEDURE — 99214 OFFICE O/P EST MOD 30 MIN: CPT | Performed by: NURSE PRACTITIONER

## 2021-09-16 RX ORDER — ALBUTEROL SULFATE 2.5 MG/3ML
2.5 SOLUTION RESPIRATORY (INHALATION) EVERY 6 HOURS PRN
Qty: 120 EACH | Refills: 11 | Status: SHIPPED | OUTPATIENT
Start: 2021-09-16 | End: 2021-09-16 | Stop reason: SDUPTHER

## 2021-09-16 RX ORDER — BUDESONIDE 0.5 MG/2ML
1 INHALANT ORAL 2 TIMES DAILY
Qty: 120 ML | Refills: 11 | Status: SHIPPED | OUTPATIENT
Start: 2021-09-16

## 2021-09-16 RX ORDER — ALBUTEROL SULFATE 2.5 MG/3ML
2.5 SOLUTION RESPIRATORY (INHALATION) EVERY 6 HOURS PRN
Qty: 120 EACH | Refills: 11 | Status: SHIPPED | OUTPATIENT
Start: 2021-09-16 | End: 2022-09-16

## 2021-09-16 ASSESSMENT — ENCOUNTER SYMPTOMS
SPUTUM PRODUCTION: 0
SORE THROAT: 0
SHORTNESS OF BREATH: 1
VOMITING: 0
DIARRHEA: 0
STRIDOR: 0
COUGH: 1
NAUSEA: 0
WHEEZING: 0
CHEST TIGHTNESS: 0

## 2021-09-16 NOTE — PROGRESS NOTES
[facility]    Patient: Marco A [facility], 66 y.o.   : 1950    Pt of Dr. Mehul Gunn   Patient presents with    Follow-up     15 month asthma f/u, no testing         Asthma  She complains of cough and shortness of breath. There is no sputum production or wheezing. This is a chronic problem. The current episode started more than 1 year ago. The problem occurs intermittently. The problem has been gradually improving. The cough is non-productive. Associated symptoms include dyspnea on exertion, nasal congestion and postnasal drip. Pertinent negatives include no chest pain, fever or sore throat. Her symptoms are aggravated by pollen and exposure to fumes (Stress, dust ). Her symptoms are alleviated by beta-agonist and rest. She reports significant improvement on treatment. Her past medical history is significant for asthma. Bobby Arnold is here for follow up for asthma/COPD overlap. Overall patient reports respiratory symptoms have been waxing and waning since last appointment. Patient reports non compliance with inhaled medications (dulera and spiriva). Patient using albuterol 2-3  times per week on average. Patient reports physical limitation due to back pain more than respiratory symptoms using WC for mobility due to DDD in her back. Was treated for Covid-19 in 2020. Most recently in 2021 was seen in ED for CP EKG showed sinus rhythm CXR no acute infiltrate or effusion, was given duoneb and discharged home. Is following at [facility] handling psychiatric meds attributes most recent ED visit to stress    Progress History:   Since last visit any new medical issues? Yes had Covid pneumonia 10/2020, 2021 was diagnosed with COPD exacerbation but was only treated with Duoneb and had improvement per pt. \" feel it was anxiety related\" was taking OTC anti anxiety medication has since stopped  New ER or hospital visits? Yes COPD exac  Any new or changes in medicines? Yes stopped taking Daily inhalers due to sore throat  Using inhalers? Yes only albuterol currently  Are they helpful? Yes   Pneumonia vaccine? Never had not interested right now advised to follow-up with PCP  Past Medical hx   PMH:  Past Medical History:   Diagnosis Date    Acid reflux     Arrhythmia     Arthritis     CAD (coronary artery disease)     NON-OBSTRUCTIVE    Chronic bronchitis ([address]     COVID-19 10/2020    Depression     Diabetes mellitus ([facility] [address]     Dizziness - light-headed     Fall 10/15/2013    left rib fracture and shoulder contusion     Hyperlipidemia     Hypertension     Kidney disease     MDRO (multiple drug resistant organisms) resistance 02/2014    MRSA ABDOMEN    Mild persistent asthma without complication 6/37/4940    Numbness and tingling     HX OF:    S/P laparoscopic cholecystectomy 11/27/2018    Swelling     Tinnitus      SURGICAL HISTORY:  Past Surgical History:   Procedure Laterality Date    BACK SURGERY  2007    CARDIOVASCULAR STRESS TEST  04/03/2009    Persantine test associated w/non specific symptoms. EKG non diagnostic. no obvious stress induced ischemia. EF 63%. normal wall motion.  CARDIOVASCULAR STRESS TEST  03/31/2010    tilt table associated w/fluctuation of BP w/relatively low BP throughout test. no significant symptoms during test. borderline tilt table test.    CHOLECYSTECTOMY      COLONOSCOPY  2011    CORONARY ANGIOPLASTY  12/16/2009    EF 60%. nonobstructive CAD. normal LV function.     ENDOSCOPY, COLON, DIAGNOSTIC  over 5 years     Dr. Austen Erickson  2004    cataract   [id number]  25 months old     umblilical    HERNIA REPAIR  9/8/14    Ventral Hernia Repair with Mesh - Dr. Gail Garnett    partial    OTHER SURGICAL HISTORY  Feb 17, 2014    Left lower quadrant incision and drainage (Dr. Анна Greco, [facility])    OTHER SURGICAL HISTORY  2/2014    I&D abd abscess  [place]     PA BIOPSY OF BREAST, NEEDLE CORE  2019    Benign    PA LAP,DIAGNOSTIC ABDOMEN N/A 2018    LAPAROSCOPY EXPLORATORY, LAP LYLE performed by Teo Young MD at [address] [place] [address]  as a child     TOOTH EXTRACTION  2012    TRANSTHORACIC ECHOCARDIOGRAM  2009    EF 50-55%. normal LV global systolic funct. slight LVH. slight lt atrial enlargement. no obvious stenotic valves. no pericardial effusion.      SOCIAL HISTORY:  Social History     Tobacco Use    Smoking status: Former Smoker     Years: 3.00     Types: Cigarettes     Start date:      Quit date:      Years since quittin.7    Smokeless tobacco: Never Used    Tobacco comment: 3 cigarettes a month, Passive smoke exposure 39 years Father then ex    Vaping Use    Vaping Use: Never used   Substance Use Topics    Alcohol use: No     Alcohol/week: 0.0 standard drinks    Drug use: No     ALLERGIES:  Allergies   Allergen Reactions    Latex Hives, Itching and Rash    Bee Venom Anaphylaxis    Advair [Fluticasone-Salmeterol]      Heart races     Caffeine Other (See Comments)     Bad kidney problems    Pcn [Penicillins] Hives    Ultram [Tramadol Hcl] Swelling     Has tolerated norco here many times     Fluticasone-Salmeterol Palpitations    Wool Alcohol [Lanolin Alcohol] Hives, Itching and Rash     FAMILY HISTORY:  Family History   Problem Relation Age of Onset    Cancer Mother 28        uterine    Heart Disease Father     Cancer Father 48        lung    Diabetes Paternal Grandfather     Cancer Maternal Grandmother         uterine    Cancer Maternal Aunt 28        uterine    Cancer Maternal Cousin 28        uterine    Colon Cancer Neg Hx     Breast Cancer Neg Hx      CURRENT MEDICATIONS:  Current Outpatient Medications   Medication Sig Dispense Refill    budesonide (PULMICORT) 0.5 MG/2ML nebulizer suspension Take 2 mLs by nebulization 2 times daily 120 mL 11    Nebulizer MISC Please (ZOCOR) 40 MG tablet take 1 tablet by mouth at bedtime 30 tablet 5    VESICARE 10 MG tablet take 1 tablet by mouth once daily 30 tablet 5    pioglitazone (ACTOS) 45 MG tablet take 1 tablet by mouth once daily 30 tablet 5    metFORMIN (GLUCOPHAGE) 1000 MG tablet take 1 tablet by mouth twice a day with food 60 tablet 5    gabapentin (NEURONTIN) 300 MG capsule Take 1 capsule by mouth 3 times daily (Patient taking differently: Take 800 mg by mouth 2 times daily. Haile Chinyere ) 90 capsule 5    albuterol sulfate HFA (PROAIR HFA) 108 (90 BASE) MCG/ACT inhaler Inhale 2 puffs into the lungs every 4 hours as needed 1 Inhaler 2    FREESTYLE LANCETS MISC Test 1 time daily. Dx: 250.00-Type 2 Diabetes. Freestyle 28G Lancets 100 each 11    Omega-3 Fatty Acids (FISH OIL) 1200 MG CAPS Take 1,200 mg by mouth daily      ASPIRIN LOW DOSE 81 MG EC tablet take 1 tablet by mouth once daily 30 tablet 11    clobetasol (TEMOVATE) 0.05 % cream Apply topically 2 times daily. 15 g 1    glucose blood VI test strips (FREESTYLE LITE) strip Test once daily 100 each 11    CICLOPIROX TREATMENT EX Apply  topically. 10.0 mL Ciclopirox-terbinafine-fluconazole  Fungal Free Nails Fungicidal solution      diphenhydrAMINE (BENADRYL) 50 MG capsule Take 50 mg by mouth nightly as needed for Itching.  QUEtiapine (SEROQUEL) 50 MG tablet Take 50 mg by mouth nightly.  acetaminophen (TYLENOL) 500 MG tablet Take 500 mg by mouth every 6 hours as needed for Pain. No current facility-administered medications for this visit. Hailejohn Whitlock ROS   Review of Systems   Constitutional: Negative for chills, fever and unexpected weight change. HENT: Positive for postnasal drip. Negative for sore throat. Respiratory: Positive for cough and shortness of breath. Negative for sputum production, chest tightness, wheezing and stridor. Cardiovascular: Positive for dyspnea on exertion. Negative for chest pain and leg swelling.    Gastrointestinal: Negative for diarrhea, nausea and vomiting. Genitourinary: Negative for dysuria. Physical exam   /82 (Site: Left Upper Arm, Position: Sitting, Cuff Size: Medium Adult)   Pulse 75   Ht 5' 2\" (1.575 m)   Wt 268 lb (121.6 kg)   LMP  (LMP Unknown)   SpO2 98% Comment: r/a  BMI 49.02 kg/m²    Wt Readings from Last 3 Encounters:   09/16/21 268 lb (121.6 kg)   08/31/21 268 lb (121.6 kg)   04/26/21 276 lb (125.2 kg)   Lost from 28    Physical Exam  Vitals and nursing note reviewed. Constitutional:       General: She is not in acute distress. Appearance: She is well-developed. She is obese. Comments: BMI 49, in WC   HENT:      Head: Normocephalic and atraumatic. Neck:      Trachea: No tracheal deviation. Cardiovascular:      Rate and Rhythm: Normal rate and regular rhythm. Heart sounds: Normal heart sounds. No murmur heard. Pulmonary:      Effort: Pulmonary effort is normal. No respiratory distress. Breath sounds: Normal breath sounds. No stridor. No wheezing or rales. Chest:      Chest wall: No tenderness. Abdominal:      General: Bowel sounds are normal. There is no distension. Palpations: Abdomen is soft. Musculoskeletal:      Cervical back: Neck supple. Skin:     General: Skin is warm and dry. Capillary Refill: Capillary refill takes less than 2 seconds. Neurological:      Mental Status: She is alert and oriented to person, place, and time. Psychiatric:         Behavior: Behavior normal.         Thought Content: Thought content normal.          Results   Lung Nodule Screening     [] Qualifies    [x] Does not qualify   [] Declined    [] Completed  Quit 1993   The USPSTF recommends annual screening for lung cancer with low-dose computed tomography (LDCT) in adults aged 48 to [de-identified] years who have a 20 pack-year smoking history and currently smoke or have quit within the past 15 years.  Screening should be discontinued once a person has not smoked for 15 years or develops a health problem that substantially limits life expectancy or the ability or willingness to have curative lung surgery. XR CHEST PORTABLE    8/31/2021   1. Borderline heart size. Suboptimal inflation of the lungs. 2. There are a few old healed rib fractures bilaterally. 3. No acute findings. No infiltrates or effusions are seen       Assessment      Diagnosis Orders   1. Mild persistent asthma without complication  budesonide (PULMICORT) 0.5 MG/2ML nebulizer suspension    Nebulizer MISC    albuterol (PROVENTIL) (2.5 MG/3ML) 0.083% nebulizer solution   2. H/O pulmonary emphysema      CT chest 2017   3. Seasonal allergic rhinitis, unspecified trigger        Deconditioning MO  Plan   -Due to reported side effect of sore throat with previous inhaled HFA will convert to nebulized budesonide and albuterol   -Budesonide 500 mcg neb BID advised to rinse and spit after doses  -Albuterol 2 puff Q6 hrs PRN for SOB/wheezing   -Discussed albuterol inhaler and nebulizer use. Reviewed signs and symptoms indicating need for use including Shortness of breath and wheezing. Discussed with patient the importance of using inhaler or nebulizer within the prescribed time frames. Patient verbalized understanding to use one or the other not both within prescribed time frame.  Patient also verbalized understanding that if they experience no relief after using albuterol and resting for 15 minutes they need to go to nearest ER or call 911.  -Continue Claritin   -Order for nebulizer machine  -Advised to maintain pneumonia vaccine with PCP and to take flu vaccine this coming season.  -Advised patient to call office with any changes, questions, or concerns regarding respiratory status    Will see Sara garcia in: 3 months    Conception Antes CNP  9/17/2021

## 2021-09-17 PROBLEM — J45.30 MILD PERSISTENT ASTHMA WITHOUT COMPLICATION: Status: ACTIVE | Noted: 2021-09-17

## 2021-09-17 PROBLEM — J96.01 ACUTE RESPIRATORY FAILURE WITH HYPOXIA (HCC): Status: RESOLVED | Noted: 2020-10-18 | Resolved: 2021-09-17

## 2021-09-17 PROBLEM — J44.1 COPD WITH ACUTE EXACERBATION (HCC): Status: RESOLVED | Noted: 2019-08-30 | Resolved: 2021-09-17

## 2021-09-17 PROBLEM — U07.1 PNEUMONIA DUE TO COVID-19 VIRUS: Status: RESOLVED | Noted: 2020-10-22 | Resolved: 2021-09-17

## 2021-09-17 PROBLEM — J44.1 COPD EXACERBATION (HCC): Status: RESOLVED | Noted: 2019-08-29 | Resolved: 2021-09-17

## 2021-09-17 PROBLEM — J12.82 PNEUMONIA DUE TO COVID-19 VIRUS: Status: RESOLVED | Noted: 2020-10-22 | Resolved: 2021-09-17

## 2021-09-25 DIAGNOSIS — J45.20 MILD INTERMITTENT ASTHMA WITHOUT COMPLICATION: ICD-10-CM

## 2021-09-27 RX ORDER — MONTELUKAST SODIUM 10 MG/1
10 TABLET ORAL NIGHTLY
Qty: 30 TABLET | Refills: 11 | Status: SHIPPED | OUTPATIENT
Start: 2021-09-27 | End: 2022-04-26

## 2021-10-12 ENCOUNTER — HOSPITAL ENCOUNTER (OUTPATIENT)
Age: 71
Setting detail: SPECIMEN
Discharge: HOME | End: 2021-10-12
Payer: MEDICARE

## 2021-10-12 LAB
CHOLESTEROL/HDL RATIO: 1.8
CHOLESTEROL: 121 MG/DL
HDLC SERPL-MCNC: 66 MG/DL
LDL CHOLESTEROL: 33 MG/DL (ref 0–130)
TRIGL SERPL-MCNC: 109 MG/DL
TSH SERPL DL<=0.05 MIU/L-ACNC: 3.17 MIU/L (ref 0.3–5)
VLDLC SERPL CALC-MCNC: NORMAL MG/DL (ref 1–30)

## 2021-11-27 ENCOUNTER — HOSPITAL ENCOUNTER (OUTPATIENT)
Age: 71
Setting detail: OBSERVATION
Discharge: OTHER INSTITUTION | End: 2021-11-30
Attending: STUDENT IN AN ORGANIZED HEALTH CARE EDUCATION/TRAINING PROGRAM | Admitting: INTERNAL MEDICINE
Payer: MEDICARE

## 2021-11-27 ENCOUNTER — APPOINTMENT (OUTPATIENT)
Dept: CT IMAGING | Age: 71
End: 2021-11-27
Payer: MEDICARE

## 2021-11-27 ENCOUNTER — APPOINTMENT (OUTPATIENT)
Dept: GENERAL RADIOLOGY | Age: 71
End: 2021-11-27
Payer: MEDICARE

## 2021-11-27 DIAGNOSIS — M51.36 DDD (DEGENERATIVE DISC DISEASE), LUMBAR: ICD-10-CM

## 2021-11-27 DIAGNOSIS — N39.0 URINARY TRACT INFECTION WITHOUT HEMATURIA, SITE UNSPECIFIED: Primary | ICD-10-CM
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
## 2021-11-27 DIAGNOSIS — J18.1 RIGHT UPPER LOBE CONSOLIDATION (HCC): ICD-10-CM

## 2021-11-27 LAB
ALBUMIN SERPL-MCNC: 3.8 G/DL (ref 3.5–5.1)
ALP BLD-CCNC: 97 U/L (ref 38–126)
ALT SERPL-CCNC: 17 U/L (ref 11–66)
ANION GAP SERPL CALCULATED.3IONS-SCNC: 15 MEQ/L (ref 8–16)
AST SERPL-CCNC: 17 U/L (ref 5–40)
BACTERIA: ABNORMAL
BASOPHILS # BLD: 0.2 %
BASOPHILS ABSOLUTE: 0 THOU/MM3 (ref 0–0.1)
BILIRUB SERPL-MCNC: 0.8 MG/DL (ref 0.3–1.2)
BILIRUBIN URINE: NEGATIVE
BLOOD, URINE: NEGATIVE
BUN BLDV-MCNC: 22 MG/DL (ref 7–22)
CALCIUM SERPL-MCNC: 9.4 MG/DL (ref 8.5–10.5)
CASTS: ABNORMAL /LPF
CASTS: ABNORMAL /LPF
CHARACTER, URINE: CLEAR
CHLORIDE BLD-SCNC: 98 MEQ/L (ref 98–111)
CO2: 23 MEQ/L (ref 23–33)
COLOR: YELLOW
CREAT SERPL-MCNC: 0.8 MG/DL (ref 0.4–1.2)
CRYSTALS: ABNORMAL
EKG ATRIAL RATE: 93 BPM
EKG P AXIS: 44 DEGREES
EKG P-R INTERVAL: 148 MS
EKG Q-T INTERVAL: 388 MS
EKG QRS DURATION: 114 MS
EKG QTC CALCULATION (BAZETT): 482 MS
EKG R AXIS: -16 DEGREES
EKG T AXIS: 85 DEGREES
EKG VENTRICULAR RATE: 93 BPM
EOSINOPHIL # BLD: 0.1 %
EOSINOPHILS ABSOLUTE: 0 THOU/MM3 (ref 0–0.4)
EPITHELIAL CELLS, UA: ABNORMAL /HPF
ERYTHROCYTE [DISTWIDTH] IN BLOOD BY AUTOMATED COUNT: 14.6 % (ref 11.5–14.5)
ERYTHROCYTE [DISTWIDTH] IN BLOOD BY AUTOMATED COUNT: 49.3 FL (ref 35–45)
GFR SERPL CREATININE-BSD FRML MDRD: 70 ML/MIN/1.73M2
GLUCOSE BLD-MCNC: 121 MG/DL (ref 70–108)
GLUCOSE, URINE: NEGATIVE MG/DL
HCT VFR BLD CALC: 41.4 % (ref 37–47)
HEMOGLOBIN: 13.3 GM/DL (ref 12–16)
IMMATURE GRANS (ABS): 0.17 THOU/MM3 (ref 0–0.07)
IMMATURE GRANULOCYTES: 0.9 %
KETONES, URINE: NEGATIVE
LACTIC ACID: 2.2 MMOL/L (ref 0.5–2)
LEUKOCYTE EST, POC: NEGATIVE
LYMPHOCYTES # BLD: 4.5 %
LYMPHOCYTES ABSOLUTE: 0.8 THOU/MM3 (ref 1–4.8)
MCH RBC QN AUTO: 29.7 PG (ref 26–33)
MCHC RBC AUTO-ENTMCNC: 32.1 GM/DL (ref 32.2–35.5)
MCV RBC AUTO: 92.4 FL (ref 81–99)
MISCELLANEOUS LAB TEST RESULT: ABNORMAL
MONOCYTES # BLD: 9.9 %
MONOCYTES ABSOLUTE: 1.8 THOU/MM3 (ref 0.4–1.3)
NITRITE, URINE: POSITIVE
NUCLEATED RED BLOOD CELLS: 0 /100 WBC
OSMOLALITY CALCULATION: 276.5 MOSMOL/KG (ref 275–300)
PH UA: 5 (ref 5–9)
PLATELET # BLD: 270 THOU/MM3 (ref 130–400)
PMV BLD AUTO: 10.5 FL (ref 9.4–12.4)
POTASSIUM REFLEX MAGNESIUM: 4.1 MEQ/L (ref 3.5–5.2)
PRO-BNP: 548.1 PG/ML (ref 0–900)
PROTEIN UA: NEGATIVE MG/DL
RBC # BLD: 4.48 MILL/MM3 (ref 4.2–5.4)
RBC URINE: ABNORMAL /HPF
RENAL EPITHELIAL, UA: ABNORMAL
SARS-COV-2, NAAT: NOT  DETECTED
SEG NEUTROPHILS: 84.4 %
SEGMENTED NEUTROPHILS ABSOLUTE COUNT: 15.2 THOU/MM3 (ref 1.8–7.7)
SODIUM BLD-SCNC: 136 MEQ/L (ref 135–145)
SPECIFIC GRAVITY UA: 1.01 (ref 1–1.03)
TOTAL PROTEIN: 7.2 G/DL (ref 6.1–8)
TROPONIN T: < 0.01 NG/ML
UROBILINOGEN, URINE: 0.2 EU/DL (ref 0–1)
WBC # BLD: 18 THOU/MM3 (ref 4.8–10.8)
WBC UA: ABNORMAL /HPF
YEAST: ABNORMAL

## 2021-11-27 PROCEDURE — 6360000002 HC RX W HCPCS: Performed by: STUDENT IN AN ORGANIZED HEALTH CARE EDUCATION/TRAINING PROGRAM

## 2021-11-27 PROCEDURE — 87040 BLOOD CULTURE FOR BACTERIA: CPT

## 2021-11-27 PROCEDURE — G0378 HOSPITAL OBSERVATION PER HR: HCPCS

## 2021-11-27 PROCEDURE — 85025 COMPLETE CBC W/AUTO DIFF WBC: CPT

## 2021-11-27 PROCEDURE — 81001 URINALYSIS AUTO W/SCOPE: CPT

## 2021-11-27 PROCEDURE — 6360000004 HC RX CONTRAST MEDICATION: Performed by: STUDENT IN AN ORGANIZED HEALTH CARE EDUCATION/TRAINING PROGRAM

## 2021-11-27 PROCEDURE — 99284 EMERGENCY DEPT VISIT MOD MDM: CPT

## 2021-11-27 PROCEDURE — 36415 COLL VENOUS BLD VENIPUNCTURE: CPT

## 2021-11-27 PROCEDURE — 71046 X-RAY EXAM CHEST 2 VIEWS: CPT

## 2021-11-27 PROCEDURE — 84484 ASSAY OF TROPONIN QUANT: CPT

## 2021-11-27 PROCEDURE — 93005 ELECTROCARDIOGRAM TRACING: CPT | Performed by: STUDENT IN AN ORGANIZED HEALTH CARE EDUCATION/TRAINING PROGRAM

## 2021-11-27 PROCEDURE — 2580000003 HC RX 258: Performed by: STUDENT IN AN ORGANIZED HEALTH CARE EDUCATION/TRAINING PROGRAM

## 2021-11-27 PROCEDURE — 87077 CULTURE AEROBIC IDENTIFY: CPT

## 2021-11-27 PROCEDURE — 72131 CT LUMBAR SPINE W/O DYE: CPT

## 2021-11-27 PROCEDURE — 83605 ASSAY OF LACTIC ACID: CPT

## 2021-11-27 PROCEDURE — 87086 URINE CULTURE/COLONY COUNT: CPT

## 2021-11-27 PROCEDURE — 96365 THER/PROPH/DIAG IV INF INIT: CPT

## 2021-11-27 PROCEDURE — 80053 COMPREHEN METABOLIC PANEL: CPT

## 2021-11-27 PROCEDURE — 70450 CT HEAD/BRAIN W/O DYE: CPT

## 2021-11-27 PROCEDURE — 99223 1ST HOSP IP/OBS HIGH 75: CPT | Performed by: INTERNAL MEDICINE

## 2021-11-27 PROCEDURE — 83880 ASSAY OF NATRIURETIC PEPTIDE: CPT

## 2021-11-27 PROCEDURE — 87186 SC STD MICRODIL/AGAR DIL: CPT

## 2021-11-27 PROCEDURE — 71275 CT ANGIOGRAPHY CHEST: CPT

## 2021-11-27 PROCEDURE — 87635 SARS-COV-2 COVID-19 AMP PRB: CPT

## 2021-11-27 PROCEDURE — G0378 HOSPITAL OBSERVATION PER HR: HCPCS | Performed by: INTERNAL MEDICINE

## 2021-11-27 RX ORDER — ASPIRIN 81 MG/1
81 TABLET ORAL DAILY
Status: DISCONTINUED | OUTPATIENT
Start: 2021-11-28 | End: 2021-11-30 | Stop reason: HOSPADM

## 2021-11-27 RX ORDER — METOPROLOL SUCCINATE 25 MG/1
25 TABLET, EXTENDED RELEASE ORAL DAILY
Status: DISCONTINUED | OUTPATIENT
Start: 2021-11-28 | End: 2021-11-30 | Stop reason: HOSPADM

## 2021-11-27 RX ORDER — MONTELUKAST SODIUM 10 MG/1
10 TABLET ORAL NIGHTLY
Status: DISCONTINUED | OUTPATIENT
Start: 2021-11-28 | End: 2021-11-30 | Stop reason: HOSPADM

## 2021-11-27 RX ORDER — ATORVASTATIN CALCIUM 40 MG/1
40 TABLET, FILM COATED ORAL DAILY
Refills: 5 | Status: DISCONTINUED | OUTPATIENT
Start: 2021-11-28 | End: 2021-11-30 | Stop reason: HOSPADM

## 2021-11-27 RX ORDER — SODIUM CHLORIDE, SODIUM LACTATE, POTASSIUM CHLORIDE, AND CALCIUM CHLORIDE .6; .31; .03; .02 G/100ML; G/100ML; G/100ML; G/100ML
500 INJECTION, SOLUTION INTRAVENOUS ONCE
Status: COMPLETED | OUTPATIENT
Start: 2021-11-27 | End: 2021-11-27

## 2021-11-27 RX ORDER — ASCORBIC ACID 500 MG
500 TABLET ORAL DAILY
Status: DISCONTINUED | OUTPATIENT
Start: 2021-11-28 | End: 2021-11-30 | Stop reason: HOSPADM

## 2021-11-27 RX ORDER — BUDESONIDE 0.5 MG/2ML
500 INHALANT ORAL 2 TIMES DAILY
Status: DISCONTINUED | OUTPATIENT
Start: 2021-11-28 | End: 2021-11-30 | Stop reason: HOSPADM

## 2021-11-27 RX ORDER — QUETIAPINE FUMARATE 25 MG/1
50 TABLET, FILM COATED ORAL NIGHTLY
Status: DISCONTINUED | OUTPATIENT
Start: 2021-11-28 | End: 2021-11-30 | Stop reason: HOSPADM

## 2021-11-27 RX ORDER — GABAPENTIN 300 MG/1
300 CAPSULE ORAL 3 TIMES DAILY
Status: DISCONTINUED | OUTPATIENT
Start: 2021-11-28 | End: 2021-11-30 | Stop reason: HOSPADM

## 2021-11-27 RX ORDER — ZINC SULFATE 50(220)MG
50 CAPSULE ORAL DAILY
Status: DISCONTINUED | OUTPATIENT
Start: 2021-11-28 | End: 2021-11-30 | Stop reason: HOSPADM

## 2021-11-27 RX ORDER — FLUORIDE TOOTHPASTE
10 TOOTHPASTE DENTAL PRN
Status: DISCONTINUED | OUTPATIENT
Start: 2021-11-27 | End: 2021-11-30 | Stop reason: HOSPADM

## 2021-11-27 RX ORDER — ALBUTEROL SULFATE 2.5 MG/3ML
2.5 SOLUTION RESPIRATORY (INHALATION) EVERY 6 HOURS PRN
Status: DISCONTINUED | OUTPATIENT
Start: 2021-11-27 | End: 2021-11-30 | Stop reason: HOSPADM

## 2021-11-27 RX ORDER — PANTOPRAZOLE SODIUM 40 MG/1
40 TABLET, DELAYED RELEASE ORAL
Status: DISCONTINUED | OUTPATIENT
Start: 2021-11-28 | End: 2021-11-30 | Stop reason: HOSPADM

## 2021-11-27 RX ORDER — ALBUTEROL SULFATE 90 UG/1
2 AEROSOL, METERED RESPIRATORY (INHALATION) EVERY 4 HOURS PRN
Status: DISCONTINUED | OUTPATIENT
Start: 2021-11-27 | End: 2021-11-30 | Stop reason: HOSPADM

## 2021-11-27 RX ORDER — CITALOPRAM 40 MG/1
40 TABLET ORAL DAILY
Status: DISCONTINUED | OUTPATIENT
Start: 2021-11-28 | End: 2021-11-30 | Stop reason: HOSPADM

## 2021-11-27 RX ADMIN — SODIUM CHLORIDE, POTASSIUM CHLORIDE, SODIUM LACTATE AND CALCIUM CHLORIDE 500 ML: 600; 310; 30; 20 INJECTION, SOLUTION INTRAVENOUS at 18:15

## 2021-11-27 RX ADMIN — IOPAMIDOL 80 ML: 755 INJECTION, SOLUTION INTRAVENOUS at 18:36

## 2021-11-27 RX ADMIN — CEFTRIAXONE SODIUM 1000 MG: 1 INJECTION, POWDER, FOR SOLUTION INTRAMUSCULAR; INTRAVENOUS at 17:55

## 2021-11-27 ASSESSMENT — ENCOUNTER SYMPTOMS
BACK PAIN: 1
DIARRHEA: 0
NAUSEA: 0
VOMITING: 0
EYE REDNESS: 0
ABDOMINAL PAIN: 0
CONSTIPATION: 0
SHORTNESS OF BREATH: 0
SORE THROAT: 0
RHINORRHEA: 0

## 2021-11-27 ASSESSMENT — PAIN DESCRIPTION - LOCATION: LOCATION: BACK

## 2021-11-27 ASSESSMENT — PAIN SCALES - GENERAL: PAINLEVEL_OUTOF10: 5

## 2021-11-27 ASSESSMENT — PAIN DESCRIPTION - ORIENTATION: ORIENTATION: LOWER

## 2021-11-27 ASSESSMENT — PAIN DESCRIPTION - PAIN TYPE: TYPE: CHRONIC PAIN

## 2021-11-27 NOTE — ED TRIAGE NOTES
Pt states I was getting out of bed & my legs gave out & down I went. I hit my head on the floor, denies LOC or new  neck pain. I was weak & dizzy yesterday.

## 2021-11-27 NOTE — ED NOTES
Bed: 016A  Expected date:   Expected time:   [facility]  Comments:     Nirmala Espinoza RN  11/27/21 [id number]

## 2021-11-27 NOTE — ED PROVIDER NOTES
[facility] ENCOUNTER          Pt Name: Yoan Burroughs  MRN: [id number]  [place] 1950  Date of evaluation: 11/27/2021  Physician: Cynthia Gilmore MD    CHIEF COMPLAINT       Chief Complaint   Patient presents with    Fall    Extremity Weakness     History obtained from the patient. HISTORY OF PRESENT ILLNESS    HPI  Yoan Burroughs is a 66 y.o. female with a history of CAD, HTN, DM, frequent falls, schizoaffective disorder who presents to the emergency department for evaluation of generalized weakness and fall. Patient states that for the past two days she has been feeling generalized weakness. While trying to get out of bed today she fell and hit her head. She is on baby aspirin but denies other anticoagulation. She complains of worsening of her chronic lumbar back pain. She endorses cough, denies chest pain, shortness of breath, abdominal pain, nausea/vomiting. The patient has no other acute complaints at this time. REVIEW OF SYSTEMS   Review of Systems   Constitutional: Negative for chills and fever. HENT: Negative for rhinorrhea and sore throat. Eyes: Negative for redness and visual disturbance. Respiratory: Negative for shortness of breath. Cardiovascular: Negative for chest pain. Gastrointestinal: Negative for abdominal pain, constipation, diarrhea, nausea and vomiting. Endocrine: Negative for polyuria. Genitourinary: Negative for dysuria and flank pain. Musculoskeletal: Positive for back pain. Negative for myalgias. Skin: Negative for rash. Neurological: Positive for weakness and light-headedness. Negative for syncope and headaches. Psychiatric/Behavioral: Negative for confusion.          PAST MEDICAL AND SURGICAL HISTORY     Past Medical History:   Diagnosis Date    Acid reflux     Arrhythmia     Arthritis     CAD (coronary artery disease)     NON-OBSTRUCTIVE    [facility] bronch[facility] ([facility] [address]     COVID-19 10/2020    Depression     Diabetes mellitus ([facility] [address]     Dizziness - light-headed     Fall 10/15/2013    left rib fracture and shoulder contusion     Hyperlipidemia     Hypertension     Kidney disease     MDRO (multiple drug resistant organisms) resistance 02/2014    MRSA ABDOMEN    Mild persistent asthma without complication 1/14/0909    Numbness and tingling     HX OF:    S/P laparoscopic cholecystectomy 11/27/2018    Swelling     Tinnitus      Past Surgical History:   Procedure Laterality Date    BACK SURGERY  2007    CARDIOVASCULAR STRESS TEST  04/03/2009    Persantine test associated w/non specific symptoms. EKG non diagnostic. no obvious stress induced ischemia. EF 63%. normal wall motion.  CARDIOVASCULAR STRESS TEST  03/31/2010    tilt table associated w/fluctuation of BP w/relatively low BP throughout test. no significant symptoms during test. borderline tilt table test.    CHOLECYSTECTOMY      COLONOSCOPY  2011    CORONARY ANGIOPLASTY  12/16/2009    EF 60%. nonobstructive CAD. normal LV function.  ENDOSCOPY, COLON, DIAGNOSTIC  over 5 years     Dr. Kendy Purdy  2004    cataract   [address]  25 months old     umblilical    HERNIA REPAIR  9/8/14    Ventral Hernia Repair with Mesh - Dr. Carin Quiñones    partial    OTHER SURGICAL HISTORY  Feb 17, 2014    Left lower quadrant incision and drainage (Dr. Roxana Patel, [facility])    OTHER SURGICAL HISTORY  2/2014    I&D abd abscess Dr. Meri Florence, NEEDLE CORE  2019    Benign    WA LAP,DIAGNOSTIC ABDOMEN N/A 11/26/2018    LAPAROSCOPY EXPLORATORY, LAP LYLE performed by Billey Soulier, MD at [address]  as a child     TOOTH EXTRACTION  4/16/2012    TRANSTHORACIC ECHOCARDIOGRAM  03/04/2009    EF 50-55%. normal LV global systolic funct. slight LVH. slight lt atrial enlargement. no obvious stenotic valves. no pericardial effusion.          MEDICATIONS     Current (GLUCOPHAGE) 1000 MG tablet, take 1 tablet by mouth twice a day with food, Disp: 60 tablet, Rfl: 5    gabapentin (NEURONTIN) 300 MG capsule, Take 1 capsule by mouth 3 times daily (Patient taking differently: Take 800 mg by mouth 2 times daily. Francesca Mcguire ), Disp: 90 capsule, Rfl: 5    albuterol sulfate HFA (PROAIR HFA) 108 (90 BASE) MCG/ACT inhaler, Inhale 2 puffs into the lungs every 4 hours as needed, Disp: 1 Inhaler, Rfl: 2    Omega-3 Fatty Acids (FISH OIL) 1200 MG CAPS, Take 1,200 mg by mouth daily, Disp: , Rfl:     ASPIRIN LOW DOSE 81 MG EC tablet, take 1 tablet by mouth once daily, Disp: 30 tablet, Rfl: 11    QUEtiapine (SEROQUEL) 50 MG tablet, Take 50 mg by mouth nightly., Disp: , Rfl:     Nebulizer MISC, Please provide new nebulizer machine to use with Albuterol 2.5mg via nebs Q6h prn and budesonide 500 mcg BID, Disp: 1 each, Rfl: 0    mupirocin (BACTROBAN) 2 % ointment, Apply topically 3 times daily Apply topically 3 times daily. , Disp: , Rfl:     loratadine (CLARITIN) 10 MG tablet, Take 10 mg by mouth daily, Disp: , Rfl:     Cranberry 450 MG CAPS, Take by mouth, Disp: , Rfl:     Mouthwashes (BIOTENE) LIQD oral solution, Swish and spit 15 mLs 3 times daily as needed, Disp: , Rfl:     nystatin-triamcinolone (MYCOLOG II) 099066-6.1 UNIT/GM-% cream, Apply 1 applicator topically 4 times daily Apply topically 4 times daily. As needed, Disp: , Rfl:     HOMEOPATHIC [employer]ODU[employer] CO, 550 mg by Combination route daily as needed Legatrin PM, Disp: , Rfl:     psyllium (KONSYL) 28.3 % PACK, Take 1 packet by mouth daily, Disp: , Rfl:     L-Arginine 1000 MG TABS, Take 1,000 mg by mouth daily as needed, Disp: , Rfl:     Artificial Saliva (BIOTENE MOISTURIZING MOUTH) SOLN, Take 1 applicator by mouth as needed, Disp: , Rfl:     Compression Stockings MISC, 1 Package by Does not apply route Knee High, Disp: 1 each, Rfl: 0    FREESTYLE LANCETS MISC, Test 1 time daily. Dx: 250.00-Type 2 Diabetes.   Freestyle 28G Lancets, Disp: 100 each, Rfl: 11    clobetasol (TEMOVATE) 0.05 % cream, Apply topically 2 times daily. , Disp: 15 g, Rfl: 1    glucose blood VI test strips (FREESTYLE LITE) strip, Test once daily, Disp: 100 each, Rfl: 11    CICLOPIROX TREATMENT EX, Apply  topically.  10.0 mL Ciclopirox-terbinafine-fluconazole  Fungal Free Nails Fungicidal solution, Disp: , Rfl:     diphenhydrAMINE (BENADRYL) 50 MG capsule, Take 50 mg by mouth nightly as needed for Itching., Disp: , Rfl:     acetaminophen (TYLENOL) 500 MG tablet, Take 500 mg by mouth every 6 hours as needed for Pain., Disp: , Rfl:       SOCIAL HISTORY     Social History     Social History Narrative    Not on file     Social History     Tobacco Use    Smoking status: Former Smoker     Years: 3.00     Types: Cigarettes     Start date:      Quit date:      Years since quittin.9    Smokeless tobacco: Never Used    Tobacco comment: 3 cigarettes a month, Passive smoke exposure 39 years Father then ex    Vaping Use    Vaping Use: Never used   Substance Use Topics    Alcohol use: No     Alcohol/week: 0.0 standard drinks    Drug use: No         ALLERGIES     Allergies   Allergen Reactions    Latex Hives, Itching and Rash    Bee Venom Anaphylaxis    Advair [Fluticasone-Salmeterol]      Heart races     Caffeine Other (See Comments)     Bad kidney problems    Pcn [Penicillins] Hives    Ultram [Tramadol Hcl] Swelling     Has tolerated norco here many times     Fluticasone-Salmeterol Palpitations    Wool Alcohol [Lanolin Alcohol] Hives, Itching and Rash         FAMILY HISTORY     Family History   Problem Relation Age of Onset    Cancer Mother 28        uterine    Heart Disease Father     Cancer Father 48        lung    Diabetes Paternal Grandfather     Cancer Maternal Grandmother         uterine    Cancer Maternal Aunt 32        uterine    Cancer Maternal Cousin 28        uterine    Colon Cancer Neg Hx     Breast Cancer Neg Hx PREVIOUS RECORDS   Previous records reviewed:   ED visit August 2021 for dizziness and SOB. PHYSICAL EXAM     ED Triage Vitals [11/27/21 1519]   BP Temp Temp src Pulse Resp SpO2 Height Weight   127/78 99.5 °F (37.5 °C) -- 94 18 95 % 5' 2\" (1.575 m) 273 lb (123.8 kg)     Initial vital signs and nursing assessment reviewed and normal. Body mass index is 49.93 kg/m². Pulsoximetry is normal per my interpretation. Additional Vital Signs:  Vitals:    11/27/21 1702   BP: 100/69   Pulse: 93   Resp: 22   Temp:    SpO2: 95%       Physical Exam  Vitals and nursing note reviewed. Constitutional:       General: She is not in acute distress. Appearance: Normal appearance. HENT:      Head: Normocephalic and atraumatic. Mouth/Throat:      Mouth: Mucous membranes are moist.   Eyes:      Extraocular Movements: Extraocular movements intact. Conjunctiva/sclera: Conjunctivae normal.      Pupils: Pupils are equal, round, and reactive to light. Cardiovascular:      Rate and Rhythm: Normal rate and regular rhythm. Pulses: Normal pulses. Heart sounds: Normal heart sounds. Pulmonary:      Effort: Pulmonary effort is normal.      Breath sounds: Normal breath sounds. Abdominal:      General: Abdomen is flat. Palpations: Abdomen is soft. Tenderness: There is no abdominal tenderness. Musculoskeletal:         General: Normal range of motion. Cervical back: Normal range of motion and neck supple. Skin:     General: Skin is warm and dry. Capillary Refill: Capillary refill takes less than 2 seconds. Neurological:      General: No focal deficit present. Mental Status: She is alert and oriented to person, place, and time. Cranial Nerves: No cranial nerve deficit. Sensory: No sensory deficit. Motor: Weakness (generalized, not focal) present.       Coordination: Coordination normal.   Psychiatric:         Mood and Affect: Mood normal.         Behavior: Behavior normal.             MEDICAL DECISION MAKING   Initial Assessment:   Rema Reyes is a 66 y.o. female with a history of CAD, HTN, DM, frequent falls, schizoaffective disorder who presents to the emergency department for evaluation of generalized weakness and fall. Patient is hemodynamically stable and in no distress. Differential diagnosis includes but is not limited to UTI, lumbar fracture, dehydration, ACS, intracranial hemorrhage, CHF. Plan:    CBC, CMP, troponin, BNP, UA, rapid covid, lactate   CTH, CT lumbar, CXR          ED RESULTS   Laboratory results:  Labs Reviewed   CBC WITH AUTO DIFFERENTIAL - Abnormal; Notable for the following components:       Result Value    WBC 18.0 (*)     MCHC 32.1 (*)     RDW-CV 14.6 (*)     RDW-SD 49.3 (*)     Segs Absolute 15.2 (*)     Lymphocytes Absolute 0.8 (*)     Monocytes Absolute 1.8 (*)     Immature Grans (Abs) 0.17 (*)     All other components within normal limits   COMPREHENSIVE METABOLIC PANEL W/ REFLEX TO MG FOR LOW K - Abnormal; Notable for the following components:    Glucose 121 (*)     All other components within normal limits   LACTIC ACID, PLASMA - Abnormal; Notable for the following components:    Lactic Acid 2.2 (*)     All other components within normal limits   MICROSCOPIC URINALYSIS - Abnormal; Notable for the following components:    Nitrite, Urine POSITIVE (*)     All other components within normal limits   GLOMERULAR FILTRATION RATE, ESTIMATED - Abnormal; Notable for the following components:    Est, Glom Filt Rate 70 (*)     All other components within normal limits   COVID-19, RAPID   CULTURE, URINE   CULTURE, BLOOD 1   CULTURE, BLOOD 2   TROPONIN   BRAIN NATRIURETIC PEPTIDE   ANION GAP   OSMOLALITY       Radiologic studies results:  CT Lumbar Spine WO Contrast   Final Result   Extensive degenerative changes and postsurgical changes. No acute process identified.             **This report has been created using voice recognition software. It may contain minor errors which are inherent in voice recognition technology. **      Final report electronically signed by Dr. Renee Mcguire on 11/27/2021 5:22 PM      CT Head WO Contrast   Final Result   No acute process            **This report has been created using voice recognition software. It may contain minor errors which are inherent in voice recognition technology. **      Final report electronically signed by Dr. Renee Mcguire on 11/27/2021 5:16 PM      XR CHEST (2 VW)   Final Result   1. Focal ill-defined infiltrate is seen at the right upper lung zone. This could represent focal pneumonia. However, cannot exclude an underlying mass. Further evaluation could be obtained with chest CT. **This report has been created using voice recognition software. It may contain minor errors which are inherent in voice recognition technology. **      Final report electronically signed by Dr. Rosanne Rgoer on 11/27/2021 4:46 PM      CTA CHEST [address] - r/o Pulmonary Embolism    (Results Pending)             ED COURSE   ED Medications administered this visit:   Medications   cefTRIAXone (ROCEPHIN) 1000 mg IVPB in 50 mL D5W minibag (1,000 mg IntraVENous New Bag 11/27/21 1755)   lactated ringers bolus (has no administration in time range)     Labs show leukocytosis 18, UA positive for nitrites with many bacteria concerning for UTI. Patient started on ceftriaxone. CTH/CT lumbar negative for acute process. Concern for infiltrate versus mass on CXR with CT recommended for further evaluation. Will obtain CTA as if patient has mass, higher risk for PE given recent generalized weakness and lightheadedness. Patient to be admitted to hospitalist for further management of UTI and generalized weakness.           MEDICATION CHANGES     New Prescriptions    No medications on file         FINAL DISPOSITION     Final diagnoses:   Urinary tract infection without hematuria, site unspecified     Condition:

## 2021-11-28 PROBLEM — J18.9 PNEUMONIA: Status: ACTIVE | Noted: 2021-11-28

## 2021-11-28 PROBLEM — L30.4 ERYTHEMA INTERTRIGO: Status: ACTIVE | Noted: 2021-11-28

## 2021-11-28 PROBLEM — E87.20 LACTIC ACID ACIDOSIS: Status: ACTIVE | Noted: 2021-11-28

## 2021-11-28 LAB
GLUCOSE BLD-MCNC: 117 MG/DL (ref 70–108)
GLUCOSE BLD-MCNC: 128 MG/DL (ref 70–108)
GLUCOSE BLD-MCNC: 138 MG/DL (ref 70–108)
GLUCOSE BLD-MCNC: 139 MG/DL (ref 70–108)
GLUCOSE BLD-MCNC: 99 MG/DL (ref 70–108)
PROCALCITONIN: 0.66 NG/ML (ref 0.01–0.09)

## 2021-11-28 PROCEDURE — G0378 HOSPITAL OBSERVATION PER HR: HCPCS

## 2021-11-28 PROCEDURE — 2580000003 HC RX 258: Performed by: HOSPITALIST

## 2021-11-28 PROCEDURE — 6370000000 HC RX 637 (ALT 250 FOR IP): Performed by: PEDIATRICS

## 2021-11-28 PROCEDURE — 6370000000 HC RX 637 (ALT 250 FOR IP): Performed by: INTERNAL MEDICINE

## 2021-11-28 PROCEDURE — 99233 SBSQ HOSP IP/OBS HIGH 50: CPT | Performed by: HOSPITALIST

## 2021-11-28 PROCEDURE — 1200000000 HC SEMI PRIVATE

## 2021-11-28 PROCEDURE — 2580000003 HC RX 258: Performed by: INTERNAL MEDICINE

## 2021-11-28 PROCEDURE — 82948 REAGENT STRIP/BLOOD GLUCOSE: CPT

## 2021-11-28 PROCEDURE — 96375 TX/PRO/DX INJ NEW DRUG ADDON: CPT

## 2021-11-28 PROCEDURE — 94640 AIRWAY INHALATION TREATMENT: CPT

## 2021-11-28 PROCEDURE — 87449 NOS EACH ORGANISM AG IA: CPT

## 2021-11-28 PROCEDURE — 6360000002 HC RX W HCPCS: Performed by: INTERNAL MEDICINE

## 2021-11-28 PROCEDURE — 36415 COLL VENOUS BLD VENIPUNCTURE: CPT

## 2021-11-28 PROCEDURE — 99222 1ST HOSP IP/OBS MODERATE 55: CPT | Performed by: INTERNAL MEDICINE

## 2021-11-28 PROCEDURE — 94760 N-INVAS EAR/PLS OXIMETRY 1: CPT

## 2021-11-28 PROCEDURE — 96367 TX/PROPH/DG ADDL SEQ IV INF: CPT

## 2021-11-28 PROCEDURE — 87899 AGENT NOS ASSAY W/OPTIC: CPT

## 2021-11-28 PROCEDURE — 96366 THER/PROPH/DIAG IV INF ADDON: CPT

## 2021-11-28 PROCEDURE — 6360000002 HC RX W HCPCS: Performed by: HOSPITALIST

## 2021-11-28 PROCEDURE — 6360000002 HC RX W HCPCS: Performed by: PEDIATRICS

## 2021-11-28 PROCEDURE — 84145 PROCALCITONIN (PCT): CPT

## 2021-11-28 RX ORDER — KETOROLAC TROMETHAMINE 30 MG/ML
15 INJECTION, SOLUTION INTRAMUSCULAR; INTRAVENOUS ONCE
Status: COMPLETED | OUTPATIENT
Start: 2021-11-28 | End: 2021-11-28

## 2021-11-28 RX ORDER — ACETAMINOPHEN 500 MG
1000 TABLET ORAL EVERY 6 HOURS PRN
Status: DISCONTINUED | OUTPATIENT
Start: 2021-11-28 | End: 2021-11-30 | Stop reason: HOSPADM

## 2021-11-28 RX ADMIN — QUETIAPINE FUMARATE 50 MG: 25 TABLET ORAL at 19:54

## 2021-11-28 RX ADMIN — OXYCODONE HYDROCHLORIDE AND ACETAMINOPHEN 500 MG: 500 TABLET ORAL at 11:14

## 2021-11-28 RX ADMIN — ATORVASTATIN CALCIUM 40 MG: 40 TABLET, FILM COATED ORAL at 11:14

## 2021-11-28 RX ADMIN — METOPROLOL SUCCINATE 25 MG: 25 TABLET, EXTENDED RELEASE ORAL at 11:14

## 2021-11-28 RX ADMIN — MONTELUKAST SODIUM 10 MG: 10 TABLET ORAL at 19:54

## 2021-11-28 RX ADMIN — CITALOPRAM 40 MG: 40 TABLET, FILM COATED ORAL at 11:13

## 2021-11-28 RX ADMIN — BUDESONIDE 500 MCG: 0.5 SUSPENSION RESPIRATORY (INHALATION) at 17:28

## 2021-11-28 RX ADMIN — ACETAMINOPHEN 1000 MG: 500 TABLET ORAL at 07:12

## 2021-11-28 RX ADMIN — AZITHROMYCIN DIHYDRATE 500 MG: 500 INJECTION, POWDER, LYOPHILIZED, FOR SOLUTION INTRAVENOUS at 13:13

## 2021-11-28 RX ADMIN — ACETAMINOPHEN 1000 MG: 500 TABLET ORAL at 01:40

## 2021-11-28 RX ADMIN — Medication 50 MG: at 11:13

## 2021-11-28 RX ADMIN — KETOROLAC TROMETHAMINE 15 MG: 30 INJECTION, SOLUTION INTRAMUSCULAR; INTRAVENOUS at 01:40

## 2021-11-28 RX ADMIN — GABAPENTIN 300 MG: 300 CAPSULE ORAL at 13:14

## 2021-11-28 RX ADMIN — ASPIRIN 81 MG: 81 TABLET, COATED ORAL at 11:16

## 2021-11-28 RX ADMIN — CEFTRIAXONE SODIUM 1000 MG: 1 INJECTION, POWDER, FOR SOLUTION INTRAMUSCULAR; INTRAVENOUS at 18:09

## 2021-11-28 RX ADMIN — GABAPENTIN 300 MG: 300 CAPSULE ORAL at 19:54

## 2021-11-28 RX ADMIN — PANTOPRAZOLE SODIUM 40 MG: 40 TABLET, DELAYED RELEASE ORAL at 07:12

## 2021-11-28 RX ADMIN — GABAPENTIN 300 MG: 300 CAPSULE ORAL at 11:14

## 2021-11-28 RX ADMIN — BUDESONIDE 500 MCG: 0.5 SUSPENSION RESPIRATORY (INHALATION) at 06:44

## 2021-11-28 ASSESSMENT — PAIN DESCRIPTION - DESCRIPTORS
DESCRIPTORS_3: ACHING
DESCRIPTORS_2: ACHING
DESCRIPTORS_3: ACHING
DESCRIPTORS: ACHING
DESCRIPTORS: ACHING

## 2021-11-28 ASSESSMENT — PAIN DESCRIPTION - PROGRESSION
CLINICAL_PROGRESSION: NOT CHANGED
CLINICAL_PROGRESSION_2: NOT CHANGED
CLINICAL_PROGRESSION_3: NOT CHANGED
CLINICAL_PROGRESSION: NOT CHANGED

## 2021-11-28 ASSESSMENT — ENCOUNTER SYMPTOMS
CHOKING: 0
RECTAL PAIN: 0
SINUS PRESSURE: 0
PHOTOPHOBIA: 0
EYE PAIN: 0
EYE REDNESS: 0
VOMITING: 0
DIARRHEA: 0
SHORTNESS OF BREATH: 0
CONSTIPATION: 0
COLOR CHANGE: 0
TROUBLE SWALLOWING: 0
EYE DISCHARGE: 0
WHEEZING: 0
SORE THROAT: 0
VOICE CHANGE: 0
BACK PAIN: 0
BLOOD IN STOOL: 0
ANAL BLEEDING: 0
SINUS PAIN: 0
EYE ITCHING: 0
ABDOMINAL PAIN: 0
ABDOMINAL DISTENTION: 0
NAUSEA: 0
COUGH: 0
STRIDOR: 0
FACIAL SWELLING: 0
RHINORRHEA: 0
APNEA: 0
CHEST TIGHTNESS: 0

## 2021-11-28 ASSESSMENT — PAIN DESCRIPTION - ONSET
ONSET_2: ON-GOING
ONSET: ON-GOING
ONSET: ON-GOING
ONSET_3: ON-GOING
ONSET_3: ON-GOING

## 2021-11-28 ASSESSMENT — PAIN DESCRIPTION - DURATION
DURATION_3: INTERMITTENT
DURATION_2: CONTINUOUS
DURATION_3: CONTINUOUS

## 2021-11-28 ASSESSMENT — PAIN DESCRIPTION - ORIENTATION
ORIENTATION_3: LEFT
ORIENTATION: LOWER;MID
ORIENTATION: LOWER
ORIENTATION_3: LEFT
ORIENTATION_2: LEFT

## 2021-11-28 ASSESSMENT — PAIN SCALES - GENERAL
PAINLEVEL_OUTOF10: 3
PAINLEVEL_OUTOF10: 4
PAINLEVEL_OUTOF10: 7
PAINLEVEL_OUTOF10: 6
PAINLEVEL_OUTOF10: 4

## 2021-11-28 ASSESSMENT — PAIN DESCRIPTION - LOCATION
LOCATION_3: RIB CAGE
LOCATION: BACK
LOCATION_2: HIP
LOCATION_3: RIB CAGE
LOCATION: BACK

## 2021-11-28 ASSESSMENT — PAIN DESCRIPTION - INTENSITY
RATING_2: 7
RATING_3: 7
RATING_3: 7

## 2021-11-28 ASSESSMENT — PAIN - FUNCTIONAL ASSESSMENT
PAIN_FUNCTIONAL_ASSESSMENT: PREVENTS OR INTERFERES WITH ALL ACTIVE AND SOME PASSIVE ACTIVITIES
PAIN_FUNCTIONAL_ASSESSMENT: PREVENTS OR INTERFERES SOME ACTIVE ACTIVITIES AND ADLS

## 2021-11-28 ASSESSMENT — PAIN DESCRIPTION - PAIN TYPE
TYPE: CHRONIC PAIN
TYPE_3: ACUTE PAIN
TYPE_3: ACUTE PAIN

## 2021-11-28 ASSESSMENT — PAIN DESCRIPTION - FREQUENCY
FREQUENCY: CONTINUOUS
FREQUENCY: CONTINUOUS

## 2021-11-28 NOTE — ED NOTES
Assumed pt care. Pt given po fluids, denies any needs at this time.       Kenia Wang RN  11/27/21 1947

## 2021-11-28 NOTE — ED NOTES
ED nurse-to-nurse bedside report    Chief Complaint   Patient presents with    Fall    Extremity Weakness      LOC: alert and orientated to name, place, date  Vital signs   Vitals:    11/27/21 1923 11/27/21 2053 11/27/21 2120 11/27/21 2350   BP:    108/69   Pulse: 93 90 89    Resp: 23 22 25    Temp:       SpO2: 94% 92% 94%    Weight:       Height:          Pain:    Pain Interventions: na  Pain Goal: 1  Oxygen: No    Current needs required na   Telemetry: NA  LDAs: yes  Peripheral IV 11/27/21 Antecubital (Active)   Site Assessment Clean; Intact 11/27/21 1544   Line Status Blood return noted; Capped; Flushed 11/27/21 1544   Dressing Status Clean; Dry; Intact 11/27/21 1544   Dressing Intervention New 11/27/21 1544     Continuous Infusions:   Mobility: Requires assistance * 1  Contreras Fall Risk Score:    Fall Risk 6/15/2016 1/21/2015   2 or more falls in past year? no no   Fall with injury in past year? no no     Fall Interventions: call light  Report given to: floor     [facility]	[place], RN  11/27/21 4615

## 2021-11-28 NOTE — H&P
Patient: Phill Buitrago    Unit/Bed: 16/016A    YOB: 1950    MRN: [id number]    Acct: [de-identified]     Admitting Diagnosis: UTI (urinary tract infection) [N39.0]    Admit Date:  11/27/2021    CC: Weakness and fall x1 day. HPI :  67-year-old female patient who lives alone. Presented to the ED today by EMS because of a history of fall from a bed to the floor leading to left-sided chest pain. Complains of general body weakness mostly lower extremities leading to the fall. Has left-sided pleuritic chest pain, no shortness of breath, no nausea vomiting or diarrhea. Has bilateral lower extremity weakness leading to acute on chronic ambulatory dysfunction. Uses a wheelchair and a walker sometimes. Chronic back pain with prior history of back surgery. Initial vital signs on admission temperature 37.5 °C, respirate of 18, blood pressure 121/78, heart rate 94 beats minute. Oxygen saturation 95% on room air. CTA chest which reviewed shows right upper lobe infiltrate suggestive of pneumonia versus mass. ED treatment included IV Rocephin 1 g for suspected UTI and IV fluids with Ringer's lactate 500 cc bolus. Review of Systems   Constitutional: Positive for activity change. Negative for appetite change, chills, diaphoresis, fatigue, fever and unexpected weight change. HENT: Negative for congestion, dental problem, drooling, ear discharge, ear pain, facial swelling, hearing loss, mouth sores, nosebleeds, postnasal drip, rhinorrhea, sinus pressure, sinus pain, sneezing, sore throat, tinnitus, trouble swallowing and voice change. Eyes: Negative for photophobia, pain, discharge, redness, itching and visual disturbance. Respiratory: Negative for apnea, cough, choking, chest tightness, shortness of breath, wheezing and stridor. Cardiovascular: Positive for chest pain. Negative for palpitations and leg swelling.    Gastrointestinal: Negative for abdominal distention, abdominal pain, anal bleeding, blood in stool, constipation, diarrhea, nausea, rectal pain and vomiting. Endocrine: Negative for cold intolerance, heat intolerance, polydipsia, polyphagia and polyuria. Genitourinary: Negative for decreased urine volume, difficulty urinating, dyspareunia, dysuria, enuresis, flank pain, frequency, genital sores, hematuria, menstrual problem, pelvic pain, urgency, vaginal bleeding, vaginal discharge and vaginal pain. Musculoskeletal: Negative for arthralgias, back pain, gait problem, joint swelling, myalgias, neck pain and neck stiffness. Skin: Negative for color change, pallor, rash and wound. Allergic/Immunologic: Negative for environmental allergies and food allergies. Neurological: Positive for weakness. Negative for dizziness, tremors, seizures, syncope, facial asymmetry, speech difficulty, light-headedness, numbness and headaches. Hematological: Negative for adenopathy. Does not bruise/bleed easily. Psychiatric/Behavioral: Negative for agitation, behavioral problems, confusion, decreased concentration, dysphoric mood, hallucinations, self-injury and sleep disturbance. The patient is not nervous/anxious and is not hyperactive.         Past Medical History:        Diagnosis Date    Acid reflux     Arrhythmia     Arthritis     CAD (coronary artery disease)     NON-OBSTRUCTIVE    [facility] bronch[facility] ([facility] [address]     COVID-19 10/2020    Depression     Diabetes mellitus ([facility] [address]     Dizziness - light-headed     Fall 10/15/2013    left rib fracture and shoulder contusion     Hyperlipidemia     Hypertension     Kidney disease     MDRO (multiple drug resistant organisms) resistance 02/2014    MRSA ABDOMEN    Mild persistent asthma without complication 3/76/2091    Numbness and tingling     HX OF:    S/P laparoscopic cholecystectomy 11/27/2018    Swelling     Tinnitus        Past Surgical History:        Procedure Laterality Date    BACK SURGERY  2007    CARDIOVASCULAR STRESS TEST  04/03/2009    Persantine test associated w/non specific symptoms. EKG non diagnostic. no obvious stress induced ischemia. EF 63%. normal wall motion.  CARDIOVASCULAR STRESS TEST  03/31/2010    tilt table associated w/fluctuation of BP w/relatively low BP throughout test. no significant symptoms during test. borderline tilt table test.    CHOLECYSTECTOMY      COLONOSCOPY  2011    CORONARY ANGIOPLASTY  12/16/2009    EF 60%. nonobstructive CAD. normal LV function.  ENDOSCOPY, COLON, DIAGNOSTIC  over 5 years     Dr. Js Jones  2004    cataract   [id number]  25 months old     umblilical    HERNIA REPAIR  9/8/14    Ventral Hernia Repair with Mesh - Dr. Kaia Davis    partial    OTHER SURGICAL HISTORY  Feb 17, 2014    Left lower quadrant incision and drainage ([facility]. Margi Armstrong, [facility])    OTHER SURGICAL HISTORY  2/2014    I&D abd abscess Dr. Nils Dorman, NEEDLE CORE  2019    Benign    CO LAP,DIAGNOSTIC ABDOMEN N/A 11/26/2018    LAPAROSCOPY EXPLORATORY, LAP LYLE performed by Yu Cleary MD at [facility], [place]  as a child     TOOTH EXTRACTION  4/16/2012    TRANSTHORACIC ECHOCARDIOGRAM  03/04/2009    EF 50-55%. normal LV global systolic funct. slight LVH. slight lt atrial enlargement. no obvious stenotic valves. no pericardial effusion. Medications Prior to Admission:    Prior to Admission medications    Medication Sig Start Date End Date Taking?  Authorizing Provider   montelukast (SINGULAIR) 10 MG tablet TAKE 1 TABLET BY MOUTH NIGHTLY 9/27/21 9/27/22 Yes CATE Dominguez CNP   budesonide (PULMICORT) 0.5 MG/2ML nebulizer suspension Take 2 mLs by nebulization 2 times daily 9/16/21  Yes CATE Dominguez - CNP   albuterol (PROVENTIL) (2.5 MG/3ML) 0.083% nebulizer solution Take 3 mLs by nebulization every 6 hours as needed for Wheezing or Shortness of Breath 9/16/21 9/16/22 Yes CATE Yu CNP metoprolol succinate (TOPROL XL) 25 MG extended release tablet take 1 tablet by mouth once daily 4/12/21  Yes Margo Vazquez MD   magnesium oxide (MAG-OX) 400 (241.3 Mg) MG TABS tablet Take 1 tablet by mouth daily 10/30/20  Yes Laurie Sauer MD   zinc sulfate (ZINCATE) 220 (50 Zn) MG capsule Take 1 capsule by mouth daily 10/30/20  Yes Laurie Sauer MD   vitamin C (VITAMIN C) 500 MG tablet Take 1 tablet by mouth daily 10/30/20  Yes Laurie Sauer MD   Cholecalciferol (VITAMIN D) 25 MCG TABS Take 1 tablet by mouth daily 10/30/20  Yes Laurie Sauer MD   Lactobacillus (ACIDOPHILUS) TABS Take by mouth   Yes Historical Provider, MD   furosemide (LASIX) 20 MG tablet Take 20 mg by mouth daily   Yes Historical Provider, MD   citalopram (CELEXA) 40 MG tablet take 1 tablet by mouth every morning 9/20/19  Yes Historical Provider, MD   omeprazole (PRILOSEC) 40 MG delayed release capsule take 1 capsule by mouth once daily 12/15/16  Yes Mathieu Rogerser, APRN - CNP   TRADJENTA 5 MG tablet take 1 tablet by mouth daily 12/6/16  Yes Ivy Nino MD   simvastatin (ZOCOR) 40 MG tablet take 1 tablet by mouth at bedtime 11/22/16  Yes Ivy Nino MD   VESICARE 10 MG tablet take 1 tablet by mouth once daily 11/2/16  Yes Ivy Nino MD   pioglitazone (ACTOS) 45 MG tablet take 1 tablet by mouth once daily 10/4/16  Yes Ivy Nino MD   metFORMIN (GLUCOPHAGE) 1000 MG tablet take 1 tablet by mouth twice a day with food 7/13/16  Yes Ivy Nino MD   gabapentin (NEURONTIN) 300 MG capsule Take 1 capsule by mouth 3 times daily  Patient taking differently: Take 800 mg by mouth 2 times daily.  . 6/8/16  Yes Ivy Nino MD   albuterol sulfate HFA (PROAIR HFA) 108 (90 BASE) MCG/ACT inhaler Inhale 2 puffs into the lungs every 4 hours as needed 6/8/16  Yes Ivy Nino MD   Omega-3 Fatty Acids (FISH OIL) 1200 MG CAPS Take 1,200 mg by mouth daily   Yes Historical Provider, MD   ASPIRIN LOW DOSE 81 MG EC tablet take 1 tablet by mouth once daily 7/2/15  Yes Orestes Murray MD   QUEtiapine (SEROQUEL) 50 MG tablet Take 50 mg by mouth nightly. Yes Historical Provider, MD   Nebulizer MISC Please provide new nebulizer machine to use with Albuterol 2.5mg via nebs Q6h prn and budesonide 500 mcg BID 9/16/21   Guyann Coast CATE Dyer - CNP   mupirocin (BACTROBAN) 2 % ointment Apply topically 3 times daily Apply topically 3 times daily. Historical Provider, MD   loratadine (CLARITIN) 10 MG tablet Take 10 mg by mouth daily    Historical Provider, MD   Cranberry 450 MG CAPS Take by mouth    Historical Provider, MD   Mouthwashes Love Johnson) LIQD oral solution Swish and spit 15 mLs 3 times daily as needed    Historical Provider, MD   nystatin-triamcinolone (MYCOLOG II) 829651-9.9 UNIT/GM-% cream Apply 1 applicator topically 4 times daily Apply topically 4 times daily. As needed    Historical Provider, MD   HOMEOPATHIC PRODUCTS  mg by Combination route daily as needed Legatrin PM    Historical Provider, MD   psyllium (KONSYL) 28.3 % PACK Take 1 packet by mouth daily    Historical Provider, MD   L-Arginine 1000 MG TABS Take 1,000 mg by mouth daily as needed    Historical Provider, MD   Artificial Saliva (BIOTENE MOISTURIZING MOUTH) SOLN Take 1 applicator by mouth as needed    Historical Provider, MD   Compression Stockings MISC 1 Package by Does not apply route Knee High 11/2/18   Anastasiia Farrell MD   FREESTYLE LANCETS MISC Test 1 time daily. Dx: 250.00-Type 2 Diabetes. Freestyle 28G Lancets 3/17/16   Orestes Murray MD   clobetasol (TEMOVATE) 0.05 % cream Apply topically 2 times daily. 5/27/15   CATE Burnham CNP   glucose blood VI test strips (FREESTYLE LITE) strip Test once daily 4/25/15   Orestes Murray MD   CICLOPIROX TREATMENT EX Apply  topically.  10.0 mL Ciclopirox-terbinafine-fluconazole  Fungal Free Nails Fungicidal solution    Historical Provider, MD   diphenhydrAMINE (BENADRYL) 50 MG capsule Take 50 mg by mouth nightly as needed for Itching. Historical Provider, MD   acetaminophen (TYLENOL) 500 MG tablet Take 500 mg by mouth every 6 hours as needed for Pain. Historical Provider, MD       Allergies:    Latex, Bee venom, Advair [fluticasone-salmeterol], Caffeine, Pcn [penicillins], Ultram [tramadol hcl], Fluticasone-salmeterol, and Wool alcohol [lanolin alcohol]    Social History:    TOBACCO:   reports that she quit smoking about 28 years ago. Her smoking use included cigarettes. She started smoking about 31 years ago. She quit after 3.00 years of use. She has never used smokeless tobacco.    ETOH:   reports no history of alcohol use. Family History:        Problem Relation Age of Onset    Cancer Mother 28        uterine    Heart Disease Father     Cancer Father 48        lung    Diabetes Paternal Grandfather     Cancer Maternal Grandmother         uterine    Cancer Maternal Aunt 28        uterine    Cancer Maternal Cousin 28        uterine    Colon Cancer Neg Hx     Breast Cancer Neg Hx        Objective:   /61   Pulse 90   Temp 99.5 °F (37.5 °C)   Resp 20   Ht 5' 2\" (1.575 m)   Wt 273 lb (123.8 kg)   LMP  (LMP Unknown)   SpO2 93%   BMI 49.93 kg/m²   No intake or output data in the 24 hours ending 11/27/21 1916    Physical Exam  Vitals and nursing note reviewed. Constitutional:       General: She is not in acute distress. Appearance: Normal appearance. She is obese. She is not ill-appearing, toxic-appearing or diaphoretic. HENT:      Head: Normocephalic and atraumatic. Right Ear: External ear normal.      Left Ear: External ear normal.      Nose: Nose normal. No congestion or rhinorrhea. Mouth/Throat:      Mouth: Mucous membranes are moist.      Pharynx: Oropharynx is clear. No oropharyngeal exudate or posterior oropharyngeal erythema.    Eyes:      General: No scleral icterus. Right eye: No discharge. Left eye: No discharge. Extraocular Movements: Extraocular movements intact. Conjunctiva/sclera: Conjunctivae normal.      Pupils: Pupils are equal, round, and reactive to light. Neck:      Vascular: No carotid bruit. Cardiovascular:      Rate and Rhythm: Normal rate and regular rhythm. Pulses: Normal pulses. Heart sounds: Normal heart sounds. No murmur heard. No friction rub. No gallop. Pulmonary:      Effort: Pulmonary effort is normal. No respiratory distress. Breath sounds: Normal breath sounds. No stridor. No wheezing, rhonchi or rales. Chest:      Chest wall: No tenderness. Abdominal:      General: Bowel sounds are normal. There is distension. Palpations: Abdomen is soft. There is no mass. Tenderness: There is no abdominal tenderness. There is no right CVA tenderness, guarding or rebound. Hernia: No hernia is present. Musculoskeletal:         General: No swelling, tenderness, deformity or signs of injury. Normal range of motion. Cervical back: Normal range of motion and neck supple. No rigidity or tenderness. Right lower leg: No edema. Left lower leg: No edema. Lymphadenopathy:      Cervical: No cervical adenopathy. Skin:     General: Skin is warm. Coloration: Skin is not jaundiced or pale. Findings: Erythema and rash present. No bruising or lesion. Comments: Bilateral breast and groin skin fold intertrigo. Neurological:      General: No focal deficit present. Mental Status: She is alert and oriented to person, place, and time. Mental status is at baseline. Cranial Nerves: No cranial nerve deficit. Sensory: No sensory deficit. Motor: No weakness.       Coordination: Coordination normal.      Gait: Gait normal.      Deep Tendon Reflexes: Reflexes normal.   Psychiatric:         Mood and Affect: Mood normal.         Behavior: Behavior normal. Thought Content: Thought content normal.         Judgment: Judgment normal.     :    Medications:       Continuous Infusions:    PRN Meds:    Data:    CBC:   Recent Labs     11/27/21  1545   WBC 18.0*   RBC 4.48   HGB 13.3   HCT 41.4   MCV 92.4          BMP:   Recent Labs     11/27/21  1545      K 4.1   CL 98   CO2 23   BUN 22   CREATININE 0.8       PT/INR: No results for input(s): PROTIME, INR in the last 72 hours. LIVER PROFILE:   Recent Labs     11/27/21  1545   AST 17   ALT 17   BILITOT 0.8   ALKPHOS 97        ABG. None. URINALYSIS. Results for Kady Jean Baptiste (MRN [id number]) as of 11/27/2021 19:16   Ref. Range 11/27/2021 16:30   Color, UA Latest Ref Range: YELLOW-STRAW  YELLOW   Bilirubin, Urine Latest Ref Range: NEGATIVE  NEGATIVE   Ketones, Urine Latest Ref Range: NEGATIVE  NEGATIVE   Specific Gravity, UA Latest Ref Range: 1.002 - 1.030  1.012   Blood, Urine Latest Ref Range: NEGATIVE  NEGATIVE   pH, UA Latest Ref Range: 5.0 - 9.0  5.0   Protein, UA Latest Ref Range: NEGATIVE mg/dl NEGATIVE   Urobilinogen, Urine Latest Ref Range: 0.0 - 1.0 eu/dl 0.2   Nitrite, Urine Latest Ref Range: NEGATIVE  POSITIVE (A)   Leukocytes, UA Latest Ref Range: NEGATIVE  NEGATIVE   Glucose, Urine Latest Ref Range: NEGATIVE mg/dl NEGATIVE   Casts Latest Units: /lpf NONE SEEN   WBC, UA Latest Ref Range: 0-4/hpf /hpf 0-2   RBC, UA Latest Ref Range: 0-2/hpf /hpf 0-2   Epithelial Cells, UA Latest Ref Range: 3-5/hpf /hpf NONE SEEN   Renal Epithelial, UA Latest Ref Range: NONE SEEN  NONE SEEN   Bacteria, UA Latest Ref Range: FEW/NONE SEEN  MANY   Yeast, Urine Latest Ref Range: NONE SEEN  NONE SEEN   Crystals Latest Ref Range: NONE SEEN  NONE SEEN   Character, Urine Latest Ref Range: CLR-SL.CLOUD  CLEAR       SEROLOGY  None. TUMOR MARKERS. None. MICROBIOLOGY   None. HISTOPATHOLOGY. None. TOXICOLOGY. None. ENDOSCOPE STUDIES. None. PROCEDURES. None.     IR PROCEDURES. None. RADIOLOGY. CTA chest11/27/2021. FINDINGS:  Suboptimal opacification of the pulmonary arteries. No large central pulmonary embolus or proximal branch emboli identified. More distal subsegmental emboli can't be excluded. There is no aneurysm or dissection. Heart size normal.    Multiple small mediastinal lymph nodes are present. There is no mediastinal hilar or axillary lymphadenopathy by size criteria. Focal consolidation right upper lobe with air bronchograms. No effusions. Upper abdomen    No acute abnormalities. Bones  No suspicious bone lesions.     IMPRESSION:  Suboptimal opacification of the pulmonary arteries such that distal subsegmental emboli   cannot be excluded. There is no large central or proximal branch emboli seen. Right upper lobe airspace consolidation. Chest x-ray11/27/2021. IMPRESSION:  Focal ill-defined infiltrate is seen at the right upper lung zone. This could represent focal pneumonia. However, cannot exclude an underlying mass. Further evaluation could be obtained with chest CT. Echocardiogram10/19/2020. Technically difficult examination. Normal left ventricle size and systolic function. Ejection fraction was  estimated at 50- to 55 %. There were no regional left ventricular wall motion abnormalities and   wall thickness was within normal limits. The left atrium is Mildly dilated. Echocardiogram11/09/2018. SUMMARY:   Ejection fraction is visually estimated at 60%. Overall left ventricular function is normal.    Myxomatotic degeneration of mitral valve. Moderate calcification of the posterior leaflet of the mitral valve. Trace mitral regurgitation is present. ASSESSMENT AND  PLAN. 1.NEUROVASCULAR. Diabetic peripheral neuropathy on gabapentin. 2.PULMONARY. RUL CAP vs Mass-IV Rocephin and azithromycin. COPD without exacerbation as needed nebs .      Suspected ESTEBAN and OHS. Streptococcus and Legionella urine antigen. Pulmonary consult for diagnostic bronchoscopy to rule out bronchoalveolar carcinoma. Procalcitonin check. Sputum studies. 3.CARDIOVASCULAR. Nonobstructive CAD without angina. Hypertension      Dyslipidemia on simvastatin. Suspected chronic diastolic heart dysfunction on Lasix. 4.GI.     GERD on PPI. Hepatic steatosis. Uncomplicated ventral hernia. 5.RENAL AND ELECTROLYTES. Mild lactic acidosis serum lactate 2.2. 6.ID. Suspected UTI on IV Rocephin. 7. ENDO. Type II DMsliding scale insulin. Hold Tradjenta, Metformin and Actos. TSH and A1c check. 8.MUSCULOSKELETAL. Chronic ambulatory dysfunction. Weakness and fallPT OT evaluation. 9.PSYCH. Anxiety and depression-on Seroquel and citalopram.     Schizoaffective disorder. 10. LIFE STYLE.       H/o tobacco use disorder in remission. 11. SKIN. Bilateral breast and groin skin fold intertrigonystatin. 12.NUTRITION. Morbid obesity with a BMI 49. 93needs to exercise and diet control for        weight loss management. 13.DISPO. Planned d/c to home vs rehab soon.       Electronically signed by Víctor Hook MD on 11/27/2021 at 7:16 PM

## 2021-11-28 NOTE — CONSULTS
cough.  The patient is afebrile, she has elevated WBC to 18 care and some elevation in procalcitonin to 0.66. Lactate was elevated on admission at 2.2. She is currently on room air. CTA of the chest was negative for PE however it showed right upper lobe dense consolidation with air bronchogram.  There is multiple small mediastinal lymph nodes. The patient follows with Dr. Nicole Arcos for asthma/COPD overlap syndrome. Past Medical History         Diagnosis Date    Acid reflux     Arrhythmia     Arthritis     CAD (coronary artery disease)     NON-OBSTRUCTIVE    Chronic bronchitis ([address]     COVID-19 10/2020    Depression     Diabetes mellitus ([address]     Dizziness - light-headed     Fall 10/15/2013    left rib fracture and shoulder contusion     Hyperlipidemia     Hypertension     Kidney disease     MDRO (multiple drug resistant organisms) resistance 02/2014    MRSA ABDOMEN    Mild persistent asthma without complication 2/13/2842    Numbness and tingling     HX OF:    S/P laparoscopic cholecystectomy 11/27/2018    Swelling     Tinnitus       Past Surgical History           Procedure Laterality Date    BACK SURGERY  2007    CARDIOVASCULAR STRESS TEST  04/03/2009    Persantine test associated w/non specific symptoms. EKG non diagnostic. no obvious stress induced ischemia. EF 63%. normal wall motion.  CARDIOVASCULAR STRESS TEST  03/31/2010    tilt table associated w/fluctuation of BP w/relatively low BP throughout test. no significant symptoms during test. borderline tilt table test.    CHOLECYSTECTOMY      COLONOSCOPY  2011    CORONARY ANGIOPLASTY  12/16/2009    EF 60%. nonobstructive CAD. normal LV function.     ENDOSCOPY, COLON, DIAGNOSTIC  over 5 years     Dr. Cindy Mancuso  2004    cataract   [id number]  25 months old     umblilical    HERNIA REPAIR  9/8/14    Ventral Hernia Repair with [facility] - Dr. Yosi Queen    partial   [address] HISTORY  Feb (Medical): Not on file    Lack of Transportation (Non-Medical): Not on file   Physical Activity:     Days of Exercise per Week: Not on file    Minutes of Exercise per Session: Not on file   Stress:     Feeling of Stress : Not on file   Social Connections:     Frequency of Communication with Friends and Family: Not on file    Frequency of Social Gatherings with Friends and Family: Not on file    Attends Zoroastrian Services: Not on file    Active Member of [employer] or Organizations: Not on file    Attends Club or Organization Meetings: Not on file    Marital Status: Not on file   Intimate Partner Violence:     Fear of Current or Ex-Partner: Not on file    Emotionally Abused: Not on file    Physically Abused: Not on file    Sexually Abused: Not on file   Housing Stability:     Unable to Pay for Housing in the Last Year: Not on file    Number of [place] in the Last Year: Not on file    Unstable Housing in the Last Year: Not on file     Family History          Problem Relation Age of Onset    Cancer Mother 28        uterine    Heart Disease Father     Cancer Father 48        lung    Diabetes Paternal Grandfather     Cancer Maternal Grandmother         uterine    Cancer Maternal Aunt 32        uterine    Cancer Maternal Cousin 28        uterine    Colon Cancer Neg Hx     Breast Cancer Neg Hx        ROS    10 systems were reviewed and negative except for the above.   Meds    Current Medications    cefTRIAXone (ROCEPHIN) IV  1,000 mg IntraVENous Q24H    azithromycin  500 mg IntraVENous Q24H    aspirin  81 mg Oral Daily    budesonide  500 mcg Nebulization BID    citalopram  40 mg Oral Daily    gabapentin  300 mg Oral TID    metoprolol succinate  25 mg Oral Daily    montelukast  10 mg Oral Nightly    pantoprazole  40 mg Oral QAM AC    QUEtiapine  50 mg Oral Nightly    atorvastatin  40 mg Oral Daily    ascorbic acid  500 mg Oral Daily    zinc sulfate  50 mg Oral Daily    insulin lispro 11/27/21  1545   AST 17   ALT 17   BILITOT 0.8   ALKPHOS 97     TROP  Lab Results   Component Value Date    TROPONINT < 0.010 11/27/2021    TROPONINT < 0.010 08/31/2021    TROPONINT < 0.010 04/26/2021     BNP  Lab Results   Component Value Date    PROBNP 548.1 11/27/2021    PROBNP 625.1 08/31/2021    PROBNP 236.9 04/26/2021     D-Dimer  Lab Results   Component Value Date    DDIMER 1693.00 10/18/2020     Lactic Acid  Recent Labs     11/27/21  1730   LACTA 2.2*     INR  No results for input(s): INR, PROTIME in the last 72 hours. PTT  No results for input(s): APTT in the last 72 hours. Glucose  Recent Labs     11/28/21  0139 11/28/21  0902 11/28/21  1249   POCGLU 128* 117* 138*     UA   Recent Labs     11/27/21  1630   SPECGRAV 1.012   PHUR 5.0   COLORU YELLOW   PROTEINU NEGATIVE   BLOODU NEGATIVE   RBCUA 0-2   WBCUA 0-2   BACTERIA MANY   NITRU POSITIVE*   BILIRUBINUR NEGATIVE   UROBILINOGEN 0.2   KETUA NEGATIVE   LABCAST NONE SEEN  NONE SEEN   . PFTs                  Echo    Conclusions      Summary   Technically difficult examination. Normal left ventricle size and systolic function. Ejection fraction was   estimated at 50- to 55 %. There were no regional left ventricular wall   motion abnormalities and wall thickness was within normal limits. The left atrium is Mildly dilated.       Signature      ----------------------------------------------------------------   Electronically signed by Laureen Corey MD (Interpreting   physician) on 10/19/2020 at 03:21 PM  Cultures    Procalcitonin  Lab Results   Component Value Date    PROCAL 0.66 11/28/2021    PROCAL 0.04 10/18/2020    PROCAL 0.04 08/29/2019       Radiology       CTA CHEST W WO CONTRAST - r/o Pulmonary Embolism [[id number]] Resulted: 11/27/21 1853     Order Status: Completed Updated: 11/27/21 1855     Narrative:       PROCEDURE: CTA CHEST W WO CONTRAST     CLINICAL INFORMATION: r/o Pulmonary Embolism, evaluate upper lobe opacity seen on CXR, infiltrate vs. mass .       TECHNIQUE: 2-D multiplanar postcontrast images of the chest with 3-D MIPS Isovue-370 IV contrast.   All CT scans at this facility use dose modulation, iterative reconstruction, and/or weight-based dosing when appropriate to reduce radiation dose to as low as reasonably achievable. COMPARISON: 10/18/2012       FINDINGS: Suboptimal opacification of the pulmonary arteries. No large central pulmonary embolus or proximal branch emboli identified. More distal subsegmental emboli can't be excluded. There is no aneurysm or dissection. Heart size normal.   Multiple small mediastinal lymph nodes are present. There is no mediastinal hilar or axillary lymphadenopathy by size criteria. Focal consolidation right upper lobe with air bronchograms. No effusions. Upper abdomen   No acute abnormalities. Bones   No suspicious bone lesions.      Impression:       Suboptimal opacification of the pulmonary arteries such that distal subsegmental emboli cannot be excluded. There is no large central or proximal branch emboli seen. Right upper lobe airspace consolidation. **This report has been created using voice recognition software.  It may contain minor errors which are inherent in voice recognition technology. **     Final report electronically signed by Dr. Kieran Rush on 11/27/2021 6:53 PM     CT Lumbar Spine WO Contrast [[id number]] Resulted: 11/27/21 1722     Order Status: Completed Updated: 11/27/21 1724     Narrative:       PROCEDURE: CT LUMBAR SPINE WO CONTRAST     CLINICAL INFORMATION: fall, low back pain . TECHNIQUE: 2-D multiplanar noncontrast CT of the lumbar spine at 3 mm intervals. Bone and soft tissue windows. All CT scans at this facility use dose modulation, iterative reconstruction, and/or weight-based dosing when appropriate to reduce radiation dose to as low as reasonably achievable.      COMPARISON: 7/26/2013       FINDINGS: There is no acute fracture or acute bony malalignment. Postsurgical changes L4-5 and L5-S1 pedicular screws on the right and intervertebral spacer devices. Progression of degenerative disc disease as the prior exam with vacuum disc phenomenon at T11-12, T12-L1, L2-3, L4-5 and L5-S1. Progression of disc space narrowing at the appearance of thecal sac somewhat displaced to the right at L4-5 possibly related   to scarring. No paraspinal soft tissue abnormalities identified. These levels.      Impression:       Extensive degenerative changes and postsurgical changes. No acute process identified. **This report has been created using voice recognition software.  It may contain minor errors which are inherent in voice recognition technology. **     Final report electronically signed by Dr. Deborah Reyna on 11/27/2021 5:22 PM     CT Head WO Contrast [[id number]] Resulted: 11/27/21 1716     Order Status: Completed Updated: 11/27/21 1718     Narrative:       PROCEDURE: CT HEAD WO CONTRAST     CLINICAL INFORMATION: fall on ASA . TECHNIQUE: 2-D multiplanar noncontrast CT brain at 5 mm intervals. All CT scans at this facility use dose modulation, iterative reconstruction, and/or weight-based dosing when appropriate to reduce radiation dose to as low as reasonably achievable. COMPARISON: 7/26/2013       FINDINGS: Stable appearance. No hemorrhage. No extra-axial or intra-axial fluid collection. No acute infarction is seen. Generalized cerebral volume loss. Ventricles are stable and unremarkable. No calvarial fracture. Visualized paranasal sinuses and   mastoid air cells are clear      Impression:       No acute process         **This report has been created using voice recognition software.  It may contain minor errors which are inherent in voice recognition technology. **     Final report electronically signed by Dr. Deborah Reyna on 11/27/2021 5:16 PM     XR CHEST (2 VW) [[id number]] Resulted: 11/27/21 1646     Order Status: Completed Specimen: Chest Updated: 11/27/21 1648     Narrative:       PROCEDURE: XR CHEST (2 VW)     CLINICAL INFORMATION: cough, fall     COMPARISON: 8/31/2021     TECHNIQUE:  AP and lateral chest x-ray       FINDINGS:     Focal ill-defined infiltrate is seen at the right upper lung zone. This could represent focal pneumonia. However, cannot exclude an underlying mass. Further evaluation could be obtained with chest CT. The heart size is normal.     No pleural effusion is seen. There is a remote appearing left-sided rib fracture involving the left posterior lateral fifth rib. No acute osseous findings are otherwise seen. There is diffuse osteopenia.      Impression:       1. Focal ill-defined infiltrate is seen at the right upper lung zone. This could represent focal pneumonia. However, cannot exclude an underlying mass. Further evaluation could be obtained with chest CT. **This report has been created using voice recognition software.  It may contain minor errors which are inherent in voice recognition technology. **     Final report electronically signed by Dr. Odalys Cain on 11/27/2021 4:46 PM             Assessment   Right upper lobe infiltrate. Possible right upper lobe pneumonia. Morbid obesity  Fall  Diabetes mellitus type 2  Lactic acidosis  UTI  Schizoaffective disorder  Ventral hernia  Recommendations   Would treat as pneumonia, currently she is on ceftriaxone and azithromycin. Agree with the current antibiotics. Augmentin on discharge for total of 7 days. Will need to repeat CT scan in 2 to 3 weeks, if no improvement/resolution of the infiltrate by then she will need tissue biopsy. Urine antigen for Legionella and Streptococcus. Sputum culture if possible. Repeat lactic acid  Repeat WBC and procalcitonin in a.m. Incentive spirometer. Pain control. DVT prophylaxis. Physical therapy nutritional therapy. Thank you for the consult and allowing us to participate in the care of your patient.      Case discussed with nurse and patient/family. Questions and concerns addressed. Meds and Orders reviewed.     Electronically signed by Tamir Juan MD on 11/28/2021 at 1:22 PM

## 2021-11-29 LAB
BASOPHILS # BLD: 0.3 %
BASOPHILS ABSOLUTE: 0 THOU/MM3 (ref 0–0.1)
EOSINOPHIL # BLD: 0.4 %
EOSINOPHILS ABSOLUTE: 0.1 THOU/MM3 (ref 0–0.4)
ERYTHROCYTE [DISTWIDTH] IN BLOOD BY AUTOMATED COUNT: 14.6 % (ref 11.5–14.5)
ERYTHROCYTE [DISTWIDTH] IN BLOOD BY AUTOMATED COUNT: 49 FL (ref 35–45)
GLUCOSE BLD-MCNC: 140 MG/DL (ref 70–108)
GLUCOSE BLD-MCNC: 145 MG/DL (ref 70–108)
GLUCOSE BLD-MCNC: 173 MG/DL (ref 70–108)
GLUCOSE BLD-MCNC: 178 MG/DL (ref 70–108)
HCT VFR BLD CALC: 39.4 % (ref 37–47)
HEMOGLOBIN: 12.8 GM/DL (ref 12–16)
IMMATURE GRANS (ABS): 0.1 THOU/MM3 (ref 0–0.07)
IMMATURE GRANULOCYTES: 0.7 %
LACTIC ACID: 0.8 MMOL/L (ref 0.5–2)
LYMPHOCYTES # BLD: 10.9 %
LYMPHOCYTES ABSOLUTE: 1.6 THOU/MM3 (ref 1–4.8)
MCH RBC QN AUTO: 29.8 PG (ref 26–33)
MCHC RBC AUTO-ENTMCNC: 32.5 GM/DL (ref 32.2–35.5)
MCV RBC AUTO: 91.6 FL (ref 81–99)
MONOCYTES # BLD: 11.1 %
MONOCYTES ABSOLUTE: 1.6 THOU/MM3 (ref 0.4–1.3)
NUCLEATED RED BLOOD CELLS: 0 /100 WBC
ORGANISM: ABNORMAL
PLATELET # BLD: 235 THOU/MM3 (ref 130–400)
PMV BLD AUTO: 10.2 FL (ref 9.4–12.4)
PROCALCITONIN: 0.36 NG/ML (ref 0.01–0.09)
RBC # BLD: 4.3 MILL/MM3 (ref 4.2–5.4)
SEG NEUTROPHILS: 76.6 %
SEGMENTED NEUTROPHILS ABSOLUTE COUNT: 11.2 THOU/MM3 (ref 1.8–7.7)
URINE CULTURE, ROUTINE: ABNORMAL
WBC # BLD: 14.6 THOU/MM3 (ref 4.8–10.8)

## 2021-11-29 PROCEDURE — 6370000000 HC RX 637 (ALT 250 FOR IP): Performed by: INTERNAL MEDICINE

## 2021-11-29 PROCEDURE — 97162 PT EVAL MOD COMPLEX 30 MIN: CPT

## 2021-11-29 PROCEDURE — 2580000003 HC RX 258: Performed by: HOSPITALIST

## 2021-11-29 PROCEDURE — 96366 THER/PROPH/DIAG IV INF ADDON: CPT

## 2021-11-29 PROCEDURE — 99215 OFFICE O/P EST HI 40 MIN: CPT | Performed by: INTERNAL MEDICINE

## 2021-11-29 PROCEDURE — 94760 N-INVAS EAR/PLS OXIMETRY 1: CPT

## 2021-11-29 PROCEDURE — 84145 PROCALCITONIN (PCT): CPT

## 2021-11-29 PROCEDURE — 94640 AIRWAY INHALATION TREATMENT: CPT

## 2021-11-29 PROCEDURE — 6360000002 HC RX W HCPCS: Performed by: INTERNAL MEDICINE

## 2021-11-29 PROCEDURE — 82948 REAGENT STRIP/BLOOD GLUCOSE: CPT

## 2021-11-29 PROCEDURE — 97110 THERAPEUTIC EXERCISES: CPT

## 2021-11-29 PROCEDURE — 85025 COMPLETE CBC W/AUTO DIFF WBC: CPT

## 2021-11-29 PROCEDURE — 99225 PR SBSQ OBSERVATION CARE/DAY 25 MINUTES: CPT | Performed by: HOSPITALIST

## 2021-11-29 PROCEDURE — 97535 SELF CARE MNGMENT TRAINING: CPT

## 2021-11-29 PROCEDURE — 36415 COLL VENOUS BLD VENIPUNCTURE: CPT

## 2021-11-29 PROCEDURE — 2580000003 HC RX 258: Performed by: INTERNAL MEDICINE

## 2021-11-29 PROCEDURE — APPSS30 APP SPLIT SHARED TIME 16-30 MINUTES: Performed by: NURSE PRACTITIONER

## 2021-11-29 PROCEDURE — G0378 HOSPITAL OBSERVATION PER HR: HCPCS

## 2021-11-29 PROCEDURE — 6360000002 HC RX W HCPCS: Performed by: HOSPITALIST

## 2021-11-29 PROCEDURE — 6370000000 HC RX 637 (ALT 250 FOR IP): Performed by: PEDIATRICS

## 2021-11-29 PROCEDURE — 97166 OT EVAL MOD COMPLEX 45 MIN: CPT

## 2021-11-29 PROCEDURE — 83605 ASSAY OF LACTIC ACID: CPT

## 2021-11-29 RX ORDER — DEXTROSE MONOHYDRATE 25 G/50ML
12.5 INJECTION, SOLUTION INTRAVENOUS PRN
Status: DISCONTINUED | OUTPATIENT
Start: 2021-11-29 | End: 2021-11-30 | Stop reason: HOSPADM

## 2021-11-29 RX ORDER — DEXTROSE MONOHYDRATE 50 MG/ML
100 INJECTION, SOLUTION INTRAVENOUS PRN
Status: DISCONTINUED | OUTPATIENT
Start: 2021-11-29 | End: 2021-11-30 | Stop reason: HOSPADM

## 2021-11-29 RX ORDER — HYDROCODONE BITARTRATE AND ACETAMINOPHEN 5; 325 MG/1; MG/1
1 TABLET ORAL EVERY 6 HOURS PRN
Status: DISCONTINUED | OUTPATIENT
Start: 2021-11-29 | End: 2021-11-30 | Stop reason: HOSPADM

## 2021-11-29 RX ORDER — NICOTINE POLACRILEX 4 MG
15 LOZENGE BUCCAL PRN
Status: DISCONTINUED | OUTPATIENT
Start: 2021-11-29 | End: 2021-11-30 | Stop reason: HOSPADM

## 2021-11-29 RX ADMIN — CEFTRIAXONE SODIUM 1000 MG: 1 INJECTION, POWDER, FOR SOLUTION INTRAMUSCULAR; INTRAVENOUS at 18:17

## 2021-11-29 RX ADMIN — ACETAMINOPHEN 1000 MG: 500 TABLET ORAL at 00:09

## 2021-11-29 RX ADMIN — METOPROLOL SUCCINATE 25 MG: 25 TABLET, EXTENDED RELEASE ORAL at 09:08

## 2021-11-29 RX ADMIN — CITALOPRAM 40 MG: 40 TABLET, FILM COATED ORAL at 09:08

## 2021-11-29 RX ADMIN — OXYCODONE HYDROCHLORIDE AND ACETAMINOPHEN 500 MG: 500 TABLET ORAL at 09:08

## 2021-11-29 RX ADMIN — GABAPENTIN 300 MG: 300 CAPSULE ORAL at 09:08

## 2021-11-29 RX ADMIN — ACETAMINOPHEN 1000 MG: 500 TABLET ORAL at 11:39

## 2021-11-29 RX ADMIN — BUDESONIDE 500 MCG: 0.5 SUSPENSION RESPIRATORY (INHALATION) at 07:58

## 2021-11-29 RX ADMIN — GABAPENTIN 300 MG: 300 CAPSULE ORAL at 21:07

## 2021-11-29 RX ADMIN — QUETIAPINE FUMARATE 50 MG: 25 TABLET ORAL at 21:07

## 2021-11-29 RX ADMIN — ASPIRIN 81 MG: 81 TABLET, COATED ORAL at 09:08

## 2021-11-29 RX ADMIN — MONTELUKAST SODIUM 10 MG: 10 TABLET ORAL at 21:07

## 2021-11-29 RX ADMIN — INSULIN LISPRO 1 UNITS: 100 INJECTION, SOLUTION INTRAVENOUS; SUBCUTANEOUS at 21:04

## 2021-11-29 RX ADMIN — GABAPENTIN 300 MG: 300 CAPSULE ORAL at 14:09

## 2021-11-29 RX ADMIN — ATORVASTATIN CALCIUM 40 MG: 40 TABLET, FILM COATED ORAL at 09:08

## 2021-11-29 RX ADMIN — AZITHROMYCIN DIHYDRATE 500 MG: 500 INJECTION, POWDER, LYOPHILIZED, FOR SOLUTION INTRAVENOUS at 12:23

## 2021-11-29 RX ADMIN — PANTOPRAZOLE SODIUM 40 MG: 40 TABLET, DELAYED RELEASE ORAL at 06:14

## 2021-11-29 RX ADMIN — Medication 50 MG: at 09:08

## 2021-11-29 ASSESSMENT — PAIN SCALES - GENERAL
PAINLEVEL_OUTOF10: 6
PAINLEVEL_OUTOF10: 2
PAINLEVEL_OUTOF10: 5
PAINLEVEL_OUTOF10: 5
PAINLEVEL_OUTOF10: 6

## 2021-11-29 ASSESSMENT — PAIN DESCRIPTION - LOCATION
LOCATION: RIB CAGE
LOCATION: BACK

## 2021-11-29 ASSESSMENT — PAIN DESCRIPTION - INTENSITY: RATING_3: 5

## 2021-11-29 NOTE — PROGRESS NOTES
[facility] Utilization Review  Patient: Shira Engel  : 1950  Acct#: [de-identified]  2021 1:39 PM  ADMISSION DAY:  2021  3:26 PM   Case reviewed in referral from UR staff  This patient is a 19-year-old female who was admitted via the emergency department after a fall. Admitting note indicates left sided pleuritic chest pain however a CT of the thorax showed no pulmonary embolus but did demonstrate a consolidation in the right upper lobe. Past medical history is notable for emphysema by CT with mild airflow obstruction and positive method calling challenge in the past. A CT in 2020 suggested bilateral posterior lung zone infiltrates raising possibility of aspiration process. Most recent echocardiogram of 2020 suggested right ventricular systolic hypertension at 40 mmHg with mild tricuspid regurgitation. LV ejection fraction was 5255% without documented diastolic dysfunction. An MRI performed in  suggested lumbar spinal stenosis of mild to moderate degree. Currently the patient has what appears to be asymptomatic bacteriuria without polyuria. Urine culture has greater than 100,000 enteric gram-negative bacilli per ML with sensitivity pending. No other positive cultures are currently present and the patient's white count and pro-calcitonin are decreasing on her current regimen of ceftriaxone and azithromycin. Vital signs are acceptable and oxygenation is acceptable on room air as charted. Recommendations for oral antibiotics have been given via the pulmonary consult service. At this time the patient's care demands do not appear to specifically meet inpatient criteria. It is anticipated that she may be transition to oral antibiotics and discharged in the near future. I have contacted the attending who is in agreement with a change to observation status and with her permission this order has been entered.     Thank you, Dr. Lynnette Liu for your time & assistance in this case    Hershell Patience

## 2021-11-29 NOTE — CARE COORDINATION
11/29/21, 10:41 AM EST  DISCHARGE PLANNING EVALUATION:    Lee Quevedo       Admitted: 11/27/2021/ [facility] day: 1   Location: -21/021-A Reason for admit: UTI (urinary tract infection) [N39.0]  Urinary tract infection without hematuria, site unspecified [N39.0]  Pneumonia [J18.9]   PMH:  has a past medical history of Acid reflux, Arrhythmia, Arthritis, CAD (coronary artery disease), Chronic bronchitis ([facility] [address] COVID-19, Depression, Diabetes mellitus ([facility] [facility] Dizziness - light-headed, Fall, Hyperlipidemia, Hypertension, Kidney disease, MDRO (multiple drug resistant organisms) resistance, Mild persistent asthma without complication, Numbness and tingling, S/P laparoscopic cholecystectomy, Swelling, and Tinnitus. Procedure: 11/27/21 CTA of chest:  Suboptimal opacification of the pulmonary arteries such that distal subsegmental emboli cannot be excluded. There is no large central or proximal branch emboli seen. Right upper lobe airspace consolidation. Barriers to Discharge:  Pulmonary consult for Diagnostic bronchoscopy evaluation for right upper lobe mass versus pneumonia. PT/OT. Monitor labs and VS  PCP: Dorothy Fan MD  Readmission Risk Score: 15.6 ( )%  Patient Goals/Plan/Treatment Preferences: I spoke with Yani. She said that she plans to return home at discharge. She said that she normally lives alone, but her son is currently living with her. She said that she has a Passport  and [facility] services. SW consulted  - new orders needed? Transportation/Food Security/Housekeeping Addressed:  No issues identified. No

## 2021-11-29 NOTE — PROGRESS NOTES
[facility] - 7K-21/021-A    Time In: 7040  Time Out: 3122  Timed Code Treatment Minutes: 12 Minutes  Minutes: 21          Date: 2021  Patient Name: Mary Perez,  Gender:  female        MRN: [id number]  : 1950  (66 y.o.)      Referring Practitioner: Cr Cadet MD  Diagnosis: UTI (urinary tract infection)  Additional Pertinent Hx: 79-year-old female patient who lives alone. Presented to the ED today by EMS because of a history of fall from a bed to the floor leading to left-sided chest pain. Complains of general body weakness mostly lower extremities leading to the fall. Has left-sided pleuritic chest pain, no shortness of breath, no nausea vomiting or diarrhea. Has bilateral lower extremity weakness leading to acute on chronic ambulatory dysfunction. Uses a wheelchair and a walker sometimes. Per MRI there are no acute findings after fall     Restrictions/Precautions:  Restrictions/Precautions: Up as Tolerated, Fall Risk, General Precautions    Subjective:  Chart Reviewed: Yes  Patient assessed for rehabilitation services?: Yes  Subjective: RN approved session. Pt sitting up in room chair and agrees to therapy.     General:  Overall Orientation Status: Within Normal Limits    Vision: Within Functional Limits    Hearing: Within functional limits         Pain: 5/10: back and L hip/upper leg    Vitals: Vitals not assessed per clinical judgement, see nursing flowsheet    Social/Functional History:    Lives With: Alone  Type of Home: Apartment (uses elevators)  Home Layout: Multi-level  Home Equipment: Wheelchair-manual, Standard walker, Rolling walker     Bathroom Shower/Tub:  (only performs sponge baths)  Bathroom Toilet: Handicap height    Receives Help From: Personal care attendant  ADL Assistance: Independent  Homemaking Assistance: Needs assistance  Ambulation Assistance: Independent  Transfer Assistance: Independent    Active : Yes     Additional Comments: uses w/c majority of time, ocassionally will use RW when back pain is not too much. OBJECTIVE:  Range of Motion:  Bilateral Lower Extremity: WFL    Strength:  Bilateral Lower Extremity: grossly 4-/5    Balance:  Static Sitting Balance:  Modified Independent  Dynamic Sitting Balance: Supervision  Static Standing Balance: Stand By Assistance  Dynamic Standing Balance: Contact Guard Assistance    Bed Mobility:  Not Tested    Transfers:  Sit to Stand: Contact Guard Assistance  Stand to Sit:Stand By Assistance    Ambulation:  Stand By Assistance, [employer] Assistance  Distance: 12 ft  Surface: Level Tile  Device:Rolling Walker  Gait Deviations: Forward Flexed Posture, Decreased Step Length Bilaterally, Decreased Gait Speed and Decreased Heel Strike Bilaterally    Exercise:  Patient was guided in 1 set(s) 5-10 reps of exercise to both lower extremities. Glut sets, Quad sets, Hip abduction/adduction, Seated marches, Seated heel/toe raises and Long arc quads. Exercises were completed for increased independence with functional mobility. Functional Outcome Measures: Completed  AM-PAC Inpatient Mobility Raw Score : 16  AM-PAC Inpatient T-Scale Score : 40.78    ASSESSMENT:  Activity Tolerance:  Patient tolerance of  treatment: good. Treatment Initiated: Treatment and education initiated within context of evaluation. Evaluation time included review of current medical information, gathering information related to past medical, social and functional history, completion of standardized testing, formal and informal observation of tasks, assessment of data and development of plan of care and goals. Treatment time included skilled education and facilitation of tasks to increase safety and independence with functional mobility for improved independence and quality of life. Assessment:   Body structures, Functions, Activity limitations: Decreased functional mobility , Decreased endurance, Decreased strength, Decreased balance, Increased pain  Assessment: Pt is a 65 yo female that is deconditioned and generally uses a wheelchair and able to ambulate very short distances with RW. Pt reports that she uses yaron LEs to propel wheelchair. Pt is currently at [facility] for sit <> stand and to ambulate up to 15-20 ft. Pt would benefit from continued skilled PT to address strengthening, endurance building, and functional mobility. Prognosis: Good    REQUIRES PT FOLLOW UP: Yes    Discharge Recommendations:  Discharge Recommendations: Continue to assess pending progress, Home with Home health PT    Patient Education:  PT Education: Goals, PT Role, Plan of Care, Home Exercise Program    Equipment Recommendations:  Equipment Needed: No    Plan:  Times per week: 5x GM  Current Treatment Recommendations: Gait Training, Strengthening, Endurance Training, Functional Mobility Training, Transfer Training, Safety Education & Training, Home Exercise Program, Patient/Caregiver Education & Training    Goals:  Patient goals : go home  Short term goals  Time Frame for Short term goals: at discharge  Short term goal 1: Pt to be Mod I for supine <> sit to get in/out of bed  Short term goal 2: Pt to be Mod I for sit <> stand to get up to ambulate  Short term goal 3: Pt to ambulate > 30 ft with RW with Supervision for household distances  Long term goals  Time Frame for Long term goals : not set due to short ELOS    Following session, patient left in safe position with all fall risk precautions in place. Carlos Manuel Thomas.  Valentina Gonsalez [place] [address]

## 2021-11-29 NOTE — PROGRESS NOTES
Loretta Laguna 59  INPATIENT OCCUPATIONAL THERAPY  [facility]  EVALUATION    Time:   Time In: 1354  Time Out: 1556  Timed Code Treatment Minutes: 13 Minutes  Minutes: 21          Date: 2021  Patient Name: Temitope Klein,   Gender: female      MRN: [id number]  : 1950  (66 y.o.)  Referring Practitioner: Fredrick Schmitz MD  Diagnosis: UTI  Additional Pertinent Hx: 67-year-old female patient who lives alone. Presented to the ED today by EMS because of a history of fall from a bed to the floor leading to left-sided chest pain. Complains of general body weakness mostly lower extremities leading to the fall. Has left-sided pleuritic chest pain, no shortness of breath, no nausea vomiting or diarrhea. Has bilateral lower extremity weakness leading to acute on chronic ambulatory dysfunction. Uses a wheelchair and a walker sometimes. Per MRI there are no acute findings after fall    Restrictions/Precautions:  Restrictions/Precautions: Up as Tolerated, Fall Risk    Subjective  Chart Reviewed: Yes, History and Physical, Previous Admission, Progress Notes  Patient assessed for rehabilitation services?: Yes  Response to previous treatment: Patient with no complaints from previous session  Family / Caregiver Present: No    Subjective: RN approved of OT session. Pt on toilet on arrival and agreeable to OT.     Pain:  Pain Assessment  Patient Currently in Pain: Yes (c/o left side hurting - did not quantify)     Vitals: Vitals not assessed per clinical judgement, see nursing flowsheet    Social/Functional History:  Lives With: Alone  Type of Home: Apartment (uses elevators)  Home Layout: Multi-level  Home Equipment: Wheelchair-manual, Standard walker, Rolling walker   Bathroom Shower/Tub:  (only performs sponge baths)  Bathroom Toilet: Handicap height    Receives Help From: Personal care attendant  ADL Assistance: Independent  Homemaking Assistance: Needs assistance  Ambulation Assistance: Independent  Transfer Assistance: Independent    Active : Yes     Additional Comments: uses w/c majority of time, ocassionally will use RW when back pain is not too much. Pt reported her son will be moving closer if discharge home to help assist the patient as needed. VISION:WFL    HEARING:  WFL    COGNITION: Slow Processing, Decreased Insight, Decreased Problem Solving and Decreased Safety Awareness    RANGE OF MOTION:  Bilateral Upper Extremity:  WFL    STRENGTH:  Bilateral Upper Extremity:  WFL    SENSATION:   WFL    ADL:   Toileting: Maximum  Assistance. for jose care and clothing management   Toilet Transfer: [address] from standard toilet . BALANCE:  Standing Balance: Stand By Assistance. with 2 UE support on RW    BED MOBILITY:  Not Tested    TRANSFERS:  Sit to Stand:  Stand By Assistance, X 1.    Stand to Sit: Stand By Assistance. FUNCTIONAL MOBILITY:  Assistive Device: Rolling Walker  Assist Level:  Stand By Assistance. Distance: To and from bathroom  required 2 x2 minutes standing rest breaks d/t fatigue and weakness      Activity Tolerance:  Patient tolerance of  treatment: fair. Limited d/t fatigue and weakness       Assessment:  Assessment: pt requires assistance with ADLs, transfers, and functional mobility compared to  PLOF. Pt would benefit from skilled OT services to address these tasks, in addition to strength and endurance to return to PLOF. Performance deficits / Impairments: Decreased functional mobility , Decreased ADL status, Decreased strength, Decreased safe awareness, Decreased endurance, Decreased high-level IADLs  REQUIRES OT FOLLOW UP: Yes  Decision Making: Medium Complexity    Treatment Initiated: Treatment and education initiated within context of evaluation.   Evaluation time included review of current medical information, gathering information related to past medical, social and functional history, completion of standardized testing, formal and informal observation of tasks, assessment of data and development of plan of care and goals. Treatment time included skilled education and facilitation of tasks to increase safety and independence with ADL's for improved functional independence and quality of life. Discharge Recommendations:  Continue to assess pending progress,   Subacute/Skilled Nursing Facility due to weakness and increased fall risk, pt is not safe to return home at this time)  If patient does return home, pt would benefit from Home health therapy. Patient Education:  OT Education: OT Role, Plan of Care, ADL Adaptive Strategies, Transfer Training, Energy Conservation    Equipment Recommendations:  Equipment Needed: No    Plan:  Times per week: 5x  Times per day: Daily  Current Treatment Recommendations: Strengthening, Patient/Caregiver Education & Training, Equipment Evaluation, Education, & procurement, Functional Mobility Training, Endurance Training, Safety Education & Training, Self-Care / ADL. See long-term goal time frame for expected duration of plan of care. If no long-term goals established, a short length of stay is anticipated. Goals:  Patient goals : return home  Short term goals  Time Frame for Short term goals: by discharge  Short term goal 1: pt will complete functional mobility to/from bathroom with SBA and no rest breaks to access ADLs  Short term goal 2: Pt will complete LB ADLs with CGA, using LHAE prn to increase indep with ADLs  Short term goal 3: Pt will complete min resisitive BUE exercise with min cuing to increase strength and endurance required for ADLs         Following session, patient left in safe position with all fall risk precautions in place.

## 2021-11-29 NOTE — PROGRESS NOTES
[facility]    Patient - Ashley Zheng   MRN -  [id number]   [facility] # - [de-identified]   - 1950      Date of Admission -  2021  3:26 PM  Date of evaluation -  2021  Room - --A   Hospital Day - [address], [place] Primary Care Physician - Emily Liao MD   Chief Complaint   RUL opacity  Active Hospital Problem List      Active Hospital Problems    Diagnosis Date Noted    Obesity, Class III, BMI 40-49.9 (morbid obesity) ([facility] [address] [E66.01] 2013     Priority: Medium    Weakness generalized [R53.1] 2013     Priority: Medium    Hypertension [I10]      Priority: Medium    Coronary artery disease involving native coronary artery of native heart without angina pectoris [I25.10]      Priority: Medium    Diabetes mellitus ([facility] [address] [E11.9]      Priority: Medium    Community acquired pneumonia of right upper lobe of lung [J18.9] 2021    Lactic acid acidosis [E87.2] 2021    Erythema intertrigo [L30.4] 2021    UTI (urinary tract infection) [N39.0] 2021    Schizoaffective disorder ([facility] [address] [F25.9] 10/22/2020    PTSD (post-traumatic stress disorder) [F43.10] 10/22/2020    Fatty liver [K76.0] 10/22/2020    Fall [B27. XXXA] 2013    BMI 45.0-49.9, adult [facility]) [Z68.42] 2013    Ventral hernia [K43.9] 2013    Hyperlipidemia [E78.5]      HPI   Ashley Zheng is a 66 y.o. female admitted for fall and weakness. The patient has past medical history of coronary artery disease, essential hypertension, diabetes mellitus type 2, schizoaffective disorder, PTSD, fatty liver, falls, morbid obesity with BMI of 49.9. The patient presented to the emergency department after a fall. She has been feeling weak in the last several days. The patient complains of left-sided chest pain.   The patient stated that she has chronic dyspnea and she does not feel that she is more short of breath than usual, she has a chronic cough. The patient is afebrile, she has elevated WBC to 18 care and some elevation in procalcitonin to 0.66. Lactate was elevated on admission at 2.2. She is currently on room air. CTA of the chest was negative for PE however it showed right upper lobe dense consolidation with air bronchogram.  There is multiple small mediastinal lymph nodes. The patient follows with Dr. Peter Rodriguez for asthma/COPD overlap syndrome. Past 24 hrs   -On RA  -Reports some dizziness this AM, currently resolved  -mild cough, afebrile WBC trending down  All other systems reviewed    Past Medical History         Diagnosis Date    Acid reflux     Arrhythmia     Arthritis     CAD (coronary artery disease)     NON-OBSTRUCTIVE    Chronic bronchitis ([address]     COVID-19 10/2020    Depression     Diabetes mellitus ([facility] [address]     Dizziness - light-headed     Fall 10/15/2013    left rib fracture and shoulder contusion     Hyperlipidemia     Hypertension     Kidney disease     MDRO (multiple drug resistant organisms) resistance 02/2014    MRSA ABDOMEN    Mild persistent asthma without complication 7/84/6453    Numbness and tingling     HX OF:    S/P laparoscopic cholecystectomy 11/27/2018    Swelling     Tinnitus       Past Surgical History           Procedure Laterality Date    BACK SURGERY  2007    CARDIOVASCULAR STRESS TEST  04/03/2009    Persantine test associated w/non specific symptoms. EKG non diagnostic. no obvious stress induced ischemia. EF 63%. normal wall motion.  CARDIOVASCULAR STRESS TEST  03/31/2010    tilt table associated w/fluctuation of BP w/relatively low BP throughout test. no significant symptoms during test. borderline tilt table test.    CHOLECYSTECTOMY      COLONOSCOPY  2011    CORONARY ANGIOPLASTY  12/16/2009    EF 60%. nonobstructive CAD. normal LV function.     ENDOSCOPY, COLON, DIAGNOSTIC  over 5 years     Dr. Janelle Barahona  2004    cataract   [address] 25 months old     umblilical    HERNIA REPAIR  14    Ventral Hernia Repair with Mesh - Dr. Diann Garcia    partial    OTHER SURGICAL HISTORY  2014    Left lower quadrant incision and drainage (Dr. Aysha Fierro, [facility])    OTHER SURGICAL HISTORY  2014    I&D abd abscess Dr. Galina Shaver, NEEDLE CORE  2019    Benign    [place] LAP,DIAGNOSTIC ABDOMEN N/A 2018    LAPAROSCOPY EXPLORATORY, LAP LYLE performed by Racheal Andrew MD at [facility]  as a child     TOOTH EXTRACTION  2012    TRANSTHORACIC ECHOCARDIOGRAM  2009    EF 50-55%. normal LV global systolic funct. slight LVH. slight lt atrial enlargement. no obvious stenotic valves. no pericardial effusion. Diet    ADULT DIET;  Regular; 4 carb choices (60 gm/meal)  Allergies    Latex, Bee venom, Advair [fluticasone-salmeterol], Caffeine, Pcn [penicillins], Ultram [tramadol hcl], Fluticasone-salmeterol, and Wool alcohol [lanolin alcohol]  Social History     Social History     Socioeconomic History    Marital status:      Spouse name: Not on file    Number of children: 6    Years of education: 10    Highest education level: Not on file   Occupational History    Occupation: retired    Tobacco Use    Smoking status: Former Smoker     Years: 3.00     Types: Cigarettes     Start date:      Quit date:      Years since quittin.9    Smokeless tobacco: Never Used    Tobacco comment: 3 cigarettes a month, Passive smoke exposure 39 years Father then ex    Vaping Use    Vaping Use: Never used   Substance and Sexual Activity    Alcohol use: No     Alcohol/week: 0.0 standard drinks    Drug use: No    Sexual activity: Not Currently   Other Topics Concern    Not on file   Social History Narrative    Not on file     Social Determinants of Health     Financial Resource Strain:     Difficulty of Paying Living Expenses: Not on file   Food Insecurity:     Worried About [facility] in the Last Year: Not on file    Sharon of Food in the Last Year: Not on file   Transportation Needs:     Lack of Transportation (Medical): Not on file    Lack of Transportation (Non-Medical): Not on file   Physical Activity:     Days of Exercise per Week: Not on file    Minutes of Exercise per Session: Not on file   Stress:     Feeling of Stress : Not on file   Social Connections:     Frequency of Communication with Friends and Family: Not on file    Frequency of Social Gatherings with Friends and Family: Not on file    Attends [employer] Services: Not on file    Active Member of [address] [place] [address] or Organizations: Not on file    Attends Club or Organization Meetings: Not on file    Marital Status: Not on file   Intimate Partner Violence:     Fear of Current or Ex-Partner: Not on file    Emotionally Abused: Not on file    Physically Abused: Not on file    Sexually Abused: Not on file   Housing Stability:     Unable to Pay for Housing in the Last Year: Not on file    Number of [place] in the Last Year: Not on file    Unstable Housing in the Last Year: Not on file     Family History          Problem Relation Age of Onset    Cancer Mother 28        uterine    Heart Disease Father     Cancer Father 48        lung    Diabetes Paternal Grandfather     Cancer Maternal Grandmother         uterine    Cancer Maternal Aunt 32        uterine    Cancer Maternal Cousin 28        uterine    Colon Cancer Neg Hx     Breast Cancer Neg Hx        ROS    10 systems were reviewed and negative except for the above.   Meds    Current Medications    cefTRIAXone (ROCEPHIN) IV  1,000 mg IntraVENous Q24H    azithromycin  500 mg IntraVENous Q24H    aspirin  81 mg Oral Daily    budesonide  500 mcg Nebulization BID    citalopram  40 mg Oral Daily    gabapentin  300 mg Oral TID    metoprolol succinate  25 mg Oral Daily    montelukast  10 mg Oral Nightly    pantoprazole  40 mg Oral QAM AC    QUEtiapine  50 mg Oral Nightly    atorvastatin  40 mg Oral Daily    ascorbic acid  500 mg Oral Daily    zinc sulfate  50 mg Oral Daily    insulin lispro  0-12 Units SubCUTAneous TID WC    insulin lispro  0-6 Units SubCUTAneous Nightly     acetaminophen, albuterol, albuterol sulfate HFA, biotene, nystatin-triamcinolone  IV Drips/Infusions    Vitals    Vitals    height is 5' 2\" (1.575 m) and weight is 273 lb (123.8 kg). Her oral temperature is 97.6 °F (36.4 °C). Her blood pressure is 136/68 and her pulse is 76. Her respiration is 20 and oxygen saturation is 93%. I/O    Intake/Output Summary (Last 24 hours) at 11/29/2021 1528  Last data filed at 11/29/2021 0455  Gross per 24 hour   Intake 810 ml   Output 1150 ml   Net -340 ml     Patient Vitals for the past 96 hrs (Last 3 readings):   Weight   11/27/21 1519 273 lb (123.8 kg)     Exam   Physical Exam   Constitutional: No distress on RA. Patient appears obese BMI 49.9  Head: Normocephalic and atraumatic. Mouth/Throat: Oropharynx is clear and moist.  No oral thrush. Eyes: Conjunctivae are normal. Pupils are equal, round. No scleral icterus. Neck: Neck supple. No tracheal deviation present. Cardiovascular: S1 and S2 with no murmur. No peripheral edema  Pulmonary/Chest: Normal effort with bilateral air entry, breath sounds, diminished at bases. No stridor. No respiratory distress. Patient exhibits no tenderness. Abdominal: Soft. Bowel sounds audible. No distension or tenderness to palp.    Musculoskeletal: Moves all extremities  Neurological: Patient is alert and follows simple commands     Labs   ABG  Lab Results   Component Value Date    PH 7.41 10/18/2020    PO2 61 10/18/2020    PCO2 38 10/18/2020    HCO3 25 10/18/2020    O2SAT 91 10/18/2020     Lab Results   Component Value Date    IFIO2 3 10/18/2020     CBC  Recent Labs     11/27/21  1545 11/29/21  0653   WBC 18.0* 14.6*   RBC 4.48 4.30   HGB 13.3 12.8   HCT 41.4 39.4   MCV 92.4 91.6   MCH 29.7 29.8   MCHC 32.1* 32.5    235   MPV 10.5 10.2      BMP  Recent Labs     11/27/21  1545      K 4.1   CL 98   CO2 23   BUN 22   CREATININE 0.8   GLUCOSE 121*   CALCIUM 9.4     LFT  Recent Labs     11/27/21  1545   AST 17   ALT 17   BILITOT 0.8   ALKPHOS 97     TROP  Lab Results   Component Value Date    TROPONINT < 0.010 11/27/2021    TROPONINT < 0.010 08/31/2021    TROPONINT < 0.010 04/26/2021     BNP  Lab Results   Component Value Date    PROBNP 548.1 11/27/2021    PROBNP 625.1 08/31/2021    PROBNP 236.9 04/26/2021     D-Dimer  Lab Results   Component Value Date    DDIMER 1693.00 10/18/2020     Lactic Acid  Recent Labs     11/27/21  1730 11/29/21  0653   LACTA 2.2* 0.8     INR  No results for input(s): INR, PROTIME in the last 72 hours. PTT  No results for input(s): APTT in the last 72 hours. Glucose  Recent Labs     11/28/21 2013 11/29/21  0600 11/29/21  1132   POCGLU 139* 145* 178*     UA   Recent Labs     11/27/21  1630   SPECGRAV 1.012   PHUR 5.0   COLORU YELLOW   PROTEINU NEGATIVE   BLOODU NEGATIVE   RBCUA 0-2   WBCUA 0-2   BACTERIA MANY   NITRU POSITIVE*   BILIRUBINUR NEGATIVE   UROBILINOGEN 0.2   KETUA NEGATIVE   LABCAST NONE SEEN  NONE SEEN   . PFTs                  Echo    Conclusions      Summary   Technically difficult examination. Normal left ventricle size and systolic function. Ejection fraction was   estimated at 50- to 55 %. There were no regional left ventricular wall   motion abnormalities and wall thickness was within normal limits. The left atrium is Mildly dilated.       Signature      ----------------------------------------------------------------   Electronically signed by Sandoval Michaels MD (Interpreting   physician) on 10/19/2020 at 03:21 PM  Cultures    Procalcitonin  Lab Results   Component Value Date    PROCAL 0.36 11/29/2021    PROCAL 0.66 11/28/2021    PROCAL 0.04 10/18/2020       Radiology       CTA CHEST W WO CONTRAST - r/o Pulmonary Embolism [[id number]] Resulted: 11/27/21 1853     Order Status: Completed Updated: 11/27/21 1855     Narrative:       PROCEDURE: CTA CHEST W WO CONTRAST     CLINICAL INFORMATION: r/o Pulmonary Embolism, evaluate upper lobe opacity seen on CXR, infiltrate vs. mass . TECHNIQUE: 2-D multiplanar postcontrast images of the chest with 3-D MIPS Isovue-370 IV contrast.   All CT scans at this facility use dose modulation, iterative reconstruction, and/or weight-based dosing when appropriate to reduce radiation dose to as low as reasonably achievable. COMPARISON: 10/18/2012       FINDINGS: Suboptimal opacification of the pulmonary arteries. No large central pulmonary embolus or proximal branch emboli identified. More distal subsegmental emboli can't be excluded. There is no aneurysm or dissection. Heart size normal.   Multiple small mediastinal lymph nodes are present. There is no mediastinal hilar or axillary lymphadenopathy by size criteria. Focal consolidation right upper lobe with air bronchograms. No effusions. Upper abdomen   No acute abnormalities. Bones   No suspicious bone lesions.      Impression:       Suboptimal opacification of the pulmonary arteries such that distal subsegmental emboli cannot be excluded. There is no large central or proximal branch emboli seen. Right upper lobe airspace consolidation. **This report has been created using voice recognition software.  It may contain minor errors which are inherent in voice recognition technology. **     Final report electronically signed by Dr. Win Galvez on 11/27/2021 6:53 PM     CT Lumbar Spine WO Contrast [[id number]] Resulted: 11/27/21 1722     Order Status: Completed Updated: 11/27/21 1724     Narrative:       PROCEDURE: CT LUMBAR SPINE WO CONTRAST     CLINICAL INFORMATION: fall, low back pain . TECHNIQUE: 2-D multiplanar noncontrast CT of the lumbar spine at 3 mm intervals. Bone and soft tissue windows.    All CT scans at this facility use dose modulation, iterative reconstruction, and/or weight-based dosing when appropriate to reduce radiation dose to as low as reasonably achievable. COMPARISON: 7/26/2013       FINDINGS: There is no acute fracture or acute bony malalignment. Postsurgical changes L4-5 and L5-S1 pedicular screws on the right and intervertebral spacer devices. Progression of degenerative disc disease as the prior exam with vacuum disc phenomenon at T11-12, T12-L1, L2-3, L4-5 and L5-S1. Progression of disc space narrowing at the appearance of thecal sac somewhat displaced to the right at L4-5 possibly related   to scarring. No paraspinal soft tissue abnormalities identified. These levels.      Impression:       Extensive degenerative changes and postsurgical changes. No acute process identified. **This report has been created using voice recognition software.  It may contain minor errors which are inherent in voice recognition technology. **     Final report electronically signed by Dr. Renee Mcguire on 11/27/2021 5:22 PM     CT Head WO Contrast [[id number]] Resulted: 11/27/21 1716     Order Status: Completed Updated: 11/27/21 1718     Narrative:       PROCEDURE: CT HEAD WO CONTRAST     CLINICAL INFORMATION: fall on ASA . TECHNIQUE: 2-D multiplanar noncontrast CT brain at 5 mm intervals. All CT scans at this facility use dose modulation, iterative reconstruction, and/or weight-based dosing when appropriate to reduce radiation dose to as low as reasonably achievable. COMPARISON: 7/26/2013       FINDINGS: Stable appearance. No hemorrhage. No extra-axial or intra-axial fluid collection. No acute infarction is seen. Generalized cerebral volume loss. Ventricles are stable and unremarkable. No calvarial fracture.  Visualized paranasal sinuses and   mastoid air cells are clear      Impression:       No acute process         **This report has been created using voice recognition software.  It may contain minor errors which are inherent in voice recognition technology. **     Final report electronically signed by Dr. Win Galvez on 11/27/2021 5:16 PM     XR CHEST (2 VW) [[id number]] Resulted: 11/27/21 1646     Order Status: Completed Specimen: Chest Updated: 11/27/21 1648     Narrative:       PROCEDURE: XR CHEST (2 VW)     CLINICAL INFORMATION: cough, fall     COMPARISON: 8/31/2021     TECHNIQUE:  AP and lateral chest x-ray       FINDINGS:     Focal ill-defined infiltrate is seen at the right upper lung zone. This could represent focal pneumonia. However, cannot exclude an underlying mass. Further evaluation could be obtained with chest CT. The heart size is normal.     No pleural effusion is seen. There is a remote appearing left-sided rib fracture involving the left posterior lateral fifth rib. No acute osseous findings are otherwise seen. There is diffuse osteopenia.      Impression:       1. Focal ill-defined infiltrate is seen at the right upper lung zone. This could represent focal pneumonia. However, cannot exclude an underlying mass. Further evaluation could be obtained with chest CT. **This report has been created using voice recognition software.  It may contain minor errors which are inherent in voice recognition technology. **     Final report electronically signed by Dr. Agatha Trimble on 11/27/2021 4:46 PM             Assessment   -Right upper lobe infiltrate.  -Possible right upper lobe pneumonia. -Morbid obesity  -S/p Fall  -Diabetes mellitus type 2  -Lactic acidosis  -UTI  -Schizoaffective disorder  -Ventral hernia  Recommendations   -Would treat as pneumonia, currently she is on ceftriaxone and azithromycin.  -Agree with the current antibiotics. Can convert to PO azithromycin and omnicef for 5 days and 7 days respectively, would not use Augmentin as has allergy to penicillin  -Will need to repeat CT scan in 3 months  -Follow Urine antigen for Legionella and Streptococcus.   -pulmonary hygiene with Incentive spirometer. -DVT prophylaxis.  -Physical therapy nutritional therapy. Case discussed with nurse and patient/family. Questions and concerns addressed. Meds and Orders reviewed. Electronically signed by CATE Holcomb CNP on 2021 at 3:28 PM    Addendum by Dr. Meghan Gibson MD:  I have seen and examined the patient independently. Face to face evaluation and examination was performed. The above evaluation and note has been reviewed. Labs and radiographs were reviewed. I Have discussed with Mr. Brennanon ABDIRAHMAN Bañuelos about this patient in detail. The above assessment and plan has been reviewed. Please see my modifications mentioned below. My modifications:  CT scan of chest with contrast:  Sat 2021    PROCEDURE: CTA CHEST W WO CONTRAST   Suboptimal opacification of the pulmonary arteries such that distal subsegmental emboli cannot be excluded. There is no large central or proximal branch emboli seen. Right upper lobe airspace consolidation. Social History     Tobacco Use    Smoking status: Former Smoker     Years: 3.00     Types: Cigarettes     Start date:      Quit date:      Years since quittin.9    Smokeless tobacco: Never Used    Tobacco comment: 3 cigarettes a month, Passive smoke exposure 39 years Father then ex    Vaping Use    Vaping Use: Never used   Substance Use Topics    Alcohol use: No     Alcohol/week: 0.0 standard drinks    Drug use: No     She denied any history of cancers in the past.  Her mammogram is negative -1-year back  She had a negative colonoscopy 3 years back. She underwent sleep study on 2020:    PATIENT NAME: Evita Andujar                   :        1950  MED REC NO:   [id number]                           ROOM:  ACCOUNT NO:   [de-identified]                           ADMIT DATE: 2020  PROVIDER:     Mecca Gallardo.  MD Gasper     DATE OF STUDY:  2020     SLEEP STUDY REPORT     REFERRING PROVIDER: Alfonso Dyer, ABDIRAHMAN    IMPRESSION:  1. Mild-to-moderate snoring with no clinically significant obstructive  sleep apnea. 2.  Hypersomnia of uncertain etiology. 3.  Moderately severe bronchial asthma. 4.  Diabetes mellitus. 5.  Hypertension. 6.  Coronary artery disease. 7.  Depression. Follow-up as above in 3 months with repeat CT scan of chest without contrast  No mediastinal adenopathy noted on her CT scan of chest dated 27 October 2021.     Iqra Garcia MD 11/29/2021 7:54 PM

## 2021-11-29 NOTE — PROGRESS NOTES
Hospitalist Progress Note    Patient:  [facility]      Unit/Bed:7K-21/021-A    YOB: 1950    MRN: [id number]       Acct: [de-identified]     PCP: Ivy Nino MD    Date of Admission: 11/27/2021      Subjective: Patient seen and examined. Patient reports that she has been having difficulty at times walking on her leg secondary to feeling weak. Patient denies having any fever. Patient states that she always has a cough. Patient states that the last time she fell was 1 year ago. Medications:  Reviewed    Infusion Medications   Scheduled Medications    cefTRIAXone (ROCEPHIN) IV  1,000 mg IntraVENous Q24H    azithromycin  500 mg IntraVENous Q24H    aspirin  81 mg Oral Daily    budesonide  500 mcg Nebulization BID    citalopram  40 mg Oral Daily    gabapentin  300 mg Oral TID    metoprolol succinate  25 mg Oral Daily    montelukast  10 mg Oral Nightly    pantoprazole  40 mg Oral QAM AC    QUEtiapine  50 mg Oral Nightly    atorvastatin  40 mg Oral Daily    ascorbic acid  500 mg Oral Daily    zinc sulfate  50 mg Oral Daily    insulin lispro  0-12 Units SubCUTAneous TID WC    insulin lispro  0-6 Units SubCUTAneous Nightly     PRN Meds: acetaminophen, albuterol, albuterol sulfate HFA, biotene, nystatin-triamcinolone      Intake/Output Summary (Last 24 hours) at 11/28/2021 1936  Last data filed at 11/28/2021 1845  Gross per 24 hour   Intake 910 ml   Output    Net 910 ml       Diet:  ADULT DIET; Regular; 4 carb choices (60 gm/meal)    Exam:  /65   Pulse 69   Temp 97.5 °F (36.4 °C) (Oral)   Resp 18   Ht 5' 2\" (1.575 m)   Wt 273 lb (123.8 kg)   LMP  (LMP Unknown)   SpO2 92%   BMI 49.93 kg/m²   Physical Exam  Constitutional:       Appearance: Normal appearance. HENT:      Head: Normocephalic and atraumatic. Right Ear: External ear normal.      Left Ear: External ear normal.      Nose: Nose normal.      Mouth/Throat:      Pharynx: Oropharynx is clear. Eyes:      Extraocular Movements: Extraocular movements intact. Pupils: Pupils are equal, round, and reactive to light. Cardiovascular:      Rate and Rhythm: Normal rate and regular rhythm. Pulses: Normal pulses. Heart sounds: Normal heart sounds. No murmur heard. No friction rub. No gallop. Pulmonary:      Effort: Pulmonary effort is normal. No respiratory distress. Breath sounds: Normal breath sounds. No wheezing, rhonchi or rales. Abdominal:      General: Bowel sounds are normal. There is no distension. Tenderness: There is no abdominal tenderness. Musculoskeletal:         General: No swelling. Normal range of motion. Cervical back: Normal range of motion and neck supple. Comments: Venous stasis dermatitis of lower extremities bilaterally   Skin:     General: Skin is warm. Neurological:      General: No focal deficit present. Mental Status: She is alert. Labs:   Recent Labs     11/27/21  1545   WBC 18.0*   HGB 13.3   HCT 41.4        Recent Labs     11/27/21  1545      K 4.1   CL 98   CO2 23   BUN 22   CREATININE 0.8   CALCIUM 9.4     Recent Labs     11/27/21  1545   AST 17   ALT 17   BILITOT 0.8   ALKPHOS 97     No results for input(s): INR in the last 72 hours. No results for input(s): Jasper Jakes in the last 72 hours. Urinalysis:      Lab Results   Component Value Date    NITRU POSITIVE 11/27/2021    WBCUA 0-2 11/27/2021    BACTERIA MANY 11/27/2021    RBCUA 0-2 11/27/2021    BLOODU NEGATIVE 11/27/2021    SPECGRAV 1.012 11/27/2021    GLUCOSEU NEGATIVE 08/31/2021       Radiology:  CTA CHEST W WO CONTRAST - r/o Pulmonary Embolism   Final Result   Suboptimal opacification of the pulmonary arteries such that distal subsegmental emboli cannot be excluded. There is no large central or proximal branch emboli seen. Right upper lobe airspace consolidation. **This report has been created using voice recognition software.   It may contain minor errors which are inherent in voice recognition technology. **      Final report electronically signed by Dr. Lizzy Jones on 11/27/2021 6:53 PM      CT Lumbar Spine WO Contrast   Final Result   Extensive degenerative changes and postsurgical changes. No acute process identified. **This report has been created using voice recognition software. It may contain minor errors which are inherent in voice recognition technology. **      Final report electronically signed by Dr. Lizzy Jones on 11/27/2021 5:22 PM      CT Head WO Contrast   Final Result   No acute process            **This report has been created using voice recognition software. It may contain minor errors which are inherent in voice recognition technology. **      Final report electronically signed by Dr. Lizzy Jones on 11/27/2021 5:16 PM      XR CHEST (2 VW)   Final Result   1. Focal ill-defined infiltrate is seen at the right upper lung zone. This could represent focal pneumonia. However, cannot exclude an underlying mass. Further evaluation could be obtained with chest CT. **This report has been created using voice recognition software. It may contain minor errors which are inherent in voice recognition technology. **      Final report electronically signed by Dr. Margarito Prajapati on 11/27/2021 4:46 PM          Diet: ADULT DIET; Regular; 4 carb choices (60 gm/meal)    DVT prophylaxis: [x] Lovenox                                 [] SCDs                                 [] SQ Heparin                                 [] Encourage ambulation           [] Already on Anticoagulation     Disposition:    [x] Home       [] TCU       [] Rehab       [] Psych       [] SNF       [] [place]       [] Other-    Code Status: Prior    PT/OT Eval Status:  On board    Assessment/Plan:    Anticipated Discharge in : 1 to 2 days    Active Hospital Problems    Diagnosis Date Noted    Community acquired pneumonia of right upper lobe of lung [J18.9] 11/28/2021     Priority: High    Lactic acid acidosis [E87.2] 11/28/2021     Priority: High    UTI (urinary tract infection) [N39.0] 11/27/2021     Priority: High    Fall [W19. XXXA] 07/26/2013     Priority: High    Weakness generalized [R53.1] 01/25/2013     Priority: High    Erythema intertrigo [L30.4] 11/28/2021     Priority: Medium    Ventral hernia [K43.9] 07/09/2013     Priority: Medium    Schizoaffective disorder ([facility] [F25.9] 10/22/2020     Priority: Low    PTSD (post-traumatic stress disorder) [F43.10] 10/22/2020     Priority: Low    Fatty liver [K76.0] 10/22/2020     Priority: Low    BMI 45.0-49.9, adult [facility]) [Z68.42] 07/26/2013     Priority: Low    Obesity, Class III, BMI 40-49.9 (morbid obesity) [facility] [E66.01] 03/11/2013     Priority: Low    Hypertension [I10]      Priority: Low    Coronary artery disease involving native coronary artery of native heart without angina pectoris [I25.10]      Priority: Low    Diabetes mellitus [facility] [E11.9]      Priority: Low    Hyperlipidemia [E78.5]      Priority: Low       1. Status post fallpatient reports that she felt weak on the day of admission which prompted her to fall. Patient states she felt as though her legs went out on her. Patient states she has not fell in over a year. We will get PT/OT to evaluate patient. 2. Generalized weaknesspatient states that she feels overall weak. Patient may be weak secondary to underlying infection. Patient currently being treated for UTI and pneumonia. PT OT has been ordered. 3. Community-acquired pneumoniapatient with imaging showing right upper lobe airspace consolidation. Patient states that she does not have an increase in her cough. Patient denies having any fever. Patient noted to have an increased WBC count. Patient evaluated by pulmonology and is recommended that patient have repeat CT scan in 2 to 3 weeks after completion of antibiotics.   Patient currently on Rocephin and Zithromax. We will follow-up sputum cultures. 4. Acute cystitispatient noted to have many bacteria in her urine. Patient UA does not show any WBC. Patient is nitrite positive but leukocyte esterase negative. Patient currently on Rocephin for possible pneumonia. We will follow-up urine cultures and sensitivity. 5. Hypertensionpatient with history of hypertension. Patient be continued on her home blood pressure medications. 6. Coronary artery diseasepatient denies having any chest pain. Patient will be continued on aspirin, Toprol-XL and Lipitor. 7. Type 2 diabetespatient with history of type 2 diabetes. Patient placed on insulin sliding scale. Accu-Cheks as scheduled. 8. Hyperlipidemiapatient with history of hyperlipidemia. Patient will be continued on her home Lipitor.         Electronically signed by Ciro Kelly MD on 11/28/2021 at 7:36 PM

## 2021-11-29 NOTE — CARE COORDINATION
11/29/21, 11:53 AM EST    DISCHARGE PLANNING EVALUATION      Spoke with [facility]  (). Mary Vidal has aids Mon/Wed/Fri for 3 hrs each day, and also [facility] RN for med set up weekly. She also has an emergency response system and transportation services through [facility]. Spoke with [facility]. Will continue aid services at discharge and nursing if no skilled needs. If Mary Vidal needs skilled nursing, [facility] is out of network with her medicaid. She then uses [facility] for skilled, and [facility] is no longer needed.   Will need new [place] orders, if skilled nursing is needed, or if discharged after 12/1

## 2021-11-29 NOTE — CARE COORDINATION
Condition Code 44 conditions met, a Utilization Review Committee member reviewed this case and agrees that this patient does not meet evidenced based criteria for inpatient services, requiring a change in patient status from \"admit as inpatient\" to \"place in observation\", in accordance with condition code 44. Medical care will continue to be provided by Mary Waldrop MD, who agrees with the decision and has documented the agreement in the patient's medical record, as evidenced by the observation order/additional documentation. JOSEPH was signed by the patient and placed in the chart. Copy of the MOON was given to the patient. Explanation to the patient that they are in Observation status and Code 44 letter of Explanation was given to the patient. All questions addressed.     Electronically signed by Polo Estrada RN on 11/29/2021 at 2:59 PM

## 2021-11-30 VITALS
TEMPERATURE: 98.1 F | WEIGHT: 273 LBS | BODY MASS INDEX: 50.24 KG/M2 | HEART RATE: 77 BPM | SYSTOLIC BLOOD PRESSURE: 165 MMHG | HEIGHT: 62 IN | RESPIRATION RATE: 16 BRPM | DIASTOLIC BLOOD PRESSURE: 73 MMHG | OXYGEN SATURATION: 92 %

## 2021-11-30 PROBLEM — E87.20 LACTIC ACID ACIDOSIS: Status: RESOLVED | Noted: 2021-11-28 | Resolved: 2021-11-30

## 2021-11-30 LAB
GLUCOSE BLD-MCNC: 164 MG/DL (ref 70–108)
GLUCOSE BLD-MCNC: 172 MG/DL (ref 70–108)
GLUCOSE BLD-MCNC: 180 MG/DL (ref 70–108)
GLUCOSE BLD-MCNC: 186 MG/DL (ref 70–108)
LEGIONELLA PNEUMOPHILIA AG, URINE: NEGATIVE
STREP PNEUMO AG, UR: NEGATIVE

## 2021-11-30 PROCEDURE — 6360000002 HC RX W HCPCS: Performed by: INTERNAL MEDICINE

## 2021-11-30 PROCEDURE — 97116 GAIT TRAINING THERAPY: CPT

## 2021-11-30 PROCEDURE — G0378 HOSPITAL OBSERVATION PER HR: HCPCS

## 2021-11-30 PROCEDURE — 82948 REAGENT STRIP/BLOOD GLUCOSE: CPT

## 2021-11-30 PROCEDURE — 94760 N-INVAS EAR/PLS OXIMETRY 1: CPT

## 2021-11-30 PROCEDURE — 2580000003 HC RX 258: Performed by: HOSPITALIST

## 2021-11-30 PROCEDURE — 94640 AIRWAY INHALATION TREATMENT: CPT

## 2021-11-30 PROCEDURE — 6370000000 HC RX 637 (ALT 250 FOR IP): Performed by: INTERNAL MEDICINE

## 2021-11-30 PROCEDURE — 6370000000 HC RX 637 (ALT 250 FOR IP): Performed by: PHYSICIAN ASSISTANT

## 2021-11-30 PROCEDURE — 2580000003 HC RX 258: Performed by: INTERNAL MEDICINE

## 2021-11-30 PROCEDURE — 99217 PR OBSERVATION CARE DISCHARGE MANAGEMENT: CPT | Performed by: HOSPITALIST

## 2021-11-30 PROCEDURE — APPSS30 APP SPLIT SHARED TIME 16-30 MINUTES: Performed by: NURSE PRACTITIONER

## 2021-11-30 PROCEDURE — 99232 SBSQ HOSP IP/OBS MODERATE 35: CPT | Performed by: INTERNAL MEDICINE

## 2021-11-30 PROCEDURE — 6360000002 HC RX W HCPCS: Performed by: HOSPITALIST

## 2021-11-30 PROCEDURE — 97110 THERAPEUTIC EXERCISES: CPT

## 2021-11-30 RX ORDER — CEFDINIR 300 MG/1
300 CAPSULE ORAL 2 TIMES DAILY
Qty: 14 CAPSULE | Refills: 0 | Status: SHIPPED | OUTPATIENT
Start: 2021-11-30 | End: 2021-12-07

## 2021-11-30 RX ORDER — HYDROCODONE BITARTRATE AND ACETAMINOPHEN 5; 325 MG/1; MG/1
1 TABLET ORAL EVERY 6 HOURS PRN
Qty: 10 TABLET | Refills: 0 | Status: SHIPPED | OUTPATIENT
Start: 2021-11-30 | End: 2021-12-03

## 2021-11-30 RX ORDER — AZITHROMYCIN 500 MG/1
500 TABLET, FILM COATED ORAL DAILY
Qty: 1 PACKET | Refills: 0 | Status: SHIPPED | OUTPATIENT
Start: 2021-11-30 | End: 2021-12-05

## 2021-11-30 RX ADMIN — PANTOPRAZOLE SODIUM 40 MG: 40 TABLET, DELAYED RELEASE ORAL at 05:53

## 2021-11-30 RX ADMIN — ATORVASTATIN CALCIUM 40 MG: 40 TABLET, FILM COATED ORAL at 08:33

## 2021-11-30 RX ADMIN — GABAPENTIN 300 MG: 300 CAPSULE ORAL at 08:33

## 2021-11-30 RX ADMIN — BUDESONIDE 500 MCG: 0.5 SUSPENSION RESPIRATORY (INHALATION) at 17:41

## 2021-11-30 RX ADMIN — Medication 50 MG: at 08:33

## 2021-11-30 RX ADMIN — CEFTRIAXONE SODIUM 1000 MG: 1 INJECTION, POWDER, FOR SOLUTION INTRAMUSCULAR; INTRAVENOUS at 18:11

## 2021-11-30 RX ADMIN — GABAPENTIN 300 MG: 300 CAPSULE ORAL at 14:55

## 2021-11-30 RX ADMIN — OXYCODONE HYDROCHLORIDE AND ACETAMINOPHEN 500 MG: 500 TABLET ORAL at 08:33

## 2021-11-30 RX ADMIN — ASPIRIN 81 MG: 81 TABLET, COATED ORAL at 08:33

## 2021-11-30 RX ADMIN — METOPROLOL SUCCINATE 25 MG: 25 TABLET, EXTENDED RELEASE ORAL at 08:33

## 2021-11-30 RX ADMIN — AZITHROMYCIN DIHYDRATE 500 MG: 500 INJECTION, POWDER, LYOPHILIZED, FOR SOLUTION INTRAVENOUS at 13:33

## 2021-11-30 RX ADMIN — HYDROCODONE BITARTRATE AND ACETAMINOPHEN 1 TABLET: 5; 325 TABLET ORAL at 01:32

## 2021-11-30 RX ADMIN — CITALOPRAM 40 MG: 40 TABLET, FILM COATED ORAL at 08:33

## 2021-11-30 ASSESSMENT — PAIN DESCRIPTION - DURATION
DURATION_2: CONTINUOUS
DURATION_3: CONTINUOUS

## 2021-11-30 ASSESSMENT — PAIN - FUNCTIONAL ASSESSMENT: PAIN_FUNCTIONAL_ASSESSMENT: PREVENTS OR INTERFERES WITH ALL ACTIVE AND SOME PASSIVE ACTIVITIES

## 2021-11-30 ASSESSMENT — PAIN DESCRIPTION - LOCATION
LOCATION_2: RIB CAGE
LOCATION: SHOULDER
LOCATION_3: FOOT

## 2021-11-30 ASSESSMENT — PAIN DESCRIPTION - PROGRESSION
CLINICAL_PROGRESSION_2: NOT CHANGED
CLINICAL_PROGRESSION: NOT CHANGED
CLINICAL_PROGRESSION_3: NOT CHANGED

## 2021-11-30 ASSESSMENT — PAIN DESCRIPTION - DESCRIPTORS
DESCRIPTORS: ACHING
DESCRIPTORS_3: ACHING
DESCRIPTORS_2: ACHING

## 2021-11-30 ASSESSMENT — PAIN DESCRIPTION - PAIN TYPE
TYPE_2: ACUTE PAIN
TYPE: ACUTE PAIN
TYPE_3: ACUTE PAIN

## 2021-11-30 ASSESSMENT — PAIN DESCRIPTION - ORIENTATION
ORIENTATION: LEFT
ORIENTATION_3: LEFT;OUTER
ORIENTATION_2: LEFT

## 2021-11-30 ASSESSMENT — PAIN DESCRIPTION - ONSET
ONSET_2: ON-GOING
ONSET_3: ON-GOING
ONSET: ON-GOING

## 2021-11-30 ASSESSMENT — PAIN DESCRIPTION - FREQUENCY: FREQUENCY: CONTINUOUS

## 2021-11-30 ASSESSMENT — PAIN DESCRIPTION - INTENSITY
RATING_2: 4
RATING_3: 4

## 2021-11-30 ASSESSMENT — PAIN SCALES - GENERAL
PAINLEVEL_OUTOF10: 4
PAINLEVEL_OUTOF10: 9

## 2021-11-30 NOTE — PROGRESS NOTES
[facility]    Patient - Helen Servin   MRN -  [id number]   Korina Suh # - [de-identified]   - 1950      Date of Admission -  2021  3:26 PM  Date of evaluation -  2021  Room - --A   Hospital Day - [address], [place] Primary Care Physician - Derrick Liu MD   Reason for consult   RUL opacity  [address] Problems    Diagnosis Date Noted    Obesity, Class III, BMI 40-49.9 (morbid obesity) ([facility] [E66.01] 2013     Priority: Medium    Weakness generalized [R53.1] 2013     Priority: Medium    Hypertension [I10]      Priority: Medium    Coronary artery disease involving native coronary artery of native heart without angina pectoris [I25.10]      Priority: Medium    Diabetes mellitus ([facility] [address] [E11.9]      Priority: Medium    Abnormal CT of the chest [R93.89]     Community acquired pneumonia of right upper lobe of lung [J18.9] 2021    Lactic acid acidosis [E87.2] 2021    Erythema intertrigo [L30.4] 2021    UTI (urinary tract infection) [N39.0] 2021    Schizoaffective disorder ([facility] [address] [F25.9] 10/22/2020    PTSD (post-traumatic stress disorder) [F43.10] 10/22/2020    Fatty liver [K76.0] 10/22/2020    Fall [R85. XXXA] 2013    BMI 45.0-49.9, adult [facility]) [Z68.42] 2013    Ventral hernia [K43.9] 2013    Hyperlipidemia [E78.5]      HPI   Helen Servin is a 66 y.o. female admitted for fall and weakness. The patient has past medical history of coronary artery disease, essential hypertension, diabetes mellitus type 2, schizoaffective disorder, PTSD, fatty liver, falls, morbid obesity with BMI of 49.9. The patient presented to the emergency department after a fall. She has been feeling weak in the last several days. The patient complains of left-sided chest pain.   The patient stated that she has chronic dyspnea and she does not feel that she is more short of breath than usual, she has a chronic cough. The patient is afebrile, she has elevated WBC to 18 care and some elevation in procalcitonin to 0.66. Lactate was elevated on admission at 2.2. She is currently on room air. CTA of the chest was negative for PE however it showed right upper lobe dense consolidation with air bronchogram.  There is multiple small mediastinal lymph nodes. The patient follows with Dr. Meghan Gibson for asthma/COPD overlap syndrome. Past 24 hrs   -on RA  -C/o pain in left breast, stable since fall  -reports SOB stable  All other systems reviewed    Past Medical History         Diagnosis Date    Acid reflux     Arrhythmia     Arthritis     CAD (coronary artery disease)     NON-OBSTRUCTIVE    Chronic bronchitis ([address]     COVID-19 10/2020    Depression     Diabetes mellitus ([address]     Dizziness - light-headed     Fall 10/15/2013    left rib fracture and shoulder contusion     Hyperlipidemia     Hypertension     Kidney disease     MDRO (multiple drug resistant organisms) resistance 02/2014    MRSA ABDOMEN    Mild persistent asthma without complication 8/45/2885    Numbness and tingling     HX OF:    S/P laparoscopic cholecystectomy 11/27/2018    Swelling     Tinnitus       Past Surgical History           Procedure Laterality Date    BACK SURGERY  2007    CARDIOVASCULAR STRESS TEST  04/03/2009    Persantine test associated w/non specific symptoms. EKG non diagnostic. no obvious stress induced ischemia. EF 63%. normal wall motion.  CARDIOVASCULAR STRESS TEST  03/31/2010    tilt table associated w/fluctuation of BP w/relatively low BP throughout test. no significant symptoms during test. borderline tilt table test.    CHOLECYSTECTOMY      COLONOSCOPY  2011    CORONARY ANGIOPLASTY  12/16/2009    EF 60%. nonobstructive CAD. normal LV function.     ENDOSCOPY, COLON, DIAGNOSTIC  over 5 years     Dr. Leonardo Ray  2004    cataract    pantoprazole  40 mg Oral QAM AC    QUEtiapine  50 mg Oral Nightly    atorvastatin  40 mg Oral Daily    ascorbic acid  500 mg Oral Daily    zinc sulfate  50 mg Oral Daily    insulin lispro  0-12 Units SubCUTAneous TID WC    insulin lispro  0-6 Units SubCUTAneous Nightly     glucose, dextrose, glucagon (rDNA), dextrose, HYDROcodone 5 mg - acetaminophen, acetaminophen, albuterol, albuterol sulfate HFA, biotene, nystatin-triamcinolone  IV Drips/Infusions   dextrose       Vitals    Vitals    height is 5' 2\" (1.575 m) and weight is 273 lb (123.8 kg). Her oral temperature is 98.3 °F (36.8 °C). Her blood pressure is 187/79 (abnormal) and her pulse is 65. Her respiration is 16 and oxygen saturation is 93%. I/O    Intake/Output Summary (Last 24 hours) at 11/30/2021 1541  Last data filed at 11/30/2021 0832  Gross per 24 hour   Intake 590 ml   Output 1200 ml   Net -610 ml     Patient Vitals for the past 96 hrs (Last 3 readings):   Weight   11/27/21 1519 273 lb (123.8 kg)     Exam   Physical Exam   Constitutional: No distress on O2 per NC. Patient appears obese BMI 49.9  Head: Normocephalic and atraumatic. Mouth/Throat: Oropharynx is clear and moist.  No oral thrush. Eyes: Conjunctivae are normal. Pupils are equal, round. No scleral icterus. Neck: Neck supple. No tracheal deviation present. Cardiovascular: S1 and S2 with no murmur. No peripheral edema  Pulmonary/Chest: Normal effort with bilateral air entry, diminished at bases. No stridor. No respiratory distress. Patient exhibits no tenderness. Abdominal: Soft. Bowel sounds audible. No distension or tenderness to palp.    Musculoskeletal: Moves all extremities  Neurological: Patient is alert and follows simple commands     Labs   ABG  Lab Results   Component Value Date    PH 7.41 10/18/2020    PO2 61 10/18/2020    PCO2 38 10/18/2020    HCO3 25 10/18/2020    O2SAT 91 10/18/2020     Lab Results   Component Value Date    IFIO2 3 10/18/2020     CBC  Recent Labs     11/27/21  1545 11/29/21  0653   WBC 18.0* 14.6*   RBC 4.48 4.30   HGB 13.3 12.8   HCT 41.4 39.4   MCV 92.4 91.6   MCH 29.7 29.8   MCHC 32.1* 32.5    235   MPV 10.5 10.2      BMP  Recent Labs     11/27/21  1545      K 4.1   CL 98   CO2 23   BUN 22   CREATININE 0.8   GLUCOSE 121*   CALCIUM 9.4     LFT  Recent Labs     11/27/21  1545   AST 17   ALT 17   BILITOT 0.8   ALKPHOS 97     TROP  Lab Results   Component Value Date    TROPONINT < 0.010 11/27/2021    TROPONINT < 0.010 08/31/2021    TROPONINT < 0.010 04/26/2021     BNP  Lab Results   Component Value Date    PROBNP 548.1 11/27/2021    PROBNP 625.1 08/31/2021    PROBNP 236.9 04/26/2021     D-Dimer  Lab Results   Component Value Date    DDIMER 1693.00 10/18/2020     Lactic Acid  Recent Labs     11/27/21  1730 11/29/21  0653   LACTA 2.2* 0.8     INR  No results for input(s): INR, PROTIME in the last 72 hours. PTT  No results for input(s): APTT in the last 72 hours. Glucose  Recent Labs     11/30/21  0611 11/30/21  1100 11/30/21  1119   POCGLU 164* 172* 186*     UA   Recent Labs     11/27/21  1630   SPECGRAV 1.012   PHUR 5.0   COLORU YELLOW   PROTEINU NEGATIVE   BLOODU NEGATIVE   RBCUA 0-2   WBCUA 0-2   BACTERIA MANY   NITRU POSITIVE*   BILIRUBINUR NEGATIVE   UROBILINOGEN 0.2   KETUA NEGATIVE   LABCAST NONE SEEN  NONE SEEN   . PFTs                  Echo    Conclusions      Summary   Technically difficult examination. Normal left ventricle size and systolic function. Ejection fraction was   estimated at 50- to 55 %. There were no regional left ventricular wall   motion abnormalities and wall thickness was within normal limits. The left atrium is Mildly dilated.       Signature      ----------------------------------------------------------------   Electronically signed by Stefanie Carey MD (Interpreting   physician) on 10/19/2020 at 03:21 PM  Cultures    Procalcitonin  Lab Results   Component Value Date    PROCAL 0.36 11/29/2021    PROCAL 0.66 11/28/2021    PROCAL 0.04 10/18/2020     Urine culture-[place] pneumoniae  Blood Culture x 2 no growth  Covid-19 Negative  Strep pneumonia antigen-negative  Legionella antigen-Negative    Radiology       CTA CHEST W WO CONTRAST - r/o Pulmonary Embolism [[id number]] Resulted: 11/27/21 1853     Order Status: Completed Updated: 11/27/21 1855     Narrative:       PROCEDURE: CTA CHEST W WO CONTRAST     CLINICAL INFORMATION: r/o Pulmonary Embolism, evaluate upper lobe opacity seen on CXR, infiltrate vs. mass . TECHNIQUE: 2-D multiplanar postcontrast images of the chest with 3-D MIPS Isovue-370 IV contrast.   All CT scans at this facility use dose modulation, iterative reconstruction, and/or weight-based dosing when appropriate to reduce radiation dose to as low as reasonably achievable. COMPARISON: 10/18/2012       FINDINGS: Suboptimal opacification of the pulmonary arteries. No large central pulmonary embolus or proximal branch emboli identified. More distal subsegmental emboli can't be excluded. There is no aneurysm or dissection. Heart size normal.   Multiple small mediastinal lymph nodes are present. There is no mediastinal hilar or axillary lymphadenopathy by size criteria. Focal consolidation right upper lobe with air bronchograms. No effusions. Upper abdomen   No acute abnormalities. Bones   No suspicious bone lesions.      Impression:       Suboptimal opacification of the pulmonary arteries such that distal subsegmental emboli cannot be excluded. There is no large central or proximal branch emboli seen. Right upper lobe airspace consolidation. **This report has been created using voice recognition software.  It may contain minor errors which are inherent in voice recognition technology. **     Final report electronically signed by Dr. Wicho Gonzales on 11/27/2021 6:53 PM     CT Lumbar Spine WO Contrast [[id number]] Resulted: 11/27/21 1722     Order Status: Completed Updated: 11/27/21 1724     Narrative:       PROCEDURE: CT LUMBAR SPINE WO CONTRAST     CLINICAL INFORMATION: fall, low back pain . TECHNIQUE: 2-D multiplanar noncontrast CT of the lumbar spine at 3 mm intervals. Bone and soft tissue windows. All CT scans at this facility use dose modulation, iterative reconstruction, and/or weight-based dosing when appropriate to reduce radiation dose to as low as reasonably achievable. COMPARISON: 7/26/2013       FINDINGS: There is no acute fracture or acute bony malalignment. Postsurgical changes L4-5 and L5-S1 pedicular screws on the right and intervertebral spacer devices. Progression of degenerative disc disease as the prior exam with vacuum disc phenomenon at T11-12, T12-L1, L2-3, L4-5 and L5-S1. Progression of disc space narrowing at the appearance of thecal sac somewhat displaced to the right at L4-5 possibly related   to scarring. No paraspinal soft tissue abnormalities identified. These levels.      Impression:       Extensive degenerative changes and postsurgical changes. No acute process identified. **This report has been created using voice recognition software.  It may contain minor errors which are inherent in voice recognition technology. **     Final report electronically signed by Dr. Kiara Ceballos on 11/27/2021 5:22 PM     CT Head WO Contrast [[id number]] Resulted: 11/27/21 1716     Order Status: Completed Updated: 11/27/21 1718     Narrative:       PROCEDURE: CT HEAD WO CONTRAST     CLINICAL INFORMATION: fall on ASA . TECHNIQUE: 2-D multiplanar noncontrast CT brain at 5 mm intervals. All CT scans at this facility use dose modulation, iterative reconstruction, and/or weight-based dosing when appropriate to reduce radiation dose to as low as reasonably achievable. COMPARISON: 7/26/2013       FINDINGS: Stable appearance. No hemorrhage. No extra-axial or intra-axial fluid collection. No acute infarction is seen. Generalized cerebral volume loss. Ventricles are stable and unremarkable. No calvarial fracture. Visualized paranasal sinuses and   mastoid air cells are clear      Impression:       No acute process         **This report has been created using voice recognition software.  It may contain minor errors which are inherent in voice recognition technology. **     Final report electronically signed by Dr. Hellen Kelly on 11/27/2021 5:16 PM     XR CHEST (2 VW) [[id number]] Resulted: 11/27/21 1646     Order Status: Completed Specimen: Chest Updated: 11/27/21 1648     Narrative:       PROCEDURE: XR CHEST (2 VW)     CLINICAL INFORMATION: cough, fall     COMPARISON: 8/31/2021     TECHNIQUE:  AP and lateral chest x-ray       FINDINGS:     Focal ill-defined infiltrate is seen at the right upper lung zone. This could represent focal pneumonia. However, cannot exclude an underlying mass. Further evaluation could be obtained with chest CT. The heart size is normal.     No pleural effusion is seen. There is a remote appearing left-sided rib fracture involving the left posterior lateral fifth rib. No acute osseous findings are otherwise seen. There is diffuse osteopenia.      Impression:       1. Focal ill-defined infiltrate is seen at the right upper lung zone. This could represent focal pneumonia. However, cannot exclude an underlying mass. Further evaluation could be obtained with chest CT. **This report has been created using voice recognition software.  It may contain minor errors which are inherent in voice recognition technology. **     Final report electronically signed by Dr. Cuco Deluna on 11/27/2021 4:46 PM             Assessment   -Right upper lobe infiltrate.  -Possible right upper lobe pneumonia.   -Morbid obesity  -S/p Fall  -Diabetes mellitus type 2  -Lactic acidosis  -UTI  -Schizoaffective disorder  -Ventral hernia  Recommendations   -Would treat as pneumonia, currently she is on ceftriaxone and azithromycin.  -Agree with the current antibiotics. Can convert to PO azithromycin and omnicef for 5 days and 7 days respectively, would not use Augmentin as she has allergy to penicillin  -pulmonary hygiene with Incentive spirometer. -DVT prophylaxis.  -Physical therapy nutritional therapy.  -Cancel apointment with Crispin Hernandez on December 16 2021  -Stable from Pulmonary standpoint, schedule patient for follow-up at [facility]. [facility] in 3 months with CT chest  approximately 2 days prior to appointment with Crispin Hernandez CNP or Dr. Casper Cartwright MD  -Order placed in [address] [place] [address] Rd will sign off       Case discussed with nurse and patient/family. Questions and concerns addressed. Meds and Orders reviewed. Electronically signed by CATE Espinoza - CNP on 11/30/2021 at 3:41 PM     Addendum by Dr. Casper Cartwright MD:  I have seen and examined the patient independently. Face to face evaluation and examination was performed. The above evaluation and note has been reviewed. Labs and radiographs were reviewed. I Have discussed with Mr. Crispin Hernandez CNP about this patient in detail. The above assessment and plan has been reviewed. Please see my modifications mentioned below. My modifications:  She is on room air  Not in distress  Follow-up as above with repeat CT scan of chest without contrast in 3 months  -Sherly Greene was advised to make early appointment with my clinic if she develops any constitutional symptoms including loss of weight, poor appetite or hemoptysis. She verbalizes under standing.     Jair Thomas MD 11/30/2021 10:23 PM

## 2021-11-30 NOTE — PROGRESS NOTES
BLE dressings removed. Legs remain red, warm. Legs/feet washed with soap and water, lotion applied and zinc oxide applied to crusted areas left leg. Legs left open to air.

## 2021-11-30 NOTE — PROGRESS NOTES
[address] [place] [address]  INPATIENT PHYSICAL THERAPY  DAILY NOTE  [facility] ORTHOPEDICS 7K - 7K-21/021-A     Time In: 2716  Time Out: 0555  Timed Code Treatment Minutes: 25 Minutes  Minutes: 25          Date: 2021  Patient Name: Helen Servin,  Gender:  female        MRN: [id number]  : 1950  (66 y.o.)     Referring Practitioner: Young Garcia MD  Diagnosis: UTI (urinary tract infection)  Additional Pertinent Hx: 76-year-old female patient who lives alone. Presented to the ED today by EMS because of a history of fall from a bed to the floor leading to left-sided chest pain. Complains of general body weakness mostly lower extremities leading to the fall. Has left-sided pleuritic chest pain, no shortness of breath, no nausea vomiting or diarrhea. Has bilateral lower extremity weakness leading to acute on chronic ambulatory dysfunction. Uses a wheelchair and a walker sometimes. Per MRI there are no acute findings after fall     Prior Level of Function:  Lives With: Alone  Type of Home: Apartment (uses elevators)  Home Layout: Multi-level  Home Equipment: Wheelchair-manual, Standard walker, Rolling walker   Bathroom Shower/Tub:  (only performs sponge baths)  Bathroom Toilet: Handicap height    Receives Help From: Personal care attendant  ADL Assistance: Independent  Homemaking Assistance: Needs assistance  Ambulation Assistance: Independent  Transfer Assistance: Independent  Active : Yes  Additional Comments: uses w/c majority of time, ocassionally will use RW when back pain is not too much. Restrictions/Precautions:  Restrictions/Precautions: Up as Tolerated, Fall Risk, General Precautions      SUBJECTIVE: Pt sitting up in room chair. RN approved session. Pt reports hoping to be discharged home tonight.     PAIN: 4/10: L side ribs and flank    Vitals: Vitals not assessed per clinical judgement, see nursing flowsheet    OBJECTIVE:  Bed Mobility:  Not Tested    Transfers:  Sit to Stand: precautions in place. Krissy Arias.  [place] Cole, [place] [address]

## 2021-11-30 NOTE — DISCHARGE SUMMARY
on the day of admission which prompted her to fall. Patient states she felt as though her legs went out on her. Patient states she has not fell in over a year. We will get PT/OT to evaluate patient. Patient evaluated by OT/PT. OT recommended that patient go to SNF or home health. PT recommended that patient have home health. Patient does not want to go to a SNF and is willing to have PT/OT at home with home health. 2. Generalized weaknesspatient states that she feels overall weak. Patient may be weak secondary to underlying infection. Patient currently being treated for UTI and pneumonia. PT/OT has been ordered. · OT states that patient is not safe to go home and should most likely go to a SNF on discharge. · Patient refusing SNF. Patient will be going home with PT/OT with home health. 3. Community-acquired pneumoniapatient with imaging showing right upper lobe airspace consolidation. Patient states that she does not have an increase in her cough. Patient denies having any fever. Patient noted to have an increased WBC count. Patient evaluated by pulmonology and is recommended that patient have repeat CT scan in 2 to 3 weeks after completion of antibiotics. Patient currently on Rocephin and Zithromax. We will follow-up sputum cultures. Patient discharged with Zithromax x5 days. Patient to have repeat CAT scan to evaluate area of consolidation. 4. Acute cystitispatient noted to have many bacteria in her urine. Patient UA does not show any WBC. Patient is nitrite positive but leukocyte esterase negative. Patient currently on Rocephin for possible pneumonia. · 11/29/2021urine culture positive for Klebsiella pneumonia with sensitivities to Rocephin. · 11/30/2021patient discharged with Omnicef x7 days. 5. Hypertensionpatient with history of hypertension. Patient be continued on her home blood pressure medications. 6. Coronary artery diseasepatient denies having any chest pain. Patient will be continued on aspirin, Toprol-XL and Lipitor. 7. Type 2 diabetespatient with history of type 2 diabetes. Patient placed on insulin sliding scale. Accu-Cheks as scheduled. 8. Hyperlipidemiapatient with history of hyperlipidemia. Patient will be continued on her home Lipitor. Consultants: Pulmonology    Discharge Medications:       Medication List      START taking these medications    azithromycin 500 MG tablet  Commonly known as: Zithromax  Take 1 tablet by mouth daily for 5 days     cefdinir 300 MG capsule  Commonly known as: OMNICEF  Take 1 capsule by mouth 2 times daily for 7 days     HYDROcodone-acetaminophen 5-325 MG per tablet  Commonly known as: NORCO  Take 1 tablet by mouth every 6 hours as needed for Pain for up to 3 days.         CHANGE how you take these medications    gabapentin 300 MG capsule  Commonly known as: Neurontin  Take 1 capsule by mouth 3 times daily  What changed:   · how much to take  · when to take this        CONTINUE taking these medications    acetaminophen 500 MG tablet  Commonly known as: TYLENOL     Acidophilus Tabs     * albuterol sulfate  (90 Base) MCG/ACT inhaler  Commonly known as: ProAir HFA  Inhale 2 puffs into the lungs every 4 hours as needed     * albuterol (2.5 MG/3ML) 0.083% nebulizer solution  Commonly known as: PROVENTIL  Take 3 mLs by nebulization every 6 hours as needed for Wheezing or Shortness of Breath     ascorbic acid 500 MG tablet  Commonly known as: VITAMIN C  Take 1 tablet by mouth daily     ASPIRIN LOW DOSE 81 MG EC tablet  Generic drug: aspirin  take 1 tablet by mouth once daily     biotene Liqd oral solution     Biotene Moisturizing Mouth Soln     blood glucose test strips strip  Commonly known as: FREESTYLE LITE  Test once daily     budesonide 0.5 MG/2ML nebulizer suspension  Commonly known as: PULMICORT  Take 2 mLs by nebulization 2 times daily     CICLOPIROX TREATMENT EX     citalopram 40 MG tablet  Commonly known as: ZINCATE  Take 1 capsule by mouth daily         * This list has 2 medication(s) that are the same as other medications prescribed for you. Read the directions carefully, and ask your doctor or other care provider to review them with you.                Where to Get Your Medications      These medications were sent to [facility] [phone]  [address] [place] [address], [address] [place] [phone]    Phone: [phone]   · azithromycin 500 MG tablet  · cefdinir 300 MG capsule     You can get these medications from any pharmacy    Bring a paper prescription for each of these medications  · HYDROcodone-acetaminophen 5-325 MG per tablet           Physical Exam:    Vitals:  Vitals:    11/29/21 2016 11/30/21 0330 11/30/21 0730 11/30/21 1126   BP: (!) 132/56 127/63 (!) 143/71 (!) 187/79   Pulse: 77 72 72 65   Resp: 18 16 16 16   Temp: 97.4 °F (36.3 °C)  98.2 °F (36.8 °C) 98.3 °F (36.8 °C)   TempSrc: Oral  Oral Oral   SpO2: 93% 90% 92% 93%   Weight:       Height:         Weight: Weight: 273 lb (123.8 kg)     24 hour intake/output:    Intake/Output Summary (Last 24 hours) at 11/30/2021 1648  Last data filed at 11/30/2021 4600  Gross per 24 hour   Intake 590 ml   Output 1200 ml   Net -610 ml       General appearance - alert, well appearing, and in no distress  Chest - clear to auscultation, no wheezes, rales or rhonchi, symmetric air entry  Heart - normal rate, regular rhythm, normal S1, S2, no murmurs, rubs, clicks or gallops  Abdomen - soft, nontender, nondistended, no masses or organomegaly  Obese: Yes; Protuberant: No   Neurological - alert, oriented, normal speech, no focal findings or movement disorder noted  Extremities - peripheral pulses normal, no pedal edema, no clubbing or cyanosis  Skin - normal coloration and turgor, no rashes, no suspicious skin lesions noted    Procedures: None    Diagnostic Test: CBC, BMP, UA, urine culture    Radiology reports as per the Radiologist  Radiology: XR CHEST (2 VW)    Result Date: 11/27/2021  PROCEDURE: XR CHEST (2 VW) CLINICAL INFORMATION: cough, fall COMPARISON: 8/31/2021 TECHNIQUE:  AP and lateral chest x-ray  FINDINGS: Focal ill-defined infiltrate is seen at the right upper lung zone. This could represent focal pneumonia. However, cannot exclude an underlying mass. Further evaluation could be obtained with chest CT. The heart size is normal. No pleural effusion is seen. There is a remote appearing left-sided rib fracture involving the left posterior lateral fifth rib. No acute osseous findings are otherwise seen. There is diffuse osteopenia. 1. Focal ill-defined infiltrate is seen at the right upper lung zone. This could represent focal pneumonia. However, cannot exclude an underlying mass. Further evaluation could be obtained with chest CT. **This report has been created using voice recognition software. It may contain minor errors which are inherent in voice recognition technology. ** Final report electronically signed by Dr. Meron Linn on 11/27/2021 4:46 PM    CT Head WO Contrast    Result Date: 11/27/2021  PROCEDURE: CT HEAD WO CONTRAST CLINICAL INFORMATION: fall on ASA . TECHNIQUE: 2-D multiplanar noncontrast CT brain at 5 mm intervals. All CT scans at this facility use dose modulation, iterative reconstruction, and/or weight-based dosing when appropriate to reduce radiation dose to as low as reasonably achievable. COMPARISON: 7/26/2013 FINDINGS: Stable appearance. No hemorrhage. No extra-axial or intra-axial fluid collection. No acute infarction is seen. Generalized cerebral volume loss. Ventricles are stable and unremarkable. No calvarial fracture. Visualized paranasal sinuses and mastoid air cells are clear     No acute process **This report has been created using voice recognition software. It may contain minor errors which are inherent in voice recognition technology. ** Final report electronically signed by  Chayo Matos on 11/27/2021 5:16 PM    CTA CHEST W WO CONTRAST - r/o Pulmonary Embolism    Result Date: 11/27/2021  PROCEDURE: CTA CHEST W WO CONTRAST CLINICAL INFORMATION: r/o Pulmonary Embolism, evaluate upper lobe opacity seen on CXR, infiltrate vs. mass . TECHNIQUE: 2-D multiplanar postcontrast images of the chest with 3-D MIPS Isovue-370 IV contrast. All CT scans at this facility use dose modulation, iterative reconstruction, and/or weight-based dosing when appropriate to reduce radiation dose to as low as reasonably achievable. COMPARISON: 10/18/2012 FINDINGS: Suboptimal opacification of the pulmonary arteries. No large central pulmonary embolus or proximal branch emboli identified. More distal subsegmental emboli can't be excluded. There is no aneurysm or dissection. Heart size normal. Multiple small mediastinal lymph nodes are present. There is no mediastinal hilar or axillary lymphadenopathy by size criteria. Focal consolidation right upper lobe with air bronchograms. No effusions. Upper abdomen No acute abnormalities. Bones No suspicious bone lesions. Suboptimal opacification of the pulmonary arteries such that distal subsegmental emboli cannot be excluded. There is no large central or proximal branch emboli seen. Right upper lobe airspace consolidation. **This report has been created using voice recognition software. It may contain minor errors which are inherent in voice recognition technology. ** Final report electronically signed by Dr. Chayo Matos on 11/27/2021 6:53 PM    CT Lumbar Spine WO Contrast    Result Date: 11/27/2021  PROCEDURE: CT LUMBAR SPINE WO CONTRAST CLINICAL INFORMATION: fall, low back pain . TECHNIQUE: 2-D multiplanar noncontrast CT of the lumbar spine at 3 mm intervals. Bone and soft tissue windows. All CT scans at this facility use dose modulation, iterative reconstruction, and/or weight-based dosing when appropriate to reduce radiation dose to as low as reasonably achievable. COMPARISON: 7/26/2013 FINDINGS: There is no acute fracture or acute bony malalignment. Postsurgical changes L4-5 and L5-S1 pedicular screws on the right and intervertebral spacer devices. Progression of degenerative disc disease as the prior exam with vacuum disc phenomenon at T11-12, T12-L1, L2-3, L4-5 and L5-S1. Progression of disc space narrowing at the appearance of thecal sac somewhat displaced to the right at L4-5 possibly related to scarring. No paraspinal soft tissue abnormalities identified. These levels. Extensive degenerative changes and postsurgical changes. No acute process identified. **This report has been created using voice recognition software. It may contain minor errors which are inherent in voice recognition technology. ** Final report electronically signed by Dr. Joshua Patino on 11/27/2021 5:22 PM       Results for orders placed or performed during the hospital encounter of 11/27/21   COVID-19, Rapid    Specimen: Nasopharyngeal Swab   Result Value Ref Range    SARS-CoV-2, NAAT NOT  DETECTED NOT DETECTED   Culture, Urine    Specimen: Urine, clean catch   Result Value Ref Range    Organism Klebsiella pneumoniae (A)     Urine Culture, Routine [place] count: >100,000 CFU/mL         Susceptibility    Klebsiella pneumoniae - BACTERIAL SUSCEPTIBILITY PANEL BY JEFF     amoxicillin-clavulanate <=2 Sensitive mcg/mL     cefOXitin <=4 Sensitive mcg/mL     cefTRIAXone <=1 Sensitive mcg/mL     ampicillin 16 Resistant mcg/mL     gentamicin <=1 Sensitive mcg/mL     trimethoprim-sulfamethoxazole <=20 Sensitive mcg/mL     tetracycline <=1 Sensitive mcg/mL     nitrofurantoin 64 Intermediate mcg/mL   Culture, Blood 1    Specimen: Blood   Result Value Ref Range    Blood Culture, Routine No growth-preliminary     Culture, Blood 2    Specimen: Blood   Result Value Ref Range    Blood Culture, Routine No growth-preliminary     Strep Pneumoniae Antigen    Specimen: Urine, clean catch   Result Value Ref Range    Strep pneumo Ag, Ur NEGATIVE    Legionella antigen, urine    Specimen: Urine, clean catch   Result Value Ref Range    Legionella Pneumophilia Ag, Urine NEGATIVE    CBC Auto Differential   Result Value Ref Range    WBC 18.0 (H) 4.8 - 10.8 thou/mm3    RBC 4.48 4.20 - 5.40 mill/mm3    Hemoglobin 13.3 12.0 - 16.0 gm/dl    Hematocrit 41.4 37.0 - 47.0 %    MCV 92.4 81.0 - 99.0 fL    MCH 29.7 26.0 - 33.0 pg    MCHC 32.1 (L) 32.2 - 35.5 gm/dl    RDW-CV 14.6 (H) 11.5 - 14.5 %    RDW-SD 49.3 (H) 35.0 - 45.0 fL    Platelets 727 824 - 822 thou/mm3    MPV 10.5 9.4 - 12.4 fL    Seg Neutrophils 84.4 %    Lymphocytes 4.5 %    Monocytes 9.9 %    Eosinophils 0.1 %    Basophils 0.2 %    Immature Granulocytes 0.9 %    Segs Absolute 15.2 (H) 1.8 - 7.7 thou/mm3    Lymphocytes Absolute 0.8 (L) 1.0 - 4.8 thou/mm3    Monocytes Absolute 1.8 (H) 0.4 - 1.3 thou/mm3    Eosinophils Absolute 0.0 0.0 - 0.4 thou/mm3    Basophils Absolute 0.0 0.0 - 0.1 thou/mm3    Immature Grans (Abs) 0.17 (H) 0.00 - 0.07 thou/mm3    nRBC 0 /100 wbc   Comprehensive Metabolic Panel w/ Reflex to MG   Result Value Ref Range    Glucose 121 (H) 70 - 108 mg/dL    CREATININE 0.8 0.4 - 1.2 mg/dL    BUN 22 7 - 22 mg/dL    Sodium 136 135 - 145 meq/L    Potassium reflex Magnesium 4.1 3.5 - 5.2 meq/L    Chloride 98 98 - 111 meq/L    CO2 23 23 - 33 meq/L    Calcium 9.4 8.5 - 10.5 mg/dL    AST 17 5 - 40 U/L    Alkaline Phosphatase 97 38 - 126 U/L    Total Protein 7.2 6.1 - 8.0 g/dL    Albumin 3.8 3.5 - 5.1 g/dL    Total Bilirubin 0.8 0.3 - 1.2 mg/dL    ALT 17 11 - 66 U/L   Troponin   Result Value Ref Range    Troponin T < 0.010 ng/ml   Brain Natriuretic Peptide   Result Value Ref Range    Pro-.1 0.0 - 900.0 pg/mL   Lactic Acid, Plasma   Result Value Ref Range    Lactic Acid 2.2 (H) 0.5 - 2.0 mmol/L   Microscopic Urinalysis   Result Value Ref Range    Glucose, Urine NEGATIVE NEGATIVE mg/dl    Bilirubin Urine NEGATIVE NEGATIVE    Ketones, Urine NEGATIVE NEGATIVE    Specific Gravity, UA 1.012 1.002 - 1.030    Blood, Urine NEGATIVE NEGATIVE    pH, UA 5.0 5.0 - 9.0    Protein, UA NEGATIVE NEGATIVE mg/dl    Urobilinogen, Urine 0.2 0.0 - 1.0 eu/dl    Nitrite, Urine POSITIVE (A) NEGATIVE    Leukocytes, UA NEGATIVE NEGATIVE    Color, UA YELLOW YELLOW-STRAW    Character, Urine CLEAR [id number]    RBC, UA 0-2 0-2/hpf /hpf    WBC, UA 0-2 0-4/hpf /hpf    Epithelial Cells, UA NONE SEEN 3-5/hpf /hpf    Bacteria, UA MANY FEW/NONE SEEN    Casts NONE SEEN NONE SEEN /lpf    Crystals NONE SEEN NONE SEEN    Renal Epithelial, UA NONE SEEN NONE SEEN    Yeast, UA NONE SEEN NONE SEEN    Casts NONE SEEN /lpf    Miscellaneous Lab Test Result NONE SEEN    Anion Gap   Result Value Ref Range    Anion Gap 15.0 8.0 - 16.0 meq/L   Glomerular Filtration Rate, Estimated   Result Value Ref Range    Est, Glom Filt Rate 70 (A) ml/min/1.73m2   Osmolality   Result Value Ref Range    Osmolality Calc 276.5 275.0 - 300.0 mOsmol/kg   Procalcitonin   Result Value Ref Range    Procalcitonin 0.66 (H) 0.01 - 0.09 ng/mL   Lactic acid, plasma   Result Value Ref Range    Lactic Acid 0.8 0.5 - 2.0 mmol/L   Procalcitonin   Result Value Ref Range    Procalcitonin 0.36 (H) 0.01 - 0.09 ng/mL   CBC auto differential   Result Value Ref Range    WBC 14.6 (H) 4.8 - 10.8 thou/mm3    RBC 4.30 4.20 - 5.40 mill/mm3    Hemoglobin 12.8 12.0 - 16.0 gm/dl    Hematocrit 39.4 37.0 - 47.0 %    MCV 91.6 81.0 - 99.0 fL    MCH 29.8 26.0 - 33.0 pg    MCHC 32.5 32.2 - 35.5 gm/dl    RDW-CV 14.6 (H) 11.5 - 14.5 %    RDW-SD 49.0 (H) 35.0 - 45.0 fL    Platelets 835 213 - 110 thou/mm3    MPV 10.2 9.4 - 12.4 fL    Seg Neutrophils 76.6 %    Lymphocytes 10.9 %    Monocytes 11.1 %    Eosinophils 0.4 %    Basophils 0.3 %    Immature Granulocytes 0.7 %    Segs Absolute 11.2 (H) 1.8 - 7.7 thou/mm3    Lymphocytes Absolute 1.6 1.0 - 4.8 thou/mm3    Monocytes Absolute 1.6 (H) 0.4 - 1.3 thou/mm3    Eosinophils Absolute 0.1 0.0 - 0.4 thou/mm3    Basophils Absolute 0.0 0.0 - 0.1 thou/mm3    Immature Grans (Abs) 0.10 (H) 0.00 - 0.07 thou/mm3    nRBC 0 /100 wbc   POCT Glucose   Result Value Ref Range    POC Glucose 128 (H) 70 - 108 mg/dl   POCT Glucose   Result Value Ref Range    POC Glucose 117 (H) 70 - 108 mg/dl   POCT Glucose   Result Value Ref Range    POC Glucose 138 (H) 70 - 108 mg/dl   POCT Glucose   Result Value Ref Range    POC Glucose 99 70 - 108 mg/dl   POCT Glucose   Result Value Ref Range    POC Glucose 139 (H) 70 - 108 mg/dl   POCT Glucose   Result Value Ref Range    POC Glucose 145 (H) 70 - 108 mg/dl   POCT Glucose   Result Value Ref Range    POC Glucose 178 (H) 70 - 108 mg/dl   POCT Glucose   Result Value Ref Range    POC Glucose 140 (H) 70 - 108 mg/dl   POCT Glucose   Result Value Ref Range    POC Glucose 173 (H) 70 - 108 mg/dl   POCT glucose   Result Value Ref Range    POC Glucose 164 (H) 70 - 108 mg/dl   POCT glucose   Result Value Ref Range    POC Glucose 172 (H) 70 - 108 mg/dl   POCT glucose   Result Value Ref Range    POC Glucose 186 (H) 70 - 108 mg/dl   POCT glucose   Result Value Ref Range    POC Glucose 180 (H) 70 - 108 mg/dl   EKG 12 Lead   Result Value Ref Range    Ventricular Rate 93 BPM    Atrial Rate 93 BPM    P-R Interval 148 ms    QRS Duration 114 ms    Q-T Interval 388 ms    QTc Calculation (Bazett) 482 ms    P Axis 44 degrees    R Axis -16 degrees    T Axis 85 degrees       Diet:  ADULT DIET;  Regular; 4 carb choices (60 gm/meal)    Activity:  Activity as tolerated (Patient may move about with assist as indicated or with supervision.)    Follow-up:  in 1-2 weeks with Jim Ward MD    Disposition: home with home health    Condition: Stable    Time Spent: 35 minutes    Electronically signed by Vance Mcneal MD on 11/30/2021 at 4:48 PM    Discharging Hospitalist

## 2021-11-30 NOTE — CARE COORDINATION
11/30/21, 4:08 PM EST    DISCHARGE PLANNING EVALUATION      Referral made to [facility], agency can provide skilled nursing, PT/OT, aid, can start services on Saturday. 11/30/21, 4:08 PM EST    Patient goals/plan/ treatment preferences discussed by  and . Patient goals/plan/ treatment preferences reviewed with patient/ family. Patient/ family verbalize understanding of discharge plan and are in agreement with goal/plan/treatment preferences. Understanding was demonstrated using the teach back method. AVS provided by RN at time of discharge, which includes all necessary medical information pertaining to the patients current course of illness, treatment, post-discharge goals of care, and treatment preferences.     Services After Discharge  Services At/After Discharge: Aide Services, Nursing Services (Continued Care)   IMM Letter  IMM Letter given to Patient/Family/Significant other/Guardian/POA/by[de-identified] cm  IMM Letter date given[de-identified] 11/29/21  IMM Letter time given[de-identified] 56  Observation Status Letter date given[de-identified] 11/29/21  Observation Status Letter time given[de-identified] 5527  Observation Status Letter given to Patient/Family/Significant other/Guardian/POA/by[de-identified] CM

## 2021-11-30 NOTE — PROGRESS NOTES
Hospitalist Progress Note    Patient:  Audrey Horowitz      Unit/Bed:7K-21/021-A    YOB: 1950    MRN: [id number]       Acct: [de-identified]     PCP: Mimi Mehta MD    Date of Admission: 11/27/2021      Subjective: Patient seen and examined. Patient states that she feels weak and she wants to be evaluated by physical therapy to see if she needs to go to rehab. No fever, no chills, no nausea, no vomiting. Medications:  Reviewed    Infusion Medications   Scheduled Medications    cefTRIAXone (ROCEPHIN) IV  1,000 mg IntraVENous Q24H    azithromycin  500 mg IntraVENous Q24H    aspirin  81 mg Oral Daily    budesonide  500 mcg Nebulization BID    citalopram  40 mg Oral Daily    gabapentin  300 mg Oral TID    metoprolol succinate  25 mg Oral Daily    montelukast  10 mg Oral Nightly    pantoprazole  40 mg Oral QAM AC    QUEtiapine  50 mg Oral Nightly    atorvastatin  40 mg Oral Daily    ascorbic acid  500 mg Oral Daily    zinc sulfate  50 mg Oral Daily    insulin lispro  0-12 Units SubCUTAneous TID WC    insulin lispro  0-6 Units SubCUTAneous Nightly     PRN Meds: acetaminophen, albuterol, albuterol sulfate HFA, biotene, nystatin-triamcinolone      Intake/Output Summary (Last 24 hours) at 11/29/2021 2203  Last data filed at 11/29/2021 2016  Gross per 24 hour   Intake 300 ml   Output 700 ml   Net -400 ml       Diet:  ADULT DIET; Regular; 4 carb choices (60 gm/meal)    Exam:  BP (!) 132/56   Pulse 77   Temp 97.4 °F (36.3 °C) (Oral)   Resp 18   Ht 5' 2\" (1.575 m)   Wt 273 lb (123.8 kg)   LMP  (LMP Unknown)   SpO2 93%   BMI 49.93 kg/m²   Physical Exam  Constitutional:       Appearance: Normal appearance. HENT:      Head: Normocephalic and atraumatic. Right Ear: External ear normal.      Left Ear: External ear normal.      Nose: Nose normal.      Mouth/Throat:      Pharynx: Oropharynx is clear. Eyes:      Extraocular Movements: Extraocular movements intact. Pupils: Pupils are equal, round, and reactive to light. Cardiovascular:      Rate and Rhythm: Normal rate and regular rhythm. Pulses: Normal pulses. Heart sounds: Normal heart sounds. No murmur heard. No friction rub. No gallop. Pulmonary:      Effort: Pulmonary effort is normal. No respiratory distress. Breath sounds: Normal breath sounds. No wheezing, rhonchi or rales. Abdominal:      General: Bowel sounds are normal. There is no distension. Tenderness: There is no abdominal tenderness. Musculoskeletal:         General: No swelling. Normal range of motion. Cervical back: Normal range of motion and neck supple. Comments: Venous stasis dermatitis of lower extremities bilaterally   Skin:     General: Skin is warm. Neurological:      General: No focal deficit present. Mental Status: She is alert. Labs:   Recent Labs     11/27/21  1545 11/29/21  0653   WBC 18.0* 14.6*   HGB 13.3 12.8   HCT 41.4 39.4    235     Recent Labs     11/27/21  1545      K 4.1   CL 98   CO2 23   BUN 22   CREATININE 0.8   CALCIUM 9.4     Recent Labs     11/27/21  1545   AST 17   ALT 17   BILITOT 0.8   ALKPHOS 97     No results for input(s): INR in the last 72 hours. No results for input(s): Curlie Lat in the last 72 hours. Urinalysis:      Lab Results   Component Value Date    NITRU POSITIVE 11/27/2021    WBCUA 0-2 11/27/2021    BACTERIA MANY 11/27/2021    RBCUA 0-2 11/27/2021    BLOODU NEGATIVE 11/27/2021    SPECGRAV 1.012 11/27/2021    GLUCOSEU NEGATIVE 08/31/2021       Radiology:  CTA CHEST W WO CONTRAST - r/o Pulmonary Embolism   Final Result   Suboptimal opacification of the pulmonary arteries such that distal subsegmental emboli cannot be excluded. There is no large central or proximal branch emboli seen. Right upper lobe airspace consolidation. **This report has been created using voice recognition software.   It may contain minor errors which are inherent in voice recognition technology. **      Final report electronically signed by Dr. Raheem Cabrera on 11/27/2021 6:53 PM      CT Lumbar Spine WO Contrast   Final Result   Extensive degenerative changes and postsurgical changes. No acute process identified. **This report has been created using voice recognition software. It may contain minor errors which are inherent in voice recognition technology. **      Final report electronically signed by Dr. Raheem Cabrera on 11/27/2021 5:22 PM      CT Head WO Contrast   Final Result   No acute process            **This report has been created using voice recognition software. It may contain minor errors which are inherent in voice recognition technology. **      Final report electronically signed by Dr. Raheem Cabrera on 11/27/2021 5:16 PM      XR CHEST (2 VW)   Final Result   1. Focal ill-defined infiltrate is seen at the right upper lung zone. This could represent focal pneumonia. However, cannot exclude an underlying mass. Further evaluation could be obtained with chest CT. **This report has been created using voice recognition software. It may contain minor errors which are inherent in voice recognition technology. **      Final report electronically signed by Dr. Meron Linn on 11/27/2021 4:46 PM      CT CHEST WO CONTRAST    (Results Pending)       Diet: ADULT DIET; Regular; 4 carb choices (60 gm/meal)    DVT prophylaxis: [x] Lovenox                                 [] SCDs                                 [] SQ Heparin                                 [] Encourage ambulation           [] Already on Anticoagulation     Disposition:    [x] Home       [] TCU       [] Rehab       [] Psych       [x] SNF       [] [address]       [] Other-    Code Status: Full Code    PT/OT Eval Status:  On board    Assessment/Plan:    Anticipated Discharge in : [facility] Problems    Diagnosis Date Noted    Community acquired pneumonia of right upper lobe of lung [J18.9] 11/28/2021     Priority: High    Lactic acid acidosis [E87.2] 11/28/2021     Priority: High    UTI (urinary tract infection) [N39.0] 11/27/2021     Priority: High    Fall [W19. XXXA] 07/26/2013     Priority: High    Weakness generalized [R53.1] 01/25/2013     Priority: High    Erythema intertrigo [L30.4] 11/28/2021     Priority: Medium    Ventral hernia [K43.9] 07/09/2013     Priority: Medium    Schizoaffective disorder ([facility] [address] [F25.9] 10/22/2020     Priority: Low    PTSD (post-traumatic stress disorder) [F43.10] 10/22/2020     Priority: Low    Fatty liver [K76.0] 10/22/2020     Priority: Low    BMI 45.0-49.9, adult [facility]) [Z68.42] 07/26/2013     Priority: Low    Obesity, Class III, BMI 40-49.9 (morbid obesity) ([facility] [address] [E66.01] 03/11/2013     Priority: Low    Hypertension [I10]      Priority: Low    Coronary artery disease involving native coronary artery of native heart without angina pectoris [I25.10]      Priority: Low    Diabetes mellitus ([facility] [address] [E11.9]      Priority: Low    Hyperlipidemia [E78.5]      Priority: Low    Abnormal CT of the chest [R93.89]        1. Status post fallpatient reports that she felt weak on the day of admission which prompted her to fall. Patient states she felt as though her legs went out on her. Patient states she has not fell in over a year. We will get PT/OT to evaluate patient. 2. Generalized weaknesspatient states that she feels overall weak. Patient may be weak secondary to underlying infection. Patient currently being treated for UTI and pneumonia. PT/OT has been ordered. · OT states that patient is not safe to go home and should most likely go to a SNF on discharge. 3. Community-acquired pneumoniapatient with imaging showing right upper lobe airspace consolidation. Patient states that she does not have an increase in her cough. Patient denies having any fever. Patient noted to have an increased WBC count.   Patient evaluated by pulmonology and is recommended that patient have repeat CT scan in 2 to 3 weeks after completion of antibiotics. Patient currently on Rocephin and Zithromax. We will follow-up sputum cultures. 4. Acute cystitispatient noted to have many bacteria in her urine. Patient UA does not show any WBC. Patient is nitrite positive but leukocyte esterase negative. Patient currently on Rocephin for possible pneumonia. · 11/29/2021urine culture positive for Klebsiella pneumonia with sensitivities to Rocephin. 5. Hypertensionpatient with history of hypertension. Patient be continued on her home blood pressure medications. 6. Coronary artery diseasepatient denies having any chest pain. Patient will be continued on aspirin, Toprol-XL and Lipitor. 7. Type 2 diabetespatient with history of type 2 diabetes. Patient placed on insulin sliding scale. Accu-Cheks as scheduled. 8. Hyperlipidemiapatient with history of hyperlipidemia. Patient will be continued on her home Lipitor.         Electronically signed by Carol Lo MD on 11/29/2021 at 10:03 PM

## 2021-12-01 NOTE — CARE COORDINATION
12/1/21, 8:14 AM EST    DISCHARGE PLANNING EVALUATION      Notified [employer]  and [facility] of discharge and referral to [facility] for skilled services.

## 2021-12-01 NOTE — PROGRESS NOTES
[facility] here to transport pt. Pt alert and oriented x 4. Left home with all her belonging All questions answered at this time.

## 2021-12-01 NOTE — PROGRESS NOTES
Discharge teaching and instructions for diagnosis/procedure of pneumonia completed with patient using teachback method. AVS reviewed. Printed prescription given to patient. Patient voiced understanding regarding prescriptions, scheduled follow up appointments, need to cancel and reschedule appointment with Mckayla Waters CNP, need to schedule CT scan and have completed by Feb 28, and care of self at home with home health nursing, aid, therapy ordered.

## 2021-12-03 LAB
BLOOD CULTURE, ROUTINE: NORMAL
BLOOD CULTURE, ROUTINE: NORMAL

## 2021-12-30 PROBLEM — N39.0 UTI (URINARY TRACT INFECTION): Status: RESOLVED | Noted: 2021-11-27 | Resolved: 2021-12-30

## 2022-01-13 ENCOUNTER — HOSPITAL ENCOUNTER (OUTPATIENT)
Dept: CT IMAGING | Age: 72
Discharge: HOME | End: 2022-01-13
Payer: MEDICARE

## 2022-01-13 ENCOUNTER — OFFICE VISIT (OUTPATIENT)
Dept: CARDIOLOGY CLINIC | Age: 72
End: 2022-01-13
Payer: MEDICARE

## 2022-01-13 ENCOUNTER — HOSPITAL ENCOUNTER (OUTPATIENT)
Age: 72
Discharge: HOME | End: 2022-01-13
Payer: MEDICARE

## 2022-01-13 ENCOUNTER — HOSPITAL ENCOUNTER (OUTPATIENT)
Dept: GENERAL RADIOLOGY | Age: 72
Discharge: HOME | End: 2022-01-13
Payer: MEDICARE

## 2022-01-13 VITALS
BODY MASS INDEX: 47.19 KG/M2 | WEIGHT: 258 LBS | SYSTOLIC BLOOD PRESSURE: 124 MMHG | HEART RATE: 61 BPM | DIASTOLIC BLOOD PRESSURE: 69 MMHG

## 2022-01-13 DIAGNOSIS — I10 PRIMARY HYPERTENSION: ICD-10-CM

## 2022-01-13 DIAGNOSIS — E78.01 FAMILIAL HYPERCHOLESTEROLEMIA: ICD-10-CM

## 2022-01-13 DIAGNOSIS — I25.10 CORONARY ARTERY DISEASE INVOLVING NATIVE CORONARY ARTERY OF NATIVE HEART WITHOUT ANGINA PECTORIS: Primary | ICD-10-CM

## 2022-01-13 DIAGNOSIS — M96.1 FAILED BACK SYNDROME: ICD-10-CM

## 2022-01-13 DIAGNOSIS — M96.1 POSTLAMINECTOMY SYNDROME, CERVICAL REGION: ICD-10-CM

## 2022-01-13 PROCEDURE — 99213 OFFICE O/P EST LOW 20 MIN: CPT | Performed by: NUCLEAR MEDICINE

## 2022-01-13 PROCEDURE — G8484 FLU IMMUNIZE NO ADMIN: HCPCS | Performed by: NUCLEAR MEDICINE

## 2022-01-13 PROCEDURE — 72082 X-RAY EXAM ENTIRE SPI 2/3 VW: CPT

## 2022-01-13 PROCEDURE — G8427 DOCREV CUR MEDS BY ELIG CLIN: HCPCS | Performed by: NUCLEAR MEDICINE

## 2022-01-13 PROCEDURE — 1036F TOBACCO NON-USER: CPT | Performed by: NUCLEAR MEDICINE

## 2022-01-13 PROCEDURE — 72131 CT LUMBAR SPINE W/O DYE: CPT

## 2022-01-13 PROCEDURE — G8400 PT W/DXA NO RESULTS DOC: HCPCS | Performed by: NUCLEAR MEDICINE

## 2022-01-13 PROCEDURE — 1090F PRES/ABSN URINE INCON ASSESS: CPT | Performed by: NUCLEAR MEDICINE

## 2022-01-13 PROCEDURE — 1123F ACP DISCUSS/DSCN MKR DOCD: CPT | Performed by: NUCLEAR MEDICINE

## 2022-01-13 PROCEDURE — 3017F COLORECTAL CA SCREEN DOC REV: CPT | Performed by: NUCLEAR MEDICINE

## 2022-01-13 PROCEDURE — G8417 CALC BMI ABV UP PARAM F/U: HCPCS | Performed by: NUCLEAR MEDICINE

## 2022-01-13 PROCEDURE — 4040F PNEUMOC VAC/ADMIN/RCVD: CPT | Performed by: NUCLEAR MEDICINE

## 2022-01-13 RX ORDER — ETODOLAC 500 MG/1
500 TABLET, FILM COATED ORAL 2 TIMES DAILY
COMMUNITY

## 2022-01-13 RX ORDER — OXYBUTYNIN CHLORIDE 15 MG/1
15 TABLET, EXTENDED RELEASE ORAL DAILY
COMMUNITY
Start: 2021-11-16

## 2022-01-13 RX ORDER — SPIRONOLACTONE 25 MG/1
25 TABLET ORAL DAILY
COMMUNITY
Start: 2021-10-31

## 2022-01-13 RX ORDER — HYDROXYZINE 50 MG/1
50 TABLET, FILM COATED ORAL NIGHTLY
COMMUNITY
End: 2022-04-26 | Stop reason: SDUPTHER

## 2022-01-13 NOTE — PROGRESS NOTES
[id number]  [address]  [facility] [address]  Dept: [phone]  Dept Fax: [phone]  Loc: [phone]    Visit Date: 1/13/2022    Shanti Heller is a 66 y.o. female who presents todayfor:  Chief Complaint   Patient presents with    Check-Up    Coronary Artery Disease    Hypertension    Hyperlipidemia        Known CAD  Known stents  Limited patient   Some baseline dyspnea  Some chest tightness at times  Uses inhalers  No use of nitro   Bp is stable   No dizziness  No syncope      HPI:  HPI  Past Medical History:   Diagnosis Date    Acid reflux     Arrhythmia     Arthritis     CAD (coronary artery disease)     NON-OBSTRUCTIVE    Chronic bronchitis ([address]     COVID-19 10/2020    Depression     Diabetes mellitus ([facility] [address]     Dizziness - light-headed     Fall 10/15/2013    left rib fracture and shoulder contusion     Hyperlipidemia     Hypertension     Kidney disease     MDRO (multiple drug resistant organisms) resistance 02/2014    MRSA ABDOMEN    Mild persistent asthma without complication 8/16/3318    Numbness and tingling     HX OF:    S/P laparoscopic cholecystectomy 11/27/2018    Swelling     Tinnitus       Past Surgical History:   Procedure Laterality Date    BACK SURGERY  2007    CARDIOVASCULAR STRESS TEST  04/03/2009    Persantine test associated w/non specific symptoms. EKG non diagnostic. no obvious stress induced ischemia. EF 63%. normal wall motion.  CARDIOVASCULAR STRESS TEST  03/31/2010    tilt table associated w/fluctuation of BP w/relatively low BP throughout test. no significant symptoms during test. borderline tilt table test.    CHOLECYSTECTOMY      COLONOSCOPY  2011    CORONARY ANGIOPLASTY  12/16/2009    EF 60%. nonobstructive CAD. normal LV function.     ENDOSCOPY, COLON, DIAGNOSTIC  over 5 years     Dr. Claudia Oro  2004    cataract    HERNIA REPAIR  21 months old     umblilical  HERNIA REPAIR  13    Ventral Hernia Repair with Mesh - Dr. Parminder Tapia    partial    OTHER SURGICAL HISTORY  2014    Left lower quadrant incision and drainage (Dr. Fabiana Mckeon, [facility])    OTHER SURGICAL HISTORY  2014    I&D abd abscess Dr. Lawson Singletary, NEEDLE CORE      Benign    PA LAP,DIAGNOSTIC ABDOMEN N/A 2018    LAPAROSCOPY EXPLORATORY, LAP LYLE performed by Deion Roger [facility] at [facility]  as a child     TOOTH EXTRACTION  2012    TRANSTHORACIC ECHOCARDIOGRAM  2009    EF 50-55%. normal LV global systolic funct. slight LVH. slight lt atrial enlargement. no obvious stenotic valves. no pericardial effusion.      Family History   Problem Relation Age of Onset    Cancer Mother 28        uterine    Heart Disease Father     Cancer Father 48        lung    Diabetes Paternal Grandfather     Cancer Maternal Grandmother         uterine    Cancer Maternal Aunt 28        uterine    Cancer Maternal Cousin 28        uterine    Colon Cancer Neg Hx     Breast Cancer Neg Hx      Social History     Tobacco Use    Smoking status: Former Smoker     Years: 3.00     Types: Cigarettes     Start date:      Quit date:      Years since quittin.0    Smokeless tobacco: Never Used    Tobacco comment: 3 cigarettes a month, Passive smoke exposure 39 years Father then ex    Substance Use Topics    Alcohol use: No     Alcohol/week: 0.0 standard drinks      Current Outpatient Medications   Medication Sig Dispense Refill    oxybutynin (DITROPAN XL) 15 MG extended release tablet Take 15 mg by mouth daily       spironolactone (ALDACTONE) 25 MG tablet Take 25 mg by mouth daily       etodolac (LODINE) 500 MG tablet Take 500 mg by mouth 2 times daily      hydrOXYzine (ATARAX) 50 MG tablet Take 50 mg by mouth nightly      Multiple Vitamins-Minerals (SENIOR VITES PO) Take by mouth      Carlee, Zingiber officinalis, (GINGER ROOT) 550 MG CAPS Take by mouth      NONFORMULARY Hylands twice a day      montelukast (SINGULAIR) 10 MG tablet TAKE 1 TABLET BY MOUTH NIGHTLY 30 tablet 11    budesonide (PULMICORT) 0.5 MG/2ML nebulizer suspension Take 2 mLs by nebulization 2 times daily 120 mL 11    Nebulizer MISC Please provide new nebulizer machine to use with Albuterol 2.5mg via nebs Q6h prn and budesonide 500 mcg BID 1 each 0    albuterol (PROVENTIL) (2.5 MG/3ML) 0.083% nebulizer solution Take 3 mLs by nebulization every 6 hours as needed for Wheezing or Shortness of Breath 120 each 11    metoprolol succinate (TOPROL XL) 25 MG extended release tablet take 1 tablet by mouth once daily 30 tablet 10    magnesium oxide (MAG-OX) 400 (241.3 Mg) MG TABS tablet Take 1 tablet by mouth daily      zinc sulfate (ZINCATE) 220 (50 Zn) MG capsule Take 1 capsule by mouth daily      vitamin C (VITAMIN C) 500 MG tablet Take 1 tablet by mouth daily      Cholecalciferol (VITAMIN D) 25 MCG TABS Take 1 tablet by mouth daily      mupirocin (BACTROBAN) 2 % ointment Apply topically 3 times daily Apply topically 3 times daily.  loratadine (CLARITIN) 10 MG tablet Take 10 mg by mouth daily      Cranberry 450 MG CAPS Take by mouth      Lactobacillus (ACIDOPHILUS) TABS Take by mouth      Mouthwashes (BIOTENE) LIQD oral solution Swish and spit 15 mLs 3 times daily as needed      furosemide (LASIX) 20 MG tablet Take 20 mg by mouth daily      citalopram (CELEXA) 40 MG tablet take 1 tablet by mouth every morning  0    nystatin-triamcinolone (MYCOLOG II) 438271-3.1 UNIT/GM-% cream Apply 1 applicator topically 4 times daily Apply topically 4 times daily.  As needed      HOMEOPATHIC PRODUCTS  mg by Combination route daily as needed Legatrin PM      Artificial Saliva (BIOTENE MOISTURIZING MOUTH) SOLN Take 1 applicator by mouth as needed      Compression Stockings MISC 1 Package by Does not apply route Knee High 1 each 0    omeprazole (PRILOSEC) 40 MG delayed release capsule take 1 capsule by mouth once daily 30 capsule 5    TRADJENTA 5 MG tablet take 1 tablet by mouth daily 30 tablet 5    simvastatin (ZOCOR) 40 MG tablet take 1 tablet by mouth at bedtime 30 tablet 5    VESICARE 10 MG tablet take 1 tablet by mouth once daily 30 tablet 5    pioglitazone (ACTOS) 45 MG tablet take 1 tablet by mouth once daily 30 tablet 5    metFORMIN (GLUCOPHAGE) 1000 MG tablet take 1 tablet by mouth twice a day with food 60 tablet 5    gabapentin (NEURONTIN) 300 MG capsule Take 1 capsule by mouth 3 times daily (Patient taking differently: Take 800 mg by mouth 2 times daily. Juanell Leyland ) 90 capsule 5    albuterol sulfate HFA (PROAIR HFA) 108 (90 BASE) MCG/ACT inhaler Inhale 2 puffs into the lungs every 4 hours as needed 1 Inhaler 2    FREESTYLE LANCETS MISC Test 1 time daily. Dx: 250.00-Type 2 Diabetes. Freestyle 28G Lancets 100 each 11    Omega-3 Fatty Acids (FISH OIL) 1200 MG CAPS Take 1,200 mg by mouth daily      ASPIRIN LOW DOSE 81 MG EC tablet take 1 tablet by mouth once daily 30 tablet 11    clobetasol (TEMOVATE) 0.05 % cream Apply topically 2 times daily. 15 g 1    glucose blood VI test strips (FREESTYLE LITE) strip Test once daily 100 each 11    CICLOPIROX TREATMENT EX Apply  topically. 10.0 mL Ciclopirox-terbinafine-fluconazole  Fungal Free Nails Fungicidal solution      diphenhydrAMINE (BENADRYL) 50 MG capsule Take 50 mg by mouth nightly as needed for Itching.  QUEtiapine (SEROQUEL) 50 MG tablet Take 50 mg by mouth nightly.  acetaminophen (TYLENOL) 500 MG tablet Take 500 mg by mouth every 6 hours as needed for Pain. No current facility-administered medications for this visit.      Allergies   Allergen Reactions    Latex Hives, Itching and Rash    Bee Venom Anaphylaxis    Advair [Fluticasone-Salmeterol]      Heart races     Caffeine Other (See Comments)     Bad kidney problems    Pcn [Penicillins] Hives    Ultram [Tramadol Hcl] Swelling     Has tolerated norco here many times     Fluticasone-Salmeterol Palpitations    Wool Alcohol [Lanolin Alcohol] Hives, Itching and Rash     Health Maintenance   Topic Date Due    Depression Monitoring  Never done    Shingles Vaccine (1 of 2) Never done    Pneumococcal 65+ years Vaccine (1 of 1 - PPSV23) Never done    Diabetic microalbuminuria test  08/01/2015    Diabetic foot exam  09/01/2015    Diabetic retinal exam  12/30/2015    Annual Wellness Visit (AWV)  Never done    Colon cancer screen colonoscopy  06/28/2021    A1C test (Diabetic or Prediabetic)  10/01/2021    Lipid screen  10/12/2022    Potassium monitoring  11/27/2022    Creatinine monitoring  11/27/2022    Breast cancer screen  04/08/2023    DTaP/Tdap/Td vaccine (2 - Td or Tdap) 03/22/2028    Flu vaccine  Completed    COVID-19 Vaccine  Completed    Hepatitis C screen  Completed    DEXA (modify frequency per FRAX score)  Addressed    Hepatitis A vaccine  Aged Out    Hib vaccine  Aged Out    Meningococcal (ACWY) vaccine  Aged Out       Subjective:  Review of Systems  General:   No fever, no chills, No fatigue or weight loss  Pulmonary:    some dyspnea, no wheezing  Cardiac:    Did have recent chest pain,   GI:     No nausea or vomiting, no abdominal pain  Neuro:    No dizziness or light headedness,   Musculoskeletal:  No recent active issues  Extremities:   No edema, no obvious claudication       Objective:  Physical Exam  /69   Pulse 61   Wt 258 lb (117 kg)   LMP  (LMP Unknown)   BMI 47.19 kg/m²   General:   Well developed, well nourished  Lungs:   Clear to auscultation  Heart:    Normal S1 S2, Slight murmur. no rubs, no gallops  Abdomen:   Soft, non tender, no organomegalies, positive bowel sounds  Extremities:   No edema, no cyanosis, good peripheral pulses  Neurological:   Awake, alert, oriented.  No obvious focal deficits  Musculoskelatal:  No obvious deformities    Assessment:      Diagnosis Orders   1. Coronary artery disease involving native coronary artery of native heart without angina pectoris     2. Primary hypertension     3. Familial hypercholesterolemia     as above  Higher risk patient   Symptoms as above      Plan:  No follow-ups on file. Discussed  Non invasive cardiac testing will be ordered to further evaluate for any ischemic or structural heart disease as a cause of the patient symptoms. We will proceed with a Stress Cardiolite test and echo soon. Continue risk factor modification and medical management  Thank you for allowing me to participate in the care of your patient. Please don't hesitate to contact me regarding any further issues related to the patient care      Orders Placed:  No orders of the defined types were placed in this encounter. Medications Prescribed:  No orders of the defined types were placed in this encounter. Discussed use, benefit, and side effects of prescribed medications. All patient questions answered. Pt voicedunderstanding. Instructed to continue current medications, diet and exercise. Continue risk factor modification and medical management. Patient agreed with treatment plan. Follow up as directed.     Electronically signedby Tami Hernandez MD on 1/13/2022 at 9:29 AM

## 2022-01-18 LAB
AVERAGE GLUCOSE: NORMAL
HBA1C MFR BLD: 5.9 %

## 2022-02-03 ENCOUNTER — APPOINTMENT (OUTPATIENT)
Dept: NON INVASIVE DIAGNOSTICS | Age: 72
End: 2022-02-03
Payer: MEDICARE

## 2022-02-08 ENCOUNTER — OFFICE VISIT (OUTPATIENT)
Dept: FAMILY MEDICINE CLINIC | Age: 72
End: 2022-02-08
Payer: MEDICARE

## 2022-02-08 VITALS
DIASTOLIC BLOOD PRESSURE: 68 MMHG | HEART RATE: 65 BPM | WEIGHT: 268 LBS | HEIGHT: 62 IN | TEMPERATURE: 97.9 F | SYSTOLIC BLOOD PRESSURE: 122 MMHG | BODY MASS INDEX: 49.32 KG/M2

## 2022-02-08 DIAGNOSIS — I10 PRIMARY HYPERTENSION: ICD-10-CM

## 2022-02-08 DIAGNOSIS — J45.30 MILD PERSISTENT ASTHMA WITHOUT COMPLICATION: ICD-10-CM

## 2022-02-08 DIAGNOSIS — M54.50 CHRONIC BILATERAL LOW BACK PAIN, UNSPECIFIED WHETHER SCIATICA PRESENT: ICD-10-CM

## 2022-02-08 DIAGNOSIS — I87.2 VENOUS STASIS DERMATITIS OF BOTH LOWER EXTREMITIES: Chronic | ICD-10-CM

## 2022-02-08 DIAGNOSIS — G89.29 CHRONIC BILATERAL LOW BACK PAIN, UNSPECIFIED WHETHER SCIATICA PRESENT: ICD-10-CM

## 2022-02-08 DIAGNOSIS — I25.10 CORONARY ARTERY DISEASE INVOLVING NATIVE CORONARY ARTERY OF NATIVE HEART WITHOUT ANGINA PECTORIS: ICD-10-CM

## 2022-02-08 DIAGNOSIS — E66.01 OBESITY, CLASS III, BMI 40-49.9 (MORBID OBESITY) (HCC): ICD-10-CM

## 2022-02-08 DIAGNOSIS — E78.5 HYPERLIPIDEMIA, UNSPECIFIED HYPERLIPIDEMIA TYPE: ICD-10-CM

## 2022-02-08 DIAGNOSIS — Z91.81 AT HIGH RISK FOR FALLS: ICD-10-CM

## 2022-02-08 DIAGNOSIS — E11.42 TYPE 2 DIABETES MELLITUS WITH DIABETIC POLYNEUROPATHY, WITHOUT LONG-TERM CURRENT USE OF INSULIN (HCC): Primary | ICD-10-CM

## 2022-02-08 DIAGNOSIS — N39.41 URGE INCONTINENCE OF URINE: ICD-10-CM

## 2022-02-08 DIAGNOSIS — M51.36 DDD (DEGENERATIVE DISC DISEASE), LUMBAR: ICD-10-CM

## 2022-02-08 DIAGNOSIS — Z87.440 HX: UTI (URINARY TRACT INFECTION): ICD-10-CM

## 2022-02-08 LAB
BILIRUBIN URINE: NEGATIVE
BLOOD, URINE: NEGATIVE
CHARACTER, URINE: CLEAR
COLOR: YELLOW
GLUCOSE URINE: NEGATIVE MG/DL
KETONES, URINE: NEGATIVE
LEUKOCYTE ESTERASE, URINE: NEGATIVE
NITRITE, URINE: NEGATIVE
PH UA: 5 (ref 5–9)
PROTEIN UA: NEGATIVE
SPECIFIC GRAVITY, URINE: 1.01 (ref 1–1.03)
UROBILINOGEN, URINE: 0.2 EU/DL (ref 0–1)

## 2022-02-08 PROCEDURE — 0509F URINE INCON PLAN DOCD: CPT | Performed by: NURSE PRACTITIONER

## 2022-02-08 PROCEDURE — 2022F DILAT RTA XM EVC RTNOPTHY: CPT | Performed by: NURSE PRACTITIONER

## 2022-02-08 PROCEDURE — G8400 PT W/DXA NO RESULTS DOC: HCPCS | Performed by: NURSE PRACTITIONER

## 2022-02-08 PROCEDURE — G8427 DOCREV CUR MEDS BY ELIG CLIN: HCPCS | Performed by: NURSE PRACTITIONER

## 2022-02-08 PROCEDURE — 3044F HG A1C LEVEL LT 7.0%: CPT | Performed by: NURSE PRACTITIONER

## 2022-02-08 PROCEDURE — 1036F TOBACCO NON-USER: CPT | Performed by: NURSE PRACTITIONER

## 2022-02-08 PROCEDURE — 99204 OFFICE O/P NEW MOD 45 MIN: CPT | Performed by: NURSE PRACTITIONER

## 2022-02-08 PROCEDURE — G8417 CALC BMI ABV UP PARAM F/U: HCPCS | Performed by: NURSE PRACTITIONER

## 2022-02-08 PROCEDURE — 1090F PRES/ABSN URINE INCON ASSESS: CPT | Performed by: NURSE PRACTITIONER

## 2022-02-08 PROCEDURE — 4040F PNEUMOC VAC/ADMIN/RCVD: CPT | Performed by: NURSE PRACTITIONER

## 2022-02-08 PROCEDURE — G8484 FLU IMMUNIZE NO ADMIN: HCPCS | Performed by: NURSE PRACTITIONER

## 2022-02-08 PROCEDURE — 3017F COLORECTAL CA SCREEN DOC REV: CPT | Performed by: NURSE PRACTITIONER

## 2022-02-08 PROCEDURE — 1123F ACP DISCUSS/DSCN MKR DOCD: CPT | Performed by: NURSE PRACTITIONER

## 2022-02-08 RX ORDER — BUPROPION HYDROCHLORIDE 150 MG/1
150 TABLET ORAL EVERY MORNING
COMMUNITY

## 2022-02-08 SDOH — ECONOMIC STABILITY: FOOD INSECURITY: WITHIN THE PAST 12 MONTHS, YOU WORRIED THAT YOUR FOOD WOULD RUN OUT BEFORE YOU GOT MONEY TO BUY MORE.: NEVER TRUE

## 2022-02-08 SDOH — ECONOMIC STABILITY: FOOD INSECURITY: WITHIN THE PAST 12 MONTHS, THE FOOD YOU BOUGHT JUST DIDN'T LAST AND YOU DIDN'T HAVE MONEY TO GET MORE.: NEVER TRUE

## 2022-02-08 ASSESSMENT — PATIENT HEALTH QUESTIONNAIRE - PHQ9
8. MOVING OR SPEAKING SO SLOWLY THAT OTHER PEOPLE COULD HAVE NOTICED. OR THE OPPOSITE, BEING SO FIGETY OR RESTLESS THAT YOU HAVE BEEN MOVING AROUND A LOT MORE THAN USUAL: 0
SUM OF ALL RESPONSES TO PHQ QUESTIONS 1-9: 15
SUM OF ALL RESPONSES TO PHQ QUESTIONS 1-9: 15
9. THOUGHTS THAT YOU WOULD BE BETTER OFF DEAD, OR OF HURTING YOURSELF: 0
2. FEELING DOWN, DEPRESSED OR HOPELESS: 3
7. TROUBLE CONCENTRATING ON THINGS, SUCH AS READING THE NEWSPAPER OR WATCHING TELEVISION: 1
SUM OF ALL RESPONSES TO PHQ9 QUESTIONS 1 & 2: 4
SUM OF ALL RESPONSES TO PHQ QUESTIONS 1-9: 15
4. FEELING TIRED OR HAVING LITTLE ENERGY: 3
3. TROUBLE FALLING OR STAYING ASLEEP: 3
5. POOR APPETITE OR OVEREATING: 1
10. IF YOU CHECKED OFF ANY PROBLEMS, HOW DIFFICULT HAVE THESE PROBLEMS MADE IT FOR YOU TO DO YOUR WORK, TAKE CARE OF THINGS AT HOME, OR GET ALONG WITH OTHER PEOPLE: 1
6. FEELING BAD ABOUT YOURSELF - OR THAT YOU ARE A FAILURE OR HAVE LET YOURSELF OR YOUR FAMILY DOWN: 3
1. LITTLE INTEREST OR PLEASURE IN DOING THINGS: 1
SUM OF ALL RESPONSES TO PHQ QUESTIONS 1-9: 15

## 2022-02-08 ASSESSMENT — ENCOUNTER SYMPTOMS: BACK PAIN: 1

## 2022-02-08 ASSESSMENT — SOCIAL DETERMINANTS OF HEALTH (SDOH): HOW HARD IS IT FOR YOU TO PAY FOR THE VERY BASICS LIKE FOOD, HOUSING, MEDICAL CARE, AND HEATING?: NOT HARD AT ALL

## 2022-02-08 ASSESSMENT — COLUMBIA-SUICIDE SEVERITY RATING SCALE - C-SSRS
2. HAVE YOU ACTUALLY HAD ANY THOUGHTS OF KILLING YOURSELF?: NO
6. HAVE YOU EVER DONE ANYTHING, STARTED TO DO ANYTHING, OR PREPARED TO DO ANYTHING TO END YOUR LIFE?: NO
1. WITHIN THE PAST MONTH, HAVE YOU WISHED YOU WERE DEAD OR WISHED YOU COULD GO TO SLEEP AND NOT WAKE UP?: NO

## 2022-02-08 NOTE — PROGRESS NOTES
Kimani Garibay (:  1950) is a 66 y.o. female,New patient, here for evaluation of the following chief complaint(s):  New Patient (Establish care), Hypertension, Hyperlipidemia, Diabetes (A1C 22 =5.9), and Insomnia (Problem falling asleep )         ASSESSMENT/PLAN:  1. Type 2 diabetes mellitus with diabetic polyneuropathy, without long-term current use of insulin ([facility] [address]  2. Chronic bilateral low back pain, unspecified whether sciatica present  -     Urinalysis Reflex to Culture; Future  3. DDD (degenerative disc disease), lumbar  4. Hx: UTI (urinary tract infection)  -     Urinalysis Reflex to Culture; Future  5. Urge incontinence of urine  6. Coronary artery disease involving native coronary artery of native heart without angina pectoris  7. Primary hypertension  8. Hyperlipidemia, unspecified hyperlipidemia type  9. Venous stasis dermatitis of both lower extremities  10. Mild persistent asthma without complication  11. Obesity, Class III, BMI 40-49.9 (morbid obesity) ([facility] [address]  12. At high risk for falls    Get last note and labs from Dr Ba Cueva. May need to re-order labs depending on last time it was done. Antifungal powder to skin folds, and washcloth or pillowcase to keep skin from touching. Continue to follow with specialists:   Mikael Ballard from Pulmonary  Dr Ru Lan- cardiology  Tyron Mandel for psychiatry  Dr Hector Judge for neurosurgery   Dr Goldie Pena for pain management. I will  prescribe diabetic meds, bladder meds, antifungal meds when needed. POC urine showed no signs of infection. Return in about 2 months (around 2022) for follow up. Subjective   SUBJECTIVE/OBJECTIVE:  HPI    To establish with a new provider. In a wheelchair most of the time, due to back pain and weakness in the legs. Lives at [facility], her son is living with her right now. Last seen by PCP last  Month, Dr Manuelito Trevizo like she has kidney problems 2-3 times per month. She will have back pain.  No pelvic pain, no dysuria. Has some mild back pain today, bilaterally. Feels like \"a needle\", then will have sharp pain at times. Hx Lumbar surgery in 2007, and in [facility]. Continues with chronic back pain. She also has arthritis in her knees, shoulders, and hip. Takes Gabapentin previously prescribed by Dr Eliel Jackson at [facility], but now seeing Dr Margie Avendano, who has referred her to Dr Calvin Chiu. She has an appt tomorrow. Has also been seen at [facility],  [facility]. Follows with cardiology for CAD, HNT, Hyperlipidemia. Last visit 1/13/22. Metoprolol, baby, Spironolactone, Furosemide, baby ASA, simvastatin and Fish Oil. Last lipid panel October 2021 showed total cholesterol 121, triglycerides 109, HDL 66, LDL 33    Depression- Wellbutrin, Celexa and Seroquel. Follows with Fide Cerrato CNP at [facility]. Seen every 3 month. Has been admitted to [place] [address] unit once, unsure of reason. Denies suicidal ideation. Has some sleep disturbance also. She was hospitalized in Nov 2021 for UTI and PNA. Was given Rocephin IV and Azithromycin daily. Discharged with order for CT Chest w/o contrast prior to next visit with pulmonary in March. (last visit was in Sept 2021 for asthma/COPD)    Diabetes: Metformin 1000 mg 1 tablet twice daily, Actose 45 mg daily, and Tradgenta 5 mg daily. HgbA1c was 5.9% on 1/18/22 per Dr Deloris Delacruz last office note. Ditropan XL 15 mg daily for OAB. Has incontinence at times and wears a brief. No bowel incontinence. Candiasis in skin folds. Has used topical and oral antifungals from Dr Angelic Lynn. Currently on Terbinafine. Review of Systems   Musculoskeletal: Positive for back pain. All other systems reviewed and are negative. Objective   Physical Exam  Vitals reviewed. Constitutional:       General: She is not in acute distress. Appearance: She is well-developed. HENT:      Head: Normocephalic.       Right Ear: External ear normal.      Left Ear: External ear normal.      Nose: Nose normal.      Mouth/Throat:      Pharynx: No oropharyngeal exudate. Eyes:      Conjunctiva/sclera: Conjunctivae normal.      Pupils: Pupils are equal, round, and reactive to light. Neck:      Thyroid: No thyromegaly. Cardiovascular:      Rate and Rhythm: Normal rate and regular rhythm. Heart sounds: Normal heart sounds. No murmur heard. No friction rub. No gallop. Pulmonary:      Effort: Pulmonary effort is normal. No respiratory distress. Breath sounds: Normal breath sounds. No wheezing or rales. Abdominal:      General: There is no distension. Palpations: Abdomen is soft. There is no mass. Tenderness: There is no abdominal tenderness. There is no guarding. Musculoskeletal:         General: Normal range of motion. Cervical back: Normal range of motion and neck supple. Right lower le+ Edema present. Left lower leg: No swelling. 1+ Edema present. Comments: Bilateral lower legs are erythematous with dry, scaly skin   Lymphadenopathy:      Cervical: No cervical adenopathy. Skin:     General: Skin is warm and dry. Capillary Refill: Capillary refill takes less than 2 seconds. Neurological:      Mental Status: She is alert and oriented to person, place, and time. Psychiatric:         Behavior: Behavior normal.         Thought Content: Thought content normal.         Judgment: Judgment normal.            On this date 2022 I have spent 45 minutes reviewing previous notes, test results and face to face with the patient discussing the diagnosis and importance of compliance with the treatment plan as well as documenting on the day of the visit. An electronic signature was used to authenticate this note. --CATE Saleem - CNP   On the basis of positive falls risk screening, assessment and plan is as follows: home safety tips provided.

## 2022-02-08 NOTE — PATIENT INSTRUCTIONS
You may receive a survey about your visit with us today. The feedback from our patients helps us identify what is working well and where the service to all patients can be enhanced. Thank you! Get last note and labs from Dr Snow Blanco. May need to re-order labs depending on last time it was done. Antifungal powder to skin folds, and washcloth or pillowcase to keep skin from touching. Continue to follow with specialist:   Yessi Moyer from Pulmonary  Dr Beto Matias- cardiology  Rea Liu for psychiatry  Dr Asha Parker for neurosurgery   Dr David Fontenot for pain management. I will  prescribe diabetic meds, bladder meds, antifungal meds when needed.

## 2022-02-10 ENCOUNTER — HOSPITAL ENCOUNTER (OUTPATIENT)
Dept: NON INVASIVE DIAGNOSTICS | Age: 72
Discharge: HOME | End: 2022-02-10
Payer: MEDICARE

## 2022-02-10 DIAGNOSIS — I25.10 CORONARY ARTERY DISEASE INVOLVING NATIVE CORONARY ARTERY OF NATIVE HEART WITHOUT ANGINA PECTORIS: ICD-10-CM

## 2022-02-10 DIAGNOSIS — E78.01 FAMILIAL HYPERCHOLESTEROLEMIA: ICD-10-CM

## 2022-02-10 DIAGNOSIS — I10 PRIMARY HYPERTENSION: ICD-10-CM

## 2022-02-10 LAB
LV EF: 55 %
LVEF MODALITY: NORMAL

## 2022-02-10 PROCEDURE — 3430000000 HC RX DIAGNOSTIC RADIOPHARMACEUTICAL: Performed by: NUCLEAR MEDICINE

## 2022-02-10 PROCEDURE — 93306 TTE W/DOPPLER COMPLETE: CPT

## 2022-02-10 PROCEDURE — 6360000002 HC RX W HCPCS

## 2022-02-10 PROCEDURE — A9500 TC99M SESTAMIBI: HCPCS | Performed by: NUCLEAR MEDICINE

## 2022-02-10 PROCEDURE — 78452 HT MUSCLE IMAGE SPECT MULT: CPT

## 2022-02-10 PROCEDURE — 93017 CV STRESS TEST TRACING ONLY: CPT | Performed by: NUCLEAR MEDICINE

## 2022-02-10 RX ADMIN — Medication 8 MILLICURIE: at 09:34

## 2022-02-10 RX ADMIN — Medication 33.7 MILLICURIE: at 11:58

## 2022-03-03 ENCOUNTER — TELEPHONE (OUTPATIENT)
Dept: BARIATRICS/WEIGHT MGMT | Age: 72
End: 2022-03-03

## 2022-03-03 ENCOUNTER — TELEPHONE (OUTPATIENT)
Dept: INTERNAL MEDICINE CLINIC | Age: 72
End: 2022-03-03

## 2022-03-03 DIAGNOSIS — E11.69 DIABETES MELLITUS TYPE 2 IN OBESE (HCC): Primary | ICD-10-CM

## 2022-03-03 DIAGNOSIS — E66.9 DIABETES MELLITUS TYPE 2 IN OBESE (HCC): Primary | ICD-10-CM

## 2022-03-03 DIAGNOSIS — E66.01 MORBID OBESITY WITH BMI OF 45.0-49.9, ADULT (HCC): ICD-10-CM

## 2022-03-03 NOTE — TELEPHONE ENCOUNTER
Outpatient dietitian left msge with Subhash Don [facility]. [facility] noting that I received her phone msge re: patient and her referral to [facility] for nutrition counseling. Office hours provided.

## 2022-03-03 NOTE — TELEPHONE ENCOUNTER
I made an electronic referral through Voc[employer], then followed up with a phone call to [facility]. Left message that patient needs diabetes/weight loss education. They can leave a message and patient will call them back.

## 2022-03-03 NOTE — TELEPHONE ENCOUNTER
----- Message from Yamileth Trinidad RN sent at 3/3/2022 11:30 AM EST -----  Regarding: Referral to Luana Weber RD  Would you please send a referral to jaxon for this patient. She has T2DM and is interested in weight loss; however she's on a fixed income and cannot afford our program fee for RD services. Please ask Cynthia Friedman to call patient and leave a voicemail message-patient will call her back.     Thanks  Lina Mays

## 2022-03-04 ENCOUNTER — HOSPITAL ENCOUNTER (OUTPATIENT)
Dept: CT IMAGING | Age: 72
Discharge: HOME | End: 2022-03-04
Payer: MEDICARE

## 2022-03-04 DIAGNOSIS — J18.1 RIGHT UPPER LOBE CONSOLIDATION (HCC): ICD-10-CM

## 2022-03-04 PROCEDURE — 71250 CT THORAX DX C-: CPT

## 2022-03-08 ENCOUNTER — OFFICE VISIT (OUTPATIENT)
Dept: PULMONOLOGY | Age: 72
End: 2022-03-08
Payer: MEDICARE

## 2022-03-08 VITALS
WEIGHT: 260.2 LBS | SYSTOLIC BLOOD PRESSURE: 122 MMHG | OXYGEN SATURATION: 97 % | HEART RATE: 63 BPM | DIASTOLIC BLOOD PRESSURE: 78 MMHG | HEIGHT: 63 IN | BODY MASS INDEX: 46.1 KG/M2 | TEMPERATURE: 97.4 F

## 2022-03-08 DIAGNOSIS — J45.30 MILD PERSISTENT ASTHMA WITHOUT COMPLICATION: Primary | ICD-10-CM

## 2022-03-08 DIAGNOSIS — J30.9 CHRONIC ALLERGIC RHINITIS: ICD-10-CM

## 2022-03-08 DIAGNOSIS — Z87.09 H/O PULMONARY EMPHYSEMA: ICD-10-CM

## 2022-03-08 PROCEDURE — G8427 DOCREV CUR MEDS BY ELIG CLIN: HCPCS | Performed by: NURSE PRACTITIONER

## 2022-03-08 PROCEDURE — G8484 FLU IMMUNIZE NO ADMIN: HCPCS | Performed by: NURSE PRACTITIONER

## 2022-03-08 PROCEDURE — 1090F PRES/ABSN URINE INCON ASSESS: CPT | Performed by: NURSE PRACTITIONER

## 2022-03-08 PROCEDURE — 4040F PNEUMOC VAC/ADMIN/RCVD: CPT | Performed by: NURSE PRACTITIONER

## 2022-03-08 PROCEDURE — 1036F TOBACCO NON-USER: CPT | Performed by: NURSE PRACTITIONER

## 2022-03-08 PROCEDURE — G8417 CALC BMI ABV UP PARAM F/U: HCPCS | Performed by: NURSE PRACTITIONER

## 2022-03-08 PROCEDURE — 1123F ACP DISCUSS/DSCN MKR DOCD: CPT | Performed by: NURSE PRACTITIONER

## 2022-03-08 PROCEDURE — 99214 OFFICE O/P EST MOD 30 MIN: CPT | Performed by: NURSE PRACTITIONER

## 2022-03-08 PROCEDURE — G8400 PT W/DXA NO RESULTS DOC: HCPCS | Performed by: NURSE PRACTITIONER

## 2022-03-08 PROCEDURE — 3017F COLORECTAL CA SCREEN DOC REV: CPT | Performed by: NURSE PRACTITIONER

## 2022-03-08 RX ORDER — ALBUTEROL SULFATE 90 UG/1
2 AEROSOL, METERED RESPIRATORY (INHALATION) EVERY 6 HOURS PRN
Qty: 1 EACH | Refills: 11
Start: 2022-03-08

## 2022-03-08 RX ORDER — LORATADINE 10 MG/1
10 TABLET ORAL DAILY
Qty: 30 TABLET | Refills: 11 | Status: SHIPPED | OUTPATIENT
Start: 2022-03-08

## 2022-03-08 ASSESSMENT — ENCOUNTER SYMPTOMS
DIARRHEA: 0
NAUSEA: 0
WHEEZING: 1
COUGH: 1
BACK PAIN: 1
SHORTNESS OF BREATH: 1
VOMITING: 0
STRIDOR: 0
CHEST TIGHTNESS: 0

## 2022-03-08 NOTE — PROGRESS NOTES
[facility]    Patient: Dennis Zimmerman, 66 y.o.   : 1950  3/8/2022    Pt of Dr. Roseline Kingsley   Patient presents with    Follow-up     3 mo asthma copd f/u        Asthma  She complains of cough, shortness of breath and wheezing. This is a chronic problem. The current episode started more than 1 year ago. The problem occurs daily. The problem has been waxing and waning. The cough is non-productive. Associated symptoms include orthopnea. Pertinent negatives include no chest pain or fever. Her symptoms are aggravated by strenuous activity and change in weather. Her symptoms are alleviated by rest and beta-agonist. She reports significant improvement on treatment. Her past medical history is significant for asthma and emphysema. Tez Berrios is here for follow up for asthma/COPD overlap. Overall patient reports respiratory symptoms have been waxing and waning since last appointment. Feels she has improved well after her pneumonia in [facility] and is back to baseline. Patient reports good compliance with inhaled medications (Pulmicort). Patient using albuterol 0 times since last. Patient reports some physical limitation due to respiratory symptoms and back pain/lower extremity pain. But informs me she is ambulatory at home and able to complete her ADLs. Reports compliance with loratadine for her chronic rhinitis symptoms are worse in the fall per patient feels they are under adequate control currently. Admitted to [facility] s/p fall from bed to floor resulting in left sie chest pain. CTA negative for PE or rib fracturefound to have Right upper lobe consolidation treated for CAP and condition improved here today to review follow-up CT images, Treated with azithromycin and Rocephin converted to PO azithromycin with omnicef also noted to have cystitis with klebseilla in urine.      Progress History:   Since last visit any new medical abscess Dr. Cathy Lester, NEEDLE CORE      Benign    SD LAP,DIAGNOSTIC ABDOMEN N/A 2018    LAPAROSCOPY EXPLORATORY, LAP LYLE performed by Shanon Woods MD at [address] [place] [address]  as a child     TOOTH EXTRACTION  2012    TRANSTHORACIC ECHOCARDIOGRAM  2009    EF 50-55%. normal LV global systolic funct. slight LVH. slight lt atrial enlargement. no obvious stenotic valves. no pericardial effusion.      SOCIAL HISTORY:  Social History     Tobacco Use    Smoking status: Former Smoker     Years: 3.00     Types: Cigarettes     Start date:      Quit date:      Years since quittin.2    Smokeless tobacco: Never Used    Tobacco comment: 3 cigarettes a month, Passive smoke exposure 39 years Father then ex    Vaping Use    Vaping Use: Never used   Substance Use Topics    Alcohol use: Not Currently     Alcohol/week: 0.0 standard drinks    Drug use: No     ALLERGIES:  Allergies   Allergen Reactions    Latex Hives, Itching and Rash    Bee Venom Anaphylaxis    Advair [Fluticasone-Salmeterol]      Heart races     Caffeine Other (See Comments)     Bad kidney problems    Pcn [Penicillins] Hives    Ultram [Tramadol Hcl] Swelling     Has tolerated norco here many times     Fluticasone-Salmeterol Palpitations    Wool Alcohol [Lanolin Alcohol] Hives, Itching and Rash     FAMILY HISTORY:  Family History   Problem Relation Age of Onset    Cancer Mother 28        uterine    Heart Disease Father     Cancer Father 48        lung    Diabetes Paternal Grandfather     Cancer Maternal Grandmother         uterine    Cancer Maternal Aunt 28        uterine    Cancer Maternal Cousin 28        uterine    Depression Brother     Depression Brother     Colon Cancer Neg Hx     Breast Cancer Neg Hx      CURRENT MEDICATIONS:  Current Outpatient Medications   Medication Sig Dispense Refill    albuterol sulfate HFA (PROAIR HFA) 108 (90 Base) MCG/ACT inhaler Inhale 2 puffs into the lungs every 6 hours as needed for Wheezing or Shortness of Breath 1 each 11    loratadine (CLARITIN) 10 MG tablet Take 1 tablet by mouth daily 30 tablet 11    buPROPion (WELLBUTRIN XL) 150 MG extended release tablet Take 150 mg by mouth every morning      oxybutynin (DITROPAN XL) 15 MG extended release tablet Take 15 mg by mouth daily       spironolactone (ALDACTONE) 25 MG tablet Take 25 mg by mouth daily       etodolac (LODINE) 500 MG tablet Take 500 mg by mouth 2 times daily      hydrOXYzine (ATARAX) 50 MG tablet Take 50 mg by mouth nightly       Multiple Vitamins-Minerals (SENIOR VITES PO) Take by mouth      Ginger, Zingiber officinalis, (GINGER ROOT) 550 MG CAPS Take by mouth      NONFORMULARY Hylands twice a day      montelukast (SINGULAIR) 10 MG tablet TAKE 1 TABLET BY MOUTH NIGHTLY 30 tablet 11    budesonide (PULMICORT) 0.5 MG/2ML nebulizer suspension Take 2 mLs by nebulization 2 times daily 120 mL 11    Nebulizer MISC Please provide new nebulizer machine to use with Albuterol 2.5mg via nebs Q6h prn and budesonide 500 mcg BID 1 each 0    albuterol (PROVENTIL) (2.5 MG/3ML) 0.083% nebulizer solution Take 3 mLs by nebulization every 6 hours as needed for Wheezing or Shortness of Breath 120 each 11    metoprolol succinate (TOPROL XL) 25 MG extended release tablet take 1 tablet by mouth once daily 30 tablet 10    magnesium oxide (MAG-OX) 400 (241.3 Mg) MG TABS tablet Take 1 tablet by mouth daily      zinc sulfate (ZINCATE) 220 (50 Zn) MG capsule Take 1 capsule by mouth daily      vitamin C (VITAMIN C) 500 MG tablet Take 1 tablet by mouth daily      Cholecalciferol (VITAMIN D) 25 MCG TABS Take 1 tablet by mouth daily      mupirocin (BACTROBAN) 2 % ointment Apply topically 3 times daily Apply topically 3 times daily.       Cranberry 450 MG CAPS Take by mouth      Lactobacillus (ACIDOPHILUS) TABS Take by mouth       Mouthwashes (BIOTENE) LIQD oral solution Swish and spit 15 mLs 3 times daily as needed      furosemide (LASIX) 20 MG tablet Take 20 mg by mouth daily      citalopram (CELEXA) 40 MG tablet take 1 tablet by mouth every morning  0    nystatin-triamcinolone (MYCOLOG II) 347413-0.1 UNIT/GM-% cream Apply 1 applicator topically 4 times daily Apply topically 4 times daily. As needed      HOMEOPATHIC PRODUCTS  mg by Combination route daily as needed Legatrin PM      Artificial Saliva (BIOTENE MOISTURIZING MOUTH) SOLN Take 1 applicator by mouth as needed      Compression Stockings MISC 1 Package by Does not apply route Knee High 1 each 0    omeprazole (PRILOSEC) 40 MG delayed release capsule take 1 capsule by mouth once daily 30 capsule 5    TRADJENTA 5 MG tablet take 1 tablet by mouth daily 30 tablet 5    simvastatin (ZOCOR) 40 MG tablet take 1 tablet by mouth at bedtime 30 tablet 5    VESICARE 10 MG tablet take 1 tablet by mouth once daily 30 tablet 5    pioglitazone (ACTOS) 45 MG tablet take 1 tablet by mouth once daily 30 tablet 5    metFORMIN (GLUCOPHAGE) 1000 MG tablet take 1 tablet by mouth twice a day with food 60 tablet 5    gabapentin (NEURONTIN) 300 MG capsule Take 1 capsule by mouth 3 times daily (Patient taking differently: Take 800 mg by mouth 2 times daily. Thresa Abed ) 90 capsule 5    FREESTYLE LANCETS MISC Test 1 time daily. Dx: 250.00-Type 2 Diabetes. Freestyle 28G Lancets 100 each 11    Omega-3 Fatty Acids (FISH OIL) 1200 MG CAPS Take 1,200 mg by mouth daily      ASPIRIN LOW DOSE 81 MG EC tablet take 1 tablet by mouth once daily 30 tablet 11    clobetasol (TEMOVATE) 0.05 % cream Apply topically 2 times daily. 15 g 1    glucose blood VI test strips (FREESTYLE LITE) strip Test once daily 100 each 11    CICLOPIROX TREATMENT EX Apply  topically. 10.0 mL Ciclopirox-terbinafine-fluconazole  Fungal Free Nails Fungicidal solution      diphenhydrAMINE (BENADRYL) 50 MG capsule Take 50 mg by mouth nightly as needed for Itching.       QUEtiapine (SEROQUEL) 50 MG tablet Take 50 mg by mouth nightly.  acetaminophen (TYLENOL) 500 MG tablet Take 500 mg by mouth every 6 hours as needed for Pain. No current facility-administered medications for this visit. [facility]   Review of Systems   Constitutional: Negative for chills, fever and unexpected weight change. Respiratory: Positive for cough, shortness of breath and wheezing. Negative for chest tightness and stridor. Cardiovascular: Negative for chest pain and leg swelling. Gastrointestinal: Negative for diarrhea, nausea and vomiting. Genitourinary: Negative for dysuria. Musculoskeletal: Positive for back pain. Physical exam   /78 (Site: Left Lower Arm, Position: Sitting, Cuff Size: Small Adult)   Pulse 63   Temp 97.4 °F (36.3 °C)   Ht 5' 2.5\" (1.588 m)   Wt 260 lb 3.2 oz (118 kg)   LMP  (LMP Unknown)   SpO2 97% Comment: on r/a  BMI 46.83 kg/m²    Wt Readings from Last 3 Encounters:   03/08/22 260 lb 3.2 oz (118 kg)   02/08/22 268 lb (121.6 kg)   01/13/22 258 lb (117 kg)       Physical Exam  Vitals and nursing note reviewed. Constitutional:       General: She is not in acute distress. Appearance: She is well-developed. She is obese. Comments: In wheelchair, BMI 46   HENT:      Head: Normocephalic and atraumatic. Neck:      Trachea: No tracheal deviation. Cardiovascular:      Rate and Rhythm: Normal rate and regular rhythm. Heart sounds: Normal heart sounds. No murmur heard. Pulmonary:      Effort: Pulmonary effort is normal. No respiratory distress. Breath sounds: Normal breath sounds. No stridor. No wheezing or rales. Chest:      Chest wall: No tenderness. Abdominal:      General: Bowel sounds are normal. There is no distension. Palpations: Abdomen is soft. Musculoskeletal:      Cervical back: Neck supple. Skin:     General: Skin is warm and dry. Capillary Refill: Capillary refill takes less than 2 seconds.       Comments: Chronic skin color changes to BLE   Neurological:      Mental Status: She is alert and oriented to person, place, and time. Psychiatric:         Behavior: Behavior normal.         Thought Content: Thought content normal.          Results   Lung Nodule Screening     [] Qualifies    [x] Does not qualify   [] Declined    [] Completed  Quit 1993 <20 pack yrs   The USPSTF recommends annual screening for lung cancer with low-dose computed tomography (LDCT) in adults aged 48 to [de-identified] years who have a 20 pack-year smoking history and currently smoke or have quit within the past 15 years. Screening should be discontinued once a person has not smoked for 15 years or develops a health problem that substantially limits life expectancy or the ability or willingness to have curative lung surgery. CT CHEST WO CONTRAST   3/4/2022   FINDINGS: Heart/mediastinum: The heart size is normal. No pericardial effusion is observed. The aorta is not dilated. No mediastinal, hilar, or axillary lymphadenopathy is observed. Lungs: The area of right perihilar/right upper lobe consolidation has resolved. No focal consolidation, pleural effusion, or pneumothorax is observed. Dependent atelectasis has improved. The central airways are patent and unremarkable. No pulmonary mass or nodules are observed. Upper abdomen: No acute findings are noted in the limited images through the upper abdomen. The gallbladder is surgically absent. Musculoskeletal:  Multilevel degenerative disc disease in the thoracic spine is unchanged. Diffuse osteopenia is observed. The visualized skeletal structures appear intact. Impression:  The previously seen right upper lobe area of consolidation has resolved. The lungs are clear. No acute intrathoracic process is observed. CTA CHEST W WO CONTRAST   11/27/2021   FINDINGS: Suboptimal opacification of the pulmonary arteries. No large central pulmonary embolus or proximal branch emboli identified.  More distal subsegmental emboli can't be excluded. There is no aneurysm or dissection. Heart size normal. Multiple small mediastinal lymph nodes are present. There is no mediastinal hilar or axillary lymphadenopathy by size criteria. Focal consolidation right upper lobe with air bronchograms. No effusions. Upper abdomen No acute abnormalities. Bones No suspicious bone lesions. Impression:  Suboptimal opacification of the pulmonary arteries such that distal subsegmental emboli cannot be excluded. There is no large central or proximal branch emboli seen. Right upper lobe airspace consolidation. Assessment      Diagnosis Orders   1. Mild persistent asthma without complication  albuterol sulfate HFA (PROAIR HFA) 108 (90 Base) MCG/ACT inhaler   2. H/O pulmonary emphysema  albuterol sulfate HFA (PROAIR HFA) 108 (90 Base) MCG/ACT inhaler   3. Chronic allergic rhinitis  loratadine (CLARITIN) 10 MG tablet         Plan   -Will continue on pulmicort 500 mcg BID advised to rinse and spit after doses  -Albuterol 2 puff Q6 hrs PRN for SOB/wheezing   -Discussed albuterol inhaler and nebulizer use. Reviewed signs and symptoms indicating need for use including Shortness of breath and wheezing. Discussed with patient the importance of using inhaler or nebulizer within the prescribed time frames. Patient verbalized understanding to use one or the other not both within prescribed time frame.  Patient also verbalized understanding that if they experience no relief after using albuterol and resting for 15 minutes they need to go to nearest ER or call 911.  -Continue on loratadine 10 mg PO Daily   -Advised to maintain pneumonia vaccine with PCP and to take flu vaccine this coming season.  -Advised patient to call office with any changes, questions, or concerns regarding respiratory status    Will see Vicky Boyle back in: 6 months     Gregor Raymond CNP  3/8/2022

## 2022-03-16 ENCOUNTER — HOSPITAL ENCOUNTER (EMERGENCY)
Age: 72
Discharge: HOME | End: 2022-03-17
Payer: MEDICARE

## 2022-03-16 ENCOUNTER — APPOINTMENT (OUTPATIENT)
Dept: CT IMAGING | Age: 72
End: 2022-03-16
Payer: MEDICARE

## 2022-03-16 DIAGNOSIS — R10.9 FLANK PAIN: Primary | ICD-10-CM

## 2022-03-16 DIAGNOSIS — N39.0 URINARY TRACT INFECTION WITHOUT HEMATURIA, SITE UNSPECIFIED: ICD-10-CM

## 2022-03-16 LAB
ALBUMIN SERPL-MCNC: 3.8 G/DL (ref 3.5–5.1)
ALP BLD-CCNC: 86 U/L (ref 38–126)
ALT SERPL-CCNC: 14 U/L (ref 11–66)
ANION GAP SERPL CALCULATED.3IONS-SCNC: 14 MEQ/L (ref 8–16)
AST SERPL-CCNC: 15 U/L (ref 5–40)
BACTERIA: ABNORMAL /HPF
BASOPHILS # BLD: 0.5 %
BASOPHILS ABSOLUTE: 0 THOU/MM3 (ref 0–0.1)
BILIRUB SERPL-MCNC: 0.4 MG/DL (ref 0.3–1.2)
BILIRUBIN URINE: NEGATIVE
BLOOD, URINE: NEGATIVE
BUN BLDV-MCNC: 25 MG/DL (ref 7–22)
CALCIUM SERPL-MCNC: 9.2 MG/DL (ref 8.5–10.5)
CASTS 2: ABNORMAL /LPF
CASTS UA: ABNORMAL /LPF
CHARACTER, URINE: ABNORMAL
CHLORIDE BLD-SCNC: 102 MEQ/L (ref 98–111)
CO2: 23 MEQ/L (ref 23–33)
COLOR: YELLOW
CREAT SERPL-MCNC: 0.9 MG/DL (ref 0.4–1.2)
CRYSTALS, UA: ABNORMAL
EOSINOPHIL # BLD: 3.9 %
EOSINOPHILS ABSOLUTE: 0.3 THOU/MM3 (ref 0–0.4)
EPITHELIAL CELLS, UA: ABNORMAL /HPF
ERYTHROCYTE [DISTWIDTH] IN BLOOD BY AUTOMATED COUNT: 15 % (ref 11.5–14.5)
ERYTHROCYTE [DISTWIDTH] IN BLOOD BY AUTOMATED COUNT: 51.7 FL (ref 35–45)
GFR SERPL CREATININE-BSD FRML MDRD: 61 ML/MIN/1.73M2
GLUCOSE BLD-MCNC: 175 MG/DL (ref 70–108)
GLUCOSE URINE: NEGATIVE MG/DL
HCT VFR BLD CALC: 40.3 % (ref 37–47)
HEMOGLOBIN: 12.7 GM/DL (ref 12–16)
IMMATURE GRANS (ABS): 0.03 THOU/MM3 (ref 0–0.07)
IMMATURE GRANULOCYTES: 0.3 %
KETONES, URINE: ABNORMAL
LEUKOCYTE ESTERASE, URINE: ABNORMAL
LYMPHOCYTES # BLD: 38 %
LYMPHOCYTES ABSOLUTE: 3.3 THOU/MM3 (ref 1–4.8)
MCH RBC QN AUTO: 29.5 PG (ref 26–33)
MCHC RBC AUTO-ENTMCNC: 31.5 GM/DL (ref 32.2–35.5)
MCV RBC AUTO: 93.5 FL (ref 81–99)
MISCELLANEOUS 2: ABNORMAL
MONOCYTES # BLD: 11.6 %
MONOCYTES ABSOLUTE: 1 THOU/MM3 (ref 0.4–1.3)
NITRITE, URINE: POSITIVE
NUCLEATED RED BLOOD CELLS: 0 /100 WBC
OSMOLALITY CALCULATION: 286.2 MOSMOL/KG (ref 275–300)
PH UA: 6 (ref 5–9)
PLATELET # BLD: 276 THOU/MM3 (ref 130–400)
PMV BLD AUTO: 11.1 FL (ref 9.4–12.4)
POTASSIUM REFLEX MAGNESIUM: 4.5 MEQ/L (ref 3.5–5.2)
PROTEIN UA: NEGATIVE
RBC # BLD: 4.31 MILL/MM3 (ref 4.2–5.4)
RBC URINE: ABNORMAL /HPF
RENAL EPITHELIAL, UA: ABNORMAL
SEG NEUTROPHILS: 45.7 %
SEGMENTED NEUTROPHILS ABSOLUTE COUNT: 4 THOU/MM3 (ref 1.8–7.7)
SODIUM BLD-SCNC: 139 MEQ/L (ref 135–145)
SPECIFIC GRAVITY, URINE: 1.03 (ref 1–1.03)
TOTAL PROTEIN: 7.1 G/DL (ref 6.1–8)
UROBILINOGEN, URINE: 0.2 EU/DL (ref 0–1)
WBC # BLD: 8.8 THOU/MM3 (ref 4.8–10.8)
WBC UA: ABNORMAL /HPF
YEAST: ABNORMAL

## 2022-03-16 PROCEDURE — 6360000002 HC RX W HCPCS: Performed by: NURSE PRACTITIONER

## 2022-03-16 PROCEDURE — 87186 SC STD MICRODIL/AGAR DIL: CPT

## 2022-03-16 PROCEDURE — 80053 COMPREHEN METABOLIC PANEL: CPT

## 2022-03-16 PROCEDURE — 87077 CULTURE AEROBIC IDENTIFY: CPT

## 2022-03-16 PROCEDURE — 87086 URINE CULTURE/COLONY COUNT: CPT

## 2022-03-16 PROCEDURE — 96375 TX/PRO/DX INJ NEW DRUG ADDON: CPT

## 2022-03-16 PROCEDURE — 85025 COMPLETE CBC W/AUTO DIFF WBC: CPT

## 2022-03-16 PROCEDURE — 99285 EMERGENCY DEPT VISIT HI MDM: CPT

## 2022-03-16 PROCEDURE — 96365 THER/PROPH/DIAG IV INF INIT: CPT

## 2022-03-16 PROCEDURE — 81001 URINALYSIS AUTO W/SCOPE: CPT

## 2022-03-16 PROCEDURE — 74176 CT ABD & PELVIS W/O CONTRAST: CPT

## 2022-03-16 RX ORDER — KETOROLAC TROMETHAMINE 30 MG/ML
15 INJECTION, SOLUTION INTRAMUSCULAR; INTRAVENOUS ONCE
Status: COMPLETED | OUTPATIENT
Start: 2022-03-17 | End: 2022-03-16

## 2022-03-16 RX ORDER — CIPROFLOXACIN 2 MG/ML
400 INJECTION, SOLUTION INTRAVENOUS ONCE
Status: COMPLETED | OUTPATIENT
Start: 2022-03-16 | End: 2022-03-17

## 2022-03-16 RX ADMIN — KETOROLAC TROMETHAMINE 15 MG: 30 INJECTION, SOLUTION INTRAMUSCULAR; INTRAVENOUS at 23:50

## 2022-03-16 RX ADMIN — CIPROFLOXACIN 400 MG: 2 INJECTION, SOLUTION INTRAVENOUS at 23:47

## 2022-03-16 ASSESSMENT — PAIN DESCRIPTION - LOCATION
LOCATION: FLANK
LOCATION: FLANK

## 2022-03-16 ASSESSMENT — PAIN SCALES - GENERAL
PAINLEVEL_OUTOF10: 10
PAINLEVEL_OUTOF10: 10

## 2022-03-16 ASSESSMENT — PAIN DESCRIPTION - ORIENTATION
ORIENTATION: RIGHT
ORIENTATION: RIGHT

## 2022-03-16 ASSESSMENT — PAIN DESCRIPTION - PAIN TYPE
TYPE: ACUTE PAIN
TYPE: ACUTE PAIN

## 2022-03-16 ASSESSMENT — PAIN DESCRIPTION - DESCRIPTORS
DESCRIPTORS: SHARP
DESCRIPTORS: SHARP

## 2022-03-16 ASSESSMENT — PAIN - FUNCTIONAL ASSESSMENT: PAIN_FUNCTIONAL_ASSESSMENT: 0-10

## 2022-03-17 VITALS
WEIGHT: 260 LBS | BODY MASS INDEX: 47.84 KG/M2 | HEIGHT: 62 IN | OXYGEN SATURATION: 96 % | SYSTOLIC BLOOD PRESSURE: 154 MMHG | TEMPERATURE: 98.1 F | HEART RATE: 65 BPM | RESPIRATION RATE: 17 BRPM | DIASTOLIC BLOOD PRESSURE: 67 MMHG

## 2022-03-17 RX ORDER — CIPROFLOXACIN 500 MG/1
500 TABLET, FILM COATED ORAL 2 TIMES DAILY
Qty: 14 TABLET | Refills: 0 | Status: SHIPPED | OUTPATIENT
Start: 2022-03-17 | End: 2022-03-24

## 2022-03-17 NOTE — ED NOTES
Pt medicated per MAR. Patient resting in bed. Respirations easy and unlabored. No distress noted. Denies further needs at this time. Call light within reach.        Samir Che RN  03/16/22 5534

## 2022-03-17 NOTE — ED NOTES
Patient resting in bed. Respirations easy and unlabored. No distress noted. Call light within reach.        Enzo Goldmann, RN  03/16/22 [id number]

## 2022-03-17 NOTE — ED NOTES
Patient resting in bed. Respirations easy and unlabored. No distress noted. Denies further needs at this time. Call light within reach.        Luz Clifton RN  03/16/22 0900

## 2022-03-17 NOTE — ED PROVIDER NOTES
I have reviewed the notes, assessments, and/or procedures performed by KURTIS, I concur with her/his documentation of Parminder Moreno. ED Medications administered this visit:    Medications   ciprofloxacin (CIPRO) IVPB 400 mg (0 mg IntraVENous Stopped 3/17/22 0042)   ketorolac (TORADOL) injection 15 mg (15 mg IntraVENous Given 3/16/22 0350)         FINAL IMPRESSION      1. Flank pain    2.  Urinary tract infection without hematuria, site unspecified          DISPOSITION/PLAN   DISPOSITION Decision To Discharge 03/17/2022 02:13:06 AM      PATIENT REFERRED TO:  Julio Draper MD  [address] [place] [address]  [phone]    Schedule an appointment as soon as possible for a visit in 3 days  For follow up      DISCHARGE MEDICATIONS:  Discharge Medication List as of 3/17/2022 12:24 AM      START taking these medications    Details   ciprofloxacin (CIPRO) 500 MG tablet Take 1 tablet by mouth 2 times daily for 7 days, Disp-14 tablet, R-0Normal                    CATE Mcfadden CNP (electronically signed)          CATE Mcfadden CNP  03/17/22 [address] [address], APRN - CNP  03/23/22 7274

## 2022-03-17 NOTE — ED NOTES
Discharge instructions and medications discussed with pt. Pt verbalized understanding and denied further questions. LDA removed. Pt discharged with all belongings. Pt discharged via [facility].       Moo Adhikari RN  03/17/22 [id number]

## 2022-03-17 NOTE — ED TRIAGE NOTES
Pt presents to ED with flank pain x \"a couple of days. \" States the pain got significantly worse today. Rates pain 10/10 to right flank. Pt states she has a history of kidney infections. Denies hematuria or dysuria. A&O. Respirations unlabored on room air. Call light within reach.

## 2022-03-17 NOTE — ED NOTES
Pt updated on POC. Patient resting in bed. Respirations easy and unlabored. No distress noted. Call light within reach.        Rhiannon Islas RN  03/17/22 9532

## 2022-03-18 LAB
ORGANISM: ABNORMAL
URINE CULTURE REFLEX: ABNORMAL

## 2022-03-24 ASSESSMENT — ENCOUNTER SYMPTOMS
COUGH: 0
CHEST TIGHTNESS: 0
ABDOMINAL PAIN: 0
VOMITING: 0
NAUSEA: 0
BACK PAIN: 0
EYE REDNESS: 0
RHINORRHEA: 0

## 2022-03-25 NOTE — ED PROVIDER NOTES
[facility] Emergency [address] [place] [address]       Chief Complaint   Patient presents with    Flank Pain       Nurses Notes reviewed and I agree except as noted in the HPI. HISTORY OF PRESENT ILLNESS    Trina chen 66 y.o. female who presents to the ED for evaluation of flank pain. Symptoms started over the last couple of days. Has a history of UTI. Denies any urgency or frequency (that is different than normal). No fevers. States flank pain is only symptom she ever gets. Denies hx of stones. HPI was provided by the patient    REVIEW OF SYSTEMS     Review of Systems   Constitutional: Negative for chills, fatigue and fever. HENT: Negative for congestion, ear discharge, ear pain, postnasal drip and rhinorrhea. Eyes: Negative for redness. Respiratory: Negative for cough and chest tightness. Cardiovascular: Negative for chest pain and leg swelling. Gastrointestinal: Negative for abdominal pain, nausea and vomiting. Genitourinary: Positive for flank pain. Negative for difficulty urinating, dysuria, enuresis and hematuria. Musculoskeletal: Negative for back pain and joint swelling. Skin: Negative for rash. Neurological: Negative for dizziness, light-headedness, numbness and headaches. Psychiatric/Behavioral: Negative for agitation, behavioral problems and confusion. All other systems negative except as noted.       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    MRSA ABDOMEN    Mild persistent asthma without complication 4/74/5142  MRSA infection     Numbness and tingling     HX OF:    S/P laparoscopic cholecystectomy 11/27/2018    Swelling     Tinnitus        SURGICALHISTORY      has a past surgical history that includes Coronary angioplasty (12/16/2009); transthoracic echocardiogram (03/04/2009); cardiovascular stress test (04/03/2009); cardiovascular stress test (03/31/2010); back surgery (2007); eye surgery (2004); Tooth Extraction (4/16/2012); Colonoscopy (2011); other surgical history (Feb 17, 2014); Endoscopy, colon, diagnostic (over 5 years ); Tonsillectomy (as a child ); other surgical history (2/2014); hernia repair (21 months old ); hernia repair (9/8/14); pr lap,diagnostic abdomen (N/A, 11/26/2018); Cholecystectomy; pr biopsy of breast, needle core (2019); and Hysterectomy (1995).     CURRENT MEDICATIONS       Discharge Medication List as of 3/17/2022 12:24 AM      CONTINUE these medications which have NOT CHANGED    Details   albuterol sulfate HFA (PROAIR HFA) 108 (90 Base) MCG/ACT inhaler Inhale 2 puffs into the lungs every 6 hours as needed for Wheezing or Shortness of Breath, Disp-1 each, R-11Adjust Sig      loratadine (CLARITIN) 10 MG tablet Take 1 tablet by mouth daily, Disp-30 tablet, R-11Normal      buPROPion (WELLBUTRIN XL) 150 MG extended release tablet Take 150 mg by mouth every morningHistorical Med      oxybutynin (DITROPAN XL) 15 MG extended release tablet Take 15 mg by mouth daily Historical Med      spironolactone (ALDACTONE) 25 MG tablet Take 25 mg by mouth daily Historical Med      etodolac (LODINE) 500 MG tablet Take 500 mg by mouth 2 times dailyHistorical Med      hydrOXYzine (ATARAX) 50 MG tablet Take 50 mg by mouth nightly Historical Med      Multiple Vitamins-Minerals (SENIOR VITES PO) Take by mouthHistorical Med      Ginger, Zingiber officinalis, (GINGER ROOT) 550 MG CAPS Take by mouthHistorical Med      NONFORMULARY Hylands twice a dayHistorical Med      montelukast (SINGULAIR) 10 MG tablet TAKE 1 TABLET BY MOUTH NIGHTLY, Disp-30 tablet, R-11Normal      budesonide (PULMICORT) 0.5 MG/2ML nebulizer suspension Take 2 mLs by nebulization 2 times daily, Disp-120 mL, R-11Needs processed under medicare part BNormal      Nebulizer MISC Disp-1 each, R-0, PrintPlease provide new nebulizer machine to use with Albuterol 2.5mg via nebs Q6h prn and budesonide 500 mcg BID      albuterol (PROVENTIL) (2.5 MG/3ML) 0.083% nebulizer solution Take 3 mLs by nebulization every 6 hours as needed for Wheezing or Shortness of Breath, Disp-120 each, R-11Needs processed under medicare part BNormal      metoprolol succinate (TOPROL XL) 25 MG extended release tablet take 1 tablet by mouth once daily, Disp-30 tablet, R-10Normal      magnesium oxide (MAG-OX) 400 (241.3 Mg) MG TABS tablet Take 1 tablet by mouth dailyOTC      zinc sulfate (ZINCATE) 220 (50 Zn) MG capsule Take 1 capsule by mouth dailyOTC      vitamin C (VITAMIN C) 500 MG tablet Take 1 tablet by mouth dailyOTC      Cholecalciferol (VITAMIN D) 25 MCG TABS Take 1 tablet by mouth dailyLabeling may look different. 25 mcg=1000 Units. Please double check dosages. OTC      mupirocin (BACTROBAN) 2 % ointment Apply topically 3 times daily Apply topically 3 times daily. , Topical, 3 TIMES DAILY, Historical Med      Cranberry 450 MG CAPS Take by mouthHistorical Med      Lactobacillus (ACIDOPHILUS) TABS Take by mouth Historical Med      Mouthwashes (BIOTENE) LIQD oral solution Swish and spit 15 mLs 3 times daily as neededHistorical Med      furosemide (LASIX) 20 MG tablet Take 20 mg by mouth dailyHistorical Med      citalopram (CELEXA) 40 MG tablet take 1 tablet by mouth every morning, R-0Historical Med      nystatin-triamcinolone (MYCOLOG II) 633802-6.1 UNIT/GM-% cream Apply 1 applicator topically 4 times daily Apply topically 4 times daily.  As needed, Topical, 4 TIMES DAILY, Historical Med      HOMEOPATHIC PRODUCTS  mg by Combination route daily as needed [address] [place] [address] Artificial Saliva (BIOTENE MOISTURIZING MOUTH) SOLN Take 1 applicator by mouth as neededHistorical Med      Compression Stockings MISC Starting Fri 11/2/2018, Disp-1 each, R-0, PrintKnee High       omeprazole (PRILOSEC) 40 MG delayed release capsule take 1 capsule by mouth once daily, Disp-30 capsule, R-5      TRADJENTA 5 MG tablet take 1 tablet by mouth daily, Disp-30 tablet, R-5      simvastatin (ZOCOR) 40 MG tablet take 1 tablet by mouth at bedtime, Disp-30 tablet, R-5      VESICARE 10 MG tablet take 1 tablet by mouth once daily, Disp-30 tablet, R-5      pioglitazone (ACTOS) 45 MG tablet take 1 tablet by mouth once daily, Disp-30 tablet, R-5      metFORMIN (GLUCOPHAGE) 1000 MG tablet take 1 tablet by mouth twice a day with food, Disp-60 tablet, R-5      gabapentin (NEURONTIN) 300 MG capsule Take 1 capsule by mouth 3 times daily, Disp-90 capsule, R-5      FREESTYLE LANCETS MISC Disp-100 each, R-11, NormalTest 1 time daily. Dx: 250.00-Type 2 Diabetes. Freestyle 28G Lancets      Omega-3 Fatty Acids (FISH OIL) 1200 MG CAPS Take 1,200 mg by mouth daily      ASPIRIN LOW DOSE 81 MG EC tablet take 1 tablet by mouth once daily, Disp-30 tablet, R-11      clobetasol (TEMOVATE) 0.05 % cream Apply topically 2 times daily. , Disp-15 g, R-1, Normal      glucose blood VI test strips (FREESTYLE LITE) strip Disp-100 each, R-11, NormalTest once daily      CICLOPIROX TREATMENT EX Apply  topically. 10.0 mL Ciclopirox-terbinafine-fluconazole  Fungal Free Nails Fungicidal solution      diphenhydrAMINE (BENADRYL) 50 MG capsule Take 50 mg by mouth nightly as needed for Itching. QUEtiapine (SEROQUEL) 50 MG tablet Take 50 mg by mouth nightly. acetaminophen (TYLENOL) 500 MG tablet Take 500 mg by mouth every 6 hours as needed for Pain.              ALLERGIES     is allergic to latex, bee venom, advair [fluticasone-salmeterol], caffeine, pcn [penicillins], ultram [tramadol hcl], fluticasone-salmeterol, and wool alcohol [lanolin alcohol]. FAMILY HISTORY     She indicated that her mother is . She indicated that her father is . She indicated that all of her three brothers are alive. She indicated that her maternal grandmother is . She indicated that the status of her paternal grandfather is unknown. She indicated that the status of her maternal aunt is unknown. She indicated that the status of her neg hx is unknown. She indicated that the status of her maternal cousin is unknown.   family history includes Cancer in her maternal grandmother; Cancer (age of onset: 28) in her maternal aunt, maternal cousin, and mother; Cancer (age of onset: 48) in her father; Depression in her brother and brother; Diabetes in her paternal grandfather; Heart Disease in her father.     SOCIAL HISTORY       Social History     Socioeconomic History    Marital status:      Spouse name: Not on file    Number of children: 10    Years of education: 10    Highest education level: Not on file   Occupational History    Occupation: retired    Tobacco Use    Smoking status: Former Smoker     Years: 3.00     Types: Cigarettes     Start date:      Quit date:      Years since quittin.2    Smokeless tobacco: Never Used    Tobacco comment: 3 cigarettes a month, Passive smoke exposure 39 years Father then ex    Vaping Use    Vaping Use: Never used   Substance and Sexual Activity    Alcohol use: Not Currently     Alcohol/week: 0.0 standard drinks    Drug use: No    Sexual activity: Not Currently   Other Topics Concern    Not on file   Social History Narrative    Not on file     Social Determinants of Health     Financial Resource Strain: Low Risk     Difficulty of Paying Living Expenses: Not hard at all   Food Insecurity: No Food Insecurity    Worried About [address] in the Last Year: Never true    Sharon of Food in the Last Year: Never true   Transportation Needs:     Lack of Transportation Comments: Normal capillary refill  Pulmonary:      Effort: Pulmonary effort is normal. No respiratory distress. Breath sounds: Normal breath sounds. No stridor. Abdominal:      General: Bowel sounds are normal.      Palpations: Abdomen is soft. Tenderness: There is right CVA tenderness and left CVA tenderness. Musculoskeletal:         General: No tenderness or deformity. Normal range of motion. Cervical back: Normal range of motion. Skin:     General: Skin is warm and dry. Capillary Refill: Capillary refill takes less than 2 seconds. Coloration: Skin is not pale. Findings: No erythema or rash. Neurological:      General: No focal deficit present. Mental Status: She is alert and oriented to person, place, and time. Cranial Nerves: No cranial nerve deficit. Psychiatric:         Mood and Affect: Mood normal.         Behavior: Behavior normal.         DIFFERENTIAL DIAGNOSIS:   UTI, pyelonephritis, kidney stone. DIAGNOSTIC RESULTS     EKG: All EKG's are interpreted by the Emergency Department Physician who eithersigns or Co-signs this chart in the absence of a cardiologist.        RADIOLOGY: non-plainfilm images(s) such as CT, Ultrasound and MRI are read by the radiologist.  Plain radiographic images are visualized and preliminarily interpreted by the emergency physician unless otherwise stated below. CT ABDOMEN PELVIS WO CONTRAST Additional Contrast? None   Final Result   Impression:   No urinary tract stone or obstruction. Air in the bladder could be    secondary to recent instrumentation or emphysematous cystitis. Correlate    with urinalysis. This document has been electronically signed by: Americo Herbert MD    on 03/17/2022 12:36 AM      All CTs at this facility use dose modulation techniques and iterative    reconstructions, and/or weight-based dosing   when appropriate to reduce radiation to a low as reasonably achievable.             LABS:   Labs Reviewed   CULTURE, REFLEXED, URINE - Abnormal; Notable for the following components:       Result Value    Organism Klebsiella pneumoniae (*)     All other components within normal limits    Narrative:     Source: urine       Site: unknown collect          Current Antibiotics: not stated   CBC WITH AUTO DIFFERENTIAL - Abnormal; Notable for the following components:    MCHC 31.5 (*)     RDW-CV 15.0 (*)     RDW-SD 51.7 (*)     All other components within normal limits   COMPREHENSIVE METABOLIC PANEL W/ REFLEX TO MG FOR LOW K - Abnormal; Notable for the following components:    Glucose 175 (*)     BUN 25 (*)     All other components within normal limits   GLOMERULAR FILTRATION RATE, ESTIMATED - Abnormal; Notable for the following components:    Est, Glom Filt Rate 61 (*)     All other components within normal limits   URINE WITH REFLEXED MICRO - Abnormal; Notable for the following components:    Ketones, Urine TRACE (*)     Nitrite, Urine POSITIVE (*)     Leukocyte Esterase, Urine MODERATE (*)     All other components within normal limits   ANION GAP   OSMOLALITY       EMERGENCY DEPARTMENT COURSE:   Vitals:    Vitals:    03/16/22 2200 03/16/22 2300 03/16/22 2350 03/17/22 0040   BP: (!) 153/95 (!) 155/75 (!) 148/70 (!) 154/67   Pulse: 73 67 67 65   Resp: 17 14 18 17   Temp:       TempSrc:       SpO2: 96% 96% 97% 96%   Weight:       Height:                                 Internal Administration   First Dose COVID-19, Pfizer Purple top, DILUTE for use, 12+ yrs, 30mcg/0.3mL dose  02/02/2021   Second Dose COVID-19, Pfizer Purple top, DILUTE for use, 12+ yrs, 30mcg/0.3mL dose   02/23/2021       Last COVID Lab SARS-CoV-2, NAAT (no units)   Date Value   11/27/2021 NOT  DETECTED            MDM    Patient was seen in the ER for flank pain. Appropriate labs and imaging are ordered and reviewed. Urine shows infection. Patient is sent for CT scan. Care is transferred to [facility] pending imging results and re-evaluation. Medications   ciprofloxacin (CIPRO) IVPB 400 mg (0 mg IntraVENous Stopped 3/17/22 0042)   ketorolac (TORADOL) injection 15 mg (15 mg IntraVENous Given 3/16/22 6500)       Please note that the patient was evaluated during a pandemic. All efforts were made for HIPPA compliance as well as provision of appropriate care. Patient was seen independently by myself. The patient's final impression and disposition and plan was determined by myself. Strict return precautions and follow up instructions were discussed with the patient prior to discharge, with which the patient agrees. Physical assessment findings, diagnostic testing(s) if applicable, and vital signs reviewed with patient/patient representative. Questions answered. Medications asdirected, including OTC medications for supportive care. Education provided on medications. Differential diagnosis(s) discussed with patient/patient representative. Home care/self care instructions reviewed withpatient/patient representative. Patient is to follow-up with family care provider in 2-3 days if no improvement. Patient is to go to the emergency department if symptoms worsen. Patient/patient representative isaware of care plan, questions answered, verbalizes understanding and is in agreement. CRITICAL CARE:   None    CONSULTS:  None    PROCEDURES:  None    FINAL IMPRESSION     1. Flank pain    2.  Urinary tract infection without hematuria, site unspecified          DISPOSITION/PLAN   DISPOSITION Decision To Discharge 03/17/2022 02:13:06 AM      PATIENT REFERREDTO:  Arnaldo Villatoro MD  [address] [place] [address]  [phone]    Schedule an appointment as soon as possible for a visit in 3 days  For follow up      DISCHARGE MEDICATIONS:  Discharge Medication List as of 3/17/2022 12:24 AM      START taking these medications    Details   ciprofloxacin (CIPRO) 500 MG tablet Take 1 tablet by mouth 2 times daily for 7 days, Disp-14 tablet, R-0Normal             (Please note that portions of this note were completed with a voice recognition program.  Efforts were made to edit the dictations but occasionally words are mis-transcribed.)         CATE Sullivan CNP, APRN - CNP  03/24/22 6245

## 2022-03-29 ENCOUNTER — OFFICE VISIT (OUTPATIENT)
Dept: INTERNAL MEDICINE CLINIC | Age: 72
End: 2022-03-29
Payer: MEDICARE

## 2022-03-29 VITALS — HEIGHT: 62 IN | TEMPERATURE: 98.7 F | BODY MASS INDEX: 48.07 KG/M2 | WEIGHT: 261.2 LBS

## 2022-03-29 DIAGNOSIS — E11.42 TYPE 2 DIABETES MELLITUS WITH DIABETIC POLYNEUROPATHY, WITHOUT LONG-TERM CURRENT USE OF INSULIN (HCC): ICD-10-CM

## 2022-03-29 DIAGNOSIS — E66.01 OBESITY, CLASS III, BMI 40-49.9 (MORBID OBESITY) (HCC): ICD-10-CM

## 2022-03-29 PROCEDURE — 97802 MEDICAL NUTRITION INDIV IN: CPT | Performed by: DIETITIAN, REGISTERED

## 2022-03-29 NOTE — PATIENT INSTRUCTIONS
Drink water everyday - don't rely on your thirst to drink. By the time you are thirsty you are already dehydrated. - Drink 16 ounces water in the morning and 24 ounces again in the afternoon and 16 ounces in the evening = 56 ounces/day. Do leg raises & arm raises while sitting in the chair. Bring food labels from some of your canned items - example - canned soup label, corned beef hash label, canned peas. 1.)  Get familiar with reading the nutrition facts label by looking at serving size and total carbohydrates. - Without a label refer to your carb count guide booklet. 2.)  Measure foods for accuracy in carb counting.    3.)  Healthy carb choices:  whole grains, whole wheat pasta, starchy vegetables, fresh fruit and lowfat/non-fat milk and yogurt. 4.)  Your meal plan is found on the inside cover of your carb counting guide booklet:  1st meal or Brkf:  45 gms carbohydrates + 1-2 oz protein  2nd meal or Lunch: 45 gms carbohydrates + 2-3 oz protein + non-starchy veggies  3rd meal or Dinner:  45 gms carbohydrates + 2-3 oz protein + non- starchy veggies    Snack time:  15 - 20 gms carbohydrates + 1 oz protein    5.)  Choose lean protein most of the time and Cut in 1/2 added fats to help with weight loss efforts. 6.) Bring a 1-2 week food log and meter to next dietitian appt. Thanks.   Check BS:  1-2x/day  Fasting BS (1st thing in the morning) or before a meal (at least 4 hour since last ate), BS goal:  90 - 130  2 hours after the start of a meal, BS goal:  100 - 150

## 2022-03-29 NOTE — PROGRESS NOTES
can of soup - last night 2 servings of vegetable soup. Snacks: not usually. Occ or potato chips (small bag)    Pt states she is lactose intolerant - included in allergies. No to milk, yogurt, cottage cheese and cream cheese. Sometimes she will make her own soup with instant potatoes, chicken and 1/2 bag of noodles. Other soups she makes - Bean soup and potato soup. Loves vegetables. Pt states she has 2 small heads of cabbage at home. Will cut apples and add splenda brown sugar, bisquick and cinnamon and puts this in the crock pot. Pt states she receives a box of grocery items that are given to her. Included in this box are Ramen noodle packages. She states she has enough money to get groceries and not financially strapped for money. No teeth and awaiting dentures - has not made arrangements for this yet. When she eats out when getting groceries she will stop somewhere to  a salad. -Main Beverages: [employer] flavored water - does not like city water.  Pt states she drinks when she is thirsty - then later states she drinks 1 1/2 liter or 2 liters of water/day    -Impression of Dietary Intake: on average, 1-3 meals per day, on average, 1 fast food meals per week, high salt, lacking routine intake of fruits and vegetables, irregular eating pattern    Current Outpatient Medications on File Prior to Visit   Medication Sig Dispense Refill    Multiple Vitamins-Minerals (SENIOR VITES PO) Take by mouth      Carlee, Zingiber officinalis, (CARLEE ROOT) 550 MG CAPS Take by mouth      NONFORMULARY Hylands twice a day      magnesium oxide (MAG-OX) 400 (241.3 Mg) MG TABS tablet Take 1 tablet by mouth daily      zinc sulfate (ZINCATE) 220 (50 Zn) MG capsule Take 1 capsule by mouth daily      vitamin C (VITAMIN C) 500 MG tablet Take 1 tablet by mouth daily      Cholecalciferol (VITAMIN D) 25 MCG TABS Take 1 tablet by mouth daily      Cranberry 450 MG CAPS Take by mouth      Lactobacillus (ACIDOPHILUS) Take 20 mg by mouth daily      citalopram (CELEXA) 40 MG tablet take 1 tablet by mouth every morning  0    nystatin-triamcinolone (MYCOLOG II) 130605-6.1 UNIT/GM-% cream Apply 1 applicator topically 4 times daily Apply topically 4 times daily. As needed      HOMEOPATHIC PRODUCTS  mg by Combination route daily as needed Legatrin PM      Artificial Saliva (BIOTENE MOISTURIZING MOUTH) SOLN Take 1 applicator by mouth as needed      Compression Stockings MISC 1 Package by Does not apply route Knee High 1 each 0    simvastatin (ZOCOR) 40 MG tablet take 1 tablet by mouth at bedtime 30 tablet 5    VESICARE 10 MG tablet take 1 tablet by mouth once daily 30 tablet 5    gabapentin (NEURONTIN) 300 MG capsule Take 1 capsule by mouth 3 times daily (Patient taking differently: Take 800 mg by mouth 2 times daily. Roslynn Mutton ) 90 capsule 5    FREESTYLE LANCETS MISC Test 1 time daily. Dx: 250.00-Type 2 Diabetes. Freestyle 28G Lancets 100 each 11    ASPIRIN LOW DOSE 81 MG EC tablet take 1 tablet by mouth once daily 30 tablet 11    clobetasol (TEMOVATE) 0.05 % cream Apply topically 2 times daily. 15 g 1    glucose blood VI test strips (FREESTYLE LITE) strip Test once daily 100 each 11    CICLOPIROX TREATMENT EX Apply  topically. 10.0 mL Ciclopirox-terbinafine-fluconazole  Fungal Free Nails Fungicidal solution      diphenhydrAMINE (BENADRYL) 50 MG capsule Take 50 mg by mouth nightly as needed for Itching.  QUEtiapine (SEROQUEL) 50 MG tablet Take 50 mg by mouth nightly.  acetaminophen (TYLENOL) 500 MG tablet Take 500 mg by mouth every 6 hours as needed for Pain. No current facility-administered medications on file prior to visit. Vitals from current and previous visits:  Temp 98.7 °F (37.1 °C)   Ht 5' 2\" (1.575 m)   Wt 261 lb 3.2 oz (118.5 kg)   LMP  (LMP Unknown)   BMI 47.77 kg/m²     -Body mass index is 47.77 kg/m². Greater than 40 - Morbid Obesity / Extreme Obesity / Grade III.    -Weight goal: lose weight. Nutrition Diagnosis:   Obesity related to Excessive energy intake, sedentary lifestyle and lack of MNT as evidenced by Food recall and BMI 47 and diagnosis of T2D. Intervention:  -Impression: Pt states she cannot lose weight eating 3 meals/day. Inquired with patient if she requested to see the dietitian for weight loss or did her provider - pt states her pain provider ordered her to see the dietitian.    -Instructed the patient on: Carbohydrate Counting, Consistent Carbohydrate Intake, Food Label Reading, Meal Planning for Regular, Balanced Meals & Snacks and The Importance of Regular Physical Activity    -Handouts given for: carbohydrate counting, food logging, healthy snacks, 150 gm sample meal plans/menus and fancy plate pictures. Patient Instructions   Drink water everyday - don't rely on your thirst to drink. By the time you are thirsty you are already dehydrated. - Drink 16 ounces water in the morning and 24 ounces again in the afternoon and 16 ounces in the evening = 56 ounces/day. Do leg raises & arm raises while sitting in the chair. Bring food labels from some of your canned items - example - canned soup label, corned beef hash label, canned peas. 1.)  Get familiar with reading the nutrition facts label by looking at serving size and total carbohydrates. - Without a label refer to your carb count guide booklet. 2.)  Measure foods for accuracy in carb counting.    3.)  Healthy carb choices:  whole grains, whole wheat pasta, starchy vegetables, fresh fruit and lowfat/non-fat milk and yogurt.     4.)  Your meal plan is found on the inside cover of your carb counting guide booklet:  1st meal or Brkf:  45 gms carbohydrates + 1-2 oz protein  2nd meal or Lunch: 45 gms carbohydrates + 2-3 oz protein + non-starchy veggies  3rd meal or Dinner:  45 gms carbohydrates + 2-3 oz protein + non- starchy veggies    Snack time:  15 - 20 gms carbohydrates + 1 oz protein    5.) Choose lean protein most of the time and Cut in 1/2 added fats to help with weight loss efforts. 6.) Bring a 1-2 week food log and meter to next dietitian appt. Thanks. Check BS:  1-2x/day  Fasting BS (1st thing in the morning) or before a meal (at least 4 hour since last ate), BS goal:  90 - 130  2 hours after the start of a meal, BS goal:  100 - 150    -Nutrition prescription: 1400 calories/day, 135 - 150 g carbs/day. Adj wt. 148# (67 kg)    Comprehension verified using teachback method. Monitoring/Evaluation:   -Followup visit: 8 weeks with dietitian.   -Receptiveness to education/goals: No Interest.  -Evaluation of education: Needs reinforcement.  -Readiness to change: precontemplative- carb counting her food and eating 3 meals/day. -Expected compliance: fair. Thank you for your referral of this patient. Total time involved in direct patient education: 75 minutes for initial MNT visit.

## 2022-04-11 ENCOUNTER — HOSPITAL ENCOUNTER (OUTPATIENT)
Dept: WOMENS IMAGING | Age: 72
Discharge: HOME | End: 2022-04-11
Payer: MEDICARE

## 2022-04-11 DIAGNOSIS — Z12.31 VISIT FOR SCREENING MAMMOGRAM: ICD-10-CM

## 2022-04-11 PROCEDURE — 77063 BREAST TOMOSYNTHESIS BI: CPT

## 2022-04-26 ENCOUNTER — OFFICE VISIT (OUTPATIENT)
Dept: UROLOGY | Age: 72
End: 2022-04-26
Payer: MEDICARE

## 2022-04-26 VITALS
BODY MASS INDEX: 47.29 KG/M2 | WEIGHT: 257 LBS | HEIGHT: 62 IN | DIASTOLIC BLOOD PRESSURE: 70 MMHG | SYSTOLIC BLOOD PRESSURE: 118 MMHG

## 2022-04-26 DIAGNOSIS — R32 URINARY INCONTINENCE, UNSPECIFIED TYPE: ICD-10-CM

## 2022-04-26 DIAGNOSIS — R10.9 FLANK PAIN: Primary | ICD-10-CM

## 2022-04-26 DIAGNOSIS — N39.0 RECURRENT UTI: ICD-10-CM

## 2022-04-26 LAB
BILIRUBIN URINE: ABNORMAL
BLOOD URINE, POC: NEGATIVE
CHARACTER, URINE: CLEAR
COLOR, URINE: YELLOW
GLUCOSE URINE: NEGATIVE MG/DL
KETONES, URINE: ABNORMAL
LEUKOCYTE CLUMPS, URINE: ABNORMAL
NITRITE, URINE: NEGATIVE
PH, URINE: 6.5 (ref 5–9)
PROTEIN, URINE: NEGATIVE MG/DL
SPECIFIC GRAVITY, URINE: >= 1.03 (ref 1–1.03)
UROBILINOGEN, URINE: 0.2 EU/DL (ref 0–1)

## 2022-04-26 PROCEDURE — 99204 OFFICE O/P NEW MOD 45 MIN: CPT | Performed by: UROLOGY

## 2022-04-26 PROCEDURE — 3017F COLORECTAL CA SCREEN DOC REV: CPT | Performed by: UROLOGY

## 2022-04-26 PROCEDURE — 1090F PRES/ABSN URINE INCON ASSESS: CPT | Performed by: UROLOGY

## 2022-04-26 PROCEDURE — G8427 DOCREV CUR MEDS BY ELIG CLIN: HCPCS | Performed by: UROLOGY

## 2022-04-26 PROCEDURE — 0509F URINE INCON PLAN DOCD: CPT | Performed by: UROLOGY

## 2022-04-26 PROCEDURE — 1036F TOBACCO NON-USER: CPT | Performed by: UROLOGY

## 2022-04-26 PROCEDURE — 1123F ACP DISCUSS/DSCN MKR DOCD: CPT | Performed by: UROLOGY

## 2022-04-26 PROCEDURE — G8400 PT W/DXA NO RESULTS DOC: HCPCS | Performed by: UROLOGY

## 2022-04-26 PROCEDURE — 4040F PNEUMOC VAC/ADMIN/RCVD: CPT | Performed by: UROLOGY

## 2022-04-26 PROCEDURE — 81003 URINALYSIS AUTO W/O SCOPE: CPT | Performed by: UROLOGY

## 2022-04-26 PROCEDURE — G8417 CALC BMI ABV UP PARAM F/U: HCPCS | Performed by: UROLOGY

## 2022-04-26 RX ORDER — CELERY SEED OIL
OIL (ML) MISCELLANEOUS
COMMUNITY

## 2022-04-26 RX ORDER — TERBINAFINE HYDROCHLORIDE 250 MG/1
250 TABLET ORAL DAILY
COMMUNITY

## 2022-04-26 RX ORDER — HYDROXYZINE PAMOATE 50 MG/1
50 CAPSULE ORAL 3 TIMES DAILY PRN
COMMUNITY

## 2022-04-26 RX ORDER — TIZANIDINE HYDROCHLORIDE 6 MG/1
6 CAPSULE, GELATIN COATED ORAL 3 TIMES DAILY
COMMUNITY

## 2022-04-26 NOTE — PROGRESS NOTES
Leopoldo Clerk MD.    [facility] [address] [facility] [id number]  Dept: [phone]  Dept Fax: [phone]: [address] [facility] Urology Office Note -     Patient:  Kristina Hong  YOB: 1950    The patient is a 67 y.o. female who presents today for evaluation of the following problems:   Chief Complaint   Patient presents with    New Patient     referred by Chrissy Asher MD for uti.     Flank Pain    referred/consultation requested by Chrissy Asher MD.    History of Present Illness:    Recurrent UTI  Multiple admissions for recurrent pyelonephritis  Currently on bactrim  Right flank pain  CT showed no stones      Incontinence  Mixed incontinence  Pt wears depends x 2  Pt wheelchair bound      Requested/reviewed records from Chrissy Asher MD office and/or outside [de-identified]    (Patient's old records have been requested, reviewed and pertinent findings summarized in today's note.)    Procedures Today: N/A      Last several PSA's:  No results found for: PSA    Last total testosterone:  No results found for: TESTOSTERONE    Urinalysis today:  Results for POC orders placed in visit on 04/26/22   POCT Urinalysis No Micro (Auto)   Result Value Ref Range    Glucose, Ur Negative NEGATIVE mg/dl    Bilirubin Urine Small (A)     Ketones, Urine Trace (A) NEGATIVE    Specific Gravity, Urine >= 1.030 1.002 - 1.030    Blood, UA POC Negative NEGATIVE    pH, Urine 6.50 5.0 - 9.0    Protein, Urine Negative NEGATIVE mg/dl    Urobilinogen, Urine 0.20 0.0 - 1.0 eu/dl    Nitrite, Urine Negative NEGATIVE    Leukocyte Clumps, Urine Trace (A) NEGATIVE    Color, Urine Yellow YELLOW-STRAW    Character, Urine Clear CLR-SL.CLOUD       Last BUN and creatinine:  Lab Results   Component Value Date    BUN 25 (H) 03/16/2022     Lab Results   Component Value Date    CREATININE 0.9 03/16/2022         Imaging Reviewed during this Office Visit:   Samuel Jurado MD independently reviewed the images and verified the radiology reports from:    [facility] DIGITAL SCREEN SELF REFERRAL W OR WO CAD BILATERAL    Result Date: 4/11/2022  [facility] [place] DIGITAL SCREEN SELF REFERRAL W OR WO CAD BILATERAL CLINICAL INFORMATION: Visit for screening mammogram. Tomosynthesis. CLINICAL:     Self-referred screening mammogram and Meets criteria for genetic evaluation and has been offered information for possible referral PATIENT MEDICAL HISTORY:  No relevant medical history has been documented for this patient. FAMILY HISTORY:   No relevant family history has been documented for this patient. RISK VALUES: Tyrer-Cuzick 10yr.: 3.1%, Tyrer-Cuzick life:   4.1% COMPARISON: 4/8/2021, 7/30/2019, 7/10/2019. TECHNIQUE: Bilateral CC and MLO views of the breasts were obtained. 3D tomosynthesis was utilized. CAD was utilized. BREAST COMPOSITION: The tissue of the breast(s) is predominantly fatty. FINDINGS:  There are bilateral stable intramammary lymph nodes. There are stable bilateral benign-appearing calcifications. There is a biopsy clip in the right breast. No significant masses, calcifications, or other findings are seen in the breast(s). There has been no significant interval change. No mammographic evidence of malignancy. A 1 year screening mammogram is recommended. A result letter will be sent to the patient. She will also receive a reminder 1 month prior to her next mammogram. . BI-RADS CATEGORY 2: BENIGN FINDINGS. Management Recommendation: Routine annual mammography. **This report has been created using voice recognition software. It may contain minor errors which are inherent in voice recognition technology. ** Final report electronically signed by Dr. Gaibno Wallace on 4/11/2022 9:47 AM      PAST MEDICAL, FAMILY AND SOCIAL HISTORY:  Past Medical History:   Diagnosis Date    Acid reflux     Arrhythmia     Arthritis  CAD (coronary artery disease)     NON-OBSTRUCTIVE    [facility] [address] [facility]     COVID-19 10/2020    Depression     Diabetes mellitus ([facility] [address] [facility]     Dizziness - light-headed     Fall 10/15/2013    left rib fracture and shoulder contusion     Hyperlipidemia     Hypertension     Kidney disease     MDRO (multiple drug resistant organisms) resistance 02/2014    MRSA ABDOMEN    Mild persistent asthma without complication 6/76/0582    MRSA infection     Numbness and tingling     HX OF:    S/P laparoscopic cholecystectomy 11/27/2018    Swelling     Tinnitus      Past Surgical History:   Procedure Laterality Date    BACK SURGERY  2007    CARDIOVASCULAR STRESS TEST  04/03/2009    Persantine test associated w/non specific symptoms. EKG non diagnostic. no obvious stress induced ischemia. EF 63%. normal wall motion.  CARDIOVASCULAR STRESS TEST  03/31/2010    tilt table associated w/fluctuation of BP w/relatively low BP throughout test. no significant symptoms during test. borderline tilt table test.    CHOLECYSTECTOMY      COLONOSCOPY  2011    CORONARY ANGIOPLASTY  12/16/2009    EF 60%. nonobstructive CAD. normal LV function.  ENDOSCOPY, COLON, DIAGNOSTIC  over 5 years     Dr. Patricia Mackey  2004    cataract   [address] [facility]  25 months old     umblilical    HERNIA REPAIR  9/8/14    Ventral Hernia Repair with Mesh - Dr. Simmons Person    partial    OTHER SURGICAL HISTORY  Feb 17, 2014    Left lower quadrant incision and drainage (Dr. Jose Luis Munguia, [facility])    OTHER SURGICAL HISTORY  2/2014    I&D abd abscess Dr. Nicholas James N/A 11/26/2018    LAPAROSCOPY EXPLORATORY, LAP LYLE performed by Duane Poole MD at [address] [place] [address]  as a child     TOOTH EXTRACTION  4/16/2012    TRANSTHORACIC ECHOCARDIOGRAM  03/04/2009    EF 50-55%. normal LV global systolic funct. slight LVH. slight lt atrial enlargement. no obvious stenotic valves.  no pericardial effusion.     US BREAST NEEDLE BIOPSY RIGHT Right 07/30/2019    Benign     Family History   Problem Relation Age of Onset    Uterine Cancer Mother 28    Heart Disease Father     Cancer Father 48        lung    Diabetes Paternal Grandfather     Uterine Cancer Maternal Grandmother     Uterine Cancer Maternal Aunt 28    Uterine Cancer Maternal Cousin 28    Depression Brother     Depression Brother     Colon Cancer Neg Hx     Breast Cancer Neg Hx      Outpatient Medications Marked as Taking for the 4/26/22 encounter (Office Visit) with Mikael Rodriges MD   Medication Sig Dispense Refill    Celery Seed OIL by Does not apply route      terbinafine (LAMISIL) 250 MG tablet Take 250 mg by mouth daily      tiZANidine (ZANAFLEX) 6 MG capsule Take 6 mg by mouth 3 times daily      hydrOXYzine (VISTARIL) 50 MG capsule Take 50 mg by mouth 3 times daily as needed for Itching      MILK THISTLE PO Take by mouth      NONFORMULARY Liver Detox      albuterol sulfate HFA (PROAIR HFA) 108 (90 Base) MCG/ACT inhaler Inhale 2 puffs into the lungs every 6 hours as needed for Wheezing or Shortness of Breath 1 each 11    loratadine (CLARITIN) 10 MG tablet Take 1 tablet by mouth daily 30 tablet 11    buPROPion (WELLBUTRIN XL) 150 MG extended release tablet Take 150 mg by mouth every morning      oxybutynin (DITROPAN XL) 15 MG extended release tablet Take 15 mg by mouth daily       spironolactone (ALDACTONE) 25 MG tablet Take 25 mg by mouth daily       etodolac (LODINE) 500 MG tablet Take 500 mg by mouth 2 times daily      Multiple Vitamins-Minerals (SENIOR VITES PO) Take by mouth      Ginger, Zingiber officinalis, (GINGER ROOT) 550 MG CAPS Take by mouth      NONFORMULARY Hylands twice a day      budesonide (PULMICORT) 0.5 MG/2ML nebulizer suspension Take 2 mLs by nebulization 2 times daily 120 mL 11    Nebulizer MISC Please provide new nebulizer machine to use with Albuterol 2.5mg via nebs Q6h prn and budesonide 500 mcg BID 1 each 0    albuterol (PROVENTIL) (2.5 MG/3ML) 0.083% nebulizer solution Take 3 mLs by nebulization every 6 hours as needed for Wheezing or Shortness of Breath 120 each 11    metoprolol succinate (TOPROL XL) 25 MG extended release tablet take 1 tablet by mouth once daily 30 tablet 10    magnesium oxide (MAG-OX) 400 (241.3 Mg) MG TABS tablet Take 1 tablet by mouth daily      zinc sulfate (ZINCATE) 220 (50 Zn) MG capsule Take 1 capsule by mouth daily      vitamin C (VITAMIN C) 500 MG tablet Take 1 tablet by mouth daily      Cholecalciferol (VITAMIN D) 25 MCG TABS Take 1 tablet by mouth daily      mupirocin (BACTROBAN) 2 % ointment Apply topically 3 times daily Apply topically 3 times daily.  Cranberry 450 MG CAPS Take by mouth      Lactobacillus (ACIDOPHILUS) TABS Take by mouth       Mouthwashes (BIOTENE) LIQD oral solution Swish and spit 15 mLs 3 times daily as needed      furosemide (LASIX) 20 MG tablet Take 20 mg by mouth daily      citalopram (CELEXA) 40 MG tablet take 1 tablet by mouth every morning  0    nystatin-triamcinolone (MYCOLOG II) 541244-4.1 UNIT/GM-% cream Apply 1 applicator topically 4 times daily Apply topically 4 times daily.  As needed      Artificial Saliva (BIOTENE MOISTURIZING MOUTH) SOLN Take 1 applicator by mouth as needed      Compression Stockings MISC 1 Package by Does not apply route Knee High 1 each 0    omeprazole (PRILOSEC) 40 MG delayed release capsule take 1 capsule by mouth once daily 30 capsule 5    TRADJENTA 5 MG tablet take 1 tablet by mouth daily 30 tablet 5    simvastatin (ZOCOR) 40 MG tablet take 1 tablet by mouth at bedtime 30 tablet 5    VESICARE 10 MG tablet take 1 tablet by mouth once daily 30 tablet 5    pioglitazone (ACTOS) 45 MG tablet take 1 tablet by mouth once daily 30 tablet 5    metFORMIN (GLUCOPHAGE) 1000 MG tablet take 1 tablet by mouth twice a day with food 60 tablet 5    gabapentin (NEURONTIN) 300 MG capsule Take 1 capsule by mouth 3 times daily (Patient taking differently: Take 800 mg by mouth 2 times daily. [place] ) 90 capsule 5    FREESTYLE LANCETS MISC Test 1 time daily. Dx: 250.00-Type 2 Diabetes. Freestyle 28G Lancets 100 each 11    Omega-3 Fatty Acids (FISH OIL) 1200 MG CAPS Take 1,200 mg by mouth daily      ASPIRIN LOW DOSE 81 MG EC tablet take 1 tablet by mouth once daily 30 tablet 11    glucose blood VI test strips (FREESTYLE LITE) strip Test once daily 100 each 11    diphenhydrAMINE (BENADRYL) 50 MG capsule Take 50 mg by mouth nightly as needed for Itching.  QUEtiapine (SEROQUEL) 50 MG tablet Take 50 mg by mouth nightly.  acetaminophen (TYLENOL) 500 MG tablet Take 500 mg by mouth every 6 hours as needed for Pain. Latex, Bee venom, Advair [fluticasone-salmeterol], Caffeine, Lactose intolerance (gi), Pcn [penicillins], Ultram [tramadol hcl], Fluticasone-salmeterol, and Wool alcohol [lanolin alcohol]  Social History     Tobacco Use   Smoking Status Former Smoker    Years: 3.00    Types: Cigarettes    Start date: 200    Quit date: 45    Years since quittin.3   Smokeless Tobacco Never Used   Tobacco Comment    3 cigarettes a month, Passive smoke exposure 39 years Father then ex       (If patient a smoker, smoking cessation counseling offered)   Social History     Substance and Sexual Activity   Alcohol Use Not Currently    Alcohol/week: 0.0 standard drinks       REVIEW OF SYSTEMS:  Constitutional: negative  Eyes: negative  Respiratory: negative  Cardiovascular: negative  Gastrointestinal: negative  Genitourinary: see HPI  Musculoskeletal: negative  Skin: negative   Neurological: negative  Hematological/Lymphatic: negative  Psychological: negative        Physical Exam:    This a 67 y.o. female  Vitals:    22 0942   BP: 118/70     Body mass index is 47.01 kg/m². Constitutional: Patient in no acute distress;  wheelchair bound    Assessment and Plan        1. Flank pain    2. Recurrent UTI    3. Urinary incontinence, unspecified type               Plan:      Recurrent pyelonephritis requiring hospital admission. Last cultures klebsiella. No stones noted on recent CT scan. Incontinence- on ditropan. pt does not complain of overflow incontinence. She is diabetic. Last ct did show air in bladder. Pt will likely need prolonged abx course or even proph for three months. Cystoscopy in office to rule out bladder pathology  May need proph in future  Pt currently on bactrim which last cx was sensitive to    Prescriptions Ordered:  No orders of the defined types were placed in this encounter.      Orders Placed:  Orders Placed This Encounter   Procedures    POCT Urinalysis No Micro (Auto)            TEREZA Sullivan MD

## 2022-05-09 RX ORDER — METOPROLOL SUCCINATE 25 MG/1
TABLET, EXTENDED RELEASE ORAL
Qty: 30 TABLET | Refills: 6 | Status: SHIPPED | OUTPATIENT
Start: 2022-05-09

## 2022-05-25 ENCOUNTER — OFFICE VISIT (OUTPATIENT)
Dept: INTERNAL MEDICINE CLINIC | Age: 72
End: 2022-05-25
Payer: MEDICARE

## 2022-05-25 VITALS — WEIGHT: 256 LBS | TEMPERATURE: 98.3 F | BODY MASS INDEX: 47.11 KG/M2 | HEIGHT: 62 IN

## 2022-05-25 DIAGNOSIS — E66.01 CLASS 3 SEVERE OBESITY DUE TO EXCESS CALORIES WITH SERIOUS COMORBIDITY AND BODY MASS INDEX (BMI) OF 50.0 TO 59.9 IN ADULT (HCC): ICD-10-CM

## 2022-05-25 DIAGNOSIS — E11.42 TYPE 2 DIABETES MELLITUS WITH DIABETIC POLYNEUROPATHY, WITHOUT LONG-TERM CURRENT USE OF INSULIN (HCC): ICD-10-CM

## 2022-05-25 PROCEDURE — 97803 MED NUTRITION INDIV SUBSEQ: CPT | Performed by: DIETITIAN, REGISTERED

## 2022-05-25 NOTE — PROGRESS NOTES
[address] [place] [address]. [address] [place] [address] Alexis., [facility], [facility]  [phone] (phone)  [phone] (fax)    Patient Name: Gale Wiley. Date of Birth: [id number]. MRN: [id number]      Assessment: Patient is a 67 y.o. female seen for follow-up MNT visit for Type 2 DB and obesity.     -Nutritionally relevant labs:   Lab Results   Component Value Date/Time    LABA1C 5.9 01/18/2022 12:00 AM    LABA1C 6.6 (H) 10/01/2020 11:22 AM    LABA1C 6.2 08/31/2019 03:50 AM    GLUCOSE 175 (H) 03/16/2022 09:30 PM    GLUCOSE 121 (H) 11/27/2021 03:45 PM    GLUCOSE 121 (H) 01/27/2012 10:05 AM    CHOL 121 10/12/2021 09:15 AM    CHOL 114 05/16/2018 11:08 AM    HDL 66 10/12/2021 09:15 AM    LDLCALC 48 05/16/2018 11:08 AM    TRIG 109 10/12/2021 09:15 AM     Pt arrives in wheelchair and she pushes herself along with her legs. DB med:  Tradjenta 5 mg, Actos 45 mg, Metformin 1000 mg BID  Pt provided a copy of the medications that she takes and their dosages. (plan to scan in EHR)  Household includes grown son. Pt has a  stop in every 6 mo and she has a nurse stop in every Friday to check BP, oxygen level and her breathing. Pt states of + Covid with hospitalization in the past and was in bed for 9 days.    -Blood sugar trends: Brought in written documentation of FBS's  FBS:  95 - 145    Pt presented with food labels. Pt states she uses sugar sub when making crock pot apples or other desserts. Pt starting to use Keto bread and buys Sohan bars for snacks.  -Food recall:   Breakfast: banana and 1 sl raisin bread today. Other - oatmeal - plain with sugar free jelly and sometimes with 1 tsp honey or peanut butter OR raisin cinn english muffin with jelly. Will have orange juice when she gets home Pt drinks a bottle of 100% fruit juice everyday = 56 gms carbs. Lunch: 3 pm - canned soup.    Dinner: Last night chips and 3 hot dogs last night, 3 sl bread keto   Pt states that some days she only eats once a day. She states she likes her way of eating - out of can and having only 1 food item to eat. She does not eat plate method way of eating. Snacks: chips or atkins bar or candy bar. Pt likes frozen broccoli, cauliflower, carrots and cauliflower rice. Most of the time she uses canned vegetables. She does not have any teeth so drinks juice or eats light canned fruit. Frequent protein choices are high in sodium - hot dogs, sausage, cornbeef hash, canned chicken and salmon. Occ she will  beef overnight in the [employer] pot.    -Main Beverages: 2 -36 oz bottles of flavored water/day. Dislikes her tap water. Exercise:  She has a recliner at home and uses a wheelchair in her apartment  -Impression of Dietary Intake: on average, 1-3 meals per day, low fiber, high fat/ cholesterol, high salt, lacking routine intake of fruits and vegetables, highly processed food choices.     Current Outpatient Medications on File Prior to Visit   Medication Sig Dispense Refill    metoprolol succinate (TOPROL XL) 25 MG extended release tablet take 1 tablet by mouth once daily 30 tablet 6    Celery Seed OIL by Does not apply route      terbinafine (LAMISIL) 250 MG tablet Take 250 mg by mouth daily      tiZANidine (ZANAFLEX) 6 MG capsule Take 6 mg by mouth 3 times daily      hydrOXYzine (VISTARIL) 50 MG capsule Take 50 mg by mouth 3 times daily as needed for Itching      MILK THISTLE PO Take by mouth      NONFORMULARY Liver Detox      albuterol sulfate HFA (PROAIR HFA) 108 (90 Base) MCG/ACT inhaler Inhale 2 puffs into the lungs every 6 hours as needed for Wheezing or Shortness of Breath 1 each 11    loratadine (CLARITIN) 10 MG tablet Take 1 tablet by mouth daily 30 tablet 11    buPROPion (WELLBUTRIN XL) 150 MG extended release tablet Take 150 mg by mouth every morning      oxybutynin (DITROPAN XL) 15 MG extended release tablet Take 15 mg by mouth daily       spironolactone (ALDACTONE) 25 MG tablet Take 25 mg by mouth daily       etodolac (LODINE) 500 MG tablet Take 500 mg by mouth 2 times daily      Multiple Vitamins-Minerals (SENIOR VITES PO) Take by mouth      Ginger, Zingiber officinalis, (GINGER ROOT) 550 MG CAPS Take by mouth      NONFORMULARY Hylands twice a day      budesonide (PULMICORT) 0.5 MG/2ML nebulizer suspension Take 2 mLs by nebulization 2 times daily 120 mL 11    Nebulizer MISC Please provide new nebulizer machine to use with Albuterol 2.5mg via nebs Q6h prn and budesonide 500 mcg BID 1 each 0    albuterol (PROVENTIL) (2.5 MG/3ML) 0.083% nebulizer solution Take 3 mLs by nebulization every 6 hours as needed for Wheezing or Shortness of Breath 120 each 11    magnesium oxide (MAG-OX) 400 (241.3 Mg) MG TABS tablet Take 1 tablet by mouth daily      zinc sulfate (ZINCATE) 220 (50 Zn) MG capsule Take 1 capsule by mouth daily      vitamin C (VITAMIN C) 500 MG tablet Take 1 tablet by mouth daily      Cholecalciferol (VITAMIN D) 25 MCG TABS Take 1 tablet by mouth daily      mupirocin (BACTROBAN) 2 % ointment Apply topically 3 times daily Apply topically 3 times daily.  Cranberry 450 MG CAPS Take by mouth      Lactobacillus (ACIDOPHILUS) TABS Take by mouth       Mouthwashes (BIOTENE) LIQD oral solution Swish and spit 15 mLs 3 times daily as needed      furosemide (LASIX) 20 MG tablet Take 20 mg by mouth daily      citalopram (CELEXA) 40 MG tablet take 1 tablet by mouth every morning  0    nystatin-triamcinolone (MYCOLOG II) 195076-5.1 UNIT/GM-% cream Apply 1 applicator topically 4 times daily Apply topically 4 times daily.  As needed      Artificial Saliva (BIOTENE MOISTURIZING MOUTH) SOLN Take 1 applicator by mouth as needed      Compression Stockings MISC 1 Package by Does not apply route Knee High 1 each 0    omeprazole (PRILOSEC) 40 MG delayed release capsule take 1 capsule by mouth once daily 30 capsule 5    TRADJENTA 5 MG tablet take 1 tablet by mouth daily 30 tablet 5    simvastatin (ZOCOR) 40 MG tablet take 1 tablet by mouth at bedtime 30 tablet 5    VESICARE 10 MG tablet take 1 tablet by mouth once daily 30 tablet 5    pioglitazone (ACTOS) 45 MG tablet take 1 tablet by mouth once daily 30 tablet 5    metFORMIN (GLUCOPHAGE) 1000 MG tablet take 1 tablet by mouth twice a day with food 60 tablet 5    gabapentin (NEURONTIN) 300 MG capsule Take 1 capsule by mouth 3 times daily (Patient taking differently: Take 800 mg by mouth 2 times daily. [place] Yeny ) 90 capsule 5    FREESTYLE LANCETS MISC Test 1 time daily. Dx: 250.00-Type 2 Diabetes. Freestyle 28G Lancets 100 each 11    Omega-3 Fatty Acids (FISH OIL) 1200 MG CAPS Take 1,200 mg by mouth daily      ASPIRIN LOW DOSE 81 MG EC tablet take 1 tablet by mouth once daily 30 tablet 11    glucose blood VI test strips (FREESTYLE LITE) strip Test once daily 100 each 11    diphenhydrAMINE (BENADRYL) 50 MG capsule Take 50 mg by mouth nightly as needed for Itching.  QUEtiapine (SEROQUEL) 50 MG tablet Take 50 mg by mouth nightly.  acetaminophen (TYLENOL) 500 MG tablet Take 500 mg by mouth every 6 hours as needed for Pain. No current facility-administered medications on file prior to visit. Vitals from current and previous visits:  Temp 98.3 °F (36.8 °C)   Ht 5' 2\" (1.575 m)   Wt 256 lb (116.1 kg)   LMP  (LMP Unknown)   BMI 46.82 kg/m²     -Body mass index is 46.82 kg/m². Greater than 40 - Morbid Obesity / Extreme Obesity / Grade III. - Patient lost and 5 pounds over the last 8 weeks. .   Pt attributes her weight loss to not being hungry all the time.  -Weight goal: lose weight. Nutrition Diagnosis:   Obesity related to Sedentary lifestyle, excessive calorie intake as evidenced by Food recall and patient stating she does not get out of the chair much. Intervention:  -Impression: Pt states she has a 6 grade education and does not do math.   She states she did not understand how to complete a food log.  Pt brought in labels and so had to work with her practical knowledge of them and how to make healthier meals. Pt resistant to eating 3 meals/day stating she will gain weight.      -Instructed the patient on: Meal Planning for Regular, Balanced Meals & Snacks and The Importance of Regular Physical Activity Having 3 small balanced meals/day will help with weight loss vs 3 large meals/day. -Handouts given for: AVS with meal ideas below. Patient Instructions   Drink only 1/2 bottle 100% juice at a time instead of the whole bottle. Save the other 1/2 bottle for the next day. Learn to like the natural taste of plain bottled water and the natural flavor of fruit and vegetables!!    Follow the serving size on your labels. **Good idea that you buy low sodium canned vegetables or frozen cauliflower/broccoli or carrots    **Get use to liking the natural flavor of fruit - if want can add splenda pkt to this. Limit treats - like cookies/jesse bars and sherri cups to 1 serving - once a week. Meal ideas:   [place] soup meal  - Good idea to get the \"healthy request\" campbells chunky soup - add fresh fruit and 6 saltines with this  Hash meal  - Eat only 1/2 can of hash, then add an egg and 1 slice toast with this. Chicken and Dumplings  - add vegetables and 1/2 cup fruit. [place] Soup meal  - add 6 saltine crackers and 1/2 fruit and 1/2 - 1 cup vegetables. Shredded chicken meal  - ok to have 2 sl Keto bread, and add veggies. [place] meal  - Eat only 1/2 the can then add 2 sl keto bread and veggies. Baked beans  - Eat only 1/2 the can and then add cornbread and veggies  Fried potatoes and onions  - Have 2 cups of this and then add lean meat like chicken or pork loin, add 1/2 cup fruit  Sweet potato meal  - Have 1/2 the can then add a protein and other veggie and ok to have 1 sl bread with this too. Keep working those legs and arms each day - always do more as able.   - Do chair exercises each

## 2022-05-25 NOTE — PATIENT INSTRUCTIONS
Drink only 1/2 bottle 100% juice at a time instead of the whole bottle. Save the other 1/2 bottle for the next day. Learn to like the natural taste of plain bottled water and the natural flavor of fruit and vegetables!!    Follow the serving size on your labels. **Good idea that you buy low sodium canned vegetables or frozen cauliflower/broccoli or carrots    **Get use to liking the natural flavor of fruit - if want can add splenda pkt to this. Limit treats - like cookies/jesse bars and sherri cups to 1 serving - once a week. Meal ideas:   [place] soup meal  - Good idea to get the \"healthy request\" campbells chunky soup - add fresh fruit and 6 saltines with this  Hash meal  - Eat only 1/2 can of hash, then add an egg and 1 slice toast with this. Chicken and Dumplings  - add vegetables and 1/2 cup fruit. Progresso Soup meal  - add 6 saltine crackers and 1/2 fruit and 1/2 - 1 cup vegetables. Shredded chicken meal  - ok to have 2 sl Keto bread, and add veggies. [place] meal  - Eat only 1/2 the can then add 2 sl keto bread and veggies. Baked beans  - Eat only 1/2 the can and then add cornbread and veggies  Fried potatoes and onions  - Have 2 cups of this and then add lean meat like chicken or pork loin, add 1/2 cup fruit  Sweet potato meal  - Have 1/2 the can then add a protein and other veggie and ok to have 1 sl bread with this too. Keep working those legs and arms each day - always do more as able. - Do chair exercises each day.
61

## 2022-06-09 ENCOUNTER — PROCEDURE VISIT (OUTPATIENT)
Dept: UROLOGY | Age: 72
End: 2022-06-09
Payer: MEDICARE

## 2022-06-09 VITALS — WEIGHT: 256 LBS | RESPIRATION RATE: 16 BRPM | BODY MASS INDEX: 47.11 KG/M2 | HEIGHT: 62 IN

## 2022-06-09 DIAGNOSIS — N39.0 RECURRENT UTI: ICD-10-CM

## 2022-06-09 DIAGNOSIS — R32 URINARY INCONTINENCE, UNSPECIFIED TYPE: ICD-10-CM

## 2022-06-09 DIAGNOSIS — R10.9 FLANK PAIN: Primary | ICD-10-CM

## 2022-06-09 PROCEDURE — 52000 CYSTOURETHROSCOPY: CPT | Performed by: UROLOGY

## 2022-06-09 RX ORDER — LORAZEPAM 0.5 MG/1
0.5 TABLET ORAL PRN
COMMUNITY

## 2022-06-09 RX ORDER — FEXOFENADINE HYDROCHLORIDE 60 MG/1
60 TABLET, FILM COATED ORAL DAILY
COMMUNITY

## 2022-06-09 RX ORDER — LIDOCAINE 4 G/G
1 PATCH TOPICAL DAILY
COMMUNITY

## 2022-06-09 RX ORDER — MONTELUKAST SODIUM 10 MG/1
10 TABLET ORAL NIGHTLY
COMMUNITY
End: 2022-10-04

## 2022-06-09 NOTE — PROGRESS NOTES
Cystoscopy    Operative Note    Patient:  James Gtz  MRN: [id number]  YOB: 1950    Date: 06/09/22  Surgeon: Bailee Wiggins MD  Anesthesia: Earline Molina Local  Indications:     Recurrent pyelonephritis requiring hospital admission. Last cultures klebsiella. No stones noted on recent CT scan. Incontinence- on ditropan. pt does not complain of overflow incontinence. She is diabetic. Last ct did show air in bladder. Pt will likely need prolonged abx course or even proph for three months. Cystoscopy in office to rule out bladder pathology  May need proph in future  Pt currently on bactrim which last cx was sensitive to        Position: Dorsal Lithotomy  EBL: 0 ml    Findings:   The patient was prepped and draped in the usual sterile fashion. The cystoscope was advanced through the urethra and into the bladder. The bladder was thoroughly inspected and the following was noted:    Vagina: normal appearing vagina with normal color and discharge, no lesions  Residual Urine: moderate. urine cloudy and concerning for infection  Urethra: normal appearing urethra with no masses, tenderness or lesions urethral hypermobility not noted  Bladder: no tumor noted . no bladder diverticulum. Moderate trabeculation noted. Ureters: Orifices with normal configuration and location. The cystoscope was removed. The patient tolerated the procedure well.   Treat symptomatic infections  Hold off on prophylaxis  Has flank pain with infections- - if she calls w UTI start empiric abx based off last positive culture and then get new culture

## 2022-08-02 ENCOUNTER — TELEPHONE (OUTPATIENT)
Dept: UROLOGY | Age: 72
End: 2022-08-02

## 2022-08-02 DIAGNOSIS — N39.0 RECURRENT UTI: Primary | ICD-10-CM

## 2022-08-02 DIAGNOSIS — R10.9 FLANK PAIN: ICD-10-CM

## 2022-08-02 RX ORDER — SULFAMETHOXAZOLE AND TRIMETHOPRIM 800; 160 MG/1; MG/1
1 TABLET ORAL 2 TIMES DAILY
Qty: 20 TABLET | Refills: 0 | Status: SHIPPED | OUTPATIENT
Start: 2022-08-02 | End: 2022-08-12

## 2022-08-02 NOTE — TELEPHONE ENCOUNTER
Patient states she has had lower back pain for the last 2 days that is continuous. She denies burning, urgency, fever, or chills. The frequency is the same. Appointment changed to 08/26//2022 to discuss UTI and possible preventative antibiotics being ordered. Darryl Frances at [facility] in [address] [place] [address] will collect the urine today or tomorrow. Please advise.  Thank you

## 2022-08-03 ENCOUNTER — NURSE ONLY (OUTPATIENT)
Dept: LAB | Age: 72
End: 2022-08-03

## 2022-08-03 DIAGNOSIS — R10.9 FLANK PAIN: ICD-10-CM

## 2022-08-03 DIAGNOSIS — N39.0 RECURRENT UTI: ICD-10-CM

## 2022-08-03 LAB
BACTERIA: ABNORMAL
BILIRUBIN URINE: NEGATIVE
BLOOD, URINE: NEGATIVE
CASTS: ABNORMAL /LPF
CASTS: ABNORMAL /LPF
CHARACTER, URINE: CLEAR
COLOR: YELLOW
CRYSTALS: ABNORMAL
EPITHELIAL CELLS, UA: ABNORMAL /HPF
GLUCOSE, URINE: NEGATIVE MG/DL
KETONES, URINE: ABNORMAL
LEUKOCYTE ESTERASE, URINE: ABNORMAL
MISCELLANEOUS LAB TEST RESULT: ABNORMAL
NITRITE, URINE: NEGATIVE
PH UA: 5 (ref 5–9)
PROTEIN UA: NEGATIVE MG/DL
RBC URINE: ABNORMAL /HPF
RENAL EPITHELIAL, UA: ABNORMAL
SPECIFIC GRAVITY UA: 1.02 (ref 1–1.03)
UROBILINOGEN, URINE: 0.2 EU/DL (ref 0–1)
WBC UA: ABNORMAL /HPF
YEAST: ABNORMAL

## 2022-08-05 LAB
ORGANISM: ABNORMAL
URINE CULTURE, ROUTINE: ABNORMAL

## 2022-08-16 NOTE — TELEPHONE ENCOUNTER
Unable to lvm. No system in place. Pt called back and said she had finished the antibiotics. At present she has no symptoms.'    She will call if any symptoms arise.

## 2022-08-26 ENCOUNTER — OFFICE VISIT (OUTPATIENT)
Dept: UROLOGY | Age: 72
End: 2022-08-26
Payer: MEDICARE

## 2022-08-26 VITALS
WEIGHT: 256 LBS | HEIGHT: 62 IN | SYSTOLIC BLOOD PRESSURE: 152 MMHG | DIASTOLIC BLOOD PRESSURE: 80 MMHG | BODY MASS INDEX: 47.11 KG/M2

## 2022-08-26 DIAGNOSIS — R32 URINARY INCONTINENCE, UNSPECIFIED TYPE: ICD-10-CM

## 2022-08-26 DIAGNOSIS — N39.0 RECURRENT UTI: Primary | ICD-10-CM

## 2022-08-26 LAB
BILIRUBIN URINE: ABNORMAL
BLOOD URINE, POC: NEGATIVE
CHARACTER, URINE: CLEAR
COLOR, URINE: YELLOW
GLUCOSE URINE: NEGATIVE MG/DL
KETONES, URINE: NEGATIVE
LEUKOCYTE CLUMPS, URINE: NEGATIVE
NITRITE, URINE: POSITIVE
PH, URINE: 6 (ref 5–9)
POST VOID RESIDUAL (PVR): 0 ML
PROTEIN, URINE: NEGATIVE MG/DL
SPECIFIC GRAVITY, URINE: 1.02 (ref 1–1.03)
UROBILINOGEN, URINE: 0.2 EU/DL (ref 0–1)

## 2022-08-26 PROCEDURE — G8428 CUR MEDS NOT DOCUMENT: HCPCS | Performed by: NURSE PRACTITIONER

## 2022-08-26 PROCEDURE — 1123F ACP DISCUSS/DSCN MKR DOCD: CPT | Performed by: NURSE PRACTITIONER

## 2022-08-26 PROCEDURE — G8417 CALC BMI ABV UP PARAM F/U: HCPCS | Performed by: NURSE PRACTITIONER

## 2022-08-26 PROCEDURE — 99213 OFFICE O/P EST LOW 20 MIN: CPT | Performed by: NURSE PRACTITIONER

## 2022-08-26 PROCEDURE — 51798 US URINE CAPACITY MEASURE: CPT | Performed by: NURSE PRACTITIONER

## 2022-08-26 PROCEDURE — 1036F TOBACCO NON-USER: CPT | Performed by: NURSE PRACTITIONER

## 2022-08-26 PROCEDURE — G8400 PT W/DXA NO RESULTS DOC: HCPCS | Performed by: NURSE PRACTITIONER

## 2022-08-26 PROCEDURE — 0509F URINE INCON PLAN DOCD: CPT | Performed by: NURSE PRACTITIONER

## 2022-08-26 PROCEDURE — 3017F COLORECTAL CA SCREEN DOC REV: CPT | Performed by: NURSE PRACTITIONER

## 2022-08-26 PROCEDURE — 81003 URINALYSIS AUTO W/O SCOPE: CPT | Performed by: NURSE PRACTITIONER

## 2022-08-26 PROCEDURE — 1090F PRES/ABSN URINE INCON ASSESS: CPT | Performed by: NURSE PRACTITIONER

## 2022-08-26 ASSESSMENT — ENCOUNTER SYMPTOMS
VOMITING: 0
BACK PAIN: 0
ABDOMINAL PAIN: 0
NAUSEA: 0

## 2022-08-26 NOTE — PROGRESS NOTES
[address] [address] [place] [address]  Dept: [phone]  Loc: [phone]    Visit Date: 8/26/2022        HPI:     Bekah Mazariegos is a 67 y.o. female who presents today for:  Chief Complaint   Patient presents with    Follow-up    Incontinence    Urinary Tract Infection     Hx of rec uti        HPI  Pt seen in follow up for recurrent UTIs. Pt has a hx of multiple hospital admissions for pyelonephritis and mixed incontinence. CT abd/pelvis 3/16/22 negative for stones or obstruction noted air in the bladder. Cystoscopy 6/9/22 noted moderate residual urine, moderate bladder wall trabeculation and no other significant findings. Urine culture 8/3/22 with Klebsiella. Pt denies symptoms of infection today. Reports normally if she has an infection she develops flank pain. Reports she occasionally takes cranberry pills but not on anything routinely for prevention. Current Outpatient Medications   Medication Sig Dispense Refill    fexofenadine (ALLEGRA) 60 MG tablet Take 60 mg by mouth daily      fluticasone 100 MCG/ACT AEPB Inhale 1 puff into the lungs daily      lidocaine 4 % external patch Place 1 patch onto the skin daily      LORazepam (ATIVAN) 0.5 MG tablet Take 0.5 mg by mouth as needed for Anxiety.       montelukast (SINGULAIR) 10 MG tablet Take 10 mg by mouth nightly      metoprolol succinate (TOPROL XL) 25 MG extended release tablet take 1 tablet by mouth once daily 30 tablet 6    Celery Seed OIL by Does not apply route      terbinafine (LAMISIL) 250 MG tablet Take 250 mg by mouth daily      tiZANidine (ZANAFLEX) 6 MG capsule Take 6 mg by mouth 3 times daily      hydrOXYzine (VISTARIL) 50 MG capsule Take 50 mg by mouth 3 times daily as needed for Itching      MILK THISTLE PO Take by mouth      NONFORMULARY Liver Detox      albuterol sulfate HFA (PROAIR HFA) 108 (90 Base) MCG/ACT inhaler Inhale 2 puffs into the 1 applicator by mouth as needed      Compression Stockings MISC 1 Package by Does not apply route Knee High 1 each 0    omeprazole (PRILOSEC) 40 MG delayed release capsule take 1 capsule by mouth once daily 30 capsule 5    TRADJENTA 5 MG tablet take 1 tablet by mouth daily 30 tablet 5    simvastatin (ZOCOR) 40 MG tablet take 1 tablet by mouth at bedtime 30 tablet 5    VESICARE 10 MG tablet take 1 tablet by mouth once daily 30 tablet 5    pioglitazone (ACTOS) 45 MG tablet take 1 tablet by mouth once daily 30 tablet 5    metFORMIN (GLUCOPHAGE) 1000 MG tablet take 1 tablet by mouth twice a day with food 60 tablet 5    gabapentin (NEURONTIN) 300 MG capsule Take 1 capsule by mouth 3 times daily (Patient taking differently: Take 800 mg by mouth 2 times daily. [place] ) 90 capsule 5    FREESTYLE LANCETS MISC Test 1 time daily. Dx: 250.00-Type 2 Diabetes. Freestyle 28G Lancets 100 each 11    Omega-3 Fatty Acids (FISH OIL) 1200 MG CAPS Take 1,200 mg by mouth daily      ASPIRIN LOW DOSE 81 MG EC tablet take 1 tablet by mouth once daily 30 tablet 11    glucose blood VI test strips (FREESTYLE LITE) strip Test once daily 100 each 11    diphenhydrAMINE (BENADRYL) 50 MG capsule Take 50 mg by mouth nightly as needed for Itching. QUEtiapine (SEROQUEL) 50 MG tablet Take 50 mg by mouth nightly. acetaminophen (TYLENOL) 500 MG tablet Take 500 mg by mouth every 6 hours as needed for Pain. No current facility-administered medications for this visit.        Past Medical History  Ana Laura Cortés  has a past medical history of Acid reflux, Arrhythmia, Arthritis, CAD (coronary artery disease), Chronic bronchitis ([facility] [address] COVID-19, Depression, Diabetes mellitus ([facility] [address] Dizziness - light-headed, Fall, Hyperlipidemia, Hypertension, Kidney disease, MDRO (multiple drug resistant organisms) resistance, Mild persistent asthma without complication, MRSA infection, Numbness and tingling, S/P laparoscopic cholecystectomy, Swelling, and not in acute distress. Appearance: Normal appearance. She is well-developed. She is not ill-appearing or diaphoretic. HENT:      Head: Normocephalic and atraumatic. Right Ear: External ear normal.      Left Ear: External ear normal.      Nose: Nose normal.      Mouth/Throat:      Mouth: Mucous membranes are moist.   Eyes:      General: No scleral icterus. Right eye: No discharge. Left eye: No discharge. Neck:      Vascular: No JVD. Trachea: No tracheal deviation. Cardiovascular:      Rate and Rhythm: Normal rate and regular rhythm. Pulmonary:      Effort: Pulmonary effort is normal. No respiratory distress. Abdominal:      General: There is no distension. Tenderness: There is no abdominal tenderness. There is no right CVA tenderness or left CVA tenderness. Musculoskeletal:         General: No tenderness. Normal range of motion. Skin:     General: Skin is warm and dry. Neurological:      Mental Status: She is alert and oriented to person, place, and time. Mental status is at baseline. Psychiatric:         Mood and Affect: Mood normal.         Behavior: Behavior normal.         Thought Content:  Thought content normal.       POC  Results for POC orders placed in visit on 08/26/22   POCT Urinalysis No Micro (Auto)   Result Value Ref Range    Glucose, Ur Negative NEGATIVE mg/dl    Bilirubin Urine Moderate (A)     Ketones, Urine Negative NEGATIVE    Specific Gravity, Urine 1.025 1.002 - 1.030    Blood, UA POC Negative NEGATIVE    pH, Urine 6.00 5.0 - 9.0    Protein, Urine Negative NEGATIVE mg/dl    Urobilinogen, Urine 0.20 0.0 - 1.0 eu/dl    Nitrite, Urine Positive (A) NEGATIVE    Leukocyte Clumps, Urine Negative NEGATIVE    Color, Urine Yellow [id number]    Character, Urine Clear [id number]   poct post void residual   Result Value Ref Range    post void residual 0 ml         Patients recent PSA values are as follows  No results found for: PSA, PSADIA     Recent BUN/Creatinine:  Lab Results   Component Value Date/Time    BUN 25 03/16/2022 09:30 PM    CREATININE 0.9 03/16/2022 09:30 PM         Assessment:   Recurrent UTIs  Mixed incontinence    Plan:     Pt's urine today nitrite positive but negative for blood and leuks. Send for culture. If significant for infection will only treat if symptomatic. Discussed possible preventive therapy with Hiprex or 3 month period of daily antibiotic therapy. Pt would like to trial supplement first.  Start D mannose 500 mg TID. Increase water intake.       F/u in 4-6 months with PVR

## 2022-08-29 ENCOUNTER — TELEPHONE (OUTPATIENT)
Dept: UROLOGY | Age: 72
End: 2022-08-29

## 2022-08-29 LAB
ORGANISM: ABNORMAL
URINE CULTURE, ROUTINE: ABNORMAL

## 2022-08-29 NOTE — TELEPHONE ENCOUNTER
Pt's urine culture with ESBL E coli. Recommend treating only if having symptoms at this time. Pt was asymptomatic on day of office visit.

## 2022-08-29 NOTE — TELEPHONE ENCOUNTER
Patient advised of the urine results. She denies any symptoms at this time, but will call back if she develops them.

## 2022-09-27 ENCOUNTER — OFFICE VISIT (OUTPATIENT)
Dept: INTERNAL MEDICINE CLINIC | Age: 72
End: 2022-09-27
Payer: MEDICARE

## 2022-09-27 VITALS — WEIGHT: 254 LBS | HEIGHT: 62 IN | BODY MASS INDEX: 46.74 KG/M2

## 2022-09-27 DIAGNOSIS — E11.9 TYPE 2 DIABETES MELLITUS WITHOUT COMPLICATION, UNSPECIFIED WHETHER LONG TERM INSULIN USE (HCC): Primary | ICD-10-CM

## 2022-09-27 DIAGNOSIS — E66.01 CLASS 3 SEVERE OBESITY DUE TO EXCESS CALORIES WITH BODY MASS INDEX (BMI) OF 45.0 TO 49.9 IN ADULT, UNSPECIFIED WHETHER SERIOUS COMORBIDITY PRESENT (HCC): ICD-10-CM

## 2022-09-27 PROCEDURE — 97803 MED NUTRITION INDIV SUBSEQ: CPT | Performed by: DIETITIAN, REGISTERED

## 2022-09-27 NOTE — PATIENT INSTRUCTIONS
Be careful of sugar free candies - they have sugar alcohols and can cause diarrhea or urgency to go. Glad you like your vegetables - Make sure you include vegetables with meals. Cut back on added fats like baum, to help with weight loss efforts. Drink only 1/2 your 100% fruit juice instead of a whole bottle - the whole bottle will spike your Blood sugar high. Get active each day as able - taking short walks with your walker each day and do chair exercises.

## 2022-09-27 NOTE — PROGRESS NOTES
[address] [place] [address]. [address] [place] [address] Alexis., [place] Garay, [address]  [phone] (phone)  [phone] (fax)    Patient Name: Saida Sanchez. Date of Birth: [id number]. MRN: [id number]      Assessment: Patient is a 67 y.o. female seen for follow-up MNT visit for Type 2 DB and obesity.     -Nutritionally relevant labs:   Lab Results   Component Value Date/Time    LABA1C 5.9 01/18/2022 12:00 AM    LABA1C 6.6 (H) 10/01/2020 11:22 AM    LABA1C 6.2 08/31/2019 03:50 AM    GLUCOSE 175 (H) 03/16/2022 09:30 PM    GLUCOSE 121 (H) 11/27/2021 03:45 PM    GLUCOSE 121 (H) 01/27/2012 10:05 AM    CHOL 121 10/12/2021 09:15 AM    CHOL 114 05/16/2018 11:08 AM    HDL 66 10/12/2021 09:15 AM    LDLCALC 48 05/16/2018 11:08 AM    TRIG 109 10/12/2021 09:15 AM     Pt was in the restroom ~ 15 minutes at the beginning of her appt time. Stating she had an accident and cleaned up as best she could. -Blood sugar trends: Did not bring her meter and forgot her BS record. FBS 95 today. Usually as high as 131    She can feel her BS drop and does not check her BS - This happens 4x/year  -Food recall:   Breakfast: Fuit bars (3). She ate these since her FBS was lower than usual..   Lunch: 6 inch sub - only ate 1/2. Ate part of the salad. Dinner: skipped last night. Eats split pea soup OR vegetable soup  Receives food boxes from [employer] she likes her vegetables and fruit.  2 days/mo - gets fresh fruit and veggies but does not always like what they offer. Does not tolerate milk or other milk substitutes. Ok with cheese. Recently made [facility] - using egg noodles and divides this recipe into 3 meals. It is a recipe for 2. Snacks: Denied eating snacks last night. If snacks - likes her sugar free candies.    -Main Beverages: 2 - 36 oz flavored fuentes/day. Occ will have Gatorade zero. Exercise - She c/o no energy.   She will take her walker and walk from 1 room to the next and back and is out of energy.    -Impression of Dietary Intake: on average, 1-2 meals per day, relies heavily on convenient processed foods. Current Outpatient Medications on File Prior to Visit   Medication Sig Dispense Refill    D-Mannose 500 MG CAPS Take 500 mg by mouth in the morning, at noon, and at bedtime 90 capsule     fexofenadine (ALLEGRA) 60 MG tablet Take 60 mg by mouth daily      fluticasone 100 MCG/ACT AEPB Inhale 1 puff into the lungs daily      lidocaine 4 % external patch Place 1 patch onto the skin daily      LORazepam (ATIVAN) 0.5 MG tablet Take 0.5 mg by mouth as needed for Anxiety.       montelukast (SINGULAIR) 10 MG tablet Take 10 mg by mouth nightly      metoprolol succinate (TOPROL XL) 25 MG extended release tablet take 1 tablet by mouth once daily 30 tablet 6    Celery Seed OIL by Does not apply route      terbinafine (LAMISIL) 250 MG tablet Take 250 mg by mouth daily      tiZANidine (ZANAFLEX) 6 MG capsule Take 6 mg by mouth 3 times daily      hydrOXYzine pamoate (VISTARIL) 50 MG capsule Take 50 mg by mouth 3 times daily as needed for Itching      MILK THISTLE PO Take by mouth      NONFORMULARY Liver Detox      albuterol sulfate HFA (PROAIR HFA) 108 (90 Base) MCG/ACT inhaler Inhale 2 puffs into the lungs every 6 hours as needed for Wheezing or Shortness of Breath 1 each 11    loratadine (CLARITIN) 10 MG tablet Take 1 tablet by mouth daily 30 tablet 11    buPROPion (WELLBUTRIN XL) 150 MG extended release tablet Take 150 mg by mouth every morning      oxybutynin (DITROPAN XL) 15 MG extended release tablet Take 15 mg by mouth daily       spironolactone (ALDACTONE) 25 MG tablet Take 25 mg by mouth daily       etodolac (LODINE) 500 MG tablet Take 500 mg by mouth 2 times daily      Multiple Vitamins-Minerals (SENIOR VITES PO) Take by mouth      Ginger, Zingiber officinalis, (GINGER ROOT) 550 MG CAPS Take by mouth      budesonide (PULMICORT) 0.5 MG/2ML nebulizer suspension Take 2 mLs by nebulization 2 times daily 120 mL 11    Nebulizer MISC Please provide new nebulizer machine to use with Albuterol 2.5mg via nebs Q6h prn and budesonide 500 mcg BID 1 each 0    magnesium oxide (MAG-OX) 400 (241.3 Mg) MG TABS tablet Take 1 tablet by mouth daily      zinc sulfate (ZINCATE) 220 (50 Zn) MG capsule Take 1 capsule by mouth daily      vitamin C (VITAMIN C) 500 MG tablet Take 1 tablet by mouth daily      Cholecalciferol (VITAMIN D) 25 MCG TABS Take 1 tablet by mouth daily      mupirocin (BACTROBAN) 2 % ointment Apply topically as needed       Cranberry 450 MG CAPS Take by mouth      Lactobacillus (ACIDOPHILUS) TABS Take by mouth       Mouthwashes (BIOTENE) LIQD oral solution Swish and spit 15 mLs 3 times daily as needed      furosemide (LASIX) 20 MG tablet Take 20 mg by mouth daily      citalopram (CELEXA) 40 MG tablet take 1 tablet by mouth every morning  0    nystatin-triamcinolone (MYCOLOG II) 587397-9.1 UNIT/GM-% cream Apply 1 applicator topically 4 times daily Apply topically 4 times daily. As needed      Artificial Saliva (BIOTENE MOISTURIZING MOUTH) SOLN Take 1 applicator by mouth as needed      Compression Stockings MISC 1 Package by Does not apply route Knee High 1 each 0    omeprazole (PRILOSEC) 40 MG delayed release capsule take 1 capsule by mouth once daily 30 capsule 5    TRADJENTA 5 MG tablet take 1 tablet by mouth daily 30 tablet 5    simvastatin (ZOCOR) 40 MG tablet take 1 tablet by mouth at bedtime 30 tablet 5    pioglitazone (ACTOS) 45 MG tablet take 1 tablet by mouth once daily 30 tablet 5    metFORMIN (GLUCOPHAGE) 1000 MG tablet take 1 tablet by mouth twice a day with food 60 tablet 5    gabapentin (NEURONTIN) 300 MG capsule Take 1 capsule by mouth 3 times daily (Patient taking differently: Take 800 mg by mouth 2 times daily.) 90 capsule 5    FREESTYLE LANCETS MISC Test 1 time daily. Dx: 250.00-Type 2 Diabetes.   Freestyle 28G Lancets 100 each 11    Omega-3 Fatty Acids (FISH OIL) 1200 MG CAPS Take 1,200 mg by mouth daily      ASPIRIN LOW DOSE 81 MG EC tablet take 1 tablet by mouth once daily 30 tablet 11    glucose blood VI test strips (FREESTYLE LITE) strip Test once daily 100 each 11    diphenhydrAMINE (BENADRYL) 50 MG capsule Take 50 mg by mouth nightly as needed for Itching. QUEtiapine (SEROQUEL) 50 MG tablet Take 50 mg by mouth nightly. acetaminophen (TYLENOL) 500 MG tablet Take 500 mg by mouth every 6 hours as needed for Pain. albuterol (PROVENTIL) (2.5 MG/3ML) 0.083% nebulizer solution Take 3 mLs by nebulization every 6 hours as needed for Wheezing or Shortness of Breath 120 each 11     No current facility-administered medications on file prior to visit. Vitals from current and previous visits:  Ht 5' 2\" (1.575 m)   Wt 254 lb (115.2 kg)   LMP  (LMP Unknown)   BMI 46.46 kg/m²     -Body mass index is 46.46 kg/m². Greater than 40 - Morbid Obesity / Extreme Obesity / Grade III. - Patient lost and 2 pounds over the last 4 mo.  -Weight goal: lose weight. Nutrition Diagnosis:   Limited adherence to nutrition-related recommendations related to Lack of value or competing values for behavior change as evidenced by Food recall. Intervention:  -Instructed the patient on: Instead of eating 1 large meal/day eat 3 smaller meals/day, see instructions below.    -Handouts given for: meal ideas. Patient Instructions   Be careful of sugar free candies - they have sugar alcohols and can cause diarrhea or urgency to go. Glad you like your vegetables - Make sure you include vegetables with meals. Cut back on added fats like baum, to help with weight loss efforts. Drink only 1/2 your 100% fruit juice instead of a whole bottle - the whole bottle will spike your Blood sugar high. Get active each day as able - taking short walks with your walker each day and do chair exercises. Here are some Meal ideas that can help with your portions.     [place] soup meal  - Good idea to get the \"healthy request\" [place] chunky soup - add fresh fruit and 6 saltines with this  Hash meal  - Eat only 1/2 can of hash, then add an egg and 1 slice toast with this. Chicken and Dumplings  - add vegetables and 1/2 cup fruit. Progresso Soup meal  - add 6 saltine crackers and 1/2 fruit and 1/2 - 1 cup vegetables. Shredded chicken meal  - ok to have 2 sl Keto bread, and add veggies. [place] meal  - Eat only 1/2 the can then add 2 sl keto bread and veggies. Baked beans  - Eat only 1/2 the can and then add cornbread and veggies  Fried potatoes and onions  - Have 1 cup of this and then add lean meat like chicken or pork loin, add 1/2 cup fruit  Sweet potato meal  - Have 1/2 the can then add a protein and other veggie and ok to have 1 sl bread with this too. Keep working those legs and arms each day - always do more as able. - Do chair exercises each day. -Nutrition prescription: 1400 calories/day, 135 - 150 g carbs/day. Adj wt. 146# (67 kg)    Comprehension verified using teachback method. Monitoring/Evaluation:   -Followup visit: as needed with dietitian.   -Receptiveness to education/goals: Agreeable.  -Evaluation of education: Indicates understanding.  -Readiness to change: precontemplative- eating 3 meals/day instead of 1 large meal/day. -Expected compliance: poor. Thank you for your referral of this patient. Total time involved in direct patient education: 30 minutes for follow-up MNT visit.

## 2022-10-16 NOTE — PROGRESS NOTES
[facility]    Patient: Bentley Hernandez, 67 y.o.   : 1950  10/20/2022    Patient of Dr. Fabby Hobbs   Patient presents with    Follow-up     Asthma/COPD 6 month f/u with no testing. Kallie Akins is here for follow up for asthma and restrictive lung disease. She is here today in wheelchair and reports she uses this all the time. She is living in an apartment, alone. Her son lives in the same apartment building. Patient was last evaluated by CATE Escoto CNP on 3/8/2022. At that time she was being followed up after having community-acquired pneumonia. She was treated with azithromycin and Omnicef. She also had a UTI at that time. She had a CT chest that revealed clear lungs with resolution of previous consolidation. She was to continue Pulmicort. Overall patient reports respiratory symptoms have been stable since last appointment. Patient reports poor compliance with inhaled medications (pulmicort). She reports that she could not use this twice daily due to stomach discomfort. She is using 2-3 times per months. Patient has not required albuterol since her last appointment. She has been monitoring her oxygen levels at home. Patient reports physical limitation due to respiratory symptoms. Her past medical history is significant for reflux, arrhythmia, arthritis, CAD, chronic bronchitis, COVID-19, depression, diabetes, hyperlipidemia, hypertension, kidney disease, asthma, tinnitus. Asthma  She complains of chest tightness, cough and shortness of breath (\"sometimes\"). There is no hemoptysis, sputum production or wheezing. This is a chronic problem. The current episode started more than 1 year ago. The problem occurs daily. The problem has been unchanged. The cough is non-productive. Associated symptoms include postnasal drip, rhinorrhea and sneezing. Pertinent negatives include no appetite change, chest pain or fever.  Her symptoms are aggravated by change in weather, minimal activity and exposure to smoke. Her symptoms are alleviated by rest. She reports complete improvement on treatment. Risk factors for lung disease include animal exposure. Her past medical history is significant for asthma. Allergy regimen: allegra and singulair  She reports that she cannot use nasal spray due to allergic reaction. She reports she no longer wishes to use nasal sprays. Progress History:   Since last visit any new medical issues? No  New ER or hospital visits? No  Any new or changes in medicines? No  Using inhalers? Yes Pulmicort and as needed albuterol inhaler and neb  Are they helpful? Yes   Previous inhalers? Advair - palpitations  Any recent exacerbations? No  Last PFT: 7/24/2019-restriction and decreased diffusion  Last 6 MWT: None in epic  Last A1AT: none in epic    Smoking History: Former smoker, quit in [address] [place] [address] Road to passive tobacco exposure for 45 years from her father and ex-    Social History:  Patient job history: Retired  Admits to exposure to pets/animals at home. 1 cat    Flu vaccine: has not received yet this year but plans to with her primary care provider   Pneumonia vaccine: She reports she has not received. She reports to talk with her primary care provider regarding pneumonia vaccine.   COVID-19 vaccine: vaccinated  Past Medical hx   PMH:  Past Medical History:   Diagnosis Date    Acid reflux     Arrhythmia     Arthritis     CAD (coronary artery disease)     NON-OBSTRUCTIVE    Chronic bronchitis ([facility]     COVID-19 10/2020    Depression     Diabetes mellitus ([facility] [address]     Dizziness - light-headed     Fall 10/15/2013    left rib fracture and shoulder contusion     Hyperlipidemia     Hypertension     Kidney disease     MDRO (multiple drug resistant organisms) resistance 02/2014    MRSA ABDOMEN    Mild persistent asthma without complication 8/56/6055    MRSA infection     Numbness and tingling     HX OF:    S/P laparoscopic cholecystectomy 2018    Swelling     Tinnitus      SURGICAL HISTORY:  Past Surgical History:   Procedure Laterality Date    BACK SURGERY  2007    CARDIOVASCULAR STRESS TEST  2009    Persantine test associated w/non specific symptoms. EKG non diagnostic. no obvious stress induced ischemia. EF 63%. normal wall motion. CARDIOVASCULAR STRESS TEST  2010    tilt table associated w/fluctuation of BP w/relatively low BP throughout test. no significant symptoms during test. borderline tilt table test.    CHOLECYSTECTOMY      COLONOSCOPY      CORONARY ANGIOPLASTY  2009    EF 60%. nonobstructive CAD. normal LV function. ENDOSCOPY, COLON, DIAGNOSTIC  over 5 years     Dr. Charissa Knight      cataract    HERNIA REPAIR  21 months old     umblilical    HERNIA REPAIR  13    Ventral Hernia Repair with Mesh - Dr. Mera Glez ([address] [place] [address])      partial    OTHER SURGICAL HISTORY  2014    Left lower quadrant incision and drainage (Dr. Yamileth Bennett, [facility])    OTHER SURGICAL HISTORY  2014    I&D abd abscess Dr. Venus Molina N/A 2018    LAPAROSCOPY EXPLORATORY, LAP LYLE performed by Vivi Pavon MD at [facility]  as a child     TOOTH EXTRACTION  2012    TRANSTHORACIC ECHOCARDIOGRAM  2009    EF 50-55%. normal LV global systolic funct. slight LVH. slight lt atrial enlargement. no obvious stenotic valves. no pericardial effusion.     US BREAST NEEDLE BIOPSY RIGHT Right 2019    Benign     SOCIAL HISTORY:  Social History     Tobacco Use    Smoking status: Former     Years: 3.00     Types: Cigarettes     Start date:      Quit date:      Years since quittin.8    Smokeless tobacco: Never    Tobacco comments:     3 cigarettes a month, Passive smoke exposure 39 years Father then ex    Vaping Use    Vaping Use: Never used   Substance Use Topics    Alcohol use: Not Currently     Alcohol/week: 0.0 standard drinks    Drug use: No     ALLERGIES:  Allergies   Allergen Reactions    Latex Hives, Itching and Rash    Bee Venom Anaphylaxis    Advair [Fluticasone-Salmeterol]      Heart races     Caffeine Other (See Comments)     Bad kidney problems    Lactose Intolerance (Gi)      Yogurt makes mouth itchy. Pcn [Penicillins] Hives    Ultram [Tramadol Hcl] Swelling     Has tolerated norco here many times     Fluticasone-Salmeterol Palpitations    Wool Alcohol [Lanolin Alcohol] Hives, Itching and Rash     FAMILY HISTORY:  Family History   Problem Relation Age of Onset    Uterine Cancer Mother 28    Heart Disease Father     Cancer Father 48        lung    Diabetes Paternal Grandfather     Uterine Cancer Maternal Grandmother     Uterine Cancer Maternal Aunt 28    Uterine Cancer Maternal Cousin 28    Depression Brother     Depression Brother     Colon Cancer Neg Hx     Breast Cancer Neg Hx      CURRENT MEDICATIONS:  Current Outpatient Medications   Medication Sig Dispense Refill    montelukast (SINGULAIR) 10 MG tablet take 1 tablet by mouth once daily nightly 90 tablet 3    D-Mannose 500 MG CAPS Take 500 mg by mouth in the morning, at noon, and at bedtime 90 capsule     fexofenadine (ALLEGRA) 60 MG tablet Take 60 mg by mouth daily      fluticasone 100 MCG/ACT AEPB Inhale 1 puff into the lungs daily      lidocaine 4 % external patch Place 1 patch onto the skin daily      LORazepam (ATIVAN) 0.5 MG tablet Take 0.5 mg by mouth as needed for Anxiety.       metoprolol succinate (TOPROL XL) 25 MG extended release tablet take 1 tablet by mouth once daily 30 tablet 6    Celery Seed OIL by Does not apply route      terbinafine (LAMISIL) 250 MG tablet Take 250 mg by mouth daily      tiZANidine (ZANAFLEX) 6 MG capsule Take 6 mg by mouth 3 times daily      hydrOXYzine pamoate (VISTARIL) 50 MG capsule Take 50 mg by mouth 3 times daily as needed for Itching      MILK THISTLE PO Take by mouth NONFORMULARY Liver Detox      albuterol sulfate HFA (PROAIR HFA) 108 (90 Base) MCG/ACT inhaler Inhale 2 puffs into the lungs every 6 hours as needed for Wheezing or Shortness of Breath 1 each 11    loratadine (CLARITIN) 10 MG tablet Take 1 tablet by mouth daily 30 tablet 11    buPROPion (WELLBUTRIN XL) 150 MG extended release tablet Take 150 mg by mouth every morning      oxybutynin (DITROPAN XL) 15 MG extended release tablet Take 15 mg by mouth daily       spironolactone (ALDACTONE) 25 MG tablet Take 25 mg by mouth daily       etodolac (LODINE) 500 MG tablet Take 500 mg by mouth 2 times daily      Multiple Vitamins-Minerals (SENIOR VITES PO) Take by mouth      Ginger, Zingiber officinalis, (GINGER ROOT) 550 MG CAPS Take by mouth      budesonide (PULMICORT) 0.5 MG/2ML nebulizer suspension Take 2 mLs by nebulization 2 times daily 120 mL 11    Nebulizer MISC Please provide new nebulizer machine to use with Albuterol 2.5mg via nebs Q6h prn and budesonide 500 mcg BID 1 each 0    magnesium oxide (MAG-OX) 400 (241.3 Mg) MG TABS tablet Take 1 tablet by mouth daily      zinc sulfate (ZINCATE) 220 (50 Zn) MG capsule Take 1 capsule by mouth daily      vitamin C (VITAMIN C) 500 MG tablet Take 1 tablet by mouth daily      Cholecalciferol (VITAMIN D) 25 MCG TABS Take 1 tablet by mouth daily      mupirocin (BACTROBAN) 2 % ointment Apply topically as needed       Cranberry 450 MG CAPS Take by mouth      Lactobacillus (ACIDOPHILUS) TABS Take by mouth       Mouthwashes (BIOTENE) LIQD oral solution Swish and spit 15 mLs 3 times daily as needed      furosemide (LASIX) 20 MG tablet Take 20 mg by mouth daily      citalopram (CELEXA) 40 MG tablet take 1 tablet by mouth every morning  0    nystatin-triamcinolone (MYCOLOG II) 303877-4.1 UNIT/GM-% cream Apply 1 applicator topically 4 times daily Apply topically 4 times daily.  As needed      Artificial Saliva (BIOTENE MOISTURIZING MOUTH) SOLN Take 1 applicator by mouth as needed Compression Stockings MISC 1 Package by Does not apply route Knee High 1 each 0    omeprazole (PRILOSEC) 40 MG delayed release capsule take 1 capsule by mouth once daily 30 capsule 5    TRADJENTA 5 MG tablet take 1 tablet by mouth daily 30 tablet 5    simvastatin (ZOCOR) 40 MG tablet take 1 tablet by mouth at bedtime 30 tablet 5    pioglitazone (ACTOS) 45 MG tablet take 1 tablet by mouth once daily 30 tablet 5    metFORMIN (GLUCOPHAGE) 1000 MG tablet take 1 tablet by mouth twice a day with food 60 tablet 5    gabapentin (NEURONTIN) 300 MG capsule Take 1 capsule by mouth 3 times daily (Patient taking differently: Take 800 mg by mouth 2 times daily.) 90 capsule 5    FREESTYLE LANCETS MISC Test 1 time daily. Dx: 250.00-Type 2 Diabetes. Freestyle 28G Lancets 100 each 11    Omega-3 Fatty Acids (FISH OIL) 1200 MG CAPS Take 1,200 mg by mouth daily      ASPIRIN LOW DOSE 81 MG EC tablet take 1 tablet by mouth once daily 30 tablet 11    glucose blood VI test strips (FREESTYLE LITE) strip Test once daily 100 each 11    diphenhydrAMINE (BENADRYL) 50 MG capsule Take 50 mg by mouth nightly as needed for Itching. QUEtiapine (SEROQUEL) 50 MG tablet Take 50 mg by mouth nightly. acetaminophen (TYLENOL) 500 MG tablet Take 500 mg by mouth every 6 hours as needed for Pain. albuterol (PROVENTIL) (2.5 MG/3ML) 0.083% nebulizer solution Take 3 mLs by nebulization every 6 hours as needed for Wheezing or Shortness of Breath 120 each 11     No current facility-administered medications for this visit. Gin BYRD   Review of Systems   Constitutional:  Negative for appetite change and fever. HENT:  Positive for congestion, postnasal drip, rhinorrhea, sinus pressure and sneezing. Respiratory:  Positive for cough and shortness of breath (\"sometimes\"). Negative for hemoptysis, sputum production and wheezing. Cardiovascular:  Negative for chest pain, palpitations and leg swelling.    Allergic/Immunologic: Positive for environmental allergies. On allegra and singulair      Physical exam   /68   Pulse 68   Temp 98.1 °F (36.7 °C)   Ht 5' 2\" (1.575 m)   Wt 252 lb 6.4 oz (114.5 kg)   LMP  (LMP Unknown)   SpO2 95%   BMI 46.16 kg/m²    Wt Readings from Last 3 Encounters:   10/20/22 252 lb 6.4 oz (114.5 kg)   09/27/22 254 lb (115.2 kg)   08/26/22 256 lb (116.1 kg)       Physical Exam  Constitutional:       General: She is not in acute distress. Appearance: She is obese. Comments: BMI 55  Appears elderly and deconditioned   HENT:      Head: Normocephalic and atraumatic. Right Ear: External ear normal.      Left Ear: External ear normal.      Mouth/Throat:      Mouth: Mucous membranes are moist.      Pharynx: No oropharyngeal exudate or posterior oropharyngeal erythema. Eyes:      General:         Right eye: No discharge. Left eye: No discharge. Cardiovascular:      Rate and Rhythm: Normal rate and regular rhythm. Pulmonary:      Effort: Pulmonary effort is normal. No respiratory distress. Breath sounds: No wheezing, rhonchi or rales. Comments: Diminished breath sounds  Chest:      Chest wall: No tenderness. Musculoskeletal:      Cervical back: Neck supple. Right lower leg: No edema. Left lower leg: No edema. Comments: Wheelchair bound   Skin:     General: Skin is warm and dry. Neurological:      General: No focal deficit present. Mental Status: She is alert. Psychiatric:         Mood and Affect: Mood normal.         Behavior: Behavior normal.         Thought Content: Thought content normal.        Results   Lung Nodule Screening     [] Qualifies    [x] Does not qualify   [] Declined    [] Completed  Quit 1993   The USPSTF recommends annual screening for lung cancer with low-dose computed tomography (LDCT) in adults aged 48 to [de-identified] years who have a 20 pack-year smoking history and currently smoke or have quit within the past 15 years.  Screening should be discontinued once a person has not smoked for 15 years or develops a health problem that substantially limits life expectancy or the ability or willingness to have curative lung surgery. Assessment      Diagnosis Orders   1. Mild persistent asthma without complication  Full PFT Study With Bronchodilator      2. Restrictive lung disease  Full PFT Study With Bronchodilator      3. Decreased diffusion capacity of lung  Full PFT Study With Bronchodilator      4. Chronic allergic rhinitis              Plan   1. Mild persistent asthma without complication  -Symptoms are very well controlled on Pulmicort as needed. She has not required albuterol since her last appointment. I advised patient that she will get better benefit from her Pulmicort neb if she takes this inhaler daily. Advised patient that Pulmicort is not a rescue medication. Rinse and spit after use  -Continue albuterol inhaler and nebulizer, one or the other, every 6 hours as needed for shortness of breath or wheezing   -Repeat Full PFT Study With Bronchodilator prior to next appointment  -Advised flu vaccination and pneumonia vaccination  -Received COVID-19 vaccination    2. Restrictive lung disease  -Repeat Full PFT Study With Bronchodilator prior to next appointment to follow restriction    3. Decreased diffusion capacity of lung  -Repeat Full PFT Study With Bronchodilator prior to next appointment    4. Chronic allergic rhinitis  -Patient reports persistent allergy symptoms, however she does report benefit from allegra and Singulair. Patient declines adding nasal spray due to previous reaction to a nasal spray. Continue current allergy regimen      Advised patient to call office with any changes, questions, or concerns regarding respiratory status or issues with prescribed medications    Return in about 6 months (around 4/20/2023) for asthma and RLD with PFT prior.        Electronically signed by CATE Rocha CNP on 10/20/2022 at 8:57 AM (Please note that portions of this note may have been completed with a voice recognition program. Efforts were made to edit the dictation but occasionally words are mis-transcribed)

## 2022-10-20 ENCOUNTER — OFFICE VISIT (OUTPATIENT)
Dept: PULMONOLOGY | Age: 72
End: 2022-10-20
Payer: MEDICARE

## 2022-10-20 VITALS
DIASTOLIC BLOOD PRESSURE: 68 MMHG | HEIGHT: 62 IN | SYSTOLIC BLOOD PRESSURE: 120 MMHG | BODY MASS INDEX: 46.45 KG/M2 | WEIGHT: 252.4 LBS | TEMPERATURE: 98.1 F | HEART RATE: 68 BPM | OXYGEN SATURATION: 95 %

## 2022-10-20 DIAGNOSIS — J30.9 CHRONIC ALLERGIC RHINITIS: ICD-10-CM

## 2022-10-20 DIAGNOSIS — R94.2 DECREASED DIFFUSION CAPACITY OF LUNG: ICD-10-CM

## 2022-10-20 DIAGNOSIS — J98.4 RESTRICTIVE LUNG DISEASE: ICD-10-CM

## 2022-10-20 DIAGNOSIS — J45.30 MILD PERSISTENT ASTHMA WITHOUT COMPLICATION: Primary | ICD-10-CM

## 2022-10-20 PROCEDURE — 99214 OFFICE O/P EST MOD 30 MIN: CPT

## 2022-10-20 PROCEDURE — G8417 CALC BMI ABV UP PARAM F/U: HCPCS

## 2022-10-20 PROCEDURE — G8400 PT W/DXA NO RESULTS DOC: HCPCS

## 2022-10-20 PROCEDURE — 1090F PRES/ABSN URINE INCON ASSESS: CPT

## 2022-10-20 PROCEDURE — G8484 FLU IMMUNIZE NO ADMIN: HCPCS

## 2022-10-20 PROCEDURE — G8427 DOCREV CUR MEDS BY ELIG CLIN: HCPCS

## 2022-10-20 PROCEDURE — 3017F COLORECTAL CA SCREEN DOC REV: CPT

## 2022-10-20 PROCEDURE — 1036F TOBACCO NON-USER: CPT

## 2022-10-20 PROCEDURE — 1123F ACP DISCUSS/DSCN MKR DOCD: CPT

## 2022-10-20 ASSESSMENT — ENCOUNTER SYMPTOMS
CHEST TIGHTNESS: 1
SHORTNESS OF BREATH: 1
SPUTUM PRODUCTION: 0
WHEEZING: 0
COUGH: 1
SINUS PRESSURE: 1
HEMOPTYSIS: 0
RHINORRHEA: 1

## 2022-10-20 NOTE — PATIENT INSTRUCTIONS
Continue Pulmicort nebulizer. Rinse mouth with water and spit after use. Do not use this as a rescue medication. Continue your albuterol inhaler and nebulizer. You may use one or the other every 6 hours as needed for shortness of breath or wheezing. Do NOT use both at the same time as they continue the same medication. Continue Allegra and Singulair. I will see you back in 6 months with your breathing test before your appointment.

## 2022-11-09 NOTE — PROGRESS NOTES
[facility] PROFESSIONAL SERVICES  [facility]   [address]   [address] [address]   Dept: [phone]   Dept Fax: [phone]   Loc: [phone]      Chief Complaint   Patient presents with    Follow-up     Breathing issues, catching breath,       Cardiologist:  Dr. Lucien Bolanos  66 yo female presents for breathing complaints. Hx of CAD and stents, HTN, HLD, asthma/COPD. Had stress test earlier this year. Saw pulmonary recently. Has episodes where she is just sitting and starts feeling winded. Has never really smoked, has lived with smokers however. Nebulizer at home, these have not helped these episodes. Some chest heaviness, dizziness, happens at rest and with standing up. Constant swelling, is improved right now. Does have occasional palpitations.      General:   No fever, no chills, no weight loss, + fatigue  Pulmonary:    + dyspnea, no wheezing  Cardiac:    Denies recent chest pain   GI:     No nausea or vomiting, no abdominal pain  Neuro:      + dizziness or light headedness  Musculoskeletal:  No recent active issues  Extremities:   No edema      Past Medical History:   Diagnosis Date    Acid reflux     Arrhythmia     Arthritis     CAD (coronary artery disease)     NON-OBSTRUCTIVE    Chronic bronchitis ([address]     COVID-19 10/2020    Depression     Diabetes mellitus ([facility] [address]     Dizziness - light-headed     Fall 10/15/2013    left rib fracture and shoulder contusion     Hyperlipidemia     Hypertension     Kidney disease     MDRO (multiple drug resistant organisms) resistance 02/2014    MRSA ABDOMEN    Mild persistent asthma without complication 6/90/1316    MRSA infection     Numbness and tingling     HX OF:    S/P laparoscopic cholecystectomy 11/27/2018    Swelling     Tinnitus        Allergies   Allergen Reactions    Latex Hives, Itching and Rash    Bee Venom Anaphylaxis    Advair [Fluticasone-Salmeterol]      Heart races     Caffeine Other (See Comments)     Bad kidney problems    Lactose Intolerance (Gi)      Yogurt makes mouth itchy. Pcn [Penicillins] Hives    Ultram [Tramadol Hcl] Swelling     Has tolerated norco here many times     Fluticasone-Salmeterol Palpitations    Wool Alcohol [Lanolin Alcohol] Hives, Itching and Rash       Current Outpatient Medications   Medication Sig Dispense Refill    montelukast (SINGULAIR) 10 MG tablet take 1 tablet by mouth once daily nightly 90 tablet 3    D-Mannose 500 MG CAPS Take 500 mg by mouth in the morning, at noon, and at bedtime 90 capsule     fexofenadine (ALLEGRA) 60 MG tablet Take 60 mg by mouth daily      fluticasone 100 MCG/ACT AEPB Inhale 1 puff into the lungs daily      lidocaine 4 % external patch Place 1 patch onto the skin daily      LORazepam (ATIVAN) 0.5 MG tablet Take 0.5 mg by mouth as needed for Anxiety.       metoprolol succinate (TOPROL XL) 25 MG extended release tablet take 1 tablet by mouth once daily 30 tablet 6    Celery Seed OIL by Does not apply route      terbinafine (LAMISIL) 250 MG tablet Take 250 mg by mouth daily      tiZANidine (ZANAFLEX) 6 MG capsule Take 6 mg by mouth 3 times daily      hydrOXYzine pamoate (VISTARIL) 50 MG capsule Take 50 mg by mouth 3 times daily as needed for Itching      MILK THISTLE PO Take by mouth      NONFORMULARY Liver Detox      albuterol sulfate HFA (PROAIR HFA) 108 (90 Base) MCG/ACT inhaler Inhale 2 puffs into the lungs every 6 hours as needed for Wheezing or Shortness of Breath 1 each 11    loratadine (CLARITIN) 10 MG tablet Take 1 tablet by mouth daily 30 tablet 11    buPROPion (WELLBUTRIN XL) 150 MG extended release tablet Take 150 mg by mouth every morning      oxybutynin (DITROPAN XL) 15 MG extended release tablet Take 15 mg by mouth daily       spironolactone (ALDACTONE) 25 MG tablet Take 25 mg by mouth daily       etodolac (LODINE) 500 MG tablet Take 500 mg by mouth 2 times daily      Multiple Vitamins-Minerals (SENIOR VITES PO) Take by mouth      Ginger, Zingiber MISC Test 1 time daily. Dx: 250.00-Type 2 Diabetes. Freestyle 28G Lancets 100 each 11    Omega-3 Fatty Acids (FISH OIL) 1200 MG CAPS Take 1,200 mg by mouth daily      ASPIRIN LOW DOSE 81 MG EC tablet take 1 tablet by mouth once daily 30 tablet 11    glucose blood VI test strips (FREESTYLE LITE) strip Test once daily 100 each 11    diphenhydrAMINE (BENADRYL) 50 MG capsule Take 50 mg by mouth nightly as needed for Itching. QUEtiapine (SEROQUEL) 50 MG tablet Take 50 mg by mouth nightly. acetaminophen (TYLENOL) 500 MG tablet Take 500 mg by mouth every 6 hours as needed for Pain. albuterol (PROVENTIL) (2.5 MG/3ML) 0.083% nebulizer solution Take 3 mLs by nebulization every 6 hours as needed for Wheezing or Shortness of Breath 120 each 11     No current facility-administered medications for this visit.        Social History     Socioeconomic History    Marital status:     Number of children: 6    Years of education: 8   Occupational History    Occupation: retired    Tobacco Use    Smoking status: Former     Years: 3.00     Types: Cigarettes     Start date:      Quit date:      Years since quittin.8    Smokeless tobacco: Never    Tobacco comments:     3 cigarettes a month, Passive smoke exposure 39 years Father then ex    Vaping Use    Vaping Use: Never used   Substance and Sexual Activity    Alcohol use: Not Currently     Alcohol/week: 0.0 standard drinks    Drug use: No    Sexual activity: Not Currently     Social Determinants of Health     Financial Resource Strain: Low Risk     Difficulty of Paying Living Expenses: Not hard at all   Food Insecurity: No Food Insecurity    Worried About Running Out of Food in the Last Year: Never true    [address] in the Last Year: Never true       Family History   Problem Relation Age of Onset    Uterine Cancer Mother 28    Heart Disease Father     Cancer Father 48        lung    Diabetes Paternal Grandfather     Uterine Cancer Maternal Grandmother     Uterine Cancer Maternal Aunt 28    Uterine Cancer Maternal Cousin 28    Depression Brother     Depression Brother     Colon Cancer Neg Hx     Breast Cancer Neg Hx        Blood pressure (!) 85/58, pulse 69, height 5' 2\" (1.575 m), weight 251 lb (113.9 kg), not currently breastfeeding. General:   Well developed, well nourished  Lungs:    Clear to auscultation, no rales  Heart:    RRR, Normal S1 S2, No murmur, rubs, or gallops  Abdomen:   Soft, non tender, no organomegalies, positive bowel sounds  Extremities:   Mild bilat LE edema, no cyanosis, good peripheral pulses  Neurological:   Awake, alert, oriented. No obvious focal deficits  Musculoskeletal:  No obvious deformities    EKG:          Diagnosis Orders   1. Primary hypertension        2. Coronary artery disease involving native coronary artery of native heart without angina pectoris        3. Shortness of breath        4. Heart palpitations            No orders of the defined types were placed in this encounter. Assessment/Plan:   SOB--occ, not really with exertion it seems. Happens randomly. Her nebulizer does not seem to help. Check echo. Palpitations--check event monitor for arrhythmias. Happens more often with the SOB episodes. HTN--now lower BP. She will monitor BP at home and record to give us updated readings. Hx of CAD. Disposition:   Echo. Event monitor.    Follow up with Dr Milton Aguayo as scheduled or sooner if needed

## 2022-11-10 ENCOUNTER — OFFICE VISIT (OUTPATIENT)
Dept: CARDIOLOGY CLINIC | Age: 72
End: 2022-11-10
Payer: MEDICARE

## 2022-11-10 VITALS
WEIGHT: 251 LBS | HEART RATE: 69 BPM | DIASTOLIC BLOOD PRESSURE: 58 MMHG | BODY MASS INDEX: 46.19 KG/M2 | SYSTOLIC BLOOD PRESSURE: 85 MMHG | HEIGHT: 62 IN

## 2022-11-10 DIAGNOSIS — R06.02 SHORTNESS OF BREATH: ICD-10-CM

## 2022-11-10 DIAGNOSIS — R00.2 HEART PALPITATIONS: ICD-10-CM

## 2022-11-10 DIAGNOSIS — I10 PRIMARY HYPERTENSION: Primary | ICD-10-CM

## 2022-11-10 DIAGNOSIS — I25.10 CORONARY ARTERY DISEASE INVOLVING NATIVE CORONARY ARTERY OF NATIVE HEART WITHOUT ANGINA PECTORIS: ICD-10-CM

## 2022-11-10 PROCEDURE — 99214 OFFICE O/P EST MOD 30 MIN: CPT | Performed by: STUDENT IN AN ORGANIZED HEALTH CARE EDUCATION/TRAINING PROGRAM

## 2022-11-10 PROCEDURE — G8484 FLU IMMUNIZE NO ADMIN: HCPCS | Performed by: STUDENT IN AN ORGANIZED HEALTH CARE EDUCATION/TRAINING PROGRAM

## 2022-11-10 PROCEDURE — G8400 PT W/DXA NO RESULTS DOC: HCPCS | Performed by: STUDENT IN AN ORGANIZED HEALTH CARE EDUCATION/TRAINING PROGRAM

## 2022-11-10 PROCEDURE — 3017F COLORECTAL CA SCREEN DOC REV: CPT | Performed by: STUDENT IN AN ORGANIZED HEALTH CARE EDUCATION/TRAINING PROGRAM

## 2022-11-10 PROCEDURE — 1036F TOBACCO NON-USER: CPT | Performed by: STUDENT IN AN ORGANIZED HEALTH CARE EDUCATION/TRAINING PROGRAM

## 2022-11-10 PROCEDURE — 3074F SYST BP LT 130 MM HG: CPT | Performed by: STUDENT IN AN ORGANIZED HEALTH CARE EDUCATION/TRAINING PROGRAM

## 2022-11-10 PROCEDURE — G8417 CALC BMI ABV UP PARAM F/U: HCPCS | Performed by: STUDENT IN AN ORGANIZED HEALTH CARE EDUCATION/TRAINING PROGRAM

## 2022-11-10 PROCEDURE — 3078F DIAST BP <80 MM HG: CPT | Performed by: STUDENT IN AN ORGANIZED HEALTH CARE EDUCATION/TRAINING PROGRAM

## 2022-11-10 PROCEDURE — 1090F PRES/ABSN URINE INCON ASSESS: CPT | Performed by: STUDENT IN AN ORGANIZED HEALTH CARE EDUCATION/TRAINING PROGRAM

## 2022-11-10 PROCEDURE — G8427 DOCREV CUR MEDS BY ELIG CLIN: HCPCS | Performed by: STUDENT IN AN ORGANIZED HEALTH CARE EDUCATION/TRAINING PROGRAM

## 2022-11-10 PROCEDURE — 1123F ACP DISCUSS/DSCN MKR DOCD: CPT | Performed by: STUDENT IN AN ORGANIZED HEALTH CARE EDUCATION/TRAINING PROGRAM

## 2022-11-10 NOTE — PROGRESS NOTES
Pt C/O Cp when breathing and coughing, SOB always, HA, Dizziness, swelling in hands and feet, fatigued.       Pt denies heart palpitations

## 2022-11-22 ENCOUNTER — HOSPITAL ENCOUNTER (OUTPATIENT)
Age: 72
Discharge: HOME | End: 2022-11-22
Payer: MEDICARE

## 2022-11-22 ENCOUNTER — HOSPITAL ENCOUNTER (OUTPATIENT)
Dept: NON INVASIVE DIAGNOSTICS | Age: 72
Discharge: HOME | End: 2022-11-22
Payer: MEDICARE

## 2022-11-22 DIAGNOSIS — R06.02 SHORTNESS OF BREATH: ICD-10-CM

## 2022-11-22 DIAGNOSIS — I25.10 CORONARY ARTERY DISEASE INVOLVING NATIVE CORONARY ARTERY OF NATIVE HEART WITHOUT ANGINA PECTORIS: ICD-10-CM

## 2022-11-22 DIAGNOSIS — R00.2 HEART PALPITATIONS: ICD-10-CM

## 2022-11-22 LAB
ALBUMIN SERPL-MCNC: 4.1 G/DL (ref 3.5–5.1)
ALP BLD-CCNC: 90 U/L (ref 38–126)
ALT SERPL-CCNC: 12 U/L (ref 11–66)
ANION GAP SERPL CALCULATED.3IONS-SCNC: 13 MEQ/L (ref 8–16)
AST SERPL-CCNC: 15 U/L (ref 5–40)
BASOPHILS # BLD: 0.3 %
BASOPHILS ABSOLUTE: 0 THOU/MM3 (ref 0–0.1)
BILIRUB SERPL-MCNC: 0.5 MG/DL (ref 0.3–1.2)
BUN BLDV-MCNC: 19 MG/DL (ref 7–22)
CALCIUM SERPL-MCNC: 9.8 MG/DL (ref 8.5–10.5)
CHLORIDE BLD-SCNC: 103 MEQ/L (ref 98–111)
CHOLESTEROL, TOTAL: 119 MG/DL (ref 100–199)
CO2: 22 MEQ/L (ref 23–33)
CREAT SERPL-MCNC: 0.7 MG/DL (ref 0.4–1.2)
EOSINOPHIL # BLD: 1.1 %
EOSINOPHILS ABSOLUTE: 0.1 THOU/MM3 (ref 0–0.4)
ERYTHROCYTE [DISTWIDTH] IN BLOOD BY AUTOMATED COUNT: 14.5 % (ref 11.5–14.5)
ERYTHROCYTE [DISTWIDTH] IN BLOOD BY AUTOMATED COUNT: 47.1 FL (ref 35–45)
GFR SERPL CREATININE-BSD FRML MDRD: > 60 ML/MIN/1.73M2
GLUCOSE BLD-MCNC: 102 MG/DL (ref 70–108)
HCT VFR BLD CALC: 42.5 % (ref 37–47)
HDLC SERPL-MCNC: 63 MG/DL
HEMOGLOBIN: 13.7 GM/DL (ref 12–16)
IMMATURE GRANS (ABS): 0.02 THOU/MM3 (ref 0–0.07)
IMMATURE GRANULOCYTES: 0.3 %
LDL CHOLESTEROL CALCULATED: 30 MG/DL
LYMPHOCYTES # BLD: 32 %
LYMPHOCYTES ABSOLUTE: 2.3 THOU/MM3 (ref 1–4.8)
MCH RBC QN AUTO: 28.9 PG (ref 26–33)
MCHC RBC AUTO-ENTMCNC: 32.2 GM/DL (ref 32.2–35.5)
MCV RBC AUTO: 89.7 FL (ref 81–99)
MONOCYTES # BLD: 9.6 %
MONOCYTES ABSOLUTE: 0.7 THOU/MM3 (ref 0.4–1.3)
NUCLEATED RED BLOOD CELLS: 0 /100 WBC
PLATELET # BLD: 287 THOU/MM3 (ref 130–400)
PMV BLD AUTO: 10.6 FL (ref 9.4–12.4)
POTASSIUM SERPL-SCNC: 4 MEQ/L (ref 3.5–5.2)
RBC # BLD: 4.74 MILL/MM3 (ref 4.2–5.4)
SEG NEUTROPHILS: 56.7 %
SEGMENTED NEUTROPHILS ABSOLUTE COUNT: 4 THOU/MM3 (ref 1.8–7.7)
SODIUM BLD-SCNC: 138 MEQ/L (ref 135–145)
TOTAL PROTEIN: 7.5 G/DL (ref 6.1–8)
TRIGL SERPL-MCNC: 129 MG/DL (ref 0–199)
TSH SERPL DL<=0.05 MIU/L-ACNC: 1.54 UIU/ML (ref 0.4–4.2)
WBC # BLD: 7.1 THOU/MM3 (ref 4.8–10.8)

## 2022-11-22 PROCEDURE — 80061 LIPID PANEL: CPT

## 2022-11-22 PROCEDURE — 80053 COMPREHEN METABOLIC PANEL: CPT

## 2022-11-22 PROCEDURE — 93270 REMOTE 30 DAY ECG REV/REPORT: CPT

## 2022-11-22 PROCEDURE — 85025 COMPLETE CBC W/AUTO DIFF WBC: CPT

## 2022-11-22 PROCEDURE — 36415 COLL VENOUS BLD VENIPUNCTURE: CPT

## 2022-11-22 PROCEDURE — 93307 TTE W/O DOPPLER COMPLETE: CPT

## 2022-11-22 PROCEDURE — 84443 ASSAY THYROID STIM HORMONE: CPT

## 2022-11-22 NOTE — PROCEDURES
The skin was prepped and a 14 day cardiac event monitor was applied. The patient was instructed on the documentation of symptoms and the purpose of the monitor as well as the things to avoid while wearing the monitor. The patient was instructed to remove and return the monitor on 12/06/2022.   The serial number of the monitor that was applied is [id number]

## 2022-11-30 ENCOUNTER — TELEPHONE (OUTPATIENT)
Dept: CARDIOLOGY CLINIC | Age: 72
End: 2022-11-30

## 2022-11-30 NOTE — TELEPHONE ENCOUNTER
----- Message from Mil Rowe PA-C sent at 11/30/2022  7:37 AM EST -----  Not much change in echo from earlier this year. F/u with you as scheduled.

## 2022-12-09 ENCOUNTER — TELEPHONE (OUTPATIENT)
Dept: CARDIOLOGY CLINIC | Age: 72
End: 2022-12-09

## 2022-12-09 NOTE — TELEPHONE ENCOUNTER
----- Message from Leanne Austin PA-C sent at 12/9/2022  8:02 AM EST -----  Normal event monitor. Basically normal echo, not much change in EF from earlier this year with echo and stress. Given history of 2nd hand smoke, consider PFT if she hasnt had done recently. Also needs to monitor BP and may sure this is staying above 90/55 consistently.

## 2022-12-09 NOTE — TELEPHONE ENCOUNTER
Spoke with patient. She has a PFT scheduled in April 2023. She also states that her SBP is >120 consistently.

## 2022-12-09 NOTE — TELEPHONE ENCOUNTER
Goal bp would be <140/90. It was very low in office previously.  Hesitant to increase unless her BP is consistently elevated above the goal.

## 2022-12-09 NOTE — PROCEDURES
[address] [place] [address]                                 EVENT MONITOR    PATIENT NAME: Jing Gore                   :        1950  MED REC NO:   [id number]                           ROOM:  ACCOUNT NO:   [de-identified]                           ADMIT DATE: 2022  PROVIDER:     Ramiro Kingsley M.D.    TEST TYPE:  Event Monitor. CLINICAL HISTORY AND INDICATION:  This is a patient with coronary artery  disease. EVENT MONITOR DESCRIPTION:  Event monitor was attached to the patient  between 2022 and 2022. EVENT MONITOR FINDINGS:  Baseline rhythm showed sinus rhythm, otherwise  unremarkable event monitor with no significant arrhythmias that will be  signs of the monitoring. CONCLUSION:  Unremarkable event monitor with no significant arrhythmias.         Chen Gayle M.D.    D: 2022 15:00:16       T: 2022 22:22:10     [email or web address]  Job#: [id number]     Doc#: [id number]    CC:

## 2022-12-13 ENCOUNTER — OFFICE VISIT (OUTPATIENT)
Dept: UROLOGY | Age: 72
End: 2022-12-13
Payer: MEDICARE

## 2022-12-13 VITALS
WEIGHT: 251 LBS | SYSTOLIC BLOOD PRESSURE: 102 MMHG | DIASTOLIC BLOOD PRESSURE: 72 MMHG | HEIGHT: 62 IN | BODY MASS INDEX: 46.19 KG/M2

## 2022-12-13 DIAGNOSIS — R39.14 FEELING OF INCOMPLETE BLADDER EMPTYING: ICD-10-CM

## 2022-12-13 DIAGNOSIS — R10.9 FLANK PAIN: ICD-10-CM

## 2022-12-13 DIAGNOSIS — N39.0 RECURRENT UTI: Primary | ICD-10-CM

## 2022-12-13 LAB
BILIRUBIN URINE: ABNORMAL
BLOOD URINE, POC: NEGATIVE
CHARACTER, URINE: CLEAR
COLOR, URINE: YELLOW
GLUCOSE URINE: NEGATIVE MG/DL
KETONES, URINE: NEGATIVE
LEUKOCYTE CLUMPS, URINE: ABNORMAL
NITRITE, URINE: POSITIVE
PH, URINE: 5.5 (ref 5–9)
POST VOID RESIDUAL (PVR): 251 ML
PROTEIN, URINE: NEGATIVE MG/DL
SPECIFIC GRAVITY, URINE: 1.02 (ref 1–1.03)
UROBILINOGEN, URINE: 0.2 EU/DL (ref 0–1)

## 2022-12-13 PROCEDURE — 3074F SYST BP LT 130 MM HG: CPT | Performed by: NURSE PRACTITIONER

## 2022-12-13 PROCEDURE — 1036F TOBACCO NON-USER: CPT | Performed by: NURSE PRACTITIONER

## 2022-12-13 PROCEDURE — G8484 FLU IMMUNIZE NO ADMIN: HCPCS | Performed by: NURSE PRACTITIONER

## 2022-12-13 PROCEDURE — G8417 CALC BMI ABV UP PARAM F/U: HCPCS | Performed by: NURSE PRACTITIONER

## 2022-12-13 PROCEDURE — 1123F ACP DISCUSS/DSCN MKR DOCD: CPT | Performed by: NURSE PRACTITIONER

## 2022-12-13 PROCEDURE — 99213 OFFICE O/P EST LOW 20 MIN: CPT | Performed by: NURSE PRACTITIONER

## 2022-12-13 PROCEDURE — 3017F COLORECTAL CA SCREEN DOC REV: CPT | Performed by: NURSE PRACTITIONER

## 2022-12-13 PROCEDURE — 1090F PRES/ABSN URINE INCON ASSESS: CPT | Performed by: NURSE PRACTITIONER

## 2022-12-13 PROCEDURE — G8400 PT W/DXA NO RESULTS DOC: HCPCS | Performed by: NURSE PRACTITIONER

## 2022-12-13 PROCEDURE — 3078F DIAST BP <80 MM HG: CPT | Performed by: NURSE PRACTITIONER

## 2022-12-13 PROCEDURE — 51798 US URINE CAPACITY MEASURE: CPT | Performed by: NURSE PRACTITIONER

## 2022-12-13 PROCEDURE — G8427 DOCREV CUR MEDS BY ELIG CLIN: HCPCS | Performed by: NURSE PRACTITIONER

## 2022-12-13 PROCEDURE — 81003 URINALYSIS AUTO W/O SCOPE: CPT | Performed by: NURSE PRACTITIONER

## 2022-12-13 ASSESSMENT — ENCOUNTER SYMPTOMS
VOMITING: 0
BACK PAIN: 0
NAUSEA: 0
ABDOMINAL PAIN: 0

## 2022-12-13 NOTE — PROGRESS NOTES
[address] [address] [place] [id number]  Dept: [phone]  Loc: [phone]    Visit Date: 12/13/2022        HPI:     Cleopatra Ambrocio is a 67 y.o. female who presents today for:  Chief Complaint   Patient presents with    Incontinence    Urinary Tract Infection       HPI    Pt seen in follow up for recurrent UTIs. Pt has a hx of multiple hospital admissions for pyelonephritis and mixed incontinence. CT abd/pelvis 3/16/22 negative for stones or obstruction noted air in the bladder. Cystoscopy 6/9/22 noted moderate residual urine, moderate bladder wall trabeculation and no other significant findings. Urine culture 8/3/22 with Klebsiella. Pt denies symptoms of infection today. Reports normally if she has an infection she develops flank pain. Reports she occasionally takes cranberry pills but not on anything routinely for prevention. Reports she only urinates about 3 x per day. Has occasional episodes of unaware incontinence. Current Outpatient Medications   Medication Sig Dispense Refill    montelukast (SINGULAIR) 10 MG tablet take 1 tablet by mouth once daily nightly 90 tablet 3    D-Mannose 500 MG CAPS Take 500 mg by mouth in the morning, at noon, and at bedtime 90 capsule     fexofenadine (ALLEGRA) 60 MG tablet Take 60 mg by mouth daily      fluticasone 100 MCG/ACT AEPB Inhale 1 puff into the lungs daily      lidocaine 4 % external patch Place 1 patch onto the skin daily      LORazepam (ATIVAN) 0.5 MG tablet Take 0.5 mg by mouth as needed for Anxiety.       metoprolol succinate (TOPROL XL) 25 MG extended release tablet take 1 tablet by mouth once daily 30 tablet 6    Celery Seed OIL by Does not apply route      terbinafine (LAMISIL) 250 MG tablet Take 250 mg by mouth daily      tiZANidine (ZANAFLEX) 6 MG capsule Take 6 mg by mouth 3 times daily      hydrOXYzine pamoate (VISTARIL) 50 MG capsule Take 50 mg by mouth 3 times daily as needed for Itching      MILK THISTLE PO Take by mouth      NONFORMULARY Liver Detox      albuterol sulfate HFA (PROAIR HFA) 108 (90 Base) MCG/ACT inhaler Inhale 2 puffs into the lungs every 6 hours as needed for Wheezing or Shortness of Breath 1 each 11    loratadine (CLARITIN) 10 MG tablet Take 1 tablet by mouth daily 30 tablet 11    buPROPion (WELLBUTRIN XL) 150 MG extended release tablet Take 150 mg by mouth every morning      oxybutynin (DITROPAN XL) 15 MG extended release tablet Take 15 mg by mouth daily       spironolactone (ALDACTONE) 25 MG tablet Take 25 mg by mouth daily       etodolac (LODINE) 500 MG tablet Take 500 mg by mouth 2 times daily      Multiple Vitamins-Minerals (SENIOR VITES PO) Take by mouth      Ginger, Zingiber officinalis, (GINGER ROOT) 550 MG CAPS Take by mouth      budesonide (PULMICORT) 0.5 MG/2ML nebulizer suspension Take 2 mLs by nebulization 2 times daily 120 mL 11    Nebulizer MISC Please provide new nebulizer machine to use with Albuterol 2.5mg via nebs Q6h prn and budesonide 500 mcg BID 1 each 0    magnesium oxide (MAG-OX) 400 (241.3 Mg) MG TABS tablet Take 1 tablet by mouth daily      zinc sulfate (ZINCATE) 220 (50 Zn) MG capsule Take 1 capsule by mouth daily      vitamin C (VITAMIN C) 500 MG tablet Take 1 tablet by mouth daily      Cholecalciferol (VITAMIN D) 25 MCG TABS Take 1 tablet by mouth daily      mupirocin (BACTROBAN) 2 % ointment Apply topically as needed       Cranberry 450 MG CAPS Take by mouth      Lactobacillus (ACIDOPHILUS) TABS Take by mouth       Mouthwashes (BIOTENE) LIQD oral solution Swish and spit 15 mLs 3 times daily as needed      furosemide (LASIX) 20 MG tablet Take 20 mg by mouth daily      citalopram (CELEXA) 40 MG tablet take 1 tablet by mouth every morning  0    nystatin-triamcinolone (MYCOLOG II) 823851-2.1 UNIT/GM-% cream Apply 1 applicator topically 4 times daily Apply topically 4 times daily.  As needed      Artificial Saliva (BIOTENE MOISTURIZING MOUTH) SOLN Take 1 applicator by mouth as needed      Compression Stockings MISC 1 Package by Does not apply route Knee High 1 each 0    omeprazole (PRILOSEC) 40 MG delayed release capsule take 1 capsule by mouth once daily 30 capsule 5    TRADJENTA 5 MG tablet take 1 tablet by mouth daily 30 tablet 5    simvastatin (ZOCOR) 40 MG tablet take 1 tablet by mouth at bedtime 30 tablet 5    pioglitazone (ACTOS) 45 MG tablet take 1 tablet by mouth once daily 30 tablet 5    metFORMIN (GLUCOPHAGE) 1000 MG tablet take 1 tablet by mouth twice a day with food 60 tablet 5    gabapentin (NEURONTIN) 300 MG capsule Take 1 capsule by mouth 3 times daily (Patient taking differently: Take 800 mg by mouth 2 times daily.) 90 capsule 5    FREESTYLE LANCETS MISC Test 1 time daily. Dx: 250.00-Type 2 Diabetes. Freestyle 28G Lancets 100 each 11    Omega-3 Fatty Acids (FISH OIL) 1200 MG CAPS Take 1,200 mg by mouth daily      ASPIRIN LOW DOSE 81 MG EC tablet take 1 tablet by mouth once daily 30 tablet 11    glucose blood VI test strips (FREESTYLE LITE) strip Test once daily 100 each 11    diphenhydrAMINE (BENADRYL) 50 MG capsule Take 50 mg by mouth nightly as needed for Itching. QUEtiapine (SEROQUEL) 50 MG tablet Take 50 mg by mouth nightly. acetaminophen (TYLENOL) 500 MG tablet Take 500 mg by mouth every 6 hours as needed for Pain. albuterol (PROVENTIL) (2.5 MG/3ML) 0.083% nebulizer solution Take 3 mLs by nebulization every 6 hours as needed for Wheezing or Shortness of Breath 120 each 11     No current facility-administered medications for this visit.        Past Medical History  Damián Albright  has a past medical history of Acid reflux, Arrhythmia, Arthritis, CAD (coronary artery disease), Chronic bronchitis ([address] COVID-19, Depression, Diabetes mellitus ([facility] [address] Dizziness - light-headed, Fall, Hyperlipidemia, Hypertension, Kidney disease, MDRO (multiple drug resistant organisms) resistance, Mild persistent asthma without complication, MRSA infection, Numbness and tingling, S/P laparoscopic cholecystectomy, Swelling, and Tinnitus. Past Surgical History  The patient  has a past surgical history that includes Coronary angioplasty (12/16/2009); transthoracic echocardiogram (03/04/2009); cardiovascular stress test (04/03/2009); cardiovascular stress test (03/31/2010); back surgery (2007); eye surgery (2004); Tooth Extraction (4/16/2012); Colonoscopy (2011); other surgical history (Feb 17, 2014); Endoscopy, colon, diagnostic (over 5 years ); Tonsillectomy (as a child ); other surgical history (2/2014); hernia repair (21 months old ); hernia repair (9/8/14); pr lap,diagnostic abdomen (N/A, 11/26/2018); Cholecystectomy; Hysterectomy (1995); and US BREAST BIOPSY W LOC DEVICE 1ST LESION RIGHT (Right, 07/30/2019). Family History  This patient's family history includes Cancer (age of onset: 48) in her father; Depression in her brother and brother; Diabetes in her paternal grandfather; Heart Disease in her father; Uterine Cancer in her maternal grandmother; Uterine Cancer (age of onset: 28) in her maternal aunt, maternal cousin, and mother. Social History  Krystyna Alves  reports that she quit smoking about 29 years ago. Her smoking use included cigarettes. She started smoking about 32 years ago. She has never used smokeless tobacco. She reports that she does not currently use alcohol. She reports that she does not use drugs. Subjective:      Review of Systems   Constitutional:  Negative for activity change, appetite change, chills, diaphoresis, fatigue, fever and unexpected weight change. Gastrointestinal:  Negative for abdominal pain, nausea and vomiting. Genitourinary:  Negative for decreased urine volume, difficulty urinating, dysuria, flank pain, frequency, hematuria and urgency. Musculoskeletal:  Negative for back pain.      Objective:   /72   Ht 5' 2\" (1.575 m)   Wt 251 lb (113.9 kg)   LMP (LMP Unknown)   BMI 45.91 kg/m²     Physical Exam  Vitals reviewed. Constitutional:       General: She is not in acute distress. Appearance: Normal appearance. She is well-developed. She is not ill-appearing or diaphoretic. HENT:      Head: Normocephalic and atraumatic. Right Ear: External ear normal.      Left Ear: External ear normal.      Nose: Nose normal.      Mouth/Throat:      Mouth: Mucous membranes are moist.   Eyes:      General: No scleral icterus. Right eye: No discharge. Left eye: No discharge. Neck:      Vascular: No JVD. Trachea: No tracheal deviation. Pulmonary:      Effort: Pulmonary effort is normal. No respiratory distress. Abdominal:      General: There is no distension. Tenderness: There is no abdominal tenderness. There is no right CVA tenderness or left CVA tenderness. Musculoskeletal:      Comments: Seated in wheelchair   Skin:     General: Skin is warm and dry. Neurological:      Mental Status: She is alert and oriented to person, place, and time. Mental status is at baseline. Psychiatric:         Mood and Affect: Mood normal.         Behavior: Behavior normal.         Thought Content:  Thought content normal.       POC  Results for POC orders placed in visit on 12/13/22   POCT Urinalysis No Micro (Auto)   Result Value Ref Range    Glucose, Ur Negative NEGATIVE mg/dl    Bilirubin Urine Large (A)     Ketones, Urine Negative NEGATIVE    Specific Gravity, Urine 1.025 1.002 - 1.030    Blood, UA POC Negative NEGATIVE    pH, Urine 5.50 5.0 - 9.0    Protein, Urine Negative NEGATIVE mg/dl    Urobilinogen, Urine 0.20 0.0 - 1.0 eu/dl    Nitrite, Urine Positive (A) NEGATIVE    Leukocyte Clumps, Urine Small (A) NEGATIVE    Color, Urine Yellow [id number]    Character, Urine Clear [id number]   poct post void residual   Result Value Ref Range    post void residual 251 ml     Patients recent PSA values are as follows  No results found for: PSA, PSADIA

## 2022-12-13 NOTE — PROGRESS NOTES
Unable to verify medications. No list and does not know what she is taking, \"nothing has changed. \"

## 2022-12-14 LAB
ORGANISM: ABNORMAL
URINE CULTURE, ROUTINE: ABNORMAL

## 2022-12-15 ENCOUNTER — TELEPHONE (OUTPATIENT)
Dept: UROLOGY | Age: 72
End: 2022-12-15

## 2022-12-15 NOTE — TELEPHONE ENCOUNTER
Pt's urine culture with bacteria. Recommend treating only if symptomatic. Pt was not symptomatic at recent office visit. Please see how she is doing and if she has developed any symptoms in the interim I can call in an antibiotic but otherwise recommend monitoring at this time.

## 2022-12-16 NOTE — TELEPHONE ENCOUNTER
Patient advised of the urine results. She does have occasional kidney pain if she forgets the D-Mannose. No c/o of other symptoms. She voiced understanding to monitor at this time if not having symptoms.

## 2023-01-05 RX ORDER — METOPROLOL SUCCINATE 25 MG/1
TABLET, EXTENDED RELEASE ORAL
Qty: 90 TABLET | Refills: 0 | Status: SHIPPED | OUTPATIENT
Start: 2023-01-05

## 2023-01-05 NOTE — TELEPHONE ENCOUNTER
[address] called requesting a refill on the following medications:  Requested Prescriptions     Pending Prescriptions Disp Refills    metoprolol succinate (TOPROL XL) 25 MG extended release tablet 30 tablet 6     Pharmacy verified: [facility]  . pv    PT ONLY HAS 1 PILL LEFT    Date of last visit: 11/10/2022  Date of next visit (if applicable): 8/05/3836

## 2023-01-12 ENCOUNTER — OFFICE VISIT (OUTPATIENT)
Dept: CARDIOLOGY CLINIC | Age: 73
End: 2023-01-12
Payer: MEDICARE

## 2023-01-12 VITALS
DIASTOLIC BLOOD PRESSURE: 78 MMHG | WEIGHT: 253 LBS | BODY MASS INDEX: 46.56 KG/M2 | HEART RATE: 64 BPM | HEIGHT: 62 IN | SYSTOLIC BLOOD PRESSURE: 135 MMHG

## 2023-01-12 DIAGNOSIS — I25.10 CORONARY ARTERY DISEASE INVOLVING NATIVE CORONARY ARTERY OF NATIVE HEART WITHOUT ANGINA PECTORIS: Primary | ICD-10-CM

## 2023-01-12 DIAGNOSIS — I10 PRIMARY HYPERTENSION: ICD-10-CM

## 2023-01-12 PROCEDURE — G8417 CALC BMI ABV UP PARAM F/U: HCPCS | Performed by: NUCLEAR MEDICINE

## 2023-01-12 PROCEDURE — G8427 DOCREV CUR MEDS BY ELIG CLIN: HCPCS | Performed by: NUCLEAR MEDICINE

## 2023-01-12 PROCEDURE — 3017F COLORECTAL CA SCREEN DOC REV: CPT | Performed by: NUCLEAR MEDICINE

## 2023-01-12 PROCEDURE — 1123F ACP DISCUSS/DSCN MKR DOCD: CPT | Performed by: NUCLEAR MEDICINE

## 2023-01-12 PROCEDURE — 1036F TOBACCO NON-USER: CPT | Performed by: NUCLEAR MEDICINE

## 2023-01-12 PROCEDURE — G8400 PT W/DXA NO RESULTS DOC: HCPCS | Performed by: NUCLEAR MEDICINE

## 2023-01-12 PROCEDURE — 3075F SYST BP GE 130 - 139MM HG: CPT | Performed by: NUCLEAR MEDICINE

## 2023-01-12 PROCEDURE — 3078F DIAST BP <80 MM HG: CPT | Performed by: NUCLEAR MEDICINE

## 2023-01-12 PROCEDURE — 1090F PRES/ABSN URINE INCON ASSESS: CPT | Performed by: NUCLEAR MEDICINE

## 2023-01-12 PROCEDURE — 99213 OFFICE O/P EST LOW 20 MIN: CPT | Performed by: NUCLEAR MEDICINE

## 2023-01-12 PROCEDURE — G8484 FLU IMMUNIZE NO ADMIN: HCPCS | Performed by: NUCLEAR MEDICINE

## 2023-01-12 PROCEDURE — 93000 ELECTROCARDIOGRAM COMPLETE: CPT | Performed by: NUCLEAR MEDICINE

## 2023-01-12 NOTE — PROGRESS NOTES
[address] [facility] [address]  [facility] [address]  Dept: [phone]  Dept Fax: [phone]  Loc: [phone]    Visit Date: 1/12/2023    Janice Flower is a 67 y.o. female who presents todayfor:  Chief Complaint   Patient presents with    1 Year Follow Up    Hypertension    Coronary Artery Disease   Know CAD   Stents  No chest pain  No changes in breathing  BP is stable   No dizziness  No syncope        HPI:  HPI  Past Medical History:   Diagnosis Date    Acid reflux     Arrhythmia     Arthritis     CAD (coronary artery disease)     NON-OBSTRUCTIVE    Chronic bronchitis ([facility] [address]     COVID-19 10/2020    Depression     Diabetes mellitus ([facility] [address]     Dizziness - light-headed     Fall 10/15/2013    left rib fracture and shoulder contusion     Hyperlipidemia     Hypertension     Kidney disease     MDRO (multiple drug resistant organisms) resistance 02/2014    MRSA ABDOMEN    Mild persistent asthma without complication 6/00/7735    MRSA infection     Numbness and tingling     HX OF:    S/P laparoscopic cholecystectomy 11/27/2018    Swelling     Tinnitus       Past Surgical History:   Procedure Laterality Date    BACK SURGERY  2007    CARDIOVASCULAR STRESS TEST  04/03/2009    Persantine test associated w/non specific symptoms. EKG non diagnostic. no obvious stress induced ischemia. EF 63%. normal wall motion. CARDIOVASCULAR STRESS TEST  03/31/2010    tilt table associated w/fluctuation of BP w/relatively low BP throughout test. no significant symptoms during test. borderline tilt table test.    CHOLECYSTECTOMY      COLONOSCOPY  2011    CORONARY ANGIOPLASTY  12/16/2009    EF 60%. nonobstructive CAD. normal LV function.     ENDOSCOPY, COLON, DIAGNOSTIC  over 5 years     Dr. Zackery Bradford  2004    cataract    HERNIA REPAIR  25 months old     umblilical    HERNIA REPAIR  9/8/14    Ventral Hernia Repair with Mesh - Dr. Michael Flores (CERVIX STATUS UNKNOWN)      partial    OTHER SURGICAL HISTORY  2014    Left lower quadrant incision and drainage (Dr. Jayne Swann, [facility])    OTHER SURGICAL HISTORY  2014    I&D abd abscess Dr. Sabina Gastelum ABD PRTM&OMENTUM DX W/WO SPEC BR/WA SPX N/A 2018    LAPAROSCOPY EXPLORATORY, LAP LYLE performed by Aurea Bautista MD at [place] [address]  as a child     TOOTH EXTRACTION  2012    TRANSTHORACIC ECHOCARDIOGRAM  2009    EF 50-55%. normal LV global systolic funct. slight LVH. slight lt atrial enlargement. no obvious stenotic valves. no pericardial effusion.     US BREAST NEEDLE BIOPSY RIGHT Right 2019    Benign     Family History   Problem Relation Age of Onset    Uterine Cancer Mother 28    Heart Disease Father     Cancer Father 48        lung    Diabetes Paternal Grandfather     Uterine Cancer Maternal Grandmother     Uterine Cancer Maternal Aunt 28    Uterine Cancer Maternal Cousin 28    Depression Brother     Depression Brother     Colon Cancer Neg Hx     Breast Cancer Neg Hx      Social History     Tobacco Use    Smoking status: Former     Years: 3.00     Types: Cigarettes     Start date:      Quit date:      Years since quittin.0    Smokeless tobacco: Never    Tobacco comments:     3 cigarettes a month, Passive smoke exposure 39 years Father then ex    Substance Use Topics    Alcohol use: Not Currently     Alcohol/week: 0.0 standard drinks      Current Outpatient Medications   Medication Sig Dispense Refill    metoprolol succinate (TOPROL XL) 25 MG extended release tablet take 1 tablet by mouth once daily 90 tablet 0    montelukast (SINGULAIR) 10 MG tablet take 1 tablet by mouth once daily nightly 90 tablet 3    D-Mannose 500 MG CAPS Take 500 mg by mouth in the morning, at noon, and at bedtime 90 capsule     fexofenadine (ALLEGRA) 60 MG tablet Take 60 mg by mouth daily      fluticasone 100 MCG/ACT AEPB Inhale 1 puff into the lungs daily      lidocaine 4 % external patch Place 1 patch onto the skin daily      LORazepam (ATIVAN) 0.5 MG tablet Take 0.5 mg by mouth as needed for Anxiety.       Celery Seed OIL by Does not apply route      terbinafine (LAMISIL) 250 MG tablet Take 250 mg by mouth daily      tiZANidine (ZANAFLEX) 6 MG capsule Take 6 mg by mouth 3 times daily      hydrOXYzine pamoate (VISTARIL) 50 MG capsule Take 50 mg by mouth 3 times daily as needed for Itching      MILK THISTLE PO Take by mouth      NONFORMULARY Liver Detox      albuterol sulfate HFA (PROAIR HFA) 108 (90 Base) MCG/ACT inhaler Inhale 2 puffs into the lungs every 6 hours as needed for Wheezing or Shortness of Breath 1 each 11    loratadine (CLARITIN) 10 MG tablet Take 1 tablet by mouth daily 30 tablet 11    buPROPion (WELLBUTRIN XL) 150 MG extended release tablet Take 150 mg by mouth every morning      oxybutynin (DITROPAN XL) 15 MG extended release tablet Take 15 mg by mouth daily       spironolactone (ALDACTONE) 25 MG tablet Take 25 mg by mouth daily       etodolac (LODINE) 500 MG tablet Take 500 mg by mouth 2 times daily      Multiple Vitamins-Minerals (SENIOR VITES PO) Take by mouth      Ginger, Zingiber officinalis, (GINGER ROOT) 550 MG CAPS Take by mouth      budesonide (PULMICORT) 0.5 MG/2ML nebulizer suspension Take 2 mLs by nebulization 2 times daily 120 mL 11    Nebulizer MISC Please provide new nebulizer machine to use with Albuterol 2.5mg via nebs Q6h prn and budesonide 500 mcg BID 1 each 0    magnesium oxide (MAG-OX) 400 (241.3 Mg) MG TABS tablet Take 1 tablet by mouth daily      zinc sulfate (ZINCATE) 220 (50 Zn) MG capsule Take 1 capsule by mouth daily      vitamin C (VITAMIN C) 500 MG tablet Take 1 tablet by mouth daily      Cholecalciferol (VITAMIN D) 25 MCG TABS Take 1 tablet by mouth daily      mupirocin (BACTROBAN) 2 % ointment Apply topically as needed       Cranberry 450 MG CAPS Take by mouth      Lactobacillus (ACIDOPHILUS) TABS Take by mouth Mouthwashes (BIOTENE) LIQD oral solution Swish and spit 15 mLs 3 times daily as needed      furosemide (LASIX) 20 MG tablet Take 20 mg by mouth daily      citalopram (CELEXA) 40 MG tablet take 1 tablet by mouth every morning  0    nystatin-triamcinolone (MYCOLOG II) 291480-1.1 UNIT/GM-% cream Apply 1 applicator topically 4 times daily Apply topically 4 times daily. As needed      Artificial Saliva (BIOTENE MOISTURIZING MOUTH) SOLN Take 1 applicator by mouth as needed      Compression Stockings MISC 1 Package by Does not apply route Knee High 1 each 0    omeprazole (PRILOSEC) 40 MG delayed release capsule take 1 capsule by mouth once daily 30 capsule 5    TRADJENTA 5 MG tablet take 1 tablet by mouth daily 30 tablet 5    simvastatin (ZOCOR) 40 MG tablet take 1 tablet by mouth at bedtime 30 tablet 5    pioglitazone (ACTOS) 45 MG tablet take 1 tablet by mouth once daily 30 tablet 5    metFORMIN (GLUCOPHAGE) 1000 MG tablet take 1 tablet by mouth twice a day with food 60 tablet 5    gabapentin (NEURONTIN) 300 MG capsule Take 1 capsule by mouth 3 times daily (Patient taking differently: Take 800 mg by mouth 2 times daily.) 90 capsule 5    FREESTYLE LANCETS MISC Test 1 time daily. Dx: 250.00-Type 2 Diabetes. Freestyle 28G Lancets 100 each 11    Omega-3 Fatty Acids (FISH OIL) 1200 MG CAPS Take 1,200 mg by mouth daily      ASPIRIN LOW DOSE 81 MG EC tablet take 1 tablet by mouth once daily 30 tablet 11    glucose blood VI test strips (FREESTYLE LITE) strip Test once daily 100 each 11    diphenhydrAMINE (BENADRYL) 50 MG capsule Take 50 mg by mouth nightly as needed for Itching. QUEtiapine (SEROQUEL) 50 MG tablet Take 50 mg by mouth nightly. acetaminophen (TYLENOL) 500 MG tablet Take 500 mg by mouth every 6 hours as needed for Pain.       albuterol (PROVENTIL) (2.5 MG/3ML) 0.083% nebulizer solution Take 3 mLs by nebulization every 6 hours as needed for Wheezing or Shortness of Breath 120 each 11     No current facility-administered medications for this visit. Allergies   Allergen Reactions    Latex Hives, Itching and Rash    Bee Venom Anaphylaxis    Advair [Fluticasone-Salmeterol]      Heart races     Caffeine Other (See Comments)     Bad kidney problems    Lactose Intolerance (Gi)      Yogurt makes mouth itchy.      Pcn [Penicillins] Hives    Ultram [Tramadol Hcl] Swelling     Has tolerated norco here many times     Fluticasone-Salmeterol Palpitations    Wool Alcohol [Lanolin Alcohol] Hives, Itching and Rash     Health Maintenance   Topic Date Due    Pneumococcal 65+ years Vaccine (1 - PCV) Never done    Shingles vaccine (1 of 2) Never done    Diabetic Alb to Cr ratio (uACR) test  08/01/2015    Diabetic foot exam  09/01/2015    Diabetic retinal exam  12/30/2015    Annual Wellness Visit (AWV)  Never done    Colorectal Cancer Screen  06/28/2021    A1C test (Diabetic or Prediabetic)  01/18/2023    Depression Monitoring  02/08/2023    Lipids  11/22/2023    GFR test (Diabetes, CKD 3-4, OR last GFR 15-59)  11/22/2023    Breast cancer screen  04/11/2024    DTaP/Tdap/Td vaccine (2 - Td or Tdap) 03/22/2028    Flu vaccine  Completed    COVID-19 Vaccine  Completed    Hepatitis C screen  Completed    DEXA (modify frequency per FRAX score)  Addressed    Hepatitis A vaccine  Aged Out    Hib vaccine  Aged Out    Meningococcal (ACWY) vaccine  Aged Out       Subjective:  Review of Systems  General:   No fever, no chills, some fatigue or weight loss  Pulmonary:    some dyspnea, no wheezing  Cardiac:    Denies recent chest pain,   GI:     No nausea or vomiting, no abdominal pain  Neuro:    No dizziness or light headedness,   Musculoskeletal:  No recent active issues  Extremities:   No edema, no obvious claudication     Objective:  Physical Exam  /78   Pulse 64   Ht 5' 2\" (1.575 m)   Wt 253 lb (114.8 kg)   LMP  (LMP Unknown)   BMI 46.27 kg/m²   General:   Well developed, well nourished  Lungs:   Clear to auscultation  Heart: Normal S1 S2, Slight murmur. no rubs, no gallops  Abdomen:   Soft, non tender, no organomegalies, positive bowel sounds  Extremities:   No edema, no cyanosis, good peripheral pulses  Neurological:   Awake, alert, oriented. No obvious focal deficits  Musculoskelatal:  No obvious deformities    Assessment:      Diagnosis Orders   1. Coronary artery disease involving native coronary artery of native heart without angina pectoris  EKG 12 lead      2. Primary hypertension  EKG 12 lead      As above  Cardiac seems stable for now   ECG in office was done today. I reviewed the ECG. No acute findings    Plan:  No follow-ups on file. As above  Continue risk factor modification and medical management'  Thank you for allowing me to participate in the care of your patient. Please don't hesitate to contact me regarding any further issues related to the patient care    Orders Placed:  Orders Placed This Encounter   Procedures    EKG 12 lead     Order Specific Question:   Reason for Exam?     Answer: Other       Medications Prescribed:  No orders of the defined types were placed in this encounter. Discussed use, benefit, and side effects of prescribed medications. All patient questions answered. Pt voicedunderstanding. Instructed to continue current medications, diet and exercise. Continue risk factor modification and medical management. Patient agreed with treatment plan. Follow up as directed.     Electronically signedby Yina Johnson MD on 1/12/2023 at 11:42 AM

## 2023-03-06 ENCOUNTER — HOSPITAL ENCOUNTER (EMERGENCY)
Age: 73
Discharge: HOME | End: 2023-03-06
Attending: EMERGENCY MEDICINE
Payer: MEDICARE

## 2023-03-06 VITALS
SYSTOLIC BLOOD PRESSURE: 118 MMHG | RESPIRATION RATE: 20 BRPM | BODY MASS INDEX: 45.54 KG/M2 | OXYGEN SATURATION: 97 % | TEMPERATURE: 98 F | DIASTOLIC BLOOD PRESSURE: 71 MMHG | HEART RATE: 64 BPM | WEIGHT: 249 LBS

## 2023-03-06 DIAGNOSIS — S81.812A LACERATION OF LEFT LOWER EXTREMITY, INITIAL ENCOUNTER: Primary | ICD-10-CM

## 2023-03-06 PROCEDURE — 99283 EMERGENCY DEPT VISIT LOW MDM: CPT

## 2023-03-06 PROCEDURE — 12002 RPR S/N/AX/GEN/TRNK2.6-7.5CM: CPT

## 2023-03-06 ASSESSMENT — PAIN SCALES - GENERAL: PAINLEVEL_OUTOF10: 4

## 2023-03-06 ASSESSMENT — PAIN - FUNCTIONAL ASSESSMENT: PAIN_FUNCTIONAL_ASSESSMENT: 0-10

## 2023-03-06 NOTE — ED PROVIDER NOTES
[facility] EMERGENCY DEPT      CHIEF COMPLAINT       Chief Complaint   Patient presents with    Laceration       Nurses Notes reviewed and I agree except as noted in the HPI. HISTORY OF PRESENT ILLNESS    Ashley Zheng is a 67 y.o. female who presents with complaint of laceration to left lower leg, stated that she dropped a box of milk on her leg at home. No other complaints. Onset: Acute  Duration: Prior to arrival  Timing:   Location of Pain: Laceration left lower leg  Intesity/severity: Laceration approximately 5 cm  Modifying Factors:   Relieved by;  Previous Episodes; Tx Before arrival: Pressure application  REVIEW OF SYSTEMS      Review of Systems   Constitutional: Negative for fever, chills, diaphoresis and fatigue. HENT: Negative for congestion, drooling, facial swelling and sore throat. Eyes: Negative for photophobia, pain and discharge. Respiratory: Negative for cough, shortness of breath, wheezing and stridor. Cardiovascular: Negative for chest pain, palpitations and leg swelling. Gastrointestinal: Negative for abdominal pain, blood in stool and abdominal distention. Genitourinary: Negative for dysuria, urgency, hematuria and difficulty urinating. Musculoskeletal: Negative for gait problem, neck pain and neck stiffness. Skin; laceration left lower leg  Neurological: Negative for seizures, weakness and numbness. Hematological: Negative for adenopathy. Does not bruise/bleed easily. PAST MEDICAL HISTORY    has a past medical history of Acid reflux, Arrhythmia, Arthritis, CAD (coronary artery disease), Chronic bronchitis ([address] COVID-19, Depression, Diabetes mellitus ([facility] [address] Dizziness - light-headed, Fall, Hyperlipidemia, Hypertension, Kidney disease, MDRO (multiple drug resistant organisms) resistance, Mild persistent asthma without complication, MRSA infection, Numbness and tingling, S/P laparoscopic cholecystectomy, Swelling, and Tinnitus.     SURGICAL HISTORY      has a past surgical history that includes Coronary angioplasty (12/16/2009); transthoracic echocardiogram (03/04/2009); cardiovascular stress test (04/03/2009); cardiovascular stress test (03/31/2010); back surgery (2007); eye surgery (2004); Tooth Extraction (4/16/2012); Colonoscopy (2011); other surgical history (Feb 17, 2014); Endoscopy, colon, diagnostic (over 5 years ); Tonsillectomy (as a child ); other surgical history (2/2014); hernia repair (21 months old ); hernia repair (9/8/14); pr laps abd prtm&omentum dx w/wo spec br/wa spx (N/A, 11/26/2018); Cholecystectomy; Hysterectomy (1995); and US BREAST BIOPSY W LOC DEVICE 1ST LESION RIGHT (Right, 07/30/2019). CURRENT MEDICATIONS       Discharge Medication List as of 3/6/2023 10:00 AM        CONTINUE these medications which have NOT CHANGED    Details   metoprolol succinate (TOPROL XL) 25 MG extended release tablet take 1 tablet by mouth once daily, Disp-90 tablet, R-0Normal      montelukast (SINGULAIR) 10 MG tablet take 1 tablet by mouth once daily nightly, Disp-90 tablet, R-3Normal      D-Mannose 500 MG CAPS Take 500 mg by mouth in the morning, at noon, and at bedtime, Disp-90 capsuleOTC      fexofenadine (ALLEGRA) 60 MG tablet Take 60 mg by mouth dailyHistorical Med      fluticasone 100 MCG/ACT AEPB Inhale 1 puff into the lungs dailyHistorical Med      lidocaine 4 % external patch Place 1 patch onto the skin daily, TransDERmal, DAILY, Historical Med      LORazepam (ATIVAN) 0.5 MG tablet Take 0.5 mg by mouth as needed for Anxiety. Historical Med      Celery Seed OIL by Does not apply routeHistorical Med      terbinafine (LAMISIL) 250 MG tablet Take 250 mg by mouth dailyHistorical Med      tiZANidine (ZANAFLEX) 6 MG capsule Take 6 mg by mouth 3 times dailyHistorical Med      hydrOXYzine pamoate (VISTARIL) 50 MG capsule Take 50 mg by mouth 3 times daily as needed for ItchingHistorical Med      MILK THISTLE PO Take by mouthHistorical Med      NONFORMULARY Liver DetoxHistorical Med      albuterol sulfate HFA (PROAIR HFA) 108 (90 Base) MCG/ACT inhaler Inhale 2 puffs into the lungs every 6 hours as needed for Wheezing or Shortness of Breath, Disp-1 each, R-11Adjust Sig      loratadine (CLARITIN) 10 MG tablet Take 1 tablet by mouth daily, Disp-30 tablet, R-11Normal      buPROPion (WELLBUTRIN XL) 150 MG extended release tablet Take 150 mg by mouth every morningHistorical Med      oxybutynin (DITROPAN XL) 15 MG extended release tablet Take 15 mg by mouth daily Historical Med      spironolactone (ALDACTONE) 25 MG tablet Take 25 mg by mouth daily Historical Med      etodolac (LODINE) 500 MG tablet Take 500 mg by mouth 2 times dailyHistorical Med      Multiple Vitamins-Minerals (SENIOR VITES PO) Take by mouthHistorical Med      Ginger, Zingiber officinalis, (GINGER ROOT) 550 MG CAPS Take by mouthHistorical Med      budesonide (PULMICORT) 0.5 MG/2ML nebulizer suspension Take 2 mLs by nebulization 2 times daily, Disp-120 mL, R-11Needs processed under medicare part BNormal      Nebulizer MISC Disp-1 each, R-0, PrintPlease provide new nebulizer machine to use with Albuterol 2.5mg via nebs Q6h prn and budesonide 500 mcg BID      albuterol (PROVENTIL) (2.5 MG/3ML) 0.083% nebulizer solution Take 3 mLs by nebulization every 6 hours as needed for Wheezing or Shortness of Breath, Disp-120 each, R-11Needs processed under medicare part BNormal      magnesium oxide (MAG-OX) 400 (241.3 Mg) MG TABS tablet Take 1 tablet by mouth dailyOTC      zinc sulfate (ZINCATE) 220 (50 Zn) MG capsule Take 1 capsule by mouth dailyOTC      vitamin C (VITAMIN C) 500 MG tablet Take 1 tablet by mouth dailyOTC      Cholecalciferol (VITAMIN D) 25 MCG TABS Take 1 tablet by mouth dailyLabeling may look different. 25 mcg=1000 Units. Please double check dosages. OTC      mupirocin (BACTROBAN) 2 % ointment Apply topically as needed , Topical, PRN, Historical Med      Cranberry 450 MG CAPS Take by mouthHistorical Med Lactobacillus (ACIDOPHILUS) TABS Take by mouth Historical Med      Mouthwashes (BIOTENE) LIQD oral solution Swish and spit 15 mLs 3 times daily as neededHistorical Med      furosemide (LASIX) 20 MG tablet Take 20 mg by mouth dailyHistorical Med      citalopram (CELEXA) 40 MG tablet take 1 tablet by mouth every morning, R-0Historical Med      nystatin-triamcinolone (MYCOLOG II) 305662-4.1 UNIT/GM-% cream Apply 1 applicator topically 4 times daily Apply topically 4 times daily. As needed, Topical, 4 TIMES DAILY, Historical Med      Artificial Saliva (BIOTENE MOISTURIZING MOUTH) SOLN Take 1 applicator by mouth as neededHistorical Med      Compression Stockings MISC Starting Fri 11/2/2018, Disp-1 each, R-0, PrintKnee High       omeprazole (PRILOSEC) 40 MG delayed release capsule take 1 capsule by mouth once daily, Disp-30 capsule, R-5      TRADJENTA 5 MG tablet take 1 tablet by mouth daily, Disp-30 tablet, R-5      simvastatin (ZOCOR) 40 MG tablet take 1 tablet by mouth at bedtime, Disp-30 tablet, R-5      pioglitazone (ACTOS) 45 MG tablet take 1 tablet by mouth once daily, Disp-30 tablet, R-5      metFORMIN (GLUCOPHAGE) 1000 MG tablet take 1 tablet by mouth twice a day with food, Disp-60 tablet, R-5      gabapentin (NEURONTIN) 300 MG capsule Take 1 capsule by mouth 3 times daily, Disp-90 capsule, R-5      FREESTYLE LANCETS MISC Disp-100 each, R-11, NormalTest 1 time daily. Dx: 250.00-Type 2 Diabetes. Freestyle 28G Lancets      Omega-3 Fatty Acids (FISH OIL) 1200 MG CAPS Take 1,200 mg by mouth daily      ASPIRIN LOW DOSE 81 MG EC tablet take 1 tablet by mouth once daily, Disp-30 tablet, R-11      glucose blood VI test strips (FREESTYLE LITE) strip Disp-100 each, R-11, NormalTest once daily      diphenhydrAMINE (BENADRYL) 50 MG capsule Take 50 mg by mouth nightly as needed for Itching. QUEtiapine (SEROQUEL) 50 MG tablet Take 50 mg by mouth nightly.       acetaminophen (TYLENOL) 500 MG tablet Take 500 mg by mouth every 6 hours as needed for Pain. ALLERGIES     is allergic to latex, bee venom, advair [fluticasone-salmeterol], caffeine, lactose intolerance (gi), pcn [penicillins], ultram [tramadol hcl], fluticasone-salmeterol, and wool alcohol [lanolin alcohol]. FAMILY HISTORY     She indicated that her mother is . She indicated that her father is . She indicated that all of her three brothers are alive. She indicated that her maternal grandmother is . She indicated that the status of her paternal grandfather is unknown. She indicated that the status of her maternal aunt is unknown. She indicated that the status of her neg hx is unknown. She indicated that the status of her maternal cousin is unknown.   family history includes Cancer (age of onset: 48) in her father; Depression in her brother and brother; Diabetes in her paternal grandfather; Heart Disease in her father; Uterine Cancer in her maternal grandmother; Uterine Cancer (age of onset: 28) in her maternal aunt, maternal cousin, and mother. SOCIAL HISTORY      reports that she quit smoking about 30 years ago. Her smoking use included cigarettes. She started smoking about 33 years ago. She has never used smokeless tobacco. She reports that she does not currently use alcohol. She reports that she does not use drugs. PHYSICAL EXAM     INITIAL VITALS:  weight is 249 lb (112.9 kg). Her oral temperature is 98 °F (36.7 °C). Her blood pressure is 118/71 and her pulse is 64. Her respiration is 20 and oxygen saturation is 97%. Physical Exam   Constitutional:  well-developed and well-nourished. HENT: Head: Normocephalic, atraumatic, Bilateral external ears normal, Oropharynx mosit, No oral exudates, Nose normal.   Eyes: PERRL, EOMI, Conjunctiva normal, No discharge. No scleral icterus  Neck: Normal range of motion, No tenderness, Supple  Cardiovascular: Normal rate, regular rhythm, S1 normal and S2 normal.  Exam reveals no gallop. Pulmonary/Chest: Effort normal and breath sounds normal. No accessory muscle usage or stridor. No respiratory distress. no wheezes. has no rales. exhibits no tenderness. Abdominal: Soft. Bowel sounds are normal.  exhibits no distension. There is no tenderness. There is no rebound and no guarding. Extremities: No edema, no tenderness, no cyanosis, no clubbing. Laceration to left lower leg. Musculoskeletal: Good range of motion in major joints is observed. No major deformities noted. Neurological: Alert and oriented ×3, normal motor function, normal sensory function, no focal deficits. GCS 15  Skin: Skin is warm, dry and intact. No rash noted. No erythema. DIFFERENTIAL DIAGNOSIS:           MEDICAL DECISION MAKING / ED COURSE:     1) Number and Complexity of Problems            Problem List This Visit:         Chief Complaint   Patient presents with    Laceration            Differential Diagnosis includes (but not limited to):  Laceration        Diagnoses Considered but I have low suspicion of:   Bony injury             Pertinent Comorbid Conditions:        2)  Data Reviewed (none if left blank)          My Independent interpretations:     EKG:      None    Imaging: None    Labs:      None                 Decision Rules/Clinical Scores utilized:              External Documentation Reviewed:         Previous patient encounter documents & history available on EMR was reviewed              See Formal Diagnostic Results above for the lab and radiology tests and orders.     3)  Treatment and Disposition         ED Reassessment: Same         Case discussed with consulting clinician: None         Shared Decision-Making was performed and disposition discussed with the        Patient/Family and questions answered          Social determinants of health impacting treatment or disposition:           Code Status:        Summary of Patient Presentation:      Micaela Urrutia  /   Zackary Nichols Reviewed:    Vitals:    03/06/23 0790 BP: 118/71   Pulse: 64   Resp: 20   Temp: 98 °F (36.7 °C)   TempSrc: Oral   SpO2: 97%   Weight: 249 lb (112.9 kg)       The patient was seen and examined. Appropriate diagnostic testing was performed and results reviewed with the patient. The results of pertinent diagnostic studies and exam findings were discussed. The patients provisional diagnosis and plan of care were discussed with the patient and present family who expressed understanding. Any medications were reviewed and indications and risks of medications were discussed with the patient /family present. Strict verbal and written return precautions, instructions and appropriate follow-up provided to  the patient. ED Medications administered this visit:  (None if blank)  Medications - No data to display            DIAGNOSTIC RESULTS     EKG: All EKG's are interpreted by the Emergency Department Physician who either signs or Co-signs this chart in the absence of a cardiologist.      RADIOLOGY: non-plain film images(s) such as CT, Ultrasound and MRI are read by the radiologist.  Plain radiographic images are visualized and preliminarily interpreted by the emergency physician unless otherwise stated below. LABS:   Labs Reviewed - No data to display    EMERGENCY DEPARTMENT COURSE:   Vitals:    Vitals:    03/06/23 0858   BP: 118/71   Pulse: 64   Resp: 20   Temp: 98 °F (36.7 °C)   TempSrc: Oral   SpO2: 97%   Weight: 249 lb (112.9 kg)     Patient presenting with complaint of laceration left lower leg, like repaired.     CRITICAL CARE:       CONSULTS:  None    PROCEDURES:  Lac Repair    Date/Time: 3/6/2023 9:25 AM  Performed by: Mona Barba DO  Authorized by: Mona Barba DO     Consent:     Consent obtained:  Verbal    Consent given by:  Patient    Risks discussed:  Pain, infection, poor cosmetic result, poor wound healing and need for additional repair  Universal protocol:     Patient identity confirmed:  Verbally with patient  Anesthesia: Anesthesia method:  Local infiltration    Local anesthetic:  Lidocaine 1% w/o epi  Laceration details:     Location:  Leg    Leg location:  L lower leg    Length (cm):  5  Pre-procedure details:     Preparation:  Patient was prepped and draped in usual sterile fashion  Exploration:     Limited defect created (wound extended): no      Hemostasis achieved with:  Direct pressure    Imaging outcome: foreign body not noted      Contaminated: yes    Treatment:     Amount of cleaning:  Standard    Irrigation solution:  Tap water    Irrigation method:  Tap    Visualized foreign bodies/material removed: no      Debridement:  None    Undermining:  None    Scar revision: no    Skin repair:     Repair method:  Sutures    Suture size:  5-0    Suture material:  Prolene    Suture technique:  Simple interrupted    Number of sutures:  6  Approximation:     Approximation:  Close  Repair type:     Repair type:  Simple  Post-procedure details:     Dressing:  Open (no dressing)    Procedure completion:  Tolerated     FINAL IMPRESSION      1.  Laceration of left lower extremity, initial encounter          DISPOSITION/PLAN   Decision To Discharge    PATIENT REFERRED TO:  Orestes Murray MD  [address] [place] [address]  [phone]    Schedule an appointment as soon as possible for a visit in 3 days  For wound re-check    DISCHARGE MEDICATIONS:  Discharge Medication List as of 3/6/2023 10:00 AM          (Please note that portions of this note were completed with a voice recognition program.  Efforts were made to edit the dictations but occasionally words are mis-transcribed.)    Divya Wilcox, [address] [place] [address], DO  03/10/23 7365

## 2023-03-06 NOTE — ED TRIAGE NOTES
Pt to the ED via [facility] for left lower leg laceration. Pt states the corner of a milk carton fell on her leg today and cut her. Bleeding controlled.

## 2023-03-18 ENCOUNTER — HOSPITAL ENCOUNTER (OUTPATIENT)
Age: 73
Setting detail: SPECIMEN
Discharge: HOME | End: 2023-03-18

## 2023-03-19 LAB
MICROORGANISM SPEC CULT: ABNORMAL
MICROORGANISM/AGENT SPEC: ABNORMAL
SPECIMEN DESCRIPTION: ABNORMAL

## 2023-03-22 ENCOUNTER — TELEPHONE (OUTPATIENT)
Dept: PULMONOLOGY | Age: 73
End: 2023-03-22

## 2023-03-22 NOTE — TELEPHONE ENCOUNTER
Received a referral for wound care and ref requested for wound clinic [facility] which I lvm on nurse ref spec line stating this referral was sent pulm /sleep. I will put this referral in the shred pile.

## 2023-03-24 DIAGNOSIS — J30.9 CHRONIC ALLERGIC RHINITIS: ICD-10-CM

## 2023-03-28 RX ORDER — LORATADINE 10 MG/1
TABLET ORAL
Qty: 30 TABLET | Refills: 11 | Status: SHIPPED | OUTPATIENT
Start: 2023-03-28

## 2023-04-18 ENCOUNTER — HOSPITAL ENCOUNTER (OUTPATIENT)
Dept: PULMONOLOGY | Age: 73
Discharge: HOME | End: 2023-04-18
Payer: MEDICARE

## 2023-04-18 DIAGNOSIS — J98.4 RESTRICTIVE LUNG DISEASE: ICD-10-CM

## 2023-04-18 DIAGNOSIS — R94.2 DECREASED DIFFUSION CAPACITY OF LUNG: ICD-10-CM

## 2023-04-18 DIAGNOSIS — J45.30 MILD PERSISTENT ASTHMA WITHOUT COMPLICATION: ICD-10-CM

## 2023-04-18 PROCEDURE — 94729 DIFFUSING CAPACITY: CPT

## 2023-04-18 PROCEDURE — 94726 PLETHYSMOGRAPHY LUNG VOLUMES: CPT

## 2023-04-18 PROCEDURE — 94060 EVALUATION OF WHEEZING: CPT

## 2023-04-24 RX ORDER — METOPROLOL SUCCINATE 25 MG/1
TABLET, EXTENDED RELEASE ORAL
Qty: 90 TABLET | Refills: 3 | Status: SHIPPED | OUTPATIENT
Start: 2023-04-24

## 2023-04-25 ENCOUNTER — OFFICE VISIT (OUTPATIENT)
Dept: PULMONOLOGY | Age: 73
End: 2023-04-25
Payer: MEDICARE

## 2023-04-25 VITALS
DIASTOLIC BLOOD PRESSURE: 60 MMHG | OXYGEN SATURATION: 99 % | HEART RATE: 68 BPM | SYSTOLIC BLOOD PRESSURE: 110 MMHG | BODY MASS INDEX: 49.35 KG/M2 | WEIGHT: 261.4 LBS | HEIGHT: 61 IN | TEMPERATURE: 98.1 F

## 2023-04-25 DIAGNOSIS — R94.2 DECREASED DIFFUSION CAPACITY OF LUNG: ICD-10-CM

## 2023-04-25 DIAGNOSIS — J45.30 MILD PERSISTENT ASTHMA WITHOUT COMPLICATION: Primary | ICD-10-CM

## 2023-04-25 DIAGNOSIS — E66.01 CLASS 3 SEVERE OBESITY DUE TO EXCESS CALORIES WITH BODY MASS INDEX (BMI) OF 45.0 TO 49.9 IN ADULT, UNSPECIFIED WHETHER SERIOUS COMORBIDITY PRESENT (HCC): ICD-10-CM

## 2023-04-25 DIAGNOSIS — J30.2 SEASONAL ALLERGIC RHINITIS, UNSPECIFIED TRIGGER: ICD-10-CM

## 2023-04-25 PROCEDURE — 1123F ACP DISCUSS/DSCN MKR DOCD: CPT

## 2023-04-25 PROCEDURE — 1090F PRES/ABSN URINE INCON ASSESS: CPT

## 2023-04-25 PROCEDURE — G8400 PT W/DXA NO RESULTS DOC: HCPCS

## 2023-04-25 PROCEDURE — 3074F SYST BP LT 130 MM HG: CPT

## 2023-04-25 PROCEDURE — 3017F COLORECTAL CA SCREEN DOC REV: CPT

## 2023-04-25 PROCEDURE — G8427 DOCREV CUR MEDS BY ELIG CLIN: HCPCS

## 2023-04-25 PROCEDURE — 1036F TOBACCO NON-USER: CPT

## 2023-04-25 PROCEDURE — G8417 CALC BMI ABV UP PARAM F/U: HCPCS

## 2023-04-25 PROCEDURE — 99214 OFFICE O/P EST MOD 30 MIN: CPT

## 2023-04-25 PROCEDURE — 3078F DIAST BP <80 MM HG: CPT

## 2023-04-25 ASSESSMENT — ENCOUNTER SYMPTOMS
SHORTNESS OF BREATH: 1
WHEEZING: 0
RHINORRHEA: 1
COUGH: 1
CHEST TIGHTNESS: 0

## 2023-04-25 NOTE — PATIENT INSTRUCTIONS
Continue pulmicort nebulizer. Rinse mouth with water, gargle, and spit after use. Continue your albuterol inhaler and nebulizer. You may use one or the other every 6 hours as needed for shortness of breath or wheezing. Do NOT use both at the same time as they continue the same medication. Continue to monitor your oxygen levels at home. Call if your oxygen drops below 89%    Make sure to work on weight loss and increasing your physical activity at home! I will see you back in 6 months.

## 2023-05-30 ENCOUNTER — TELEPHONE (OUTPATIENT)
Dept: CARDIOLOGY CLINIC | Age: 73
End: 2023-05-30

## 2023-05-30 NOTE — TELEPHONE ENCOUNTER
Pre op Risk Assessment    Procedure Egd with dialation  7/5/23  Colonoscopy  7/24/23    Physician Dr. Cardenas Amelie  Date of surgery/procedure 7/5-/24    Last OV 1/12/2023  Last Stress 1/13/22  Last Echo 1/13/22  Last Cath 2009  Last Stent not seen in epic  Is patient on blood thinners ASA  Hold Meds/how many days ? Dr. Josh Menon is patient cleared?

## 2023-06-20 ENCOUNTER — HOSPITAL ENCOUNTER (OUTPATIENT)
Dept: WOMENS IMAGING | Age: 73
Discharge: HOME | End: 2023-06-20
Payer: MEDICARE

## 2023-06-20 DIAGNOSIS — Z12.31 VISIT FOR SCREENING MAMMOGRAM: ICD-10-CM

## 2023-06-20 PROCEDURE — 77063 BREAST TOMOSYNTHESIS BI: CPT

## 2023-07-03 RX ORDER — SODIUM CHLORIDE 450 MG/100ML
INJECTION, SOLUTION INTRAVENOUS CONTINUOUS
Status: DISCONTINUED | OUTPATIENT
Start: 2023-07-03 | End: 2023-07-05 | Stop reason: HOSPADM

## 2023-07-03 RX ORDER — SODIUM CHLORIDE 9 MG/ML
25 INJECTION, SOLUTION INTRAVENOUS PRN
Status: DISCONTINUED | OUTPATIENT
Start: 2023-07-03 | End: 2023-07-05 | Stop reason: HOSPADM

## 2023-07-03 RX ORDER — SODIUM CHLORIDE 0.9 % (FLUSH) 0.9 %
5-40 SYRINGE (ML) INJECTION PRN
Status: DISCONTINUED | OUTPATIENT
Start: 2023-07-03 | End: 2023-07-05 | Stop reason: HOSPADM

## 2023-07-03 RX ORDER — SODIUM CHLORIDE 0.9 % (FLUSH) 0.9 %
5-40 SYRINGE (ML) INJECTION EVERY 12 HOURS SCHEDULED
Status: DISCONTINUED | OUTPATIENT
Start: 2023-07-03 | End: 2023-07-05 | Stop reason: HOSPADM

## 2023-07-05 ENCOUNTER — HOSPITAL ENCOUNTER (OUTPATIENT)
Age: 73
Setting detail: OUTPATIENT SURGERY
Discharge: HOME | End: 2023-07-05
Attending: INTERNAL MEDICINE | Admitting: INTERNAL MEDICINE
Payer: MEDICARE

## 2023-07-05 ENCOUNTER — ANESTHESIA EVENT (OUTPATIENT)
Dept: ENDOSCOPY | Age: 73
End: 2023-07-05
Payer: MEDICARE

## 2023-07-05 ENCOUNTER — ANESTHESIA (OUTPATIENT)
Dept: ENDOSCOPY | Age: 73
End: 2023-07-05
Payer: MEDICARE

## 2023-07-05 VITALS
OXYGEN SATURATION: 96 % | BODY MASS INDEX: 50.63 KG/M2 | TEMPERATURE: 97 F | WEIGHT: 268.2 LBS | HEART RATE: 68 BPM | SYSTOLIC BLOOD PRESSURE: 160 MMHG | HEIGHT: 61 IN | DIASTOLIC BLOOD PRESSURE: 68 MMHG | RESPIRATION RATE: 20 BRPM

## 2023-07-05 PROCEDURE — 7100000010 HC PHASE II RECOVERY - FIRST 15 MIN: Performed by: INTERNAL MEDICINE

## 2023-07-05 PROCEDURE — 7100000011 HC PHASE II RECOVERY - ADDTL 15 MIN: Performed by: INTERNAL MEDICINE

## 2023-07-05 PROCEDURE — 2500000003 HC RX 250 WO HCPCS: Performed by: NURSE ANESTHETIST, CERTIFIED REGISTERED

## 2023-07-05 PROCEDURE — 3700000001 HC ADD 15 MINUTES (ANESTHESIA): Performed by: INTERNAL MEDICINE

## 2023-07-05 PROCEDURE — 3700000000 HC ANESTHESIA ATTENDED CARE: Performed by: INTERNAL MEDICINE

## 2023-07-05 PROCEDURE — 2580000003 HC RX 258: Performed by: INTERNAL MEDICINE

## 2023-07-05 PROCEDURE — 3609012700 HC EGD DILATION SAVORY: Performed by: INTERNAL MEDICINE

## 2023-07-05 PROCEDURE — 6360000002 HC RX W HCPCS: Performed by: NURSE ANESTHETIST, CERTIFIED REGISTERED

## 2023-07-05 RX ORDER — PROPOFOL 10 MG/ML
INJECTION, EMULSION INTRAVENOUS PRN
Status: DISCONTINUED | OUTPATIENT
Start: 2023-07-05 | End: 2023-07-05 | Stop reason: SDUPTHER

## 2023-07-05 RX ORDER — LIDOCAINE HYDROCHLORIDE 20 MG/ML
INJECTION, SOLUTION EPIDURAL; INFILTRATION; INTRACAUDAL; PERINEURAL PRN
Status: DISCONTINUED | OUTPATIENT
Start: 2023-07-05 | End: 2023-07-05 | Stop reason: SDUPTHER

## 2023-07-05 RX ADMIN — LIDOCAINE HYDROCHLORIDE 60 MG: 20 INJECTION, SOLUTION EPIDURAL; INFILTRATION; INTRACAUDAL; PERINEURAL at 09:22

## 2023-07-05 RX ADMIN — PROPOFOL 170 MG: 10 INJECTION, EMULSION INTRAVENOUS at 09:22

## 2023-07-05 RX ADMIN — SODIUM CHLORIDE: 4.5 INJECTION, SOLUTION INTRAVENOUS at 09:02

## 2023-07-05 ASSESSMENT — ENCOUNTER SYMPTOMS: SHORTNESS OF BREATH: 1

## 2023-07-05 ASSESSMENT — PAIN - FUNCTIONAL ASSESSMENT: PAIN_FUNCTIONAL_ASSESSMENT: NONE - DENIES PAIN

## 2023-07-05 NOTE — ANESTHESIA POSTPROCEDURE EVALUATION
Department of Anesthesiology  Postprocedure Note    Patient: Yogi Level  MRN: [id number]  YOB: 1950  Date of evaluation: 7/5/2023      Procedure Summary     Date: 07/05/23 Room / Location: [address] [facility] [address] / [address] [place] [address]    Anesthesia Start: 1688 Anesthesia Stop: 0930    Procedure: EGD DILATION Diagnosis:       Dysphagia, unspecified type      (Dysphagia, unspecified type [R13.10])    Surgeons: Scooter Toro MD Responsible Provider: Linda Aranda DO    Anesthesia Type: MAC ASA Status: 4          Anesthesia Type: No value filed.     Lyndsey Phase I: Lyndsey Score: 8    Lyndsey Phase II:        Anesthesia Post Evaluation    Patient location during evaluation: PACU  Patient participation: complete - patient participated  Level of consciousness: awake  Pain score: 0  Airway patency: patent  Nausea & Vomiting: no nausea and no vomiting  Complications: no  Cardiovascular status: blood pressure returned to baseline  Respiratory status: acceptable  Hydration status: euvolemic

## 2023-07-05 NOTE — ANESTHESIA PRE PROCEDURE
Department of Anesthesiology  Preprocedure Note       Name:  Sekou Vu   Age:  75 y.o.  :  1950                                          MRN:  [id number]         Date:  2023      Surgeon: Mira Aponte):  Neelima Feliz MD    Procedure: EGD DILATION    Medications prior to admission:   Prior to Admission medications    Medication Sig Start Date End Date Taking? Authorizing Provider   gabapentin (NEURONTIN) 800 MG tablet Take 1 tablet by mouth 4 times daily. 23   Historical Provider, MD   meloxicam (MOBIC) 15 MG tablet Take 1 tablet by mouth daily with food 23   Historical Provider, MD   bisacodyl (DULCOLAX) 5 MG EC tablet See Prep Instructions 23   CATE Padron CNP   polyethylene glycol (GLYCOLAX) 17 GM/SCOOP powder Dispense 238 Gram Bottle. Use as Directed 23   CATE Padron CNP   metoprolol succinate (TOPROL XL) 25 MG extended release tablet take 1 tablet by mouth once daily 23   Manohar Garrett MD   loratadine (CLARITIN) 10 MG tablet take 1 tablet by mouth once daily 3/28/23   CATE Martinez CNP   montelukast (SINGULAIR) 10 MG tablet take 1 tablet by mouth once daily nightly 10/4/22   CATE Martinez CNP   D-Mannose 500 MG CAPS Take 500 mg by mouth in the morning, at noon, and at bedtime 22   CATE Harris CNP   fexofenadine (ALLEGRA) 60 MG tablet Take 1 tablet by mouth daily    Historical Provider, MD   fluticasone 100 MCG/ACT AEPB Inhale 1 puff into the lungs daily  Patient not taking: Reported on 2023    Historical Provider, MD   lidocaine 4 % external patch Place 1 patch onto the skin daily    Historical Provider, MD   LORazepam (ATIVAN) 0.5 MG tablet Take 1 tablet by mouth as needed for Anxiety.     Historical Provider, MD   Celery Seed OIL by Does not apply route    Historical Provider, MD   terbinafine (LAMISIL) 250 MG tablet Take 1 tablet by mouth daily    Historical Provider, MD   tiZANidine

## 2023-07-05 NOTE — PROGRESS NOTES
5301- Recovery mode, denies discomfort. Taking fluids. 0942-Dr Arroyo discussed findings with pt and . [facility] notified of need for transport    [phone]- pt assisted to get dressed. And toilet. 1010- Discharge instructions provided and understanding verbalized. 1014-Pt taken to discharge lobby in stable condition. [facility]  Phyllis Barillas picked up pt.

## 2023-07-05 NOTE — H&P
tablet Take 1 tablet by mouth every 6 hours as needed for Pain    Historical Provider, MD     Additional information:       PHYSICAL:   Heart:  [x]Regular rate and rhythm  []Other:    Lungs:  [x]Clear    []Other:    Abdomen: [x]Soft    []Other:    Mental Status: [x]Alert & Oriented  []Other:        PLANNED PROCEDURE   [x]EGD  []Colonoscopy []Flex Sigmoid     Consent: I have discussed with the patient and/or the patient representative the indication, alternatives, and the possible risks and/or complications of the planned procedure and the anesthesia methods. The patient and/or patient representative appear to understand and agree to proceed. SEDATION  Please see anesthesia note. The medication Planned :  Planned agent:[x]Midazolam []Meperidine [x]Sublimaze []Morphine  []Diazepam  [x]Propofol     Airway Assessment:   See anesthesia no please     Monitoring and Safety: The patient will be placed on a cardiac monitor and vital signs, pulse oximetry and level of consciousness will be continuously evaluated throughout the procedure. The patient will be closely monitored until recovery from the medications is complete and the patient has returned to baseline status. Respiratory therapy will be on standby during the procedure. [x]Pre-procedure diagnostic studies complete and results available. Comment:    [x]Previous sedation/anesthesia experiences assessed. Comment:    [x]The patient is an appropriate candidate to undergo the planned procedure sedation and anesthesia. (Refer to nursing sedation/analgesia documentation record)  [x]Formulation and discussion of sedation/procedure plan, risks, and expectations with patient and/or responsible adult completed. [x]Patient examined immediately prior to the procedure.  (Refer to nursing sedation/analgesia documentation record)    Brandon Kang MD, MD   Electronically signed

## 2023-07-05 NOTE — PROGRESS NOTES
EGD complete, photos taken, 0 specimens taken, pt tolerated procedure well, dilated using 48, 51, 54f american dilators. Scope Number 908 used.

## 2023-07-05 NOTE — DISCHARGE INSTRUCTIONS
[facility]  [address] [facility]   [address], [address] [place] [address]  [phone]     Follow up with GI clinic, start ASA today

## 2023-07-05 NOTE — BRIEF OP NOTE
Brief Postoperative Note      Patient: Jm Porras  YOB: 1950  MRN: [id number]    Date of Procedure: 7/5/2023    Pre-Op Diagnosis Codes:      * Dysphagia, unspecified type [R13.10]    Post-Op Diagnosis: Dysphagia dilation to size 54 [place]       Procedure(s):  EGD DILATION    Surgeon(s):  Lawanda Mason MD    Assistant:  * No surgical staff found *    Anesthesia: Monitor Anesthesia Care    Estimated Blood Loss (mL): none    Complications: None    Specimens:   * No specimens in log *    Implants:  * No implants in log *      Drains: * No LDAs found *    Findings: Dysphagia dilation to size 54 [place]      Electronically signed by Lawanda Mason MD on 7/5/2023 at 9:30 AM

## 2023-07-05 NOTE — OP NOTE
[place] [address]                                OPERATIVE REPORT    PATIENT NAME: Aníbal Adhikari                   :        1950  MED REC NO:   [id number]                           ROOM:  ACCOUNT NO:   [de-identified]                           ADMIT DATE: 2023  PROVIDER:     Mozelle Knock, M.D. Seldon Rinne OF PROCEDURE:  2023    INDICATIONS: Patient with dysphagia had upper endoscopy with   Dilation in the past with benefit the patient. Plan today for  upper endoscopy to evaluate with dilation. ASA CLASSIFICATION:  Please see anesthesia note. ESTIMATED BLOOD LOSS:  None. OPERATIVE PROCEDURE:  The patient was brought to the GI lab. Consent  was obtained. The risks involved with the procedure were explained to  the patient. Informed consent was obtained. The patient was monitored  during the procedure with pulse oximetry, blood pressure monitoring, and  oxygen by nasal cannula. Sedation by incremental doses of IV propofol  given by the Anesthesia Service to achieve monitored anesthesia care. For ASA classification and medications given during the procedure,  please see Anesthesia note. PROCEDURE PERFORMED:  EGD with dilation. A standard video 190 upper scope was advanced under direct vision from  the oral cavity up to the duodenum. Esophagus featured mild acid reflux  No erosions or ulcerations seen. No well-defined strictures seen. Scope was advanced to the stomach, retroflexed. Examination of the   cardia revealed normal cardia. Gastric mucosa of the fundus and body   appears normal.  Antrum showed mild gastritis not significant. Duodenum   appears normal.  Scope withdrawn to the antrum. Guidewire was introduced. Scope was withdrawn. Then, American dilator starting from size 50 [place] to  54-[place] introduced over the guidewire, led to dilation of the entire  length of the esophagus.

## 2023-07-19 ENCOUNTER — HOSPITAL ENCOUNTER (EMERGENCY)
Age: 73
Discharge: HOME | End: 2023-07-20
Payer: MEDICARE

## 2023-07-19 DIAGNOSIS — M25.50 POLYARTHRALGIA: ICD-10-CM
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
## 2023-07-19 DIAGNOSIS — M19.90 OSTEOARTHRITIS, UNSPECIFIED OSTEOARTHRITIS TYPE, UNSPECIFIED SITE: ICD-10-CM

## 2023-07-19 PROCEDURE — 99284 EMERGENCY DEPT VISIT MOD MDM: CPT

## 2023-07-19 RX ORDER — SODIUM CHLORIDE 0.9 % (FLUSH) 0.9 %
5-40 SYRINGE (ML) INJECTION PRN
Status: DISCONTINUED | OUTPATIENT
Start: 2023-07-19 | End: 2023-07-24 | Stop reason: HOSPADM

## 2023-07-19 RX ORDER — SODIUM CHLORIDE 0.9 % (FLUSH) 0.9 %
5-40 SYRINGE (ML) INJECTION EVERY 12 HOURS SCHEDULED
Status: DISCONTINUED | OUTPATIENT
Start: 2023-07-19 | End: 2023-07-24 | Stop reason: HOSPADM

## 2023-07-19 RX ORDER — SODIUM CHLORIDE 9 MG/ML
25 INJECTION, SOLUTION INTRAVENOUS PRN
Status: DISCONTINUED | OUTPATIENT
Start: 2023-07-19 | End: 2023-07-24 | Stop reason: HOSPADM

## 2023-07-19 RX ORDER — SODIUM CHLORIDE 450 MG/100ML
INJECTION, SOLUTION INTRAVENOUS CONTINUOUS
Status: DISCONTINUED | OUTPATIENT
Start: 2023-07-19 | End: 2023-07-24 | Stop reason: HOSPADM

## 2023-07-19 ASSESSMENT — PAIN SCALES - GENERAL: PAINLEVEL_OUTOF10: 7

## 2023-07-19 ASSESSMENT — PAIN DESCRIPTION - LOCATION: LOCATION: GENERALIZED

## 2023-07-19 ASSESSMENT — PAIN - FUNCTIONAL ASSESSMENT: PAIN_FUNCTIONAL_ASSESSMENT: 0-10

## 2023-07-20 ENCOUNTER — APPOINTMENT (OUTPATIENT)
Dept: CT IMAGING | Age: 73
End: 2023-07-20
Payer: MEDICARE

## 2023-07-20 ENCOUNTER — APPOINTMENT (OUTPATIENT)
Dept: GENERAL RADIOLOGY | Age: 73
End: 2023-07-20
Payer: MEDICARE

## 2023-07-20 VITALS
WEIGHT: 274 LBS | DIASTOLIC BLOOD PRESSURE: 75 MMHG | TEMPERATURE: 98 F | HEART RATE: 65 BPM | OXYGEN SATURATION: 96 % | HEIGHT: 61 IN | SYSTOLIC BLOOD PRESSURE: 133 MMHG | BODY MASS INDEX: 51.73 KG/M2 | RESPIRATION RATE: 18 BRPM

## 2023-07-20 PROCEDURE — 72170 X-RAY EXAM OF PELVIS: CPT

## 2023-07-20 PROCEDURE — 73030 X-RAY EXAM OF SHOULDER: CPT

## 2023-07-20 PROCEDURE — 72125 CT NECK SPINE W/O DYE: CPT

## 2023-07-20 PROCEDURE — 73564 X-RAY EXAM KNEE 4 OR MORE: CPT

## 2023-07-20 PROCEDURE — 6370000000 HC RX 637 (ALT 250 FOR IP): Performed by: NURSE PRACTITIONER

## 2023-07-20 PROCEDURE — 73080 X-RAY EXAM OF ELBOW: CPT

## 2023-07-20 PROCEDURE — 72131 CT LUMBAR SPINE W/O DYE: CPT

## 2023-07-20 RX ORDER — LIDOCAINE 4 G/G
1 PATCH TOPICAL ONCE
Status: DISCONTINUED | OUTPATIENT
Start: 2023-07-20 | End: 2023-07-20 | Stop reason: HOSPADM

## 2023-07-20 RX ORDER — LIDOCAINE 50 MG/G
OINTMENT TOPICAL
Qty: 50 G | Refills: 2 | Status: SHIPPED | OUTPATIENT
Start: 2023-07-20

## 2023-07-20 RX ORDER — ACETAMINOPHEN 325 MG/1
650 TABLET ORAL ONCE
Status: COMPLETED | OUTPATIENT
Start: 2023-07-20 | End: 2023-07-20

## 2023-07-20 RX ORDER — ACETAMINOPHEN 500 MG
500 TABLET ORAL 4 TIMES DAILY PRN
Qty: 120 TABLET | Refills: 0 | Status: SHIPPED | OUTPATIENT
Start: 2023-07-20

## 2023-07-20 RX ADMIN — ACETAMINOPHEN 650 MG: 325 TABLET ORAL at 01:28

## 2023-07-20 ASSESSMENT — PAIN SCALES - GENERAL
PAINLEVEL_OUTOF10: 6
PAINLEVEL_OUTOF10: 8

## 2023-07-20 NOTE — ED PROVIDER NOTES
[facility] EMERGENCY DEPT      EMERGENCY MEDICINE     Pt Name: Judith Lugo  MRN: [id number]  [address] [facility] 1950  Date of evaluation: 7/19/2023  Provider: CATE Green CNP    CHIEF COMPLAINT       Chief Complaint   Patient presents with    [address] is a pleasant 75 y.o. female who presents to the emergency department from home with c/o injury sustained in a fall. The patient was sitting on her bed crocheting and watching TV and she said she nodded off. She slid off the bed and landed on both of her knees and both of her hands. She complains of pain in the bilateral knees, bilateral hips, bilateral shoulders, bilateral elbows. She also reports pain in the lower back and her neck. She did not hit her head and had no loss of consciousness.   She is not on blood thinners      History is obtained from:  patient  PASTMEDICAL HISTORY     Past Medical History:   Diagnosis Date    Acid reflux     Arrhythmia     Arthritis     CAD (coronary artery disease)     NON-OBSTRUCTIVE    Chronic bronchitis ([address])     COVID-19 10/2020    Depression     Diabetes mellitus ([address])     Dizziness - light-headed     Fall 10/15/2013    left rib fracture and shoulder contusion     Hyperlipidemia     Hypertension     Kidney disease     MDRO (multiple drug resistant organisms) resistance 02/2014    MRSA ABDOMEN    Mild persistent asthma without complication 6/25/1653    MRSA infection     Numbness and tingling     HX OF:    S/P laparoscopic cholecystectomy 11/27/2018    Swelling     Tinnitus        Patient Active Problem List   Diagnosis Code    Coronary artery disease involving native coronary artery of native heart without angina pectoris I25.10    Hypertension I10    Hyperlipidemia E78.5    Diabetes mellitus ([address]) E11.9    Arrhythmia I49.9    Kidney disease N28.9    Falls frequently R29.6    Incontinence of urine R32    DDD (degenerative disc disease), lumbar M51.36    Depression

## 2023-07-20 NOTE — ED NOTES
Pt back from scans at this time. Updated on plan of care. Voiced no needs. Call light in reach.      Paolo Springer RN  07/20/23 2143

## 2023-07-20 NOTE — DISCHARGE INSTRUCTIONS
Use Tylenol and lidocaine ointment as directed. Follow-up with your PCP. Return for new or worsening symptoms.

## 2023-07-20 NOTE — ED TRIAGE NOTES
Pt arrives to ED from home via EMS for c/o fall from bed and generalized pain. Pt states she was sitting in bed watching tv and crocheting when she fell from her bed. Pt states she fell and landed on both elbows and both knees. Denies hitting her head or LOC. Pt c/o pain to bilateral shoulders, elbows, and knees. Denies c-spine tenderness. Pt in bed talking on phone during triage.

## 2023-07-20 NOTE — ED NOTES
Pt in bed sleeping at this time with no s/s of distress noted. Call light in reach.      Eli Fawad, GUILLE  07/20/23 8720

## 2023-07-20 NOTE — ED NOTES
Pt in bed sleeping with no s/s of distress noted. Updated on plan of care. Voiced no needs. Call light in reach.      Eli [place], GUILLE  07/20/23 1474

## 2023-07-24 ENCOUNTER — ANESTHESIA EVENT (OUTPATIENT)
Dept: ENDOSCOPY | Age: 73
End: 2023-07-24
Payer: MEDICARE

## 2023-07-24 ENCOUNTER — ANESTHESIA (OUTPATIENT)
Dept: ENDOSCOPY | Age: 73
End: 2023-07-24
Payer: MEDICARE

## 2023-07-24 ENCOUNTER — HOSPITAL ENCOUNTER (OUTPATIENT)
Age: 73
Setting detail: OUTPATIENT SURGERY
Discharge: HOME | End: 2023-07-24
Attending: INTERNAL MEDICINE | Admitting: INTERNAL MEDICINE
Payer: MEDICARE

## 2023-07-24 VITALS
DIASTOLIC BLOOD PRESSURE: 74 MMHG | OXYGEN SATURATION: 99 % | WEIGHT: 257.8 LBS | TEMPERATURE: 97.4 F | HEART RATE: 82 BPM | SYSTOLIC BLOOD PRESSURE: 124 MMHG | BODY MASS INDEX: 48.67 KG/M2 | HEIGHT: 61 IN | RESPIRATION RATE: 18 BRPM

## 2023-07-24 PROCEDURE — 2580000003 HC RX 258: Performed by: INTERNAL MEDICINE

## 2023-07-24 PROCEDURE — 3700000000 HC ANESTHESIA ATTENDED CARE: Performed by: INTERNAL MEDICINE

## 2023-07-24 PROCEDURE — 6360000002 HC RX W HCPCS

## 2023-07-24 PROCEDURE — 2580000003 HC RX 258

## 2023-07-24 PROCEDURE — 7100000011 HC PHASE II RECOVERY - ADDTL 15 MIN: Performed by: INTERNAL MEDICINE

## 2023-07-24 PROCEDURE — 3700000001 HC ADD 15 MINUTES (ANESTHESIA): Performed by: INTERNAL MEDICINE

## 2023-07-24 PROCEDURE — 2709999900 HC NON-CHARGEABLE SUPPLY: Performed by: INTERNAL MEDICINE

## 2023-07-24 PROCEDURE — 3609027000 HC COLONOSCOPY: Performed by: INTERNAL MEDICINE

## 2023-07-24 PROCEDURE — 7100000010 HC PHASE II RECOVERY - FIRST 15 MIN: Performed by: INTERNAL MEDICINE

## 2023-07-24 RX ORDER — PROPOFOL 10 MG/ML
INJECTION, EMULSION INTRAVENOUS PRN
Status: DISCONTINUED | OUTPATIENT
Start: 2023-07-24 | End: 2023-07-24 | Stop reason: SDUPTHER

## 2023-07-24 RX ORDER — SODIUM CHLORIDE 9 MG/ML
INJECTION, SOLUTION INTRAVENOUS CONTINUOUS PRN
Status: DISCONTINUED | OUTPATIENT
Start: 2023-07-24 | End: 2023-07-24 | Stop reason: SDUPTHER

## 2023-07-24 RX ADMIN — SODIUM CHLORIDE: 9 INJECTION, SOLUTION INTRAVENOUS at 10:07

## 2023-07-24 RX ADMIN — PROPOFOL 30 MG: 10 INJECTION, EMULSION INTRAVENOUS at 10:10

## 2023-07-24 RX ADMIN — PROPOFOL 40 MG: 10 INJECTION, EMULSION INTRAVENOUS at 10:15

## 2023-07-24 RX ADMIN — SODIUM CHLORIDE: 4.5 INJECTION, SOLUTION INTRAVENOUS at 09:58

## 2023-07-24 RX ADMIN — PROPOFOL 50 MG: 10 INJECTION, EMULSION INTRAVENOUS at 10:12

## 2023-07-24 ASSESSMENT — ENCOUNTER SYMPTOMS: SHORTNESS OF BREATH: 1

## 2023-07-24 ASSESSMENT — PAIN - FUNCTIONAL ASSESSMENT: PAIN_FUNCTIONAL_ASSESSMENT: NONE - DENIES PAIN

## 2023-07-24 NOTE — BRIEF OP NOTE
Brief Postoperative Note      Patient: Negar Dickerson  YOB: 1950  MRN: [id number]    Date of Procedure: 7/24/2023    Pre-Op Diagnosis Codes:     * Positive colorectal cancer screening using DNA-based stool test [R19.5]    Post-Op Diagnosis: Rare everyday diverticulosis the finding cannot explain the Cologuard positive DNA based colon cancer screening test       Procedure(s):  COLONOSCOPY    Surgeon(s):  Mercedes Wahl MD    Assistant:  * No surgical staff found *    Anesthesia: Monitor Anesthesia Care    Estimated Blood Loss (mL): none    Complications: None    Specimens:   * No specimens in log *    Implants:  * No implants in log *      Drains: * No LDAs found *    Findings:  Rare everyday diverticulosis the finding cannot explain the Cologuard positive DNA based colon cancer screening test      Electronically signed by Mercedes Wahl MD on 7/24/2023 at 10:28 AM

## 2023-07-24 NOTE — ANESTHESIA PRE PROCEDURE
Department of Anesthesiology  Preprocedure Note       Name:  Paula Band   Age:  75 y.o.  :  1950                                          MRN:  [id number]         Date:  2023      Surgeon: Ramona De La Torre):  Ivanna Chirinos MD    Procedure: COLONOSCOPY    Medications prior to admission:   Prior to Admission medications    Medication Sig Start Date End Date Taking? Authorizing Provider   acetaminophen (TYLENOL) 500 MG tablet Take 1 tablet by mouth 4 times daily as needed for Pain 23   CATE Eduardo CNP   lidocaine (XYLOCAINE) 5 % ointment Apply topically as needed. 23   CATE Eduardo CNP   gabapentin (NEURONTIN) 800 MG tablet Take 1 tablet by mouth 4 times daily. 23   Historical Provider, MD   meloxicam (MOBIC) 15 MG tablet Take 1 tablet by mouth daily with food 23   Historical Provider, MD   bisacodyl (DULCOLAX) 5 MG EC tablet See Prep Instructions 23   CATE Caballero CNP   polyethylene glycol (GLYCOLAX) 17 GM/SCOOP powder Dispense 238 Gram Bottle.   Use as Directed 23   CATE Caballero CNP   metoprolol succinate (TOPROL XL) 25 MG extended release tablet take 1 tablet by mouth once daily 23   Anastasiia Davis MD   loratadine (CLARITIN) 10 MG tablet take 1 tablet by mouth once daily 3/28/23   CATE Renteria CNP   montelukast (SINGULAIR) 10 MG tablet take 1 tablet by mouth once daily nightly 10/4/22   CATE Renteria CNP   D-Mannose 500 MG CAPS Take 500 mg by mouth in the morning, at noon, and at bedtime 22   [place] AluCATE CNP   fexofenadine (ALLEGRA) 60 MG tablet Take 1 tablet by mouth daily    Historical Provider, MD   fluticasone 100 MCG/ACT AEPB Inhale 1 puff into the lungs daily  Patient not taking: Reported on 2023    Historical Provider, MD   lidocaine 4 % external patch Place 1 patch onto the skin daily    Historical Provider, MD   LORazepam (ATIVAN) 0.5 MG tablet Take 1

## 2023-07-24 NOTE — PROGRESS NOTES
Recovery mode, denies discomfort. Passing gas, taking fluids. Dr. Angel Castro discussed findings with pt and . Discharge instructions provided and understanding verbalized.

## 2023-07-24 NOTE — DISCHARGE INSTRUCTIONS
Follow up with office as needed  [facility]  [address] [facility] Cristin Adames, [address] [place] [address]  [phone]

## 2023-07-24 NOTE — H&P
Provider, MD   nystatin-triamcinolone [facility] II) 513934-1.3 UNIT/GM-% cream Apply 1 applicator topically 4 times daily Apply topically 4 times daily. As needed    Historical Provider, MD   Artificial Saliva (BIOTENE MOISTURIZING MOUTH) SOLN Take 1 applicator by mouth as needed    Historical Provider, MD   Compression Stockings MISC 1 Package by Does not apply route Knee High 11/2/18   Anastasiia Henry MD   omeprazole (PRILOSEC) 40 MG delayed release capsule take 1 capsule by mouth once daily 12/15/16   CATE Guerra - CNP   TRADJENTA 5 MG tablet take 1 tablet by mouth daily 12/6/16   Kt Lozoya MD   simvastatin (ZOCOR) 40 MG tablet take 1 tablet by mouth at bedtime 11/22/16   Kt Lozoya MD   pioglitazone (ACTOS) 45 MG tablet take 1 tablet by mouth once daily 10/4/16   Kt Lozoya MD   metFORMIN (GLUCOPHAGE) 1000 MG tablet take 1 tablet by mouth twice a day with food 7/13/16   Kt Lozoya MD   gabapentin (NEURONTIN) 300 MG capsule Take 1 capsule by mouth 3 times daily  Patient taking differently: Take 800 mg by mouth 2 times daily. 6/8/16   Kt Lozoya MD   FREESTYLE LANCETS MISC Test 1 time daily. Dx: 250.00-Type 2 Diabetes.   Freestyle 28G Lancets 3/17/16   Kt Lozoya MD   Omega-3 Fatty Acids (FISH OIL) 1200 MG CAPS Take 1,200 mg by mouth daily    Historical Provider, MD   ASPIRIN LOW DOSE 81 MG EC tablet take 1 tablet by mouth once daily 7/2/15   Kt Lozoya MD   glucose blood VI test strips (FREESTYLE LITE) strip Test once daily 4/25/15   Kt Lozoya MD   diphenhydrAMINE (BENADRYL) 50 MG capsule Take 1 capsule by mouth nightly as needed for Itching    Historical Provider, MD   QUEtiapine (SEROQUEL) 50 MG tablet Take 1 tablet by mouth nightly    Historical Provider, MD     Additional information:       PHYSICAL:   Heart:  [x]Regular rate and rhythm  []Other:    Lungs:  [x]Clear

## 2023-07-24 NOTE — ANESTHESIA POSTPROCEDURE EVALUATION
Department of Anesthesiology  Postprocedure Note    Patient: Taisha Sellers  MRN: [id number]  YOB: 1950  Date of evaluation: 7/24/2023      Procedure Summary     Date: 07/24/23 Room / Location: [address] [place] [address] / [address] [place] [address]    Anesthesia Start: 1007 Anesthesia Stop: 1024    Procedure: COLONOSCOPY Diagnosis:       Positive colorectal cancer screening using DNA-based stool test      (Positive colorectal cancer screening using DNA-based stool test [R19.5])    Surgeons: Fabiana Talavera MD Responsible Provider: Yaya Bazzi DO    Anesthesia Type: MAC ASA Status: 4          Anesthesia Type: No value filed.     Lyndsey Phase I: Lyndsey Score: 10    Lyndsey Phase II:        Anesthesia Post Evaluation    Patient location during evaluation: bedside  Patient participation: complete - patient participated  Level of consciousness: awake  Pain score: 0  Airway patency: patent  Nausea & Vomiting: no nausea and no vomiting  Complications: no  Cardiovascular status: hemodynamically stable and blood pressure returned to baseline  Respiratory status: room air, nonlabored ventilation and spontaneous ventilation  Hydration status: stable

## 2023-07-25 NOTE — OP NOTE
[place] [address]                                OPERATIVE REPORT    PATIENT NAME: Maurizio Vallejo                   :        1950  MED REC NO:   [id number]                           ROOM:  ACCOUNT NO:   [de-identified]                           ADMIT DATE: 2023  PROVIDER:     JERMAIN Hernandez Gearing OF PROCEDURE:  2023    INDICATION:  The patient with positive Cologuard DNA. The patient is  for colon cancer screening test.  Plan today for colonoscopy to  evaluate. ASA CLASSIFICATION:  III. ESTIMATED BLOOD LOSS:  None. DESCRIPTION OF THE PROCEDURE:  The patient was brought to GI lab. Consent was obtained. Risks involved with the procedure were explained  to the patient. Informed consent was obtained. The patient was  monitored during the procedure with pulse oximetry, blood pressure  monitoring, and oxygen by nasal cannula. Sedation by incremental doses  of IV propofol given by Anesthesia Service to achieve monitored  anesthesia care. For ASA classification and medication given during the  procedure, please see Anesthesia note. PROCEDURE PERFORMED:  Colonoscopy. Digital examination revealed normal rectum. Standard colonoscope was  advanced under direct vision from the rectum up to the cecum. Prep was  good and the patient tolerated the procedure well. Cecum intubation was  confirmed by appendiceal orifice. Scope was withdrawn. Seen very rare  nonclinically significant diverticulosis. No polyps. No masses. Scope  was withdrawn with no immediate complication. IMPRESSION:  1. Very mild diverticulosis. 2.  The finding cannot explain the positive Cologuard DNA based colon  cancer screening test.    PLAN:  Recommend upper endoscopy if not recently to evaluate and complete the GI  evaluation.         Dick Ramos M.D.    D: 2023 10:42:19       T: 2023 10:47:37

## 2023-07-26 ENCOUNTER — HOSPITAL ENCOUNTER (OUTPATIENT)
Age: 73
Discharge: HOME | End: 2023-07-26
Payer: MEDICARE

## 2023-07-26 ENCOUNTER — HOSPITAL ENCOUNTER (OUTPATIENT)
Dept: GENERAL RADIOLOGY | Age: 73
Discharge: HOME | End: 2023-07-26
Payer: MEDICARE

## 2023-07-26 DIAGNOSIS — R05.3 CHRONIC COUGH: ICD-10-CM

## 2023-07-26 PROCEDURE — 71046 X-RAY EXAM CHEST 2 VIEWS: CPT

## 2023-07-31 ENCOUNTER — APPOINTMENT (OUTPATIENT)
Dept: GENERAL RADIOLOGY | Age: 73
End: 2023-07-31
Payer: MEDICARE

## 2023-07-31 ENCOUNTER — APPOINTMENT (OUTPATIENT)
Dept: CT IMAGING | Age: 73
End: 2023-07-31
Payer: MEDICARE

## 2023-07-31 ENCOUNTER — HOSPITAL ENCOUNTER (EMERGENCY)
Age: 73
Discharge: HOME | End: 2023-07-31
Payer: MEDICARE

## 2023-07-31 VITALS
HEART RATE: 58 BPM | HEIGHT: 61 IN | DIASTOLIC BLOOD PRESSURE: 80 MMHG | SYSTOLIC BLOOD PRESSURE: 141 MMHG | TEMPERATURE: 98.1 F | RESPIRATION RATE: 18 BRPM | WEIGHT: 254 LBS | OXYGEN SATURATION: 96 % | BODY MASS INDEX: 47.95 KG/M2

## 2023-07-31 DIAGNOSIS — S40.021A CONTUSION OF MULTIPLE SITES OF RIGHT SHOULDER AND UPPER ARM, INITIAL ENCOUNTER: ICD-10-CM

## 2023-07-31 DIAGNOSIS — S93.401A SPRAIN OF RIGHT ANKLE, UNSPECIFIED LIGAMENT, INITIAL ENCOUNTER: ICD-10-CM

## 2023-07-31 DIAGNOSIS — M25.551 PAIN OF RIGHT HIP: ICD-10-CM

## 2023-07-31 DIAGNOSIS — S40.011A CONTUSION OF MULTIPLE SITES OF RIGHT SHOULDER AND UPPER ARM, INITIAL ENCOUNTER: ICD-10-CM
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
## 2023-07-31 DIAGNOSIS — S16.1XXA STRAIN OF NECK MUSCLE, INITIAL ENCOUNTER: ICD-10-CM

## 2023-07-31 DIAGNOSIS — S92.901A CLOSED FRACTURE OF RIGHT FOOT, INITIAL ENCOUNTER: ICD-10-CM

## 2023-07-31 DIAGNOSIS — S09.90XA CLOSED HEAD INJURY, INITIAL ENCOUNTER: Primary | ICD-10-CM

## 2023-07-31 PROCEDURE — 73630 X-RAY EXAM OF FOOT: CPT

## 2023-07-31 PROCEDURE — 73502 X-RAY EXAM HIP UNI 2-3 VIEWS: CPT

## 2023-07-31 PROCEDURE — 6370000000 HC RX 637 (ALT 250 FOR IP): Performed by: PHYSICIAN ASSISTANT

## 2023-07-31 PROCEDURE — 93010 ELECTROCARDIOGRAM REPORT: CPT | Performed by: INTERNAL MEDICINE

## 2023-07-31 PROCEDURE — 70450 CT HEAD/BRAIN W/O DYE: CPT

## 2023-07-31 PROCEDURE — 73610 X-RAY EXAM OF ANKLE: CPT

## 2023-07-31 PROCEDURE — 73030 X-RAY EXAM OF SHOULDER: CPT

## 2023-07-31 PROCEDURE — 72192 CT PELVIS W/O DYE: CPT

## 2023-07-31 PROCEDURE — 99284 EMERGENCY DEPT VISIT MOD MDM: CPT

## 2023-07-31 PROCEDURE — 72125 CT NECK SPINE W/O DYE: CPT

## 2023-07-31 PROCEDURE — 93005 ELECTROCARDIOGRAM TRACING: CPT | Performed by: PHYSICIAN ASSISTANT

## 2023-07-31 RX ORDER — HYDROCODONE BITARTRATE AND ACETAMINOPHEN 5; 325 MG/1; MG/1
1 TABLET ORAL ONCE
Status: COMPLETED | OUTPATIENT
Start: 2023-07-31 | End: 2023-07-31

## 2023-07-31 RX ADMIN — HYDROCODONE BITARTRATE AND ACETAMINOPHEN 1 TABLET: 5; 325 TABLET ORAL at 05:22

## 2023-07-31 RX ADMIN — HYDROCODONE BITARTRATE AND ACETAMINOPHEN 1 TABLET: 5; 325 TABLET ORAL at 02:15

## 2023-07-31 ASSESSMENT — PAIN SCALES - GENERAL
PAINLEVEL_OUTOF10: 7
PAINLEVEL_OUTOF10: 6
PAINLEVEL_OUTOF10: 6

## 2023-07-31 ASSESSMENT — PAIN - FUNCTIONAL ASSESSMENT
PAIN_FUNCTIONAL_ASSESSMENT: 0-10

## 2023-07-31 ASSESSMENT — PAIN DESCRIPTION - PAIN TYPE: TYPE: ACUTE PAIN

## 2023-07-31 ASSESSMENT — ENCOUNTER SYMPTOMS
RHINORRHEA: 0
SHORTNESS OF BREATH: 0
ABDOMINAL PAIN: 0
FACIAL SWELLING: 0
NAUSEA: 0
VOMITING: 0

## 2023-07-31 ASSESSMENT — VISUAL ACUITY: OU: 1

## 2023-07-31 NOTE — ED TRIAGE NOTES
Pt presents to the ED by EMS from home with c/c fall. Pt reports she was trying to pivot from bed to wheelchair when she fell. Pt struck head. Denies LOC, No blood thinners. Pt has small laceration to top of head. Bleeding controlled. Rates pain 4/10 in head. EKG completed.

## 2023-07-31 NOTE — ED NOTES
Pt resting on cot. Vitals stable. Pain managed.  Call light in reach     Mckayla Madison, RN  07/31/23 8402

## 2023-07-31 NOTE — ED NOTES
Pt able to stand/pivot to wheelchair with minimal assistance, states that she feels safe going home. Provider notified.       Myra Castro RN  07/31/23 5610

## 2023-08-04 LAB
EKG ATRIAL RATE: 69 BPM
EKG P AXIS: 42 DEGREES
EKG P-R INTERVAL: 158 MS
EKG Q-T INTERVAL: 456 MS
EKG QRS DURATION: 116 MS
EKG QTC CALCULATION (BAZETT): 488 MS
EKG R AXIS: -26 DEGREES
EKG T AXIS: 55 DEGREES
EKG VENTRICULAR RATE: 69 BPM

## 2023-08-08 ENCOUNTER — APPOINTMENT (OUTPATIENT)
Dept: GENERAL RADIOLOGY | Age: 73
End: 2023-08-08
Payer: MEDICARE

## 2023-08-08 ENCOUNTER — APPOINTMENT (OUTPATIENT)
Dept: CT IMAGING | Age: 73
End: 2023-08-08
Payer: MEDICARE

## 2023-08-08 ENCOUNTER — HOSPITAL ENCOUNTER (INPATIENT)
Age: 73
LOS: 3 days | Discharge: HOME HEALTH | End: 2023-08-11
Attending: EMERGENCY MEDICINE | Admitting: SURGERY
Payer: MEDICARE

## 2023-08-08 DIAGNOSIS — N17.9 AKI (ACUTE KIDNEY INJURY) (HCC): ICD-10-CM

## 2023-08-08 DIAGNOSIS — B02.8 HERPES ZOSTER WITH COMPLICATION: ICD-10-CM

## 2023-08-08 DIAGNOSIS — B02.23 SHINGLES (HERPES ZOSTER) POLYNEUROPATHY: ICD-10-CM

## 2023-08-08 DIAGNOSIS — S22.42XA CLOSED FRACTURE OF MULTIPLE RIBS OF LEFT SIDE, INITIAL ENCOUNTER: Primary | ICD-10-CM

## 2023-08-08 LAB
ANION GAP SERPL CALC-SCNC: 12 MEQ/L (ref 8–16)
BACTERIA URNS QL MICRO: ABNORMAL /HPF
BASOPHILS ABSOLUTE: 0.1 THOU/MM3 (ref 0–0.1)
BASOPHILS NFR BLD AUTO: 0.7 %
BILIRUB UR QL STRIP.AUTO: NEGATIVE
BUN SERPL-MCNC: 32 MG/DL (ref 7–22)
CALCIUM SERPL-MCNC: 9.1 MG/DL (ref 8.5–10.5)
CASTS #/AREA URNS LPF: ABNORMAL /LPF
CASTS 2: ABNORMAL /LPF
CHARACTER UR: CLEAR
CHLORIDE SERPL-SCNC: 104 MEQ/L (ref 98–111)
CO2 SERPL-SCNC: 24 MEQ/L (ref 23–33)
COLOR: YELLOW
CREAT SERPL-MCNC: 1.4 MG/DL (ref 0.4–1.2)
CRYSTALS URNS MICRO: ABNORMAL
DEPRECATED RDW RBC AUTO: 50.3 FL (ref 35–45)
EOSINOPHIL NFR BLD AUTO: 5.6 %
EOSINOPHILS ABSOLUTE: 0.4 THOU/MM3 (ref 0–0.4)
EPITHELIAL CELLS, UA: ABNORMAL /HPF
ERYTHROCYTE [DISTWIDTH] IN BLOOD BY AUTOMATED COUNT: 14.5 % (ref 11.5–14.5)
GFR SERPL CREATININE-BSD FRML MDRD: 39 ML/MIN/1.73M2
GLUCOSE BLD STRIP.AUTO-MCNC: 113 MG/DL (ref 70–108)
GLUCOSE BLD STRIP.AUTO-MCNC: 79 MG/DL (ref 70–108)
GLUCOSE SERPL-MCNC: 105 MG/DL (ref 70–108)
GLUCOSE UR QL STRIP.AUTO: NEGATIVE MG/DL
HCT VFR BLD AUTO: 43.3 % (ref 37–47)
HGB BLD-MCNC: 13.2 GM/DL (ref 12–16)
HGB UR QL STRIP.AUTO: NEGATIVE
IMM GRANULOCYTES # BLD AUTO: 0.02 THOU/MM3 (ref 0–0.07)
IMM GRANULOCYTES NFR BLD AUTO: 0.3 %
KETONES UR QL STRIP.AUTO: NEGATIVE
LYMPHOCYTES ABSOLUTE: 1.7 THOU/MM3 (ref 1–4.8)
LYMPHOCYTES NFR BLD AUTO: 23.5 %
MCH RBC QN AUTO: 29 PG (ref 26–33)
MCHC RBC AUTO-ENTMCNC: 30.5 GM/DL (ref 32.2–35.5)
MCV RBC AUTO: 95.2 FL (ref 81–99)
MISCELLANEOUS 2: ABNORMAL
MONOCYTES ABSOLUTE: 0.8 THOU/MM3 (ref 0.4–1.3)
MONOCYTES NFR BLD AUTO: 10.6 %
NEUTROPHILS NFR BLD AUTO: 59.3 %
NITRITE UR QL STRIP: POSITIVE
NRBC BLD AUTO-RTO: 0 /100 WBC
NT-PROBNP SERPL IA-MCNC: 337.9 PG/ML (ref 0–124)
OSMOLALITY SERPL CALC.SUM OF ELEC: 286.7 MOSMOL/KG (ref 275–300)
PH UR STRIP.AUTO: 5.5 [PH] (ref 5–9)
PLATELET # BLD AUTO: 222 THOU/MM3 (ref 130–400)
PMV BLD AUTO: 11 FL (ref 9.4–12.4)
POTASSIUM SERPL-SCNC: 4.8 MEQ/L (ref 3.5–5.2)
PROT UR STRIP.AUTO-MCNC: NEGATIVE MG/DL
RBC # BLD AUTO: 4.55 MILL/MM3 (ref 4.2–5.4)
RBC URINE: ABNORMAL /HPF
RENAL EPI CELLS #/AREA URNS HPF: ABNORMAL /[HPF]
SEGMENTED NEUTROPHILS ABSOLUTE COUNT: 4.4 THOU/MM3 (ref 1.8–7.7)
SODIUM SERPL-SCNC: 140 MEQ/L (ref 135–145)
SP GR UR REFRACT.AUTO: 1.01 (ref 1–1.03)
TROPONIN, HIGH SENSITIVITY: 11 NG/L (ref 0–12)
UROBILINOGEN, URINE: 0.2 EU/DL (ref 0–1)
WBC # BLD AUTO: 7.4 THOU/MM3 (ref 4.8–10.8)
WBC #/AREA URNS HPF: ABNORMAL /HPF
WBC #/AREA URNS HPF: NEGATIVE /[HPF]
YEAST LIKE FUNGI URNS QL MICRO: ABNORMAL

## 2023-08-08 PROCEDURE — 83880 ASSAY OF NATRIURETIC PEPTIDE: CPT

## 2023-08-08 PROCEDURE — 72125 CT NECK SPINE W/O DYE: CPT

## 2023-08-08 PROCEDURE — 80048 BASIC METABOLIC PNL TOTAL CA: CPT

## 2023-08-08 PROCEDURE — 2500000003 HC RX 250 WO HCPCS: Performed by: NURSE PRACTITIONER

## 2023-08-08 PROCEDURE — 84484 ASSAY OF TROPONIN QUANT: CPT

## 2023-08-08 PROCEDURE — 6820000001 HC L2 TRAUMA SURGERY EVALUATION: Performed by: SURGERY

## 2023-08-08 PROCEDURE — 99222 1ST HOSP IP/OBS MODERATE 55: CPT | Performed by: SURGERY

## 2023-08-08 PROCEDURE — 2580000003 HC RX 258: Performed by: OCCUPATIONAL THERAPIST

## 2023-08-08 PROCEDURE — 93005 ELECTROCARDIOGRAM TRACING: CPT | Performed by: STUDENT IN AN ORGANIZED HEALTH CARE EDUCATION/TRAINING PROGRAM

## 2023-08-08 PROCEDURE — 6360000002 HC RX W HCPCS: Performed by: STUDENT IN AN ORGANIZED HEALTH CARE EDUCATION/TRAINING PROGRAM

## 2023-08-08 PROCEDURE — 71101 X-RAY EXAM UNILAT RIBS/CHEST: CPT

## 2023-08-08 PROCEDURE — 6360000002 HC RX W HCPCS: Performed by: OCCUPATIONAL THERAPIST

## 2023-08-08 PROCEDURE — 96372 THER/PROPH/DIAG INJ SC/IM: CPT

## 2023-08-08 PROCEDURE — 71260 CT THORAX DX C+: CPT

## 2023-08-08 PROCEDURE — 93010 ELECTROCARDIOGRAM REPORT: CPT | Performed by: INTERNAL MEDICINE

## 2023-08-08 PROCEDURE — 99223 1ST HOSP IP/OBS HIGH 75: CPT | Performed by: NURSE PRACTITIONER

## 2023-08-08 PROCEDURE — 6370000000 HC RX 637 (ALT 250 FOR IP): Performed by: NURSE PRACTITIONER

## 2023-08-08 PROCEDURE — 2700000000 HC OXYGEN THERAPY PER DAY

## 2023-08-08 PROCEDURE — 94640 AIRWAY INHALATION TREATMENT: CPT

## 2023-08-08 PROCEDURE — 6360000004 HC RX CONTRAST MEDICATION: Performed by: NURSE PRACTITIONER

## 2023-08-08 PROCEDURE — 6360000002 HC RX W HCPCS: Performed by: NURSE PRACTITIONER

## 2023-08-08 PROCEDURE — 1200000000 HC SEMI PRIVATE

## 2023-08-08 PROCEDURE — 6370000000 HC RX 637 (ALT 250 FOR IP): Performed by: OCCUPATIONAL THERAPIST

## 2023-08-08 PROCEDURE — 36415 COLL VENOUS BLD VENIPUNCTURE: CPT

## 2023-08-08 PROCEDURE — 99285 EMERGENCY DEPT VISIT HI MDM: CPT

## 2023-08-08 PROCEDURE — 85025 COMPLETE CBC W/AUTO DIFF WBC: CPT

## 2023-08-08 PROCEDURE — 81001 URINALYSIS AUTO W/SCOPE: CPT

## 2023-08-08 PROCEDURE — 1200000003 HC TELEMETRY R&B

## 2023-08-08 PROCEDURE — 2580000003 HC RX 258: Performed by: STUDENT IN AN ORGANIZED HEALTH CARE EDUCATION/TRAINING PROGRAM

## 2023-08-08 PROCEDURE — 96374 THER/PROPH/DIAG INJ IV PUSH: CPT

## 2023-08-08 PROCEDURE — 82948 REAGENT STRIP/BLOOD GLUCOSE: CPT

## 2023-08-08 PROCEDURE — 70450 CT HEAD/BRAIN W/O DYE: CPT

## 2023-08-08 PROCEDURE — 6370000000 HC RX 637 (ALT 250 FOR IP): Performed by: STUDENT IN AN ORGANIZED HEALTH CARE EDUCATION/TRAINING PROGRAM

## 2023-08-08 PROCEDURE — 73562 X-RAY EXAM OF KNEE 3: CPT

## 2023-08-08 PROCEDURE — APPNB60 APP NON BILLABLE TIME 46-60 MINS: Performed by: OCCUPATIONAL THERAPIST

## 2023-08-08 PROCEDURE — 94761 N-INVAS EAR/PLS OXIMETRY MLT: CPT

## 2023-08-08 RX ORDER — FUROSEMIDE 20 MG/1
20 TABLET ORAL DAILY
Status: DISCONTINUED | OUTPATIENT
Start: 2023-08-08 | End: 2023-08-11 | Stop reason: HOSPADM

## 2023-08-08 RX ORDER — LIDOCAINE 4 G/G
3 PATCH TOPICAL DAILY
Status: DISCONTINUED | OUTPATIENT
Start: 2023-08-08 | End: 2023-08-11 | Stop reason: HOSPADM

## 2023-08-08 RX ORDER — MONTELUKAST SODIUM 10 MG/1
10 TABLET ORAL NIGHTLY
Status: DISCONTINUED | OUTPATIENT
Start: 2023-08-08 | End: 2023-08-11 | Stop reason: HOSPADM

## 2023-08-08 RX ORDER — LIDOCAINE 4 G/G
1 PATCH TOPICAL ONCE
Status: COMPLETED | OUTPATIENT
Start: 2023-08-08 | End: 2023-08-08

## 2023-08-08 RX ORDER — BUPROPION HYDROCHLORIDE 150 MG/1
150 TABLET ORAL EVERY MORNING
Status: DISCONTINUED | OUTPATIENT
Start: 2023-08-09 | End: 2023-08-11 | Stop reason: HOSPADM

## 2023-08-08 RX ORDER — SODIUM CHLORIDE 9 MG/ML
INJECTION, SOLUTION INTRAVENOUS CONTINUOUS
Status: DISCONTINUED | OUTPATIENT
Start: 2023-08-08 | End: 2023-08-10

## 2023-08-08 RX ORDER — FAMOTIDINE 20 MG/1
20 TABLET, FILM COATED ORAL DAILY
Status: DISCONTINUED | OUTPATIENT
Start: 2023-08-08 | End: 2023-08-11 | Stop reason: HOSPADM

## 2023-08-08 RX ORDER — ALOGLIPTIN 25 MG/1
25 TABLET, FILM COATED ORAL DAILY
Status: DISCONTINUED | OUTPATIENT
Start: 2023-08-08 | End: 2023-08-11 | Stop reason: HOSPADM

## 2023-08-08 RX ORDER — KETOROLAC TROMETHAMINE 30 MG/ML
15 INJECTION, SOLUTION INTRAMUSCULAR; INTRAVENOUS ONCE
Status: COMPLETED | OUTPATIENT
Start: 2023-08-08 | End: 2023-08-08

## 2023-08-08 RX ORDER — SPIRONOLACTONE 25 MG/1
25 TABLET ORAL DAILY
Status: DISCONTINUED | OUTPATIENT
Start: 2023-08-08 | End: 2023-08-11 | Stop reason: HOSPADM

## 2023-08-08 RX ORDER — SENNOSIDES A AND B 8.6 MG/1
1 TABLET, FILM COATED ORAL DAILY PRN
Status: DISCONTINUED | OUTPATIENT
Start: 2023-08-08 | End: 2023-08-11 | Stop reason: HOSPADM

## 2023-08-08 RX ORDER — GABAPENTIN 300 MG/1
300 CAPSULE ORAL 2 TIMES DAILY
Status: DISCONTINUED | OUTPATIENT
Start: 2023-08-08 | End: 2023-08-11 | Stop reason: HOSPADM

## 2023-08-08 RX ORDER — ONDANSETRON 4 MG/1
4 TABLET, ORALLY DISINTEGRATING ORAL EVERY 8 HOURS PRN
Status: DISCONTINUED | OUTPATIENT
Start: 2023-08-08 | End: 2023-08-11 | Stop reason: HOSPADM

## 2023-08-08 RX ORDER — ALBUTEROL SULFATE 2.5 MG/3ML
2.5 SOLUTION RESPIRATORY (INHALATION) EVERY 4 HOURS PRN
Status: DISCONTINUED | OUTPATIENT
Start: 2023-08-08 | End: 2023-08-11 | Stop reason: HOSPADM

## 2023-08-08 RX ORDER — SODIUM CHLORIDE 0.9 % (FLUSH) 0.9 %
5-40 SYRINGE (ML) INJECTION PRN
Status: DISCONTINUED | OUTPATIENT
Start: 2023-08-08 | End: 2023-08-11 | Stop reason: HOSPADM

## 2023-08-08 RX ORDER — ASPIRIN 81 MG/1
81 TABLET ORAL DAILY
Status: DISCONTINUED | OUTPATIENT
Start: 2023-08-08 | End: 2023-08-11 | Stop reason: HOSPADM

## 2023-08-08 RX ORDER — KETOROLAC TROMETHAMINE 30 MG/ML
15 INJECTION, SOLUTION INTRAMUSCULAR; INTRAVENOUS ONCE
Status: DISCONTINUED | OUTPATIENT
Start: 2023-08-08 | End: 2023-08-08

## 2023-08-08 RX ORDER — TIZANIDINE 4 MG/1
4 TABLET ORAL EVERY 6 HOURS PRN
Status: DISCONTINUED | OUTPATIENT
Start: 2023-08-08 | End: 2023-08-11 | Stop reason: HOSPADM

## 2023-08-08 RX ORDER — METOPROLOL SUCCINATE 25 MG/1
25 TABLET, EXTENDED RELEASE ORAL DAILY
Status: DISCONTINUED | OUTPATIENT
Start: 2023-08-08 | End: 2023-08-11 | Stop reason: HOSPADM

## 2023-08-08 RX ORDER — 0.9 % SODIUM CHLORIDE 0.9 %
1000 INTRAVENOUS SOLUTION INTRAVENOUS ONCE
Status: COMPLETED | OUTPATIENT
Start: 2023-08-08 | End: 2023-08-08

## 2023-08-08 RX ORDER — SODIUM CHLORIDE 0.9 % (FLUSH) 0.9 %
5-40 SYRINGE (ML) INJECTION EVERY 12 HOURS SCHEDULED
Status: DISCONTINUED | OUTPATIENT
Start: 2023-08-08 | End: 2023-08-11 | Stop reason: HOSPADM

## 2023-08-08 RX ORDER — INSULIN LISPRO 100 [IU]/ML
0-8 INJECTION, SOLUTION INTRAVENOUS; SUBCUTANEOUS
Status: DISCONTINUED | OUTPATIENT
Start: 2023-08-08 | End: 2023-08-11 | Stop reason: HOSPADM

## 2023-08-08 RX ORDER — SODIUM CHLORIDE 9 MG/ML
INJECTION, SOLUTION INTRAVENOUS PRN
Status: DISCONTINUED | OUTPATIENT
Start: 2023-08-08 | End: 2023-08-11 | Stop reason: HOSPADM

## 2023-08-08 RX ORDER — INSULIN LISPRO 100 [IU]/ML
0-4 INJECTION, SOLUTION INTRAVENOUS; SUBCUTANEOUS NIGHTLY
Status: DISCONTINUED | OUTPATIENT
Start: 2023-08-08 | End: 2023-08-11 | Stop reason: HOSPADM

## 2023-08-08 RX ORDER — ATORVASTATIN CALCIUM 20 MG/1
20 TABLET, FILM COATED ORAL NIGHTLY
Status: DISCONTINUED | OUTPATIENT
Start: 2023-08-08 | End: 2023-08-11 | Stop reason: HOSPADM

## 2023-08-08 RX ORDER — HYDROCODONE BITARTRATE AND ACETAMINOPHEN 5; 325 MG/1; MG/1
2 TABLET ORAL EVERY 6 HOURS PRN
Status: DISCONTINUED | OUTPATIENT
Start: 2023-08-08 | End: 2023-08-11 | Stop reason: HOSPADM

## 2023-08-08 RX ORDER — CITALOPRAM 40 MG/1
40 TABLET ORAL EVERY MORNING
Status: DISCONTINUED | OUTPATIENT
Start: 2023-08-09 | End: 2023-08-11 | Stop reason: HOSPADM

## 2023-08-08 RX ORDER — QUETIAPINE FUMARATE 25 MG/1
50 TABLET, FILM COATED ORAL NIGHTLY
Status: DISCONTINUED | OUTPATIENT
Start: 2023-08-08 | End: 2023-08-11 | Stop reason: HOSPADM

## 2023-08-08 RX ORDER — HYDROCODONE BITARTRATE AND ACETAMINOPHEN 5; 325 MG/1; MG/1
1 TABLET ORAL EVERY 6 HOURS PRN
Status: DISCONTINUED | OUTPATIENT
Start: 2023-08-08 | End: 2023-08-11 | Stop reason: HOSPADM

## 2023-08-08 RX ORDER — MORPHINE SULFATE 4 MG/ML
4 INJECTION, SOLUTION INTRAMUSCULAR; INTRAVENOUS ONCE
Status: COMPLETED | OUTPATIENT
Start: 2023-08-08 | End: 2023-08-08

## 2023-08-08 RX ORDER — POLYETHYLENE GLYCOL 3350 17 G/17G
17 POWDER, FOR SOLUTION ORAL DAILY
Status: DISCONTINUED | OUTPATIENT
Start: 2023-08-08 | End: 2023-08-11 | Stop reason: HOSPADM

## 2023-08-08 RX ORDER — ALBUTEROL SULFATE 2.5 MG/3ML
2.5 SOLUTION RESPIRATORY (INHALATION)
Status: DISCONTINUED | OUTPATIENT
Start: 2023-08-09 | End: 2023-08-11 | Stop reason: HOSPADM

## 2023-08-08 RX ORDER — ACETAMINOPHEN 325 MG/1
650 TABLET ORAL EVERY 4 HOURS PRN
Status: DISCONTINUED | OUTPATIENT
Start: 2023-08-08 | End: 2023-08-11 | Stop reason: HOSPADM

## 2023-08-08 RX ORDER — LANOLIN ALCOHOL/MO/W.PET/CERES
400 CREAM (GRAM) TOPICAL DAILY
Status: DISCONTINUED | OUTPATIENT
Start: 2023-08-08 | End: 2023-08-11 | Stop reason: HOSPADM

## 2023-08-08 RX ORDER — BUDESONIDE 0.5 MG/2ML
500 INHALANT ORAL 2 TIMES DAILY
Status: DISCONTINUED | OUTPATIENT
Start: 2023-08-08 | End: 2023-08-11 | Stop reason: HOSPADM

## 2023-08-08 RX ORDER — ONDANSETRON 2 MG/ML
4 INJECTION INTRAMUSCULAR; INTRAVENOUS EVERY 6 HOURS PRN
Status: DISCONTINUED | OUTPATIENT
Start: 2023-08-08 | End: 2023-08-11 | Stop reason: HOSPADM

## 2023-08-08 RX ORDER — BISACODYL 10 MG
10 SUPPOSITORY, RECTAL RECTAL DAILY
Status: DISCONTINUED | OUTPATIENT
Start: 2023-08-08 | End: 2023-08-11 | Stop reason: HOSPADM

## 2023-08-08 RX ORDER — DEXTROSE MONOHYDRATE 100 MG/ML
INJECTION, SOLUTION INTRAVENOUS CONTINUOUS PRN
Status: DISCONTINUED | OUTPATIENT
Start: 2023-08-08 | End: 2023-08-11 | Stop reason: HOSPADM

## 2023-08-08 RX ADMIN — IOPAMIDOL 80 ML: 755 INJECTION, SOLUTION INTRAVENOUS at 18:35

## 2023-08-08 RX ADMIN — BUDESONIDE 500 MCG: 0.5 INHALANT RESPIRATORY (INHALATION) at 22:07

## 2023-08-08 RX ADMIN — MONTELUKAST SODIUM 10 MG: 10 TABLET ORAL at 22:06

## 2023-08-08 RX ADMIN — ONDANSETRON 4 MG: 2 INJECTION INTRAMUSCULAR; INTRAVENOUS at 17:55

## 2023-08-08 RX ADMIN — MORPHINE SULFATE 4 MG: 4 INJECTION, SOLUTION INTRAMUSCULAR; INTRAVENOUS at 12:47

## 2023-08-08 RX ADMIN — SODIUM CHLORIDE: 9 INJECTION, SOLUTION INTRAVENOUS at 16:58

## 2023-08-08 RX ADMIN — ATORVASTATIN CALCIUM 20 MG: 20 TABLET, FILM COATED ORAL at 22:06

## 2023-08-08 RX ADMIN — METOPROLOL SUCCINATE 25 MG: 25 TABLET, EXTENDED RELEASE ORAL at 22:09

## 2023-08-08 RX ADMIN — GABAPENTIN 300 MG: 300 CAPSULE ORAL at 22:06

## 2023-08-08 RX ADMIN — ASPIRIN 81 MG: 81 TABLET, COATED ORAL at 19:14

## 2023-08-08 RX ADMIN — MAGNESIUM OXIDE 400 MG (241.3 MG MAGNESIUM) TABLET 400 MG: TABLET at 22:09

## 2023-08-08 RX ADMIN — FAMOTIDINE 20 MG: 20 TABLET ORAL at 19:14

## 2023-08-08 RX ADMIN — MICONAZOLE NITRATE: 2 POWDER TOPICAL at 22:06

## 2023-08-08 RX ADMIN — SODIUM CHLORIDE 1000 ML: 9 INJECTION, SOLUTION INTRAVENOUS at 12:50

## 2023-08-08 RX ADMIN — KETOROLAC TROMETHAMINE 15 MG: 30 INJECTION, SOLUTION INTRAMUSCULAR; INTRAVENOUS at 11:14

## 2023-08-08 RX ADMIN — QUETIAPINE FUMARATE 50 MG: 25 TABLET ORAL at 22:06

## 2023-08-08 RX ADMIN — OXYBUTYNIN CHLORIDE 15 MG: 10 TABLET, EXTENDED RELEASE ORAL at 22:08

## 2023-08-08 ASSESSMENT — PAIN SCALES - GENERAL
PAINLEVEL_OUTOF10: 9
PAINLEVEL_OUTOF10: 3
PAINLEVEL_OUTOF10: 7
PAINLEVEL_OUTOF10: 0
PAINLEVEL_OUTOF10: 5
PAINLEVEL_OUTOF10: 5
PAINLEVEL_OUTOF10: 9
PAINLEVEL_OUTOF10: 9
PAINLEVEL_OUTOF10: 5

## 2023-08-08 ASSESSMENT — PAIN - FUNCTIONAL ASSESSMENT
PAIN_FUNCTIONAL_ASSESSMENT: 0-10

## 2023-08-08 ASSESSMENT — PAIN DESCRIPTION - ORIENTATION
ORIENTATION: LEFT

## 2023-08-08 ASSESSMENT — ENCOUNTER SYMPTOMS
ALLERGIC/IMMUNOLOGIC NEGATIVE: 1
CONSTIPATION: 1
BACK PAIN: 1
NAUSEA: 0
SHORTNESS OF BREATH: 0
EYES NEGATIVE: 1
COLOR CHANGE: 0
GASTROINTESTINAL NEGATIVE: 1
RESPIRATORY NEGATIVE: 1
ABDOMINAL PAIN: 0
COUGH: 0
VOMITING: 0
EYE PAIN: 0

## 2023-08-08 ASSESSMENT — PAIN DESCRIPTION - LOCATION
LOCATION: RIB CAGE
LOCATION: HIP;BACK;RIB CAGE
LOCATION: HIP;BACK;RIB CAGE
LOCATION: HIP;RIB CAGE
LOCATION: RIB CAGE;HIP

## 2023-08-08 ASSESSMENT — PAIN DESCRIPTION - DESCRIPTORS: DESCRIPTORS: SORE

## 2023-08-08 NOTE — ED NOTES
Pt. Resting in bed with even and unlabored respirations. Pt. States pain is a 6/10 at this time. Pt. Updated about plan for admission. Pt. Has no further concerns, questions or needs at this time. Call light within reach.         Skip Parson RN  08/08/23 8087

## 2023-08-08 NOTE — ED PROVIDER NOTES
[facility] [place]          Pt Name: Bel Galindo  MRN: [id number]  [address] [facility] 1950  Date of evaluation: 8/8/2023  Emergency Physician: Mercedes Cameron MD    CHIEF COMPLAINT       Chief Complaint   Patient presents with    Fall    Rib Injury     left    Hip Pain     left     History obtained from the patient. HISTORY OF PRESENT ILLNESS    HPI  Bel Galindo is a 75 y.o. female with past medical history of CAD, venous stasis dermatitis, obesity, asthma, PTSD, schizoaffective disorder who presents to the emergency department for evaluation of left-sided chest pain. Patient states that she has had recurrent falls for unknown reasons and her last fall was on 2 days ago in which she attempted to get out of her wheelchair and then fell over on to her buttocks and left side. She reports hitting her head but did not lose consciousness and does not have any complaints of headache or nausea. She denied any preceding chest pain, back pain, abdominal pain, or severe headache. She does state that it is painful to inspire deeply. She denies coughs or fevers or chills. He is not on any blood thinners. The patient has no other acute complaints at this time. REVIEW OF SYSTEMS   Review of Systems   Constitutional: Negative. HENT: Negative. Eyes: Negative. Respiratory: Negative. Cardiovascular: Negative. Gastrointestinal: Negative. Endocrine: Negative. Genitourinary: Negative. Musculoskeletal:  Positive for arthralgias. Skin: Negative. Allergic/Immunologic: Negative. Neurological: Negative. Hematological: Negative. Psychiatric/Behavioral: Negative. See HPI.  12 point ROS performed, pertinent positives listed above otherwise negative  PAST MEDICAL AND SURGICAL HISTORY     Past Medical History:   Diagnosis Date    Acid reflux     Arrhythmia     Arthritis     CAD (coronary artery disease) LVH. slight lt atrial enlargement. no obvious stenotic valves. no pericardial effusion. UPPER GASTROINTESTINAL ENDOSCOPY N/A 7/5/2023    EGD DILATION performed by Isaiah Herrera MD at [address] RIGHT Right 07/30/2019    Benign         MEDICATIONS     Current Facility-Administered Medications:     lidocaine 4 % external patch 1 patch, 1 patch, TransDERmal, Once, Drusilla Simmonds, MD, 1 patch at 08/08/23 1114    sodium chloride 0.9 % bolus 1,000 mL, 1,000 mL, IntraVENous, Once, Drusilla Simmonds, MD, Last Rate: 983.6 mL/hr at 08/08/23 1250, 1,000 mL at 08/08/23 1250    Current Outpatient Medications:     acetaminophen (TYLENOL) 500 MG tablet, Take 1 tablet by mouth 4 times daily as needed for Pain, Disp: 120 tablet, Rfl: 0    lidocaine (XYLOCAINE) 5 % ointment, Apply topically as needed. , Disp: 50 g, Rfl: 2    gabapentin (NEURONTIN) 800 MG tablet, Take 1 tablet by mouth 4 times daily. , Disp: , Rfl:     meloxicam (MOBIC) 15 MG tablet, Take 1 tablet by mouth daily with food, Disp: , Rfl:     bisacodyl (DULCOLAX) 5 MG EC tablet, See Prep Instructions, Disp: 4 tablet, Rfl: 0    polyethylene glycol (GLYCOLAX) 17 GM/SCOOP powder, Dispense 238 Gram Bottle.   Use as Directed, Disp: 238 g, Rfl: 0    metoprolol succinate (TOPROL XL) 25 MG extended release tablet, take 1 tablet by mouth once daily, Disp: 90 tablet, Rfl: 3    loratadine (CLARITIN) 10 MG tablet, take 1 tablet by mouth once daily, Disp: 30 tablet, Rfl: 11    montelukast (SINGULAIR) 10 MG tablet, take 1 tablet by mouth once daily nightly, Disp: 90 tablet, Rfl: 3    D-Mannose 500 MG CAPS, Take 500 mg by mouth in the morning, at noon, and at bedtime, Disp: 90 capsule, Rfl:     fexofenadine (ALLEGRA) 60 MG tablet, Take 1 tablet by mouth daily, Disp: , Rfl:     fluticasone 100 MCG/ACT AEPB, Inhale 1 puff into the lungs daily (Patient not taking: Reported on 6/13/2023), Disp: , Rfl:     lidocaine 4 % external patch,

## 2023-08-08 NOTE — ED NOTES
Pt. Resting in bed with even and unlabored respirations. Pt. States pain is a 9/10 and medicated per mar at this time. Pt. Updated about plan for x-ray and ct scan. External catheter placed at this time. Pt. Has no further concerns, questions or needs at this time. Call light within reach.         Brennan Dobson RN  08/08/23 7493

## 2023-08-08 NOTE — ED NOTES
Pt arrives to the ED for falls that have been occurring for the last 1-2 weeks. Pt states she has had several falls over the last few weeks with the most recent one being this morning. Pt states she fell getting out of her wheelchair this morning landing on her bottom. Pt states she fell yesterday and the ambulance was called for lift assist however the pt states she was not having any pain and decided against going to the hospital. Pt states she started having left rib and hip pain last night. Pt rates her pain a 9/10. Pt respirations are unlabored. Pt VSS.       Nuha Rudolph, GUILLE  08/08/23 1038

## 2023-08-08 NOTE — CONSULTS
fall. COMPARISON: CT scan of the cervical spine dated 7/31/2023. Marcell [facility] TECHNIQUE: 3 mm noncontrast axial images were obtained through the cervical spine with sagittal and coronal reconstructions. All CT scans at this facility use dose modulation, iterative reconstruction, and/or weight-based dosing when appropriate to reduce radiation dose to as low as reasonably achievable. FINDINGS: The cervical vertebral bodies are normally aligned. There is no fracture. There is no prevertebral soft tissue swelling. There are degenerative changes especially at C3-4, C4-5 and C5-6. Marcell [place] No suspicious osseous lesions are present. At C1-2 there is normal alignment of the dens relative to anterior arch of C1. At C2-3, C3-4, C4-5, C5-6, C6-7 and C7-T1 there is no disc herniation, canal or foraminal stenosis. There is slight heterogeneity involving the right and left lobes of the thyroid gland. .  There are no suspicious findings in the lung apices. 1. Stable CT scan of the cervical spine, no interval change since previous study dated 7/31/2022, no acute fracture noted. . **This report has been created using voice recognition software. It may contain minor errors which are inherent in voice recognition technology. ** Final report electronically signed by DR Cha Engel on 8/8/2023 11:53 AM        EKG: Sinus bradycardia  Nonspecific intraventricular conduction delay  Minimal voltage criteria for LVH, may be normal variant ( Jonathan product )  Borderline ECG  When compared with ECG of 31-JUL-2023 00:42,  No significant change was found  Confirmed by [facility] MD, [address] [facility] ([id number]) on 8/8/2023 11:45:10 AM      THANK YOU FOR THE CONSULT    Electronically signed by CATE Pendleton CNP on 8/8/2023 at 2:56 PM

## 2023-08-08 NOTE — H&P
No      Total time spent in care of patient:  60 minutes collectively between subjective/objective examination, chart review, documentation, clinical reasoning and discussion with attending regarding plan/interval changes. Electronically signed by Ayse Callejas PA-C on 8/8/2023 at 1:32 PM       Patient seen and examined independently by me 8/8/2023     I personally supervised the PA/NP in the evaluation, management and development of the treatment plan for Maurizio Vallejo  on the same date of service as above. I personally interviewed Maurizio Vallejo   and  discussed his review of symptoms as able due to the patient's condition, as well as performed an individual physical exam on the same   date of service as above. In addition I discussed the patient's condition and treatment options with the patient, if able, and/or designated family if available. I have also reviewed and agree with the past medical,  family and social history updates as well as care plans unless otherwise noted below. All questions were answered. I examined independently and reviewed relevant data myself and may have done so in the context of team rounds. A full chart review was performed by me. I attest that this medical record entry accurately reflects signatures and notations that I made in my capacity as an M. D. when I treated and diagnosed Maurizio Vallejo on the date of service above     I was responsible for all medical decision making involving this encounter. I identified and/or confirmed all problems associated with this patient encounter by my own direct physical examination of this patient and review of all radiology studies and labwork  that were ordered and available. Active Hospital Problems    Diagnosis     Multiple closed fractures of ribs of left side [S22.42XA]         I  discussed the management of all of the identified problems with the APN or PA.       I formulated the treatment plan for all identified problems and discussed those with the APN or PA . This management plan was then carried out and the patient's orders for care by the APN or PA. Total time personally spent on this patient encounter was 65 minutes which includes :  Preparing to see the patient( reviewing tests and chart)  Obtaining and reviewing separately obtained history  Performing a medically appropriate examination and evaluation  Ordering medications, tests, or procedures  Counseling and educating the patient/family/caregiver  Care coordination  Referring and communicating with other healthcare professionals  Documenting clinical information in the EHR  Independent interpretation of results and communicating the results to patient and care team  This includes a direct physical exam as well as all the other encounter activities described above. Time may be discontiguous. Time does not include procedures. Please see our orders that were directed and approved by me if there are any new ones for the updated patient care plan. Above discussed and I agree with documentation and orders placed by Alecia SALAS    See any additional comments if needed below for any other updated orders and plans. -- Admit for pain control. Rib fracture protocol. A.m. labs. Incentive spirometer. PT/OT. CT chest.  X-ray knee. SCDs for DVT prophylaxis. Advance diet. Hospitalist consultation.     Electronically signed by Lex Arreguin MD on 8/8/23 at 6:50 PM EDT

## 2023-08-08 NOTE — ED NOTES
ED to inpatient nurses report    Chief Complaint   Patient presents with    Fall    Rib Injury     left    Hip Pain     left      Present to ED from home  LOC: alert and orientated to name, place, date  Vital signs   Vitals:    08/08/23 1214 08/08/23 1254 08/08/23 1355 08/08/23 1500   BP: (!) 131/113 103/83 102/76 (!) 120/103   Pulse: 62 68 57 62   Resp: 15 16 17 13   Temp:       SpO2: 95% 95% 95% 96%   Weight:       Height:          Oxygen Baseline room air     Current needs required none Bipap/Cpap No  LDAs:   Peripheral IV 08/08/23 Left Forearm (Active)   Site Assessment Clean, dry & intact 08/08/23 1255   Line Status Infusing 08/08/23 1255   Phlebitis Assessment No symptoms 08/08/23 1255   Infiltration Assessment 0 08/08/23 1255   Dressing Status Clean, dry & intact 08/08/23 1255     Mobility: Requires assistance * 1  Pending ED orders: none   Present condition: Pt resting in bed with unlabored respirations. VSS. Pt has external catheter in place. Received toradol, morphine and fluids in ED.  Has broken ribs   Person of contract, phone number   Our promise was given to patient    C-SSRS    Swallow Screening    Preferred Language: Varsha     Electronically signed by Domenico Sosa RN on 8/8/2023 at 3:33 PM       Domenico Sosa RN  08/08/23 4802

## 2023-08-08 NOTE — PROGRESS NOTES
Pt admitted to  7K7 via cart/stretcher. Complaints: fall    IV normal saline infusing into the forearm left, condition patent and no redness at a rate of 125 mls/ hour with about 100 mls in the bag still. IV site free of s/s of infection or infiltration. Vital signs obtained. Assessment and data collection initiated. Two nurse skin assessment performed by Sully RN and Hannah RN. Oriented to room. Policies and procedures for 7K explained. All questions answered with no further questions at this time. Fall prevention and safety brochure discussed with patient. Bed alarm on. Call light in reach.

## 2023-08-08 NOTE — ED NOTES
Pt. Resting in bed with even and unlabored respirations. Pt. States pain is a 7/10 at this time. Pt. Updated about plan of care and treatment. Pt. Has no further concerns, questions or needs at this time. Call light within reach.         Rosmery South RN  08/08/23 8877

## 2023-08-08 NOTE — ED NOTES
Pt. Resting in bed with even and unlabored respirations. Pt. States pain is a 5/10 and medicated per mar at this time. Pt. Updated about plan for admission. Family at bedside. Pt. Has no further concerns, questions or needs at this time. Call light within reach.         Daisy Landaverde RN  08/08/23 0506

## 2023-08-08 NOTE — ED NOTES
Pt transported to [facility] on cart in stable condition. Floor contacted before transport and spoke with Hussein Cross.      Ethan Buys  08/08/23 1246

## 2023-08-08 NOTE — ED NOTES
Pt. Resting in bed with even and unlabored respirations. Pt. States pain is a 6/10 at this time. Pt. Updated about plan of care and treatment. Family at bedside. Pt. Has no further concerns, questions or needs at this time. Call light within reach.         Shanon John RN  08/08/23 9953

## 2023-08-09 LAB
ANION GAP SERPL CALC-SCNC: 9 MEQ/L (ref 8–16)
BASOPHILS ABSOLUTE: 0 THOU/MM3 (ref 0–0.1)
BASOPHILS NFR BLD AUTO: 0.7 %
BUN SERPL-MCNC: 23 MG/DL (ref 7–22)
CALCIUM SERPL-MCNC: 8.1 MG/DL (ref 8.5–10.5)
CHLORIDE SERPL-SCNC: 109 MEQ/L (ref 98–111)
CO2 SERPL-SCNC: 23 MEQ/L (ref 23–33)
CREAT SERPL-MCNC: 0.8 MG/DL (ref 0.4–1.2)
DEPRECATED RDW RBC AUTO: 46.2 FL (ref 35–45)
EOSINOPHIL NFR BLD AUTO: 7 %
EOSINOPHILS ABSOLUTE: 0.4 THOU/MM3 (ref 0–0.4)
ERYTHROCYTE [DISTWIDTH] IN BLOOD BY AUTOMATED COUNT: 14.4 % (ref 11.5–14.5)
GFR SERPL CREATININE-BSD FRML MDRD: > 60 ML/MIN/1.73M2
GLUCOSE BLD STRIP.AUTO-MCNC: 134 MG/DL (ref 70–108)
GLUCOSE BLD STRIP.AUTO-MCNC: 139 MG/DL (ref 70–108)
GLUCOSE BLD STRIP.AUTO-MCNC: 146 MG/DL (ref 70–108)
GLUCOSE BLD STRIP.AUTO-MCNC: 84 MG/DL (ref 70–108)
GLUCOSE SERPL-MCNC: 99 MG/DL (ref 70–108)
HCT VFR BLD AUTO: 33.6 % (ref 37–47)
HGB BLD-MCNC: 10.8 GM/DL (ref 12–16)
IMM GRANULOCYTES # BLD AUTO: 0.02 THOU/MM3 (ref 0–0.07)
IMM GRANULOCYTES NFR BLD AUTO: 0.3 %
LYMPHOCYTES ABSOLUTE: 2.1 THOU/MM3 (ref 1–4.8)
LYMPHOCYTES NFR BLD AUTO: 36 %
MCH RBC QN AUTO: 28.5 PG (ref 26–33)
MCHC RBC AUTO-ENTMCNC: 32.1 GM/DL (ref 32.2–35.5)
MCV RBC AUTO: 88.7 FL (ref 81–99)
MONOCYTES ABSOLUTE: 0.9 THOU/MM3 (ref 0.4–1.3)
MONOCYTES NFR BLD AUTO: 15.3 %
NEUTROPHILS NFR BLD AUTO: 40.7 %
NRBC BLD AUTO-RTO: 0 /100 WBC
PLATELET # BLD AUTO: 182 THOU/MM3 (ref 130–400)
PMV BLD AUTO: 11.4 FL (ref 9.4–12.4)
POTASSIUM SERPL-SCNC: 4.7 MEQ/L (ref 3.5–5.2)
RBC # BLD AUTO: 3.79 MILL/MM3 (ref 4.2–5.4)
SEGMENTED NEUTROPHILS ABSOLUTE COUNT: 2.4 THOU/MM3 (ref 1.8–7.7)
SODIUM SERPL-SCNC: 141 MEQ/L (ref 135–145)
WBC # BLD AUTO: 5.8 THOU/MM3 (ref 4.8–10.8)

## 2023-08-09 PROCEDURE — 1200000000 HC SEMI PRIVATE

## 2023-08-09 PROCEDURE — 94669 MECHANICAL CHEST WALL OSCILL: CPT

## 2023-08-09 PROCEDURE — 94799 UNLISTED PULMONARY SVC/PX: CPT

## 2023-08-09 PROCEDURE — 36415 COLL VENOUS BLD VENIPUNCTURE: CPT

## 2023-08-09 PROCEDURE — 6360000002 HC RX W HCPCS: Performed by: NURSE PRACTITIONER

## 2023-08-09 PROCEDURE — 85025 COMPLETE CBC W/AUTO DIFF WBC: CPT

## 2023-08-09 PROCEDURE — 2580000003 HC RX 258: Performed by: OCCUPATIONAL THERAPIST

## 2023-08-09 PROCEDURE — 6370000000 HC RX 637 (ALT 250 FOR IP): Performed by: OCCUPATIONAL THERAPIST

## 2023-08-09 PROCEDURE — 94640 AIRWAY INHALATION TREATMENT: CPT

## 2023-08-09 PROCEDURE — 6360000002 HC RX W HCPCS: Performed by: SURGERY

## 2023-08-09 PROCEDURE — 82948 REAGENT STRIP/BLOOD GLUCOSE: CPT

## 2023-08-09 PROCEDURE — 80048 BASIC METABOLIC PNL TOTAL CA: CPT

## 2023-08-09 PROCEDURE — 2700000000 HC OXYGEN THERAPY PER DAY

## 2023-08-09 PROCEDURE — 94010 BREATHING CAPACITY TEST: CPT

## 2023-08-09 PROCEDURE — 97162 PT EVAL MOD COMPLEX 30 MIN: CPT

## 2023-08-09 PROCEDURE — 99233 SBSQ HOSP IP/OBS HIGH 50: CPT | Performed by: NURSE PRACTITIONER

## 2023-08-09 PROCEDURE — 97535 SELF CARE MNGMENT TRAINING: CPT

## 2023-08-09 PROCEDURE — 97110 THERAPEUTIC EXERCISES: CPT

## 2023-08-09 PROCEDURE — 97530 THERAPEUTIC ACTIVITIES: CPT

## 2023-08-09 PROCEDURE — 99232 SBSQ HOSP IP/OBS MODERATE 35: CPT | Performed by: SURGERY

## 2023-08-09 PROCEDURE — 6370000000 HC RX 637 (ALT 250 FOR IP): Performed by: NURSE PRACTITIONER

## 2023-08-09 PROCEDURE — 1200000003 HC TELEMETRY R&B

## 2023-08-09 PROCEDURE — 97166 OT EVAL MOD COMPLEX 45 MIN: CPT

## 2023-08-09 RX ADMIN — POLYETHYLENE GLYCOL 3350 17 G: 17 POWDER, FOR SOLUTION ORAL at 09:59

## 2023-08-09 RX ADMIN — HYDROCODONE BITARTRATE AND ACETAMINOPHEN 1 TABLET: 5; 325 TABLET ORAL at 23:49

## 2023-08-09 RX ADMIN — ALBUTEROL SULFATE 2.5 MG: 2.5 SOLUTION RESPIRATORY (INHALATION) at 17:14

## 2023-08-09 RX ADMIN — SODIUM CHLORIDE: 9 INJECTION, SOLUTION INTRAVENOUS at 01:16

## 2023-08-09 RX ADMIN — ASPIRIN 81 MG: 81 TABLET, COATED ORAL at 09:58

## 2023-08-09 RX ADMIN — BUDESONIDE 500 MCG: 0.5 INHALANT RESPIRATORY (INHALATION) at 17:14

## 2023-08-09 RX ADMIN — MICONAZOLE NITRATE: 2 POWDER TOPICAL at 10:01

## 2023-08-09 RX ADMIN — BUDESONIDE 500 MCG: 0.5 INHALANT RESPIRATORY (INHALATION) at 09:22

## 2023-08-09 RX ADMIN — GABAPENTIN 300 MG: 300 CAPSULE ORAL at 21:05

## 2023-08-09 RX ADMIN — ATORVASTATIN CALCIUM 20 MG: 20 TABLET, FILM COATED ORAL at 21:05

## 2023-08-09 RX ADMIN — HYDROCODONE BITARTRATE AND ACETAMINOPHEN 2 TABLET: 5; 325 TABLET ORAL at 08:09

## 2023-08-09 RX ADMIN — CITALOPRAM 40 MG: 40 TABLET, FILM COATED ORAL at 09:59

## 2023-08-09 RX ADMIN — MAGNESIUM OXIDE 400 MG (241.3 MG MAGNESIUM) TABLET 400 MG: TABLET at 09:59

## 2023-08-09 RX ADMIN — ALBUTEROL SULFATE 2.5 MG: 2.5 SOLUTION RESPIRATORY (INHALATION) at 09:22

## 2023-08-09 RX ADMIN — QUETIAPINE FUMARATE 50 MG: 25 TABLET ORAL at 21:05

## 2023-08-09 RX ADMIN — FAMOTIDINE 20 MG: 20 TABLET ORAL at 09:58

## 2023-08-09 RX ADMIN — GABAPENTIN 300 MG: 300 CAPSULE ORAL at 09:59

## 2023-08-09 RX ADMIN — BUPROPION HYDROCHLORIDE 150 MG: 150 TABLET, FILM COATED, EXTENDED RELEASE ORAL at 09:59

## 2023-08-09 RX ADMIN — OXYBUTYNIN CHLORIDE 15 MG: 10 TABLET, EXTENDED RELEASE ORAL at 09:59

## 2023-08-09 RX ADMIN — METOPROLOL SUCCINATE 25 MG: 25 TABLET, EXTENDED RELEASE ORAL at 09:59

## 2023-08-09 RX ADMIN — MONTELUKAST SODIUM 10 MG: 10 TABLET ORAL at 21:07

## 2023-08-09 RX ADMIN — MICONAZOLE NITRATE: 2 POWDER TOPICAL at 21:04

## 2023-08-09 ASSESSMENT — PAIN DESCRIPTION - LOCATION: LOCATION: GENERALIZED;LEG;RIB CAGE

## 2023-08-09 ASSESSMENT — PAIN DESCRIPTION - DESCRIPTORS: DESCRIPTORS: ACHING;SHARP;STABBING

## 2023-08-09 ASSESSMENT — PAIN SCALES - GENERAL: PAINLEVEL_OUTOF10: 5

## 2023-08-09 NOTE — PROGRESS NOTES
Comprehensive Nutrition Assessment    Type and Reason for Visit:  Initial, Consult (Nutritional assessment for sima score)    Nutrition Recommendations/Plan:   Recommend obtaining A1C as last available from 1/18/22. Continue diet as ordered and encourage PO intake while adhering to nutrition recommendations. ONS ordered: Russ BID  Pt has followed with outpatient RD in the past~ denies need for further education at this time. Monitoring all nutrition aspects and will provide further recommendations as necessary and appropriate. Malnutrition Assessment:  Malnutrition Status: At risk for malnutrition (Comment) (08/09/23 6354)    Context:  Acute Illness     Findings of the 6 clinical characteristics of malnutrition:  Energy Intake:  No significant decrease in energy intake  Weight Loss:  Unable to assess (only stated weight available this admit and wt fluctuates d/t edema)     Body Fat Loss:  No significant body fat loss     Muscle Mass Loss:  No significant muscle mass loss    Fluid Accumulation:  Mild     Strength:  Not Performed    Nutrition Assessment:     Pt. nutritionally compromised AEB increased needs to prevent further skin breakdown from developing wounds. At risk for further nutrition compromise r/t admit as a level 3 trauma s/p a fall from her wheelchair at home, frequent falls at home, nondisplaced rib fx, and underlying medical condition (PMH: CAD, COPD, DM, HLD, HTN, kidney disease, noncompliance). Nutrition Related Findings:    Pt. Report/Treatments/Miscellaneous: pt seen in chair this afternoon~ just finished working with therapy. Pt reports she is lactose intolerant so she does not drink the glucerna or any other ONS at home.  Agreeable to russ and boiled eggs at breakfast.   GI Status: last BM 8/5 per EMR  Pertinent Labs: Na 141, K 4.7, BUN 23, creatinine 0.8, glucose 99, A1C 5.9 (1/18/22)  Pertinent Meds: lipitor, pepcid, glycolax     Wound Type:  (pt reports wounds on legs

## 2023-08-09 NOTE — PROGRESS NOTES
Hospitalist Progress Note    Patient:  Kyra Amaral      Unit/Bed:7K-07/007-A    YOB: 1950    MRN: [id number]       Acct: [de-identified]     PCP: Zach Gillis MD    Date of Admission: 8/8/2023    Assessment/Plan:    Recurrent falls--initial high-sensitivity troponin was normal; CT chest did not reveal central pulmonary artery embolism; CT head does not reveal anything acute; CT C-spine reveals nothing acute; checked orthostatic vital signs~not significant; questioning if secondary to possible some dehydration as she is on Lasix and Aldactone along with an JACEY; states she follows at [facility] with neurology which is Dr. Jese Hicks along with pain management which is Dr. Adilson Byrne; questioning if she needs to follow-up with orthopedic spine as patient states that \"my legs give out\" tells me she has a history of 5 herniated vertebrae  Acute nondisplaced fracture of the left fifth and seventh ribs--trauma services  Nondisplaced fracture of the neck of the fifth metatarsal bone  Primary hypertension, controlled--labile, on Toprol with hold parameters, monitor  Diabetes mellitus type 2, uncontrolled--on Nesina, medium dose sliding scale; monitor  JACEY--creatinine was noted to be 0.7 on November 22, 2022 and on admission was noted to be 1.4; Lasix/Aldactone on hold; creatinine 0.8  CAD--on aspirin, statin, Toprol  Hyperlipidemia--statin  Depression/schizoaffective disorder/PTSD--treated  Chronic low back pain  Chronic venous stasis  Obesity class III with BMI 47.61  Physical deconditioning/debility--PT/OT    Expected discharge date: Pending clinical course    Disposition:    [x] Home       [] TCU       [] Rehab       [] Psych       [] SNF       [] [address]       [] Other-    Chief Complaint: Recurrent falls and left rib pain    Hospital Course: Per consult note dated 8/8/2023: Chito Ibar[age] y.o. female who we are asked to see/evaluate by Brad Maradiaga which are inherent in voice recognition technology. ** Final report electronically signed by DR Matos Records on 8/8/2023 11:48 AM    CT CHEST W CONTRAST    Result Date: 8/8/2023  CT chest with IV contrast: Comparison: CR/KO/SR - XR RIBS LEFT INCLUDE CHEST (MIN 3 VIEWS) - 08/08/2023 11:38 AM EDT FINDINGS: Lungs/pleura: Normal lung volumes. Minimal linear scarring or discoid atelectasis near the bases. No consolidation. No pleural effusion or pneumothorax. Mediastinum/heart/aorta: Heart size is normal. No pericardial effusion. Normal caliber thoracic aorta without dissection. No central pulmonary artery embolism. No enlarged mediastinal, hilar, or axillary lymph nodes. Trachea and mainstem bronchi of normal caliber without intraluminal lesion. Visualized thyroid gland without mass or enlargement. Esophagus without significant distention or evidence of mass. Uppermost abdomen: Cholecystectomy change. No adrenal mass. MSK: Multilevel severe spondylosis of the thoracic spine without acute fracture. There is focal cortical buckling of the posterolateral aspect of the left 5th, 6th, 7th, and 8th ribs without displacement. There is cortical buckling of the posterior aspect of the right 4th, 5th, 6th, 7th and 8th ribs. There is cortical buckling of several bilateral anterior ribs appearing chronic. No definite acute/displaced rib fracture. No vertebral body fracture. Chronic bilateral rib fractures. No consolidation, pleural effusion, or pneumothorax. This document has been electronically signed by: Herbert Govea MD on 08/08/2023 07:26 PM All CTs at this facility use dose modulation techniques and iterative reconstructions, and/or weight-based dosing when appropriate to reduce radiation to a low as reasonably achievable. CT CERVICAL SPINE WO CONTRAST    Result Date: 8/8/2023  PROCEDURE: CT CERVICAL SPINE WO CONTRAST CLINICAL INFORMATION: fall. COMPARISON: CT scan of the cervical spine dated 7/31/2023. Jessica Sanchez  TECHNIQUE: 3 mm

## 2023-08-09 NOTE — PROGRESS NOTES
[facility] OCCUPATIONAL THERAPY  [facility] ORTHOPEDICS 7K  EVALUATION    Time:   Time In: 0805  Time Out: 1201  Timed Code Treatment Minutes: 23 Minutes  Minutes: 31          Date: 2023  Patient Name: Michoacano Arzate,   Gender: female      MRN: [id number]  : 1950  (75 y.o.)  Referring Practitioner: Romana Maldonado PA-C  Diagnosis: JACEY  Additional Pertinent Hx: Per H&P, \"Patient is a 71-year-old female who presents to [facility] as an activation of a level 3 trauma following a fall out of her wheelchair. Per report patient overall falls in the last month or 2 with 1 to 2 days ago where she felt she might have injured her left side and 1 this morning where she slid out of her wheelchair. Patient denies hitting her head or loss of consciousness. States that when she falls out of her wheelchair she was reaching for something in front of her and slipped off of the . she was unable to get up and required EMS assist for lift. Complains of pain to her left side chest wall, left hip, left knee chronic headache. Denies lightheadedness, dizziness, presyncopal events, neck pain, abdominal pain, nausea/vomiting. States she has not had a bowel movement in a couple days. She has old injuries to her right side including ecchymosis to the right shoulder from a recent fall. Patient not taking blood thinners, on ASA. Pt with complex PMH and co morbidities. On exam patient's ABCs intact, GCS 15, and in no acute distress. Patient with pain to L chest anteriolateral and even some posterior, palpable pain to L knee. She has developed ecchymosis to the R anterior shoulder from a pervious fall. Trauma was consulted when pt was found to have two left rib fractures and poor success with incentive spirometer. Chest CT and L knee XR pending. Labs and vital signs reviewed. Patient afebrile, vital signs stable with JACEY.  Plan for patient to admitted under trauma surgery with consults will complete LB dressing with LHAE PRN and min A to increase indep in home environment. Short Term Goal 5: Pt will complete BADL routine with SBA and min vcs for safety to increase safety in home environment. Additional Goals?: No         Following session, patient left in safe position with all fall risk precautions in place.

## 2023-08-09 NOTE — PROGRESS NOTES
21 Merged with [facility] Continence Nurse  Progress Note       Su Lancaster  AGE: 75 y.o. GENDER: female  : 1950  UNIT: 7K-07/007-A  TODAY'S DATE:  2023  ADMISSION DATE: 2023 10:12 AM    Summary:      Consult received for OPEN WOUNDS GROIN, UPPER THIGH, UNDER BILATERAL BREAST AND BLISTERS LOWER LEGS. Documentation reviewed. Spoke with primary RN to clarify wounds, primary RN stated patient just has excoriation and does not need wound care team. Recommend staff to utilize Srinivasa and Wound Care order sets. See below. Continue to turn every 2 hours and PRN, offload with pillows, ensure patient is on low air loss support surface CartoDB, SPR+, [place], Bariatric bed), utilize moisture wicking underpads, limit use of depends, and if applicable, use waffle cushion when in chair. If wound evolves during hospital admission, please call. How to: Srinivasa and Wound Care Orders         Type WOUND in order set search bar. Right click Wound Care Orders, select add to favorites. Right click GEN Srinivasa Scale Focused, select add to favorites. Check GEN Srinivasa Scale Focus and Wound Care Orders. Select appropriate orders. Sign orders as Per Protocol: No Cosign Required. These are independent nursing orders.

## 2023-08-09 NOTE — PROGRESS NOTES
Respiratory Care is following the rib fracture order set. Patient's position when testing was done was sitting. A Negative Inspiratory Force (NIF) was performed with patient achieving a NIF of -40 cm H2O. The NIF was greater than 25 cm H2O. A Forced Vital Capacity (FVC) was obtained with patient achieving an FVC of 1.37 liters. The patient's calculated ideal body weight,  The patient's FVC was greater than 0.020 liters/kg of IBW. Based on the spirometry measurement alone, patient does not meet ICU admission criteria. Last pain medication was given on  08/09/2023 @ 0809. Physician was not called regarding spirometry measurement.

## 2023-08-09 NOTE — PROGRESS NOTES
[address]  Trauma/General Surgery - Rosalio Ybarra MD  Daily Progress Note    Pt Name: Guy Mandel Record Number: [id number]  Date of Birth 1950   Today's Date: 8/9/2023    Hospital day # 1     ASSESSMENT   Recurrent falls  Nondisplaced acute fractures left fifth and seventh ribs  Recent nondisplaced fracture neck of fifth metatarsal bone  Acute kidney injury  Coronary artery disease  Hypertension  Asthma  Diabetes mellitus  Recurrent urinary tract infections  GERD  Chronic low back pain  Depression  Schizoaffective disorder  PTSD  Morbid obesity (BMI 47)   has a past medical history of Acid reflux, Arrhythmia, Arthritis, CAD (coronary artery disease), Chronic bronchitis ([address]), COPD (chronic obstructive pulmonary disease) ([address]), COVID-19, Depression, Diabetes mellitus ([address]), Dizziness - light-headed, Fall, Hyperlipidemia, Hypertension, Kidney disease, MDRO (multiple drug resistant organisms) resistance, Mild persistent asthma without complication, MRSA infection, Numbness and tingling, S/P laparoscopic cholecystectomy, Swelling, and Tinnitus. PLAN   CT chest reviewed. No obvious acute fractures on imaging. Concern for bilateral chronic fractures. Either way, continue respiratory/pulmonary toileting and rib protocol for now until oxygenation better. Continue to wean off of supplemental oxygen. PT/OT  Neurovascular checks  Fall precautions  Cardiac monitoring  Outpatient follow-up with orthopedics  IV fluid hydration  Currently holding Lasix and Aldactone  Appreciate hospitalist assistance  Resume aspirin  DVT prophylaxis  Advance diet  Stable from a trauma standpoint. See how she progresses from medical standpoint today as well as with therapy. Home when okay with therapy and medicine. SUBJECTIVE   Chief complaint: Generalized weakness    Stable overnight. Chart reviewed. Afebrile. Vital signs stable. Left knee x-ray okay.   CT chest completed and feels that rib hours.    RADIOLOGY   CT CHEST W CONTRAST  Order: [id number]  Status: Final result     Visible to patient: Yes (not seen)     Next appt: 10/24/2023 at 10:00 AM in Pulmonology Mitali LongCATE CNP)     0 Result Notes  Details    Reading Physician Reading Date Result Priority   Pauline Martin MD  [phone] 8/8/2023      Narrative & Impression  CT chest with IV contrast:     Comparison: CR/KO/SR - XR RIBS LEFT INCLUDE CHEST (MIN 3 VIEWS) -   08/08/2023 11:38 AM EDT     FINDINGS:     Lungs/pleura:  Normal lung volumes. Minimal linear scarring or discoid atelectasis near the bases. No consolidation. No pleural effusion or pneumothorax. Mediastinum/heart/aorta:  Heart size is normal. No pericardial effusion. Normal caliber thoracic aorta without dissection. No central pulmonary   artery embolism. No enlarged mediastinal, hilar, or axillary lymph nodes. Trachea and mainstem bronchi of normal caliber without intraluminal   lesion. Visualized thyroid gland without mass or enlargement. Esophagus without significant distention or evidence of mass. Uppermost abdomen:  Cholecystectomy change. No adrenal mass. MSK:  Multilevel severe spondylosis of the thoracic spine without acute   fracture. There is focal cortical buckling of the posterolateral aspect of the left   5th, 6th, 7th, and 8th ribs without displacement. There is cortical buckling of the posterior aspect of the right 4th, 5th,   6th, 7th and 8th ribs. There is cortical buckling of several bilateral anterior ribs appearing   chronic. IMPRESSION:  No definite acute/displaced rib fracture. No vertebral body fracture. Chronic bilateral rib fractures. No consolidation, pleural effusion, or pneumothorax.      This document has been electronically signed by: Ludwig Sotelo MD on   08/08/2023 07:26 PM     All CTs at this facility use dose modulation techniques and iterative   reconstructions, and/or weight-based dosing  when

## 2023-08-09 NOTE — PROGRESS NOTES
Pt was alone and was sitting at the time of the visit. She was dealing with multiple fractures of ribs of the left side. She was hopeful and was anointed.     08/09/23 1731   Encounter Summary   Encounter Overview/Reason  Initial Encounter   Service Provided For: Patient   Referral/Consult From: [address] Family members   Last Encounter  08/09/23  (anointed)   Complexity of Encounter Low   Begin Time 1406   End Time  1412   Total Time Calculated 6 min   Spiritual/Emotional needs   Type Spiritual Support   Rituals, Rites and Sacraments   Type Anointing   Assessment/Intervention/Outcome   Assessment Calm   Intervention Empowerment

## 2023-08-09 NOTE — PROGRESS NOTES
[facility]  INPATIENT PHYSICAL THERAPY  EVALUATION  [facility] ORTHOPEDICS 7K - 7K-07/007-A    Time In: 8797  Time Out: 1323  Timed Code Treatment Minutes: 23 Minutes  Minutes: 31          Date: 2023  Patient Name: Maurizio Vallejo,  Gender:  female        MRN: [id number]  : 1950  (75 y.o.)      Referring Practitioner: Ayse Callejas PA-C  Diagnosis: JACEY (acute kidney injury)  Additional Pertinent Hx: Per H&P : Patient is a 59-year-old female who presents to [facility] as an activation of a level 3 trauma following a fall out of her wheelchair. Patient overall falls in the last month or 2 with 1 to 2 days ago where she felt she might have injured her left side and 1 this morning where she slid out of her wheelchair. Patient denies hitting her head or loss of consciousness. States that when she falls out of her wheelchair she was reaching for something in front of her and slipped off of the . she was unable to get up and required EMS assist for lift. Complains of pain to her left side chest wall, left hip, left knee chronic headache. She has old injuries to her right side including ecchymosis to the right shoulder from a recent fall. Patient not taking blood thinners, on ASA. On exam patient's ABCs intact, GCS 15, and in no acute distress. Patient with pain to L chest anteriolateral and even some posterior, palpable pain to L knee. She has developed ecchymosis to the R anterior shoulder from a pervious fall. Trauma was consulted when pt was found to have two left rib fractures and poor success with incentive spirometer. Chest CT and L knee XR pending. Plan for patient to admitted under trauma surgery with consults placed to hospitalist services. Pt also with Recent nondisplaced fracture neck of fifth metatarsal bone R LE .      Restrictions/Precautions:  Restrictions/Precautions: Weight Bearing, General Precautions, Fall Risk  Right Lower Extremity Weight Bearing: Weight cleaning.)  Homemaking Responsibilities: Yes  Ambulation Assistance: Non-ambulatory  Transfer Assistance: Independent (Pivots to/from surfaces.)    Active : No  Occupation: Retired  Additional Comments: Pt transfers between surfaces with no AD, uses her w/c to mobilize around her apartment. Her son lives in the same building and does not work, can \"check in\" on her. She does carry her phone and have a life alert button. OBJECTIVE:  Range of Motion:  Bilateral Lower Extremity: WFL    Strength:  Bilateral Lower Extremity: [facility]  Hip flexion 4/5, knee extension 4+/5, knee flexion 4/5, DF 4/5, PF 4/5    Balance:  Static Sitting Balance:  Stand By Assistance  Dynamic Sitting Balance: Stand By Assistance  Static Standing Balance: Contact Guard Assistance, Minimal Assistance    Bed Mobility:  Not Tested    Transfers:  Sit to Stand: Minimal Assistance, with increased time for completion, cues for hand placement  Stand to Sit:Minimal Assistance, with increased time for completion, cues for hand placement  Max cues to not place weight on R LE since pt does not have her CAM boot at this time, she struggled with this with limited carryover    Ambulation:  Minimal Assistance, with cues for safety, with increased time for completion  Distance: 3'x2  Surface: Level Tile  Device:Rolling Walker  Gait Deviations: Forward Flexed Posture, Slow Areli, Decreased Step Length Bilaterally, Wide Base of Support, Mild Path Deviations, and Unsteady Gait    Pt educated to maintain no weight on R LE to return to bed from the chair, min A and max cues to attempt to maintain this with limited carryover. Pt stated she wanted to stay up in the chair so this PT assisted with mobility back to the chair, continued difficulty with maintaining limited weight R LE. Min A for stability, RW progression and max cues for weightbearing and sequencing. Exercise:  Patient was guided in 1 set(s) 12 reps of exercise to both lower extremities.   Ankle

## 2023-08-09 NOTE — CARE COORDINATION
8/9/23, 11:24 AM EDT      [address]  Admitted: 8/8/2023  Hospital Day: 1    Location: -07/007-A Reason for admit: JACEY (acute kidney injury) ([address]) [N17.9]  Closed fracture of two ribs of left side, initial encounter [S22.42XA]  Closed fracture of multiple ribs of left side, initial encounter [S22.42XA]    Past Medical History:   Diagnosis Date    Acid reflux     Arrhythmia     Arthritis     CAD (coronary artery disease)     NON-OBSTRUCTIVE    Chronic bronchitis ([address])     COPD (chronic obstructive pulmonary disease) ([address])     COVID-19 10/2020    Depression     Diabetes mellitus ([address])     Dizziness - light-headed     Fall 10/15/2013    left rib fracture and shoulder contusion     Hyperlipidemia     Hypertension     Kidney disease     MDRO (multiple drug resistant organisms) resistance 02/2014    MRSA ABDOMEN    Mild persistent asthma without complication 66/07/5739    MRSA infection     Numbness and tingling     HX OF:    S/P laparoscopic cholecystectomy 11/27/2018    Swelling     Tinnitus        Procedure:   8/8 CT Head WO contrast: Mild atrophy, diminished attenuation likely representing ischemic changes. No hemorrhage. 8/8 CT cervical spine negative for fracture. 8/8 XR L ribs: fractures L 5th and 7th ribs. Cardiomegaly. 8/8 L knee XR: Soft tissue swelling, no fx. Osteoporosis. Tricompartmental arthritis. Chondrocalcinosis. 8/8 CT chest W contrast: Chronic bilateral rib fractures. No consolidation, pleural effusion, or pneumothorax. 8/8 EKG: Sinus angela. IV conduction delay. Minimal voltage for LVH. Barriers to Discharge:  EMS brought pt to ED from [facility] following fall out of wheelchair. Pt found to have closed fractures of multiple L ribs. Reports multiple falls the past month. Admitted by Dr Velma Medina w c/s to hospitalist. PT/OT.      PMHx MRSA of surgical wound 2014, Covid 2020, asthma, CAD, HLD, HTN, DM II, arrhythmia, CKD, arthritis, dizziness,

## 2023-08-09 NOTE — PLAN OF CARE
Problem: Respiratory - Adult  Goal: Achieves optimal ventilation and oxygenation  Outcome: Progressing     Problem: Respiratory - Adult  Goal: Clear lung sounds  Description: Clear lung sounds  Outcome: Progressing   Patient mutually agrees upon goals.

## 2023-08-09 NOTE — CARE COORDINATION
DISCHARGE PLANNING EVALUATION  8/9/23, 8:27 AM EDT    Reason for Referral: discharge needs   Mental Status:  alert, oriented   Decision Making:  makes own decisions   Family/Social/Home Environment:  Spence Meckel lives at home alone in senior housing. Her son lives in the same building and sees her daily. Spence Meckel has [employer], including transportation. She has RN for non skilled services and aids through [facility] . She also has services from [employer] on Aging. Current Services including food security, transportation and housekeeping:  [facility] RN (non skilled services)and aids, transportation and aids from [employer]   Current Equipment: walker, cane   Payment Source: [employer], medicaid  Concerns or Barriers to Discharge: planning home with current services   Post-acute [facility]) provider list was provided to patient. Patient was informed of their freedom to choose [facility] provider. Discussed and offered to show the patient the relevant [facility] Providers quality and resource use measures on Medicare Compare web site via computer based on patient's goals of care and treatment preferences. Questions regarding selection process were answered. Teach Back Method used with patient regarding care plan and discharge plan  Patient  verbalized understanding of the plan of care and contributed to goal setting. Patient goals, treatment preferences and discharge plan:  spoke with patient about discharge plan. She plans home alone with current services. Spence Meckel does not anticipate need for any additional services, home with [employer], [facility] RN for non skilled services and aids. Updated [employer] on admission,  is CHENG. Spoke with [facility], confirmed services in place.   Patient has non skilled RN services, if skilled RN is needed, will need referral to [facility] for skilled, with [facility] providing aids and resuming non skilled RN when skilled needs are completed, due to insurance. Patient does not anticipate need for skilled RN services, plans home with services in place.      Electronically signed by SHONNA Mckeon on 8/9/2023 at 8:27 AM

## 2023-08-09 NOTE — PLAN OF CARE
Problem: Discharge Planning  Goal: Discharge to home or other facility with appropriate resources  Outcome: Progressing  Flowsheets (Taken 8/9/2023 1819)  Discharge to home or other facility with appropriate resources:   Identify barriers to discharge with patient and caregiver   Arrange for needed discharge resources and transportation as appropriate   Identify discharge learning needs (meds, wound care, etc)     Problem: Pain  Goal: Verbalizes/displays adequate comfort level or baseline comfort level  Outcome: Progressing  Flowsheets (Taken 8/9/2023 1819)  Verbalizes/displays adequate comfort level or baseline comfort level:   Encourage patient to monitor pain and request assistance   Assess pain using appropriate pain scale   Implement non-pharmacological measures as appropriate and evaluate response   Administer analgesics based on type and severity of pain and evaluate response     Problem: Safety - Adult  Goal: Free from fall injury  Outcome: Progressing  Flowsheets (Taken 8/9/2023 1819)  Free From Fall Injury: Instruct family/caregiver on patient safety     Problem: ABCDS Injury Assessment  Goal: Absence of physical injury  Outcome: Progressing  Flowsheets (Taken 8/9/2023 1819)  Absence of Physical Injury: Implement safety measures based on patient assessment     Problem: Skin/Tissue Integrity  Goal: Absence of new skin breakdown  Description: 1. Monitor for areas of redness and/or skin breakdown  2. Assess vascular access sites hourly  3. Every 4-6 hours minimum:  Change oxygen saturation probe site  4. Every 4-6 hours:  If on nasal continuous positive airway pressure, respiratory therapy assess nares and determine need for appliance change or resting period.   Outcome: Progressing  Note: No new skin breakdown       Problem: Chronic Conditions and Co-morbidities  Goal: Patient's chronic conditions and co-morbidity symptoms are monitored and maintained or improved  Outcome: Progressing   Care plan reviewed with patient and patient verbalized understanding of the plan of care and contribute to goal setting.

## 2023-08-10 LAB
ANION GAP SERPL CALC-SCNC: 9 MEQ/L (ref 8–16)
BASOPHILS ABSOLUTE: 0 THOU/MM3 (ref 0–0.1)
BASOPHILS NFR BLD AUTO: 0.5 %
BUN SERPL-MCNC: 14 MG/DL (ref 7–22)
CALCIUM SERPL-MCNC: 8.8 MG/DL (ref 8.5–10.5)
CHLORIDE SERPL-SCNC: 109 MEQ/L (ref 98–111)
CO2 SERPL-SCNC: 23 MEQ/L (ref 23–33)
CREAT SERPL-MCNC: 0.7 MG/DL (ref 0.4–1.2)
DEPRECATED RDW RBC AUTO: 48.3 FL (ref 35–45)
EOSINOPHIL NFR BLD AUTO: 4.6 %
EOSINOPHILS ABSOLUTE: 0.3 THOU/MM3 (ref 0–0.4)
ERYTHROCYTE [DISTWIDTH] IN BLOOD BY AUTOMATED COUNT: 14.4 % (ref 11.5–14.5)
GFR SERPL CREATININE-BSD FRML MDRD: > 60 ML/MIN/1.73M2
GLUCOSE BLD STRIP.AUTO-MCNC: 132 MG/DL (ref 70–108)
GLUCOSE BLD STRIP.AUTO-MCNC: 149 MG/DL (ref 70–108)
GLUCOSE BLD STRIP.AUTO-MCNC: 166 MG/DL (ref 70–108)
GLUCOSE BLD STRIP.AUTO-MCNC: 176 MG/DL (ref 70–108)
GLUCOSE SERPL-MCNC: 129 MG/DL (ref 70–108)
HCT VFR BLD AUTO: 34.2 % (ref 37–47)
HGB BLD-MCNC: 10.7 GM/DL (ref 12–16)
IMM GRANULOCYTES # BLD AUTO: 0.02 THOU/MM3 (ref 0–0.07)
IMM GRANULOCYTES NFR BLD AUTO: 0.3 %
LYMPHOCYTES ABSOLUTE: 1.8 THOU/MM3 (ref 1–4.8)
LYMPHOCYTES NFR BLD AUTO: 28 %
MCH RBC QN AUTO: 28.6 PG (ref 26–33)
MCHC RBC AUTO-ENTMCNC: 31.3 GM/DL (ref 32.2–35.5)
MCV RBC AUTO: 91.4 FL (ref 81–99)
MONOCYTES ABSOLUTE: 1 THOU/MM3 (ref 0.4–1.3)
MONOCYTES NFR BLD AUTO: 15.1 %
NEUTROPHILS NFR BLD AUTO: 51.5 %
NRBC BLD AUTO-RTO: 0 /100 WBC
PLATELET # BLD AUTO: 196 THOU/MM3 (ref 130–400)
PMV BLD AUTO: 10.8 FL (ref 9.4–12.4)
POTASSIUM SERPL-SCNC: 4.6 MEQ/L (ref 3.5–5.2)
RBC # BLD AUTO: 3.74 MILL/MM3 (ref 4.2–5.4)
SEGMENTED NEUTROPHILS ABSOLUTE COUNT: 3.3 THOU/MM3 (ref 1.8–7.7)
SODIUM SERPL-SCNC: 141 MEQ/L (ref 135–145)
WBC # BLD AUTO: 6.5 THOU/MM3 (ref 4.8–10.8)

## 2023-08-10 PROCEDURE — 85025 COMPLETE CBC W/AUTO DIFF WBC: CPT

## 2023-08-10 PROCEDURE — 82948 REAGENT STRIP/BLOOD GLUCOSE: CPT

## 2023-08-10 PROCEDURE — 1200000000 HC SEMI PRIVATE

## 2023-08-10 PROCEDURE — 6360000002 HC RX W HCPCS: Performed by: NURSE PRACTITIONER

## 2023-08-10 PROCEDURE — 94761 N-INVAS EAR/PLS OXIMETRY MLT: CPT

## 2023-08-10 PROCEDURE — 94799 UNLISTED PULMONARY SVC/PX: CPT

## 2023-08-10 PROCEDURE — 97110 THERAPEUTIC EXERCISES: CPT

## 2023-08-10 PROCEDURE — 94010 BREATHING CAPACITY TEST: CPT

## 2023-08-10 PROCEDURE — 97530 THERAPEUTIC ACTIVITIES: CPT

## 2023-08-10 PROCEDURE — 6360000002 HC RX W HCPCS: Performed by: SURGERY

## 2023-08-10 PROCEDURE — 94669 MECHANICAL CHEST WALL OSCILL: CPT

## 2023-08-10 PROCEDURE — 99232 SBSQ HOSP IP/OBS MODERATE 35: CPT | Performed by: SURGERY

## 2023-08-10 PROCEDURE — 94640 AIRWAY INHALATION TREATMENT: CPT

## 2023-08-10 PROCEDURE — 6370000000 HC RX 637 (ALT 250 FOR IP): Performed by: NURSE PRACTITIONER

## 2023-08-10 PROCEDURE — 36415 COLL VENOUS BLD VENIPUNCTURE: CPT

## 2023-08-10 PROCEDURE — 97535 SELF CARE MNGMENT TRAINING: CPT

## 2023-08-10 PROCEDURE — 99233 SBSQ HOSP IP/OBS HIGH 50: CPT | Performed by: NURSE PRACTITIONER

## 2023-08-10 PROCEDURE — 80048 BASIC METABOLIC PNL TOTAL CA: CPT

## 2023-08-10 PROCEDURE — 6370000000 HC RX 637 (ALT 250 FOR IP): Performed by: OCCUPATIONAL THERAPIST

## 2023-08-10 PROCEDURE — 2580000003 HC RX 258: Performed by: OCCUPATIONAL THERAPIST

## 2023-08-10 RX ADMIN — TIZANIDINE 4 MG: 4 TABLET ORAL at 02:23

## 2023-08-10 RX ADMIN — BUDESONIDE 500 MCG: 0.5 INHALANT RESPIRATORY (INHALATION) at 21:19

## 2023-08-10 RX ADMIN — GABAPENTIN 300 MG: 300 CAPSULE ORAL at 20:23

## 2023-08-10 RX ADMIN — TIZANIDINE 4 MG: 4 TABLET ORAL at 20:24

## 2023-08-10 RX ADMIN — SODIUM CHLORIDE, PRESERVATIVE FREE 10 ML: 5 INJECTION INTRAVENOUS at 09:51

## 2023-08-10 RX ADMIN — BUPROPION HYDROCHLORIDE 150 MG: 150 TABLET, FILM COATED, EXTENDED RELEASE ORAL at 09:52

## 2023-08-10 RX ADMIN — FAMOTIDINE 20 MG: 20 TABLET ORAL at 09:56

## 2023-08-10 RX ADMIN — ASPIRIN 81 MG: 81 TABLET, COATED ORAL at 09:56

## 2023-08-10 RX ADMIN — BUDESONIDE 500 MCG: 0.5 INHALANT RESPIRATORY (INHALATION) at 09:11

## 2023-08-10 RX ADMIN — ALBUTEROL SULFATE 2.5 MG: 2.5 SOLUTION RESPIRATORY (INHALATION) at 09:11

## 2023-08-10 RX ADMIN — GABAPENTIN 300 MG: 300 CAPSULE ORAL at 09:52

## 2023-08-10 RX ADMIN — ATORVASTATIN CALCIUM 20 MG: 20 TABLET, FILM COATED ORAL at 20:24

## 2023-08-10 RX ADMIN — CITALOPRAM 40 MG: 40 TABLET, FILM COATED ORAL at 09:52

## 2023-08-10 RX ADMIN — HYDROCODONE BITARTRATE AND ACETAMINOPHEN 2 TABLET: 5; 325 TABLET ORAL at 20:23

## 2023-08-10 RX ADMIN — QUETIAPINE FUMARATE 50 MG: 25 TABLET ORAL at 20:23

## 2023-08-10 RX ADMIN — OXYBUTYNIN CHLORIDE 15 MG: 10 TABLET, EXTENDED RELEASE ORAL at 09:52

## 2023-08-10 RX ADMIN — MICONAZOLE NITRATE: 2 POWDER TOPICAL at 09:54

## 2023-08-10 RX ADMIN — MAGNESIUM OXIDE 400 MG (241.3 MG MAGNESIUM) TABLET 400 MG: TABLET at 09:52

## 2023-08-10 RX ADMIN — METOPROLOL SUCCINATE 25 MG: 25 TABLET, EXTENDED RELEASE ORAL at 09:52

## 2023-08-10 RX ADMIN — ALBUTEROL SULFATE 2.5 MG: 2.5 SOLUTION RESPIRATORY (INHALATION) at 21:19

## 2023-08-10 RX ADMIN — MONTELUKAST SODIUM 10 MG: 10 TABLET ORAL at 20:23

## 2023-08-10 RX ADMIN — MICONAZOLE NITRATE: 2 POWDER TOPICAL at 21:19

## 2023-08-10 ASSESSMENT — PAIN SCALES - GENERAL
PAINLEVEL_OUTOF10: 0
PAINLEVEL_OUTOF10: 6
PAINLEVEL_OUTOF10: 0
PAINLEVEL_OUTOF10: 0
PAINLEVEL_OUTOF10: 5
PAINLEVEL_OUTOF10: 7

## 2023-08-10 ASSESSMENT — PAIN - FUNCTIONAL ASSESSMENT: PAIN_FUNCTIONAL_ASSESSMENT: ACTIVITIES ARE NOT PREVENTED

## 2023-08-10 ASSESSMENT — PAIN SCALES - WONG BAKER: WONGBAKER_NUMERICALRESPONSE: 0

## 2023-08-10 ASSESSMENT — PAIN DESCRIPTION - FREQUENCY: FREQUENCY: CONTINUOUS

## 2023-08-10 ASSESSMENT — PAIN DESCRIPTION - LOCATION
LOCATION: BACK
LOCATION: GENERALIZED

## 2023-08-10 ASSESSMENT — PAIN DESCRIPTION - PAIN TYPE: TYPE: ACUTE PAIN;SURGICAL PAIN

## 2023-08-10 ASSESSMENT — PAIN DESCRIPTION - DESCRIPTORS: DESCRIPTORS: ACHING

## 2023-08-10 ASSESSMENT — PAIN DESCRIPTION - ONSET: ONSET: ON-GOING

## 2023-08-10 ASSESSMENT — PAIN DESCRIPTION - ORIENTATION: ORIENTATION: LOWER;MID

## 2023-08-10 NOTE — PROGRESS NOTES
consults placed to hospitalist services . Case discussed and care coordinated with trauma surgeon, Dr. Candy Milton. \"    Restrictions/Precautions:  Restrictions/Precautions: Weight Bearing, General Precautions, Fall Risk  Right Lower Extremity Weight Bearing: Weight Bearing As Tolerated  Required Braces or Orthoses  Right Lower Extremity Brace: Boot  Position Activity Restriction  Other position/activity restrictions: Fractures of the left fifth and seventh ribs, and h/o 7/31/2023 nondisplaced fracture of the neck of the 5th metatarsal bone     SUBJECTIVE: Rn okayed OT session. Pt. In room and agreeable to participate. PAIN: Reports pain in R shoulder (bruising noted from previous fall),R foot, ribs, and B knees-no numerical rating given    Vitals: Vitals not assessed per clinical judgement, see nursing flowsheet    COGNITION: Slow Processing, Decreased Recall, Decreased Problem Solving, and Decreased Safety Awareness    ADL:   Lower Extremity Dressing: Stand By Assistance. Pt. Instructed on donning and doffing of LB clothing with LHR use along with utilizing in home environment with ADL tasks,  Pt. Voices an understanding and provided with purchase information  Pt. Declines need for a sock aid reports her aid will complete prn. .    TRANSFERS:  Declined reports transfers/mobility didn't go well in earlier PT session. ADDITIONAL ACTIVITIES:  Patient completed BUE strengthening exercises with skilled education on HEP: completed x12 reps x1 set with AROM in all joints and all planes in order to improve UE strength and activity tolerance required for BADL routine and toilet / shower transfers. Patient tolerated , requiring  rest breaks. Patient also required visual and verbal cues for technique.         ASSESSMENT:     Activity Tolerance:  Patient tolerance of  treatment: Fair treatment tolerance      Discharge Recommendations: Subacute/skilled nursing facility  Equipment Recommendations: Equipment Needed: No  Plan: Times Per Week: 5x  Times Per Day: Once a day  Current Treatment Recommendations: Strengthening, Balance training, Functional mobility training, Endurance training, Safety education & training, Patient/Caregiver education & training, Equipment evaluation, education, & procurement, Self-Care / ADL, Home management training    Patient Education  Patient Education: ADL's, Home Exercise Program, and Importance of Increasing Activity    Goals  Short Term Goals  Time Frame for Short Term Goals: Until discharge  Short Term Goal 1: Pt will complete BUE strengthening exercises with min vcs for technique to increase indep and endurance with all self cares and transfers. Short Term Goal 2: Pt will complete standing tolerance x 3 minutes with 1 UE release and SBA to increase indep and endutance with all sinkside grooming. Short Term Goal 3: Pt will complete stand pivots to/from Various surfaces with SBA and min vcs for safety to increase indep with toileting. Short Term Goal 4: Pt will complete LB dressing with LHAE PRN and min A to increase indep in home environment. Short Term Goal 5: Pt will complete BADL routine with SBA and min vcs for safety to increase safety in home environment. Additional Goals?: No    Following session, patient left in safe position with all fall risk precautions in place.

## 2023-08-10 NOTE — PROGRESS NOTES
that    Expected discharge date: Pending clinical course    Disposition:    [x] Home       [] TCU       [] Rehab       [] Psych       [] SNF       [] [address]       [] Other-    Chief Complaint: Recurrent falls and left rib pain    Hospital Course: Per consult note dated 8/8/2023: Haley [age] y.o. female who we are asked to see/evaluate by Jan Lorenzo MD for medical management. Patient has a PMHx that includes: CAD, hypertension, hyperlipidemia, asthma, chronic venous stasis, DM type II, recurrent UTIs, GERD, diverticulosis, chronic low back pain, postlaminectomy syndrome, depression, morbid obesity who presented to the emergency room today for left rib pain. Patient has been having recurrent falls at home. Chronic bilateral lower extremity weakness. Just in the past few weeks, patient has been in the ED twice for falls, on 31 July she was diagnosed with acute nondisplaced fracture of the neck of the fifth metatarsal bone. Patient reports falling again yesterday. She was assisted up by EMS but declined further medical evaluation. States she developed left rib and left hip pain later last evening. This morning patient fell again. Patient is alert, oriented x 3 but a poor historian. Multiple vague c/o including occasional lightheadedness, states she does not try to get up if she is feeling dizzy, chronic shortness of breath, occasional nausea, no recent emesis, chronic low back pain and BLE weakness. Patient denies chest pain. States she believes she falls due to LE weakness from back issues. Denies LOC. \"    8/9--> hemodynamically stable, sitting up in the chair, eating well    8/10--> hemodynamically stable, has been weaned to room air; trauma note reviewed; up in the chair    Subjective (past 24 hours): Currently complains of pain 5 out of 10 to her left rib cage area, she states she has a nonproductive cough, she states she has chronic shortness of breath, she tells me she has 5 Noncontrast 5 mm axial images were obtained through the brain. Sagittal and coronal reconstructions were obtained. All CT scans at this facility use dose modulation, iterative reconstruction, and/or weight-based dosing when appropriate to reduce radiation dose to as low as reasonably achievable. FINDINGS: There is mild atrophy. There is diminished attenuation in the white matter most likely representing ischemic changes. There is atherosclerotic calcification in the cavernous segments of both internal carotid arteries. There is no hemorrhage. There are no intra-or extra-axial collections. There is no hydrocephalus, midline shift or mass effect. .  The paranasal sinuses and mastoid air cells are normally aerated. There is no suspicious calvarial abnormality. 1. Stable CT scan of the brain, no interval change since previous study dated 7/31/2023. **This report has been created using voice recognition software. It may contain minor errors which are inherent in voice recognition technology. ** Final report electronically signed by DR Myra Bar on 8/8/2023 11:48 AM    CT CHEST W CONTRAST    Result Date: 8/8/2023  CT chest with IV contrast: Comparison: CR/KO/SR - XR RIBS LEFT INCLUDE CHEST (MIN 3 VIEWS) - 08/08/2023 11:38 AM EDT FINDINGS: Lungs/pleura: Normal lung volumes. Minimal linear scarring or discoid atelectasis near the bases. No consolidation. No pleural effusion or pneumothorax. Mediastinum/heart/aorta: Heart size is normal. No pericardial effusion. Normal caliber thoracic aorta without dissection. No central pulmonary artery embolism. No enlarged mediastinal, hilar, or axillary lymph nodes. Trachea and mainstem bronchi of normal caliber without intraluminal lesion. Visualized thyroid gland without mass or enlargement. Esophagus without significant distention or evidence of mass. Uppermost abdomen: Cholecystectomy change. No adrenal mass.  MSK: Multilevel severe spondylosis of the thoracic spine without

## 2023-08-10 NOTE — PROGRESS NOTES
Respiratory Care is following the rib fracture order set. Patient's position when testing was done was sitting. A Negative Inspiratory Force (NIF) was performed with patient achieving a NIF of 35 cm H2O. The NIF was greater than 25 cm H2O. A Forced Vital Capacity (FVC) was obtained with patient achieving an FVC of *** liters. The patient's calculated ideal body weight, (IBW) is  *** kg. 0.020 liters/kg of the patient's IBW is *** liters. The patient's FVC was {HSP GEN GREATER/LESS MPIW:[id number]} 0.020 liters/kg of IBW. Based on the spirometry measurement alone, patient does not meet ICU admission criteria. Previous FVC was *** liters and previous NIF was 40 cm H2O. Patient's NIF and FVC {Improving/worsening/no change:94088} from previous screening(s). Last pain medication was given on  8/9/23 @ 809. Physician was not called regarding spirometry measurement.

## 2023-08-10 NOTE — PLAN OF CARE
RN  Outcome: Progressing  8/9/2023 1819 by Rula Falcon RN  Outcome: Progressing     Problem: Chronic Conditions and Co-morbidities  Goal: Patient's chronic conditions and co-morbidity symptoms are monitored and maintained or improved  8/10/2023 0119 by Faith Milner RN  Outcome: Progressing  Flowsheets (Taken 8/10/2023 0119)  Care Plan - Patient's Chronic Conditions and Co-Morbidity Symptoms are Monitored and Maintained or Improved:   Monitor and assess patient's chronic conditions and comorbid symptoms for stability, deterioration, or improvement   Collaborate with multidisciplinary team to address chronic and comorbid conditions and prevent exacerbation or deterioration  8/9/2023 1819 by Rula Falcon RN  Outcome: Progressing     Problem: Nutrition Deficit:  Goal: Optimize nutritional status  8/10/2023 0119 by Faith Milner RN  Outcome: Progressing  Flowsheets (Taken 8/9/2023 1453 by Alphonse Hernández RD)  Nutrient intake appropriate for improving, restoring, or maintaining nutritional needs:   Assess nutritional status and recommend course of action   Monitor oral intake, labs, and treatment plans   Recommend appropriate diets, oral nutritional supplements, and vitamin/mineral supplements   Provide specific nutrition education to patient or family as appropriate  8/9/2023 1819 by Rula Falcon RN  Outcome: Progressing  8/9/2023 1453 by Alphonse Hernández RD  Flowsheets (Taken 8/9/2023 1453)  Nutrient intake appropriate for improving, restoring, or maintaining nutritional needs:   Assess nutritional status and recommend course of action   Monitor oral intake, labs, and treatment plans   Recommend appropriate diets, oral nutritional supplements, and vitamin/mineral supplements   Provide specific nutrition education to patient or family as appropriate     Care plan reviewed with patient. Patient verbalizes understanding of the plan of care and contributes to goal setting.

## 2023-08-10 NOTE — PROGRESS NOTES
[facility]  Trauma Surgery [facility]  Daily Progress Note  Pt Name: Marylin Horn Record Number: [id number]  Date of Birth 1950   Today's Date: 8/10/2023    HD: # 2    CC:     ASSESSMENT  1. Active Hospital Problems    Diagnosis Date Noted    Multiple closed fractures of ribs of left side [S22.42XA] 08/08/2023    Fall [W19. XXXA] 07/26/2013         PLAN  Patient admitted under Trauma Services (w/ tele) to [address] [place] [address] by fall               - Mechanical in nature               - PT/OT eval and treat              - Fall precautions               - Hx recent increased falls/ recurring      Nondisplaced acute fractures of the left fifth and seventh   - Rib fracture protocol              -Tizanidine               - Pain control              - Lidoderm patches              - Incentive spirometry/cough and deep breathing              - Repeat chest x-ray prn               - Monitor respiratory status and continuous O2              - CT chest notes no acute/displaced rib fractures; chronic bilateral rib fractures are seen     JACEY - resolved              - BUN 32/ Creatinine 1.4              - IVF hydration and avoid nephrotoxic agents               - Repeat labs in the am              - Hospitalist assisting with management   - 08/10: BUN 14, Creatinine 0.7, GFR >60     Left knee pain              - AROM intact, no signs trauma, reports hx arthritis               - XR of the left knee with no fracture or dislocation, tricompartmental arthritis noted               - Pain control and PT/OT     PMH CAD, hypertension, hyperlipidemia, asthma, chronic venous stasis, DM type II, recurrent UTIs, GERD, diverticulosis, chronic low back pain, postlaminectomy syndrome, asthma, chronic bronchitis, depression, morbid obesity              - Consult hospitalist for med mgmt               - Accfiordaliza qAC and HS                 Consults: hospitalist      Pain Management              -Morphine, CLINICAL INFORMATION: fall, rib pain COMPARISON: Chest x-ray 7/26/2023 TECHNIQUE: 4 views of the left ribs including PA view of the chest FINDINGS: The lungs are clear. No pleural effusions. No pneumothorax. The heart is markedly enlarged. . Old bilateral rib fractures are seen. Degenerative changes of the thoracic spine. Nondisplaced acute fractures of the left fifth and seventh ribs. The bones appear demineralized. No significant soft tissue abnormality. Surgical hardware is seen in the lumbar spine. 1. Nondisplaced acute fractures of the left fifth and seventh ribs. 2. Marked cardiomegaly. **This report has been created using voice recognition software. It may contain minor errors which are inherent in voice recognition technology. ** Final report electronically signed by Dr Sandra May on 8/8/2023 12:15 PM    XR KNEE LEFT (3 VIEWS)    Result Date: 8/8/2023  Exam: 3 view left knee with repeat AP view. Comparison: None Findings: Diffuse osteoporosis. No acute fracture or dislocation. Mild soft tissue swelling medially. Moderate degenerative narrowing of the medial and lateral knee compartments with meniscal calcifications. Severe degenerative narrowing of the patellofemoral joint with hypertrophic spurring. No joint effusion. No loose intra-articular body. Impression: Diffuse osteoporosis. No fracture or dislocation. Medial soft tissue swelling. Tricompartmental arthritis. Chondrocalcinosis. This document has been electronically signed by: Varsha Arias MD on 08/08/2023 08:56 PM    CT HEAD WO CONTRAST    Result Date: 8/8/2023  PROCEDURE: CT HEAD WO CONTRAST CLINICAL INFORMATION: fall. COMPARISON: CT scan of the brain dated 7/31/2023. Renay Land TECHNIQUE: Noncontrast 5 mm axial images were obtained through the brain. Sagittal and coronal reconstructions were obtained.  All CT scans at this facility use dose modulation, iterative reconstruction, and/or weight-based dosing when appropriate to reduce radiation dose

## 2023-08-10 NOTE — PROGRESS NOTES
[address]   INPATIENT PHYSICAL THERAPY  DAILY NOTE  [facility] ORTHOPEDICS 7K - 7K-07/007-A    Time In: 8985  Time Out: 2828  Timed Code Treatment Minutes: 30 Minutes  Minutes: 30          Date: 8/10/2023  Patient Name: Taisha Sellers,  Gender:  female        MRN: [id number]  : 1950  (75 y.o.)     Referring Practitioner: Greg Ram PA-C  Diagnosis: JACEY (acute kidney injury)  Additional Pertinent Hx: Per H&P : Patient is a 78-year-old female who presents to [facility] as an activation of a level 3 trauma following a fall out of her wheelchair. Patient overall falls in the last month or 2 with 1 to 2 days ago where she felt she might have injured her left side and 1 this morning where she slid out of her wheelchair. Patient denies hitting her head or loss of consciousness. States that when she falls out of her wheelchair she was reaching for something in front of her and slipped off of the . she was unable to get up and required EMS assist for lift. Complains of pain to her left side chest wall, left hip, left knee chronic headache. She has old injuries to her right side including ecchymosis to the right shoulder from a recent fall. Patient not taking blood thinners, on ASA. On exam patient's ABCs intact, GCS 15, and in no acute distress. Patient with pain to L chest anteriolateral and even some posterior, palpable pain to L knee. She has developed ecchymosis to the R anterior shoulder from a pervious fall. Trauma was consulted when pt was found to have two left rib fractures and poor success with incentive spirometer. Chest CT and L knee XR pending. Plan for patient to admitted under trauma surgery with consults placed to hospitalist services. Pt also with Recent nondisplaced fracture neck of fifth metatarsal bone R LE .      Prior Level of Function:  Lives With: Alone  Type of Home: Apartment  Home Layout: One level  Home Access: Elevator (3rd floor apartment)  Home this date. Otherwise will require 24 hour hands on assistance or supervision to ensure safety  Plan: Current Treatment Recommendations: Strengthening, ROM, Balance training, Functional mobility training, Transfer training, Cognitive/Perceptual training, Endurance training, Wheelchair mobility training, Neuromuscular re-education, Home exercise program, Equipment evaluation, education, & procurement, Patient/Caregiver education & training, Safety education & training, Therapeutic activities  General Plan:  (5x GM)    Patient Education  Patient Education: Plan of Care, Transfers, Gait, Verbal Exercise Instruction, safety/recommendations    Goals:  Patient Goals : none stated  Short Term Goals  Time Frame for Short Term Goals: by hospital d/c  Short Term Goal 1: Pt to complete supine <->Sit indep for ease getting in bed  Short Term Goal 2: Pt to complete sit <->Stand with RW mod I with R LE CAM boot for safe transfers  Short Term Goal 3: Pt to ambulate 4' with RW and R LE CAM boot for ease getting to and from her w/c with S  Long Term Goals  Time Frame for Long Term Goals : NA due to short ELOS    Following session, patient left in safe position with all fall risk precautions in place.

## 2023-08-10 NOTE — CARE COORDINATION
8/10/23, 1:39 PM EDT    DISCHARGE ON [facility] day: 2  Location: -07/007-A Reason for admit: JACEY (acute kidney injury) ([address]) [N17.9]  Closed fracture of two ribs of left side, initial encounter [S22.42XA]  Closed fracture of multiple ribs of left side, initial encounter [S22.42XA]     Procedure:   8/8 CT Head WO contrast: Mild atrophy, diminished attenuation likely representing ischemic changes. No hemorrhage. 8/8 CT cervical spine negative for fracture. 8/8 XR L ribs: fractures L 5th and 7th ribs. Cardiomegaly. 8/8 L knee XR: Soft tissue swelling, no fx. Osteoporosis. Tricompartmental arthritis. Chondrocalcinosis. 8/8 CT chest W contrast: Chronic bilateral rib fractures. No consolidation, pleural effusion, or pneumothorax. 8/8 EKG: Sinus angela. IV conduction delay. Minimal voltage for LVH. Barriers to Discharge: Telemetry, daily labs, trauma still following, PT/OT w less than optimal abilities. Possible SNF placement. PCP: Faiza Green MD  Readmission Risk Score: 15.1%    Patient Goals/Plan/Treatment Preferences: From [facility] (senior [facility]). Son lives in the same building and visits daily. [employer] services including transportation. [facility] for non skilled nurse* and Aide. [facility] on Aging. Pt denies needs and wants to return home with previously established services, but therapy indicates SNF. SW consulted.

## 2023-08-10 NOTE — CARE COORDINATION
8/10/23, 10:19 AM EDT    DISCHARGE PLANNING EVALUATION    Spoke with [employer] , confirmed services in place, including aids 3x week, emergency response system and transportation. Covering [employer]  this week is Tim[place], [phone].

## 2023-08-11 ENCOUNTER — APPOINTMENT (OUTPATIENT)
Dept: GENERAL RADIOLOGY | Age: 73
End: 2023-08-11
Payer: MEDICARE

## 2023-08-11 VITALS
RESPIRATION RATE: 18 BRPM | TEMPERATURE: 98 F | HEIGHT: 61 IN | OXYGEN SATURATION: 94 % | WEIGHT: 263.89 LBS | BODY MASS INDEX: 49.82 KG/M2 | SYSTOLIC BLOOD PRESSURE: 130 MMHG | HEART RATE: 73 BPM | DIASTOLIC BLOOD PRESSURE: 75 MMHG

## 2023-08-11 LAB
ANION GAP SERPL CALC-SCNC: 11 MEQ/L (ref 8–16)
BASOPHILS ABSOLUTE: 0 THOU/MM3 (ref 0–0.1)
BASOPHILS NFR BLD AUTO: 0.5 %
BUN SERPL-MCNC: 9 MG/DL (ref 7–22)
CALCIUM SERPL-MCNC: 9.3 MG/DL (ref 8.5–10.5)
CHLORIDE SERPL-SCNC: 107 MEQ/L (ref 98–111)
CO2 SERPL-SCNC: 23 MEQ/L (ref 23–33)
CREAT SERPL-MCNC: 0.6 MG/DL (ref 0.4–1.2)
DEPRECATED RDW RBC AUTO: 46.5 FL (ref 35–45)
EKG ATRIAL RATE: 56 BPM
EKG P AXIS: 59 DEGREES
EKG P-R INTERVAL: 164 MS
EKG Q-T INTERVAL: 504 MS
EKG QRS DURATION: 118 MS
EKG QTC CALCULATION (BAZETT): 486 MS
EKG R AXIS: -17 DEGREES
EKG T AXIS: 35 DEGREES
EKG VENTRICULAR RATE: 56 BPM
EOSINOPHIL NFR BLD AUTO: 3.2 %
EOSINOPHILS ABSOLUTE: 0.2 THOU/MM3 (ref 0–0.4)
ERYTHROCYTE [DISTWIDTH] IN BLOOD BY AUTOMATED COUNT: 14.4 % (ref 11.5–14.5)
GFR SERPL CREATININE-BSD FRML MDRD: > 60 ML/MIN/1.73M2
GLUCOSE BLD STRIP.AUTO-MCNC: 146 MG/DL (ref 70–108)
GLUCOSE BLD STRIP.AUTO-MCNC: 176 MG/DL (ref 70–108)
GLUCOSE SERPL-MCNC: 131 MG/DL (ref 70–108)
HCT VFR BLD AUTO: 35.7 % (ref 37–47)
HGB BLD-MCNC: 11.4 GM/DL (ref 12–16)
IMM GRANULOCYTES # BLD AUTO: 0.02 THOU/MM3 (ref 0–0.07)
IMM GRANULOCYTES NFR BLD AUTO: 0.3 %
LYMPHOCYTES ABSOLUTE: 2.2 THOU/MM3 (ref 1–4.8)
LYMPHOCYTES NFR BLD AUTO: 37.3 %
MCH RBC QN AUTO: 28.6 PG (ref 26–33)
MCHC RBC AUTO-ENTMCNC: 31.9 GM/DL (ref 32.2–35.5)
MCV RBC AUTO: 89.5 FL (ref 81–99)
MONOCYTES ABSOLUTE: 0.9 THOU/MM3 (ref 0.4–1.3)
MONOCYTES NFR BLD AUTO: 15 %
NEUTROPHILS NFR BLD AUTO: 43.7 %
NRBC BLD AUTO-RTO: 0 /100 WBC
PLATELET # BLD AUTO: 214 THOU/MM3 (ref 130–400)
PMV BLD AUTO: 11 FL (ref 9.4–12.4)
POTASSIUM SERPL-SCNC: 4.2 MEQ/L (ref 3.5–5.2)
RBC # BLD AUTO: 3.99 MILL/MM3 (ref 4.2–5.4)
SEGMENTED NEUTROPHILS ABSOLUTE COUNT: 2.6 THOU/MM3 (ref 1.8–7.7)
SODIUM SERPL-SCNC: 141 MEQ/L (ref 135–145)
WBC # BLD AUTO: 6 THOU/MM3 (ref 4.8–10.8)

## 2023-08-11 PROCEDURE — 6370000000 HC RX 637 (ALT 250 FOR IP): Performed by: NURSE PRACTITIONER

## 2023-08-11 PROCEDURE — 73030 X-RAY EXAM OF SHOULDER: CPT

## 2023-08-11 PROCEDURE — APPNB30 APP NON BILLABLE TIME 0-30 MINS: Performed by: OCCUPATIONAL THERAPIST

## 2023-08-11 PROCEDURE — 97530 THERAPEUTIC ACTIVITIES: CPT

## 2023-08-11 PROCEDURE — 94640 AIRWAY INHALATION TREATMENT: CPT

## 2023-08-11 PROCEDURE — 36415 COLL VENOUS BLD VENIPUNCTURE: CPT

## 2023-08-11 PROCEDURE — 73000 X-RAY EXAM OF COLLAR BONE: CPT

## 2023-08-11 PROCEDURE — 2580000003 HC RX 258: Performed by: OCCUPATIONAL THERAPIST

## 2023-08-11 PROCEDURE — 97535 SELF CARE MNGMENT TRAINING: CPT

## 2023-08-11 PROCEDURE — 94669 MECHANICAL CHEST WALL OSCILL: CPT

## 2023-08-11 PROCEDURE — 6360000002 HC RX W HCPCS: Performed by: NURSE PRACTITIONER

## 2023-08-11 PROCEDURE — 97110 THERAPEUTIC EXERCISES: CPT

## 2023-08-11 PROCEDURE — 6360000002 HC RX W HCPCS: Performed by: SURGERY

## 2023-08-11 PROCEDURE — 82948 REAGENT STRIP/BLOOD GLUCOSE: CPT

## 2023-08-11 PROCEDURE — 80048 BASIC METABOLIC PNL TOTAL CA: CPT

## 2023-08-11 PROCEDURE — 94799 UNLISTED PULMONARY SVC/PX: CPT

## 2023-08-11 PROCEDURE — 99233 SBSQ HOSP IP/OBS HIGH 50: CPT | Performed by: NURSE PRACTITIONER

## 2023-08-11 PROCEDURE — 6370000000 HC RX 637 (ALT 250 FOR IP): Performed by: OCCUPATIONAL THERAPIST

## 2023-08-11 PROCEDURE — 85025 COMPLETE CBC W/AUTO DIFF WBC: CPT

## 2023-08-11 RX ADMIN — ALBUTEROL SULFATE 2.5 MG: 2.5 SOLUTION RESPIRATORY (INHALATION) at 05:34

## 2023-08-11 RX ADMIN — TIZANIDINE 4 MG: 4 TABLET ORAL at 09:44

## 2023-08-11 RX ADMIN — METOPROLOL SUCCINATE 25 MG: 25 TABLET, EXTENDED RELEASE ORAL at 09:30

## 2023-08-11 RX ADMIN — MAGNESIUM OXIDE 400 MG (241.3 MG MAGNESIUM) TABLET 400 MG: TABLET at 09:31

## 2023-08-11 RX ADMIN — MICONAZOLE NITRATE: 2 POWDER TOPICAL at 09:30

## 2023-08-11 RX ADMIN — BUPROPION HYDROCHLORIDE 150 MG: 150 TABLET, FILM COATED, EXTENDED RELEASE ORAL at 09:31

## 2023-08-11 RX ADMIN — ASPIRIN 81 MG: 81 TABLET, COATED ORAL at 09:31

## 2023-08-11 RX ADMIN — OXYBUTYNIN CHLORIDE 15 MG: 10 TABLET, EXTENDED RELEASE ORAL at 09:30

## 2023-08-11 RX ADMIN — HYDROCODONE BITARTRATE AND ACETAMINOPHEN 1 TABLET: 5; 325 TABLET ORAL at 04:26

## 2023-08-11 RX ADMIN — BUDESONIDE 500 MCG: 0.5 INHALANT RESPIRATORY (INHALATION) at 05:35

## 2023-08-11 RX ADMIN — SODIUM CHLORIDE, PRESERVATIVE FREE 10 ML: 5 INJECTION INTRAVENOUS at 09:30

## 2023-08-11 RX ADMIN — FAMOTIDINE 20 MG: 20 TABLET ORAL at 09:31

## 2023-08-11 RX ADMIN — CITALOPRAM 40 MG: 40 TABLET, FILM COATED ORAL at 09:31

## 2023-08-11 RX ADMIN — GABAPENTIN 300 MG: 300 CAPSULE ORAL at 09:31

## 2023-08-11 ASSESSMENT — PAIN DESCRIPTION - PAIN TYPE: TYPE: ACUTE PAIN

## 2023-08-11 ASSESSMENT — PAIN DESCRIPTION - ONSET: ONSET: ON-GOING

## 2023-08-11 ASSESSMENT — PAIN SCALES - GENERAL
PAINLEVEL_OUTOF10: 5
PAINLEVEL_OUTOF10: 6
PAINLEVEL_OUTOF10: 7

## 2023-08-11 ASSESSMENT — PAIN DESCRIPTION - ORIENTATION: ORIENTATION: RIGHT

## 2023-08-11 ASSESSMENT — PAIN DESCRIPTION - FREQUENCY: FREQUENCY: CONTINUOUS

## 2023-08-11 ASSESSMENT — PAIN DESCRIPTION - DESCRIPTORS: DESCRIPTORS: ACHING

## 2023-08-11 ASSESSMENT — PAIN DESCRIPTION - LOCATION: LOCATION: SHOULDER

## 2023-08-11 NOTE — PLAN OF CARE
Problem: Discharge Planning  Goal: Discharge to home or other facility with appropriate resources  Outcome: Progressing  Flowsheets (Taken 8/10/2023 2021)  Discharge to home or other facility with appropriate resources: Identify barriers to discharge with patient and caregiver     Problem: Pain  Goal: Verbalizes/displays adequate comfort level or baseline comfort level  Outcome: Progressing  Flowsheets (Taken 8/10/2023 2021)  Verbalizes/displays adequate comfort level or baseline comfort level:   Encourage patient to monitor pain and request assistance   Assess pain using appropriate pain scale     Problem: Safety - Adult  Goal: Free from fall injury  Outcome: Progressing  Flowsheets (Taken 8/10/2023 0119)  Free From Fall Injury:   Instruct family/caregiver on patient safety   Based on caregiver fall risk screen, instruct family/caregiver to ask for assistance with transferring infant if caregiver noted to have fall risk factors     Problem: ABCDS Injury Assessment  Goal: Absence of physical injury  Outcome: Progressing  Flowsheets (Taken 8/9/2023 1819 by Viki Galvan RN)  Absence of Physical Injury: Implement safety measures based on patient assessment     Problem: Skin/Tissue Integrity  Goal: Absence of new skin breakdown  Description: 1. Monitor for areas of redness and/or skin breakdown  2. Assess vascular access sites hourly  3. Every 4-6 hours minimum:  Change oxygen saturation probe site  4. Every 4-6 hours:  If on nasal continuous positive airway pressure, respiratory therapy assess nares and determine need for appliance change or resting period.   Outcome: Progressing     Problem: Respiratory - Adult  Goal: Achieves optimal ventilation and oxygenation  Outcome: Progressing  Flowsheets (Taken 8/10/2023 2021)  Achieves optimal ventilation and oxygenation: Assess for changes in respiratory status  Goal: Clear lung sounds  Description: Clear lung sounds  Outcome: Progressing     Problem: Chronic

## 2023-08-11 NOTE — PROGRESS NOTES
Rib fracture not done due to this RCP being tied up in Labor & Delivery from 6034-3050 and no other RCP's on shift trained.

## 2023-08-11 NOTE — PROGRESS NOTES
[facility]  INPATIENT PHYSICAL THERAPY  DAILY NOTE  [facility] ORTHOPEDICS 7K - 7K-07/007-A    Time In: 1607  Time Out: 5292  Timed Code Treatment Minutes: 27 Minutes  Minutes: 27          Date: 2023  Patient Name: Taya Damon,  Gender:  female        MRN: [id number]  : 1950  (75 y.o.)     Referring Practitioner: Nahomy Walter PA-C  Diagnosis: JACEY (acute kidney injury)  Additional Pertinent Hx: Per H&P : Patient is a 76-year-old female who presents to [facility] as an activation of a level 3 trauma following a fall out of her wheelchair. Patient overall falls in the last month or 2 with 1 to 2 days ago where she felt she might have injured her left side and 1 this morning where she slid out of her wheelchair. Patient denies hitting her head or loss of consciousness. States that when she falls out of her wheelchair she was reaching for something in front of her and slipped off of the . she was unable to get up and required EMS assist for lift. Complains of pain to her left side chest wall, left hip, left knee chronic headache. She has old injuries to her right side including ecchymosis to the right shoulder from a recent fall. Patient not taking blood thinners, on ASA. On exam patient's ABCs intact, GCS 15, and in no acute distress. Patient with pain to L chest anteriolateral and even some posterior, palpable pain to L knee. She has developed ecchymosis to the R anterior shoulder from a pervious fall. Trauma was consulted when pt was found to have two left rib fractures and poor success with incentive spirometer. Chest CT and L knee XR pending. Plan for patient to admitted under trauma surgery with consults placed to hospitalist services. Pt also with Recent nondisplaced fracture neck of fifth metatarsal bone R LE .      Prior Level of Function:  Lives With: Alone  Type of Home: Apartment  Home Layout: One level  Home Access: Elevator (3rd floor apartment)  Home

## 2023-08-11 NOTE — PLAN OF CARE
Problem: Discharge Planning  Goal: Discharge to home or other facility with appropriate resources  Outcome: Adequate for Discharge     Problem: Pain  Goal: Verbalizes/displays adequate comfort level or baseline comfort level  Outcome: Adequate for Discharge  Flowsheets (Taken 8/11/2023 0251 by Florence Man RN)  Verbalizes/displays adequate comfort level or baseline comfort level:   Encourage patient to monitor pain and request assistance   Assess pain using appropriate pain scale     Problem: Safety - Adult  Goal: Free from fall injury  Outcome: Adequate for Discharge     Problem: ABCDS Injury Assessment  Goal: Absence of physical injury  Outcome: Adequate for Discharge     Problem: Skin/Tissue Integrity  Goal: Absence of new skin breakdown  Description: 1. Monitor for areas of redness and/or skin breakdown  2. Assess vascular access sites hourly  3. Every 4-6 hours minimum:  Change oxygen saturation probe site  4. Every 4-6 hours:  If on nasal continuous positive airway pressure, respiratory therapy assess nares and determine need for appliance change or resting period.   Outcome: Adequate for Discharge     Problem: Respiratory - Adult  Goal: Achieves optimal ventilation and oxygenation  8/11/2023 1622 by Xu York RN  Outcome: Adequate for Discharge  8/11/2023 0539 by Lupe Gaytan RCP  Outcome: Progressing  Goal: Clear lung sounds  Description: Clear lung sounds  8/11/2023 1622 by Xu York RN  Outcome: Adequate for Discharge  8/11/2023 0539 by Lupe Gaytan RCP  Outcome: Progressing     Problem: Chronic Conditions and Co-morbidities  Goal: Patient's chronic conditions and co-morbidity symptoms are monitored and maintained or improved  Outcome: Adequate for Discharge     Problem: Nutrition Deficit:  Goal: Optimize nutritional status  Outcome: Adequate for Discharge

## 2023-08-11 NOTE — PROGRESS NOTES
Hospitalist Progress Note    Patient:  Judith Lugo      Unit/Bed:7K-07/007-A    YOB: 1950    MRN: [id number]       Acct: [de-identified]     PCP: Jorge Mccoy MD    Date of Admission: 8/8/2023    Assessment/Plan:    Recurrent falls--initial high-sensitivity troponin was normal; CT chest did not reveal central pulmonary artery embolism; CT head does not reveal anything acute; CT C-spine reveals nothing acute; on 8/9 checked orthostatic vital signs~not significant; questioning if secondary to possible some dehydration as she is on Lasix and Aldactone along with an JACEY; states she follows at [facility] with neurology which is Dr. Bradly Hairston along with pain management which is Dr. Meron Kennedy; questioning if she needs to follow-up with orthopedic spine as patient states that \"my legs give out\" tells me she has a history of 5 herniated vertebrae~has an appointment with pain management this coming Tuesday per the patient  Right shoulder/clavicle pain--x ray ordered that did not reveal any acute findings  Acute nondisplaced fracture of the left fifth and seventh ribs--trauma services  Nondisplaced fracture of the neck of the fifth metatarsal bone--Per review of trauma note concern for bilateral chronic fractures however plan is to continue respiratory/pulmonary toileting and rib protocol  Primary hypertension, controlled--labile, on Toprol with hold parameters, monitor  Diabetes mellitus type 2, uncontrolled--on [place], medium dose sliding scale; monitor  JACEY--creatinine was noted to be 0.7 on November 22, 2022 and on admission was noted to be 1.4; Lasix/Aldactone on hold; creatinine 0.6; BUN improved to 14; resolved  CAD--on aspirin, statin, Toprol  Hyperlipidemia--statin  Depression/schizoaffective disorder/PTSD--treated  Chronic low back pain  Chronic venous stasis  Super obesity with BMI 51.57  Physical deconditioning/debility--PT/OT; patient is mostly wheelchair-bound, she cortical buckling of several bilateral anterior ribs appearing chronic. No definite acute/displaced rib fracture. No vertebral body fracture. Chronic bilateral rib fractures. No consolidation, pleural effusion, or pneumothorax. This document has been electronically signed by: Sally Law MD on 08/08/2023 07:26 PM All CTs at this facility use dose modulation techniques and iterative reconstructions, and/or weight-based dosing when appropriate to reduce radiation to a low as reasonably achievable. CT CERVICAL SPINE WO CONTRAST    Result Date: 8/8/2023  PROCEDURE: CT CERVICAL SPINE WO CONTRAST CLINICAL INFORMATION: fall. COMPARISON: CT scan of the cervical spine dated 7/31/2023. Robbert Catracho TECHNIQUE: 3 mm noncontrast axial images were obtained through the cervical spine with sagittal and coronal reconstructions. All CT scans at this facility use dose modulation, iterative reconstruction, and/or weight-based dosing when appropriate to reduce radiation dose to as low as reasonably achievable. FINDINGS: The cervical vertebral bodies are normally aligned. There is no fracture. There is no prevertebral soft tissue swelling. There are degenerative changes especially at C3-4, C4-5 and C5-6. Robbert Catracho No suspicious osseous lesions are present. At C1-2 there is normal alignment of the dens relative to anterior arch of C1. At C2-3, C3-4, C4-5, C5-6, C6-7 and C7-T1 there is no disc herniation, canal or foraminal stenosis. There is slight heterogeneity involving the right and left lobes of the thyroid gland. .  There are no suspicious findings in the lung apices. 1. Stable CT scan of the cervical spine, no interval change since previous study dated 7/31/2022, no acute fracture noted. . **This report has been created using voice recognition software. It may contain minor errors which are inherent in voice recognition technology. ** Final report electronically signed by DR Michelle Goss on 8/8/2023 11:53 AM      DVT prophylaxis: [x]

## 2023-08-11 NOTE — PROGRESS NOTES
consults placed to hospitalist services . Case discussed and care coordinated with trauma surgeon, Dr. Deepika Roman. \"    Restrictions/Precautions:  Restrictions/Precautions: Weight Bearing, General Precautions, Fall Risk  Right Lower Extremity Weight Bearing: Weight Bearing As Tolerated  Required Braces or Orthoses  Right Lower Extremity Brace: Boot  Position Activity Restriction  Other position/activity restrictions: Fractures of the left fifth and seventh ribs, and h/o 7/31/2023 nondisplaced fracture of the neck of the 5th metatarsal bone     SUBJECTIVE: Rn okayed OT session. Upon entry Pt. Agreeable to participate and requesting to utilize the bathroom     PAIN: no pain voiced    Vitals: Nurse checked vitals prior to session    COGNITION: Decreased Recall, Decreased Problem Solving, and Decreased Safety Awareness    ADL:   Footwear Management: Dependent. To don socks  Toileting: Dependent. Posterior jose care  Toilet Transfer: Contact Guard Assistance. Iona Pantoja BALANCE:  Sitting Balance:  Stand By Assistance. Standing Balance: Contact Guard Assistance. BED MOBILITY:  Not Tested    TRANSFERS:  Sit to Stand:  Contact Guard Assistance. Stand to Sit: Contact Guard Assistance. FUNCTIONAL MOBILITY:  Assistive Device: Rolling Walker  Assist Level:  Contact Guard Assistance. Distance:  to/from [address]:  Patient completed BUE strengthening exercises with skilled education on HEP: completed x10 reps x1 set with AROM in all joints and all planes in order to improve UE strength and activity tolerance required for BADL routine and toilet / shower transfers. Patient tolerated , requiring  rest breaks. Patient also required visual and verbal cues for technique.         ASSESSMENT:     Activity Tolerance:  Patient tolerance of  treatment: Fair treatment tolerance      Discharge Recommendations: Subacute/skilled nursing facility  Equipment Recommendations: Equipment Needed: No  Plan: Times Per Week: 5x  Times Per Day: Once a day  Current Treatment Recommendations: Strengthening, Balance training, Functional mobility training, Endurance training, Safety education & training, Patient/Caregiver education & training, Equipment evaluation, education, & procurement, Self-Care / ADL, Home management training    Patient Education  Patient Education: ADL's, Home Exercise Program, Importance of Increasing Activity, and Assistive Device Safety    Goals  Short Term Goals  Time Frame for Short Term Goals: Until discharge  Short Term Goal 1: Pt will complete BUE strengthening exercises with min vcs for technique to increase indep and endurance with all self cares and transfers. Short Term Goal 2: Pt will complete standing tolerance x 3 minutes with 1 UE release and SBA to increase indep and endutance with all sinkside grooming. Short Term Goal 3: Pt will complete stand pivots to/from Various surfaces with SBA and min vcs for safety to increase indep with toileting. Short Term Goal 4: Pt will complete LB dressing with LHAE PRN and min A to increase indep in home environment. Short Term Goal 5: Pt will complete BADL routine with SBA and min vcs for safety to increase safety in home environment. Additional Goals?: No    Following session, patient left in safe position with all fall risk precautions in place.

## 2023-08-11 NOTE — PROGRESS NOTES
[facility]  Trauma Surgery [facility]  Daily Progress Note  Pt Name: Taya Damon  Medical Record Number: [id number]  Date of Birth 1950   Today's Date: 8/11/2023    HD: # 3    CC: pain all over, chronic     ASSESSMENT  1. Active Hospital Problems    Diagnosis Date Noted    Multiple closed fractures of ribs of left side [S22.42XA] 08/08/2023    Fall [W19. XXXA] 07/26/2013         PLAN  Patient admitted under Trauma Services (w/ tele) to [address] [place] [address] by fall               - Mechanical in nature               - PT/OT eval and treat              - Fall precautions               - Hx recent increased falls/ recurring      Nondisplaced acute fractures of the left fifth and seventh   - Rib fracture protocol              -Tizanidine               - Pain control              - Lidoderm patches              - Incentive spirometry/cough and deep breathing              - Repeat chest x-ray prn               - Monitor respiratory status and continuous O2              - CT chest notes no acute/displaced rib fractures; chronic bilateral rib fractures are seen     JACEY - resolved              - BUN 32/ Creatinine 1.4              - IVF hydration and avoid nephrotoxic agents               - Repeat labs in the am              - Hospitalist assisting with management   - 08/10: BUN 14, Creatinine 0.7, GFR >60     Left knee pain              - AROM intact, no signs trauma, reports hx arthritis               - XR of the left knee with no fracture or dislocation, tricompartmental arthritis noted               - Pain control and PT/OT     PMH CAD, hypertension, hyperlipidemia, asthma, chronic venous stasis, DM type II, recurrent UTIs, GERD, diverticulosis, chronic low back pain, postlaminectomy syndrome, asthma, chronic bronchitis, depression, morbid obesity              - Consult hospitalist for med mgmt               - Accuchecks qAC and HS                 Consults: hospitalist      Pain Management osseous lesions are present. At C1-2 there is normal alignment of the dens relative to anterior arch of C1. At C2-3, C3-4, C4-5, C5-6, C6-7 and C7-T1 there is no disc herniation, canal or foraminal stenosis. There is slight heterogeneity involving the right and left lobes of the thyroid gland. .  There are no suspicious findings in the lung apices. 1. Stable CT scan of the cervical spine, no interval change since previous study dated 7/31/2022, no acute fracture noted. . **This report has been created using voice recognition software. It may contain minor errors which are inherent in voice recognition technology. ** Final report electronically signed by DR Myra Bar on 8/8/2023 11:53 AM     25 Minutes spent in patient care collectively between subjective/objective examination, chart review, documentation, clinical reasoning and discussion with attending regarding plan/interval changes.     Electronically signed by Nakia Swartz PA-C on 8/11/2023 at 2:08 PM

## 2023-08-11 NOTE — PLAN OF CARE
Problem: Respiratory - Adult  Goal: Achieves optimal ventilation and oxygenation  8/11/2023 0539 by Renny William RCP  Outcome: Progressing  Achieves optimal ventilation and oxygenation: Assess for changes in respiratory status     Problem: Respiratory - Adult  Goal: Clear lung sounds  Description: Clear lung sounds  8/11/2023 0539 by Renny William RCP  Outcome: Progressing  Continue therapy as ordered to achieve and maintain clear breath sounds and improve aeration.

## 2023-08-11 NOTE — PROGRESS NOTES
Respiratory Care is following the rib fracture order set. Patient's position when testing was done was sitting. A Negative Inspiratory Force (NIF) was performed with patient achieving a NIF of >-40 cm H2O. The NIF was greater than 25 cm H2O. A Forced Vital Capacity (FVC) was obtained with patient achieving an FVC of 1.71 liters. The patient's calculated ideal body weight, (IBW) is  47.8 kg. 0.020 liters/kg of the patient's IBW is .956 liters. The patient's FVC was greater than 0.020 liters/kg of IBW. Based on the spirometry measurement alone, patient does not meet ICU admission criteria. Previous FVC was 1.37 liters and previous NIF was -40 cm H2O. Patient's NIF and FVC are improving from previous screening(s). Last pain medication was given on  8/11 @ 0426. Physician was not called regarding spirometry measurement.

## 2023-08-11 NOTE — CARE COORDINATION
8/11/23, 2:03 PM EDT    Patient goals/plan/ treatment preferences discussed by  and . Patient goals/plan/ treatment preferences reviewed with patient/ family. Patient/ family verbalize understanding of discharge plan and are in agreement with goal/plan/treatment preferences. Understanding was demonstrated using the teach back method. AVS provided by RN at time of discharge, which includes all necessary medical information pertaining to the patients current course of illness, treatment, post-discharge goals of care, and treatment preferences. Services At/After Discharge: Home Health       IMM Letter  IMM Letter given to Patient/Family/Significant other/Guardian/POA/by[de-identified] CM: Danielle Sin RN  IMM Letter date given[de-identified] 08/11/23  IMM Letter time given[de-identified] 1461      Patient is anticipated weekend discharge returning home with [facility]. SW updated Derguilhermea Minium at [facility].

## 2023-08-11 NOTE — DISCHARGE SUMMARY
5.2 meq/L    Chloride 109 98 - 111 meq/L    CO2 23 23 - 33 meq/L    Glucose 99 70 - 108 mg/dL    BUN 23 (H) 7 - 22 mg/dL    Creatinine 0.8 0.4 - 1.2 mg/dL    Calcium 8.1 (L) 8.5 - 10.5 mg/dL   Anion Gap    Collection Time: 08/09/23  5:09 AM   Result Value Ref Range    Anion Gap 9.0 8.0 - 16.0 meq/L   Glomerular Filtration Rate, Estimated    Collection Time: 08/09/23  5:09 AM   Result Value Ref Range    Est, Glom Filt Rate >60 >60 ml/min/1.73m2   POCT Glucose    Collection Time: 08/09/23  6:48 AM   Result Value Ref Range    POC Glucose 84 70 - 108 mg/dl   POCT glucose    Collection Time: 08/09/23 10:55 AM   Result Value Ref Range    POC Glucose 134 (H) 70 - 108 mg/dl   POCT Glucose    Collection Time: 08/09/23  3:50 PM   Result Value Ref Range    POC Glucose 146 (H) 70 - 108 mg/dl   POCT glucose    Collection Time: 08/09/23  7:55 PM   Result Value Ref Range    POC Glucose 139 (H) 70 - 108 mg/dl   CBC auto differential    Collection Time: 08/10/23  5:55 AM   Result Value Ref Range    WBC 6.5 4.8 - 10.8 thou/mm3    RBC 3.74 (L) 4.20 - 5.40 mill/mm3    Hemoglobin 10.7 (L) 12.0 - 16.0 gm/dl    Hematocrit 34.2 (L) 37.0 - 47.0 %    MCV 91.4 81.0 - 99.0 fL    MCH 28.6 26.0 - 33.0 pg    MCHC 31.3 (L) 32.2 - 35.5 gm/dl    RDW-CV 14.4 11.5 - 14.5 %    RDW-SD 48.3 (H) 35.0 - 45.0 fL    Platelets 542 007 - 947 thou/mm3    MPV 10.8 9.4 - 12.4 fL    Seg Neutrophils 51.5 %    Lymphocytes 28.0 %    Monocytes 15.1 %    Eosinophils 4.6 %    Basophils 0.5 %    Immature Granulocytes 0.3 %    Segs Absolute 3.3 1.8 - 7.7 thou/mm3    Lymphocytes Absolute 1.8 1.0 - 4.8 thou/mm3    Monocytes Absolute 1.0 0.4 - 1.3 thou/mm3    Eosinophils Absolute 0.3 0.0 - 0.4 thou/mm3    Basophils Absolute 0.0 0.0 - 0.1 thou/mm3    Immature Grans (Abs) 0.02 0.00 - 0.07 thou/mm3    nRBC 0 /100 wbc   Basic Metabolic Panel w/ Reflex to MG    Collection Time: 08/10/23  5:55 AM   Result Value Ref Range    Sodium 141 135 - 145 meq/L    Potassium reflex Magnesium reflex Magnesium 4.2 3.5 - 5.2 meq/L    Chloride 107 98 - 111 meq/L    CO2 23 23 - 33 meq/L    Glucose 131 (H) 70 - 108 mg/dL    BUN 9 7 - 22 mg/dL    Creatinine 0.6 0.4 - 1.2 mg/dL    Calcium 9.3 8.5 - 10.5 mg/dL   Anion Gap    Collection Time: 08/11/23  5:45 AM   Result Value Ref Range    Anion Gap 11.0 8.0 - 16.0 meq/L   Glomerular Filtration Rate, Estimated    Collection Time: 08/11/23  5:45 AM   Result Value Ref Range    Est, Glom Filt Rate >60 >60 ml/min/1.73m2   POCT Glucose    Collection Time: 08/11/23  6:29 AM   Result Value Ref Range    POC Glucose 146 (H) 70 - 108 mg/dl   POCT Glucose    Collection Time: 08/11/23 11:20 AM   Result Value Ref Range    POC Glucose 176 (H) 70 - 108 mg/dl       Discharge condition: Stable  Disposition: Home    Electronically signed by Virgen You PA-C on 8/11/2023 at 2:10 PM

## 2023-09-24 ENCOUNTER — APPOINTMENT (OUTPATIENT)
Dept: CT IMAGING | Age: 73
End: 2023-09-24
Payer: MEDICARE

## 2023-09-24 ENCOUNTER — APPOINTMENT (OUTPATIENT)
Dept: GENERAL RADIOLOGY | Age: 73
End: 2023-09-24
Payer: MEDICARE

## 2023-09-24 ENCOUNTER — HOSPITAL ENCOUNTER (EMERGENCY)
Age: 73
Discharge: HOME | End: 2023-09-25
Attending: STUDENT IN AN ORGANIZED HEALTH CARE EDUCATION/TRAINING PROGRAM
Payer: MEDICARE

## 2023-09-24 DIAGNOSIS — S02.2XXA CLOSED FRACTURE OF NASAL BONE, INITIAL ENCOUNTER: ICD-10-CM

## 2023-09-24 DIAGNOSIS — S92.354K CLOSED NONDISPLACED FRACTURE OF FIFTH METATARSAL BONE OF RIGHT FOOT WITH NONUNION, SUBSEQUENT ENCOUNTER: ICD-10-CM
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
## 2023-09-24 LAB
BASOPHILS ABSOLUTE: 0 THOU/MM3 (ref 0–0.1)
BASOPHILS NFR BLD AUTO: 0.3 %
DEPRECATED RDW RBC AUTO: 51.8 FL (ref 35–45)
EOSINOPHIL NFR BLD AUTO: 2.9 %
EOSINOPHILS ABSOLUTE: 0.2 THOU/MM3 (ref 0–0.4)
ERYTHROCYTE [DISTWIDTH] IN BLOOD BY AUTOMATED COUNT: 15.5 % (ref 11.5–14.5)
HCT VFR BLD AUTO: 40.2 % (ref 37–47)
HGB BLD-MCNC: 12.8 GM/DL (ref 12–16)
IMM GRANULOCYTES # BLD AUTO: 0.02 THOU/MM3 (ref 0–0.07)
IMM GRANULOCYTES NFR BLD AUTO: 0.2 %
LYMPHOCYTES ABSOLUTE: 3.3 THOU/MM3 (ref 1–4.8)
LYMPHOCYTES NFR BLD AUTO: 38.5 %
MCH RBC QN AUTO: 29.4 PG (ref 26–33)
MCHC RBC AUTO-ENTMCNC: 31.8 GM/DL (ref 32.2–35.5)
MCV RBC AUTO: 92.2 FL (ref 81–99)
MONOCYTES ABSOLUTE: 0.9 THOU/MM3 (ref 0.4–1.3)
MONOCYTES NFR BLD AUTO: 10 %
NEUTROPHILS NFR BLD AUTO: 48.1 %
NRBC BLD AUTO-RTO: 0 /100 WBC
PLATELET # BLD AUTO: 236 THOU/MM3 (ref 130–400)
PMV BLD AUTO: 11.1 FL (ref 9.4–12.4)
RBC # BLD AUTO: 4.36 MILL/MM3 (ref 4.2–5.4)
SEGMENTED NEUTROPHILS ABSOLUTE COUNT: 4.1 THOU/MM3 (ref 1.8–7.7)
WBC # BLD AUTO: 8.6 THOU/MM3 (ref 4.8–10.8)

## 2023-09-24 PROCEDURE — 85025 COMPLETE CBC W/AUTO DIFF WBC: CPT

## 2023-09-24 PROCEDURE — 80053 COMPREHEN METABOLIC PANEL: CPT

## 2023-09-24 PROCEDURE — 36415 COLL VENOUS BLD VENIPUNCTURE: CPT

## 2023-09-24 PROCEDURE — 93005 ELECTROCARDIOGRAM TRACING: CPT | Performed by: STUDENT IN AN ORGANIZED HEALTH CARE EDUCATION/TRAINING PROGRAM

## 2023-09-24 PROCEDURE — 72131 CT LUMBAR SPINE W/O DYE: CPT

## 2023-09-24 PROCEDURE — 84484 ASSAY OF TROPONIN QUANT: CPT

## 2023-09-24 PROCEDURE — 72128 CT CHEST SPINE W/O DYE: CPT

## 2023-09-24 PROCEDURE — 70450 CT HEAD/BRAIN W/O DYE: CPT

## 2023-09-24 PROCEDURE — 73630 X-RAY EXAM OF FOOT: CPT

## 2023-09-24 PROCEDURE — 70486 CT MAXILLOFACIAL W/O DYE: CPT

## 2023-09-24 PROCEDURE — 71045 X-RAY EXAM CHEST 1 VIEW: CPT

## 2023-09-24 PROCEDURE — 99285 EMERGENCY DEPT VISIT HI MDM: CPT

## 2023-09-24 PROCEDURE — 87636 SARSCOV2 & INF A&B AMP PRB: CPT

## 2023-09-24 PROCEDURE — 72125 CT NECK SPINE W/O DYE: CPT

## 2023-09-24 PROCEDURE — 83735 ASSAY OF MAGNESIUM: CPT

## 2023-09-24 RX ORDER — ACETAMINOPHEN 500 MG
1000 TABLET ORAL ONCE
Status: DISCONTINUED | OUTPATIENT
Start: 2023-09-25 | End: 2023-09-25

## 2023-09-24 ASSESSMENT — PAIN DESCRIPTION - ONSET: ONSET: ON-GOING

## 2023-09-24 ASSESSMENT — PAIN DESCRIPTION - FREQUENCY: FREQUENCY: CONTINUOUS

## 2023-09-24 ASSESSMENT — PAIN - FUNCTIONAL ASSESSMENT: PAIN_FUNCTIONAL_ASSESSMENT: 0-10

## 2023-09-24 ASSESSMENT — PAIN SCALES - GENERAL: PAINLEVEL_OUTOF10: 5

## 2023-09-25 ENCOUNTER — APPOINTMENT (OUTPATIENT)
Dept: GENERAL RADIOLOGY | Age: 73
End: 2023-09-25
Payer: MEDICARE

## 2023-09-25 VITALS
WEIGHT: 250 LBS | RESPIRATION RATE: 19 BRPM | HEIGHT: 61 IN | HEART RATE: 68 BPM | OXYGEN SATURATION: 100 % | SYSTOLIC BLOOD PRESSURE: 153 MMHG | BODY MASS INDEX: 47.2 KG/M2 | TEMPERATURE: 97.7 F | DIASTOLIC BLOOD PRESSURE: 81 MMHG

## 2023-09-25 LAB
ALBUMIN SERPL BCG-MCNC: 3.5 G/DL (ref 3.5–5.1)
ALP SERPL-CCNC: 115 U/L (ref 38–126)
ALT SERPL W/O P-5'-P-CCNC: 29 U/L (ref 11–66)
ANION GAP SERPL CALC-SCNC: 12 MEQ/L (ref 8–16)
AST SERPL-CCNC: 28 U/L (ref 5–40)
BILIRUB SERPL-MCNC: 0.3 MG/DL (ref 0.3–1.2)
BUN SERPL-MCNC: 27 MG/DL (ref 7–22)
CALCIUM SERPL-MCNC: 8.9 MG/DL (ref 8.5–10.5)
CHLORIDE SERPL-SCNC: 100 MEQ/L (ref 98–111)
CO2 SERPL-SCNC: 23 MEQ/L (ref 23–33)
CREAT SERPL-MCNC: 0.8 MG/DL (ref 0.4–1.2)
EKG ATRIAL RATE: 63 BPM
EKG P AXIS: 65 DEGREES
EKG P-R INTERVAL: 176 MS
EKG Q-T INTERVAL: 464 MS
EKG QRS DURATION: 108 MS
EKG QTC CALCULATION (BAZETT): 474 MS
EKG R AXIS: 6 DEGREES
EKG T AXIS: 66 DEGREES
EKG VENTRICULAR RATE: 63 BPM
FLUAV RNA RESP QL NAA+PROBE: NOT DETECTED
FLUBV RNA RESP QL NAA+PROBE: NOT DETECTED
GFR SERPL CREATININE-BSD FRML MDRD: > 60 ML/MIN/1.73M2
GLUCOSE SERPL-MCNC: 117 MG/DL (ref 70–108)
MAGNESIUM SERPL-MCNC: 1.8 MG/DL (ref 1.6–2.4)
OSMOLALITY SERPL CALC.SUM OF ELEC: 276.2 MOSMOL/KG (ref 275–300)
POTASSIUM SERPL-SCNC: 5.1 MEQ/L (ref 3.5–5.2)
PROT SERPL-MCNC: 6.6 G/DL (ref 6.1–8)
SARS-COV-2 RNA RESP QL NAA+PROBE: NOT DETECTED
SODIUM SERPL-SCNC: 135 MEQ/L (ref 135–145)
TROPONIN, HIGH SENSITIVITY: 8 NG/L (ref 0–12)

## 2023-09-25 PROCEDURE — 93010 ELECTROCARDIOGRAM REPORT: CPT | Performed by: NUCLEAR MEDICINE

## 2023-09-25 PROCEDURE — 6370000000 HC RX 637 (ALT 250 FOR IP): Performed by: STUDENT IN AN ORGANIZED HEALTH CARE EDUCATION/TRAINING PROGRAM

## 2023-09-25 PROCEDURE — 72190 X-RAY EXAM OF PELVIS: CPT

## 2023-09-25 RX ORDER — HYDROCODONE BITARTRATE AND ACETAMINOPHEN 5; 325 MG/1; MG/1
1 TABLET ORAL ONCE
Status: COMPLETED | OUTPATIENT
Start: 2023-09-25 | End: 2023-09-25

## 2023-09-25 RX ORDER — ACETAMINOPHEN 325 MG/1
650 TABLET ORAL ONCE
Status: COMPLETED | OUTPATIENT
Start: 2023-09-25 | End: 2023-09-25

## 2023-09-25 RX ADMIN — HYDROCODONE BITARTRATE AND ACETAMINOPHEN 1 TABLET: 5; 325 TABLET ORAL at 01:26

## 2023-09-25 RX ADMIN — ACETAMINOPHEN 650 MG: 325 TABLET ORAL at 01:26

## 2023-09-25 ASSESSMENT — PAIN SCALES - GENERAL: PAINLEVEL_OUTOF10: 5

## 2023-09-25 NOTE — ED TRIAGE NOTES
Pt presents to ED via EMS for evaluation of fall. Pt states that this evening she stood up out of bed and \"everything was black\". Pt states that she also believes she hit her foot on something when she fell. Pt rates current pain 5/10. Pt states she did hit her head. Pt denies blood thinners. Pt denies CP or SOB. Vitals, labs, EKG obtained at this time.

## 2023-09-25 NOTE — ED PROVIDER NOTES
treatment significantly limited by social determinants of health. ED COURSE   ED Medications administered this visit (None if left blank):   Medications   HYDROcodone-acetaminophen (NORCO) 5-325 MG per tablet 1 tablet (1 tablet Oral Given 9/25/23 0126)   acetaminophen (TYLENOL) tablet 650 mg (650 mg Oral Given 9/25/23 0126)       ED Course as of 09/25/23 0128   Mon Sep 25, 2023   0109 Impression:  Distal 5th metatarsal fracture does not appear healed. Possible age-indeterminate nondisplaced distal 4th metatarsal fracture [SC]   0109 CT FACIAL BONES WO CONTRAST  Small nasal bone fractures. 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 Patient is agreeable to plan. Outpatient Follow-Up:  [facility]  [address] [facility] [address] [phone]    Schedule an appointment as soon as possible for a visit       Shay Quiroz MD  [address]  [phone]    Schedule an appointment as soon as possible for a visit       [facility]  [address] [facility]  [address] [facility] [address] [facility]  Go to   If symptoms worsen        This transcription was electronically signed. Parts of this transcriptions may have been dictated by use of voice recognition software and electronically transcribed. The transcription may contain errors not detected in proofreading.  Please refer to my supervising physician's documentation if

## 2023-09-25 NOTE — DISCHARGE INSTRUCTIONS
You are seen this evening for injuries after a fall. Given new fracture of your nasal bone. The fracture in your right foot appears to be nonhealed. Restart wearing your Unna boot on your right foot. Follow-up with your podiatrist, Dr. John Bravo as scheduled later this week. Follow-up with Dr. Jenn Shaffer, ENT call his office to make an appointment. If symptoms are worse or other new concerns please come back to the Emergency Department for re-evaluation.

## 2023-09-26 ENCOUNTER — TELEPHONE (OUTPATIENT)
Dept: ENT CLINIC | Age: 73
End: 2023-09-26

## 2023-10-11 ENCOUNTER — OFFICE VISIT (OUTPATIENT)
Dept: ENT CLINIC | Age: 73
End: 2023-10-11
Payer: MEDICARE

## 2023-10-11 VITALS
OXYGEN SATURATION: 97 % | HEIGHT: 61 IN | BODY MASS INDEX: 47.77 KG/M2 | TEMPERATURE: 97.3 F | WEIGHT: 253 LBS | HEART RATE: 70 BPM | RESPIRATION RATE: 20 BRPM | DIASTOLIC BLOOD PRESSURE: 72 MMHG | SYSTOLIC BLOOD PRESSURE: 124 MMHG

## 2023-10-11 DIAGNOSIS — S02.2XXD CLOSED FRACTURE OF NASAL BONE WITH ROUTINE HEALING, SUBSEQUENT ENCOUNTER: Primary | ICD-10-CM

## 2023-10-11 DIAGNOSIS — J34.89 RHINORRHEA: ICD-10-CM

## 2023-10-11 DIAGNOSIS — R09.81 NASAL CONGESTION: ICD-10-CM

## 2023-10-11 PROCEDURE — G8400 PT W/DXA NO RESULTS DOC: HCPCS | Performed by: REGISTERED NURSE

## 2023-10-11 PROCEDURE — G8427 DOCREV CUR MEDS BY ELIG CLIN: HCPCS | Performed by: REGISTERED NURSE

## 2023-10-11 PROCEDURE — 3078F DIAST BP <80 MM HG: CPT | Performed by: REGISTERED NURSE

## 2023-10-11 PROCEDURE — 99214 OFFICE O/P EST MOD 30 MIN: CPT | Performed by: REGISTERED NURSE

## 2023-10-11 PROCEDURE — G8484 FLU IMMUNIZE NO ADMIN: HCPCS | Performed by: REGISTERED NURSE

## 2023-10-11 PROCEDURE — 1123F ACP DISCUSS/DSCN MKR DOCD: CPT | Performed by: REGISTERED NURSE

## 2023-10-11 PROCEDURE — 1090F PRES/ABSN URINE INCON ASSESS: CPT | Performed by: REGISTERED NURSE

## 2023-10-11 PROCEDURE — G8417 CALC BMI ABV UP PARAM F/U: HCPCS | Performed by: REGISTERED NURSE

## 2023-10-11 PROCEDURE — 3074F SYST BP LT 130 MM HG: CPT | Performed by: REGISTERED NURSE

## 2023-10-11 PROCEDURE — 3017F COLORECTAL CA SCREEN DOC REV: CPT | Performed by: REGISTERED NURSE

## 2023-10-11 PROCEDURE — 1036F TOBACCO NON-USER: CPT | Performed by: REGISTERED NURSE

## 2023-10-11 NOTE — PROGRESS NOTES
[facility] [place] [facility]  [facility] EAR, NOSE AND THROAT  [address]  Dept: [phone]  Dept Fax: [phone]  Loc: [address] Henry Vazquez is a 75 y.o. female who was referred by No ref. provider found for:  Chief Complaint   Patient presents with    Follow-up     Patient is a hospital f/u for nasal fx. Patient states that it still hurts across the bridge of her nose. She says it hurts to sniff, sneeze, and sinus are constantly running. She said it hurts when glasses sit on her nose. Diamond Hamman HPI:     Estela Chowdhury presents today for hospital follow up for nasal fracture. She endorses that she lives in [place] and fell out of her chair when she was trying to move around her room on 9/24/23 and presented to the ER for imaging. Patient reports having a substantial history of falls and she states she has fallen approximately 5 times in the last several months. She describes that when she fell she had some episodic bleeding from her left side and right anterior nare that stopped with slight pressure. Patient reports she is still having some tenderness to the top of her nose where her glasses sit and tenderness when she blows her nose. She describes that there is no swelling or bruising remaining and that her nose is not deformed. Patient states she has most likely broken her nose on multiple occasions before and denies that it looks any different than it usually does. She states she has had longstanding issues with nasal congestion and persistent rhinorrhea. Patient reports that her ability to breathe through her nose is not any worse than her normal. She describes that she utilizes Nyquil at nighttime and vicks to her internal nares every now and then and it helps with her congestion. Patient states that she has longstanding persistent allergy symptoms and has been on Claritin and Singulair.  She declines wanting to add a nasal spray to her

## 2023-10-22 NOTE — PROGRESS NOTES
[facility]    Patient: Narcisa Donahue, 75 y.o.   : 1950  10/24/2023    Patient of Dr. Kirstin Herrera   Patient presents with    Follow-up     Asthma 6 month f/u with no testing. MARCY  Sapna Ortiz is here for follow up for asthma. Patient gets a ride to her appointments. She uses the wheelchair all the time. She lives alone at [facility] and her son lives on the floor below her. She does not walk at home. She states she is wheelchair bound and pivots from her chair. Overall patient reports respiratory symptoms have been stable since last appointment. Patient reports poor compliance with inhaled medications (Pulmicort neb). She reports she uses as needed. She states she uses once every 2 weeks. Patient does not use her albuterol inhaler or nebulizer. Patient reports physical limitation due to respiratory symptoms. Her past medical history is significant for reflux, arrhythmia, arthritis, CAD, chronic bronchitis, COVID-19, depression, diabetes, hyperlipidemia, hypertension, kidney disease, asthma, tinnitus. Patient lost 9 lbs since last appointment     Patient has had several admissions & ED visits for falls since last appointment. See encounters. Patient reports nose pain from falling and has had to breathe thru her mouth. CT Chest 23 - no consolidation, pleural effusion, or pneumothorax  Chest x-ray 23 - no consolidation or CHF    Allergy regimen: claritin as needed and singulair  She reports that she cannot use nasal spray due to allergic reaction. She reports she no longer wishes to use nasal sprays.      Complaints: Shortness of Breath  Onset Duration: Over a year  Exacerbating factors: Exertion  Alleviating factors: Rest  Associated symptoms: Cough  Pertinent negatives: Sputum Production, Hemoptysis, Wheezing, and Chest Tightness  Risk factors for lung disease: animal exposure      Progress History:   Since last

## 2023-10-24 ENCOUNTER — OFFICE VISIT (OUTPATIENT)
Dept: PULMONOLOGY | Age: 73
End: 2023-10-24
Payer: MEDICARE

## 2023-10-24 VITALS
HEIGHT: 61 IN | DIASTOLIC BLOOD PRESSURE: 60 MMHG | SYSTOLIC BLOOD PRESSURE: 118 MMHG | BODY MASS INDEX: 47.65 KG/M2 | TEMPERATURE: 98 F | WEIGHT: 252.4 LBS | OXYGEN SATURATION: 96 % | HEART RATE: 69 BPM

## 2023-10-24 DIAGNOSIS — J30.2 SEASONAL ALLERGIC RHINITIS, UNSPECIFIED TRIGGER: ICD-10-CM

## 2023-10-24 DIAGNOSIS — R94.2 DECREASED DIFFUSION CAPACITY OF LUNG: ICD-10-CM

## 2023-10-24 DIAGNOSIS — R29.6 FREQUENT FALLS: ICD-10-CM

## 2023-10-24 DIAGNOSIS — J45.30 MILD PERSISTENT ASTHMA WITHOUT COMPLICATION: Primary | ICD-10-CM

## 2023-10-24 PROCEDURE — 3078F DIAST BP <80 MM HG: CPT

## 2023-10-24 PROCEDURE — G8427 DOCREV CUR MEDS BY ELIG CLIN: HCPCS

## 2023-10-24 PROCEDURE — 1090F PRES/ABSN URINE INCON ASSESS: CPT

## 2023-10-24 PROCEDURE — 99214 OFFICE O/P EST MOD 30 MIN: CPT

## 2023-10-24 PROCEDURE — 1123F ACP DISCUSS/DSCN MKR DOCD: CPT

## 2023-10-24 PROCEDURE — 3017F COLORECTAL CA SCREEN DOC REV: CPT

## 2023-10-24 PROCEDURE — G8400 PT W/DXA NO RESULTS DOC: HCPCS

## 2023-10-24 PROCEDURE — 3074F SYST BP LT 130 MM HG: CPT

## 2023-10-24 PROCEDURE — G8484 FLU IMMUNIZE NO ADMIN: HCPCS

## 2023-10-24 PROCEDURE — G8417 CALC BMI ABV UP PARAM F/U: HCPCS

## 2023-10-24 PROCEDURE — 1036F TOBACCO NON-USER: CPT

## 2023-10-24 RX ORDER — ALBUTEROL SULFATE 2.5 MG/3ML
2.5 SOLUTION RESPIRATORY (INHALATION) EVERY 6 HOURS PRN
Qty: 120 EACH | Refills: 11 | Status: SHIPPED | OUTPATIENT
Start: 2023-10-24 | End: 2024-10-23

## 2023-10-24 RX ORDER — MONTELUKAST SODIUM 10 MG/1
10 TABLET ORAL NIGHTLY
Qty: 90 TABLET | Refills: 3 | Status: SHIPPED | OUTPATIENT
Start: 2023-10-24

## 2023-10-24 RX ORDER — BUDESONIDE 0.5 MG/2ML
1 INHALANT ORAL 2 TIMES DAILY
Qty: 120 ML | Refills: 11 | Status: SHIPPED | OUTPATIENT
Start: 2023-10-24

## 2023-10-24 ASSESSMENT — ENCOUNTER SYMPTOMS
CHEST TIGHTNESS: 0
SINUS PRESSURE: 0
COUGH: 1
RHINORRHEA: 0
SHORTNESS OF BREATH: 1
SINUS PAIN: 0
WHEEZING: 0

## 2023-10-24 NOTE — PATIENT INSTRUCTIONS
Make sure to use your budesonide nebulizer twice daily    Continue your albuterol inhaler and nebulizer. You may use one or the other every 6 hours as needed for shortness of breath or wheezing. Do NOT use both at the same time as they continue the same medication. Patient education: Avoiding asthma triggers (The Basics)  Written by the doctors and editors at [facility]  Please read the Disclaimer at the end of this page. What are asthma triggers? An asthma trigger is anything that causes asthma symptoms or makes asthma symptoms worse. It's important to know what your asthma triggers are. That way, you can avoid them so that your symptoms don't get worse. What are some common asthma triggers? Common asthma triggers are:  ?Cigarette smoke  ? Stress  ? Getting sick, like with a cold, the flu, COVID-19, or a lung, ear, or sinus infection  ? Strong cleaning products, such as bleach  ? Strong perfumes or scents  ? Air pollution  ? Certain medicines, such as aspirin and other medicines for pain or fever  ? Exercise  ? Very cold and dry air  People can have other triggers, too. These include things in the environment that they are allergic to. These are called \"allergic triggers. \" Examples of allergic triggers are:  ? Dust mites - These are tiny bugs that are too small for you to see. They live in beds, couches, carpets, and other places in your home. ?Ric Riling can grow in basements, showers, and other damp and wet places. ?Dogs and cats - People can be allergic to animal saliva, urine, or dander (flakes of dead skin). ?Pollen from trees, grass, and weeds  ? Cockroach droppings  ? Mice  How do I know what my asthma triggers are? You might already know what makes your asthma symptoms worse. But if you don't, talk with your doctor or nurse. They can help you figure it out by talking with you and asking you questions. Your doctor might do allergy tests to see if you have allergic triggers.  Allergy tests include blood

## 2023-11-16 ENCOUNTER — HOSPITAL ENCOUNTER (OUTPATIENT)
Dept: WOMENS IMAGING | Age: 73
Discharge: HOME | End: 2023-11-16
Attending: FAMILY MEDICINE
Payer: MEDICARE

## 2023-11-16 DIAGNOSIS — N95.8 POSTARTIFICIAL MENOPAUSAL SYNDROME: ICD-10-CM

## 2023-11-16 PROCEDURE — 77080 DXA BONE DENSITY AXIAL: CPT

## 2024-01-17 ENCOUNTER — TELEPHONE (OUTPATIENT)
Dept: UROLOGY | Age: 74
End: 2024-01-17

## 2024-01-17 NOTE — TELEPHONE ENCOUNTER
Patient is having trouble with incontinence. She filling 4 depends a day. She is not taking ditropan 15 mg xl daily. The prescription ran out. She does not know when she took it last. She doers not have it on her list of medications.    She is on lasix. Appointment scheduled on 02/22/2024 the next available opening.    She denies symptoms of infection.    She takes D Mannose. Her bowels are fine and denies constipation.

## 2024-01-18 ENCOUNTER — OFFICE VISIT (OUTPATIENT)
Dept: CARDIOLOGY CLINIC | Age: 74
End: 2024-01-18

## 2024-01-18 VITALS — OXYGEN SATURATION: 94 % | DIASTOLIC BLOOD PRESSURE: 68 MMHG | HEART RATE: 59 BPM | SYSTOLIC BLOOD PRESSURE: 119 MMHG

## 2024-01-18 DIAGNOSIS — I10 PRIMARY HYPERTENSION: ICD-10-CM

## 2024-01-18 DIAGNOSIS — I25.10 CORONARY ARTERY DISEASE INVOLVING NATIVE CORONARY ARTERY OF NATIVE HEART WITHOUT ANGINA PECTORIS: Primary | ICD-10-CM

## 2024-01-18 DIAGNOSIS — E78.01 FAMILIAL HYPERCHOLESTEROLEMIA: ICD-10-CM

## 2024-01-18 NOTE — PROGRESS NOTES
issues  Extremities:   No edema, no obvious claudication       Objective:  General:   Well developed, well nourished  Lungs:   Clear to auscultation  Heart:    Normal S1 S2, Slight murmur. no rubs, no gallops  Abdomen:   Soft, non tender, no organomegalies, positive bowel sounds  Extremities:   No edema, no cyanosis, good peripheral pulses  Neurological:   Awake, alert, oriented. No obvious focal deficits  Musculoskelatal:  No obvious deformities   /68   Pulse 59   LMP  (LMP Unknown)   SpO2 94%     Assessment:      Diagnosis Orders   1. Coronary artery disease involving native coronary artery of native heart without angina pectoris        2. Primary hypertension        3. Familial hypercholesterolemia        As above  Cardiac seems stable for now   Multiple chronic medical issues     Plan:  No follow-ups on file.  As above  Continue risk factor modification and medical management  Thank you for allowing me to participate in the care of your patient. Please don't hesitate to contact me regarding any further issues related to the patient care    Orders Placed:  No orders of the defined types were placed in this encounter.      Prescribed:  No orders of the defined types were placed in this encounter.         Discussed use, benefit, and side effects of prescribed medications. All patient questions answered. Pt voicedunderstanding. Instructed to continue current medications, diet and exercise. Continue risk factor modification and medical management. Patient agreed with treatment plan. Follow up as directed.    Electronically signedby Anastasiia Valero MD on 1/18/2024 at 11:16 AM

## 2024-01-19 NOTE — TELEPHONE ENCOUNTER
Patient advised myrbetriq was sent to the pharmacy and it may be 2-4 weeks for improvement. She voiced understanding and appointment confirmed

## 2024-02-22 ENCOUNTER — OFFICE VISIT (OUTPATIENT)
Dept: UROLOGY | Age: 74
End: 2024-02-22
Payer: MEDICARE

## 2024-02-22 VITALS
SYSTOLIC BLOOD PRESSURE: 112 MMHG | BODY MASS INDEX: 47.58 KG/M2 | HEIGHT: 61 IN | WEIGHT: 252 LBS | DIASTOLIC BLOOD PRESSURE: 66 MMHG

## 2024-02-22 DIAGNOSIS — N39.0 RECURRENT UTI: ICD-10-CM

## 2024-02-22 DIAGNOSIS — R32 URINARY INCONTINENCE, UNSPECIFIED TYPE: Primary | ICD-10-CM

## 2024-02-22 DIAGNOSIS — R39.14 FEELING OF INCOMPLETE BLADDER EMPTYING: ICD-10-CM

## 2024-02-22 LAB
BILIRUBIN URINE: ABNORMAL
BLOOD URINE, POC: NEGATIVE
CHARACTER, URINE: CLEAR
COLOR, URINE: YELLOW
GLUCOSE URINE: NEGATIVE MG/DL
KETONES, URINE: ABNORMAL
LEUKOCYTE CLUMPS, URINE: ABNORMAL
NITRITE, URINE: NEGATIVE
PH, URINE: 5 (ref 5–9)
POST VOID RESIDUAL (PVR): 126 ML
PROTEIN, URINE: NEGATIVE MG/DL
SPECIFIC GRAVITY, URINE: >= 1.03 (ref 1–1.03)
UROBILINOGEN, URINE: 0.2 EU/DL (ref 0–1)

## 2024-02-22 PROCEDURE — G8417 CALC BMI ABV UP PARAM F/U: HCPCS | Performed by: NURSE PRACTITIONER

## 2024-02-22 PROCEDURE — 3074F SYST BP LT 130 MM HG: CPT | Performed by: NURSE PRACTITIONER

## 2024-02-22 PROCEDURE — 0509F URINE INCON PLAN DOCD: CPT | Performed by: NURSE PRACTITIONER

## 2024-02-22 PROCEDURE — 3078F DIAST BP <80 MM HG: CPT | Performed by: NURSE PRACTITIONER

## 2024-02-22 PROCEDURE — 1036F TOBACCO NON-USER: CPT | Performed by: NURSE PRACTITIONER

## 2024-02-22 PROCEDURE — 3017F COLORECTAL CA SCREEN DOC REV: CPT | Performed by: NURSE PRACTITIONER

## 2024-02-22 PROCEDURE — 99214 OFFICE O/P EST MOD 30 MIN: CPT | Performed by: NURSE PRACTITIONER

## 2024-02-22 PROCEDURE — G8399 PT W/DXA RESULTS DOCUMENT: HCPCS | Performed by: NURSE PRACTITIONER

## 2024-02-22 PROCEDURE — 1090F PRES/ABSN URINE INCON ASSESS: CPT | Performed by: NURSE PRACTITIONER

## 2024-02-22 PROCEDURE — 1123F ACP DISCUSS/DSCN MKR DOCD: CPT | Performed by: NURSE PRACTITIONER

## 2024-02-22 PROCEDURE — 51798 US URINE CAPACITY MEASURE: CPT | Performed by: NURSE PRACTITIONER

## 2024-02-22 PROCEDURE — 81003 URINALYSIS AUTO W/O SCOPE: CPT | Performed by: NURSE PRACTITIONER

## 2024-02-22 PROCEDURE — G8484 FLU IMMUNIZE NO ADMIN: HCPCS | Performed by: NURSE PRACTITIONER

## 2024-02-22 PROCEDURE — G8427 DOCREV CUR MEDS BY ELIG CLIN: HCPCS | Performed by: NURSE PRACTITIONER

## 2024-02-22 ASSESSMENT — ENCOUNTER SYMPTOMS
BACK PAIN: 0
NAUSEA: 0
VOMITING: 0
ABDOMINAL PAIN: 0

## 2024-02-22 NOTE — PROGRESS NOTES
[facility] [place] [facility]  [facility]  [address]  [address]  [facility] [address]  Dept: [phone]  Loc: [phone]    Visit Date: 2/22/2024        HPI:     Vandana Burt is a 73 y.o. female who presents today for:  Chief Complaint   Patient presents with    Incontinence    Follow-up     1 year f/u with [facility]       HPI    Pt seen in follow up for recurrent UTIs.      Pt has a hx of multiple hospital admissions for pyelonephritis and mixed incontinence.  CT abd/pelvis 3/16/22 negative for stones or obstruction noted air in the bladder.  Cystoscopy 6/9/22 noted moderate residual urine, moderate bladder wall trabeculation and no other significant findings.  Urine culture 8/3/22 with Klebsiella.  Urine culture 12/13/22 with Klebsiella but pt asymptomatic and held off on antibiotic therapy.  We have discussed only treating symptomatic infections.       On D mannose.     At prior appt pt reported she only urinates about 3 x per day.  Has occasional episodes of unaware incontinence.  We discussed timed voiding, voiding strategies, crede maneuver, and double voiding.       No recent UTIs.  Reports overall doing well.  Started Myrbetriq in January for incontinence and reports it has significantly helped.       Current Outpatient Medications   Medication Sig Dispense Refill    mirabegron (MYRBETRIQ) 50 MG TB24 Take 50 mg by mouth daily 30 tablet 1    budesonide (PULMICORT) 0.5 MG/2ML nebulizer suspension Take 2 mLs by nebulization 2 times daily 120 mL 11    albuterol (PROVENTIL) (2.5 MG/3ML) 0.083% nebulizer solution Take 3 mLs by nebulization every 6 hours as needed for Wheezing or Shortness of Breath 120 each 11    montelukast (SINGULAIR) 10 MG tablet Take 1 tablet by mouth nightly 90 tablet 3    acetaminophen (TYLENOL) 500 MG tablet Take 1 tablet by mouth 4 times daily as needed for Pain 120 tablet 0    lidocaine (XYLOCAINE) 5 % ointment Apply topically as needed. 50 g 2

## 2024-04-22 NOTE — PROGRESS NOTES
(VITAMIN D) 25 MCG TABS Take 1 tablet by mouth daily      mupirocin (BACTROBAN) 2 % ointment Apply topically as needed       Mouthwashes (BIOTENE) LIQD oral solution Swish and spit 15 mLs 3 times daily as needed      furosemide (LASIX) 20 MG tablet Take 1 tablet by mouth daily      citalopram (CELEXA) 40 MG tablet take 1 tablet by mouth every morning  0    nystatin-triamcinolone (MYCOLOG II) 148614-6.1 UNIT/GM-% cream Apply 1 applicator topically 4 times daily Apply topically 4 times daily. As needed      Artificial Saliva (BIOTENE MOISTURIZING MOUTH) SOLN Take 1 applicator by mouth as needed      Compression Stockings MISC 1 Package by Does not apply route Knee High 1 each 0    omeprazole (PRILOSEC) 40 MG delayed release capsule take 1 capsule by mouth once daily 30 capsule 5    TRADJENTA 5 MG tablet take 1 tablet by mouth daily 30 tablet 5    simvastatin (ZOCOR) 40 MG tablet take 1 tablet by mouth at bedtime 30 tablet 5    pioglitazone (ACTOS) 45 MG tablet take 1 tablet by mouth once daily 30 tablet 5    metFORMIN (GLUCOPHAGE) 1000 MG tablet take 1 tablet by mouth twice a day with food 60 tablet 5    FREESTYLE LANCETS MISC Test 1 time daily.   Dx: 250.00-Type 2 Diabetes.  Freestyle 28G Lancets 100 each 11    Omega-3 Fatty Acids (FISH OIL) 1200 MG CAPS Take 1,200 mg by mouth daily      ASPIRIN LOW DOSE 81 MG EC tablet take 1 tablet by mouth once daily 30 tablet 11    glucose blood VI test strips (FREESTYLE LITE) strip Test once daily 100 each 11    QUEtiapine (SEROQUEL) 50 MG tablet Take 1 tablet by mouth nightly      tiZANidine (ZANAFLEX) 6 MG capsule Take 1 capsule by mouth 3 times daily (Patient not taking: Reported on 1/18/2024)      spironolactone (ALDACTONE) 25 MG tablet Take 1 tablet by mouth daily (Patient not taking: Reported on 1/18/2024)       No current facility-administered medications for this visit.     .    ROS   Review of Systems   Constitutional:  Negative for appetite change, fever and unexpected

## 2024-04-24 ENCOUNTER — OFFICE VISIT (OUTPATIENT)
Dept: PULMONOLOGY | Age: 74
End: 2024-04-24
Payer: MEDICARE

## 2024-04-24 VITALS
BODY MASS INDEX: 47.58 KG/M2 | DIASTOLIC BLOOD PRESSURE: 60 MMHG | TEMPERATURE: 98 F | HEIGHT: 61 IN | OXYGEN SATURATION: 95 % | WEIGHT: 252 LBS | HEART RATE: 66 BPM | SYSTOLIC BLOOD PRESSURE: 110 MMHG

## 2024-04-24 DIAGNOSIS — J30.2 SEASONAL ALLERGIC RHINITIS, UNSPECIFIED TRIGGER: ICD-10-CM

## 2024-04-24 DIAGNOSIS — J45.30 MILD PERSISTENT ASTHMA WITHOUT COMPLICATION: Primary | ICD-10-CM

## 2024-04-24 DIAGNOSIS — R94.2 DECREASED DIFFUSION CAPACITY OF LUNG: ICD-10-CM

## 2024-04-24 PROCEDURE — 1090F PRES/ABSN URINE INCON ASSESS: CPT

## 2024-04-24 PROCEDURE — 1036F TOBACCO NON-USER: CPT

## 2024-04-24 PROCEDURE — 3078F DIAST BP <80 MM HG: CPT

## 2024-04-24 PROCEDURE — 3017F COLORECTAL CA SCREEN DOC REV: CPT

## 2024-04-24 PROCEDURE — 3074F SYST BP LT 130 MM HG: CPT

## 2024-04-24 PROCEDURE — G8427 DOCREV CUR MEDS BY ELIG CLIN: HCPCS

## 2024-04-24 PROCEDURE — 1123F ACP DISCUSS/DSCN MKR DOCD: CPT

## 2024-04-24 PROCEDURE — G8399 PT W/DXA RESULTS DOCUMENT: HCPCS

## 2024-04-24 PROCEDURE — G8417 CALC BMI ABV UP PARAM F/U: HCPCS

## 2024-04-24 PROCEDURE — 99214 OFFICE O/P EST MOD 30 MIN: CPT

## 2024-04-24 RX ORDER — ALBUTEROL SULFATE 90 UG/1
2 AEROSOL, METERED RESPIRATORY (INHALATION) EVERY 6 HOURS PRN
Qty: 1 EACH | Refills: 11 | Status: SHIPPED | OUTPATIENT
Start: 2024-04-24

## 2024-04-24 ASSESSMENT — ENCOUNTER SYMPTOMS
SINUS PAIN: 0
COUGH: 1
SHORTNESS OF BREATH: 1
WHEEZING: 0
CHEST TIGHTNESS: 0
RHINORRHEA: 0
SINUS PRESSURE: 0

## 2024-05-31 RX ORDER — METOPROLOL SUCCINATE 25 MG/1
TABLET, EXTENDED RELEASE ORAL
Qty: 90 TABLET | Refills: 3 | Status: SHIPPED | OUTPATIENT
Start: 2024-05-31

## 2024-08-22 ENCOUNTER — OFFICE VISIT (OUTPATIENT)
Dept: UROLOGY | Age: 74
End: 2024-08-22

## 2024-08-22 VITALS — HEIGHT: 61 IN | BODY MASS INDEX: 47.61 KG/M2

## 2024-08-22 DIAGNOSIS — R39.14 FEELING OF INCOMPLETE BLADDER EMPTYING: ICD-10-CM

## 2024-08-22 DIAGNOSIS — N39.0 RECURRENT UTI: ICD-10-CM

## 2024-08-22 DIAGNOSIS — R32 URINARY INCONTINENCE, UNSPECIFIED TYPE: Primary | ICD-10-CM

## 2024-08-22 LAB
BILIRUBIN, URINE: ABNORMAL
BLOOD URINE, POC: NEGATIVE
CHARACTER, URINE: ABNORMAL
COLOR, UA: ABNORMAL
GLUCOSE URINE: NEGATIVE MG/DL
KETONES, URINE: NEGATIVE
LEUKOCYTE CLUMPS, URINE: ABNORMAL
NITRITE, URINE: POSITIVE
PH, URINE: 7.5 (ref 5–9)
PROTEIN, URINE: ABNORMAL MG/DL
SPECIFIC GRAVITY UA: 1.02 (ref 1–1.03)
UROBILINOGEN, URINE: 1 EU/DL (ref 0–1)

## 2024-08-22 ASSESSMENT — ENCOUNTER SYMPTOMS
ABDOMINAL PAIN: 0
NAUSEA: 0
BACK PAIN: 0
VOMITING: 0

## 2024-08-22 NOTE — PATIENT INSTRUCTIONS
Instruction for Double Voiding  Your healthcare provider has recommended double voiding twice daily to help your bladder to empty properly preventing leakage, infections, urinary retention.    Double Voiding:   Urinate as usual.   Stand up and walk/wait 10-15 minutes.   Go back to bathroom and urinate a second time.

## 2024-08-22 NOTE — PROGRESS NOTES
[facility] [place] [facility]  [facility] UROLOGY  [address]  [address]  [facility] [address]  Dept: [phone]  Loc: [phone]    Visit Date: 8/22/2024        HPI:     Vandana Burt is a 74 y.o. female who presents today for:  Chief Complaint   Patient presents with    Incontinence       HPI    Pt seen in follow up for recurrent UTIs.      Pt has a hx of multiple hospital admissions for pyelonephritis and mixed incontinence.  CT abd/pelvis 3/16/22 negative for stones or obstruction noted air in the bladder.  Cystoscopy 6/9/22 noted moderate residual urine, moderate bladder wall trabeculation and no other significant findings.  Urine culture 8/3/22 with Klebsiella.  Urine culture 12/13/22 with Klebsiella but pt asymptomatic and held off on antibiotic therapy.  We have discussed only treating symptomatic infections.       On D mannose.     At prior appt pt reported she only urinates about 3 x per day.  Has occasional episodes of unaware incontinence.  We discussed timed voiding, voiding strategies, crede maneuver, and double voiding.       No recent UTIs.  Denies symptoms of UTI today.  Reports PCP is treating her for yeast infections in her groin and skin folds.  Notes she is doing well with incontinence as long as she remembers to take the myrbetriq.     Current Outpatient Medications   Medication Sig Dispense Refill    metoprolol succinate (TOPROL XL) 25 MG extended release tablet take 1 tablet by mouth once daily 90 tablet 3    albuterol sulfate HFA (PROAIR HFA) 108 (90 Base) MCG/ACT inhaler Inhale 2 puffs into the lungs every 6 hours as needed for Wheezing or Shortness of Breath 1 each 11    mirabegron (MYRBETRIQ) 50 MG TB24 Take 50 mg by mouth daily 90 tablet 3    budesonide (PULMICORT) 0.5 MG/2ML nebulizer suspension Take 2 mLs by nebulization 2 times daily 120 mL 11    albuterol (PROVENTIL) (2.5 MG/3ML) 0.083% nebulizer solution Take 3 mLs by nebulization every 6 hours as

## 2024-08-25 ENCOUNTER — TELEPHONE (OUTPATIENT)
Dept: UROLOGY | Age: 74
End: 2024-08-25

## 2024-08-25 LAB
BACTERIA UR CULT: ABNORMAL
BACTERIA UR CULT: ABNORMAL
ORGANISM: ABNORMAL
ORGANISM: ABNORMAL

## 2024-08-25 NOTE — TELEPHONE ENCOUNTER
Pt's urine culture significant for bacteria but only recommend treating if she is having symptoms of infection.  She was asymptomatic at recent office visit.

## 2024-09-24 RX ORDER — MIRABEGRON 50 MG/1
50 TABLET, EXTENDED RELEASE ORAL DAILY
Qty: 90 TABLET | Refills: 3 | Status: SHIPPED | OUTPATIENT
Start: 2024-09-24

## 2024-10-01 NOTE — ED PROVIDER NOTES
ATTENDING NOTE:    I supervised and discussed the history, physical exam and the management of this patient with the resident. I reviewed the resident's note and agree with the documented findings and plan of care. Please see my additional note. I personally saw and examined the patient. I have reviewed and agree with the resident's findings, including all diagnostic interpretations and treatment plans as written. I was present for the key portion of any procedures performed and the inclusive time noted in any critical care statement.     Electronically verified by Steven Bustamante MD  08/08/23 9430 Detail Level: Generalized Continue Regimen: Vanicream soaps and moisturizers.

## 2024-10-29 ENCOUNTER — HOSPITAL ENCOUNTER (OUTPATIENT)
Dept: CT IMAGING | Age: 74
Discharge: HOME | End: 2024-10-29
Attending: FAMILY MEDICINE
Payer: MEDICARE

## 2024-10-29 DIAGNOSIS — R19.04 LEFT LOWER QUADRANT ABDOMINAL MASS: ICD-10-CM

## 2024-10-29 LAB — POC CREATININE WHOLE BLOOD: 0.8 MG/DL (ref 0.5–1.2)

## 2024-10-29 PROCEDURE — 6360000004 HC RX CONTRAST MEDICATION: Performed by: FAMILY MEDICINE

## 2024-10-29 PROCEDURE — 74177 CT ABD & PELVIS W/CONTRAST: CPT

## 2024-10-29 PROCEDURE — 82565 ASSAY OF CREATININE: CPT

## 2024-10-29 RX ORDER — IOPAMIDOL 755 MG/ML
80 INJECTION, SOLUTION INTRAVASCULAR
Status: COMPLETED | OUTPATIENT
Start: 2024-10-29 | End: 2024-10-29

## 2024-10-29 RX ADMIN — IOPAMIDOL 80 ML: 755 INJECTION, SOLUTION INTRAVENOUS at 09:17

## 2024-11-27 ENCOUNTER — HOSPITAL ENCOUNTER (EMERGENCY)
Age: 74
Discharge: HOME | End: 2024-11-27
Attending: EMERGENCY MEDICINE
Payer: MEDICARE

## 2024-11-27 ENCOUNTER — APPOINTMENT (OUTPATIENT)
Dept: GENERAL RADIOLOGY | Age: 74
End: 2024-11-27
Payer: MEDICARE

## 2024-11-27 ENCOUNTER — APPOINTMENT (OUTPATIENT)
Dept: CT IMAGING | Age: 74
End: 2024-11-27
Payer: MEDICARE

## 2024-11-27 VITALS
BODY MASS INDEX: 48.15 KG/M2 | TEMPERATURE: 98.8 F | DIASTOLIC BLOOD PRESSURE: 90 MMHG | RESPIRATION RATE: 19 BRPM | SYSTOLIC BLOOD PRESSURE: 141 MMHG | HEIGHT: 61 IN | WEIGHT: 255 LBS | OXYGEN SATURATION: 92 % | HEART RATE: 85 BPM

## 2024-11-27 DIAGNOSIS — J44.1 COPD EXACERBATION (HCC): Primary | ICD-10-CM

## 2024-11-27 LAB
ALBUMIN SERPL BCG-MCNC: 3.7 G/DL (ref 3.5–5.1)
ALP SERPL-CCNC: 71 U/L (ref 38–126)
ALT SERPL W/O P-5'-P-CCNC: 13 U/L (ref 11–66)
ANION GAP SERPL CALC-SCNC: 11 MEQ/L (ref 8–16)
AST SERPL-CCNC: 16 U/L (ref 5–40)
BASE EXCESS BLDA CALC-SCNC: 2.2 MMOL/L (ref -2–3)
BASOPHILS ABSOLUTE: 0 THOU/MM3 (ref 0–0.1)
BASOPHILS NFR BLD AUTO: 0.3 %
BILIRUB CONJ SERPL-MCNC: < 0.1 MG/DL (ref 0.1–13.8)
BILIRUB SERPL-MCNC: 0.4 MG/DL (ref 0.3–1.2)
BILIRUB UR QL STRIP.AUTO: NEGATIVE
BUN SERPL-MCNC: 22 MG/DL (ref 7–22)
CALCIUM SERPL-MCNC: 9.1 MG/DL (ref 8.5–10.5)
CHARACTER UR: CLEAR
CHLORIDE SERPL-SCNC: 104 MEQ/L (ref 98–111)
CO2 SERPL-SCNC: 25 MEQ/L (ref 23–33)
COLLECTED BY:: ABNORMAL
COLOR, UA: YELLOW
CREAT SERPL-MCNC: 0.8 MG/DL (ref 0.4–1.2)
DEPRECATED RDW RBC AUTO: 46.6 FL (ref 35–45)
DEVICE: ABNORMAL
EKG ATRIAL RATE: 72 BPM
EKG P AXIS: 56 DEGREES
EKG P-R INTERVAL: 160 MS
EKG Q-T INTERVAL: 464 MS
EKG QRS DURATION: 120 MS
EKG QTC CALCULATION (BAZETT): 508 MS
EKG R AXIS: -9 DEGREES
EKG T AXIS: 57 DEGREES
EKG VENTRICULAR RATE: 72 BPM
EOSINOPHIL NFR BLD AUTO: 2.4 %
EOSINOPHILS ABSOLUTE: 0.2 THOU/MM3 (ref 0–0.4)
ERYTHROCYTE [DISTWIDTH] IN BLOOD BY AUTOMATED COUNT: 13.4 % (ref 11.5–14.5)
FLUAV RNA RESP QL NAA+PROBE: NOT DETECTED
FLUBV RNA RESP QL NAA+PROBE: NOT DETECTED
GFR SERPL CREATININE-BSD FRML MDRD: 77 ML/MIN/1.73M2
GLUCOSE SERPL-MCNC: 131 MG/DL (ref 70–108)
GLUCOSE UR QL STRIP.AUTO: NEGATIVE MG/DL
HCO3 BLDA-SCNC: 27 MMOL/L (ref 23–28)
HCT VFR BLD AUTO: 38 % (ref 37–47)
HGB BLD-MCNC: 12 GM/DL (ref 12–16)
HGB UR QL STRIP.AUTO: NEGATIVE
IMM GRANULOCYTES # BLD AUTO: 0.03 THOU/MM3 (ref 0–0.07)
IMM GRANULOCYTES NFR BLD AUTO: 0.3 %
KETONES UR QL STRIP.AUTO: NEGATIVE
LYMPHOCYTES ABSOLUTE: 1.8 THOU/MM3 (ref 1–4.8)
LYMPHOCYTES NFR BLD AUTO: 21.1 %
MCH RBC QN AUTO: 29.9 PG (ref 26–33)
MCHC RBC AUTO-ENTMCNC: 31.6 GM/DL (ref 32.2–35.5)
MCV RBC AUTO: 94.5 FL (ref 81–99)
MONOCYTES ABSOLUTE: 1 THOU/MM3 (ref 0.4–1.3)
MONOCYTES NFR BLD AUTO: 12.2 %
NEUTROPHILS ABSOLUTE: 5.5 THOU/MM3 (ref 1.8–7.7)
NEUTROPHILS NFR BLD AUTO: 63.7 %
NITRITE UR QL STRIP: NEGATIVE
NRBC BLD AUTO-RTO: 0 /100 WBC
NT-PROBNP SERPL IA-MCNC: 305.3 PG/ML (ref 0–449)
OSMOLALITY SERPL CALC.SUM OF ELEC: 284.5 MOSMOL/KG (ref 275–300)
PCO2 TEMP ADJ BLDMV: 41 MMHG (ref 41–51)
PH BLDMV: 7.43 [PH] (ref 7.31–7.41)
PH UR STRIP.AUTO: 7.5 [PH] (ref 5–9)
PLATELET # BLD AUTO: 229 THOU/MM3 (ref 130–400)
PMV BLD AUTO: 10.5 FL (ref 9.4–12.4)
PO2 BLDMV: 44 MMHG (ref 25–40)
POTASSIUM SERPL-SCNC: 4 MEQ/L (ref 3.5–5.2)
PROCALCITONIN SERPL IA-MCNC: 0.03 NG/ML (ref 0.01–0.09)
PROT SERPL-MCNC: 6.7 G/DL (ref 6.1–8)
PROT UR STRIP.AUTO-MCNC: NEGATIVE MG/DL
RBC # BLD AUTO: 4.02 MILL/MM3 (ref 4.2–5.4)
SAO2 % BLDMV: 81 %
SARS-COV-2 RNA RESP QL NAA+PROBE: NOT DETECTED
SITE: ABNORMAL
SODIUM SERPL-SCNC: 140 MEQ/L (ref 135–145)
SP GR UR REFRACT.AUTO: 1.01 (ref 1–1.03)
TROPONIN, HIGH SENSITIVITY: 7 NG/L (ref 0–12)
TROPONIN, HIGH SENSITIVITY: < 6 NG/L (ref 0–12)
UROBILINOGEN, URINE: 0.2 EU/DL (ref 0–1)
VENTILATION MODE VENT: ABNORMAL
WBC # BLD AUTO: 8.6 THOU/MM3 (ref 4.8–10.8)
WBC #/AREA URNS HPF: NEGATIVE /[HPF]

## 2024-11-27 PROCEDURE — 93005 ELECTROCARDIOGRAM TRACING: CPT | Performed by: EMERGENCY MEDICINE

## 2024-11-27 PROCEDURE — 80048 BASIC METABOLIC PNL TOTAL CA: CPT

## 2024-11-27 PROCEDURE — 94640 AIRWAY INHALATION TREATMENT: CPT

## 2024-11-27 PROCEDURE — 2580000003 HC RX 258: Performed by: EMERGENCY MEDICINE

## 2024-11-27 PROCEDURE — 83880 ASSAY OF NATRIURETIC PEPTIDE: CPT

## 2024-11-27 PROCEDURE — 80076 HEPATIC FUNCTION PANEL: CPT

## 2024-11-27 PROCEDURE — 84145 PROCALCITONIN (PCT): CPT

## 2024-11-27 PROCEDURE — 87040 BLOOD CULTURE FOR BACTERIA: CPT

## 2024-11-27 PROCEDURE — 36415 COLL VENOUS BLD VENIPUNCTURE: CPT

## 2024-11-27 PROCEDURE — 84484 ASSAY OF TROPONIN QUANT: CPT

## 2024-11-27 PROCEDURE — 6360000002 HC RX W HCPCS: Performed by: EMERGENCY MEDICINE

## 2024-11-27 PROCEDURE — 93010 ELECTROCARDIOGRAM REPORT: CPT | Performed by: INTERNAL MEDICINE

## 2024-11-27 PROCEDURE — 70450 CT HEAD/BRAIN W/O DYE: CPT

## 2024-11-27 PROCEDURE — 36600 WITHDRAWAL OF ARTERIAL BLOOD: CPT

## 2024-11-27 PROCEDURE — 96374 THER/PROPH/DIAG INJ IV PUSH: CPT

## 2024-11-27 PROCEDURE — 82803 BLOOD GASES ANY COMBINATION: CPT

## 2024-11-27 PROCEDURE — 6370000000 HC RX 637 (ALT 250 FOR IP): Performed by: EMERGENCY MEDICINE

## 2024-11-27 PROCEDURE — 85025 COMPLETE CBC W/AUTO DIFF WBC: CPT

## 2024-11-27 PROCEDURE — 81003 URINALYSIS AUTO W/O SCOPE: CPT

## 2024-11-27 PROCEDURE — 71045 X-RAY EXAM CHEST 1 VIEW: CPT

## 2024-11-27 PROCEDURE — 87636 SARSCOV2 & INF A&B AMP PRB: CPT

## 2024-11-27 PROCEDURE — 99285 EMERGENCY DEPT VISIT HI MDM: CPT

## 2024-11-27 RX ORDER — PREDNISONE 20 MG/1
40 TABLET ORAL DAILY
Qty: 10 TABLET | Refills: 0 | Status: SHIPPED | OUTPATIENT
Start: 2024-11-27 | End: 2024-12-02

## 2024-11-27 RX ORDER — IPRATROPIUM BROMIDE AND ALBUTEROL SULFATE 2.5; .5 MG/3ML; MG/3ML
1 SOLUTION RESPIRATORY (INHALATION) ONCE
Status: COMPLETED | OUTPATIENT
Start: 2024-11-27 | End: 2024-11-27

## 2024-11-27 RX ORDER — AZITHROMYCIN 250 MG/1
TABLET, FILM COATED ORAL
Qty: 1 PACKET | Refills: 0 | Status: SHIPPED | OUTPATIENT
Start: 2024-11-27 | End: 2024-12-01

## 2024-11-27 RX ADMIN — METHYLPREDNISOLONE SODIUM SUCCINATE 125 MG: 125 INJECTION INTRAMUSCULAR; INTRAVENOUS at 07:48

## 2024-11-27 RX ADMIN — IPRATROPIUM BROMIDE AND ALBUTEROL SULFATE 1 DOSE: .5; 3 SOLUTION RESPIRATORY (INHALATION) at 08:11

## 2024-11-27 ASSESSMENT — PAIN - FUNCTIONAL ASSESSMENT: PAIN_FUNCTIONAL_ASSESSMENT: 0-10

## 2024-11-27 ASSESSMENT — PAIN SCALES - GENERAL: PAINLEVEL_OUTOF10: 7

## 2024-11-27 NOTE — ED TRIAGE NOTES
Pt presents to ED via EMS from home with complaints of chest pain and SOB. Pt states symptoms started last night. Upon arrival, pt stating at 93% RA. Pt is A&Ox4, Vital signs assessed.

## 2024-11-27 NOTE — ED NOTES
Urine sample obtained and sent to lab. Pt states breathing might be better after treatment. Pt respirations are even and unlabored.

## 2024-11-27 NOTE — ED PROVIDER NOTES
[facility]  EMERGENCY DEPARTMENT ENCOUNTER        PATIENT NAME: Vandana Burt  MRN: [id number]  : 1950  RAMIREZ: 2024  PROVIDER: Shan Ortez MD    Patient was seen and evaluated at 7:16 AM EST. Nurses Notes are reviewed and I agree except as noted in the HPI.  Chief Complaint   Patient presents with    Shortness of Breath    Chest Pain     HISTORY OF PRESENT ILLNESS     A 74-year-old female presents with chest pain, cough, and SOB since last night.  Cough is nonproductive, no fever no chills.  Patient also complains chest pain worse during coughing.  She describes chest pain from retrosternal area as if \" elephant sitting on the chest\".  She also feels dizzy and off balance.  She has been having headache since yesterday.  No nausea or vomiting.  No abdominal pain.  Appetite has been normal.  No urinary symptoms.     Her PMH is remarkable for CAD (no prior history of MI or PCI), chronic lower extremity venous stasis, former smoker, COPD, diabetes, morbid obesity, physical debilitation (wheelchair bound), HLD, and HTN.     This HPI was provided by patient.     PAST MEDICAL HISTORY    has a past medical history of Acid reflux, Arrhythmia, Arthritis, CAD (coronary artery disease), Chronic bronchitis ([facility]), Chronic venous stasis, COPD (chronic obstructive pulmonary disease) ([facility]), COVID-19, Depression, Diabetes mellitus (HCC), Dizziness - light-headed, Fall, Hyperlipidemia, Hypertension, Kidney disease, MDRO (multiple drug resistant organisms) resistance, Mild persistent asthma without complication, MRSA infection, Numbness and tingling, S/P laparoscopic cholecystectomy, Swelling, and Tinnitus.    SURGICAL HISTORY      has a past surgical history that includes Coronary angioplasty (2009); transthoracic echocardiogram (2009); cardiovascular stress test (2009); cardiovascular stress test (2010); back surgery (); eye surgery (); Tooth Extraction (2012);

## 2024-11-29 LAB
BACTERIA BLD AEROBE CULT: NORMAL
BACTERIA BLD AEROBE CULT: NORMAL

## 2024-12-02 LAB
BACTERIA BLD AEROBE CULT: NORMAL
BACTERIA BLD AEROBE CULT: NORMAL

## 2024-12-03 ENCOUNTER — APPOINTMENT (OUTPATIENT)
Dept: GENERAL RADIOLOGY | Age: 74
End: 2024-12-03
Payer: MEDICARE

## 2024-12-03 ENCOUNTER — HOSPITAL ENCOUNTER (INPATIENT)
Age: 74
LOS: 3 days | Discharge: HOME | End: 2024-12-06
Attending: EMERGENCY MEDICINE | Admitting: STUDENT IN AN ORGANIZED HEALTH CARE EDUCATION/TRAINING PROGRAM
Payer: MEDICARE

## 2024-12-03 ENCOUNTER — APPOINTMENT (OUTPATIENT)
Dept: CT IMAGING | Age: 74
End: 2024-12-03
Payer: MEDICARE

## 2024-12-03 DIAGNOSIS — J44.9 CHRONIC OBSTRUCTIVE PULMONARY DISEASE, UNSPECIFIED COPD TYPE (HCC): ICD-10-CM

## 2024-12-03 DIAGNOSIS — T14.8XXA HEMATOMA: Primary | ICD-10-CM

## 2024-12-03 DIAGNOSIS — E11.9 DIABETES MELLITUS (HCC): ICD-10-CM

## 2024-12-03 DIAGNOSIS — S30.1XXA RECTUS SHEATH HEMATOMA, INITIAL ENCOUNTER: ICD-10-CM

## 2024-12-03 DIAGNOSIS — M79.89 SWELLING OF LOWER EXTREMITY: ICD-10-CM

## 2024-12-03 DIAGNOSIS — D64.9 ANEMIA, UNSPECIFIED TYPE: ICD-10-CM

## 2024-12-03 LAB
ALBUMIN SERPL BCG-MCNC: 3.5 G/DL (ref 3.5–5.1)
ALP SERPL-CCNC: 65 U/L (ref 38–126)
ALT SERPL W/O P-5'-P-CCNC: 16 U/L (ref 11–66)
ANION GAP SERPL CALC-SCNC: 13 MEQ/L (ref 8–16)
AST SERPL-CCNC: 17 U/L (ref 5–40)
B PERT DNA NPH QL NAA+PROBE: NOT DETECTED
BASOPHILS ABSOLUTE: 0 THOU/MM3 (ref 0–0.1)
BASOPHILS NFR BLD AUTO: 0.3 %
BILIRUB CONJ SERPL-MCNC: < 0.1 MG/DL (ref 0.1–13.8)
BILIRUB SERPL-MCNC: 0.3 MG/DL (ref 0.3–1.2)
BORDETELLA PARAPERTUSSIS BY PCR: NOT DETECTED
BUN SERPL-MCNC: 18 MG/DL (ref 7–22)
C PNEUM DNA SPEC QL NAA+PROBE: NOT DETECTED
CALCIUM SERPL-MCNC: 9.1 MG/DL (ref 8.5–10.5)
CHLORIDE SERPL-SCNC: 101 MEQ/L (ref 98–111)
CO2 SERPL-SCNC: 24 MEQ/L (ref 23–33)
CREAT SERPL-MCNC: 0.7 MG/DL (ref 0.4–1.2)
DEPRECATED RDW RBC AUTO: 47.1 FL (ref 35–45)
EKG ATRIAL RATE: 73 BPM
EKG P AXIS: 34 DEGREES
EKG P-R INTERVAL: 108 MS
EKG Q-T INTERVAL: 436 MS
EKG QRS DURATION: 114 MS
EKG QTC CALCULATION (BAZETT): 480 MS
EKG R AXIS: -5 DEGREES
EKG T AXIS: 58 DEGREES
EKG VENTRICULAR RATE: 73 BPM
EOSINOPHIL NFR BLD AUTO: 0.2 %
EOSINOPHILS ABSOLUTE: 0 THOU/MM3 (ref 0–0.4)
ERYTHROCYTE [DISTWIDTH] IN BLOOD BY AUTOMATED COUNT: 13.5 % (ref 11.5–14.5)
FLUAV RNA NPH QL NAA+PROBE: NOT DETECTED
FLUAV RNA RESP QL NAA+PROBE: NOT DETECTED
FLUBV RNA NPH QL NAA+PROBE: NOT DETECTED
FLUBV RNA RESP QL NAA+PROBE: NOT DETECTED
GFR SERPL CREATININE-BSD FRML MDRD: 90 ML/MIN/1.73M2
GLUCOSE BLD STRIP.AUTO-MCNC: 232 MG/DL (ref 70–108)
GLUCOSE SERPL-MCNC: 178 MG/DL (ref 70–108)
HADV DNA NPH QL NAA+PROBE: NOT DETECTED
HCOV 229E RNA SPEC QL NAA+PROBE: NOT DETECTED
HCOV HKU1 RNA SPEC QL NAA+PROBE: NOT DETECTED
HCOV NL63 RNA SPEC QL NAA+PROBE: NOT DETECTED
HCOV OC43 RNA SPEC QL NAA+PROBE: NOT DETECTED
HCT VFR BLD AUTO: 36.6 % (ref 37–47)
HCT VFR BLD AUTO: 37.5 % (ref 37–47)
HGB BLD-MCNC: 11.2 GM/DL (ref 12–16)
HGB BLD-MCNC: 11.8 GM/DL (ref 12–16)
HMPV RNA NPH QL NAA+PROBE: NOT DETECTED
HPIV1 RNA NPH QL NAA+PROBE: NOT DETECTED
HPIV2 RNA NPH QL NAA+PROBE: NOT DETECTED
HPIV3 RNA NPH QL NAA+PROBE: NOT DETECTED
HPIV4 RNA NPH QL NAA+PROBE: NOT DETECTED
IMM GRANULOCYTES # BLD AUTO: 0.2 THOU/MM3 (ref 0–0.07)
IMM GRANULOCYTES NFR BLD AUTO: 1.7 %
LACTATE SERPL-SCNC: 1.7 MMOL/L (ref 0.5–2)
LACTIC ACID, SEPSIS: 2.2 MMOL/L (ref 0.5–1.9)
LACTIC ACID, SEPSIS: 2.4 MMOL/L (ref 0.5–1.9)
LYMPHOCYTES ABSOLUTE: 3.8 THOU/MM3 (ref 1–4.8)
LYMPHOCYTES NFR BLD AUTO: 31 %
M PNEUMO DNA SPEC QL NAA+PROBE: NOT DETECTED
MCH RBC QN AUTO: 29.9 PG (ref 26–33)
MCHC RBC AUTO-ENTMCNC: 31.5 GM/DL (ref 32.2–35.5)
MCV RBC AUTO: 94.9 FL (ref 81–99)
MONOCYTES ABSOLUTE: 1.1 THOU/MM3 (ref 0.4–1.3)
MONOCYTES NFR BLD AUTO: 9.4 %
NEUTROPHILS ABSOLUTE: 6.9 THOU/MM3 (ref 1.8–7.7)
NEUTROPHILS NFR BLD AUTO: 57.4 %
NRBC BLD AUTO-RTO: 0 /100 WBC
NT-PROBNP SERPL IA-MCNC: 1351 PG/ML (ref 0–449)
OSMOLALITY SERPL CALC.SUM OF ELEC: 282 MOSMOL/KG (ref 275–300)
PLATELET # BLD AUTO: 272 THOU/MM3 (ref 130–400)
PMV BLD AUTO: 10.1 FL (ref 9.4–12.4)
POTASSIUM SERPL-SCNC: 4.3 MEQ/L (ref 3.5–5.2)
PROT SERPL-MCNC: 6.5 G/DL (ref 6.1–8)
RBC # BLD AUTO: 3.95 MILL/MM3 (ref 4.2–5.4)
RSV RNA NPH QL NAA+PROBE: NOT DETECTED
RV+EV RNA SPEC QL NAA+PROBE: DETECTED
SARS-COV-2 RNA NPH QL NAA+NON-PROBE: NOT DETECTED
SARS-COV-2 RNA RESP QL NAA+PROBE: NOT DETECTED
SODIUM SERPL-SCNC: 138 MEQ/L (ref 135–145)
TROPONIN, HIGH SENSITIVITY: < 6 NG/L (ref 0–12)
WBC # BLD AUTO: 12.1 THOU/MM3 (ref 4.8–10.8)

## 2024-12-03 PROCEDURE — 36415 COLL VENOUS BLD VENIPUNCTURE: CPT

## 2024-12-03 PROCEDURE — 1200000000 HC SEMI PRIVATE

## 2024-12-03 PROCEDURE — 80048 BASIC METABOLIC PNL TOTAL CA: CPT

## 2024-12-03 PROCEDURE — 6370000000 HC RX 637 (ALT 250 FOR IP)

## 2024-12-03 PROCEDURE — 85014 HEMATOCRIT: CPT

## 2024-12-03 PROCEDURE — 85025 COMPLETE CBC W/AUTO DIFF WBC: CPT

## 2024-12-03 PROCEDURE — 96374 THER/PROPH/DIAG INJ IV PUSH: CPT

## 2024-12-03 PROCEDURE — 99285 EMERGENCY DEPT VISIT HI MDM: CPT

## 2024-12-03 PROCEDURE — 93005 ELECTROCARDIOGRAM TRACING: CPT | Performed by: EMERGENCY MEDICINE

## 2024-12-03 PROCEDURE — 83880 ASSAY OF NATRIURETIC PEPTIDE: CPT

## 2024-12-03 PROCEDURE — 74177 CT ABD & PELVIS W/CONTRAST: CPT

## 2024-12-03 PROCEDURE — 87636 SARSCOV2 & INF A&B AMP PRB: CPT

## 2024-12-03 PROCEDURE — 94761 N-INVAS EAR/PLS OXIMETRY MLT: CPT

## 2024-12-03 PROCEDURE — 85018 HEMOGLOBIN: CPT

## 2024-12-03 PROCEDURE — 71275 CT ANGIOGRAPHY CHEST: CPT

## 2024-12-03 PROCEDURE — 1200000003 HC TELEMETRY R&B

## 2024-12-03 PROCEDURE — 94640 AIRWAY INHALATION TREATMENT: CPT

## 2024-12-03 PROCEDURE — 6360000002 HC RX W HCPCS

## 2024-12-03 PROCEDURE — 0202U NFCT DS 22 TRGT SARS-COV-2: CPT

## 2024-12-03 PROCEDURE — 93010 ELECTROCARDIOGRAM REPORT: CPT | Performed by: INTERNAL MEDICINE

## 2024-12-03 PROCEDURE — 82948 REAGENT STRIP/BLOOD GLUCOSE: CPT

## 2024-12-03 PROCEDURE — 99223 1ST HOSP IP/OBS HIGH 75: CPT | Performed by: STUDENT IN AN ORGANIZED HEALTH CARE EDUCATION/TRAINING PROGRAM

## 2024-12-03 PROCEDURE — 6360000004 HC RX CONTRAST MEDICATION: Performed by: EMERGENCY MEDICINE

## 2024-12-03 PROCEDURE — 6370000000 HC RX 637 (ALT 250 FOR IP): Performed by: STUDENT IN AN ORGANIZED HEALTH CARE EDUCATION/TRAINING PROGRAM

## 2024-12-03 PROCEDURE — 83605 ASSAY OF LACTIC ACID: CPT

## 2024-12-03 PROCEDURE — 6360000002 HC RX W HCPCS: Performed by: STUDENT IN AN ORGANIZED HEALTH CARE EDUCATION/TRAINING PROGRAM

## 2024-12-03 PROCEDURE — 2580000003 HC RX 258

## 2024-12-03 PROCEDURE — 71046 X-RAY EXAM CHEST 2 VIEWS: CPT

## 2024-12-03 PROCEDURE — 80076 HEPATIC FUNCTION PANEL: CPT

## 2024-12-03 PROCEDURE — 84484 ASSAY OF TROPONIN QUANT: CPT

## 2024-12-03 RX ORDER — VITAMIN B COMPLEX
1000 TABLET ORAL DAILY
Status: DISCONTINUED | OUTPATIENT
Start: 2024-12-03 | End: 2024-12-06 | Stop reason: HOSPADM

## 2024-12-03 RX ORDER — HYDROXYZINE PAMOATE 25 MG/1
50 CAPSULE ORAL 3 TIMES DAILY PRN
Status: DISCONTINUED | OUTPATIENT
Start: 2024-12-03 | End: 2024-12-06 | Stop reason: HOSPADM

## 2024-12-03 RX ORDER — ACETAMINOPHEN 650 MG/1
650 SUPPOSITORY RECTAL EVERY 6 HOURS PRN
Status: DISCONTINUED | OUTPATIENT
Start: 2024-12-03 | End: 2024-12-06 | Stop reason: HOSPADM

## 2024-12-03 RX ORDER — IBUPROFEN 200 MG
200 TABLET ORAL
Status: DISCONTINUED | OUTPATIENT
Start: 2024-12-03 | End: 2024-12-06 | Stop reason: HOSPADM

## 2024-12-03 RX ORDER — SODIUM CHLORIDE 0.9 % (FLUSH) 0.9 %
5-40 SYRINGE (ML) INJECTION PRN
Status: DISCONTINUED | OUTPATIENT
Start: 2024-12-03 | End: 2024-12-06 | Stop reason: HOSPADM

## 2024-12-03 RX ORDER — MORPHINE SULFATE 4 MG/ML
4 INJECTION, SOLUTION INTRAMUSCULAR; INTRAVENOUS ONCE
Status: COMPLETED | OUTPATIENT
Start: 2024-12-03 | End: 2024-12-03

## 2024-12-03 RX ORDER — GLUCAGON 1 MG/ML
1 KIT INJECTION PRN
Status: DISCONTINUED | OUTPATIENT
Start: 2024-12-03 | End: 2024-12-06 | Stop reason: HOSPADM

## 2024-12-03 RX ORDER — ONDANSETRON 4 MG/1
4 TABLET, ORALLY DISINTEGRATING ORAL EVERY 8 HOURS PRN
Status: DISCONTINUED | OUTPATIENT
Start: 2024-12-03 | End: 2024-12-06 | Stop reason: HOSPADM

## 2024-12-03 RX ORDER — SODIUM CHLORIDE 9 MG/ML
INJECTION, SOLUTION INTRAVENOUS PRN
Status: DISCONTINUED | OUTPATIENT
Start: 2024-12-03 | End: 2024-12-06 | Stop reason: HOSPADM

## 2024-12-03 RX ORDER — ATORVASTATIN CALCIUM 20 MG/1
20 TABLET, FILM COATED ORAL DAILY
Status: DISCONTINUED | OUTPATIENT
Start: 2024-12-03 | End: 2024-12-06 | Stop reason: HOSPADM

## 2024-12-03 RX ORDER — LORAZEPAM 0.5 MG/1
0.5 TABLET ORAL PRN
Status: DISCONTINUED | OUTPATIENT
Start: 2024-12-03 | End: 2024-12-03

## 2024-12-03 RX ORDER — BUMETANIDE 0.25 MG/ML
1 INJECTION, SOLUTION INTRAMUSCULAR; INTRAVENOUS ONCE
Status: COMPLETED | OUTPATIENT
Start: 2024-12-03 | End: 2024-12-03

## 2024-12-03 RX ORDER — OXYCODONE AND ACETAMINOPHEN 5; 325 MG/1; MG/1
1 TABLET ORAL EVERY 4 HOURS PRN
Status: DISCONTINUED | OUTPATIENT
Start: 2024-12-03 | End: 2024-12-06 | Stop reason: HOSPADM

## 2024-12-03 RX ORDER — TROSPIUM CHLORIDE 20 MG/1
20 TABLET, FILM COATED ORAL
Status: DISCONTINUED | OUTPATIENT
Start: 2024-12-03 | End: 2024-12-06 | Stop reason: HOSPADM

## 2024-12-03 RX ORDER — IOPAMIDOL 755 MG/ML
80 INJECTION, SOLUTION INTRAVASCULAR
Status: COMPLETED | OUTPATIENT
Start: 2024-12-03 | End: 2024-12-03

## 2024-12-03 RX ORDER — OXYCODONE AND ACETAMINOPHEN 5; 325 MG/1; MG/1
2 TABLET ORAL EVERY 4 HOURS PRN
Status: DISCONTINUED | OUTPATIENT
Start: 2024-12-03 | End: 2024-12-06 | Stop reason: HOSPADM

## 2024-12-03 RX ORDER — LIDOCAINE HYDROCHLORIDE 20 MG/ML
15 SOLUTION OROPHARYNGEAL
Status: DISCONTINUED | OUTPATIENT
Start: 2024-12-03 | End: 2024-12-06 | Stop reason: HOSPADM

## 2024-12-03 RX ORDER — GABAPENTIN 400 MG/1
800 CAPSULE ORAL 4 TIMES DAILY
Status: DISCONTINUED | OUTPATIENT
Start: 2024-12-03 | End: 2024-12-06 | Stop reason: HOSPADM

## 2024-12-03 RX ORDER — ACETAMINOPHEN 325 MG/1
650 TABLET ORAL EVERY 6 HOURS SCHEDULED
Status: DISCONTINUED | OUTPATIENT
Start: 2024-12-03 | End: 2024-12-06 | Stop reason: HOSPADM

## 2024-12-03 RX ORDER — BENZONATATE 100 MG/1
100 CAPSULE ORAL 3 TIMES DAILY PRN
Status: DISCONTINUED | OUTPATIENT
Start: 2024-12-03 | End: 2024-12-06 | Stop reason: HOSPADM

## 2024-12-03 RX ORDER — INSULIN LISPRO 100 [IU]/ML
0-8 INJECTION, SOLUTION INTRAVENOUS; SUBCUTANEOUS
Status: DISCONTINUED | OUTPATIENT
Start: 2024-12-03 | End: 2024-12-06 | Stop reason: HOSPADM

## 2024-12-03 RX ORDER — IPRATROPIUM BROMIDE AND ALBUTEROL SULFATE 2.5; .5 MG/3ML; MG/3ML
1 SOLUTION RESPIRATORY (INHALATION) ONCE
Status: COMPLETED | OUTPATIENT
Start: 2024-12-03 | End: 2024-12-03

## 2024-12-03 RX ORDER — BUPROPION HYDROCHLORIDE 150 MG/1
150 TABLET ORAL EVERY MORNING
Status: DISCONTINUED | OUTPATIENT
Start: 2024-12-03 | End: 2024-12-06 | Stop reason: HOSPADM

## 2024-12-03 RX ORDER — BUDESONIDE 0.5 MG/2ML
500 INHALANT ORAL 2 TIMES DAILY
Status: DISCONTINUED | OUTPATIENT
Start: 2024-12-03 | End: 2024-12-06 | Stop reason: HOSPADM

## 2024-12-03 RX ORDER — KETOROLAC TROMETHAMINE 30 MG/ML
15 INJECTION, SOLUTION INTRAMUSCULAR; INTRAVENOUS EVERY 6 HOURS PRN
Status: DISCONTINUED | OUTPATIENT
Start: 2024-12-03 | End: 2024-12-03

## 2024-12-03 RX ORDER — BISACODYL 5 MG/1
5 TABLET, DELAYED RELEASE ORAL DAILY
Status: DISCONTINUED | OUTPATIENT
Start: 2024-12-03 | End: 2024-12-06 | Stop reason: HOSPADM

## 2024-12-03 RX ORDER — ONDANSETRON 2 MG/ML
4 INJECTION INTRAMUSCULAR; INTRAVENOUS EVERY 6 HOURS PRN
Status: DISCONTINUED | OUTPATIENT
Start: 2024-12-03 | End: 2024-12-06 | Stop reason: HOSPADM

## 2024-12-03 RX ORDER — MAGNESIUM OXIDE 400 MG/1
400 TABLET ORAL DAILY
Status: DISCONTINUED | OUTPATIENT
Start: 2024-12-03 | End: 2024-12-03 | Stop reason: CLARIF

## 2024-12-03 RX ORDER — FENTANYL CITRATE 50 UG/ML
100 INJECTION, SOLUTION INTRAMUSCULAR; INTRAVENOUS ONCE
Status: DISCONTINUED | OUTPATIENT
Start: 2024-12-03 | End: 2024-12-03

## 2024-12-03 RX ORDER — DEXTROSE MONOHYDRATE 100 MG/ML
INJECTION, SOLUTION INTRAVENOUS CONTINUOUS PRN
Status: DISCONTINUED | OUTPATIENT
Start: 2024-12-03 | End: 2024-12-06 | Stop reason: HOSPADM

## 2024-12-03 RX ORDER — FENTANYL CITRATE 50 UG/ML
50 INJECTION, SOLUTION INTRAMUSCULAR; INTRAVENOUS ONCE
Status: COMPLETED | OUTPATIENT
Start: 2024-12-03 | End: 2024-12-03

## 2024-12-03 RX ORDER — ACETAMINOPHEN 325 MG/1
650 TABLET ORAL EVERY 6 HOURS PRN
Status: DISCONTINUED | OUTPATIENT
Start: 2024-12-03 | End: 2024-12-06 | Stop reason: HOSPADM

## 2024-12-03 RX ORDER — PANTOPRAZOLE SODIUM 40 MG/1
40 TABLET, DELAYED RELEASE ORAL
Status: DISCONTINUED | OUTPATIENT
Start: 2024-12-03 | End: 2024-12-06 | Stop reason: HOSPADM

## 2024-12-03 RX ORDER — SODIUM CHLORIDE 0.9 % (FLUSH) 0.9 %
5-40 SYRINGE (ML) INJECTION EVERY 12 HOURS SCHEDULED
Status: DISCONTINUED | OUTPATIENT
Start: 2024-12-03 | End: 2024-12-06 | Stop reason: HOSPADM

## 2024-12-03 RX ORDER — LANOLIN ALCOHOL/MO/W.PET/CERES
400 CREAM (GRAM) TOPICAL DAILY
Status: DISCONTINUED | OUTPATIENT
Start: 2024-12-03 | End: 2024-12-06 | Stop reason: HOSPADM

## 2024-12-03 RX ORDER — ALBUTEROL SULFATE 0.83 MG/ML
2.5 SOLUTION RESPIRATORY (INHALATION) EVERY 6 HOURS PRN
Status: DISCONTINUED | OUTPATIENT
Start: 2024-12-03 | End: 2024-12-06 | Stop reason: HOSPADM

## 2024-12-03 RX ORDER — GUAIFENESIN 600 MG/1
600 TABLET, EXTENDED RELEASE ORAL 2 TIMES DAILY
Status: DISCONTINUED | OUTPATIENT
Start: 2024-12-03 | End: 2024-12-03 | Stop reason: HOSPADM

## 2024-12-03 RX ORDER — MONTELUKAST SODIUM 10 MG/1
10 TABLET ORAL NIGHTLY
Status: DISCONTINUED | OUTPATIENT
Start: 2024-12-03 | End: 2024-12-06 | Stop reason: HOSPADM

## 2024-12-03 RX ORDER — CITALOPRAM HYDROBROMIDE 40 MG/1
40 TABLET ORAL EVERY MORNING
Status: DISCONTINUED | OUTPATIENT
Start: 2024-12-03 | End: 2024-12-06 | Stop reason: HOSPADM

## 2024-12-03 RX ORDER — LIDOCAINE 50 MG/G
OINTMENT TOPICAL PRN
Status: DISCONTINUED | OUTPATIENT
Start: 2024-12-03 | End: 2024-12-06 | Stop reason: HOSPADM

## 2024-12-03 RX ORDER — METOPROLOL SUCCINATE 25 MG/1
25 TABLET, EXTENDED RELEASE ORAL DAILY
Status: DISCONTINUED | OUTPATIENT
Start: 2024-12-03 | End: 2024-12-06 | Stop reason: HOSPADM

## 2024-12-03 RX ORDER — POLYETHYLENE GLYCOL 3350 17 G/17G
17 POWDER, FOR SOLUTION ORAL DAILY PRN
Status: DISCONTINUED | OUTPATIENT
Start: 2024-12-03 | End: 2024-12-06 | Stop reason: HOSPADM

## 2024-12-03 RX ORDER — QUETIAPINE FUMARATE 25 MG/1
50 TABLET, FILM COATED ORAL NIGHTLY
Status: DISCONTINUED | OUTPATIENT
Start: 2024-12-03 | End: 2024-12-06 | Stop reason: HOSPADM

## 2024-12-03 RX ADMIN — PANTOPRAZOLE SODIUM 40 MG: 40 TABLET, DELAYED RELEASE ORAL at 16:54

## 2024-12-03 RX ADMIN — IOPAMIDOL 80 ML: 755 INJECTION, SOLUTION INTRAVENOUS at 10:32

## 2024-12-03 RX ADMIN — TROSPIUM CHLORIDE 20 MG: 20 TABLET, FILM COATED ORAL at 16:54

## 2024-12-03 RX ADMIN — GABAPENTIN 800 MG: 400 CAPSULE ORAL at 19:49

## 2024-12-03 RX ADMIN — Medication 1000 UNITS: at 16:54

## 2024-12-03 RX ADMIN — BUMETANIDE 1 MG: 0.25 INJECTION INTRAMUSCULAR; INTRAVENOUS at 23:09

## 2024-12-03 RX ADMIN — QUETIAPINE FUMARATE 50 MG: 25 TABLET ORAL at 19:49

## 2024-12-03 RX ADMIN — BUPROPION HYDROCHLORIDE 150 MG: 150 TABLET, EXTENDED RELEASE ORAL at 16:54

## 2024-12-03 RX ADMIN — METOPROLOL SUCCINATE 25 MG: 25 TABLET, FILM COATED, EXTENDED RELEASE ORAL at 16:54

## 2024-12-03 RX ADMIN — SODIUM CHLORIDE, PRESERVATIVE FREE 10 ML: 5 INJECTION INTRAVENOUS at 19:51

## 2024-12-03 RX ADMIN — MORPHINE SULFATE 4 MG: 4 INJECTION, SOLUTION INTRAMUSCULAR; INTRAVENOUS at 11:52

## 2024-12-03 RX ADMIN — GABAPENTIN 800 MG: 400 CAPSULE ORAL at 16:55

## 2024-12-03 RX ADMIN — MONTELUKAST 10 MG: 10 TABLET, FILM COATED ORAL at 19:49

## 2024-12-03 RX ADMIN — IPRATROPIUM BROMIDE AND ALBUTEROL SULFATE 1 DOSE: .5; 3 SOLUTION RESPIRATORY (INHALATION) at 11:31

## 2024-12-03 RX ADMIN — OXYCODONE HYDROCHLORIDE AND ACETAMINOPHEN 2 TABLET: 5; 325 TABLET ORAL at 19:49

## 2024-12-03 RX ADMIN — Medication 400 MG: at 16:54

## 2024-12-03 RX ADMIN — BENZONATATE 100 MG: 100 CAPSULE ORAL at 23:10

## 2024-12-03 RX ADMIN — FENTANYL CITRATE 50 MCG: 50 INJECTION, SOLUTION INTRAMUSCULAR; INTRAVENOUS at 10:16

## 2024-12-03 RX ADMIN — ACETAMINOPHEN 650 MG: 325 TABLET ORAL at 16:57

## 2024-12-03 RX ADMIN — GUAIFENESIN 600 MG: 600 TABLET, EXTENDED RELEASE ORAL at 11:52

## 2024-12-03 RX ADMIN — CITALOPRAM 40 MG: 40 TABLET, FILM COATED ORAL at 16:54

## 2024-12-03 RX ADMIN — BISACODYL 5 MG: 5 TABLET, COATED ORAL at 16:53

## 2024-12-03 RX ADMIN — BUDESONIDE 500 MCG: 0.5 INHALANT RESPIRATORY (INHALATION) at 20:01

## 2024-12-03 RX ADMIN — ATORVASTATIN CALCIUM 20 MG: 20 TABLET, FILM COATED ORAL at 16:53

## 2024-12-03 ASSESSMENT — PAIN SCALES - GENERAL
PAINLEVEL_OUTOF10: 10
PAINLEVEL_OUTOF10: 5
PAINLEVEL_OUTOF10: 9
PAINLEVEL_OUTOF10: 7
PAINLEVEL_OUTOF10: 6
PAINLEVEL_OUTOF10: 10

## 2024-12-03 ASSESSMENT — PAIN DESCRIPTION - DESCRIPTORS
DESCRIPTORS: SHARP
DESCRIPTORS: ACHING;SHARP
DESCRIPTORS: ACHING;SHARP

## 2024-12-03 ASSESSMENT — PAIN DESCRIPTION - ORIENTATION: ORIENTATION: MID;LOWER

## 2024-12-03 ASSESSMENT — PAIN - FUNCTIONAL ASSESSMENT
PAIN_FUNCTIONAL_ASSESSMENT: 0-10
PAIN_FUNCTIONAL_ASSESSMENT: ACTIVITIES ARE NOT PREVENTED
PAIN_FUNCTIONAL_ASSESSMENT: 0-10

## 2024-12-03 ASSESSMENT — PAIN DESCRIPTION - LOCATION
LOCATION: ABDOMEN

## 2024-12-03 ASSESSMENT — PAIN DESCRIPTION - PAIN TYPE
TYPE: ACUTE PAIN
TYPE: CHRONIC PAIN

## 2024-12-03 ASSESSMENT — PAIN DESCRIPTION - ONSET: ONSET: ON-GOING

## 2024-12-03 ASSESSMENT — PAIN DESCRIPTION - FREQUENCY: FREQUENCY: CONTINUOUS

## 2024-12-03 NOTE — ED PROVIDER NOTES
[facility] EMERGENCY DEPARTMENT - VISIT NOTE    Patient Name: Vandana Burt  MRN: [id number]  YOB: 1950  Date of Evaluation: 12/3/2024  Treating Resident Physician: Juancho Martinez MD  Supervising Physician: Cris Daniel MD    CHIEF COMPLAINT       Chief Complaint   Patient presents with    Cough       HISTORY OF PRESENT ILLNESS    HPI    History obtained from the patient.    Vandana is a 74 y.o. old female who presents to the emergency department by Ambulance for evaluation of left upper quadrant abdominal pain.  Patient has history of COPD, was recently seen and diagnosed here 11/27/2024 with mild COPD exacerbation prescribed azithromycin and prednisone.  Patient comes in today with \" tearing\" left upper quadrant abdominal pain that significantly worsens with cough.  She is not on any blood thinners.  No other injury/trauma to the area.    Chart reviewed, relevant history summarized in HPI above.      REVIEW OF SYSTEMS   Review of Systems  Negative unless documented in HPI    PAST MEDICAL AND SURGICAL HISTORY   Vandana  has a past medical history of Acid reflux, Arrhythmia, Arthritis, CAD (coronary artery disease), Chronic bronchitis ([facility]), Chronic venous stasis, COPD (chronic obstructive pulmonary disease) ([facility]), COVID-19 (10/2020), Depression, Diabetes mellitus ([facility]), Dizziness - light-headed, Fall (10/15/2013), Hyperlipidemia, Hypertension, Kidney disease, MDRO (multiple drug resistant organisms) resistance (02/2014), Mild persistent asthma without complication (09/17/2021), MRSA infection, Numbness and tingling, S/P laparoscopic cholecystectomy (11/27/2018), Swelling, and Tinnitus.    Vandana  has a past surgical history that includes Coronary angioplasty (12/16/2009); transthoracic echocardiogram (03/04/2009); cardiovascular stress test (04/03/2009); cardiovascular stress test (03/31/2010); back surgery (2007); eye surgery (2004); Tooth Extraction (4/16/2012); Colonoscopy (2011); other  using voice recognition software.  It may contain   minor errors which are inherent in voice recognition technology.**      Electronically signed by Dr. La Nena Smith      CTA CHEST W WO CONTRAST   Final Result   1. A large left rectus sheath hematoma measures up to 7.5 x 15.3 x 16.1 cm. The   hematoma contains a central hyperdense component (series 301, image 50)   measuring up to 4.5 cm suggestive of active hemorrhage/extravasation of IV   contrast. No free fluid or fluid collections are noted within the   abdomen/pelvis. No bowel obstruction is observed.      2. No filling defects are noted within the pulmonary arterial vasculature to   suggest the presence of pulmonary embolus. No acute intrathoracic process is   identified.      3. Numerous chronic findings are discussed.            **This report has been created using voice recognition software.  It may contain   minor errors which are inherent in voice recognition technology.**      Electronically signed by Dr. La Nena Smith      XR CHEST (2 VW)   Final Result   1. No acute cardiopulmonary finding..               **This report has been created using voice recognition software.  It may contain   minor errors which are inherent in voice recognition technology.**      Electronically signed by Dr. Chris [facility]          LABS: (none if blank)  Labs Reviewed   BASIC METABOLIC PANEL W/ REFLEX TO MG FOR LOW K - Abnormal; Notable for the following components:       Result Value    Glucose 178 (*)     All other components within normal limits   CBC WITH AUTO DIFFERENTIAL - Abnormal; Notable for the following components:    WBC 12.1 (*)     RBC 3.95 (*)     Hemoglobin 11.8 (*)     MCHC 31.5 (*)     RDW-SD 47.1 (*)     Immature Grans (Abs) 0.20 (*)     All other components within normal limits   LACTATE, SEPSIS - Abnormal; Notable for the following components:    Lactic Acid, Sepsis 2.2 (*)     All other components within normal limits   LACTATE, SEPSIS - Abnormal;

## 2024-12-03 NOTE — ED NOTES
Report received from Raiza HAN. Pt resting quietly in bed. Respirations even and unlabored. No new needs or concerns. Call light in reach. Telemetry in place.

## 2024-12-03 NOTE — ED TRIAGE NOTES
Pt presents to the ED by EMS from home with c/c cough. Pt reports she was seen approx 4 days ago for same symptoms. Pt holding stomach, also complaining of abdominal pain. EKG completed on arrival.

## 2024-12-03 NOTE — ED NOTES
Patient transported to [facility] in stable condition. Spoke to floor prior to transport.

## 2024-12-03 NOTE — H&P
History & Physical  Internal Medicine Resident         Patient: Vandana Burt 74 y.o. female      : 1950  Date of Admission: 12/3/2024  Date of Service: Pt seen/examined on 24 and Admitted to Inpatient with expected LOS greater than two midnights due to medical therapy.       ASSESSMENT AND PLAN  Intractable tussis  Mild persistent asthma  -2.5-3-week history of persistent cough with minimal sputum production with sudden onset left upper quadrant abdominal pain  -2024 emergency department encounter for which patient was prescribed a 5-day course of prednisone and azithromycin  -Complicated by development of rectus sheath hematoma  -2024 CTA chest showing dependent atelectasis with no evidence of focal consolidation, pleural effusion, or pneumothorax.  - influenza and COVID-19 testing negative  - molecular respiratory panel with rhinovirus/enterovirus detected  -2023 pulmonary function testing FEV1/FVC 80%, FEV1 73% predicted, FVC 70% predicted, RV/% predicted, DL/VA 98% predicted  -Patient following with pulmonology with recommendation for budesonide nebulizer twice daily with additional albuterol inhaler/nebulizer every 6 hours as needed  -As needed antitussive regimen initiated  -Incentive spirometry, vibratory airway clearance ordered    Seasonal allergic rhinitis  -Patient established with pulmonology for advance care recommendations to continue montelukast and cetirizine    Rectus sheath hematoma  Acute normocytic anemia  -Suspected secondary to intractable tussis  -2024 CTA chest/abdomen/pelvis showing large left rectus sheath hematoma measuring up to 7.5 x 15.3 x 16.1 cm identified.  -Interventional radiology contacted in emergency department with recommendation for conservative management with ice, pain control  -Analgesia regimen initiated  -Recommendation for CBC monitoring; patient consented for blood transfusion if necessary after  EYE SURGERY      cataract    HERNIA REPAIR  18 months old     umblilical    HERNIA REPAIR  13    Ventral Hernia Repair with Mesh - Dr. Mcconnell    HYSTERECTOMY (CERVIX STATUS UNKNOWN)      partial    OTHER SURGICAL HISTORY  2014    Left lower quadrant incision and drainage (Dr. Mcconnell, [facility])    OTHER SURGICAL HISTORY  2014    I&D abd abscess Dr. Mcconnell     RI LAPS ABD PRTM&OMENTUM DX W/WO SPEC BR/WA SPX N/A 2018    LAPAROSCOPY EXPLORATORY, LAP LYLE performed by Crow Mcconnell MD at [facility] OR    TONSILLECTOMY  as a child     TOOTH EXTRACTION  2012    TRANSTHORACIC ECHOCARDIOGRAM  2009    EF 50-55%. normal LV global systolic funct. slight LVH. slight lt atrial enlargement. no obvious stenotic valves. no pericardial effusion.    UPPER GASTROINTESTINAL ENDOSCOPY N/A 2023    EGD DILATION performed by Blanca Arroyo MD at [facility] Endoscopy    US BREAST BIOPSY W LOC DEVICE 1ST LESION RIGHT Right 2019    Benign         Problem Relation Age of Onset    Uterine Cancer Mother 32    Heart Disease Father     Cancer Father 50        lung    Diabetes Paternal Grandfather     Uterine Cancer Maternal Grandmother     Uterine Cancer Maternal Aunt 32    Uterine Cancer Maternal Cousin 32    Depression Brother     Depression Brother     Colon Cancer Neg Hx     Breast Cancer Neg Hx      Social History     Socioeconomic History    Marital status:      Spouse name: None    Number of children: 6    Years of education: 10    Highest education level: None   Occupational History    Occupation: retired    Tobacco Use    Smoking status: Former     Current packs/day: 0.00     Types: Cigarettes     Start date:      Quit date:      Years since quittin.9    Smokeless tobacco: Never    Tobacco comments:     3 cigarettes a month, Passive smoke exposure 45 years Father then ex    Vaping Use    Vaping status: Never Used   Substance and Sexual Activity    Alcohol use:

## 2024-12-03 NOTE — ED PROVIDER NOTES

## 2024-12-04 ENCOUNTER — APPOINTMENT (OUTPATIENT)
Age: 74
End: 2024-12-04
Attending: STUDENT IN AN ORGANIZED HEALTH CARE EDUCATION/TRAINING PROGRAM
Payer: MEDICARE

## 2024-12-04 PROBLEM — T14.8XXA HEMATOMA: Status: ACTIVE | Noted: 2024-12-04

## 2024-12-04 PROBLEM — I50.9 CONGESTIVE HEART FAILURE (HCC): Status: ACTIVE | Noted: 2024-12-04

## 2024-12-04 PROBLEM — M79.89 SWELLING OF LOWER EXTREMITY: Status: ACTIVE | Noted: 2024-12-04

## 2024-12-04 LAB
ANION GAP SERPL CALC-SCNC: 13 MEQ/L (ref 8–16)
BUN SERPL-MCNC: 24 MG/DL (ref 7–22)
CALCIUM SERPL-MCNC: 8.8 MG/DL (ref 8.5–10.5)
CHLORIDE SERPL-SCNC: 98 MEQ/L (ref 98–111)
CO2 SERPL-SCNC: 25 MEQ/L (ref 23–33)
CREAT SERPL-MCNC: 1 MG/DL (ref 0.4–1.2)
DEPRECATED MEAN GLUCOSE BLD GHB EST-ACNC: 123 MG/DL (ref 70–126)
DEPRECATED RDW RBC AUTO: 46.1 FL (ref 35–45)
ECHO AV CUSP MM: 1.6 CM
ECHO AV PEAK GRADIENT: 8 MMHG
ECHO AV PEAK VELOCITY: 1.5 M/S
ECHO AV VELOCITY RATIO: 0.73
ECHO BSA: 2.23 M2
ECHO LA DIAMETER INDEX: 1.87 CM/M2
ECHO LA DIAMETER: 3.9 CM
ECHO LV E' LATERAL VELOCITY: 5.9 CM/S
ECHO LV E' SEPTAL VELOCITY: 6.7 CM/S
ECHO LV EF PHYSICIAN: 60 %
ECHO LV FRACTIONAL SHORTENING: 29 % (ref 28–44)
ECHO LV INTERNAL DIMENSION DIASTOLE INDEX: 2.01 CM/M2
ECHO LV INTERNAL DIMENSION DIASTOLIC: 4.2 CM (ref 3.9–5.3)
ECHO LV INTERNAL DIMENSION SYSTOLIC INDEX: 1.44 CM/M2
ECHO LV INTERNAL DIMENSION SYSTOLIC: 3 CM
ECHO LV IVSD: 1 CM (ref 0.6–0.9)
ECHO LV MASS 2D: 157.1 G (ref 67–162)
ECHO LV MASS INDEX 2D: 75.1 G/M2 (ref 43–95)
ECHO LV POSTERIOR WALL DIASTOLIC: 1.2 CM (ref 0.6–0.9)
ECHO LV RELATIVE WALL THICKNESS RATIO: 0.57
ECHO LVOT PEAK GRADIENT: 5 MMHG
ECHO LVOT PEAK VELOCITY: 1.1 M/S
ECHO MV A VELOCITY: 1.27 M/S
ECHO MV E DECELERATION TIME (DT): 180 MS
ECHO MV E VELOCITY: 0.73 M/S
ECHO MV E/A RATIO: 0.57
ECHO MV E/E' LATERAL: 12.37
ECHO MV E/E' RATIO (AVERAGED): 11.63
ECHO MV E/E' SEPTAL: 10.9
ECHO PV MAX VELOCITY: 1.1 M/S
ECHO PV PEAK GRADIENT: 5 MMHG
ECHO RV INTERNAL DIMENSION: 2.5 CM
ECHO TV E WAVE: 0.4 M/S
ECHO TV REGURGITANT MAX VELOCITY: 2.57 M/S
ECHO TV REGURGITANT PEAK GRADIENT: 26 MMHG
ERYTHROCYTE [DISTWIDTH] IN BLOOD BY AUTOMATED COUNT: 13.5 % (ref 11.5–14.5)
GFR SERPL CREATININE-BSD FRML MDRD: 59 ML/MIN/1.73M2
GLUCOSE BLD STRIP.AUTO-MCNC: 170 MG/DL (ref 70–108)
GLUCOSE BLD STRIP.AUTO-MCNC: 184 MG/DL (ref 70–108)
GLUCOSE BLD STRIP.AUTO-MCNC: 203 MG/DL (ref 70–108)
GLUCOSE BLD STRIP.AUTO-MCNC: 263 MG/DL (ref 70–108)
GLUCOSE BLD STRIP.AUTO-MCNC: 318 MG/DL (ref 70–108)
GLUCOSE SERPL-MCNC: 193 MG/DL (ref 70–108)
HBA1C MFR BLD HPLC: 6.1 % (ref 4.4–6.4)
HCT VFR BLD AUTO: 31.3 % (ref 37–47)
HCT VFR BLD AUTO: 33.6 % (ref 37–47)
HCT VFR BLD AUTO: 34 % (ref 37–47)
HCT VFR BLD AUTO: 34.5 % (ref 37–47)
HGB BLD-MCNC: 10.2 GM/DL (ref 12–16)
HGB BLD-MCNC: 10.8 GM/DL (ref 12–16)
MAGNESIUM SERPL-MCNC: 1.6 MG/DL (ref 1.6–2.4)
MCH RBC QN AUTO: 30.3 PG (ref 26–33)
MCHC RBC AUTO-ENTMCNC: 32.1 GM/DL (ref 32.2–35.5)
MCV RBC AUTO: 94.1 FL (ref 81–99)
PLATELET # BLD AUTO: 260 THOU/MM3 (ref 130–400)
PMV BLD AUTO: 10.3 FL (ref 9.4–12.4)
POTASSIUM SERPL-SCNC: 4.6 MEQ/L (ref 3.5–5.2)
RBC # BLD AUTO: 3.57 MILL/MM3 (ref 4.2–5.4)
SODIUM SERPL-SCNC: 136 MEQ/L (ref 135–145)
WBC # BLD AUTO: 13.8 THOU/MM3 (ref 4.8–10.8)

## 2024-12-04 PROCEDURE — 6370000000 HC RX 637 (ALT 250 FOR IP): Performed by: STUDENT IN AN ORGANIZED HEALTH CARE EDUCATION/TRAINING PROGRAM

## 2024-12-04 PROCEDURE — 82948 REAGENT STRIP/BLOOD GLUCOSE: CPT

## 2024-12-04 PROCEDURE — 94640 AIRWAY INHALATION TREATMENT: CPT

## 2024-12-04 PROCEDURE — 2580000003 HC RX 258

## 2024-12-04 PROCEDURE — 2500000003 HC RX 250 WO HCPCS: Performed by: PHYSICIAN ASSISTANT

## 2024-12-04 PROCEDURE — 85018 HEMOGLOBIN: CPT

## 2024-12-04 PROCEDURE — 93306 TTE W/DOPPLER COMPLETE: CPT | Performed by: NUCLEAR MEDICINE

## 2024-12-04 PROCEDURE — 36415 COLL VENOUS BLD VENIPUNCTURE: CPT

## 2024-12-04 PROCEDURE — 83036 HEMOGLOBIN GLYCOSYLATED A1C: CPT

## 2024-12-04 PROCEDURE — 80048 BASIC METABOLIC PNL TOTAL CA: CPT

## 2024-12-04 PROCEDURE — 1200000000 HC SEMI PRIVATE

## 2024-12-04 PROCEDURE — 99232 SBSQ HOSP IP/OBS MODERATE 35: CPT | Performed by: PHYSICIAN ASSISTANT

## 2024-12-04 PROCEDURE — 94669 MECHANICAL CHEST WALL OSCILL: CPT

## 2024-12-04 PROCEDURE — 6370000000 HC RX 637 (ALT 250 FOR IP): Performed by: PHYSICIAN ASSISTANT

## 2024-12-04 PROCEDURE — 85014 HEMATOCRIT: CPT

## 2024-12-04 PROCEDURE — 83735 ASSAY OF MAGNESIUM: CPT

## 2024-12-04 PROCEDURE — 85027 COMPLETE CBC AUTOMATED: CPT

## 2024-12-04 PROCEDURE — 93306 TTE W/DOPPLER COMPLETE: CPT

## 2024-12-04 PROCEDURE — 94761 N-INVAS EAR/PLS OXIMETRY MLT: CPT

## 2024-12-04 PROCEDURE — 6370000000 HC RX 637 (ALT 250 FOR IP)

## 2024-12-04 PROCEDURE — 6360000002 HC RX W HCPCS

## 2024-12-04 RX ORDER — CALCIUM CARBONATE 500 MG/1
500 TABLET, CHEWABLE ORAL ONCE
Status: COMPLETED | OUTPATIENT
Start: 2024-12-04 | End: 2024-12-04

## 2024-12-04 RX ORDER — SENNOSIDES A AND B 8.6 MG/1
1 TABLET, FILM COATED ORAL NIGHTLY
Status: DISCONTINUED | OUTPATIENT
Start: 2024-12-04 | End: 2024-12-06 | Stop reason: HOSPADM

## 2024-12-04 RX ORDER — DOCUSATE SODIUM 100 MG/1
100 CAPSULE, LIQUID FILLED ORAL DAILY
Status: DISCONTINUED | OUTPATIENT
Start: 2024-12-04 | End: 2024-12-06 | Stop reason: HOSPADM

## 2024-12-04 RX ADMIN — GABAPENTIN 800 MG: 400 CAPSULE ORAL at 20:24

## 2024-12-04 RX ADMIN — MICONAZOLE NITRATE: 2 POWDER TOPICAL at 15:10

## 2024-12-04 RX ADMIN — INSULIN LISPRO 2 UNITS: 100 INJECTION, SOLUTION INTRAVENOUS; SUBCUTANEOUS at 17:15

## 2024-12-04 RX ADMIN — BISACODYL 5 MG: 5 TABLET, COATED ORAL at 09:04

## 2024-12-04 RX ADMIN — MONTELUKAST 10 MG: 10 TABLET, FILM COATED ORAL at 20:25

## 2024-12-04 RX ADMIN — QUETIAPINE FUMARATE 50 MG: 25 TABLET ORAL at 20:24

## 2024-12-04 RX ADMIN — PANTOPRAZOLE SODIUM 40 MG: 40 TABLET, DELAYED RELEASE ORAL at 09:02

## 2024-12-04 RX ADMIN — SODIUM CHLORIDE, PRESERVATIVE FREE 10 ML: 5 INJECTION INTRAVENOUS at 20:25

## 2024-12-04 RX ADMIN — OXYCODONE HYDROCHLORIDE AND ACETAMINOPHEN 1 TABLET: 5; 325 TABLET ORAL at 15:10

## 2024-12-04 RX ADMIN — Medication 1000 UNITS: at 09:03

## 2024-12-04 RX ADMIN — METOPROLOL SUCCINATE 25 MG: 25 TABLET, FILM COATED, EXTENDED RELEASE ORAL at 09:03

## 2024-12-04 RX ADMIN — BUDESONIDE 500 MCG: 0.5 INHALANT RESPIRATORY (INHALATION) at 21:10

## 2024-12-04 RX ADMIN — TROSPIUM CHLORIDE 20 MG: 20 TABLET, FILM COATED ORAL at 09:03

## 2024-12-04 RX ADMIN — ANTACID TABLETS 500 MG: 500 TABLET, CHEWABLE ORAL at 21:08

## 2024-12-04 RX ADMIN — MICONAZOLE NITRATE: 2 POWDER TOPICAL at 20:24

## 2024-12-04 RX ADMIN — ACETAMINOPHEN 650 MG: 325 TABLET ORAL at 12:46

## 2024-12-04 RX ADMIN — GABAPENTIN 800 MG: 400 CAPSULE ORAL at 17:15

## 2024-12-04 RX ADMIN — HYDROXYZINE PAMOATE 50 MG: 25 CAPSULE ORAL at 20:25

## 2024-12-04 RX ADMIN — ALBUTEROL SULFATE 2.5 MG: 2.5 SOLUTION RESPIRATORY (INHALATION) at 07:37

## 2024-12-04 RX ADMIN — OXYCODONE HYDROCHLORIDE AND ACETAMINOPHEN 2 TABLET: 5; 325 TABLET ORAL at 20:25

## 2024-12-04 RX ADMIN — DOCUSATE SODIUM 100 MG: 100 CAPSULE, LIQUID FILLED ORAL at 18:20

## 2024-12-04 RX ADMIN — OXYCODONE HYDROCHLORIDE AND ACETAMINOPHEN 2 TABLET: 5; 325 TABLET ORAL at 09:03

## 2024-12-04 RX ADMIN — BENZONATATE 100 MG: 100 CAPSULE ORAL at 13:17

## 2024-12-04 RX ADMIN — ACETAMINOPHEN 650 MG: 325 TABLET ORAL at 01:33

## 2024-12-04 RX ADMIN — INSULIN LISPRO 2 UNITS: 100 INJECTION, SOLUTION INTRAVENOUS; SUBCUTANEOUS at 01:33

## 2024-12-04 RX ADMIN — TROSPIUM CHLORIDE 20 MG: 20 TABLET, FILM COATED ORAL at 15:10

## 2024-12-04 RX ADMIN — Medication 400 MG: at 09:04

## 2024-12-04 RX ADMIN — SODIUM CHLORIDE, PRESERVATIVE FREE 10 ML: 5 INJECTION INTRAVENOUS at 09:04

## 2024-12-04 RX ADMIN — CITALOPRAM 40 MG: 40 TABLET, FILM COATED ORAL at 09:03

## 2024-12-04 RX ADMIN — INSULIN LISPRO 4 UNITS: 100 INJECTION, SOLUTION INTRAVENOUS; SUBCUTANEOUS at 12:46

## 2024-12-04 RX ADMIN — BUDESONIDE 500 MCG: 0.5 INHALANT RESPIRATORY (INHALATION) at 07:37

## 2024-12-04 RX ADMIN — GABAPENTIN 800 MG: 400 CAPSULE ORAL at 09:04

## 2024-12-04 RX ADMIN — GABAPENTIN 800 MG: 400 CAPSULE ORAL at 12:46

## 2024-12-04 RX ADMIN — ACETAMINOPHEN 650 MG: 325 TABLET ORAL at 17:15

## 2024-12-04 RX ADMIN — BUPROPION HYDROCHLORIDE 150 MG: 150 TABLET, EXTENDED RELEASE ORAL at 09:04

## 2024-12-04 RX ADMIN — LIDOCAINE HYDROCHLORIDE 15 ML: 20 SOLUTION ORAL at 13:17

## 2024-12-04 RX ADMIN — ATORVASTATIN CALCIUM 20 MG: 20 TABLET, FILM COATED ORAL at 09:03

## 2024-12-04 ASSESSMENT — PAIN SCALES - GENERAL
PAINLEVEL_OUTOF10: 7
PAINLEVEL_OUTOF10: 6
PAINLEVEL_OUTOF10: 6
PAINLEVEL_OUTOF10: 7

## 2024-12-04 ASSESSMENT — PAIN DESCRIPTION - DESCRIPTORS
DESCRIPTORS: SHARP
DESCRIPTORS: ACHING;SHARP

## 2024-12-04 ASSESSMENT — PAIN DESCRIPTION - LOCATION
LOCATION: ABDOMEN

## 2024-12-04 ASSESSMENT — PAIN DESCRIPTION - ORIENTATION: ORIENTATION: MID;LOWER

## 2024-12-04 ASSESSMENT — PAIN DESCRIPTION - PAIN TYPE: TYPE: ACUTE PAIN

## 2024-12-04 ASSESSMENT — PAIN DESCRIPTION - FREQUENCY: FREQUENCY: CONTINUOUS

## 2024-12-04 ASSESSMENT — PAIN - FUNCTIONAL ASSESSMENT: PAIN_FUNCTIONAL_ASSESSMENT: ACTIVITIES ARE NOT PREVENTED

## 2024-12-04 NOTE — CONSENT
Informed Consent for Blood Component Transfusion Note    I have discussed with the patient the rationale for blood component transfusion; its benefits in treating or preventing fatigue, organ damage, or death; and its risk which includes mild transfusion reactions, rare risk of blood borne infection, or more serious but rare reactions. I have discussed the alternatives to transfusion, including the risk and consequences of not receiving transfusion. The patient had an opportunity to ask questions and had agreed to proceed with transfusion of blood components.    Electronically signed by Thomas Meza MD on 12/3/24 at 10:56 PM EST

## 2024-12-04 NOTE — PLAN OF CARE
Problem: Chronic Conditions and Co-morbidities  Goal: Patient's chronic conditions and co-morbidity symptoms are monitored and maintained or improved  Outcome: Progressing  Flowsheets (Taken 12/4/2024 1420)  Care Plan - Patient's Chronic Conditions and Co-Morbidity Symptoms are Monitored and Maintained or Improved:   Monitor and assess patient's chronic conditions and comorbid symptoms for stability, deterioration, or improvement   Collaborate with multidisciplinary team to address chronic and comorbid conditions and prevent exacerbation or deterioration   Update acute care plan with appropriate goals if chronic or comorbid symptoms are exacerbated and prevent overall improvement and discharge     Problem: Discharge Planning  Goal: Discharge to home or other facility with appropriate resources  Outcome: Progressing  Flowsheets (Taken 12/4/2024 1420)  Discharge to home or other facility with appropriate resources:   Identify barriers to discharge with patient and caregiver   Arrange for needed discharge resources and transportation as appropriate   Identify discharge learning needs (meds, wound care, etc)     Problem: Pain  Goal: Verbalizes/displays adequate comfort level or baseline comfort level  Outcome: Progressing  Flowsheets (Taken 12/4/2024 1420)  Verbalizes/displays adequate comfort level or baseline comfort level:   Encourage patient to monitor pain and request assistance   Assess pain using appropriate pain scale   Administer analgesics based on type and severity of pain and evaluate response     Problem: Safety - Adult  Goal: Free from fall injury  Outcome: Progressing  Flowsheets (Taken 12/4/2024 1420)  Free From Fall Injury:   Instruct family/caregiver on patient safety   Based on caregiver fall risk screen, instruct family/caregiver to ask for assistance with transferring infant if caregiver noted to have fall risk factors     Problem: Skin/Tissue Integrity  Goal: Absence of new skin

## 2024-12-04 NOTE — CARE COORDINATION
Case Management Assessment Initial Evaluation    Date/Time of Evaluation: 2024 8:32 AM  Assessment Completed by: Parag Avalos RN    If patient is discharged prior to next notation, then this note serves as note for discharge by case management.    Patient Name: Vandana Burt                   YOB: 1950  Diagnosis: Hematoma [T14.8XXA]  Rectus sheath hematoma, initial encounter [S30.1XXA]  Chronic obstructive pulmonary disease, unspecified COPD type (HCC) [J44.9]                   Date / Time: 12/3/2024  9:10 AM  Location: [address] [place] [address]     Patient Admission Status: Inpatient   Readmission Risk Low 0-14, Mod 15-19), High > 20: Readmission Risk Score: 15.9    Current PCP: Samuel Ervin MD  Health Care Decision Makers:   Primary Decision Maker: Corrie Rosado  Child - [phone]    Additional Case Management Notes: WBC 13.8, Hgb 10.8.+rhinovirus. Pain control. PT/OT.     Procedures: none    Imagin/3 CT abd: large left rectus sheath hematoma    Patient Goals/Plan/Treatment Preferences: Met with Vandana, she is from home alone. DME as below. She mostly uses her wheelchair, but states she is able to care for herself. At this time she denies additional needs, but states she is working on getting her medicaid/passport back.        24 1036   Service Assessment   Patient Orientation Alert and Oriented   Cognition Alert   History Provided By Patient   Primary Caregiver Self   Support Systems Family Members;Children   Patient's Healthcare Decision Maker is: Named in Scanned ACP Document   PCP Verified by CM Yes   Prior Functional Level Independent in ADLs/IADLs   Current Functional Level Assistance with the following:;Mobility   Can patient return to prior living arrangement Yes   Ability to make needs known: Good   Family able to assist with home care needs: Yes   Would you like for me to discuss the discharge plan with any other family members/significant others, and if so,  who? No   Financial Resources Medicare   Community Resources None   Social/Functional History   Active  Yes   Discharge Planning   Type of Residence Apartment   Living Arrangements Alone   Current Services Prior To Admission Durable Medical Equipment;[employer]IR[employer]/Passport  (had Passport until recently, is working on getting it set back up)   Current DME Prior to Arrival Wheelchair;Shower Chair;Walker;Bedside Commode   Potential Assistance Needed N/A   DME Ordered? No   Potential Assistance Purchasing Medications No   Type of Home Care Services None   Patient expects to be discharged to: Apartment   Condition of Participation: Discharge Planning   The Plan for Transition of Care is related to the following treatment goals: breathe easier

## 2024-12-04 NOTE — PROGRESS NOTES
Hospitalist Progress Note      Patient:  Vandana Burt 74 y.o. female     Unit/Bed:-23/023-A    Date of Admission: 12/3/2024      ASSESSMENT AND PLAN    Active Problems  Intractable tussis in setting of mild persistent asthma  11/27/24: 5 day course prednisone / Zithromax  12/03 influenza and COVID-19 testing negative  12/03 molecular respiratory panel with rhinovirus/enterovirus detected  04/18/2023 pulmonary function testing FEV1/FVC 80%, FEV1 73% predicted, FVC 70% predicted, RV/% predicted, DL/VA 98% predicted  Patient following with pulmonology with recommendation for budesonide nebulizer twice daily with additional albuterol inhaler/nebulizer every 6 hours as needed  As needed antitussive regimen initiated  IS / Acapella   Rectus sheath hematoma  Likely secondary to above  12/03/2024 CTA chest/abdomen/pelvis showing large left rectus sheath hematoma measuring up to 7.5 x 15.3 x 16.1 cm identified.  Interventional radiology contacted in emergency department with recommendation for conservative management with ice, pain control  Hgb overall stable  Acute HFrEF exacerbation:  11/22/2022 echocardiogram with EF 50% with mild global hypokinesis of left ventricle  GDMT: Beta-blocker (previous prescription for MRA); diuresis with 20 mg furosemide  -Recommendation for discontinuation of pioglitazone at discharge  Strict I's and O's, daily weights, judicious diuresis and IV fluids  Repeat ECHO ordered as well as escalation of diuresis  Resolved Problems      Chronic Conditions (reviewed and stable unless otherwise stated)  CAD  Follows with Dr. Valero for advanced care recommendations  Patient's home beta-blocker, statin resumed.  Patient's home aspirin held in the context of bleeding    Essential hypertension  Patient's home beta-blocker (previous prescription for MRA) resumed with hold parameters    Type II DM, With Hyperglycemia, With Complication   Reference to A1c 6.1% in outpatient note from  nonproductive cough as well as chest pain worse during coughing.  At that encounter the patient received nebulized ipratropium-albuterol as well as 125 mg of methylprednisolone.  The patient was discharged to home with a 5-day course of prednisone and azithromycin.  The patient clarifies on 12/03/2024 that she is having intermittent pleuritic chest pain but her pain profile is predominantly in the left upper quadrant that worsens with her coughing.  The patient denies any chest pressure or tightness.     Additional review of systems is negative for fever, chills, nausea, vomiting, diarrhea, constipation, falls (last fall 3 to 4 months ago), or loss of consciousness.  Review of systems is positive for intermittent room spinning sensation, generalized dizziness, intermittent blood when wiping, straining with bowel movements, intermittent incontinence when coughing of urine, bilateral lower extremity edema, paresthesias of the upper extremities.     The patient states that she lives at home by herself and utilizes a wheelchair.  The patient denies any recent illness, sick contacts, or recent travel outside of the state or country.  The patient endorses a decreased appetite.  She is able to identify her emergency contact and clarifies her CODE STATUS to be full.  She states that she is established with a cardiologist, urologist, pulmonologist, and gastroenterologist for advance care recommendations.     In the emergency room Stephens temperature 97.4, respirations 24, pulse 79, blood pressure 125/66, SpO2 94% on room air.  BMP sodium 138, potassium 4.3, chloride 101, bicarb 24, BUN 18, creatinine 0.7, anion gap 13, EGFR 90, calcium 9.1, calculated osmolality 282     Lactic acid trend 2.2, 2.4, 1.7  proBNP 1351, troponin less than 6  LFT albumin 3.5, alkaline phosphatase 65, ALT 16, AST 17, total bilirubin 0.3, direct bilirubin less than 0.1  CBC WBC 12.1, Hb 11.8, HCT 37.5, MCV 94.9, platelets 272  COVID and influenza

## 2024-12-04 NOTE — PLAN OF CARE
Problem: Chronic Conditions and Co-morbidities  Goal: Patient's chronic conditions and co-morbidity symptoms are monitored and maintained or improved  Outcome: Progressing  Flowsheets (Taken 12/3/2024 2238)  Care Plan - Patient's Chronic Conditions and Co-Morbidity Symptoms are Monitored and Maintained or Improved:   Collaborate with multidisciplinary team to address chronic and comorbid conditions and prevent exacerbation or deterioration   Monitor and assess patient's chronic conditions and comorbid symptoms for stability, deterioration, or improvement     Problem: Discharge Planning  Goal: Discharge to home or other facility with appropriate resources  Outcome: Progressing  Flowsheets  Taken 12/3/2024 2238 by Amanda Anne RN  Discharge to home or other facility with appropriate resources: Identify barriers to discharge with patient and caregiver      Problem: Pain  Goal: Verbalizes/displays adequate comfort level or baseline comfort level  Outcome: Progressing  Flowsheets (Taken 12/3/2024 2238)  Verbalizes/displays adequate comfort level or baseline comfort level:   Encourage patient to monitor pain and request assistance   Assess pain using appropriate pain scale     Problem: Safety - Adult  Goal: Free from fall injury  Outcome: Progressing  Flowsheets (Taken 12/3/2024 2238)  Free From Fall Injury:   Instruct family/caregiver on patient safety   Based on caregiver fall risk screen, instruct family/caregiver to ask for assistance with transferring infant if caregiver noted to have fall risk factors     Problem: Skin/Tissue Integrity  Goal: Absence of new skin breakdown  Description: 1.  Monitor for areas of redness and/or skin breakdown  2.  Assess vascular access sites hourly  3.  Every 4-6 hours minimum:  Change oxygen saturation probe site  4.  Every 4-6 hours:  If on nasal continuous positive airway pressure, respiratory therapy assess nares and determine need for appliance change or resting  period.  Outcome: Progressing  Note: No new skin breakdown so far this shift. Pt is able to adjust self in bed, frequently reminded to and help offered.       Identify barriers to discharge with patient and caregiver   Identify discharge learning needs (meds, wound care, etc)   Refer to discharge planning if patient needs post-hospital services based on physician order or complex needs related to functional status, cognitive ability or social support system

## 2024-12-05 LAB
ANION GAP SERPL CALC-SCNC: 12 MEQ/L (ref 8–16)
BUN SERPL-MCNC: 16 MG/DL (ref 7–22)
CALCIUM SERPL-MCNC: 8.7 MG/DL (ref 8.5–10.5)
CHLORIDE SERPL-SCNC: 98 MEQ/L (ref 98–111)
CO2 SERPL-SCNC: 26 MEQ/L (ref 23–33)
CREAT SERPL-MCNC: 0.7 MG/DL (ref 0.4–1.2)
DEPRECATED RDW RBC AUTO: 45.9 FL (ref 35–45)
ERYTHROCYTE [DISTWIDTH] IN BLOOD BY AUTOMATED COUNT: 13.4 % (ref 11.5–14.5)
GFR SERPL CREATININE-BSD FRML MDRD: 90 ML/MIN/1.73M2
GLUCOSE BLD STRIP.AUTO-MCNC: 143 MG/DL (ref 70–108)
GLUCOSE BLD STRIP.AUTO-MCNC: 197 MG/DL (ref 70–108)
GLUCOSE BLD STRIP.AUTO-MCNC: 218 MG/DL (ref 70–108)
GLUCOSE BLD STRIP.AUTO-MCNC: 229 MG/DL (ref 70–108)
GLUCOSE SERPL-MCNC: 182 MG/DL (ref 70–108)
HCT VFR BLD AUTO: 29.7 % (ref 37–47)
HCT VFR BLD AUTO: 31.2 % (ref 37–47)
HGB BLD-MCNC: 10.1 GM/DL (ref 12–16)
HGB BLD-MCNC: 9.5 GM/DL (ref 12–16)
HGB BLD-MCNC: 9.9 GM/DL (ref 12–16)
IRON SATN MFR SERPL: 10 % (ref 20–50)
IRON SERPL-MCNC: 25 UG/DL (ref 50–170)
MCH RBC QN AUTO: 30.1 PG (ref 26–33)
MCHC RBC AUTO-ENTMCNC: 32 GM/DL (ref 32.2–35.5)
MCV RBC AUTO: 94 FL (ref 81–99)
PLATELET # BLD AUTO: 253 THOU/MM3 (ref 130–400)
PMV BLD AUTO: 10.4 FL (ref 9.4–12.4)
POTASSIUM SERPL-SCNC: 3.8 MEQ/L (ref 3.5–5.2)
RBC # BLD AUTO: 3.16 MILL/MM3 (ref 4.2–5.4)
SODIUM SERPL-SCNC: 136 MEQ/L (ref 135–145)
TIBC SERPL-MCNC: 244 UG/DL (ref 171–450)
WBC # BLD AUTO: 12 THOU/MM3 (ref 4.8–10.8)

## 2024-12-05 PROCEDURE — 6370000000 HC RX 637 (ALT 250 FOR IP): Performed by: STUDENT IN AN ORGANIZED HEALTH CARE EDUCATION/TRAINING PROGRAM

## 2024-12-05 PROCEDURE — 36415 COLL VENOUS BLD VENIPUNCTURE: CPT

## 2024-12-05 PROCEDURE — 97110 THERAPEUTIC EXERCISES: CPT

## 2024-12-05 PROCEDURE — 85027 COMPLETE CBC AUTOMATED: CPT

## 2024-12-05 PROCEDURE — 2500000003 HC RX 250 WO HCPCS: Performed by: PHYSICIAN ASSISTANT

## 2024-12-05 PROCEDURE — 97166 OT EVAL MOD COMPLEX 45 MIN: CPT

## 2024-12-05 PROCEDURE — 83550 IRON BINDING TEST: CPT

## 2024-12-05 PROCEDURE — 82948 REAGENT STRIP/BLOOD GLUCOSE: CPT

## 2024-12-05 PROCEDURE — 94640 AIRWAY INHALATION TREATMENT: CPT

## 2024-12-05 PROCEDURE — 85014 HEMATOCRIT: CPT

## 2024-12-05 PROCEDURE — 6370000000 HC RX 637 (ALT 250 FOR IP)

## 2024-12-05 PROCEDURE — 85018 HEMOGLOBIN: CPT

## 2024-12-05 PROCEDURE — 80048 BASIC METABOLIC PNL TOTAL CA: CPT

## 2024-12-05 PROCEDURE — 2580000003 HC RX 258

## 2024-12-05 PROCEDURE — 97530 THERAPEUTIC ACTIVITIES: CPT

## 2024-12-05 PROCEDURE — 1200000000 HC SEMI PRIVATE

## 2024-12-05 PROCEDURE — 6370000000 HC RX 637 (ALT 250 FOR IP): Performed by: PHYSICIAN ASSISTANT

## 2024-12-05 PROCEDURE — 6360000002 HC RX W HCPCS

## 2024-12-05 PROCEDURE — 83540 ASSAY OF IRON: CPT

## 2024-12-05 PROCEDURE — 97162 PT EVAL MOD COMPLEX 30 MIN: CPT

## 2024-12-05 RX ORDER — BENZONATATE 100 MG/1
100 CAPSULE ORAL 3 TIMES DAILY PRN
Qty: 30 CAPSULE | Refills: 0 | Status: SHIPPED | OUTPATIENT
Start: 2024-12-05 | End: 2024-12-15

## 2024-12-05 RX ADMIN — MICONAZOLE NITRATE: 2 POWDER TOPICAL at 21:08

## 2024-12-05 RX ADMIN — TROSPIUM CHLORIDE 20 MG: 20 TABLET, FILM COATED ORAL at 17:46

## 2024-12-05 RX ADMIN — PANTOPRAZOLE SODIUM 40 MG: 40 TABLET, DELAYED RELEASE ORAL at 05:20

## 2024-12-05 RX ADMIN — GABAPENTIN 800 MG: 400 CAPSULE ORAL at 17:46

## 2024-12-05 RX ADMIN — BISACODYL 5 MG: 5 TABLET, COATED ORAL at 09:11

## 2024-12-05 RX ADMIN — INSULIN LISPRO 2 UNITS: 100 INJECTION, SOLUTION INTRAVENOUS; SUBCUTANEOUS at 17:46

## 2024-12-05 RX ADMIN — SODIUM CHLORIDE, PRESERVATIVE FREE 10 ML: 5 INJECTION INTRAVENOUS at 09:11

## 2024-12-05 RX ADMIN — MONTELUKAST 10 MG: 10 TABLET, FILM COATED ORAL at 21:08

## 2024-12-05 RX ADMIN — SODIUM CHLORIDE, PRESERVATIVE FREE 10 ML: 5 INJECTION INTRAVENOUS at 21:10

## 2024-12-05 RX ADMIN — GABAPENTIN 800 MG: 400 CAPSULE ORAL at 09:11

## 2024-12-05 RX ADMIN — TROSPIUM CHLORIDE 20 MG: 20 TABLET, FILM COATED ORAL at 05:19

## 2024-12-05 RX ADMIN — ACETAMINOPHEN 650 MG: 325 TABLET ORAL at 01:09

## 2024-12-05 RX ADMIN — BUPROPION HYDROCHLORIDE 150 MG: 150 TABLET, EXTENDED RELEASE ORAL at 09:11

## 2024-12-05 RX ADMIN — BENZONATATE 100 MG: 100 CAPSULE ORAL at 22:08

## 2024-12-05 RX ADMIN — DOCUSATE SODIUM 100 MG: 100 CAPSULE, LIQUID FILLED ORAL at 09:11

## 2024-12-05 RX ADMIN — ACETAMINOPHEN 650 MG: 325 TABLET ORAL at 05:19

## 2024-12-05 RX ADMIN — ACETAMINOPHEN 650 MG: 325 TABLET ORAL at 23:42

## 2024-12-05 RX ADMIN — BENZONATATE 100 MG: 100 CAPSULE ORAL at 01:09

## 2024-12-05 RX ADMIN — INSULIN LISPRO 4 UNITS: 100 INJECTION, SOLUTION INTRAVENOUS; SUBCUTANEOUS at 09:12

## 2024-12-05 RX ADMIN — MICONAZOLE NITRATE: 2 POWDER TOPICAL at 09:13

## 2024-12-05 RX ADMIN — GABAPENTIN 800 MG: 400 CAPSULE ORAL at 21:08

## 2024-12-05 RX ADMIN — INSULIN LISPRO 2 UNITS: 100 INJECTION, SOLUTION INTRAVENOUS; SUBCUTANEOUS at 21:09

## 2024-12-05 RX ADMIN — GABAPENTIN 800 MG: 400 CAPSULE ORAL at 12:47

## 2024-12-05 RX ADMIN — METOPROLOL SUCCINATE 25 MG: 25 TABLET, FILM COATED, EXTENDED RELEASE ORAL at 09:11

## 2024-12-05 RX ADMIN — ACETAMINOPHEN 650 MG: 325 TABLET ORAL at 12:47

## 2024-12-05 RX ADMIN — ACETAMINOPHEN 650 MG: 325 TABLET ORAL at 17:46

## 2024-12-05 RX ADMIN — Medication 400 MG: at 09:11

## 2024-12-05 RX ADMIN — BUDESONIDE 500 MCG: 0.5 INHALANT RESPIRATORY (INHALATION) at 21:35

## 2024-12-05 RX ADMIN — Medication 12.5 MG: at 05:20

## 2024-12-05 RX ADMIN — Medication 1000 UNITS: at 09:11

## 2024-12-05 RX ADMIN — ATORVASTATIN CALCIUM 20 MG: 20 TABLET, FILM COATED ORAL at 09:11

## 2024-12-05 RX ADMIN — CITALOPRAM 40 MG: 40 TABLET, FILM COATED ORAL at 09:11

## 2024-12-05 RX ADMIN — QUETIAPINE FUMARATE 50 MG: 25 TABLET ORAL at 21:08

## 2024-12-05 ASSESSMENT — PAIN DESCRIPTION - LOCATION
LOCATION: ABDOMEN
LOCATION: ABDOMEN

## 2024-12-05 ASSESSMENT — PAIN DESCRIPTION - FREQUENCY
FREQUENCY: CONTINUOUS
FREQUENCY: CONTINUOUS

## 2024-12-05 ASSESSMENT — PAIN DESCRIPTION - ORIENTATION
ORIENTATION: MID;LOWER
ORIENTATION: MID;LOWER

## 2024-12-05 ASSESSMENT — PAIN DESCRIPTION - ONSET
ONSET: ON-GOING
ONSET: ON-GOING

## 2024-12-05 ASSESSMENT — PAIN DESCRIPTION - PAIN TYPE
TYPE: ACUTE PAIN
TYPE: ACUTE PAIN

## 2024-12-05 ASSESSMENT — PAIN SCALES - GENERAL
PAINLEVEL_OUTOF10: 5
PAINLEVEL_OUTOF10: 6
PAINLEVEL_OUTOF10: 7

## 2024-12-05 ASSESSMENT — PAIN DESCRIPTION - DESCRIPTORS
DESCRIPTORS: ACHING
DESCRIPTORS: ACHING

## 2024-12-05 NOTE — PLAN OF CARE
Problem: Respiratory - Adult  Goal: Clear lung sounds  Outcome: Progressing     Problem: Respiratory - Adult  Goal: Achieves optimal ventilation and oxygenation  12/4/2024 2112 by Memo Silveira RCP  Outcome: Progressing

## 2024-12-05 NOTE — PLAN OF CARE
Problem: Chronic Conditions and Co-morbidities  Goal: Patient's chronic conditions and co-morbidity symptoms are monitored and maintained or improved  12/4/2024 2238 by Navarro Marino RN  Outcome: Progressing     Problem: Discharge Planning  Goal: Discharge to home or other facility with appropriate resources  12/4/2024 2238 by Navarro Marino RN  Outcome: Progressing     Problem: Pain  Goal: Verbalizes/displays adequate comfort level or baseline comfort level  12/4/2024 2238 by Navarro Marino RN  Outcome: Progressing     Problem: Safety - Adult  Goal: Free from fall injury  12/4/2024 2238 by Navarro Marino RN  Outcome: Progressing     Problem: Skin/Tissue Integrity  Goal: Absence of new skin breakdown  12/4/2024 2238 by Navarro Marino RN  Outcome: Progressing     Problem: Respiratory - Adult  Goal: Clear lung sounds  Goal: Achieves optimal ventilation and oxygenation  12/4/2024 2238 by Navarro Marino RN  Outcome: Progressing

## 2024-12-05 NOTE — PROGRESS NOTES
[facility]  INPATIENT OCCUPATIONAL THERAPY  STRZ RENAL TELEMETRY 6K  EVALUATION      Discharge Recommendations: Continue to assess pending progress, Home with Home health OT, Home with nursing aide  Equipment Recommendations: No         Time In: 0753  Time Out: 0821  Timed Code Treatment Minutes: 13 Minutes  Minutes: 28          Date: 2024  Patient Name: Vandana Burt,   Gender: female      MRN: [id number]  : 1950  (74 y.o.)  Referring Practitioner: Balbina Mendez MD  Diagnosis: Rectus sheath hematoma, initial encounter  Additional Pertinent Hx: History obtained from patient at the bedside with attending physician present.  This is a 74-year-old female who presented to [facility] ED on 2024 secondary to intractable tussis.  She states that her cough is lasted for 2.5 to 3 weeks.  She states that it is typically not productive and does not feel the sensation that something is stuck in her throat.  She denies any wheezing or shortness of breath.  She denies any alleviating factors after trying antitussive medications.  The patient states that she has a nebulizer that she is utilizing once every 12 hours that is helpful. Mild persistent asthma, Systolic heart failure with reduced ejection fraction. CTA of chest showed: A large left rectus sheath hematoma measures up to 7.5 x 15.3 x 16.1 cm.rhinovirus/enterovirus detected    Restrictions/Precautions:  Restrictions/Precautions: Fall Risk  Position Activity Restriction  Other Position/Activity Restrictions: droplet isolation for rhinovirus    Subjective  Chart Reviewed: Yes, Orders, Progress Notes, History and Physical  Patient assessed for rehabilitation services?: Yes  Family / Caregiver Present: No    Subjective: cooperative    Pain: 4/10: in belly    Vitals: Oxygen: >90% while seated EOB on RA    Social/Functional History:  Lives With: Alone  Type of Home: Apartment ([employer])  Home Layout: One level  Home Access: Elevator (3rd  ADL T-Scale Score : 38.66    Following session, patient left in safe position with all fall risk precautions in place.

## 2024-12-05 NOTE — PROGRESS NOTES
Patient received sacramental anointing by a . If you are in need of  support, please call [phone]. If you are in need of a  after 6:30pm, please call the house supervisor for the on-call .      Raiza Avelar  [facility] Coordinator  [phone]

## 2024-12-05 NOTE — PLAN OF CARE
Problem: Chronic Conditions and Co-morbidities  Goal: Patient's chronic conditions and co-morbidity symptoms are monitored and maintained or improved  Outcome: Progressing  Flowsheets (Taken 12/4/2024 1420 by Willa Enriquez RN)  Care Plan - Patient's Chronic Conditions and Co-Morbidity Symptoms are Monitored and Maintained or Improved:   Monitor and assess patient's chronic conditions and comorbid symptoms for stability, deterioration, or improvement   Collaborate with multidisciplinary team to address chronic and comorbid conditions and prevent exacerbation or deterioration   Update acute care plan with appropriate goals if chronic or comorbid symptoms are exacerbated and prevent overall improvement and discharge     Problem: Discharge Planning  Goal: Discharge to home or other facility with appropriate resources  Outcome: Progressing  Flowsheets (Taken 12/4/2024 1420 by Willa Enriquez, RN)  Discharge to home or other facility with appropriate resources:   Identify barriers to discharge with patient and caregiver   Arrange for needed discharge resources and transportation as appropriate   Identify discharge learning needs (meds, wound care, etc)     Problem: Pain  Goal: Verbalizes/displays adequate comfort level or baseline comfort level  Outcome: Progressing  Flowsheets (Taken 12/4/2024 1420 by Willa Enriquez, RN)  Verbalizes/displays adequate comfort level or baseline comfort level:   Encourage patient to monitor pain and request assistance   Assess pain using appropriate pain scale   Administer analgesics based on type and severity of pain and evaluate response     Problem: Safety - Adult  Goal: Free from fall injury  Outcome: Progressing  Flowsheets (Taken 12/4/2024 1420 by Willa Enriquez, RN)  Free From Fall Injury:   Instruct family/caregiver on patient safety   Based on caregiver fall risk screen, instruct family/caregiver to ask for assistance with transferring infant if caregiver noted to have fall risk

## 2024-12-05 NOTE — PROGRESS NOTES
Patient received sacramental anointing by a . If you are in need of  support, please call [phone]. If you are in need of a  after 6:30pm, please call the house supervisor for the on-call .     12/05/24 1620   Encounter Summary   Encounter Overview/Reason  Encounter   Service Provided For Patient   Referral/Consult From [facility]   Support System Family members   Last Encounter  12/05/24  (Anointed)   Complexity of Encounter Low   Begin Time 0930   End Time  0936   Total Time Calculated 6 min   Spiritual/Emotional needs   Type Spiritual Support   Rituals, Rites and Sacraments   Type Anointing   Assessment/Intervention/Outcome   Assessment Hopeful   Intervention Empowerment

## 2024-12-06 VITALS
TEMPERATURE: 98.1 F | OXYGEN SATURATION: 92 % | DIASTOLIC BLOOD PRESSURE: 79 MMHG | HEART RATE: 80 BPM | RESPIRATION RATE: 20 BRPM | BODY MASS INDEX: 47.99 KG/M2 | HEIGHT: 61 IN | SYSTOLIC BLOOD PRESSURE: 127 MMHG | WEIGHT: 254.19 LBS

## 2024-12-06 LAB
DEPRECATED RDW RBC AUTO: 45.9 FL (ref 35–45)
ERYTHROCYTE [DISTWIDTH] IN BLOOD BY AUTOMATED COUNT: 13.4 % (ref 11.5–14.5)
GLUCOSE BLD STRIP.AUTO-MCNC: 203 MG/DL (ref 70–108)
GLUCOSE BLD STRIP.AUTO-MCNC: 224 MG/DL (ref 70–108)
HCT VFR BLD AUTO: 28.3 % (ref 37–47)
HCT VFR BLD AUTO: 28.5 % (ref 37–47)
HGB BLD-MCNC: 9 GM/DL (ref 12–16)
HGB BLD-MCNC: 9 GM/DL (ref 12–16)
MCH RBC QN AUTO: 30.5 PG (ref 26–33)
MCHC RBC AUTO-ENTMCNC: 31.8 GM/DL (ref 32.2–35.5)
MCV RBC AUTO: 95.9 FL (ref 81–99)
PLATELET # BLD AUTO: 241 THOU/MM3 (ref 130–400)
PMV BLD AUTO: 10.2 FL (ref 9.4–12.4)
RBC # BLD AUTO: 2.95 MILL/MM3 (ref 4.2–5.4)
WBC # BLD AUTO: 10.7 THOU/MM3 (ref 4.8–10.8)

## 2024-12-06 PROCEDURE — 99239 HOSP IP/OBS DSCHRG MGMT >30: CPT | Performed by: PHYSICIAN ASSISTANT

## 2024-12-06 PROCEDURE — 85014 HEMATOCRIT: CPT

## 2024-12-06 PROCEDURE — 6360000002 HC RX W HCPCS: Performed by: PHYSICIAN ASSISTANT

## 2024-12-06 PROCEDURE — 94669 MECHANICAL CHEST WALL OSCILL: CPT

## 2024-12-06 PROCEDURE — 82948 REAGENT STRIP/BLOOD GLUCOSE: CPT

## 2024-12-06 PROCEDURE — 36415 COLL VENOUS BLD VENIPUNCTURE: CPT

## 2024-12-06 PROCEDURE — 85018 HEMOGLOBIN: CPT

## 2024-12-06 PROCEDURE — 2580000003 HC RX 258

## 2024-12-06 PROCEDURE — 94640 AIRWAY INHALATION TREATMENT: CPT

## 2024-12-06 PROCEDURE — 6360000002 HC RX W HCPCS

## 2024-12-06 PROCEDURE — 6370000000 HC RX 637 (ALT 250 FOR IP): Performed by: STUDENT IN AN ORGANIZED HEALTH CARE EDUCATION/TRAINING PROGRAM

## 2024-12-06 PROCEDURE — 6370000000 HC RX 637 (ALT 250 FOR IP): Performed by: PHYSICIAN ASSISTANT

## 2024-12-06 PROCEDURE — 85027 COMPLETE CBC AUTOMATED: CPT

## 2024-12-06 PROCEDURE — 2580000003 HC RX 258: Performed by: PHYSICIAN ASSISTANT

## 2024-12-06 PROCEDURE — 6370000000 HC RX 637 (ALT 250 FOR IP)

## 2024-12-06 RX ORDER — FERROUS SULFATE 325(65) MG
325 TABLET ORAL 2 TIMES DAILY
Qty: 60 TABLET | Refills: 0 | Status: SHIPPED | OUTPATIENT
Start: 2024-12-06 | End: 2025-01-05

## 2024-12-06 RX ADMIN — OXYCODONE HYDROCHLORIDE AND ACETAMINOPHEN 2 TABLET: 5; 325 TABLET ORAL at 09:40

## 2024-12-06 RX ADMIN — METOPROLOL SUCCINATE 25 MG: 25 TABLET, FILM COATED, EXTENDED RELEASE ORAL at 09:43

## 2024-12-06 RX ADMIN — DOCUSATE SODIUM 100 MG: 100 CAPSULE, LIQUID FILLED ORAL at 09:41

## 2024-12-06 RX ADMIN — BENZONATATE 100 MG: 100 CAPSULE ORAL at 09:40

## 2024-12-06 RX ADMIN — TROSPIUM CHLORIDE 20 MG: 20 TABLET, FILM COATED ORAL at 05:11

## 2024-12-06 RX ADMIN — BUDESONIDE 500 MCG: 0.5 INHALANT RESPIRATORY (INHALATION) at 08:23

## 2024-12-06 RX ADMIN — ACETAMINOPHEN 650 MG: 325 TABLET ORAL at 05:11

## 2024-12-06 RX ADMIN — Medication 400 MG: at 09:44

## 2024-12-06 RX ADMIN — BISACODYL 5 MG: 5 TABLET, COATED ORAL at 09:43

## 2024-12-06 RX ADMIN — ATORVASTATIN CALCIUM 20 MG: 20 TABLET, FILM COATED ORAL at 09:43

## 2024-12-06 RX ADMIN — INSULIN LISPRO 2 UNITS: 100 INJECTION, SOLUTION INTRAVENOUS; SUBCUTANEOUS at 09:40

## 2024-12-06 RX ADMIN — GABAPENTIN 800 MG: 400 CAPSULE ORAL at 09:43

## 2024-12-06 RX ADMIN — BUPROPION HYDROCHLORIDE 150 MG: 150 TABLET, EXTENDED RELEASE ORAL at 09:43

## 2024-12-06 RX ADMIN — MICONAZOLE NITRATE: 2 POWDER TOPICAL at 09:42

## 2024-12-06 RX ADMIN — Medication 12.5 MG: at 05:11

## 2024-12-06 RX ADMIN — HYDROXYZINE PAMOATE 50 MG: 25 CAPSULE ORAL at 09:40

## 2024-12-06 RX ADMIN — SODIUM CHLORIDE, PRESERVATIVE FREE 10 ML: 5 INJECTION INTRAVENOUS at 09:45

## 2024-12-06 RX ADMIN — CITALOPRAM 40 MG: 40 TABLET, FILM COATED ORAL at 09:43

## 2024-12-06 RX ADMIN — LIDOCAINE HYDROCHLORIDE 15 ML: 20 SOLUTION ORAL at 09:40

## 2024-12-06 RX ADMIN — Medication 1000 UNITS: at 09:44

## 2024-12-06 RX ADMIN — Medication 4.8 MG: at 05:11

## 2024-12-06 RX ADMIN — SODIUM CHLORIDE 125 MG: 9 INJECTION, SOLUTION INTRAVENOUS at 09:51

## 2024-12-06 RX ADMIN — PANTOPRAZOLE SODIUM 40 MG: 40 TABLET, DELAYED RELEASE ORAL at 05:11

## 2024-12-06 ASSESSMENT — PAIN SCALES - GENERAL: PAINLEVEL_OUTOF10: 8

## 2024-12-06 NOTE — PROGRESS NOTES
Oral Daily    trospium  20 mg Oral BID AC    montelukast  10 mg Oral Nightly    pantoprazole  40 mg Oral QAM AC    QUEtiapine  50 mg Oral Nightly    atorvastatin  20 mg Oral Daily    magnesium oxide  400 mg Oral Daily    acetaminophen  650 mg Oral 4 times per day    budesonide  500 mcg Nebulization BID    insulin lispro  0-8 Units SubCUTAneous 4x Daily AC & HS    PRN Meds: Menthol, sodium chloride flush, sodium chloride, ondansetron **OR** ondansetron, polyethylene glycol, [Held by provider] acetaminophen **OR** [Held by provider] acetaminophen, albuterol, hydrOXYzine pamoate, lidocaine, benzonatate, lidocaine viscous hcl, oxyCODONE-acetaminophen **OR** oxyCODONE-acetaminophen, glucose, dextrose bolus **OR** dextrose bolus, glucagon (rDNA), dextrose    Exam:  /82   Pulse 87   Temp 98.7 °F (37.1 °C) (Oral)   Resp 20   Ht 1.549 m (5' 1\")   Wt 115.3 kg (254 lb 3.1 oz)   LMP  (LMP Unknown)   SpO2 95%   BMI 48.03 kg/m²   General: obese, elderly, acutely ill appearing, no distress, appears stated age.  Eyes:  PERRL. Conjunctivae/corneas clear.  HENT: Head normal appearing. Nares normal. Oral mucosa moist.  Hearing intact.   Neck: Supple, with full range of motion. Trachea midline.  No gross JVD appreciated.  Respiratory:  Normal effort. diminished to auscultation, without wheezes or rales. B/l rhonchi noted throughout.  Cardiovascular: Normal rate, regular rhythm with normal S1/S2 without murmurs.    No lower extremity edema.   Abdomen: Soft, non-tender, non-distended with normal bowel sounds.  Musculoskeletal: No joint swelling or tenderness. Normal tone. No abnormal movements.   Skin: Warm and dry. BLE chronic erythema, TTP however she states its been this way for \"15 years\"  Neurologic:  No focal sensory/motor deficits in the upper or lower extremities. Cranial nerves:  grossly non-focal 2-12.     Psychiatric: Alert and oriented x4, normal insight and thought content.   Capillary Refill: Brisk,< 3

## 2024-12-06 NOTE — CARE COORDINATION
12/6/24, 8:05 AM EST    Patient goals/plan/ treatment preferences discussed by  and .  Patient goals/plan/ treatment preferences reviewed with patient/ family.  Patient/ family verbalize understanding of discharge plan and are in agreement with goal/plan/treatment preferences.  Understanding was demonstrated using the teach back method.  AVS provided by RN at time of discharge, which includes all necessary medical information pertaining to the patients current course of illness, treatment, post-discharge goals of care, and treatment preferences.     Services At/After Discharge: In ambulette    Met w/ [facility]. She is agreeable with discharge today. Plans to return home alone. Has needed DME. Denies needs for HH. Expresses need for ambulette. Understands she may be charged as she doesn't have Medicaid. Ambulette transport arranged by [facility].

## 2024-12-06 NOTE — PROGRESS NOTES
1. Myopia: Rx was given for correction if indicated and requested. 2. Presbyopia3. RADHA w/ PEK OU -- Recommend Increase AT's to BID-TID OU. (Sample given of Blink)4. Nuclear Cataract OU -- Observe5. Corneal Scars OD -- likely due to CTL wear in pastReturn for an appointment in 1 year 36 with Dr. Gee Knapp. Discharge teaching and instructions for diagnosis/procedure of hematoma completed with patient using teachback method. AVS reviewed. Printed prescriptions given to patient. Patient voiced understanding regarding prescriptions, follow up appointments, and care of self at home. Discharged in a wheelchair to  home with support per EMS transportation

## 2024-12-06 NOTE — PLAN OF CARE
Problem: Chronic Conditions and Co-morbidities  Goal: Patient's chronic conditions and co-morbidity symptoms are monitored and maintained or improved  12/6/2024 1036 by Vishnu Hubbard RN  Outcome: Progressing  Flowsheets (Taken 12/6/2024 1036)  Care Plan - Patient's Chronic Conditions and Co-Morbidity Symptoms are Monitored and Maintained or Improved: Monitor and assess patient's chronic conditions and comorbid symptoms for stability, deterioration, or improvement     Problem: Discharge Planning  Goal: Discharge to home or other facility with appropriate resources  12/6/2024 1036 by Vishnu Hubbard, RN  Outcome: Progressing  Flowsheets (Taken 12/6/2024 1036)  Discharge to home or other facility with appropriate resources: Identify barriers to discharge with patient and caregiver     Problem: Pain  Goal: Verbalizes/displays adequate comfort level or baseline comfort level  12/6/2024 1036 by Vishnu Hubbard, RN  Outcome: Progressing  Flowsheets (Taken 12/6/2024 1036)  Verbalizes/displays adequate comfort level or baseline comfort level: Encourage patient to monitor pain and request assistance

## 2024-12-06 NOTE — PLAN OF CARE
Problem: Respiratory - Adult  Goal: Clear lung sounds  12/6/2024 0915 by Belgica Joseph RCP  Outcome: Progressing  12/5/2024 2136 by Cris German RCP  Outcome: Progressing     Problem: Respiratory - Adult  Goal: Clear lung sounds  12/6/2024 0915 by Belgica Joseph RCP  Outcome: Progressing     Problem: Respiratory - Adult  Goal: Clear lung sounds  12/5/2024 2136 by Cris German RCP  Outcome: Progressing     Problem: Respiratory - Adult  Goal: Achieves optimal ventilation and oxygenation  12/6/2024 0915 by Belgica Joseph RCP  Outcome: Progressing  12/5/2024 2152 by Larissa Esquivel RN  Outcome: Progressing  Flowsheets (Taken 12/5/2024 2152)  Achieves optimal ventilation and oxygenation:   Assess for changes in respiratory status   Assess for changes in mentation and behavior   Position to facilitate oxygenation and minimize respiratory effort   Oxygen supplementation based on oxygen saturation or arterial blood gases   Encourage broncho-pulmonary hygiene including cough, deep breathe, incentive spirometry   Assess and instruct to report shortness of breath or any respiratory difficulty   Respiratory therapy support as indicated  12/5/2024 2136 by Cris German RCP  Outcome: Progressing

## 2024-12-06 NOTE — DISCHARGE SUMMARY
ZINCATE  Take 1 capsule by mouth daily           * This list has 2 medication(s) that are the same as other medications prescribed for you. Read the directions carefully, and ask your doctor or other care provider to review them with you.                STOP taking these medications      etodolac 500 MG tablet  Commonly known as: LODINE               Where to Get Your Medications        These medications were sent to [facility] - [place], [place] - [address] - P [phone] - F [phone]  [address] [place] [address]      Phone: [phone]   benzonatate 100 MG capsule  ferrous sulfate 325 (65 Fe) MG tablet          Labs :-  Recent Results (from the past 72 hour(s))   Hemoglobin and Hematocrit    Collection Time: 12/03/24  5:22 PM   Result Value Ref Range    Hemoglobin 11.2 (L) 12.0 - 16.0 gm/dl    Hematocrit 36.6 (L) 37.0 - 47.0 %   Respiratory Panel, Molecular, with COVID-19 (Restricted: peds pts or suitable admitted adults)    Collection Time: 12/03/24  6:52 PM    Specimen: Nasopharyngeal   Result Value Ref Range    Film Array Adenovirus Not Detected Not Detected    Film Array Coronavirus 229E Not Detected Not Detected    Film Array Coronavirus HKU1 Not Detected Not Detected    Film Array Conoravirus NL63 Not Detected Not Detected    Film Array Coronavirus OC43 Not Detected Not Detected    SARS-CoV-2, PCR Not Detected Not Detected    Film Array Metapneumovirus Not Detected Not Detected    Film Array Rhinovirus/Enterovirus Detected (A) Not Detected    Film Array Influenza A Virus Not Detected Not Detected    Film Array Influenza B Not Detected Not Detected    Film Array Parainfluenza Virus 1 Not Detected Not Detected    Film Array Parainfluenza Virus 2 Not Detected Not Detected    Film Array Parainfluenza Virus 3 Not Detected Not Detected    Film Array Parainfluenza Virus 4 Not Detected Not Detected    Film Array Respiratory Syncitial Virus Not Detected Not  when appropriate to reduce the radiation dose to as low as reasonably achievable. CONTRAST: 80 cc Isovue-370. FINDINGS: Pulmonary arteries: No filling defects are identified within the pulmonary arterial vasculature to suggest the presence of pulmonary embolus. Heart/mediastinum: The heart size is normal. Coronary artery calcifications are observed. No pericardial effusion is identified. No aortic aneurysm or dissection is present. The thyroid gland is unremarkable. No mediastinal, hilar, or axillary lymphadenopathy is observed. Lungs: Dependent atelectasis is present. No focal consolidation, pleural effusion, or pneumothorax is identified. No pulmonary mass or nodule is observed. The central airways are patent and unremarkable. Liver/gallbladder/bilary tree: The gallbladder is absent. Hepatomegaly and hepatic steatosis are unchanged. No biliary ductal dilatation is identified. Pancreas: Remains atrophic. Spleen : Normal. Adrenal glands: Normal. Kidneys/ ureters/ bladder: No renal calculus, hydronephrosis, or hydroureter is visualized. The urinary bladder is partially distended and grossly unremarkable. Gastrointestinal: A left rectus sheath hematoma measures up to 7.6 x 15.3 x 16.1 cm. The hematoma contains a central hyperdense component (series 303, image 50) measuring up to 4.5 cm suggestive of active hemorrhage/extravasation of contrast. No free fluid or fluid collections are noted within the abdomen. No bowel obstruction is observed. A small sliding-type hiatal hernia is stable. Retroperitoneum / lymph nodes: The aorta is not dilated. No lymphadenopathy is visualized. Pelvis: The uterus is absent. No adnexal lesions are observed. Musculoskeletal: Posterior fusion hardware spanning the lower lumbar spine is intact. Diffuse osteopenia and multilevel degenerative disc disease is observed. A spinal stimulator device terminates within the midthoracic spine.     1. A large left rectus sheath hematoma measures up to

## 2024-12-06 NOTE — PLAN OF CARE
Problem: Respiratory - Adult  Goal: Clear lung sounds  Outcome: Progressing     Problem: Respiratory - Adult  Goal: Achieves optimal ventilation and oxygenation  12/5/2024 2136 by Cris German, JASMINE  Outcome: Progressing   Patient mutually agreed on goals.

## 2024-12-06 NOTE — PLAN OF CARE
Problem: Chronic Conditions and Co-morbidities  Goal: Patient's chronic conditions and co-morbidity symptoms are monitored and maintained or improved  12/5/2024 2152 by Larissa Esquivel, RN  Outcome: Progressing  Flowsheets (Taken 12/5/2024 2152)  Care Plan - Patient's Chronic Conditions and Co-Morbidity Symptoms are Monitored and Maintained or Improved:   Monitor and assess patient's chronic conditions and comorbid symptoms for stability, deterioration, or improvement   Collaborate with multidisciplinary team to address chronic and comorbid conditions and prevent exacerbation or deterioration   Update acute care plan with appropriate goals if chronic or comorbid symptoms are exacerbated and prevent overall improvement and discharge     Problem: Discharge Planning  Goal: Discharge to home or other facility with appropriate resources  12/5/2024 2152 by Larissa Esquivel, RN  Outcome: Progressing  Flowsheets (Taken 12/5/2024 2152)  Discharge to home or other facility with appropriate resources:   Identify barriers to discharge with patient and caregiver   Arrange for needed discharge resources and transportation as appropriate   Identify discharge learning needs (meds, wound care, etc)     Problem: Pain  Goal: Verbalizes/displays adequate comfort level or baseline comfort level  12/5/2024 2152 by Larissa Esquivel, RN  Outcome: Progressing  Flowsheets (Taken 12/5/2024 2152)  Verbalizes/displays adequate comfort level or baseline comfort level:   Encourage patient to monitor pain and request assistance   Assess pain using appropriate pain scale   Administer analgesics based on type and severity of pain and evaluate response   Implement non-pharmacological measures as appropriate and evaluate response     Problem: Safety - Adult  Goal: Free from fall injury  12/5/2024 2152 by Larissa Esquivel, RN  Outcome: Progressing  Flowsheets (Taken 12/5/2024 2152)  Free From Fall Injury:   Instruct family/caregiver on patient safety

## 2024-12-10 NOTE — PROGRESS NOTES
Physician Progress Note      PATIENT:               CHUCK ARREDONDO  [facility] #:                  [id number]  :                       1950  ADMIT DATE:       12/3/2024 9:10 AM  DISCH DATE:        2024 1:22 PM  RESPONDING  PROVIDER #:        Cassy Loya PA-C          QUERY TEXT:    Patient admitted with BMI 48.3. If possible, please document in progress notes   and discharge summary if you are evaluating and /or treating any of the   following:    The medical record reflects the following:  Risk Factors: 74 y.o. female with DM, hyperlipidemia, depression, HTN  Clinical Indicators: BMI 48.3  Treatment: Cardiac, sodium and fluid restriction diet    Specificity of obesity and morbid obesity should be reported based on   physician documentation, as there are several published classifications and   definitions?  MS-DRG Training Guide. CDC:   https://www.cdc.gov/obesity/basics/adult-defining.html. WHO:   https://www.who.int/news-room/fact-sheets/detail/obesity-and-overweight. NIH:   https://www.nhlbi.nih.gov/health/educational/lose_wt/BMI/bmi_dis.htm  Options provided:  -- Severe obesity  -- Other - I will add my own diagnosis  -- Disagree - Not applicable / Not valid  -- Disagree - Clinically unable to determine / Unknown  -- Refer to Clinical Documentation Reviewer    PROVIDER RESPONSE TEXT:    This patient has severe obesity.    Query created by: Merle Del Cid on 2024 1:15 PM      QUERY TEXT:    Pt admitted with mild persistent asthma & rectus sheath hematoma and has   systolic CHF documented. If possible, please document in progress notes and   discharge summary further specificity regarding the type and acuity of CHF:    The medical record reflects the following:  Risk Factors: 74 y.o. female with systolic heart failure with reduced ejection   fraction  Clinical Indicators: Documented \"Systolic heart failure with reduced ejection   fraction, with evidence of acute exacerbation. 2022

## 2024-12-23 ENCOUNTER — OFFICE VISIT (OUTPATIENT)
Dept: CARDIOLOGY CLINIC | Age: 74
End: 2024-12-23
Payer: MEDICARE

## 2024-12-23 VITALS
DIASTOLIC BLOOD PRESSURE: 70 MMHG | HEART RATE: 71 BPM | BODY MASS INDEX: 48.15 KG/M2 | HEIGHT: 61 IN | WEIGHT: 255 LBS | SYSTOLIC BLOOD PRESSURE: 129 MMHG

## 2024-12-23 DIAGNOSIS — E78.01 FAMILIAL HYPERCHOLESTEROLEMIA: ICD-10-CM

## 2024-12-23 DIAGNOSIS — I10 PRIMARY HYPERTENSION: Primary | ICD-10-CM

## 2024-12-23 PROCEDURE — 1159F MED LIST DOCD IN RCRD: CPT | Performed by: NUCLEAR MEDICINE

## 2024-12-23 PROCEDURE — 1111F DSCHRG MED/CURRENT MED MERGE: CPT | Performed by: NUCLEAR MEDICINE

## 2024-12-23 PROCEDURE — G8417 CALC BMI ABV UP PARAM F/U: HCPCS | Performed by: NUCLEAR MEDICINE

## 2024-12-23 PROCEDURE — G8427 DOCREV CUR MEDS BY ELIG CLIN: HCPCS | Performed by: NUCLEAR MEDICINE

## 2024-12-23 PROCEDURE — 1090F PRES/ABSN URINE INCON ASSESS: CPT | Performed by: NUCLEAR MEDICINE

## 2024-12-23 PROCEDURE — 1036F TOBACCO NON-USER: CPT | Performed by: NUCLEAR MEDICINE

## 2024-12-23 PROCEDURE — 3078F DIAST BP <80 MM HG: CPT | Performed by: NUCLEAR MEDICINE

## 2024-12-23 PROCEDURE — 3017F COLORECTAL CA SCREEN DOC REV: CPT | Performed by: NUCLEAR MEDICINE

## 2024-12-23 PROCEDURE — G8399 PT W/DXA RESULTS DOCUMENT: HCPCS | Performed by: NUCLEAR MEDICINE

## 2024-12-23 PROCEDURE — 99213 OFFICE O/P EST LOW 20 MIN: CPT | Performed by: NUCLEAR MEDICINE

## 2024-12-23 PROCEDURE — G8484 FLU IMMUNIZE NO ADMIN: HCPCS | Performed by: NUCLEAR MEDICINE

## 2024-12-23 PROCEDURE — 3074F SYST BP LT 130 MM HG: CPT | Performed by: NUCLEAR MEDICINE

## 2024-12-23 PROCEDURE — 1123F ACP DISCUSS/DSCN MKR DOCD: CPT | Performed by: NUCLEAR MEDICINE

## 2024-12-23 RX ORDER — METOPROLOL SUCCINATE 25 MG/1
TABLET, EXTENDED RELEASE ORAL
Qty: 90 TABLET | Refills: 3 | Status: SHIPPED | OUTPATIENT
Start: 2024-12-23

## 2024-12-23 NOTE — PROGRESS NOTES
Patient here for follow up.  Patient didn't bring medication list or bottles today.   
via nebs Q6h prn and budesonide 500 mcg BID 1 each 0    magnesium oxide (MAG-OX) 400 (241.3 Mg) MG TABS tablet Take 1 tablet by mouth daily      zinc sulfate (ZINCATE) 220 (50 Zn) MG capsule Take 1 capsule by mouth daily      vitamin C (VITAMIN C) 500 MG tablet Take 1 tablet by mouth daily      Cholecalciferol (VITAMIN D) 25 MCG TABS Take 1 tablet by mouth daily      mupirocin (BACTROBAN) 2 % ointment Apply topically as needed       Mouthwashes (BIOTENE) LIQD oral solution Swish and spit 15 mLs 3 times daily as needed      furosemide (LASIX) 20 MG tablet Take 1 tablet by mouth daily      citalopram (CELEXA) 40 MG tablet take 1 tablet by mouth every morning  0    nystatin-triamcinolone (MYCOLOG II) 174272-1.1 UNIT/GM-% cream Apply 1 applicator topically 4 times daily Apply topically 4 times daily. As needed      Artificial Saliva (BIOTENE MOISTURIZING MOUTH) SOLN Take 1 applicator by mouth as needed      Compression Stockings MISC 1 Package by Does not apply route Knee High 1 each 0    omeprazole (PRILOSEC) 40 MG delayed release capsule take 1 capsule by mouth once daily 30 capsule 5    TRADJENTA 5 MG tablet take 1 tablet by mouth daily 30 tablet 5    simvastatin (ZOCOR) 40 MG tablet take 1 tablet by mouth at bedtime 30 tablet 5    pioglitazone (ACTOS) 45 MG tablet take 1 tablet by mouth once daily 30 tablet 5    metFORMIN (GLUCOPHAGE) 1000 MG tablet take 1 tablet by mouth twice a day with food 60 tablet 5    FREESTYLE LANCETS MISC Test 1 time daily.   Dx: 250.00-Type 2 Diabetes.  Freestyle 28G Lancets 100 each 11    Omega-3 Fatty Acids (FISH OIL) 1200 MG CAPS Take 1,200 mg by mouth daily      ASPIRIN LOW DOSE 81 MG EC tablet take 1 tablet by mouth once daily 30 tablet 11    glucose blood VI test strips (FREESTYLE LITE) strip Test once daily 100 each 11    QUEtiapine (SEROQUEL) 50 MG tablet Take 1 tablet by mouth nightly      albuterol (PROVENTIL) (2.5 MG/3ML) 0.083% nebulizer solution Take 3 mLs by nebulization

## 2025-02-25 ENCOUNTER — OFFICE VISIT (OUTPATIENT)
Dept: PULMONOLOGY | Age: 75
End: 2025-02-25
Payer: MEDICARE

## 2025-02-25 VITALS
HEIGHT: 61 IN | BODY MASS INDEX: 48.86 KG/M2 | DIASTOLIC BLOOD PRESSURE: 70 MMHG | TEMPERATURE: 98 F | OXYGEN SATURATION: 99 % | WEIGHT: 258.8 LBS | HEART RATE: 64 BPM | SYSTOLIC BLOOD PRESSURE: 128 MMHG

## 2025-02-25 DIAGNOSIS — J45.30 MILD PERSISTENT ASTHMA WITHOUT COMPLICATION: ICD-10-CM

## 2025-02-25 DIAGNOSIS — R09.82 POST-NASAL DRIP: ICD-10-CM

## 2025-02-25 DIAGNOSIS — E66.01 CLASS 3 SEVERE OBESITY DUE TO EXCESS CALORIES WITH BODY MASS INDEX (BMI) OF 45.0 TO 49.9 IN ADULT, UNSPECIFIED WHETHER SERIOUS COMORBIDITY PRESENT: Primary | ICD-10-CM

## 2025-02-25 DIAGNOSIS — E66.813 CLASS 3 SEVERE OBESITY DUE TO EXCESS CALORIES WITH BODY MASS INDEX (BMI) OF 45.0 TO 49.9 IN ADULT, UNSPECIFIED WHETHER SERIOUS COMORBIDITY PRESENT: Primary | ICD-10-CM

## 2025-02-25 DIAGNOSIS — J30.2 SEASONAL ALLERGIC RHINITIS, UNSPECIFIED TRIGGER: ICD-10-CM

## 2025-02-25 PROCEDURE — 3017F COLORECTAL CA SCREEN DOC REV: CPT

## 2025-02-25 PROCEDURE — 1159F MED LIST DOCD IN RCRD: CPT

## 2025-02-25 PROCEDURE — 1036F TOBACCO NON-USER: CPT

## 2025-02-25 PROCEDURE — 99214 OFFICE O/P EST MOD 30 MIN: CPT

## 2025-02-25 PROCEDURE — 1090F PRES/ABSN URINE INCON ASSESS: CPT

## 2025-02-25 PROCEDURE — G8399 PT W/DXA RESULTS DOCUMENT: HCPCS

## 2025-02-25 PROCEDURE — G8427 DOCREV CUR MEDS BY ELIG CLIN: HCPCS

## 2025-02-25 PROCEDURE — 3078F DIAST BP <80 MM HG: CPT

## 2025-02-25 PROCEDURE — 3074F SYST BP LT 130 MM HG: CPT

## 2025-02-25 PROCEDURE — 1123F ACP DISCUSS/DSCN MKR DOCD: CPT

## 2025-02-25 PROCEDURE — G8417 CALC BMI ABV UP PARAM F/U: HCPCS

## 2025-02-25 PROCEDURE — 1160F RVW MEDS BY RX/DR IN RCRD: CPT

## 2025-02-25 RX ORDER — BUDESONIDE 0.5 MG/2ML
1 INHALANT ORAL 2 TIMES DAILY
Qty: 120 ML | Refills: 11 | Status: SHIPPED | OUTPATIENT
Start: 2025-02-25

## 2025-02-25 RX ORDER — ALBUTEROL SULFATE 90 UG/1
2 INHALANT RESPIRATORY (INHALATION) EVERY 6 HOURS PRN
Qty: 1 EACH | Refills: 11 | Status: SHIPPED | OUTPATIENT
Start: 2025-02-25

## 2025-02-25 RX ORDER — MONTELUKAST SODIUM 10 MG/1
10 TABLET ORAL NIGHTLY
Qty: 90 TABLET | Refills: 3 | Status: SHIPPED | OUTPATIENT
Start: 2025-02-25

## 2025-02-25 ASSESSMENT — ENCOUNTER SYMPTOMS
SINUS PAIN: 0
SORE THROAT: 0
CHEST TIGHTNESS: 1
RHINORRHEA: 0
SINUS PRESSURE: 1
SHORTNESS OF BREATH: 1
COUGH: 1

## 2025-02-25 NOTE — PROGRESS NOTES
[facility]     Patient: CHUCK ARREDONDO, 74 y.o.   : 1950  Date of encounter: 2025   Previously seen by Dr. Jackman, Remedios Blevins, ABDIRAHMAN     Subjective     Patient presents for 10 months Asthma  follow up   Chuck presents to the pulmonary clinic today for routine follow-up for her asthma.  She reports no new significant changes in her breathing, chronically short of breath with minimal exertion, occasionally triggered for unknown reasons, patient reports triggered by dust, anxiety.  She reports chronic chest tightness, denies chest pain, fevers or chills. States associated cough has been non-productive, however has chronic post nasal drip. Reports taking albuterol in haler 1-3 times per week.   Per chart review, Chuck is a 75 y/o female former smoker who follows the Pulmonary clinic  for asthma, with emphysema. She also has a pertinent PMHx of allergic rhinitis. Overall has significant physical deconditioning, obesity, and poor activity status.     Progress History:   - Since last visit any new medical issues? Yes- non respiratory  - New ER or hospital visits? Yes- unrelated to breathing/respiratory  - Any new or changes in medicines? No   - Using inhalers? Yes, not consistently- taking pulmicort as needed rather than scheduled  - Are they helpful? yes     Immunization History   Administered Date(s) Administered    COVID-19, PFIZER Bivalent, DO NOT Dilute, (age 12y+), IM, 30 mcg/0.3 mL 10/19/2022    COVID-19, PFIZER PURPLE top, DILUTE for use, (age 12 y+), 30mcg/0.3mL 2021, 2021, 2021    Influenza, FLUARIX, FLULAVAL, FLUZONE (age 6 mo+) and AFLURIA, (age 3 y+), Quadv PF, 0.5mL 10/12/2021, 10/27/2022    Influenza, FLUZONE High Dose (age 65 y+), IM, Quadv, 0.7mL 10/12/2021, 10/27/2022, 2024    Influenza, FLUZONE High Dose, (age 65 y+), IM, Trivalent PF, 0.5mL 10/12/2021    PPD Test 10/23/2020    Pneumococcal, PCV20, PREVNAR 20, (age

## 2025-04-01 ENCOUNTER — HOSPITAL ENCOUNTER (OUTPATIENT)
Dept: WOMENS IMAGING | Age: 75
Discharge: HOME | End: 2025-04-01
Payer: MEDICARE

## 2025-04-01 VITALS — WEIGHT: 258 LBS | HEIGHT: 61 IN | BODY MASS INDEX: 48.71 KG/M2

## 2025-04-01 DIAGNOSIS — Z12.31 ENCOUNTER FOR SCREENING MAMMOGRAM FOR MALIGNANT NEOPLASM OF BREAST: ICD-10-CM

## 2025-04-01 PROCEDURE — 77063 BREAST TOMOSYNTHESIS BI: CPT

## 2025-04-09 ENCOUNTER — HOSPITAL ENCOUNTER (OUTPATIENT)
Dept: WOMENS IMAGING | Age: 75
Discharge: HOME | End: 2025-04-09
Attending: RADIOLOGY
Payer: MEDICARE

## 2025-04-09 ENCOUNTER — APPOINTMENT (OUTPATIENT)
Dept: WOMENS IMAGING | Age: 75
End: 2025-04-09
Attending: RADIOLOGY
Payer: MEDICARE

## 2025-04-09 DIAGNOSIS — R92.0 MICROCALCIFICATION OF RIGHT BREAST ON MAMMOGRAM: ICD-10-CM

## 2025-04-09 PROCEDURE — G0279 TOMOSYNTHESIS, MAMMO: HCPCS

## 2025-04-16 ENCOUNTER — HOSPITAL ENCOUNTER (OUTPATIENT)
Dept: WOMENS IMAGING | Age: 75
Discharge: HOME | End: 2025-04-16
Attending: RADIOLOGY
Payer: MEDICARE

## 2025-04-16 ENCOUNTER — HOSPITAL ENCOUNTER (OUTPATIENT)
Dept: WOMENS IMAGING | Age: 75
Discharge: HOME | End: 2025-04-16
Payer: MEDICARE

## 2025-04-16 DIAGNOSIS — R92.1 BREAST CALCIFICATION, RIGHT: ICD-10-CM

## 2025-04-16 PROCEDURE — 2709999900 MAM STEREO BREAST BX W LOC DEVICE 1ST LESION RIGHT

## 2025-04-16 PROCEDURE — 77065 DX MAMMO INCL CAD UNI: CPT

## 2025-04-16 PROCEDURE — 88305 TISSUE EXAM BY PATHOLOGIST: CPT

## 2025-04-16 NOTE — PROGRESS NOTES
Breast Biopsy Flowsheet/Post-Operative Care    Date of Procedure: 4/16/2025  Physician: Dr. Bell  Technologist: Reina Perkins RT(R)(M) & Lisa Gould RT(R)(M)    Biopsy:stereotactic breast biopsy  Lesion type: Non-palpable  Breast: right    Clock face position: Site #1: UOQ        Primary Method of Detection: Mammogram      Microcalcification's: yes,Increasing Number   Distribution: Grouped         Biopsy Method:   Mammotome:    Site # 1    Gauge: 10    # of Passes: 5     Clip: Coil         Pre-Op Assessment: (BI-RADS)   4. Suspicious Abnormality    Patient Tolerated Procedure: good  Complications: none  Comments: none    Post Operative Care  Steri strips: Yes  Dressing: Gauze, Tape   Ice Applied to Site:  Yes  Evidence of Bleeding:  No    Pain Verbalized: No      Written Discharge Instructions: Yes  Condition at Discharge: good  Time of Discharge: 1400 -  [facility] called and they are picking her up in our lobby    Electronically signed by Magdalena Bell on 4/16/2025 at 1:58 PM

## 2025-04-16 NOTE — PROGRESS NOTES
[facility]  Pre-Biopsy Assessment      Patient Education    Written information about procedure Yes  right   Procedural steps explained Yes Stereotactic Biopsy   Post-op potential: bruising, hematoma, pain Yes    Self-care: activity, care of dressing Yes    Patient verbalized understanding Yes    Consent signed and witnessed Yes      Hormone Therapy Status: none    Recent Medication: Aspirin Last Dose: three days ago                                     Hormone Replacement Therapy: no    Previous Breast Biopsy: yes - right axillary node 2019 - benign    Previous Diagnosis Cancer: no    Hysterectomy:yes - partial age 44    Emotional Status: Calm    Language or Physical Barriers: uses a wheelchair, does not walk well alone    Comments: has a nerve stimulator for back pain, she turned it off at home and does not have the control with her today, states she will be fine, she has been in back pain since age 16      Electronically signed by Magdalena Bell on 4/16/2025 at 1:54 PM

## 2025-04-17 ENCOUNTER — CLINICAL DOCUMENTATION (OUTPATIENT)
Dept: WOMENS IMAGING | Age: 75
End: 2025-04-17

## 2025-04-17 NOTE — PROGRESS NOTES
Contact Type :    Telephone  Notes :  After consulting with Dr. Bell,  Vita was contacted by telephone.  She was informed of the negative biopsy results and the need to return for a 6 month follow up.  She voiced understanding.    Biopsy site: doing well     Results faxed to: Dr. Ervin

## 2025-04-18 DIAGNOSIS — R92.1 BREAST CALCIFICATION, RIGHT: ICD-10-CM

## 2025-04-18 DIAGNOSIS — Z09 FOLLOW-UP EXAM: Primary | ICD-10-CM

## 2025-05-27 ENCOUNTER — HOSPITAL ENCOUNTER (INPATIENT)
Age: 75
LOS: 6 days | Discharge: HOME HEALTH | DRG: 308 | End: 2025-06-02
Attending: EMERGENCY MEDICINE | Admitting: STUDENT IN AN ORGANIZED HEALTH CARE EDUCATION/TRAINING PROGRAM
Payer: MEDICARE

## 2025-05-27 ENCOUNTER — APPOINTMENT (OUTPATIENT)
Dept: CT IMAGING | Age: 75
DRG: 308 | End: 2025-05-27
Payer: MEDICARE

## 2025-05-27 ENCOUNTER — TELEPHONE (OUTPATIENT)
Dept: CARDIOLOGY CLINIC | Age: 75
End: 2025-05-27

## 2025-05-27 ENCOUNTER — APPOINTMENT (OUTPATIENT)
Dept: GENERAL RADIOLOGY | Age: 75
DRG: 308 | End: 2025-05-27
Payer: MEDICARE

## 2025-05-27 DIAGNOSIS — I50.9 CONGESTIVE HEART FAILURE, UNSPECIFIED HF CHRONICITY, UNSPECIFIED HEART FAILURE TYPE (HCC): ICD-10-CM

## 2025-05-27 DIAGNOSIS — I48.0 PAROXYSMAL ATRIAL FIBRILLATION (HCC): ICD-10-CM

## 2025-05-27 DIAGNOSIS — R94.31 ABNORMAL ELECTROCARDIOGRAPHY: ICD-10-CM

## 2025-05-27 DIAGNOSIS — R06.02 SHORTNESS OF BREATH: ICD-10-CM

## 2025-05-27 DIAGNOSIS — I48.91 ATRIAL FIBRILLATION WITH RAPID VENTRICULAR RESPONSE (HCC): Primary | ICD-10-CM

## 2025-05-27 LAB
ALBUMIN SERPL BCG-MCNC: 3.8 G/DL (ref 3.4–4.9)
ALP SERPL-CCNC: 75 U/L (ref 38–126)
ALT SERPL W/O P-5'-P-CCNC: 13 U/L (ref 10–35)
ANION GAP SERPL CALC-SCNC: 14 MEQ/L (ref 8–16)
APTT PPP: 33.6 SECONDS (ref 22–38)
AST SERPL-CCNC: 21 U/L (ref 10–35)
BASOPHILS ABSOLUTE: 0 THOU/MM3 (ref 0–0.1)
BASOPHILS NFR BLD AUTO: 0.5 %
BILIRUB SERPL-MCNC: 0.4 MG/DL (ref 0.3–1.2)
BUN SERPL-MCNC: 16 MG/DL (ref 8–23)
CA-I BLD ISE-SCNC: 1.2 MMOL/L (ref 1.12–1.32)
CALCIUM SERPL-MCNC: 9.4 MG/DL (ref 8.8–10.2)
CHLORIDE SERPL-SCNC: 106 MEQ/L (ref 98–111)
CO2 SERPL-SCNC: 21 MEQ/L (ref 22–29)
CREAT SERPL-MCNC: 0.7 MG/DL (ref 0.5–0.9)
DEPRECATED RDW RBC AUTO: 53.5 FL (ref 35–45)
EKG ATRIAL RATE: 66 BPM
EKG P AXIS: 67 DEGREES
EKG P-R INTERVAL: 156 MS
EKG Q-T INTERVAL: 258 MS
EKG Q-T INTERVAL: 448 MS
EKG QRS DURATION: 104 MS
EKG QRS DURATION: 116 MS
EKG QTC CALCULATION (BAZETT): 450 MS
EKG QTC CALCULATION (BAZETT): 469 MS
EKG R AXIS: -7 DEGREES
EKG R AXIS: -8 DEGREES
EKG T AXIS: 162 DEGREES
EKG T AXIS: 72 DEGREES
EKG VENTRICULAR RATE: 183 BPM
EKG VENTRICULAR RATE: 66 BPM
EOSINOPHIL NFR BLD AUTO: 3.7 %
EOSINOPHILS ABSOLUTE: 0.3 THOU/MM3 (ref 0–0.4)
ERYTHROCYTE [DISTWIDTH] IN BLOOD BY AUTOMATED COUNT: 15.7 % (ref 11.5–14.5)
GFR SERPL CREATININE-BSD FRML MDRD: 90 ML/MIN/1.73M2
GLUCOSE SERPL-MCNC: 242 MG/DL (ref 74–109)
HCT VFR BLD AUTO: 39.7 % (ref 37–47)
HEPARIN UNFRACTIONATED: < 0.04 U/ML (ref 0.3–0.7)
HGB BLD-MCNC: 12.7 GM/DL (ref 12–16)
IMM GRANULOCYTES # BLD AUTO: 0.02 THOU/MM3 (ref 0–0.07)
IMM GRANULOCYTES NFR BLD AUTO: 0.3 %
INR PPP: 1.04 (ref 0.85–1.13)
LYMPHOCYTES ABSOLUTE: 2.3 THOU/MM3 (ref 1–4.8)
LYMPHOCYTES NFR BLD AUTO: 30 %
MAGNESIUM SERPL-MCNC: 1.7 MG/DL (ref 1.6–2.6)
MCH RBC QN AUTO: 29.7 PG (ref 26–33)
MCHC RBC AUTO-ENTMCNC: 32 GM/DL (ref 32.2–35.5)
MCV RBC AUTO: 93 FL (ref 81–99)
MONOCYTES ABSOLUTE: 0.9 THOU/MM3 (ref 0.4–1.3)
MONOCYTES NFR BLD AUTO: 12.3 %
NEUTROPHILS ABSOLUTE: 4 THOU/MM3 (ref 1.8–7.7)
NEUTROPHILS NFR BLD AUTO: 53.2 %
NRBC BLD AUTO-RTO: 0 /100 WBC
NT-PROBNP SERPL IA-MCNC: 918 PG/ML (ref 0–449)
OSMOLALITY SERPL CALC.SUM OF ELEC: 290.4 MOSMOL/KG (ref 275–300)
PLATELET # BLD AUTO: 263 THOU/MM3 (ref 130–400)
PMV BLD AUTO: 11.4 FL (ref 9.4–12.4)
POTASSIUM SERPL-SCNC: 4.2 MEQ/L (ref 3.5–5.2)
PROT SERPL-MCNC: 6.9 G/DL (ref 6.4–8.3)
RBC # BLD AUTO: 4.27 MILL/MM3 (ref 4.2–5.4)
SODIUM SERPL-SCNC: 141 MEQ/L (ref 135–145)
T4 FREE SERPL-MCNC: 1.2 NG/DL (ref 0.92–1.68)
TROPONIN, HIGH SENSITIVITY: 13 NG/L (ref 0–12)
TROPONIN, HIGH SENSITIVITY: 18 NG/L (ref 0–12)
TSH SERPL DL<=0.05 MIU/L-ACNC: 3.39 UIU/ML (ref 0.27–4.2)
WBC # BLD AUTO: 7.5 THOU/MM3 (ref 4.8–10.8)

## 2025-05-27 PROCEDURE — 2500000003 HC RX 250 WO HCPCS

## 2025-05-27 PROCEDURE — 85610 PROTHROMBIN TIME: CPT

## 2025-05-27 PROCEDURE — 80053 COMPREHEN METABOLIC PANEL: CPT

## 2025-05-27 PROCEDURE — 85730 THROMBOPLASTIN TIME PARTIAL: CPT

## 2025-05-27 PROCEDURE — 84443 ASSAY THYROID STIM HORMONE: CPT

## 2025-05-27 PROCEDURE — 6360000002 HC RX W HCPCS

## 2025-05-27 PROCEDURE — 6370000000 HC RX 637 (ALT 250 FOR IP)

## 2025-05-27 PROCEDURE — 36415 COLL VENOUS BLD VENIPUNCTURE: CPT

## 2025-05-27 PROCEDURE — 84484 ASSAY OF TROPONIN QUANT: CPT

## 2025-05-27 PROCEDURE — 83735 ASSAY OF MAGNESIUM: CPT

## 2025-05-27 PROCEDURE — 96365 THER/PROPH/DIAG IV INF INIT: CPT

## 2025-05-27 PROCEDURE — 6360000004 HC RX CONTRAST MEDICATION: Performed by: STUDENT IN AN ORGANIZED HEALTH CARE EDUCATION/TRAINING PROGRAM

## 2025-05-27 PROCEDURE — 96361 HYDRATE IV INFUSION ADD-ON: CPT

## 2025-05-27 PROCEDURE — 96375 TX/PRO/DX INJ NEW DRUG ADDON: CPT

## 2025-05-27 PROCEDURE — 93005 ELECTROCARDIOGRAM TRACING: CPT | Performed by: EMERGENCY MEDICINE

## 2025-05-27 PROCEDURE — 83880 ASSAY OF NATRIURETIC PEPTIDE: CPT

## 2025-05-27 PROCEDURE — 85520 HEPARIN ASSAY: CPT

## 2025-05-27 PROCEDURE — 82330 ASSAY OF CALCIUM: CPT

## 2025-05-27 PROCEDURE — 96366 THER/PROPH/DIAG IV INF ADDON: CPT

## 2025-05-27 PROCEDURE — 71275 CT ANGIOGRAPHY CHEST: CPT

## 2025-05-27 PROCEDURE — 85025 COMPLETE CBC W/AUTO DIFF WBC: CPT

## 2025-05-27 PROCEDURE — 94640 AIRWAY INHALATION TREATMENT: CPT

## 2025-05-27 PROCEDURE — 94761 N-INVAS EAR/PLS OXIMETRY MLT: CPT

## 2025-05-27 PROCEDURE — 93005 ELECTROCARDIOGRAM TRACING: CPT

## 2025-05-27 PROCEDURE — 84439 ASSAY OF FREE THYROXINE: CPT

## 2025-05-27 PROCEDURE — 2140000000 HC CCU INTERMEDIATE R&B

## 2025-05-27 PROCEDURE — 99285 EMERGENCY DEPT VISIT HI MDM: CPT

## 2025-05-27 PROCEDURE — 71045 X-RAY EXAM CHEST 1 VIEW: CPT

## 2025-05-27 PROCEDURE — 2580000003 HC RX 258

## 2025-05-27 PROCEDURE — 2700000000 HC OXYGEN THERAPY PER DAY

## 2025-05-27 RX ORDER — IOPAMIDOL 755 MG/ML
80 INJECTION, SOLUTION INTRAVASCULAR
Status: COMPLETED | OUTPATIENT
Start: 2025-05-27 | End: 2025-05-27

## 2025-05-27 RX ORDER — ACETAMINOPHEN 650 MG/1
650 SUPPOSITORY RECTAL EVERY 6 HOURS PRN
Status: DISCONTINUED | OUTPATIENT
Start: 2025-05-27 | End: 2025-06-02 | Stop reason: HOSPADM

## 2025-05-27 RX ORDER — SODIUM CHLORIDE 9 MG/ML
INJECTION, SOLUTION INTRAVENOUS PRN
Status: DISCONTINUED | OUTPATIENT
Start: 2025-05-27 | End: 2025-06-02 | Stop reason: HOSPADM

## 2025-05-27 RX ORDER — POTASSIUM CHLORIDE 1500 MG/1
40 TABLET, EXTENDED RELEASE ORAL PRN
Status: DISCONTINUED | OUTPATIENT
Start: 2025-05-27 | End: 2025-06-02 | Stop reason: HOSPADM

## 2025-05-27 RX ORDER — HYDROXYZINE PAMOATE 25 MG/1
50 CAPSULE ORAL 3 TIMES DAILY PRN
Status: DISCONTINUED | OUTPATIENT
Start: 2025-05-27 | End: 2025-06-02 | Stop reason: HOSPADM

## 2025-05-27 RX ORDER — BUPROPION HYDROCHLORIDE 150 MG/1
150 TABLET ORAL EVERY MORNING
Status: DISCONTINUED | OUTPATIENT
Start: 2025-05-28 | End: 2025-06-02 | Stop reason: HOSPADM

## 2025-05-27 RX ORDER — ACETAMINOPHEN 325 MG/1
650 TABLET ORAL EVERY 6 HOURS PRN
Status: DISCONTINUED | OUTPATIENT
Start: 2025-05-27 | End: 2025-06-02 | Stop reason: HOSPADM

## 2025-05-27 RX ORDER — HEPARIN SODIUM 10000 [USP'U]/100ML
5-30 INJECTION, SOLUTION INTRAVENOUS CONTINUOUS
Status: DISCONTINUED | OUTPATIENT
Start: 2025-05-27 | End: 2025-05-28

## 2025-05-27 RX ORDER — ALBUTEROL SULFATE 90 UG/1
2 INHALANT RESPIRATORY (INHALATION) EVERY 4 HOURS PRN
Status: DISCONTINUED | OUTPATIENT
Start: 2025-05-27 | End: 2025-06-02 | Stop reason: HOSPADM

## 2025-05-27 RX ORDER — BUDESONIDE 0.5 MG/2ML
500 INHALANT ORAL 2 TIMES DAILY
Status: DISCONTINUED | OUTPATIENT
Start: 2025-05-27 | End: 2025-06-02 | Stop reason: HOSPADM

## 2025-05-27 RX ORDER — HEPARIN SODIUM 1000 [USP'U]/ML
2000 INJECTION, SOLUTION INTRAVENOUS; SUBCUTANEOUS PRN
Status: DISCONTINUED | OUTPATIENT
Start: 2025-05-27 | End: 2025-05-30

## 2025-05-27 RX ORDER — QUETIAPINE FUMARATE 25 MG/1
50 TABLET, FILM COATED ORAL NIGHTLY
Status: DISCONTINUED | OUTPATIENT
Start: 2025-05-27 | End: 2025-05-30

## 2025-05-27 RX ORDER — ONDANSETRON 4 MG/1
4 TABLET, ORALLY DISINTEGRATING ORAL EVERY 8 HOURS PRN
Status: DISCONTINUED | OUTPATIENT
Start: 2025-05-27 | End: 2025-06-02 | Stop reason: HOSPADM

## 2025-05-27 RX ORDER — ATORVASTATIN CALCIUM 20 MG/1
20 TABLET, FILM COATED ORAL NIGHTLY
Status: DISCONTINUED | OUTPATIENT
Start: 2025-05-27 | End: 2025-06-02 | Stop reason: HOSPADM

## 2025-05-27 RX ORDER — CITALOPRAM HYDROBROMIDE 40 MG/1
40 TABLET ORAL EVERY MORNING
Status: DISCONTINUED | OUTPATIENT
Start: 2025-05-28 | End: 2025-06-02 | Stop reason: HOSPADM

## 2025-05-27 RX ORDER — TROSPIUM CHLORIDE 20 MG/1
20 TABLET, FILM COATED ORAL
Status: DISCONTINUED | OUTPATIENT
Start: 2025-05-28 | End: 2025-06-02 | Stop reason: HOSPADM

## 2025-05-27 RX ORDER — METOPROLOL TARTRATE 1 MG/ML
5 INJECTION, SOLUTION INTRAVENOUS
Status: COMPLETED | OUTPATIENT
Start: 2025-05-27 | End: 2025-05-27

## 2025-05-27 RX ORDER — MAGNESIUM SULFATE IN WATER 40 MG/ML
2000 INJECTION, SOLUTION INTRAVENOUS PRN
Status: DISCONTINUED | OUTPATIENT
Start: 2025-05-27 | End: 2025-06-02 | Stop reason: HOSPADM

## 2025-05-27 RX ORDER — DILTIAZEM HYDROCHLORIDE 5 MG/ML
20 INJECTION INTRAVENOUS ONCE
Status: COMPLETED | OUTPATIENT
Start: 2025-05-27 | End: 2025-05-27

## 2025-05-27 RX ORDER — ALBUTEROL SULFATE 90 UG/1
2 INHALANT RESPIRATORY (INHALATION) 3 TIMES DAILY
Status: DISCONTINUED | OUTPATIENT
Start: 2025-05-28 | End: 2025-05-28

## 2025-05-27 RX ORDER — METOPROLOL TARTRATE 1 MG/ML
5 INJECTION, SOLUTION INTRAVENOUS
Status: DISPENSED | OUTPATIENT
Start: 2025-05-27 | End: 2025-05-27

## 2025-05-27 RX ORDER — HEPARIN SODIUM 1000 [USP'U]/ML
4000 INJECTION, SOLUTION INTRAVENOUS; SUBCUTANEOUS ONCE
Status: COMPLETED | OUTPATIENT
Start: 2025-05-27 | End: 2025-05-27

## 2025-05-27 RX ORDER — BISACODYL 5 MG/1
5 TABLET, DELAYED RELEASE ORAL DAILY PRN
Status: DISCONTINUED | OUTPATIENT
Start: 2025-05-27 | End: 2025-06-02 | Stop reason: HOSPADM

## 2025-05-27 RX ORDER — GABAPENTIN 400 MG/1
800 CAPSULE ORAL 4 TIMES DAILY
Status: DISCONTINUED | OUTPATIENT
Start: 2025-05-27 | End: 2025-06-02 | Stop reason: HOSPADM

## 2025-05-27 RX ORDER — 0.9 % SODIUM CHLORIDE 0.9 %
500 INTRAVENOUS SOLUTION INTRAVENOUS ONCE
Status: COMPLETED | OUTPATIENT
Start: 2025-05-27 | End: 2025-05-27

## 2025-05-27 RX ORDER — HEPARIN SODIUM 1000 [USP'U]/ML
4000 INJECTION, SOLUTION INTRAVENOUS; SUBCUTANEOUS PRN
Status: DISCONTINUED | OUTPATIENT
Start: 2025-05-27 | End: 2025-05-30

## 2025-05-27 RX ORDER — ONDANSETRON 2 MG/ML
4 INJECTION INTRAMUSCULAR; INTRAVENOUS EVERY 6 HOURS PRN
Status: DISCONTINUED | OUTPATIENT
Start: 2025-05-27 | End: 2025-06-02 | Stop reason: HOSPADM

## 2025-05-27 RX ORDER — METOPROLOL SUCCINATE 25 MG/1
25 TABLET, EXTENDED RELEASE ORAL DAILY
Status: DISCONTINUED | OUTPATIENT
Start: 2025-05-27 | End: 2025-05-29

## 2025-05-27 RX ORDER — SODIUM CHLORIDE 0.9 % (FLUSH) 0.9 %
5-40 SYRINGE (ML) INJECTION EVERY 12 HOURS SCHEDULED
Status: DISCONTINUED | OUTPATIENT
Start: 2025-05-27 | End: 2025-06-02 | Stop reason: HOSPADM

## 2025-05-27 RX ORDER — POLYETHYLENE GLYCOL 3350 17 G/17G
17 POWDER, FOR SOLUTION ORAL DAILY PRN
Status: DISCONTINUED | OUTPATIENT
Start: 2025-05-27 | End: 2025-06-02 | Stop reason: HOSPADM

## 2025-05-27 RX ORDER — FUROSEMIDE 40 MG/1
20 TABLET ORAL DAILY
Status: DISCONTINUED | OUTPATIENT
Start: 2025-05-28 | End: 2025-05-30

## 2025-05-27 RX ORDER — SODIUM CHLORIDE 0.9 % (FLUSH) 0.9 %
5-40 SYRINGE (ML) INJECTION PRN
Status: DISCONTINUED | OUTPATIENT
Start: 2025-05-27 | End: 2025-06-02 | Stop reason: HOSPADM

## 2025-05-27 RX ORDER — ALBUTEROL SULFATE 90 UG/1
2 INHALANT RESPIRATORY (INHALATION) EVERY 6 HOURS PRN
Status: DISCONTINUED | OUTPATIENT
Start: 2025-05-27 | End: 2025-05-27

## 2025-05-27 RX ORDER — METOPROLOL TARTRATE 1 MG/ML
5 INJECTION, SOLUTION INTRAVENOUS EVERY 6 HOURS PRN
Status: DISCONTINUED | OUTPATIENT
Start: 2025-05-27 | End: 2025-05-27

## 2025-05-27 RX ORDER — POTASSIUM CHLORIDE 7.45 MG/ML
10 INJECTION INTRAVENOUS PRN
Status: DISCONTINUED | OUTPATIENT
Start: 2025-05-27 | End: 2025-06-02 | Stop reason: HOSPADM

## 2025-05-27 RX ORDER — MONTELUKAST SODIUM 10 MG/1
10 TABLET ORAL NIGHTLY
Status: DISCONTINUED | OUTPATIENT
Start: 2025-05-27 | End: 2025-06-02 | Stop reason: HOSPADM

## 2025-05-27 RX ADMIN — METOPROLOL TARTRATE 5 MG: 5 INJECTION INTRAVENOUS at 17:33

## 2025-05-27 RX ADMIN — ALBUTEROL SULFATE 2 PUFF: 90 AEROSOL, METERED RESPIRATORY (INHALATION) at 21:06

## 2025-05-27 RX ADMIN — MONTELUKAST 10 MG: 10 TABLET, FILM COATED ORAL at 22:11

## 2025-05-27 RX ADMIN — HEPARIN SODIUM 12 UNITS/KG/HR: 10000 INJECTION, SOLUTION INTRAVENOUS at 18:30

## 2025-05-27 RX ADMIN — METOPROLOL TARTRATE 5 MG: 5 INJECTION INTRAVENOUS at 17:44

## 2025-05-27 RX ADMIN — SODIUM CHLORIDE, PRESERVATIVE FREE 10 ML: 5 INJECTION INTRAVENOUS at 22:12

## 2025-05-27 RX ADMIN — DILTIAZEM HYDROCHLORIDE 5 MG/HR: 5 INJECTION, SOLUTION INTRAVENOUS at 16:08

## 2025-05-27 RX ADMIN — METOPROLOL TARTRATE 5 MG: 5 INJECTION INTRAVENOUS at 17:39

## 2025-05-27 RX ADMIN — HEPARIN SODIUM 4000 UNITS: 1000 INJECTION INTRAVENOUS; SUBCUTANEOUS at 18:28

## 2025-05-27 RX ADMIN — QUETIAPINE FUMARATE 50 MG: 25 TABLET ORAL at 22:11

## 2025-05-27 RX ADMIN — IOPAMIDOL 80 ML: 755 INJECTION, SOLUTION INTRAVENOUS at 23:56

## 2025-05-27 RX ADMIN — SODIUM CHLORIDE 500 ML: 0.9 INJECTION, SOLUTION INTRAVENOUS at 15:27

## 2025-05-27 RX ADMIN — ATORVASTATIN CALCIUM 20 MG: 20 TABLET, FILM COATED ORAL at 22:11

## 2025-05-27 RX ADMIN — GABAPENTIN 800 MG: 400 CAPSULE ORAL at 22:11

## 2025-05-27 RX ADMIN — DILTIAZEM HYDROCHLORIDE 20 MG: 5 INJECTION, SOLUTION INTRAVENOUS at 16:04

## 2025-05-27 NOTE — ED NOTES
Bedside report received from Gil HAN. Pt remains alert and oriented. RR regular and unlabored. Call light in reach. [place] offered upon request. Further needs denied.   fair minus

## 2025-05-27 NOTE — ED PROVIDER NOTES
[facility] EMERGENCY DEPARTMENT    EMERGENCY MEDICINE     Patient Name: Vandana Burt  MRN: [id number]  YOB: 1950  Date of Evaluation: 5/27/2025  Treating Resident Physician: Michael Rubio DO  Supervising Physician: Koko Brenner DO     Chief Complaint   Patient presents with    Shortness of Breath    Tachycardia       History of Present Illness   History obtained from the patient.    Vandana Burt is a 75 y.o. female who presents to the emergency department for evaluation of breath.  Patient arrived with shortness of breath and atrial fibrillation heart rate in the 180s to 190s.  She does have a history of atrial fibrillation.  She reports the last time she was in atrial fibrillation was about 4 years ago and about 8 years ago was when she was cardioverted.   Not on any anticoagulation.  Denied any recent URI symptoms, no chest pain, no urinary urgency or frequency, did have an episode of diarrhea that was nonbloody.  Does not have a history of COPD been using her home inhalers as needed.  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

## 2025-05-27 NOTE — ED NOTES
Patient in bed and talking about watching cartoons on the tv. Patient VSS and patient does not appear to be in any current distress at this time. Call light within reach.

## 2025-05-27 NOTE — ED NOTES
This RN to the bedside for rounding. Patient being cleaned up of urine at this time. Patient provided complete bed change and external catheter placed. Patient VSS. Respirations are easy and unlabored. Patient appears to be in no current distress at this time. Call light within reach.

## 2025-05-27 NOTE — H&P
History & Physical  Internal Medicine Resident         Patient: Vandana Burt 75 y.o. female      : 1950  Date of Admission: 2025  Date of Service: Pt seen/examined on 25 and Admitted to Inpatient with expected LOS greater than two midnights due to medical therapy.       ASSESSMENT AND PLAN  Paroxysmal Atrial Fibrillation  not on DOAC   Resolving  Hx of similar presentation over 10 years ago. EKG on admit Afib RV. [id number] 6. HAS-BLED 2. Rate control at home: Toprol 25 mg daily. No Rhythm control at home: TSH = 3.39 on admit, free T4 = 1.2 on admit. S/p diltiazem bolus and drip in ED which lowered heart rate to 130s.  Then was given 3 doses of 5 mg Lopressor which dropped heart rate to 110.  Converted to sinus rhythm.  Patient currently remains on sinus rhythm  Stop diltiazem drip  Started on home Toprol 25 mg daily.    Echo ordered, pending  Consider Cardiology consult  -Daily BMP + serum magnesium  -Maintain potassium > 4.0; Initiate potassium replacement protocol  -Maintain magnesium > 2.0; Initiate magnesium replacement protocol  Elevated proBNP with hx of HFpEF:   Baseline elevated proBNP  On this admission , last proBNP from 2024 was 1350  Echo from 2024 noted EF of 55-60%.  With mildly dilated left atrium.  Echo ordered  Shortness of breath, most likely extrathoracic airway obstruction due to obesity  BMI of 49.57  Continues to have short of breath with SpO2 over 98  Shallow breathing with RR high 20s  Given associated tachycardia and persistent shortness of breath will obtain CTAPE and D dimer  Started on albuterol and oxygen supplements which improved her SOB  Mild persistent asthma:   PFT on  showed no obstructive or overt restriction, but was suggestive for extrathoracic airway obstruction.  On albuterol inhaler, Pulmicort nebulizer at home.  Given palpitations associated with the Pulmicort, hold it.  Continue albuterol inhaler as needed  Follows

## 2025-05-27 NOTE — ED NOTES
This RN to the bedside for rounding. Patient updated on use of diltiazem drip and verbalizes understanding. Patient still tachycardic at this time. Call light within reach.

## 2025-05-27 NOTE — TELEPHONE ENCOUNTER
Pt calling cardiology office, states /68 .  She states she is having a hard time catching her breath.  Advised that the readings aren't terrible, but if she is having trouble breathing she needs to report to ED. She states she has COPD. Also reports that her legs and ankles are always swollen. Sounds SOB on the phone. Asked if there is anyone that could take her to ER, she states she will call an ambulance.

## 2025-05-27 NOTE — ED NOTES
Dr. Rubio to the bedside, patient appears to have converted via telemetry, Dr. Rubio states to titrate diltiazem down.

## 2025-05-28 ENCOUNTER — APPOINTMENT (OUTPATIENT)
Age: 75
DRG: 308 | End: 2025-05-28
Payer: MEDICARE

## 2025-05-28 LAB
ANION GAP SERPL CALC-SCNC: 11 MEQ/L (ref 8–16)
BACTERIA URNS QL MICRO: ABNORMAL /HPF
BASOPHILS ABSOLUTE: 0 THOU/MM3 (ref 0–0.1)
BASOPHILS NFR BLD AUTO: 0.4 %
BILIRUB UR QL STRIP.AUTO: NEGATIVE
BUN SERPL-MCNC: 11 MG/DL (ref 8–23)
CALCIUM SERPL-MCNC: 8.5 MG/DL (ref 8.8–10.2)
CASTS #/AREA URNS LPF: ABNORMAL /LPF
CASTS 2: ABNORMAL /LPF
CHARACTER UR: CLEAR
CHLORIDE SERPL-SCNC: 108 MEQ/L (ref 98–111)
CO2 SERPL-SCNC: 22 MEQ/L (ref 22–29)
COLOR, UA: YELLOW
CREAT SERPL-MCNC: 0.6 MG/DL (ref 0.5–0.9)
CRYSTALS URNS MICRO: ABNORMAL
D DIMER PPP IA.FEU-MCNC: 458 NG/ML FEU (ref 0–500)
DEPRECATED RDW RBC AUTO: 53.1 FL (ref 35–45)
ECHO BSA: 2.26 M2
ECHO LA AREA 2C: 18.6 CM2
ECHO LA AREA 4C: 20.2 CM2
ECHO LA DIAMETER INDEX: 1.7 CM/M2
ECHO LA DIAMETER: 3.6 CM
ECHO LA MAJOR AXIS: 5.1 CM
ECHO LA MINOR AXIS: 5.1 CM
ECHO LA VOL BP: 57 ML (ref 22–52)
ECHO LA VOL MOD A2C: 56 ML (ref 22–52)
ECHO LA VOL MOD A4C: 59 ML (ref 22–52)
ECHO LA VOL/BSA BIPLANE: 27 ML/M2 (ref 16–34)
ECHO LA VOLUME INDEX MOD A2C: 26 ML/M2 (ref 16–34)
ECHO LA VOLUME INDEX MOD A4C: 28 ML/M2 (ref 16–34)
ECHO LV EDV A2C: 96 ML
ECHO LV EDV A4C: 126 ML
ECHO LV EDV INDEX A4C: 59 ML/M2
ECHO LV EDV NDEX A2C: 45 ML/M2
ECHO LV EF PHYSICIAN: 55 %
ECHO LV EJECTION FRACTION A2C: 59 %
ECHO LV EJECTION FRACTION A4C: 54 %
ECHO LV EJECTION FRACTION BIPLANE: 56 % (ref 55–100)
ECHO LV ESV A2C: 39 ML
ECHO LV ESV A4C: 58 ML
ECHO LV ESV INDEX A2C: 18 ML/M2
ECHO LV ESV INDEX A4C: 27 ML/M2
ECHO LV FRACTIONAL SHORTENING: 17 % (ref 28–44)
ECHO LV INTERNAL DIMENSION DIASTOLE INDEX: 2.17 CM/M2
ECHO LV INTERNAL DIMENSION DIASTOLIC: 4.6 CM (ref 3.9–5.3)
ECHO LV INTERNAL DIMENSION SYSTOLIC INDEX: 1.79 CM/M2
ECHO LV INTERNAL DIMENSION SYSTOLIC: 3.8 CM
ECHO LV IVSD: 1.1 CM (ref 0.6–0.9)
ECHO LV MASS 2D: 192.9 G (ref 67–162)
ECHO LV MASS INDEX 2D: 91 G/M2 (ref 43–95)
ECHO LV POSTERIOR WALL DIASTOLIC: 1.2 CM (ref 0.6–0.9)
ECHO LV RELATIVE WALL THICKNESS RATIO: 0.52
ECHO RV INTERNAL DIMENSION: 2.4 CM
EKG Q-T INTERVAL: 264 MS
EKG QRS DURATION: 116 MS
EKG QTC CALCULATION (BAZETT): 459 MS
EKG R AXIS: 9 DEGREES
EKG T AXIS: -175 DEGREES
EKG VENTRICULAR RATE: 182 BPM
EOSINOPHIL NFR BLD AUTO: 2.4 %
EOSINOPHILS ABSOLUTE: 0.2 THOU/MM3 (ref 0–0.4)
EPITHELIAL CELLS, UA: ABNORMAL /HPF
ERYTHROCYTE [DISTWIDTH] IN BLOOD BY AUTOMATED COUNT: 15.4 % (ref 11.5–14.5)
GFR SERPL CREATININE-BSD FRML MDRD: > 90 ML/MIN/1.73M2
GLUCOSE BLD STRIP.AUTO-MCNC: 128 MG/DL (ref 70–108)
GLUCOSE SERPL-MCNC: 143 MG/DL (ref 74–109)
GLUCOSE UR QL STRIP.AUTO: NEGATIVE MG/DL
HCT VFR BLD AUTO: 34.5 % (ref 37–47)
HEPARIN UNFRACTIONATED: 0.54 U/ML (ref 0.3–0.7)
HEPARIN UNFRACTIONATED: 0.58 U/ML (ref 0.3–0.7)
HEPARIN UNFRACTIONATED: 0.6 U/ML (ref 0.3–0.7)
HGB BLD-MCNC: 11.1 GM/DL (ref 12–16)
HGB UR QL STRIP.AUTO: NEGATIVE
IMM GRANULOCYTES # BLD AUTO: 0.03 THOU/MM3 (ref 0–0.07)
IMM GRANULOCYTES NFR BLD AUTO: 0.3 %
INR PPP: 1.14 (ref 0.85–1.13)
KETONES UR QL STRIP.AUTO: NEGATIVE
LYMPHOCYTES ABSOLUTE: 2.7 THOU/MM3 (ref 1–4.8)
LYMPHOCYTES NFR BLD AUTO: 26.7 %
MAGNESIUM SERPL-MCNC: 1.4 MG/DL (ref 1.6–2.6)
MAGNESIUM SERPL-MCNC: 1.8 MG/DL (ref 1.6–2.6)
MCH RBC QN AUTO: 30.2 PG (ref 26–33)
MCHC RBC AUTO-ENTMCNC: 32.2 GM/DL (ref 32.2–35.5)
MCV RBC AUTO: 93.8 FL (ref 81–99)
MISCELLANEOUS 2: ABNORMAL
MONOCYTES ABSOLUTE: 1.3 THOU/MM3 (ref 0.4–1.3)
MONOCYTES NFR BLD AUTO: 12.4 %
NEUTROPHILS ABSOLUTE: 5.9 THOU/MM3 (ref 1.8–7.7)
NEUTROPHILS NFR BLD AUTO: 57.8 %
NITRITE UR QL STRIP: POSITIVE
NRBC BLD AUTO-RTO: 0 /100 WBC
PH UR STRIP.AUTO: 7.5 [PH] (ref 5–9)
PLATELET # BLD AUTO: 192 THOU/MM3 (ref 130–400)
PMV BLD AUTO: 11.1 FL (ref 9.4–12.4)
POTASSIUM SERPL-SCNC: 4 MEQ/L (ref 3.5–5.2)
PROT UR STRIP.AUTO-MCNC: NEGATIVE MG/DL
RBC # BLD AUTO: 3.68 MILL/MM3 (ref 4.2–5.4)
RBC URINE: ABNORMAL /HPF
RENAL EPI CELLS #/AREA URNS HPF: ABNORMAL /[HPF]
SODIUM SERPL-SCNC: 141 MEQ/L (ref 135–145)
SP GR UR REFRACT.AUTO: 1.02 (ref 1–1.03)
UROBILINOGEN, URINE: 1 EU/DL (ref 0–1)
WBC # BLD AUTO: 10.2 THOU/MM3 (ref 4.8–10.8)
WBC #/AREA URNS HPF: ABNORMAL /HPF
WBC #/AREA URNS HPF: NEGATIVE /[HPF]
YEAST LIKE FUNGI URNS QL MICRO: ABNORMAL

## 2025-05-28 PROCEDURE — 6360000002 HC RX W HCPCS

## 2025-05-28 PROCEDURE — 80048 BASIC METABOLIC PNL TOTAL CA: CPT

## 2025-05-28 PROCEDURE — 94640 AIRWAY INHALATION TREATMENT: CPT

## 2025-05-28 PROCEDURE — 2140000000 HC CCU INTERMEDIATE R&B

## 2025-05-28 PROCEDURE — 6370000000 HC RX 637 (ALT 250 FOR IP): Performed by: INTERNAL MEDICINE

## 2025-05-28 PROCEDURE — 93307 TTE W/O DOPPLER COMPLETE: CPT

## 2025-05-28 PROCEDURE — 2500000003 HC RX 250 WO HCPCS: Performed by: PHYSICIAN ASSISTANT

## 2025-05-28 PROCEDURE — 85610 PROTHROMBIN TIME: CPT

## 2025-05-28 PROCEDURE — 83735 ASSAY OF MAGNESIUM: CPT

## 2025-05-28 PROCEDURE — 85025 COMPLETE CBC W/AUTO DIFF WBC: CPT

## 2025-05-28 PROCEDURE — 85520 HEPARIN ASSAY: CPT

## 2025-05-28 PROCEDURE — 93307 TTE W/O DOPPLER COMPLETE: CPT | Performed by: INTERNAL MEDICINE

## 2025-05-28 PROCEDURE — 81001 URINALYSIS AUTO W/SCOPE: CPT

## 2025-05-28 PROCEDURE — 6370000000 HC RX 637 (ALT 250 FOR IP)

## 2025-05-28 PROCEDURE — 99223 1ST HOSP IP/OBS HIGH 75: CPT | Performed by: INTERNAL MEDICINE

## 2025-05-28 PROCEDURE — 82948 REAGENT STRIP/BLOOD GLUCOSE: CPT

## 2025-05-28 PROCEDURE — 6360000002 HC RX W HCPCS: Performed by: PHYSICIAN ASSISTANT

## 2025-05-28 PROCEDURE — 93005 ELECTROCARDIOGRAM TRACING: CPT | Performed by: INTERNAL MEDICINE

## 2025-05-28 PROCEDURE — 36415 COLL VENOUS BLD VENIPUNCTURE: CPT

## 2025-05-28 PROCEDURE — 94761 N-INVAS EAR/PLS OXIMETRY MLT: CPT

## 2025-05-28 PROCEDURE — 85379 FIBRIN DEGRADATION QUANT: CPT

## 2025-05-28 PROCEDURE — 2500000003 HC RX 250 WO HCPCS

## 2025-05-28 RX ORDER — METOPROLOL TARTRATE 1 MG/ML
5 INJECTION, SOLUTION INTRAVENOUS ONCE
Status: DISCONTINUED | OUTPATIENT
Start: 2025-05-28 | End: 2025-05-28

## 2025-05-28 RX ORDER — ALBUTEROL SULFATE 90 UG/1
2 INHALANT RESPIRATORY (INHALATION) 2 TIMES DAILY
Status: DISCONTINUED | OUTPATIENT
Start: 2025-05-28 | End: 2025-06-02 | Stop reason: HOSPADM

## 2025-05-28 RX ORDER — POTASSIUM CHLORIDE 1500 MG/1
20 TABLET, EXTENDED RELEASE ORAL ONCE
Status: COMPLETED | OUTPATIENT
Start: 2025-05-28 | End: 2025-05-28

## 2025-05-28 RX ORDER — METOPROLOL TARTRATE 1 MG/ML
5 INJECTION, SOLUTION INTRAVENOUS EVERY 5 MIN PRN
Status: COMPLETED | OUTPATIENT
Start: 2025-05-28 | End: 2025-05-28

## 2025-05-28 RX ORDER — MAGNESIUM SULFATE IN WATER 40 MG/ML
4000 INJECTION, SOLUTION INTRAVENOUS ONCE
Status: COMPLETED | OUTPATIENT
Start: 2025-05-28 | End: 2025-05-29

## 2025-05-28 RX ADMIN — ALBUTEROL SULFATE 2 PUFF: 90 AEROSOL, METERED RESPIRATORY (INHALATION) at 21:37

## 2025-05-28 RX ADMIN — GABAPENTIN 800 MG: 400 CAPSULE ORAL at 14:20

## 2025-05-28 RX ADMIN — MAGNESIUM SULFATE HEPTAHYDRATE 4000 MG: 40 INJECTION, SOLUTION INTRAVENOUS at 23:04

## 2025-05-28 RX ADMIN — POTASSIUM CHLORIDE 20 MEQ: 1500 TABLET, EXTENDED RELEASE ORAL at 14:20

## 2025-05-28 RX ADMIN — APIXABAN 5 MG: 5 TABLET, FILM COATED ORAL at 20:15

## 2025-05-28 RX ADMIN — TROSPIUM CHLORIDE 20 MG: 20 TABLET, FILM COATED ORAL at 05:47

## 2025-05-28 RX ADMIN — HEPARIN SODIUM 12 UNITS/KG/HR: 10000 INJECTION, SOLUTION INTRAVENOUS at 12:46

## 2025-05-28 RX ADMIN — APIXABAN 5 MG: 5 TABLET, FILM COATED ORAL at 14:40

## 2025-05-28 RX ADMIN — MAGNESIUM SULFATE HEPTAHYDRATE 2000 MG: 40 INJECTION, SOLUTION INTRAVENOUS at 12:43

## 2025-05-28 RX ADMIN — MONTELUKAST 10 MG: 10 TABLET, FILM COATED ORAL at 20:15

## 2025-05-28 RX ADMIN — METOPROLOL TARTRATE 5 MG: 5 INJECTION INTRAVENOUS at 22:11

## 2025-05-28 RX ADMIN — METOPROLOL TARTRATE 5 MG: 5 INJECTION INTRAVENOUS at 22:33

## 2025-05-28 RX ADMIN — MICONAZOLE NITRATE: 2 POWDER TOPICAL at 09:16

## 2025-05-28 RX ADMIN — ACETAMINOPHEN 650 MG: 325 TABLET ORAL at 00:17

## 2025-05-28 RX ADMIN — GABAPENTIN 800 MG: 400 CAPSULE ORAL at 17:13

## 2025-05-28 RX ADMIN — FUROSEMIDE 20 MG: 40 TABLET ORAL at 09:15

## 2025-05-28 RX ADMIN — SODIUM CHLORIDE, PRESERVATIVE FREE 10 ML: 5 INJECTION INTRAVENOUS at 20:16

## 2025-05-28 RX ADMIN — ALBUTEROL SULFATE 2 PUFF: 90 AEROSOL, METERED RESPIRATORY (INHALATION) at 08:27

## 2025-05-28 RX ADMIN — QUETIAPINE FUMARATE 50 MG: 25 TABLET ORAL at 20:15

## 2025-05-28 RX ADMIN — BUPROPION HYDROCHLORIDE 150 MG: 150 TABLET, EXTENDED RELEASE ORAL at 09:15

## 2025-05-28 RX ADMIN — CITALOPRAM 40 MG: 40 TABLET, FILM COATED ORAL at 09:15

## 2025-05-28 RX ADMIN — TROSPIUM CHLORIDE 20 MG: 20 TABLET, FILM COATED ORAL at 17:13

## 2025-05-28 RX ADMIN — ATORVASTATIN CALCIUM 20 MG: 20 TABLET, FILM COATED ORAL at 20:15

## 2025-05-28 RX ADMIN — MICONAZOLE NITRATE: 2 POWDER TOPICAL at 20:16

## 2025-05-28 RX ADMIN — GABAPENTIN 800 MG: 400 CAPSULE ORAL at 09:15

## 2025-05-28 RX ADMIN — METOPROLOL SUCCINATE 25 MG: 25 TABLET, EXTENDED RELEASE ORAL at 09:15

## 2025-05-28 RX ADMIN — GABAPENTIN 800 MG: 400 CAPSULE ORAL at 20:15

## 2025-05-28 RX ADMIN — METOPROLOL TARTRATE 5 MG: 5 INJECTION INTRAVENOUS at 22:26

## 2025-05-28 NOTE — PROCEDURES
PROCEDURE NOTE  Date: 5/27/2025   Name: Vandana Burt  YOB: 1950    Procedures        EKG was completed and handed to RN

## 2025-05-28 NOTE — CONSULTS
[facility]'s  Consult    Patient's Name/Date of Birth: Vandana Burt / 1950 (75 y.o.)    Date: May 28, 2025     Referring Provider: Mart Rowell MD    CHIEF COMPLAINT: Afib      HPI: This is a pleasant 75 y.o. female PMH CAD s/p PCI (2009), HTN, HLD, asthma/COPD, DM, AF not on anticoagulation, depression, morbid obesity, presented with SOB and palpitations and found to be in AF with RVR HR 170s-190s. Received diltiazem bolus, diltiazem gtt and  lopressor x3 doses in the ED with improvement of HR and conversion back to NSR. Patient relates waking at 0300 am with SOB, took a breathing treatment with improvement and returned to bed. When she got up for the day she noticed she was SOB and had palpitations. Denies lightheadedness, dizziness, near syncope or syncope. She does not wear oxygen at home. States she had an episode of AF 7-8 years ago and had a successful cardioversion at that time. Started on metoprolol s/p cardioversion. Not on anticoagulation. Patient reports chronic nonproductive cough. Denies recent illness, fevers, congestion, or chest pain. Reports chronic lower extremity swelling and intermittent muscle cramps in B/L lower extremities. Former smoker. Denies alcohol or drug use. Family h/o heart disease in her father. No h/o blood clots. Followed by Dr. Felipe outpatient.         Echo 12/4/2024:   · Left Ventricle: Normal left ventricular systolic function with a visually estimated EF of 55 - 60%. Left ventricle size is normal. Normal wall thickness. Normal wall motion. Normal diastolic function.  · Left Atrium: Left atrium is mildly dilated.   Stress test 2/10/22: Negative for ischemia. Borderline low EF.      All labs, EKG's, diagnostic testing and images as well as cardiac cath, stress testing were reviewed during this encounter    Past Medical History:   Diagnosis Date    Acid reflux     Arrhythmia     Arthritis     CAD (coronary artery disease)     NON-OBSTRUCTIVE

## 2025-05-28 NOTE — ED NOTES
Called [facility] and spoke with Adriana who approved patient transport to 30. Patient is in stable condition.

## 2025-05-28 NOTE — CARE COORDINATION
Case Management Assessment Initial Evaluation    Date/Time of Evaluation: 2025 8:50 AM  Assessment Completed by: Yahaira Ramos RN    If patient is discharged prior to next notation, then this note serves as note for discharge by case management.    Patient Name: Vandana Burt                   YOB: 1950  Diagnosis: Shortness of breath [R06.02]  A-fib (HCC) [I48.91]  Atrial fibrillation with rapid ventricular response (HCC) [I48.91]                   Date / Time: 2025  3:08 PM  Location: [address] [place] [address]     Patient Admission Status: Inpatient   Readmission Risk Low 0-14, Mod 15-19), High > 20: Readmission Risk Score: 15.1    Current PCP: Samuel Ervin MD  Health Care Decision Makers:   Primary Decision Maker: Corrie Rosado - Child - [phone]    Additional Case Management Notes: Admitted through ED with SOB. Pt reached out to Cardiology office and they recommended pt come to ED. Found to be in afib with RVR. UA: positive nitrites, many bacteria. Heparin gtt. Started on Cardizem gtt, now off after pt converted to NSR. O2 on at 2L/nc, sats 95-98%.     Procedures:    Echo: ordered.     Imagin/27 PCXR: No acute intrathoracic process.    CTA chest:   1. No evidence of pulmonary embolism.  2. Cardiomegaly.  3. Diffuse COPD, thickening of the interstitial lung markings and areas of  atelectasis or scarring in the lingula and left lower lobe.  4. Thoracic spondylosis. Spinal cord stimulator in place.    Patient Goals/Plan/Treatment Preferences: Spoke with pt. She lives at home alone at [facility]. She is current with [facility] for RN and Aides. She uses [employer], [employer] or she drives rarely. SW consulted.        25 0504   Service Assessment   Patient Orientation Alert and Oriented   Cognition Alert   History Provided By Patient   Support Systems JAHAIRA/Passport;Home Care Staff;Children  ([facility])   Patient's Healthcare Decision Maker is: Named in Scanned

## 2025-05-28 NOTE — RT PROTOCOL NOTE
RT Inhaler-Nebulizer Bronchodilator Protocol Note    There is a bronchodilator order in the chart from a provider indicating to follow the RT Bronchodilator Protocol and there is an “Initiate RT Inhaler-Nebulizer Bronchodilator Protocol” order as well (see protocol at bottom of note).    CXR Findings:  XR CHEST PORTABLE  Result Date: 5/27/2025  No acute intrathoracic process. **This report has been created using voice recognition software. It may contain minor errors which are inherent in voice recognition technology.** Electronically signed by Dr. Obinna Pena      The findings from the last RT Protocol Assessment were as follows:   History Pulmonary Disease: Chronic pulmonary disease  Respiratory Pattern: Mild dyspnea at rest, irregular pattern, or RR 21-25 bpm  Breath Sounds: Slightly diminished and/or crackles  Cough: Strong, spontaneous, non-productive  Indication for Bronchodilator Therapy: On home bronchodilators, Decreased or absent breath sounds (Takes PRN at home)  Bronchodilator Assessment Score: 8    Aerosolized bronchodilator medication orders have been revised according to the RT Inhaler-Nebulizer Bronchodilator Protocol below.    Respiratory Therapist to perform RT Therapy Protocol Assessment initially then follow the protocol.  Repeat RT Therapy Protocol Assessment PRN for score 0-3 or on second treatment, BID, and PRN for scores above 3.    No Indications - adjust the frequency to every 6 hours PRN wheezing or bronchospasm, if no treatments needed after 48 hours then discontinue using Per Protocol order mode.     If indication present, adjust the RT bronchodilator orders based on the Bronchodilator Assessment Score as indicated below.  Use Inhaler orders unless patient has one or more of the following: on home nebulizer, not able to hold breath for 10 seconds, is not alert and oriented, cannot activate and use MDI correctly, or respiratory rate 25 breaths per minute or more, then use the

## 2025-05-28 NOTE — RT PROTOCOL NOTE
RT Inhaler-Nebulizer Bronchodilator Protocol Note    There is a bronchodilator order in the chart from a provider indicating to follow the RT Bronchodilator Protocol and there is an “Initiate RT Inhaler-Nebulizer Bronchodilator Protocol” order as well (see protocol at bottom of note).    CXR Findings:  XR CHEST PORTABLE  Result Date: 5/27/2025  No acute intrathoracic process. **This report has been created using voice recognition software. It may contain minor errors which are inherent in voice recognition technology.** Electronically signed by Dr. Obinna Pena      The findings from the last RT Protocol Assessment were as follows:   History Pulmonary Disease: Chronic pulmonary disease  Respiratory Pattern: Dyspnea on exertion or RR 21-25 bpm  Breath Sounds: Slightly diminished and/or crackles  Cough: Strong, spontaneous, non-productive  Indication for Bronchodilator Therapy: Decreased or absent breath sounds  Bronchodilator Assessment Score: 6    Aerosolized bronchodilator medication orders have been revised according to the RT Inhaler-Nebulizer Bronchodilator Protocol below.    Respiratory Therapist to perform RT Therapy Protocol Assessment initially then follow the protocol.  Repeat RT Therapy Protocol Assessment PRN for score 0-3 or on second treatment, BID, and PRN for scores above 3.    No Indications - adjust the frequency to every 6 hours PRN wheezing or bronchospasm, if no treatments needed after 48 hours then discontinue using Per Protocol order mode.     If indication present, adjust the RT bronchodilator orders based on the Bronchodilator Assessment Score as indicated below.  Use Inhaler orders unless patient has one or more of the following: on home nebulizer, not able to hold breath for 10 seconds, is not alert and oriented, cannot activate and use MDI correctly, or respiratory rate 25 breaths per minute or more, then use the equivalent nebulizer order(s) with same Frequency and PRN reasons based

## 2025-05-29 LAB
ANION GAP SERPL CALC-SCNC: 13 MEQ/L (ref 8–16)
BASOPHILS ABSOLUTE: 0.1 THOU/MM3 (ref 0–0.1)
BASOPHILS NFR BLD AUTO: 0.8 %
BUN SERPL-MCNC: 15 MG/DL (ref 8–23)
CALCIUM SERPL-MCNC: 9.4 MG/DL (ref 8.8–10.2)
CHLORIDE SERPL-SCNC: 104 MEQ/L (ref 98–111)
CO2 SERPL-SCNC: 19 MEQ/L (ref 22–29)
CREAT SERPL-MCNC: 0.7 MG/DL (ref 0.5–0.9)
DEPRECATED RDW RBC AUTO: 52.6 FL (ref 35–45)
EKG ATRIAL RATE: 78 BPM
EKG P AXIS: 46 DEGREES
EKG P-R INTERVAL: 148 MS
EKG Q-T INTERVAL: 432 MS
EKG QRS DURATION: 116 MS
EKG QTC CALCULATION (BAZETT): 492 MS
EKG R AXIS: -13 DEGREES
EKG T AXIS: 49 DEGREES
EKG VENTRICULAR RATE: 78 BPM
EOSINOPHIL NFR BLD AUTO: 3.8 %
EOSINOPHILS ABSOLUTE: 0.3 THOU/MM3 (ref 0–0.4)
ERYTHROCYTE [DISTWIDTH] IN BLOOD BY AUTOMATED COUNT: 15.3 % (ref 11.5–14.5)
GFR SERPL CREATININE-BSD FRML MDRD: 90 ML/MIN/1.73M2
GLUCOSE BLD STRIP.AUTO-MCNC: 140 MG/DL (ref 70–108)
GLUCOSE SERPL-MCNC: 142 MG/DL (ref 74–109)
HCT VFR BLD AUTO: 40.3 % (ref 37–47)
HEPARIN UNFRACTIONATED: 0.91 U/ML (ref 0.3–0.7)
HGB BLD-MCNC: 12.6 GM/DL (ref 12–16)
IMM GRANULOCYTES # BLD AUTO: 0.02 THOU/MM3 (ref 0–0.07)
IMM GRANULOCYTES NFR BLD AUTO: 0.3 %
LYMPHOCYTES ABSOLUTE: 1.8 THOU/MM3 (ref 1–4.8)
LYMPHOCYTES NFR BLD AUTO: 27.2 %
MAGNESIUM SERPL-MCNC: 1.7 MG/DL (ref 1.6–2.6)
MAGNESIUM SERPL-MCNC: 2.7 MG/DL (ref 1.6–2.6)
MCH RBC QN AUTO: 29.4 PG (ref 26–33)
MCHC RBC AUTO-ENTMCNC: 31.3 GM/DL (ref 32.2–35.5)
MCV RBC AUTO: 94.2 FL (ref 81–99)
MONOCYTES ABSOLUTE: 0.9 THOU/MM3 (ref 0.4–1.3)
MONOCYTES NFR BLD AUTO: 13.1 %
NEUTROPHILS ABSOLUTE: 3.6 THOU/MM3 (ref 1.8–7.7)
NEUTROPHILS NFR BLD AUTO: 54.8 %
NRBC BLD AUTO-RTO: 0 /100 WBC
PLATELET # BLD AUTO: 207 THOU/MM3 (ref 130–400)
PMV BLD AUTO: 10.8 FL (ref 9.4–12.4)
POTASSIUM SERPL-SCNC: 4 MEQ/L (ref 3.5–5.2)
POTASSIUM SERPL-SCNC: 4.4 MEQ/L (ref 3.5–5.2)
RBC # BLD AUTO: 4.28 MILL/MM3 (ref 4.2–5.4)
SODIUM SERPL-SCNC: 136 MEQ/L (ref 135–145)
WBC # BLD AUTO: 6.6 THOU/MM3 (ref 4.8–10.8)

## 2025-05-29 PROCEDURE — 2500000003 HC RX 250 WO HCPCS

## 2025-05-29 PROCEDURE — 94761 N-INVAS EAR/PLS OXIMETRY MLT: CPT

## 2025-05-29 PROCEDURE — 94640 AIRWAY INHALATION TREATMENT: CPT

## 2025-05-29 PROCEDURE — 83735 ASSAY OF MAGNESIUM: CPT

## 2025-05-29 PROCEDURE — 82948 REAGENT STRIP/BLOOD GLUCOSE: CPT

## 2025-05-29 PROCEDURE — 2140000000 HC CCU INTERMEDIATE R&B

## 2025-05-29 PROCEDURE — 6370000000 HC RX 637 (ALT 250 FOR IP): Performed by: INTERNAL MEDICINE

## 2025-05-29 PROCEDURE — 6370000000 HC RX 637 (ALT 250 FOR IP)

## 2025-05-29 PROCEDURE — 93005 ELECTROCARDIOGRAM TRACING: CPT | Performed by: INTERNAL MEDICINE

## 2025-05-29 PROCEDURE — 6370000000 HC RX 637 (ALT 250 FOR IP): Performed by: NURSE PRACTITIONER

## 2025-05-29 PROCEDURE — 6360000002 HC RX W HCPCS: Performed by: NURSE PRACTITIONER

## 2025-05-29 PROCEDURE — 2580000003 HC RX 258

## 2025-05-29 PROCEDURE — 6360000002 HC RX W HCPCS: Performed by: PHYSICIAN ASSISTANT

## 2025-05-29 PROCEDURE — 99232 SBSQ HOSP IP/OBS MODERATE 35: CPT | Performed by: NURSE PRACTITIONER

## 2025-05-29 PROCEDURE — 2700000000 HC OXYGEN THERAPY PER DAY

## 2025-05-29 PROCEDURE — 85025 COMPLETE CBC W/AUTO DIFF WBC: CPT

## 2025-05-29 PROCEDURE — 84132 ASSAY OF SERUM POTASSIUM: CPT

## 2025-05-29 PROCEDURE — 85520 HEPARIN ASSAY: CPT

## 2025-05-29 PROCEDURE — 36415 COLL VENOUS BLD VENIPUNCTURE: CPT

## 2025-05-29 PROCEDURE — 80048 BASIC METABOLIC PNL TOTAL CA: CPT

## 2025-05-29 PROCEDURE — 99233 SBSQ HOSP IP/OBS HIGH 50: CPT | Performed by: INTERNAL MEDICINE

## 2025-05-29 RX ORDER — METOPROLOL SUCCINATE 25 MG/1
25 TABLET, EXTENDED RELEASE ORAL 2 TIMES DAILY
Status: DISCONTINUED | OUTPATIENT
Start: 2025-05-29 | End: 2025-06-02 | Stop reason: HOSPADM

## 2025-05-29 RX ORDER — BUMETANIDE 0.25 MG/ML
1 INJECTION, SOLUTION INTRAMUSCULAR; INTRAVENOUS EVERY 12 HOURS
Status: DISCONTINUED | OUTPATIENT
Start: 2025-05-29 | End: 2025-06-02

## 2025-05-29 RX ORDER — MAGNESIUM SULFATE IN WATER 40 MG/ML
4000 INJECTION, SOLUTION INTRAVENOUS ONCE
Status: COMPLETED | OUTPATIENT
Start: 2025-05-30 | End: 2025-05-30

## 2025-05-29 RX ADMIN — METOPROLOL SUCCINATE 25 MG: 25 TABLET, EXTENDED RELEASE ORAL at 09:22

## 2025-05-29 RX ADMIN — ALBUTEROL SULFATE 2 PUFF: 90 AEROSOL, METERED RESPIRATORY (INHALATION) at 08:49

## 2025-05-29 RX ADMIN — METOPROLOL SUCCINATE 25 MG: 25 TABLET, EXTENDED RELEASE ORAL at 20:25

## 2025-05-29 RX ADMIN — SODIUM CHLORIDE 50 ML: 0.9 INJECTION, SOLUTION INTRAVENOUS at 23:54

## 2025-05-29 RX ADMIN — GABAPENTIN 800 MG: 400 CAPSULE ORAL at 12:18

## 2025-05-29 RX ADMIN — GABAPENTIN 800 MG: 400 CAPSULE ORAL at 16:54

## 2025-05-29 RX ADMIN — ACETAMINOPHEN 650 MG: 325 TABLET ORAL at 22:19

## 2025-05-29 RX ADMIN — TROSPIUM CHLORIDE 20 MG: 20 TABLET, FILM COATED ORAL at 16:54

## 2025-05-29 RX ADMIN — APIXABAN 5 MG: 5 TABLET, FILM COATED ORAL at 20:25

## 2025-05-29 RX ADMIN — TROSPIUM CHLORIDE 20 MG: 20 TABLET, FILM COATED ORAL at 09:22

## 2025-05-29 RX ADMIN — SODIUM CHLORIDE, PRESERVATIVE FREE 10 ML: 5 INJECTION INTRAVENOUS at 20:25

## 2025-05-29 RX ADMIN — MICONAZOLE NITRATE: 2 POWDER TOPICAL at 09:23

## 2025-05-29 RX ADMIN — BUPROPION HYDROCHLORIDE 150 MG: 150 TABLET, EXTENDED RELEASE ORAL at 09:22

## 2025-05-29 RX ADMIN — APIXABAN 5 MG: 5 TABLET, FILM COATED ORAL at 09:22

## 2025-05-29 RX ADMIN — MAGNESIUM SULFATE HEPTAHYDRATE 4000 MG: 40 INJECTION, SOLUTION INTRAVENOUS at 23:57

## 2025-05-29 RX ADMIN — CITALOPRAM 40 MG: 40 TABLET, FILM COATED ORAL at 09:22

## 2025-05-29 RX ADMIN — BUMETANIDE 1 MG: 0.25 INJECTION INTRAMUSCULAR; INTRAVENOUS at 12:18

## 2025-05-29 RX ADMIN — MONTELUKAST 10 MG: 10 TABLET, FILM COATED ORAL at 20:25

## 2025-05-29 RX ADMIN — GABAPENTIN 800 MG: 400 CAPSULE ORAL at 20:25

## 2025-05-29 RX ADMIN — BUMETANIDE 1 MG: 0.25 INJECTION INTRAMUSCULAR; INTRAVENOUS at 22:14

## 2025-05-29 RX ADMIN — SODIUM CHLORIDE, PRESERVATIVE FREE 10 ML: 5 INJECTION INTRAVENOUS at 09:23

## 2025-05-29 RX ADMIN — ALBUTEROL SULFATE 2 PUFF: 90 AEROSOL, METERED RESPIRATORY (INHALATION) at 21:56

## 2025-05-29 RX ADMIN — ATORVASTATIN CALCIUM 20 MG: 20 TABLET, FILM COATED ORAL at 20:25

## 2025-05-29 RX ADMIN — GABAPENTIN 800 MG: 400 CAPSULE ORAL at 09:22

## 2025-05-29 RX ADMIN — QUETIAPINE FUMARATE 50 MG: 25 TABLET ORAL at 20:25

## 2025-05-29 ASSESSMENT — PAIN DESCRIPTION - DESCRIPTORS: DESCRIPTORS: ACHING

## 2025-05-29 ASSESSMENT — PAIN SCALES - GENERAL: PAINLEVEL_OUTOF10: 2

## 2025-05-29 ASSESSMENT — PAIN DESCRIPTION - LOCATION: LOCATION: SHOULDER

## 2025-05-29 ASSESSMENT — PAIN DESCRIPTION - ORIENTATION: ORIENTATION: LEFT;RIGHT

## 2025-05-29 NOTE — PROCEDURES
PROCEDURE NOTE  Date: 5/29/2025   Name: Vandana Burt  YOB: 1950    Procedures  EKG completed

## 2025-05-29 NOTE — CARE COORDINATION
DISCHARGE PLANNING EVALUATION  5/29/25, 11:59 AM EDT    Reason for Referral: Current with Passport/ [facility]  Decision Maker: Patient is able to make her own decisions.   Current Services: Current with [employer].  She has [facility] for RN and aid services per Jaja at the agency).  Her Passport CM is Roselyn Ever [phone].  Called and left her a message.  New Services Requested: No, patient does not feel she needs anything else.   Family/ Social/ Home environment: From home alone.  She has an aid up to 3 days a week.  She has 5 children.  Her daughter Corrie helps as needed.   Payment Source:[employer]  Transportation at Discharge: ambulette  Post-acute (PAC) provider list was provided to patient. Patient was informed of their freedom to choose PAC provider. Discussed and offered to show the patient the relevant PAC Providers quality and resource use measures on Medicare Compare web site via computer based on patient's goals of care and treatment preferences. Questions regarding selection process were answered.      Teach Back Method used with Vandana regarding care plan and discharge planning.   Patient verbalized understanding of the plan of care and contribute to goal setting.       Patient preferences and discharge plan: Home alone.  Resume Continued Care through [employer].  She would like ambulette set up for transport at discharge.      Update 4:08 pm: Received a message from Mary Ellen Islas [phone].  She reports that she is patients Passport CM.      Electronically signed by SHONNA Bates on 5/29/2025 at 11:59 AM

## 2025-05-29 NOTE — PROCEDURES
PROCEDURE NOTE  Date: 5/28/2025   Name: Vandana Burt  YOB: 1950    Procedures  EKG Completed. Handed to RN.

## 2025-05-29 NOTE — RT PROTOCOL NOTE
RT Inhaler-Nebulizer Bronchodilator Protocol Note    There is a bronchodilator order in the chart from a provider indicating to follow the RT Bronchodilator Protocol and there is an “Initiate RT Inhaler-Nebulizer Bronchodilator Protocol” order as well (see protocol at bottom of note).    CXR Findings:  XR CHEST PORTABLE  Result Date: 5/27/2025  No acute intrathoracic process. **This report has been created using voice recognition software. It may contain minor errors which are inherent in voice recognition technology.** Electronically signed by Dr. Obinna Pena      The findings from the last RT Protocol Assessment were as follows:   History Pulmonary Disease: Chronic pulmonary disease  Respiratory Pattern: Regular pattern and RR 12-20 bpm  Breath Sounds: Slightly diminished and/or crackles  Cough: Strong, spontaneous, non-productive  Indication for Bronchodilator Therapy: Decreased or absent breath sounds  Bronchodilator Assessment Score: 4    Aerosolized bronchodilator medication orders have been revised according to the RT Inhaler-Nebulizer Bronchodilator Protocol below.    Respiratory Therapist to perform RT Therapy Protocol Assessment initially then follow the protocol.  Repeat RT Therapy Protocol Assessment PRN for score 0-3 or on second treatment, BID, and PRN for scores above 3.    No Indications - adjust the frequency to every 6 hours PRN wheezing or bronchospasm, if no treatments needed after 48 hours then discontinue using Per Protocol order mode.     If indication present, adjust the RT bronchodilator orders based on the Bronchodilator Assessment Score as indicated below.  Use Inhaler orders unless patient has one or more of the following: on home nebulizer, not able to hold breath for 10 seconds, is not alert and oriented, cannot activate and use MDI correctly, or respiratory rate 25 breaths per minute or more, then use the equivalent nebulizer order(s) with same Frequency and PRN reasons based on

## 2025-05-30 LAB
ANION GAP SERPL CALC-SCNC: 15 MEQ/L (ref 8–16)
BASOPHILS ABSOLUTE: 0 THOU/MM3 (ref 0–0.1)
BASOPHILS NFR BLD AUTO: 0.5 %
BUN SERPL-MCNC: 21 MG/DL (ref 8–23)
CALCIUM SERPL-MCNC: 9.1 MG/DL (ref 8.8–10.2)
CHLORIDE SERPL-SCNC: 101 MEQ/L (ref 98–111)
CO2 SERPL-SCNC: 18 MEQ/L (ref 22–29)
CREAT SERPL-MCNC: 0.7 MG/DL (ref 0.5–0.9)
DEPRECATED RDW RBC AUTO: 56.6 FL (ref 35–45)
EKG ATRIAL RATE: 81 BPM
EKG P AXIS: 61 DEGREES
EKG P-R INTERVAL: 172 MS
EKG Q-T INTERVAL: 294 MS
EKG Q-T INTERVAL: 450 MS
EKG QRS DURATION: 114 MS
EKG QRS DURATION: 126 MS
EKG QTC CALCULATION (BAZETT): 478 MS
EKG QTC CALCULATION (BAZETT): 522 MS
EKG R AXIS: -6 DEGREES
EKG R AXIS: 2 DEGREES
EKG T AXIS: -167 DEGREES
EKG T AXIS: 78 DEGREES
EKG VENTRICULAR RATE: 159 BPM
EKG VENTRICULAR RATE: 81 BPM
EOSINOPHIL NFR BLD AUTO: 3.5 %
EOSINOPHILS ABSOLUTE: 0.3 THOU/MM3 (ref 0–0.4)
ERYTHROCYTE [DISTWIDTH] IN BLOOD BY AUTOMATED COUNT: 15.1 % (ref 11.5–14.5)
GFR SERPL CREATININE-BSD FRML MDRD: 90 ML/MIN/1.73M2
GLUCOSE SERPL-MCNC: 191 MG/DL (ref 74–109)
HCT VFR BLD AUTO: 47.3 % (ref 37–47)
HGB BLD-MCNC: 14.1 GM/DL (ref 12–16)
IMM GRANULOCYTES # BLD AUTO: 0.02 THOU/MM3 (ref 0–0.07)
IMM GRANULOCYTES NFR BLD AUTO: 0.3 %
LYMPHOCYTES ABSOLUTE: 2.1 THOU/MM3 (ref 1–4.8)
LYMPHOCYTES NFR BLD AUTO: 26.5 %
MAGNESIUM SERPL-MCNC: 3 MG/DL (ref 1.6–2.6)
MCH RBC QN AUTO: 30.1 PG (ref 26–33)
MCHC RBC AUTO-ENTMCNC: 29.8 GM/DL (ref 32.2–35.5)
MCV RBC AUTO: 100.9 FL (ref 81–99)
MONOCYTES ABSOLUTE: 1.4 THOU/MM3 (ref 0.4–1.3)
MONOCYTES NFR BLD AUTO: 17.4 %
NEUTROPHILS ABSOLUTE: 4 THOU/MM3 (ref 1.8–7.7)
NEUTROPHILS NFR BLD AUTO: 51.8 %
NRBC BLD AUTO-RTO: 0 /100 WBC
PLATELET # BLD AUTO: 203 THOU/MM3 (ref 130–400)
PMV BLD AUTO: 10.5 FL (ref 9.4–12.4)
POTASSIUM SERPL-SCNC: 3.9 MEQ/L (ref 3.5–5.2)
RBC # BLD AUTO: 4.69 MILL/MM3 (ref 4.2–5.4)
SODIUM SERPL-SCNC: 134 MEQ/L (ref 135–145)
WBC # BLD AUTO: 7.8 THOU/MM3 (ref 4.8–10.8)

## 2025-05-30 PROCEDURE — 6370000000 HC RX 637 (ALT 250 FOR IP): Performed by: NURSE PRACTITIONER

## 2025-05-30 PROCEDURE — 99232 SBSQ HOSP IP/OBS MODERATE 35: CPT | Performed by: INTERNAL MEDICINE

## 2025-05-30 PROCEDURE — 6360000002 HC RX W HCPCS: Performed by: NURSE PRACTITIONER

## 2025-05-30 PROCEDURE — 93005 ELECTROCARDIOGRAM TRACING: CPT | Performed by: INTERNAL MEDICINE

## 2025-05-30 PROCEDURE — 99232 SBSQ HOSP IP/OBS MODERATE 35: CPT | Performed by: NURSE PRACTITIONER

## 2025-05-30 PROCEDURE — 6360000002 HC RX W HCPCS

## 2025-05-30 PROCEDURE — 6370000000 HC RX 637 (ALT 250 FOR IP): Performed by: INTERNAL MEDICINE

## 2025-05-30 PROCEDURE — 6370000000 HC RX 637 (ALT 250 FOR IP)

## 2025-05-30 PROCEDURE — 2500000003 HC RX 250 WO HCPCS

## 2025-05-30 PROCEDURE — 94640 AIRWAY INHALATION TREATMENT: CPT

## 2025-05-30 PROCEDURE — 94762 N-INVAS EAR/PLS OXIMTRY CONT: CPT

## 2025-05-30 PROCEDURE — 83735 ASSAY OF MAGNESIUM: CPT

## 2025-05-30 PROCEDURE — 85025 COMPLETE CBC W/AUTO DIFF WBC: CPT

## 2025-05-30 PROCEDURE — 2140000000 HC CCU INTERMEDIATE R&B

## 2025-05-30 PROCEDURE — 80048 BASIC METABOLIC PNL TOTAL CA: CPT

## 2025-05-30 PROCEDURE — 36415 COLL VENOUS BLD VENIPUNCTURE: CPT

## 2025-05-30 RX ORDER — SPIRONOLACTONE 25 MG/1
25 TABLET ORAL DAILY
Status: DISCONTINUED | OUTPATIENT
Start: 2025-05-30 | End: 2025-06-02 | Stop reason: HOSPADM

## 2025-05-30 RX ORDER — ARIPIPRAZOLE 10 MG/1
10 TABLET ORAL DAILY
Status: DISCONTINUED | OUTPATIENT
Start: 2025-05-30 | End: 2025-06-02 | Stop reason: HOSPADM

## 2025-05-30 RX ADMIN — GABAPENTIN 800 MG: 400 CAPSULE ORAL at 20:04

## 2025-05-30 RX ADMIN — ARIPIPRAZOLE 10 MG: 10 TABLET ORAL at 14:23

## 2025-05-30 RX ADMIN — GABAPENTIN 800 MG: 400 CAPSULE ORAL at 09:10

## 2025-05-30 RX ADMIN — GABAPENTIN 800 MG: 400 CAPSULE ORAL at 14:22

## 2025-05-30 RX ADMIN — SPIRONOLACTONE 25 MG: 25 TABLET ORAL at 14:22

## 2025-05-30 RX ADMIN — TROSPIUM CHLORIDE 20 MG: 20 TABLET, FILM COATED ORAL at 06:37

## 2025-05-30 RX ADMIN — ALBUTEROL SULFATE 2 PUFF: 90 AEROSOL, METERED RESPIRATORY (INHALATION) at 20:47

## 2025-05-30 RX ADMIN — MONTELUKAST 10 MG: 10 TABLET, FILM COATED ORAL at 20:04

## 2025-05-30 RX ADMIN — BUMETANIDE 1 MG: 0.25 INJECTION INTRAMUSCULAR; INTRAVENOUS at 21:29

## 2025-05-30 RX ADMIN — SODIUM CHLORIDE, PRESERVATIVE FREE 10 ML: 5 INJECTION INTRAVENOUS at 20:06

## 2025-05-30 RX ADMIN — APIXABAN 5 MG: 5 TABLET, FILM COATED ORAL at 20:04

## 2025-05-30 RX ADMIN — ACETAMINOPHEN 650 MG: 325 TABLET ORAL at 21:28

## 2025-05-30 RX ADMIN — ONDANSETRON 4 MG: 2 INJECTION, SOLUTION INTRAMUSCULAR; INTRAVENOUS at 14:30

## 2025-05-30 RX ADMIN — ATORVASTATIN CALCIUM 20 MG: 20 TABLET, FILM COATED ORAL at 20:04

## 2025-05-30 RX ADMIN — BUPROPION HYDROCHLORIDE 150 MG: 150 TABLET, EXTENDED RELEASE ORAL at 09:11

## 2025-05-30 RX ADMIN — CITALOPRAM 40 MG: 40 TABLET, FILM COATED ORAL at 09:10

## 2025-05-30 RX ADMIN — METOPROLOL SUCCINATE 25 MG: 25 TABLET, EXTENDED RELEASE ORAL at 20:04

## 2025-05-30 RX ADMIN — METFORMIN HYDROCHLORIDE 1000 MG: 500 TABLET ORAL at 14:23

## 2025-05-30 RX ADMIN — APIXABAN 5 MG: 5 TABLET, FILM COATED ORAL at 09:10

## 2025-05-30 RX ADMIN — BUMETANIDE 1 MG: 0.25 INJECTION INTRAMUSCULAR; INTRAVENOUS at 09:10

## 2025-05-30 RX ADMIN — ACETAMINOPHEN 650 MG: 325 TABLET ORAL at 14:27

## 2025-05-30 RX ADMIN — SODIUM CHLORIDE, PRESERVATIVE FREE 10 ML: 5 INJECTION INTRAVENOUS at 09:10

## 2025-05-30 RX ADMIN — METOPROLOL SUCCINATE 25 MG: 25 TABLET, EXTENDED RELEASE ORAL at 09:10

## 2025-05-30 RX ADMIN — MICONAZOLE NITRATE: 2 POWDER TOPICAL at 09:10

## 2025-05-30 RX ADMIN — ALBUTEROL SULFATE 2 PUFF: 90 AEROSOL, METERED RESPIRATORY (INHALATION) at 09:20

## 2025-05-30 ASSESSMENT — PAIN DESCRIPTION - ORIENTATION: ORIENTATION: MID

## 2025-05-30 ASSESSMENT — PAIN SCALES - GENERAL
PAINLEVEL_OUTOF10: 1
PAINLEVEL_OUTOF10: 0
PAINLEVEL_OUTOF10: 5
PAINLEVEL_OUTOF10: 3

## 2025-05-30 ASSESSMENT — PAIN DESCRIPTION - PAIN TYPE: TYPE: CHRONIC PAIN

## 2025-05-30 ASSESSMENT — PAIN DESCRIPTION - DESCRIPTORS: DESCRIPTORS: ACHING;SORE

## 2025-05-30 ASSESSMENT — PAIN DESCRIPTION - ONSET: ONSET: GRADUAL

## 2025-05-30 ASSESSMENT — PAIN DESCRIPTION - LOCATION: LOCATION: NECK;HEAD

## 2025-05-30 ASSESSMENT — PAIN - FUNCTIONAL ASSESSMENT: PAIN_FUNCTIONAL_ASSESSMENT: ACTIVITIES ARE NOT PREVENTED

## 2025-05-30 ASSESSMENT — PAIN DESCRIPTION - FREQUENCY: FREQUENCY: INTERMITTENT

## 2025-05-30 NOTE — CARE COORDINATION
5/30/25, 2:16 PM EDT    DISCHARGE ON GOING EVALUATION    [facility] day: 3  Location: -30/030-A Reason for admit: Shortness of breath [R06.02]  A-fib (HCC) [I48.91]  Atrial fibrillation with rapid ventricular response (HCC) [I48.91]     Procedures:   5/28 Echo: EF 50-55%.     Imaging since last note: None    Barriers to Discharge: Hospitalist and Cardiology following. Pt had Afib with RVR overnight. Planning overnight pulse ox study. Pt was given iv Lopressor overnight. Pt is back in NSR. Bumex 1mg iv q12hr. Nausea control.     PCP: Samuel Ervin MD  Readmission Risk Score: 14.5    Patient Goals/Plan/Treatment Preferences: Plans home to [facility] at discharge. Will need a ride home. Current with [facility] for RN and Aides. SW following.     Discussed with pt possible need for home O2. Prefers [facility].          05/30/25 5011   Condition of Participation: Discharge Planning   The Patient and/or Patient Representative was provided with a Choice of Provider? Patient   The Patient and/Or Patient Representative agree with the Discharge Plan? Yes   Freedom of Choice list was provided with basic dialogue that supports the patient's individualized plan of care/goals, treatment preferences, and shares the quality data associated with the providers?  No  (declined list for DME, wants [facility].)

## 2025-05-30 NOTE — PROCEDURES
PROCEDURE NOTE  Date: 5/29/2025   Name: Vandana Burt  YOB: 1950    Procedures      EKG was completed and handed to RN

## 2025-05-30 NOTE — PROCEDURES
PROCEDURE NOTE  Date: 5/30/2025   Name: Vandana Burt  YOB: 1950    Procedures        EKG was completed and handed to RN

## 2025-05-31 ENCOUNTER — APPOINTMENT (OUTPATIENT)
Dept: GENERAL RADIOLOGY | Age: 75
DRG: 308 | End: 2025-05-31
Payer: MEDICARE

## 2025-05-31 LAB
ANION GAP SERPL CALC-SCNC: 18 MEQ/L (ref 8–16)
BUN SERPL-MCNC: 25 MG/DL (ref 8–23)
CALCIUM SERPL-MCNC: 8.9 MG/DL (ref 8.8–10.2)
CHLORIDE SERPL-SCNC: 94 MEQ/L (ref 98–111)
CO2 SERPL-SCNC: 20 MEQ/L (ref 22–29)
CREAT SERPL-MCNC: 0.9 MG/DL (ref 0.5–0.9)
EKG Q-T INTERVAL: 300 MS
EKG QRS DURATION: 114 MS
EKG QTC CALCULATION (BAZETT): 495 MS
EKG R AXIS: 4 DEGREES
EKG T AXIS: -179 DEGREES
EKG VENTRICULAR RATE: 164 BPM
GFR SERPL CREATININE-BSD FRML MDRD: 66 ML/MIN/1.73M2
GLUCOSE SERPL-MCNC: 221 MG/DL (ref 74–109)
POTASSIUM SERPL-SCNC: 4.2 MEQ/L (ref 3.5–5.2)
SODIUM SERPL-SCNC: 132 MEQ/L (ref 135–145)

## 2025-05-31 PROCEDURE — 6370000000 HC RX 637 (ALT 250 FOR IP): Performed by: INTERNAL MEDICINE

## 2025-05-31 PROCEDURE — 99233 SBSQ HOSP IP/OBS HIGH 50: CPT | Performed by: INTERNAL MEDICINE

## 2025-05-31 PROCEDURE — 94640 AIRWAY INHALATION TREATMENT: CPT

## 2025-05-31 PROCEDURE — 2140000000 HC CCU INTERMEDIATE R&B

## 2025-05-31 PROCEDURE — 6370000000 HC RX 637 (ALT 250 FOR IP)

## 2025-05-31 PROCEDURE — 2500000003 HC RX 250 WO HCPCS: Performed by: STUDENT IN AN ORGANIZED HEALTH CARE EDUCATION/TRAINING PROGRAM

## 2025-05-31 PROCEDURE — 93005 ELECTROCARDIOGRAM TRACING: CPT | Performed by: INTERNAL MEDICINE

## 2025-05-31 PROCEDURE — 99232 SBSQ HOSP IP/OBS MODERATE 35: CPT | Performed by: STUDENT IN AN ORGANIZED HEALTH CARE EDUCATION/TRAINING PROGRAM

## 2025-05-31 PROCEDURE — 2500000003 HC RX 250 WO HCPCS

## 2025-05-31 PROCEDURE — 6370000000 HC RX 637 (ALT 250 FOR IP): Performed by: NURSE PRACTITIONER

## 2025-05-31 PROCEDURE — 6360000002 HC RX W HCPCS

## 2025-05-31 PROCEDURE — 94761 N-INVAS EAR/PLS OXIMETRY MLT: CPT

## 2025-05-31 PROCEDURE — 6360000002 HC RX W HCPCS: Performed by: NURSE PRACTITIONER

## 2025-05-31 PROCEDURE — 36415 COLL VENOUS BLD VENIPUNCTURE: CPT

## 2025-05-31 PROCEDURE — 80048 BASIC METABOLIC PNL TOTAL CA: CPT

## 2025-05-31 PROCEDURE — 71045 X-RAY EXAM CHEST 1 VIEW: CPT

## 2025-05-31 PROCEDURE — 2500000003 HC RX 250 WO HCPCS: Performed by: NURSE PRACTITIONER

## 2025-05-31 RX ORDER — GUAIFENESIN 200 MG/10ML
200 LIQUID ORAL ONCE
Status: COMPLETED | OUTPATIENT
Start: 2025-05-31 | End: 2025-05-31

## 2025-05-31 RX ORDER — SODIUM BICARBONATE 650 MG/1
650 TABLET ORAL 4 TIMES DAILY
Status: DISCONTINUED | OUTPATIENT
Start: 2025-05-31 | End: 2025-06-02 | Stop reason: HOSPADM

## 2025-05-31 RX ORDER — METOPROLOL TARTRATE 1 MG/ML
5 INJECTION, SOLUTION INTRAVENOUS ONCE
Status: COMPLETED | OUTPATIENT
Start: 2025-05-31 | End: 2025-05-31

## 2025-05-31 RX ADMIN — METFORMIN HYDROCHLORIDE 1000 MG: 500 TABLET ORAL at 16:05

## 2025-05-31 RX ADMIN — ONDANSETRON 4 MG: 2 INJECTION, SOLUTION INTRAMUSCULAR; INTRAVENOUS at 17:30

## 2025-05-31 RX ADMIN — AMIODARONE HYDROCHLORIDE 1 MG/MIN: 1.8 INJECTION, SOLUTION INTRAVENOUS at 14:30

## 2025-05-31 RX ADMIN — METOPROLOL SUCCINATE 25 MG: 25 TABLET, EXTENDED RELEASE ORAL at 19:39

## 2025-05-31 RX ADMIN — AMIODARONE HYDROCHLORIDE 150 MG: 1.5 INJECTION, SOLUTION INTRAVENOUS at 14:20

## 2025-05-31 RX ADMIN — METOPROLOL TARTRATE 5 MG: 5 INJECTION INTRAVENOUS at 15:36

## 2025-05-31 RX ADMIN — BUMETANIDE 1 MG: 0.25 INJECTION INTRAMUSCULAR; INTRAVENOUS at 08:46

## 2025-05-31 RX ADMIN — BUPROPION HYDROCHLORIDE 150 MG: 150 TABLET, EXTENDED RELEASE ORAL at 08:45

## 2025-05-31 RX ADMIN — ATORVASTATIN CALCIUM 20 MG: 20 TABLET, FILM COATED ORAL at 19:39

## 2025-05-31 RX ADMIN — METFORMIN HYDROCHLORIDE 1000 MG: 500 TABLET ORAL at 08:45

## 2025-05-31 RX ADMIN — SODIUM CHLORIDE, PRESERVATIVE FREE 10 ML: 5 INJECTION INTRAVENOUS at 21:22

## 2025-05-31 RX ADMIN — APIXABAN 5 MG: 5 TABLET, FILM COATED ORAL at 08:45

## 2025-05-31 RX ADMIN — GUAIFENESIN 200 MG: 200 SOLUTION ORAL at 22:03

## 2025-05-31 RX ADMIN — CITALOPRAM 40 MG: 40 TABLET, FILM COATED ORAL at 08:45

## 2025-05-31 RX ADMIN — GABAPENTIN 800 MG: 400 CAPSULE ORAL at 12:42

## 2025-05-31 RX ADMIN — BUMETANIDE 1 MG: 0.25 INJECTION INTRAMUSCULAR; INTRAVENOUS at 21:22

## 2025-05-31 RX ADMIN — TROSPIUM CHLORIDE 20 MG: 20 TABLET, FILM COATED ORAL at 15:36

## 2025-05-31 RX ADMIN — ARIPIPRAZOLE 10 MG: 10 TABLET ORAL at 08:45

## 2025-05-31 RX ADMIN — ALBUTEROL SULFATE 2 PUFF: 90 AEROSOL, METERED RESPIRATORY (INHALATION) at 20:50

## 2025-05-31 RX ADMIN — ALBUTEROL SULFATE 2 PUFF: 90 AEROSOL, METERED RESPIRATORY (INHALATION) at 08:56

## 2025-05-31 RX ADMIN — APIXABAN 5 MG: 5 TABLET, FILM COATED ORAL at 19:39

## 2025-05-31 RX ADMIN — GABAPENTIN 800 MG: 400 CAPSULE ORAL at 19:44

## 2025-05-31 RX ADMIN — GABAPENTIN 800 MG: 400 CAPSULE ORAL at 08:48

## 2025-05-31 RX ADMIN — SPIRONOLACTONE 25 MG: 25 TABLET ORAL at 08:45

## 2025-05-31 RX ADMIN — SODIUM BICARBONATE 650 MG: 650 TABLET ORAL at 19:39

## 2025-05-31 RX ADMIN — GABAPENTIN 800 MG: 400 CAPSULE ORAL at 16:05

## 2025-05-31 RX ADMIN — SODIUM CHLORIDE, PRESERVATIVE FREE 10 ML: 5 INJECTION INTRAVENOUS at 08:46

## 2025-05-31 RX ADMIN — METOPROLOL SUCCINATE 25 MG: 25 TABLET, EXTENDED RELEASE ORAL at 08:45

## 2025-05-31 RX ADMIN — TROSPIUM CHLORIDE 20 MG: 20 TABLET, FILM COATED ORAL at 08:52

## 2025-05-31 RX ADMIN — MONTELUKAST 10 MG: 10 TABLET, FILM COATED ORAL at 19:39

## 2025-05-31 ASSESSMENT — PAIN - FUNCTIONAL ASSESSMENT: PAIN_FUNCTIONAL_ASSESSMENT: PREVENTS OR INTERFERES SOME ACTIVE ACTIVITIES AND ADLS

## 2025-05-31 ASSESSMENT — PAIN SCALES - GENERAL
PAINLEVEL_OUTOF10: 3
PAINLEVEL_OUTOF10: 5
PAINLEVEL_OUTOF10: 3
PAINLEVEL_OUTOF10: 6
PAINLEVEL_OUTOF10: 5

## 2025-05-31 ASSESSMENT — PAIN DESCRIPTION - DESCRIPTORS
DESCRIPTORS: STABBING
DESCRIPTORS: ACHING
DESCRIPTORS: PRESSURE
DESCRIPTORS: ACHING
DESCRIPTORS: ACHING

## 2025-05-31 ASSESSMENT — PAIN DESCRIPTION - LOCATION
LOCATION: BACK
LOCATION: CHEST
LOCATION: BACK
LOCATION: BACK
LOCATION: CHEST

## 2025-05-31 ASSESSMENT — PAIN DESCRIPTION - PAIN TYPE: TYPE: CHRONIC PAIN

## 2025-05-31 ASSESSMENT — PAIN DESCRIPTION - ORIENTATION
ORIENTATION: LOWER
ORIENTATION: LOWER
ORIENTATION: MID
ORIENTATION: LOWER
ORIENTATION: MID

## 2025-05-31 NOTE — PROCEDURES
PROCEDURE NOTE  Date: 5/31/2025   Name: Vandana Burt  YOB: 1950    Procedures  EKG completed

## 2025-06-01 LAB
ANION GAP SERPL CALC-SCNC: 13 MEQ/L (ref 8–16)
BUN SERPL-MCNC: 31 MG/DL (ref 8–23)
CALCIUM SERPL-MCNC: 8.6 MG/DL (ref 8.8–10.2)
CHLORIDE SERPL-SCNC: 95 MEQ/L (ref 98–111)
CO2 SERPL-SCNC: 27 MEQ/L (ref 22–29)
CREAT SERPL-MCNC: 0.9 MG/DL (ref 0.5–0.9)
EKG ATRIAL RATE: 72 BPM
EKG P AXIS: 49 DEGREES
EKG P-R INTERVAL: 154 MS
EKG Q-T INTERVAL: 470 MS
EKG QRS DURATION: 122 MS
EKG QTC CALCULATION (BAZETT): 514 MS
EKG R AXIS: -16 DEGREES
EKG T AXIS: 60 DEGREES
EKG VENTRICULAR RATE: 72 BPM
GFR SERPL CREATININE-BSD FRML MDRD: 66 ML/MIN/1.73M2
GLUCOSE BLD STRIP.AUTO-MCNC: 163 MG/DL (ref 70–108)
GLUCOSE SERPL-MCNC: 168 MG/DL (ref 74–109)
POTASSIUM SERPL-SCNC: 3.6 MEQ/L (ref 3.5–5.2)
SODIUM SERPL-SCNC: 135 MEQ/L (ref 135–145)

## 2025-06-01 PROCEDURE — 6370000000 HC RX 637 (ALT 250 FOR IP): Performed by: STUDENT IN AN ORGANIZED HEALTH CARE EDUCATION/TRAINING PROGRAM

## 2025-06-01 PROCEDURE — 6370000000 HC RX 637 (ALT 250 FOR IP): Performed by: INTERNAL MEDICINE

## 2025-06-01 PROCEDURE — 82948 REAGENT STRIP/BLOOD GLUCOSE: CPT

## 2025-06-01 PROCEDURE — 93005 ELECTROCARDIOGRAM TRACING: CPT | Performed by: INTERNAL MEDICINE

## 2025-06-01 PROCEDURE — 94640 AIRWAY INHALATION TREATMENT: CPT

## 2025-06-01 PROCEDURE — 2140000000 HC CCU INTERMEDIATE R&B

## 2025-06-01 PROCEDURE — 6360000002 HC RX W HCPCS: Performed by: NURSE PRACTITIONER

## 2025-06-01 PROCEDURE — 6370000000 HC RX 637 (ALT 250 FOR IP): Performed by: NURSE PRACTITIONER

## 2025-06-01 PROCEDURE — 36415 COLL VENOUS BLD VENIPUNCTURE: CPT

## 2025-06-01 PROCEDURE — 2500000003 HC RX 250 WO HCPCS: Performed by: STUDENT IN AN ORGANIZED HEALTH CARE EDUCATION/TRAINING PROGRAM

## 2025-06-01 PROCEDURE — 80048 BASIC METABOLIC PNL TOTAL CA: CPT

## 2025-06-01 PROCEDURE — 6370000000 HC RX 637 (ALT 250 FOR IP)

## 2025-06-01 PROCEDURE — 99232 SBSQ HOSP IP/OBS MODERATE 35: CPT | Performed by: STUDENT IN AN ORGANIZED HEALTH CARE EDUCATION/TRAINING PROGRAM

## 2025-06-01 PROCEDURE — 2500000003 HC RX 250 WO HCPCS

## 2025-06-01 RX ORDER — AMIODARONE HYDROCHLORIDE 200 MG/1
200 TABLET ORAL 2 TIMES DAILY
Status: DISCONTINUED | OUTPATIENT
Start: 2025-06-01 | End: 2025-06-02 | Stop reason: HOSPADM

## 2025-06-01 RX ORDER — GUAIFENESIN/DEXTROMETHORPHAN 100-10MG/5
5 SYRUP ORAL EVERY 4 HOURS PRN
Status: DISCONTINUED | OUTPATIENT
Start: 2025-06-01 | End: 2025-06-02 | Stop reason: HOSPADM

## 2025-06-01 RX ADMIN — BUPROPION HYDROCHLORIDE 150 MG: 150 TABLET, EXTENDED RELEASE ORAL at 09:31

## 2025-06-01 RX ADMIN — CITALOPRAM 40 MG: 40 TABLET, FILM COATED ORAL at 13:16

## 2025-06-01 RX ADMIN — MICONAZOLE NITRATE: 2 POWDER TOPICAL at 20:34

## 2025-06-01 RX ADMIN — APIXABAN 5 MG: 5 TABLET, FILM COATED ORAL at 09:42

## 2025-06-01 RX ADMIN — AMIODARONE HYDROCHLORIDE 0.5 MG/MIN: 1.8 INJECTION, SOLUTION INTRAVENOUS at 03:46

## 2025-06-01 RX ADMIN — AMIODARONE HYDROCHLORIDE 200 MG: 200 TABLET ORAL at 20:32

## 2025-06-01 RX ADMIN — HYDROXYZINE PAMOATE 50 MG: 25 CAPSULE ORAL at 04:45

## 2025-06-01 RX ADMIN — ALBUTEROL SULFATE 2 PUFF: 90 AEROSOL, METERED RESPIRATORY (INHALATION) at 17:54

## 2025-06-01 RX ADMIN — GABAPENTIN 800 MG: 400 CAPSULE ORAL at 20:32

## 2025-06-01 RX ADMIN — METOPROLOL SUCCINATE 25 MG: 25 TABLET, EXTENDED RELEASE ORAL at 09:42

## 2025-06-01 RX ADMIN — GABAPENTIN 800 MG: 400 CAPSULE ORAL at 16:55

## 2025-06-01 RX ADMIN — ACETAMINOPHEN 650 MG: 325 TABLET ORAL at 04:45

## 2025-06-01 RX ADMIN — SODIUM BICARBONATE 650 MG: 650 TABLET ORAL at 09:30

## 2025-06-01 RX ADMIN — TROSPIUM CHLORIDE 20 MG: 20 TABLET, FILM COATED ORAL at 16:56

## 2025-06-01 RX ADMIN — AMIODARONE HYDROCHLORIDE 200 MG: 200 TABLET ORAL at 09:56

## 2025-06-01 RX ADMIN — BUMETANIDE 1 MG: 0.25 INJECTION INTRAMUSCULAR; INTRAVENOUS at 22:16

## 2025-06-01 RX ADMIN — SODIUM CHLORIDE, PRESERVATIVE FREE 10 ML: 5 INJECTION INTRAVENOUS at 20:33

## 2025-06-01 RX ADMIN — METFORMIN HYDROCHLORIDE 1000 MG: 500 TABLET ORAL at 09:30

## 2025-06-01 RX ADMIN — BUMETANIDE 1 MG: 0.25 INJECTION INTRAMUSCULAR; INTRAVENOUS at 09:42

## 2025-06-01 RX ADMIN — GABAPENTIN 800 MG: 400 CAPSULE ORAL at 09:29

## 2025-06-01 RX ADMIN — SPIRONOLACTONE 25 MG: 25 TABLET ORAL at 09:39

## 2025-06-01 RX ADMIN — ARIPIPRAZOLE 10 MG: 10 TABLET ORAL at 09:30

## 2025-06-01 RX ADMIN — ATORVASTATIN CALCIUM 20 MG: 20 TABLET, FILM COATED ORAL at 20:32

## 2025-06-01 RX ADMIN — SODIUM BICARBONATE 650 MG: 650 TABLET ORAL at 13:17

## 2025-06-01 RX ADMIN — SODIUM BICARBONATE 650 MG: 650 TABLET ORAL at 20:32

## 2025-06-01 RX ADMIN — ALBUTEROL SULFATE 2 PUFF: 90 AEROSOL, METERED RESPIRATORY (INHALATION) at 08:54

## 2025-06-01 RX ADMIN — GABAPENTIN 800 MG: 400 CAPSULE ORAL at 13:17

## 2025-06-01 RX ADMIN — APIXABAN 5 MG: 5 TABLET, FILM COATED ORAL at 20:32

## 2025-06-01 RX ADMIN — METFORMIN HYDROCHLORIDE 1000 MG: 500 TABLET ORAL at 16:55

## 2025-06-01 RX ADMIN — MONTELUKAST 10 MG: 10 TABLET, FILM COATED ORAL at 20:34

## 2025-06-01 RX ADMIN — SODIUM BICARBONATE 650 MG: 650 TABLET ORAL at 16:56

## 2025-06-01 RX ADMIN — TROSPIUM CHLORIDE 20 MG: 20 TABLET, FILM COATED ORAL at 04:45

## 2025-06-01 RX ADMIN — METOPROLOL SUCCINATE 25 MG: 25 TABLET, EXTENDED RELEASE ORAL at 20:32

## 2025-06-01 ASSESSMENT — PAIN SCALES - GENERAL
PAINLEVEL_OUTOF10: 3
PAINLEVEL_OUTOF10: 0

## 2025-06-01 ASSESSMENT — PAIN DESCRIPTION - DESCRIPTORS: DESCRIPTORS: ACHING

## 2025-06-01 ASSESSMENT — PAIN - FUNCTIONAL ASSESSMENT: PAIN_FUNCTIONAL_ASSESSMENT: ACTIVITIES ARE NOT PREVENTED

## 2025-06-01 ASSESSMENT — PAIN DESCRIPTION - LOCATION: LOCATION: GENERALIZED

## 2025-06-01 NOTE — PROCEDURES
PROCEDURE NOTE  Date: 6/1/2025   Name: Vandana MACIEL Bassemandres  YOB: 1950    ProceduresEKG completed, given to RN

## 2025-06-02 ENCOUNTER — APPOINTMENT (OUTPATIENT)
Age: 75
DRG: 308 | End: 2025-06-02
Payer: MEDICARE

## 2025-06-02 VITALS
SYSTOLIC BLOOD PRESSURE: 111 MMHG | HEIGHT: 61 IN | DIASTOLIC BLOOD PRESSURE: 63 MMHG | RESPIRATION RATE: 18 BRPM | BODY MASS INDEX: 44.08 KG/M2 | TEMPERATURE: 97.6 F | OXYGEN SATURATION: 94 % | HEART RATE: 70 BPM | WEIGHT: 233.47 LBS

## 2025-06-02 LAB
ANION GAP SERPL CALC-SCNC: 15 MEQ/L (ref 8–16)
BUN SERPL-MCNC: 33 MG/DL (ref 8–23)
CALCIUM SERPL-MCNC: 8.8 MG/DL (ref 8.8–10.2)
CHLORIDE SERPL-SCNC: 95 MEQ/L (ref 98–111)
CO2 SERPL-SCNC: 28 MEQ/L (ref 22–29)
CREAT SERPL-MCNC: 1 MG/DL (ref 0.5–0.9)
ECHO BSA: 2.26 M2
GFR SERPL CREATININE-BSD FRML MDRD: 58 ML/MIN/1.73M2
GLUCOSE SERPL-MCNC: 155 MG/DL (ref 74–109)
POTASSIUM SERPL-SCNC: 3.7 MEQ/L (ref 3.5–5.2)
SODIUM SERPL-SCNC: 138 MEQ/L (ref 135–145)

## 2025-06-02 PROCEDURE — 80048 BASIC METABOLIC PNL TOTAL CA: CPT

## 2025-06-02 PROCEDURE — 6370000000 HC RX 637 (ALT 250 FOR IP): Performed by: STUDENT IN AN ORGANIZED HEALTH CARE EDUCATION/TRAINING PROGRAM

## 2025-06-02 PROCEDURE — 94761 N-INVAS EAR/PLS OXIMETRY MLT: CPT

## 2025-06-02 PROCEDURE — NBSRV NON-BILLABLE SERVICE: Performed by: INTERNAL MEDICINE

## 2025-06-02 PROCEDURE — 94640 AIRWAY INHALATION TREATMENT: CPT

## 2025-06-02 PROCEDURE — 6370000000 HC RX 637 (ALT 250 FOR IP): Performed by: INTERNAL MEDICINE

## 2025-06-02 PROCEDURE — 99239 HOSP IP/OBS DSCHRG MGMT >30: CPT | Performed by: INTERNAL MEDICINE

## 2025-06-02 PROCEDURE — 93270 REMOTE 30 DAY ECG REV/REPORT: CPT

## 2025-06-02 PROCEDURE — 2500000003 HC RX 250 WO HCPCS

## 2025-06-02 PROCEDURE — 36415 COLL VENOUS BLD VENIPUNCTURE: CPT

## 2025-06-02 PROCEDURE — 6370000000 HC RX 637 (ALT 250 FOR IP)

## 2025-06-02 PROCEDURE — 6370000000 HC RX 637 (ALT 250 FOR IP): Performed by: NURSE PRACTITIONER

## 2025-06-02 RX ORDER — BUMETANIDE 1 MG/1
1 TABLET ORAL 2 TIMES DAILY
Qty: 30 TABLET | Refills: 3 | Status: SHIPPED | OUTPATIENT
Start: 2025-06-02

## 2025-06-02 RX ORDER — GUAIFENESIN/DEXTROMETHORPHAN 100-10MG/5
5 SYRUP ORAL EVERY 4 HOURS PRN
Qty: 120 ML | Refills: 5 | Status: SHIPPED | OUTPATIENT
Start: 2025-06-02 | End: 2025-06-26

## 2025-06-02 RX ORDER — AMIODARONE HYDROCHLORIDE 100 MG/1
TABLET ORAL
Qty: 60 TABLET | Refills: 3 | Status: SHIPPED | OUTPATIENT
Start: 2025-06-02

## 2025-06-02 RX ORDER — BUMETANIDE 1 MG/1
1 TABLET ORAL 2 TIMES DAILY
Status: DISCONTINUED | OUTPATIENT
Start: 2025-06-02 | End: 2025-06-02 | Stop reason: HOSPADM

## 2025-06-02 RX ADMIN — TROSPIUM CHLORIDE 20 MG: 20 TABLET, FILM COATED ORAL at 09:39

## 2025-06-02 RX ADMIN — GABAPENTIN 800 MG: 400 CAPSULE ORAL at 09:40

## 2025-06-02 RX ADMIN — GABAPENTIN 800 MG: 400 CAPSULE ORAL at 13:39

## 2025-06-02 RX ADMIN — ALBUTEROL SULFATE 2 PUFF: 90 AEROSOL, METERED RESPIRATORY (INHALATION) at 09:20

## 2025-06-02 RX ADMIN — SODIUM CHLORIDE, PRESERVATIVE FREE 10 ML: 5 INJECTION INTRAVENOUS at 09:39

## 2025-06-02 RX ADMIN — METFORMIN HYDROCHLORIDE 1000 MG: 500 TABLET ORAL at 09:39

## 2025-06-02 RX ADMIN — BUMETANIDE 1 MG: 1 TABLET ORAL at 09:40

## 2025-06-02 RX ADMIN — MICONAZOLE NITRATE: 2 POWDER TOPICAL at 09:39

## 2025-06-02 RX ADMIN — SODIUM BICARBONATE 650 MG: 650 TABLET ORAL at 09:40

## 2025-06-02 RX ADMIN — SPIRONOLACTONE 25 MG: 25 TABLET ORAL at 09:40

## 2025-06-02 RX ADMIN — METOPROLOL SUCCINATE 25 MG: 25 TABLET, EXTENDED RELEASE ORAL at 09:40

## 2025-06-02 RX ADMIN — SODIUM BICARBONATE 650 MG: 650 TABLET ORAL at 13:39

## 2025-06-02 RX ADMIN — ACETAMINOPHEN 650 MG: 325 TABLET ORAL at 18:13

## 2025-06-02 RX ADMIN — APIXABAN 5 MG: 5 TABLET, FILM COATED ORAL at 09:40

## 2025-06-02 RX ADMIN — BUPROPION HYDROCHLORIDE 150 MG: 150 TABLET, EXTENDED RELEASE ORAL at 09:40

## 2025-06-02 RX ADMIN — ARIPIPRAZOLE 10 MG: 10 TABLET ORAL at 09:40

## 2025-06-02 RX ADMIN — CITALOPRAM 40 MG: 40 TABLET, FILM COATED ORAL at 09:40

## 2025-06-02 RX ADMIN — AMIODARONE HYDROCHLORIDE 200 MG: 200 TABLET ORAL at 09:40

## 2025-06-02 ASSESSMENT — PAIN - FUNCTIONAL ASSESSMENT: PAIN_FUNCTIONAL_ASSESSMENT: ACTIVITIES ARE NOT PREVENTED

## 2025-06-02 ASSESSMENT — PAIN DESCRIPTION - DESCRIPTORS: DESCRIPTORS: ACHING

## 2025-06-02 ASSESSMENT — PAIN SCALES - GENERAL: PAINLEVEL_OUTOF10: 3

## 2025-06-02 ASSESSMENT — PAIN DESCRIPTION - PAIN TYPE: TYPE: ACUTE PAIN

## 2025-06-02 ASSESSMENT — PAIN DESCRIPTION - FREQUENCY: FREQUENCY: INTERMITTENT

## 2025-06-02 ASSESSMENT — PAIN DESCRIPTION - LOCATION: LOCATION: NECK

## 2025-06-02 ASSESSMENT — PAIN DESCRIPTION - ONSET: ONSET: GRADUAL

## 2025-06-02 ASSESSMENT — PAIN DESCRIPTION - ORIENTATION: ORIENTATION: POSTERIOR

## 2025-06-02 NOTE — PLAN OF CARE
Problem: Chronic Conditions and Co-morbidities  Goal: Patient's chronic conditions and co-morbidity symptoms are monitored and maintained or improved  5/31/2025 2028 by Adelita Mccann RN  Outcome: Progressing  Flowsheets  Taken 5/31/2025 2028  Care Plan - Patient's Chronic Conditions and Co-Morbidity Symptoms are Monitored and Maintained or Improved: Monitor and assess patient's chronic conditions and comorbid symptoms for stability, deterioration, or improvement  Taken 5/31/2025 1915  Care Plan - Patient's Chronic Conditions and Co-Morbidity Symptoms are Monitored and Maintained or Improved: Monitor and assess patient's chronic conditions and comorbid symptoms for stability, deterioration, or improvement  5/31/2025 1705 by Rosa Viera RN  Outcome: Progressing  Flowsheets (Taken 5/31/2025 0841)  Care Plan - Patient's Chronic Conditions and Co-Morbidity Symptoms are Monitored and Maintained or Improved:   Monitor and assess patient's chronic conditions and comorbid symptoms for stability, deterioration, or improvement   Collaborate with multidisciplinary team to address chronic and comorbid conditions and prevent exacerbation or deterioration   Update acute care plan with appropriate goals if chronic or comorbid symptoms are exacerbated and prevent overall improvement and discharge     Problem: Discharge Planning  Goal: Discharge to home or other facility with appropriate resources  5/31/2025 2028 by Adelita Mccann RN  Outcome: Progressing  Flowsheets  Taken 5/31/2025 2028  Discharge to home or other facility with appropriate resources: Identify barriers to discharge with patient and caregiver  Taken 5/31/2025 1915  Discharge to home or other facility with appropriate resources: Identify barriers to discharge with patient and caregiver  5/31/2025 1705 by Rosa Viera RN  Outcome: Progressing  Flowsheets (Taken 5/31/2025 0841)  Discharge to home or other facility with appropriate resources:   Identify 
  Problem: Chronic Conditions and Co-morbidities  Goal: Patient's chronic conditions and co-morbidity symptoms are monitored and maintained or improved  Outcome: Progressing  Flowsheets (Taken 5/28/2025 0251)  Care Plan - Patient's Chronic Conditions and Co-Morbidity Symptoms are Monitored and Maintained or Improved:   Monitor and assess patient's chronic conditions and comorbid symptoms for stability, deterioration, or improvement   Collaborate with multidisciplinary team to address chronic and comorbid conditions and prevent exacerbation or deterioration   Update acute care plan with appropriate goals if chronic or comorbid symptoms are exacerbated and prevent overall improvement and discharge     Problem: Discharge Planning  Goal: Discharge to home or other facility with appropriate resources  Outcome: Progressing  Flowsheets (Taken 5/28/2025 0251)  Discharge to home or other facility with appropriate resources:   Identify barriers to discharge with patient and caregiver   Identify discharge learning needs (meds, wound care, etc)   Arrange for needed discharge resources and transportation as appropriate   Refer to discharge planning if patient needs post-hospital services based on physician order or complex needs related to functional status, cognitive ability or social support system     Problem: Safety - Adult  Goal: Free from fall injury  Outcome: Progressing  Flowsheets (Taken 5/28/2025 0251)  Free From Fall Injury:   Instruct family/caregiver on patient safety   Based on caregiver fall risk screen, instruct family/caregiver to ask for assistance with transferring infant if caregiver noted to have fall risk factors     Problem: Skin/Tissue Integrity  Goal: Skin integrity remains intact  Description: 1.  Monitor for areas of redness and/or skin breakdown2.  Assess vascular access sites hourly3.  Every 4-6 hours minimum:  Change oxygen saturation probe site4.  Every 4-6 hours:  If on nasal continuous positive 
  Problem: Chronic Conditions and Co-morbidities  Goal: Patient's chronic conditions and co-morbidity symptoms are monitored and maintained or improved  Outcome: Progressing  Flowsheets (Taken 5/28/2025 0251)  Care Plan - Patient's Chronic Conditions and Co-Morbidity Symptoms are Monitored and Maintained or Improved:   Monitor and assess patient's chronic conditions and comorbid symptoms for stability, deterioration, or improvement   Collaborate with multidisciplinary team to address chronic and comorbid conditions and prevent exacerbation or deterioration   Update acute care plan with appropriate goals if chronic or comorbid symptoms are exacerbated and prevent overall improvement and discharge     Problem: Discharge Planning  Goal: Discharge to home or other facility with appropriate resources  Outcome: Progressing  Flowsheets (Taken 5/28/2025 1041 by Roseann Singh, RN)  Discharge to home or other facility with appropriate resources: Identify barriers to discharge with patient and caregiver     Problem: Safety - Adult  Goal: Free from fall injury  Outcome: Progressing  Flowsheets (Taken 5/28/2025 1041 by Roseann Singh, RN)  Free From Fall Injury: Instruct family/caregiver on patient safety     Problem: Skin/Tissue Integrity  Goal: Skin integrity remains intact  Description: 1.  Monitor for areas of redness and/or skin breakdown2.  Assess vascular access sites hourly3.  Every 4-6 hours minimum:  Change oxygen saturation probe site4.  Every 4-6 hours:  If on nasal continuous positive airway pressure, respiratory therapy assess nares and determine need for appliance change or resting period  Outcome: Progressing  Flowsheets (Taken 5/28/2025 1041 by Roseann Singh, RN)  Skin Integrity Remains Intact:   Monitor for areas of redness and/or skin breakdown   Assess vascular access sites hourly   Check visual cues for pain   Positioning devices   Turn and reposition as indicated     Problem: 
  Problem: Chronic Conditions and Co-morbidities  Goal: Patient's chronic conditions and co-morbidity symptoms are monitored and maintained or improved  Outcome: Progressing  Flowsheets (Taken 5/30/2025 2136)  Care Plan - Patient's Chronic Conditions and Co-Morbidity Symptoms are Monitored and Maintained or Improved:   Monitor and assess patient's chronic conditions and comorbid symptoms for stability, deterioration, or improvement   Collaborate with multidisciplinary team to address chronic and comorbid conditions and prevent exacerbation or deterioration   Update acute care plan with appropriate goals if chronic or comorbid symptoms are exacerbated and prevent overall improvement and discharge     Problem: Discharge Planning  Goal: Discharge to home or other facility with appropriate resources  Outcome: Progressing  Flowsheets (Taken 5/30/2025 2136)  Discharge to home or other facility with appropriate resources:   Identify barriers to discharge with patient and caregiver   Identify discharge learning needs (meds, wound care, etc)   Arrange for needed discharge resources and transportation as appropriate     Problem: Safety - Adult  Goal: Free from fall injury  Outcome: Progressing  Flowsheets (Taken 5/30/2025 2136)  Free From Fall Injury: Instruct family/caregiver on patient safety  Note: Bed locked & in low position, call light in reach, side-rails up x2, bed/chair alarm utilized, non-slip socks on when ambulating, reminded patient to use call light to call for assistance.      Problem: Skin/Tissue Integrity  Goal: Skin integrity remains intact  Description: 1.  Monitor for areas of redness and/or skin breakdown2.  Assess vascular access sites hourly3.  Every 4-6 hours minimum:  Change oxygen saturation probe site4.  Every 4-6 hours:  If on nasal continuous positive airway pressure, respiratory therapy assess nares and determine need for appliance change or resting period  Outcome: Progressing  Flowsheets (Taken 
  Problem: Chronic Conditions and Co-morbidities  Goal: Patient's chronic conditions and co-morbidity symptoms are monitored and maintained or improved  Outcome: Progressing  Flowsheets (Taken 6/2/2025 0422)  Care Plan - Patient's Chronic Conditions and Co-Morbidity Symptoms are Monitored and Maintained or Improved:   Monitor and assess patient's chronic conditions and comorbid symptoms for stability, deterioration, or improvement   Collaborate with multidisciplinary team to address chronic and comorbid conditions and prevent exacerbation or deterioration   Update acute care plan with appropriate goals if chronic or comorbid symptoms are exacerbated and prevent overall improvement and discharge     Problem: Discharge Planning  Goal: Discharge to home or other facility with appropriate resources  Outcome: Progressing  Flowsheets (Taken 6/2/2025 0422)  Discharge to home or other facility with appropriate resources:   Identify barriers to discharge with patient and caregiver   Identify discharge learning needs (meds, wound care, etc)     Problem: Safety - Adult  Goal: Free from fall injury  Outcome: Progressing  Note: Bed locked & in low position, call light in reach, side-rails up x2, bed/chair alarm utilized, non-slip socks on when ambulating, reminded patient to use call light to call for assistance.      Problem: Skin/Tissue Integrity  Goal: Skin integrity remains intact  Description: 1.  Monitor for areas of redness and/or skin breakdown2.  Assess vascular access sites hourly3.  Every 4-6 hours minimum:  Change oxygen saturation probe site4.  Every 4-6 hours:  If on nasal continuous positive airway pressure, respiratory therapy assess nares and determine need for appliance change or resting period  Outcome: Progressing  Flowsheets (Taken 6/2/2025 0422)  Skin Integrity Remains Intact:   Monitor for areas of redness and/or skin breakdown   Assess vascular access sites hourly     Problem: Cardiovascular - 
  Problem: Discharge Planning  Goal: Discharge to home or other facility with appropriate resources  6/2/2025 1035 by Roseann Singh RN  Outcome: Progressing  Flowsheets (Taken 6/2/2025 1035)  Discharge to home or other facility with appropriate resources: Identify barriers to discharge with patient and caregiver     Problem: Safety - Adult  Goal: Free from fall injury  6/2/2025 1035 by Roseann Singh RN  Outcome: Progressing  Flowsheets (Taken 6/2/2025 1035)  Free From Fall Injury: Instruct family/caregiver on patient safety     Problem: Skin/Tissue Integrity  Goal: Skin integrity remains intact  Description: 1.  Monitor for areas of redness and/or skin breakdown2.  Assess vascular access sites hourly3.  Every 4-6 hours minimum:  Change oxygen saturation probe site4.  Every 4-6 hours:  If on nasal continuous positive airway pressure, respiratory therapy assess nares and determine need for appliance change or resting period  6/2/2025 1035 by Roseann Singh RN  Outcome: Progressing  Flowsheets (Taken 6/2/2025 1035)  Skin Integrity Remains Intact:   Monitor for areas of redness and/or skin breakdown   Assess vascular access sites hourly   Check visual cues for pain     Problem: Cardiovascular - Adult  Goal: Maintains optimal cardiac output and hemodynamic stability  6/2/2025 1035 by Roseann Singh RN  Outcome: Progressing  Flowsheets (Taken 6/2/2025 1035)  Maintains optimal cardiac output and hemodynamic stability:   Monitor blood pressure and heart rate   Assess for signs of decreased cardiac output   Administer fluid and/or volume expanders as ordered   Administer vasoactive medications as ordered     Problem: Cardiovascular - Adult  Goal: Absence of cardiac dysrhythmias or at baseline  6/2/2025 1035 by Roseann Singh RN  Outcome: Progressing  Flowsheets (Taken 6/2/2025 1035)  Absence of cardiac dysrhythmias or at baseline:   Monitor cardiac rate and rhythm   Assess for 
  Problem: Respiratory - Adult  Goal: Clear lung sounds  5/29/2025 0855 by Nancy Mera, RCP  Outcome: Progressing   Continue inhaler to improve breath sounds.  Pt agrees with plan of care  
  Problem: Respiratory - Adult  Goal: Clear lung sounds  5/30/2025 2054 by Cris German RCP  Outcome: Progressing   Lung sounds are improved. Current treatment regimen will be discussed with provider to see if treatments may be changed.Patient mutually agreed on goals.      
  Problem: Respiratory - Adult  Goal: Clear lung sounds  5/31/2025 0859 by Pat Elizondo, RCJING  Outcome: Progressing     
  Problem: Respiratory - Adult  Goal: Clear lung sounds  5/31/2025 2053 by Cris German RCP  Outcome: Progressing   Patient lung sounds are considered normal for their current lung condition. No signs of distress noted. Current treatment regimen appropriate Patient mutually agreed on goals.      
  Problem: Respiratory - Adult  Goal: Clear lung sounds  6/1/2025 0857 by Pat Elizondo, RCJING  Outcome: Progressing     
  Problem: Respiratory - Adult  Goal: Clear lung sounds  Outcome: Progressing     
  Problem: Respiratory - Adult  Goal: Clear lung sounds  Outcome: Progressing    Patient lung sounds are considered normal for their current lung condition. No signs of distress noted. Current treatment regimen appropriate      Patient mutually agreed on goals.      
  Problem: Respiratory - Adult  Goal: Clear lung sounds  Outcome: Progressing   Receiving MDI's to improve aeration. Will continue with therapies as ordered.    Patient mutually agreed on goals.      
Consult received.  Please see SW note dated 5/29/2025.   
RCP  Outcome: Progressing     Problem: Cardiovascular - Adult  Goal: Maintains optimal cardiac output and hemodynamic stability  Outcome: Progressing  Flowsheets (Taken 5/31/2025 0841)  Maintains optimal cardiac output and hemodynamic stability:   Monitor blood pressure and heart rate   Monitor urine output and notify Licensed Independent Practitioner for values outside of normal range   Assess for signs of decreased cardiac output  Goal: Absence of cardiac dysrhythmias or at baseline  Outcome: Progressing  Flowsheets (Taken 5/31/2025 0841)  Absence of cardiac dysrhythmias or at baseline:   Monitor cardiac rate and rhythm   Assess for signs of decreased cardiac output   Administer antiarrhythmia medication and electrolyte replacement as ordered     Problem: Pain  Goal: Verbalizes/displays adequate comfort level or baseline comfort level  Outcome: Progressing  Flowsheets (Taken 5/31/2025 0841)  Verbalizes/displays adequate comfort level or baseline comfort level:   Encourage patient to monitor pain and request assistance   Assess pain using appropriate pain scale   Administer analgesics based on type and severity of pain and evaluate response   Implement non-pharmacological measures as appropriate and evaluate response   Consider cultural and social influences on pain and pain management     
output   Administer antiarrhythmia medication and electrolyte replacement as ordered     Care plan reviewed with patient.  Patient verbalizes understanding of the care plan and contributed to goal setting.

## 2025-06-02 NOTE — PROGRESS NOTES
Hospitalist Progress Note      Patient:  Vandana Burt 75 y.o. female     Unit/Bed:3B-30/030-A    Date of Admission: 5/27/2025      ASSESSMENT AND PLAN    Active Problems      Acute on SOB multifactorial with obesity and deconditioning , atrial fib  Atrial fib back to NSR on anticoag due to risk factors  Morbid obesity  Sleep disturbances, likely with significant ESTEBAN , needs formal testing again reiterated this to Vandana.  HTN  HLD  DM-2       She was in NSR in am on rounds, we are optimizing medical Rx, spoke with cards   On toprol today they increased to BID   For better rate control    Again likely ESTEBAN is the common denominator for her multiple medical issues.          DVT Prophylaxis: [] Lovenox / [] Heparin / [] SCDs / [x] Already on Systemic Anticoagulation / [] None   Code status: Full Code     Expected discharge date:  likely tomorrow if stable   Disposition:  back to [facility]     ===================================================================    Chief Complaint: SOB    Subjective (past 24 hours):     Pt with atrial fib overnight, and was given IV lopressor that converted her to NSR. No fever or chills, on one L NC oxygen at this time, she has episodes of increased heart rate at night and gets of hypoxic, likely secondary to ESTEBAN.  Nighttime pulse oximetry will be done today    HPI / Hospital Course:  This is a pleasant 75 y.o. female PMH CAD s/p PCI (2009), HTN, HLD, asthma/COPD, DM, AF not on anticoagulation, depression, morbid obesity, presented with SOB and palpitations and found to be in AF with RVR HR 170s-190s. Received diltiazem bolus, diltiazem gtt and  lopressor x3 doses in the ED with improvement of HR and conversion back to NSR. Patient relates waking at 0300 am with SOB, took a breathing treatment with improvement and returned to bed. When she got up for the day she noticed she was SOB and had palpitations. Denies lightheadedness, dizziness, near syncope or syncope. She does not 
    Hospitalist Progress Note      Patient:  Vandana Burt 75 y.o. female     Unit/Bed:3B-30/030-A    Date of Admission: 5/27/2025      ASSESSMENT AND PLAN    Active Problems      Acute on SOB multifactorial with obesity and deconditioning , atrial fib  Back in atrial fib, now on IV amiodarone  Morbid obesity  Sleep disturbances, likely with significant ESTEBAN , needs formal testing again reiterated this to Vandana.  HTN  HLD  DM-2       She was in NSR in am on rounds, we are optimizing medical Rx, spoke with cards .  But later on she flipped back to atrial fib  With RVR, now on IV amiodraone drip    Again likely ESTEBAN is the common denominator for her multiple medical issues.      Pt with fluid overload - currently getting IV diuretics , will check an CXR, has normal   EF          DVT Prophylaxis: [] Lovenox / [] Heparin / [] SCDs / [x] Already on Systemic Anticoagulation / [] None   Code status: Full Code     Expected discharge date:  likely tomorrow if stable   Disposition:  back to [facility]     ===================================================================    Chief Complaint: SOB    Subjective (past 24 hours):      No fever or chills, on one L NC oxygen at this time, she has episodes of increased heart rate at night and gets of hypoxic, likely secondary to ESTEBAN.  Sitting in the chair   Seen earlier this morning.    HPI / Hospital Course:    This is a pleasant 75 y.o. female PMH CAD s/p PCI (2009), HTN, HLD, asthma/COPD, DM, AF not on anticoagulation, depression, morbid obesity, presented with SOB and palpitations and found to be in AF with RVR HR 170s-190s. Received diltiazem bolus, diltiazem gtt and  lopressor x3 doses in the ED with improvement of HR and conversion back to NSR. Patient relates waking at 0300 am with SOB, took a breathing treatment with improvement and returned to bed. When she got up for the day she noticed she was SOB and had palpitations. Denies lightheadedness, dizziness, near syncope 
    Hospitalist Progress Note      Patient:  Vandana Burt 75 y.o. female     Unit/Bed:3B-30/030-A    Date of Admission: 5/27/2025      ASSESSMENT AND PLAN    Active Problems      Acute on SOB multifactorial with obesity and deconditioning , atrial fib  Back in atrial fib, now on IV amiodarone and transitioned to p.o. amio today and spoke with cardiology  Morbid obesity, strong risk factor modification lifestyle changes have been recommended  Sleep disturbances, likely with significant ESTEBAN , needs formal testing again reiterated this to Vandana.  HTN  HLD  DM-2       Patient's Amio drip was stopped this morning started on p.o. regimen as she was in atrial fibrillation again yesterday afternoon going back to normal sinus rhythm now.    Again likely ESTEBAN is the common denominator for her multiple medical issues.      Pt with fluid overload - currently getting IV diuretics , will check an CXR, has normal   EF, we are diuresing this patient checking daily weights, and labs.          DVT Prophylaxis: [] Lovenox / [] Heparin / [] SCDs / [x] Already on Systemic Anticoagulation / [] None   Code status: Full Code     Expected discharge date:  likely tomorrow if stable   Disposition:  back to [facility]     ===================================================================    Chief Complaint: SOB    Subjective (past 24 hours):      No fever or chills, on one L NC oxygen at this time, she has episodes of increased heart rate at night and gets of hypoxic, likely secondary to ESTEBAN.  Patient fortunately did not have any episodes of atrial fibrillation again back in normal sinus rhythm after being on amiodarone.  No fever or chills and denies any bleeding issues    HPI / Hospital Course:    This is a pleasant 75 y.o. female PMH CAD s/p PCI (2009), HTN, HLD, asthma/COPD, DM, AF not on anticoagulation, depression, morbid obesity, presented with SOB and palpitations and found to be in AF with RVR HR 170s-190s. Received diltiazem 
    Hospitalist Progress Note      Patient:  Vandana Burt 75 y.o. female     Unit/Bed:3B-30/030-A    Date of Admission: 5/27/2025      ASSESSMENT AND PLAN    Active Problems  Acute on SOB multifactorial with obesity and deconditioning , atrial fib  Atrial fib back to NSR on anticoag due to risk factors  Morbid obesity  Sleep disturbances, likely with significant ESTEBAN , needs formal testing again reiterated this to Vandana.  HTN  HLD  DM-2       She was in NSR in am on rounds, we are optimizing medical Rx, spoke with cards   On toprol today they increased to BID   For better rate control    Again likely ESTEBAN is the common denominator for her multiple medical issues.          DVT Prophylaxis: [] Lovenox / [] Heparin / [] SCDs / [x] Already on Systemic Anticoagulation / [] None   Code status: Full Code     Expected discharge date:  likely tomorrow if stable   Disposition:  back to [facility]     ===================================================================    Chief Complaint: SOB    Subjective (past 24 hours):     Pt with atrial fib overnight, and was given IV lopressor that converted her to NSR. No fever or chills, on one L NC oxygen at this time, no bleeding issue and no fever or chills. Dec ADL's at home          HPI / Hospital Course:  This is a pleasant 75 y.o. female PMH CAD s/p PCI (2009), HTN, HLD, asthma/COPD, DM, AF not on anticoagulation, depression, morbid obesity, presented with SOB and palpitations and found to be in AF with RVR HR 170s-190s. Received diltiazem bolus, diltiazem gtt and  lopressor x3 doses in the ED with improvement of HR and conversion back to NSR. Patient relates waking at 0300 am with SOB, took a breathing treatment with improvement and returned to bed. When she got up for the day she noticed she was SOB and had palpitations. Denies lightheadedness, dizziness, near syncope or syncope. She does not wear oxygen at home. States she had an episode of AF 7-8 years ago and had a 
  Physician Progress Note      PATIENT:               CHUCK ARREDONDO  CSN #:                  [id number]  :                       1950  ADMIT DATE:       2025 3:08 PM  DISCH DATE:  RESPONDING  PROVIDER #:        Mart Rowell MD          QUERY TEXT:    Based on your medical judgment, please clarify these findings and document if   any of the following are being evaluated and/or treated:    The clinical indicators include:  75 yr, female, CHF, DM,HTN (H&P)      Per H&P by Dr. Ro \"EKG on admit Afib RV. [id number].\"    Eliquis (MAR)  Options provided:  -- Secondary hypercoagulable state in a patient with atrial fibrillation  -- Other - I will add my own diagnosis  -- Disagree - Not applicable / Not valid  -- Disagree - Clinically unable to determine / Unknown  -- Refer to Clinical Documentation Reviewer    PROVIDER RESPONSE TEXT:    This patient has secondary hypercoagulable state in a patient with atrial   fibrillation.    Query created by: VIC JONES on 2025 9:52 AM      Electronically signed by:  Mart Rowell MD 2025 6:26 PM          
1330 Heart monitor alarming HR into 160's. RN assess monitor looks like afib with rvr. RN checks patient, sitting up in chair talking on phone. Denies any cp, sob or dizziness. EKG ordered in system.1343 Patient c/o chest pressure and jaw pain. RN notifies Connor Mortimer PA with cardiology and Dr. Rowell. Tech and RN get patient back to bed, tech completes EKG. Results relayed to Connor Mortimer PA. Orders placed for amio drip. Continue to monitor patient   
Assessment and Plan:        Paroxysmal afib:  recurrent; currently nsr; on amio  Dm- last AIC 6.1; good candidate for GLP1; was on Actos, can cause fluid retention  ? Of ESTEBAN; to see pulm as outpt.      CC:  sob  HPI: pt with dm, morbid obesity, presented with sob and was found to be in afib/rvr; currently nsr on amio.  Ha had in past.    ROS (12 point review of systems completed.  Pertinent positives noted. Otherwise ROS is negative) : prior back operations, has spinal stimulator; gets around with wc.  PMH:  Per HPI  SHX:  lives alone.  exsmoker  FHX: cancer, dm  Allergies: See above    Medications:     sodium chloride Stopped (05/30/25 0323)      bumetanide  1 mg Oral BID    amiodarone  200 mg Oral BID    sodium bicarbonate  650 mg Oral 4x Daily    spironolactone  25 mg Oral Daily    metFORMIN  1,000 mg Oral BID WC    ARIPiprazole  10 mg Oral Daily    metoprolol succinate  25 mg Oral BID    miconazole   Topical BID    albuterol sulfate HFA  2 puff Inhalation BID    apixaban  5 mg Oral BID    sodium chloride flush  5-40 mL IntraVENous 2 times per day    buPROPion  150 mg Oral QAM    citalopram  40 mg Oral QAM    montelukast  10 mg Oral Nightly    atorvastatin  20 mg Oral Nightly    gabapentin  800 mg Oral 4x Daily    trospium  20 mg Oral BID AC    [Held by provider] budesonide  500 mcg Nebulization BID       Vital Signs:   BP (!) 126/56   Pulse 74   Temp 97.8 °F (36.6 °C) (Oral)   Resp 19   Ht 1.549 m (5' 1\")   Wt 105.9 kg (233 lb 7.5 oz)   LMP  (LMP Unknown)   SpO2 95%   BMI 44.11 kg/m²      Intake/Output Summary (Last 24 hours) at 6/2/2025 0852  Last data filed at 6/2/2025 0400  Gross per 24 hour   Intake --   Output 1900 ml   Net -1900 ml        Physical Examination: General appearance - alert, well appearing, and in no distress and overweight  Mental status - alert, oriented to person, place, and time  Neck - supple, no significant adenopathy, no JVD, or carotid bruits  Chest - crackles bases  Heart - 
Cardiology Progress Note      Patient:  Vandana Burt  YOB: 1950  MRN: [id number]   Acct: [id number]  Admit Date:  5/27/2025  Primary Cardiologist: Anastasiia Valero MD  Seen by Dr. Morrison     Per prior cardiology consult note-  CHIEF COMPLAINT:        HPI: This is a pleasant 75 y.o. female PMH CAD s/p PCI (2009), HTN, HLD, asthma/COPD, DM, AF not on anticoagulation, depression, morbid obesity, presented with SOB and palpitations and found to be in AF with RVR HR 170s-190s. Received diltiazem bolus, diltiazem gtt and  lopressor x3 doses in the ED with improvement of HR and conversion back to NSR. Patient relates waking at 0300 am with SOB, took a breathing treatment with improvement and returned to bed. When she got up for the day she noticed she was SOB and had palpitations. Denies lightheadedness, dizziness, near syncope or syncope. She does not wear oxygen at home. States she had an episode of AF 7-8 years ago and had a successful cardioversion at that time. Started on metoprolol s/p cardioversion. Not on anticoagulation. Patient reports chronic nonproductive cough. Denies recent illness, fevers, congestion, or chest pain. Reports chronic lower extremity swelling and intermittent muscle cramps in B/L lower extremities. Former smoker. Denies alcohol or drug use. Family h/o heart disease in her father. No h/o blood clots. Followed by Dr. Felipe outpatient.       Subjective (Events in last 24 hours):   Pt had AFB RVR overnight - terminated with lopressor IV after 2-3 doses per nursing staff     Currently SR no ectopy     Pt states sleeps inclined at home   She states sob all the time - she thinks improved since being here - on 1L NC today   She states \"can't get a deep breath in\"  Noticed SOB at rest   Pt denies early satiety       5/30/25  Diuresed 3.6L / 24 hrs   Bp stable     Overnight pt has AFB RVR noted 11 pm and 1 am) - converted back to SR when awake     Pt states no change in SOB   Co 
Cardiology Progress Note      Patient:  Vandana Burt  YOB: 1950  MRN: [id number]   Acct: [id number]  Admit Date:  5/27/2025  Primary Cardiologist: Anastasiia Valero MD  Seen by Dr. Morrison     Per prior cardiology consult note-  CHIEF COMPLAINT:        HPI: This is a pleasant 75 y.o. female PMH CAD s/p PCI (2009), HTN, HLD, asthma/COPD, DM, AF not on anticoagulation, depression, morbid obesity, presented with SOB and palpitations and found to be in AF with RVR HR 170s-190s. Received diltiazem bolus, diltiazem gtt and  lopressor x3 doses in the ED with improvement of HR and conversion back to NSR. Patient relates waking at 0300 am with SOB, took a breathing treatment with improvement and returned to bed. When she got up for the day she noticed she was SOB and had palpitations. Denies lightheadedness, dizziness, near syncope or syncope. She does not wear oxygen at home. States she had an episode of AF 7-8 years ago and had a successful cardioversion at that time. Started on metoprolol s/p cardioversion. Not on anticoagulation. Patient reports chronic nonproductive cough. Denies recent illness, fevers, congestion, or chest pain. Reports chronic lower extremity swelling and intermittent muscle cramps in B/L lower extremities. Former smoker. Denies alcohol or drug use. Family h/o heart disease in her father. No h/o blood clots. Followed by Dr. Felipe outpatient.       Subjective (Events in last 24 hours):   Pt had AFB RVR overnight - terminated with lopressor IV after 2-3 doses per nursing staff     Currently SR no ectopy     Pt states sleeps inclined at home   She states sob all the time - she thinks improved since being here - on 1L NC today   She states \"can't get a deep breath in\"  Noticed SOB at rest   Pt denies early satiety       5/30/25  Diuresed 3.6L / 24 hrs   Bp stable     Overnight pt has AFB RVR noted 11 pm and 1 am) - converted back to SR when awake     Pt states no change in SOB   Co 
Cardiology Progress Note      Patient:  Vandana Burt  YOB: 1950  MRN: [id number]   Acct: [id number]  Admit Date:  5/27/2025  Primary Cardiologist: Anastasiia Valero MD  Seen by Dr. Morrison     Per prior cardiology consult note-  CHIEF COMPLAINT:        HPI: This is a pleasant 75 y.o. female PMH CAD s/p PCI (2009), HTN, HLD, asthma/COPD, DM, AF not on anticoagulation, depression, morbid obesity, presented with SOB and palpitations and found to be in AF with RVR HR 170s-190s. Received diltiazem bolus, diltiazem gtt and  lopressor x3 doses in the ED with improvement of HR and conversion back to NSR. Patient relates waking at 0300 am with SOB, took a breathing treatment with improvement and returned to bed. When she got up for the day she noticed she was SOB and had palpitations. Denies lightheadedness, dizziness, near syncope or syncope. She does not wear oxygen at home. States she had an episode of AF 7-8 years ago and had a successful cardioversion at that time. Started on metoprolol s/p cardioversion. Not on anticoagulation. Patient reports chronic nonproductive cough. Denies recent illness, fevers, congestion, or chest pain. Reports chronic lower extremity swelling and intermittent muscle cramps in B/L lower extremities. Former smoker. Denies alcohol or drug use. Family h/o heart disease in her father. No h/o blood clots. Followed by Dr. Felipe outpatient.       Subjective (Events in last 24 hours):   Pt had AFB RVR overnight - terminated with lopressor IV after 2-3 doses per nursing staff     Currently SR no ectopy     Pt states sleeps inclined at home   She states sob all the time - she thinks improved since being here - on 1L NC today   She states \"can't get a deep breath in\"  Noticed SOB at rest   Pt denies early satiety     Objective:   /70   Pulse 66   Temp 98.2 °F (36.8 °C) (Oral)   Resp 18   Ht 1.549 m (5' 1\")   Wt 117.9 kg (259 lb 14.8 oz)   LMP  (LMP Unknown)   SpO2 95%   BMI 
Cardiology Progress Note      Patient:  Vandana uBrt  YOB: 1950  MRN: [id number]   Acct: [id number]  Admit Date:  5/27/2025  Primary Cardiologist: Anastasiia Valero MD  Note per dr goldman  \"Seen by Dr. Goldman      Per prior cardiology consult note-  CHIEF COMPLAINT:        HPI: This is a pleasant 75 y.o. female PMH CAD s/p PCI (2009), HTN, HLD, asthma/COPD, DM, AF not on anticoagulation, depression, morbid obesity, presented with SOB and palpitations and found to be in AF with RVR HR 170s-190s. Received diltiazem bolus, diltiazem gtt and  lopressor x3 doses in the ED with improvement of HR and conversion back to NSR. Patient relates waking at 0300 am with SOB, took a breathing treatment with improvement and returned to bed. When she got up for the day she noticed she was SOB and had palpitations. Denies lightheadedness, dizziness, near syncope or syncope. She does not wear oxygen at home. States she had an episode of AF 7-8 years ago and had a successful cardioversion at that time. Started on metoprolol s/p cardioversion. Not on anticoagulation. Patient reports chronic nonproductive cough. Denies recent illness, fevers, congestion, or chest pain. Reports chronic lower extremity swelling and intermittent muscle cramps in B/L lower extremities. Former smoker. Denies alcohol or drug use. Family h/o heart disease in her father. No h/o blood clots. Followed by Dr. Felipe outpatient. \"    Subjective (Events in last 24 hours):   Pt awake, alert. NAD. Denies cp or sob. Improved symptoms. Still peeing frequently. Swelling improving. Discussed with patient about current plans.     Had afib yesterday, started on amio gtt and given IV lopressor x1, converted back to nsr.     -8.5L total now.   Objective:   /60   Pulse 74   Temp 98.3 °F (36.8 °C) (Oral)   Resp 18   Ht 1.549 m (5' 1\")   Wt 98.5 kg (217 lb 2.5 oz)   LMP  (LMP Unknown)   SpO2 91%   BMI 41.03 kg/m²      vss  TELEMETRY: nsr with occ pvc 
Discussed discharge summary with patient. Instructed patient about medications & follow up appointments. Patient denies any additional questions. Patient was discharged with all belongings. No distress noted. Patient discharged to [facility] via [facility].   
Eliquis cost evaluation:    Vandana can get Eliquis at no charge (30 or 90 day supply).    Thanks!  Danielle Pike CPhT  Patient  Ext [phone]  Outpatient Pharmacy  5/28/2025,11:40 AM    
Nutrition Education  Received c/s for diet education.  Pt. Has worked w/ OP RD in 2022.  Voices understanding of diet.  Has some lactose intolerance but can tolerate cream soups, breads, etc.  Avoids dairy.    Educated on Heart Healthy, CHO controlled diet  Learners: Patient  Readiness: Acceptance  Method: Explanation and Teachback  Response: Verbalizes Understanding  Contact name and number provided.    Julianna Kahn RD, LD  Contact Number: [phone]      
Patient arrived per cart to 3B 30 from ED. Heart monitor applied and vitals taken.  Admission paperwork completed.  Explained to patient that [facility] is not responsible for any lost or stolen items.  Patient verbalized understanding. Oriented to room and use of call light and bed controls.  Patient denies pain or needs. No signs of distress noted.  Bed locked & in low position, side-rails up x2.  Call light in reach.  Reminded patient to call nurse if any needs arise.     2 person skin assessment performed by this nurse and Adriana MOSLEY RN.    Explained patients right to have family, representative or physician notified of their admission.  Patient has Declined for physician to be notified.  Patient has Declined for family/representative to be notified.       
Patient received sacramental anointing by a . If you are in need of  support, please call [phone]. If you are in need of a  after 6:30pm, please call the house supervisor for the on-call .      Raiza Avelar  [facility] Health Coordinator  [phone]   
Pricing evaluation for GLP1s:    Byetta: Non-formulary  Mounjaro: $0  Ozempic: $0  Trulicity: $0  Victoza (brand or generic): non-formulary  Wegovy: non-formulary    Thanks.    Danielle Pike CPhT  Patient  Ext [phone]  Outpatient Pharmacy  6/2/2025,10:06 AM    
normal. Increased wall thickness. Findings consistent with mild concentric hypertrophy. Normal wall motion.    Right Ventricle: Right ventricle size is normal. Normal systolic function.    Image quality is technically difficult. Procedure performed with the patient in a supine position.        Comparison Study Information    Prior Study    There is a prior study available for comparison. Prior study date: 12/4/2024.   ·  Left Ventricle: Normal left ventricular systolic function with a visually estimated EF of 55 - 60%. Left ventricle size is normal. Normal wall thickness. Normal wall motion. Normal diastolic function.  ·  Left Atrium: Left atrium is mildly dilated.  ·  Image quality is technically difficult. Technically difficult study due to patient's body habitus and procedure performed with the patient in a sitting position.     Echo Findings    Left Ventricle Normal left ventricular systolic function with a visually estimated EF of 50 - 55%. Left ventricle size is normal. Increased wall thickness. Findings consistent with mild concentric hypertrophy. Normal wall motion. Indeterminate diastolic function.   Left Atrium Left atrium size is normal.   Right Ventricle Right ventricle size is normal. Normal systolic function.   Right Atrium Right atrium size is normal.   Aortic Valve Valve structure is normal.   Mitral Valve Valve structure is normal.   Tricuspid Valve Valve structure is normal.   Pulmonic Valve Valve structure is normal.   Aorta Normal sized aortic root.   IVC/Hepatic Veins IVC was not well visualized.   Pericardium No pericardial effusion.     Stress:     Left Heart Cath:     Lab Data:    Cardiac Enzymes:  No results for input(s): \"CKTOTAL\", \"CKMB\", \"CKMBINDEX\", \"TROPONINI\" in the last 72 hours.    CBC:   Lab Results   Component Value Date/Time    WBC 7.8 05/30/2025 05:21 AM    RBC 4.69 05/30/2025 05:21 AM    RBC 5.06 01/27/2012 10:05 AM    HGB 14.1 05/30/2025 05:21 AM    HGB 14.5 01/27/2012 10:05 AM 
2017 17:00

## 2025-06-02 NOTE — CARE COORDINATION
6/2/25, 2:28 PM EDT    Patient goals/plan/ treatment preferences discussed by  and .  Patient goals/plan/ treatment preferences reviewed with patient/ family.  Patient/ family verbalize understanding of discharge plan and are in agreement with goal/plan/treatment preferences.  Understanding was demonstrated using the teach back method.  AVS provided by RN at time of discharge, which includes all necessary medical information pertaining to the patients current course of illness, treatment, post-discharge goals of care, and treatment preferences.     Services At/After Discharge: Community Resources, Home Health, Aide services, and In ambulance       Vandana will be discharged to home today where she lives alone.  She will resume her services through [employer].  Called her [employer] [facility] [place] [phone] to make her aware of discharge.  Called Jaja from [facility] to make her aware of discharge.  Patient is aware stretcher transport has been set up. She told SW that she has a key to get into her apartment.

## 2025-06-02 NOTE — RT PROTOCOL NOTE
RT Inhaler-Nebulizer Bronchodilator Protocol Note    There is a bronchodilator order in the chart from a provider indicating to follow the RT Bronchodilator Protocol and there is an “Initiate RT Inhaler-Nebulizer Bronchodilator Protocol” order as well (see protocol at bottom of note).    CXR Findings:  XR CHEST PORTABLE  Result Date: 5/31/2025  1. There is mild borderline enlargement of the cardiomediastinal silhouette which may be in part accentuated by AP portable technique. No other focal consolidation or pleural effusion is seen. **This report has been created using voice recognition software.  It may contain minor errors which are inherent in voice recognition technology.** Electronically signed by Dr. Bebo Craig      The findings from the last RT Protocol Assessment were as follows:   History Pulmonary Disease: Chronic pulmonary disease  Respiratory Pattern: Regular pattern and RR 12-20 bpm  Breath Sounds: Slightly diminished and/or crackles  Cough: Strong, spontaneous, non-productive  Indication for Bronchodilator Therapy: Decreased or absent breath sounds  Bronchodilator Assessment Score: 4    Aerosolized bronchodilator medication orders have been revised according to the RT Inhaler-Nebulizer Bronchodilator Protocol below.    Respiratory Therapist to perform RT Therapy Protocol Assessment initially then follow the protocol.  Repeat RT Therapy Protocol Assessment PRN for score 0-3 or on second treatment, BID, and PRN for scores above 3.    No Indications - adjust the frequency to every 6 hours PRN wheezing or bronchospasm, if no treatments needed after 48 hours then discontinue using Per Protocol order mode.     If indication present, adjust the RT bronchodilator orders based on the Bronchodilator Assessment Score as indicated below.  Use Inhaler orders unless patient has one or more of the following: on home nebulizer, not able to hold breath for 10 seconds, is not alert and oriented, cannot activate and

## 2025-06-03 NOTE — DISCHARGE SUMMARY
[place], [facility] [address]      Phone: [phone]   amiodarone 100 MG tablet  apixaban 5 MG Tabs tablet  bumetanide 1 MG tablet  guaiFENesin-dextromethorphan 100-10 MG/5ML syrup           Physical Exam:    Vitals:  Vitals:    06/02/25 0920 06/02/25 0930 06/02/25 1158 06/02/25 1548   BP:  121/66 (!) 94/56 111/63   Pulse: 70 69 70 70   Resp: 16 18 18 18   Temp:  97.9 °F (36.6 °C) 97.4 °F (36.3 °C) 97.6 °F (36.4 °C)   TempSrc:  Oral Oral Oral   SpO2: 98% 95% 96% 94%   Weight:       Height:         Weight: Weight - Scale: 105.9 kg (233 lb 7.5 oz)     24 hour intake/output:No intake or output data in the 24 hours ending 06/03/25 1322    General appearance - overweight and chronically ill appearing  Chest - clear to auscultation, no wheezes, rales or rhonchi, symmetric air entry  Heart - normal rate, regular rhythm, normal S1, S2, no murmurs, rubs, clicks or gallops  Abdomen - soft, nontender, nondistended, no masses or organomegaly  Obese: Yes; Protuberant: Yes   Neurological - alert, oriented, normal speech, no focal findings or movement disorder noted  Extremities - no pedal edema noted  Skin - normal coloration and turgor, no rashes, no suspicious skin lesions noted        Labs can be found in the Lab tab of Chart Review    Diet:  No diet orders on file    Activity:  Activity as tolerated (Patient may move about with assist as indicated or with supervision.)    Follow-up:  in the next few days with Samuel Ervin MD,  in the next few weeks with Cardiology    Disposition: home    Condition: Stable      Time Spent: 35 minutes    Electronically signed by Josh Slade MD on 6/3/2025 at 1:22 PM    Discharging Hospitalist

## 2025-06-18 LAB — ECHO BSA: 2.26 M2

## 2025-06-18 NOTE — PROGRESS NOTES
[facility] [place] [facility]  [facility]ARDI[facility]  [address]  [address]  [facility] [address]  Dept: [phone]  Dept Fax: [phone]  Loc: [phone]    Visit Date: 6/19/2025    Vandana Burt is a 75 y.o. female who presents todayfor:  Chief Complaint   Patient presents with    Follow-Up from Hospital     Cardiologist: Destinee Spaulding of CAD/PCI, HTN, HLD, asthma, COPD, DM, AFib    HPI:f/u for sob, CHF, new Pafib/rvr  She still has not felt well but slowly improving. She was in memoral just last week. Treated for afib? No obvious swelling or weight changes now. Denies palpitations. No cp. She is always sob, not different than normal. Does follow up with pulm but not sleep center. We had discussed this at prior hospital admission.   Past Surgical History:   Procedure Laterality Date    BACK SURGERY  2007    CARDIOVASCULAR STRESS TEST  04/03/2009    Persantine test associated w/non specific symptoms. EKG non diagnostic. no obvious stress induced ischemia. EF 63%. normal wall motion.    CARDIOVASCULAR STRESS TEST  03/31/2010    tilt table associated w/fluctuation of BP w/relatively low BP throughout test. no significant symptoms during test. borderline tilt table test.    CHOLECYSTECTOMY      COLONOSCOPY  2011    COLONOSCOPY N/A 7/24/2023    COLONOSCOPY performed by Blanca Arroyo MD at [facility]    CORONARY ANGIOPLASTY  12/16/2009    EF 60%. nonobstructive CAD. normal LV function.    ENDOSCOPY, COLON, DIAGNOSTIC  over 5 years     Dr. Arroyo     EYE SURGERY  2004    cataract    HERNIA REPAIR  18 months old     umblilical    HERNIA REPAIR  9/8/14    Ventral Hernia Repair with Mesh - Dr. Mcconnell    HYSTERECTOMY (CERVIX STATUS UNKNOWN)  1995    partial    SHAHRAM STEREO BREAST BX W LOC DEVICE 1ST LESION RIGHT Right 4/16/2025    SHAHRAM STEREO BREAST BX W LOC DEVICE 1ST LESION RIGHT 4/16/2025 [facility]    OTHER SURGICAL HISTORY  Feb 17, 2014    Left lower quadrant incision and

## 2025-06-19 ENCOUNTER — OFFICE VISIT (OUTPATIENT)
Dept: CARDIOLOGY CLINIC | Age: 75
End: 2025-06-19
Payer: MEDICARE

## 2025-06-19 VITALS — DIASTOLIC BLOOD PRESSURE: 54 MMHG | SYSTOLIC BLOOD PRESSURE: 85 MMHG | HEART RATE: 53 BPM

## 2025-06-19 DIAGNOSIS — I25.10 CORONARY ARTERY DISEASE INVOLVING NATIVE CORONARY ARTERY OF NATIVE HEART WITHOUT ANGINA PECTORIS: ICD-10-CM

## 2025-06-19 DIAGNOSIS — I50.32 CHRONIC DIASTOLIC CONGESTIVE HEART FAILURE (HCC): ICD-10-CM

## 2025-06-19 DIAGNOSIS — I10 PRIMARY HYPERTENSION: ICD-10-CM

## 2025-06-19 DIAGNOSIS — I48.91 ATRIAL FIBRILLATION WITH RAPID VENTRICULAR RESPONSE (HCC): Primary | ICD-10-CM

## 2025-06-19 PROCEDURE — 1111F DSCHRG MED/CURRENT MED MERGE: CPT | Performed by: STUDENT IN AN ORGANIZED HEALTH CARE EDUCATION/TRAINING PROGRAM

## 2025-06-19 PROCEDURE — 1090F PRES/ABSN URINE INCON ASSESS: CPT | Performed by: STUDENT IN AN ORGANIZED HEALTH CARE EDUCATION/TRAINING PROGRAM

## 2025-06-19 PROCEDURE — 1036F TOBACCO NON-USER: CPT | Performed by: STUDENT IN AN ORGANIZED HEALTH CARE EDUCATION/TRAINING PROGRAM

## 2025-06-19 PROCEDURE — G8428 CUR MEDS NOT DOCUMENT: HCPCS | Performed by: STUDENT IN AN ORGANIZED HEALTH CARE EDUCATION/TRAINING PROGRAM

## 2025-06-19 PROCEDURE — G8399 PT W/DXA RESULTS DOCUMENT: HCPCS | Performed by: STUDENT IN AN ORGANIZED HEALTH CARE EDUCATION/TRAINING PROGRAM

## 2025-06-19 PROCEDURE — 99214 OFFICE O/P EST MOD 30 MIN: CPT | Performed by: STUDENT IN AN ORGANIZED HEALTH CARE EDUCATION/TRAINING PROGRAM

## 2025-06-19 PROCEDURE — 3074F SYST BP LT 130 MM HG: CPT | Performed by: STUDENT IN AN ORGANIZED HEALTH CARE EDUCATION/TRAINING PROGRAM

## 2025-06-19 PROCEDURE — 3078F DIAST BP <80 MM HG: CPT | Performed by: STUDENT IN AN ORGANIZED HEALTH CARE EDUCATION/TRAINING PROGRAM

## 2025-06-19 PROCEDURE — G8417 CALC BMI ABV UP PARAM F/U: HCPCS | Performed by: STUDENT IN AN ORGANIZED HEALTH CARE EDUCATION/TRAINING PROGRAM

## 2025-06-19 PROCEDURE — 1123F ACP DISCUSS/DSCN MKR DOCD: CPT | Performed by: STUDENT IN AN ORGANIZED HEALTH CARE EDUCATION/TRAINING PROGRAM

## 2025-06-19 RX ORDER — BENZONATATE 200 MG/1
200 CAPSULE ORAL 3 TIMES DAILY PRN
COMMUNITY

## 2025-06-19 RX ORDER — OXYBUTYNIN CHLORIDE 15 MG/1
15 TABLET, EXTENDED RELEASE ORAL DAILY
COMMUNITY

## 2025-06-27 RX ORDER — METOPROLOL SUCCINATE 25 MG/1
25 TABLET, EXTENDED RELEASE ORAL DAILY
Qty: 90 TABLET | Refills: 1 | Status: SHIPPED | OUTPATIENT
Start: 2025-06-27

## 2025-07-08 NOTE — PROGRESS NOTES
07/08/25 1007   Discharge Assessment   Assessment Type Discharge Planning Assessment   Source of Information patient   People in Home alone   Do you expect to return to your current living situation? Yes   Do you have help at home or someone to help you manage your care at home? Yes   Prior to hospitilization cognitive status: Alert/Oriented   Current cognitive status: Alert/Oriented   Walking or Climbing Stairs Difficulty no   Dressing/Bathing Difficulty no   Home Accessibility stairs to enter home  (2nd Floor Apartment)   Equipment Currently Used at Home none   Readmission within 30 days? No   Patient currently being followed by outpatient case management? No   Do you currently have service(s) that help you manage your care at home? No   Do you take prescription medications? Yes   How do you get to doctors appointments? family or friend will provide   Discharge Plan A Home   Discharge Plan B Home with family   DME Needed Upon Discharge  other (see comments)  (TBD)   Discharge Plan discussed with: Patient   Transition of Care Barriers None        Position/Activity Restrictions: droplet isolation for rhinovirus           Subjective:  Chart Reviewed: Yes  Patient assessed for rehabilitation services?: Yes  Subjective: Pt resting in recliner chair and agreed to therapy assessment and exercises.    General:     Vision: Impaired  Vision Exceptions: Wears glasses for reading  Hearing: Within functional limits       Pain: 8-9/10: upper abdomen especially with coughing    Vitals: Vitals not assessed per clinical judgement, see nursing flowsheet    Social/Functional History:    Lives With: Alone  Type of Home: Apartment ([place])  Home Layout: One level  Home Access: Elevator (3rd floor)  Home Equipment: Walker - Rolling, Cane, Wheelchair - Manual     Bathroom Shower/Tub:  (sponge bathe)  Bathroom Toilet: Handicap height       Prior Level of Assist for ADLs: Independent  Prior Level of Assist for Homemaking: Independent  Prior Level of Assist for Transfers: Independent    Active : Yes     Additional Comments: Pt reports using no AD in apartment then therapist found that Pt uses a w/c. Reports she has passport, but unable to tell what they help her with other than pola boots.    OBJECTIVE:  Range of Motion:  Bilateral Lower Extremity: WFL    Strength:  Bilateral Lower Extremity: grossly 4-/5    Balance:  Static Sitting Balance:  Modified Independent  Dynamic Sitting Balance: Supervision  Static Standing Balance: Stand By Assistance  Dynamic Standing Balance: Stand By Assistance, Contact Guard Assistance    Bed Mobility:  Not Tested    Transfers:  Sit to Stand: Stand By Assistance  Stand to Sit:Stand By Assistance  Stand Pivot:Stand By Assistance    Ambulation:  Not Tested    Stairs:  Not Tested    Exercise:  Patient was guided in 1 set(s) 10 reps of exercises to both lower extremities: Ankle pumps, Glut sets, Quad sets, Hip abduction/adduction, Seated marches, and Long arc quads.  Exercises were completed for increased independence with functional  Functional mobility training, Transfer training, Patient/Caregiver education & training, Safety education & training, Home exercise program, Therapeutic activities, Endurance training  General Plan:  (4-5x GM)    Goals:     Short Term Goals  Time Frame for Short Term Goals: at discharge  Short Term Goal 1: Pt to be Mod I for supine <> sit to get in/out of bed  Short Term Goal 2: Pt to be Mod I for sit <> stand to get up to ambulate  Short Term Goal 3: Pt to be Mod I for stand pivot between seats  Long Term Goals  Time Frame for Long Term Goals : not set due to short ELOS    Following session, patient left in safe position with all fall risk precautions in place.       Lavonne Garza, MPT [id number]

## 2025-07-23 ENCOUNTER — OFFICE VISIT (OUTPATIENT)
Age: 75
End: 2025-07-23
Payer: MEDICARE

## 2025-07-23 VITALS
WEIGHT: 251 LBS | OXYGEN SATURATION: 96 % | DIASTOLIC BLOOD PRESSURE: 82 MMHG | HEIGHT: 61 IN | HEART RATE: 64 BPM | SYSTOLIC BLOOD PRESSURE: 120 MMHG | TEMPERATURE: 98 F | BODY MASS INDEX: 47.39 KG/M2

## 2025-07-23 DIAGNOSIS — G47.10 HYPERSOMNOLENCE: ICD-10-CM

## 2025-07-23 DIAGNOSIS — F51.02 ADJUSTMENT INSOMNIA: ICD-10-CM

## 2025-07-23 DIAGNOSIS — R06.83 LOUD SNORING: ICD-10-CM

## 2025-07-23 DIAGNOSIS — I48.91 ATRIAL FIBRILLATION WITH RAPID VENTRICULAR RESPONSE (HCC): ICD-10-CM

## 2025-07-23 DIAGNOSIS — I50.40 COMBINED SYSTOLIC AND DIASTOLIC CONGESTIVE HEART FAILURE, UNSPECIFIED HF CHRONICITY (HCC): ICD-10-CM

## 2025-07-23 DIAGNOSIS — I48.0 PAROXYSMAL ATRIAL FIBRILLATION (HCC): ICD-10-CM

## 2025-07-23 DIAGNOSIS — R29.818 SUSPECTED SLEEP APNEA: Primary | ICD-10-CM

## 2025-07-23 DIAGNOSIS — E66.813 CLASS 3 SEVERE OBESITY DUE TO EXCESS CALORIES WITH BODY MASS INDEX (BMI) OF 45.0 TO 49.9 IN ADULT (HCC): ICD-10-CM

## 2025-07-23 PROCEDURE — G8427 DOCREV CUR MEDS BY ELIG CLIN: HCPCS | Performed by: INTERNAL MEDICINE

## 2025-07-23 PROCEDURE — 1090F PRES/ABSN URINE INCON ASSESS: CPT | Performed by: INTERNAL MEDICINE

## 2025-07-23 PROCEDURE — 1036F TOBACCO NON-USER: CPT | Performed by: INTERNAL MEDICINE

## 2025-07-23 PROCEDURE — 1123F ACP DISCUSS/DSCN MKR DOCD: CPT | Performed by: INTERNAL MEDICINE

## 2025-07-23 PROCEDURE — 99204 OFFICE O/P NEW MOD 45 MIN: CPT | Performed by: INTERNAL MEDICINE

## 2025-07-23 PROCEDURE — 3079F DIAST BP 80-89 MM HG: CPT | Performed by: INTERNAL MEDICINE

## 2025-07-23 PROCEDURE — G8399 PT W/DXA RESULTS DOCUMENT: HCPCS | Performed by: INTERNAL MEDICINE

## 2025-07-23 PROCEDURE — G8417 CALC BMI ABV UP PARAM F/U: HCPCS | Performed by: INTERNAL MEDICINE

## 2025-07-23 PROCEDURE — 1159F MED LIST DOCD IN RCRD: CPT | Performed by: INTERNAL MEDICINE

## 2025-07-23 PROCEDURE — 3074F SYST BP LT 130 MM HG: CPT | Performed by: INTERNAL MEDICINE

## 2025-07-23 NOTE — PROGRESS NOTES
Chief Complaint:new sleep, referred by [facility]imer. sleep eval, last sleep study 2020 at [facility], not using a machine     Mallampati airway Class:1  Neck Circumference:.16 Inches    [place] sleepiness score 7/23/25: .10  SAQLI:42  
excessinve daytime sleepiness for evaluation of obstructive sleep apnea  - Mallampati: I (soft palate, uvula, fauces, tonsillar pillars visible) Neck size: 18   - Obesity: morbid Class III (morbid obesity) BMI > 40, BMI: Body mass index is 47.43 kg/m².  - clinically suspected Insomnia  - No parasomnias   - Inadequate sleep hygiene.  - noncompliant patient.    Acid reflux     Arrhythmia     Arthritis     CAD (coronary artery disease)     NON-OBSTRUCTIVE    Chronic bronchitis ([facility])     Chronic venous stasis     Congestive heart failure ([facility]) 12/4/2024    COPD (chronic obstructive pulmonary disease) ([facility]) Over Lap asthma     COVID-19 10/2020    Depression     Diabetes mellitus (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 Schedule for second night study if split night was not possible, or optimal pressure could not be determined during the intital study. at [] Home   [x] In the sleep lab.  - Other tests    TSH    Once we have Split PSG report, we will forward additional plan of care.    - I had a discussion with patient regarding avialable treatment options for her sleep disorder breathing including but not    limited to CPAP titration in the sleep lab Vs.Dental appliance placement with referral to a local dentist Vs other available surgical options including Uvulopalatopharyngoplasty, maxillomandibular ostomy,Inspire device placement and

## 2025-08-14 ENCOUNTER — TELEPHONE (OUTPATIENT)
Dept: INTERNAL MEDICINE CLINIC | Age: 75
End: 2025-08-14

## 2025-08-14 RX ORDER — AMIODARONE HYDROCHLORIDE 100 MG/1
TABLET ORAL
Qty: 60 TABLET | Refills: 3 | Status: CANCELLED | OUTPATIENT
Start: 2025-08-14

## 2025-08-20 ENCOUNTER — HOSPITAL ENCOUNTER (OUTPATIENT)
Dept: SLEEP CENTER | Age: 75
Discharge: HOME | End: 2025-08-22
Payer: MEDICARE

## 2025-08-20 DIAGNOSIS — I48.91 ATRIAL FIBRILLATION WITH RAPID VENTRICULAR RESPONSE (HCC): ICD-10-CM

## 2025-08-20 DIAGNOSIS — R29.818 SUSPECTED SLEEP APNEA: ICD-10-CM

## 2025-08-20 DIAGNOSIS — I50.40 COMBINED SYSTOLIC AND DIASTOLIC CONGESTIVE HEART FAILURE, UNSPECIFIED HF CHRONICITY (HCC): ICD-10-CM

## 2025-08-20 DIAGNOSIS — F51.02 ADJUSTMENT INSOMNIA: ICD-10-CM

## 2025-08-20 DIAGNOSIS — E66.813 CLASS 3 SEVERE OBESITY DUE TO EXCESS CALORIES WITH BODY MASS INDEX (BMI) OF 45.0 TO 49.9 IN ADULT (HCC): ICD-10-CM

## 2025-08-20 DIAGNOSIS — R06.83 LOUD SNORING: ICD-10-CM

## 2025-08-20 DIAGNOSIS — I48.0 PAROXYSMAL ATRIAL FIBRILLATION (HCC): ICD-10-CM

## 2025-08-20 DIAGNOSIS — G47.10 HYPERSOMNOLENCE: ICD-10-CM

## 2025-08-20 PROCEDURE — 95810 POLYSOM 6/> YRS 4/> PARAM: CPT

## 2025-08-25 ENCOUNTER — RESULTS FOLLOW-UP (OUTPATIENT)
Dept: PULMONOLOGY | Age: 75
End: 2025-08-25

## 2025-08-25 RX ORDER — AMIODARONE HYDROCHLORIDE 200 MG/1
200 TABLET ORAL DAILY
COMMUNITY
Start: 2025-06-17 | End: 2025-08-25 | Stop reason: SDUPTHER

## 2025-08-25 RX ORDER — AMIODARONE HYDROCHLORIDE 200 MG/1
200 TABLET ORAL DAILY
Qty: 90 TABLET | Refills: 1 | Status: SHIPPED | OUTPATIENT
Start: 2025-08-25

## 2025-09-02 ENCOUNTER — TELEPHONE (OUTPATIENT)
Dept: PULMONOLOGY | Age: 75
End: 2025-09-02

## 2025-09-02 DIAGNOSIS — E66.813 CLASS 3 SEVERE OBESITY DUE TO EXCESS CALORIES WITH BODY MASS INDEX (BMI) OF 45.0 TO 49.9 IN ADULT, UNSPECIFIED WHETHER SERIOUS COMORBIDITY PRESENT (HCC): ICD-10-CM

## 2025-09-02 DIAGNOSIS — R94.2 DECREASED DIFFUSION CAPACITY OF LUNG: ICD-10-CM

## 2025-09-02 DIAGNOSIS — J45.30 MILD PERSISTENT ASTHMA WITHOUT COMPLICATION: Primary | ICD-10-CM

## (undated) DEVICE — SUTURE VCRL SZ 4-0 L27IN ABSRB UD L19MM FS-2 3/8 CIR REV J422H

## (undated) DEVICE — CHLORAPREP 26ML ORANGE

## (undated) DEVICE — 3M™ WARMING BLANKET, UPPER BODY, 10 PER CASE, 42268: Brand: BAIR HUGGER™

## (undated) DEVICE — APPLICATOR ENDOSCP L34CM W/ S STL CANN PLAS OBT STYL FOR

## (undated) DEVICE — SOLUTION IV IRRIG WATER 1000ML POUR BRL 2F7114

## (undated) DEVICE — TROCAR ENDOSCP SHFT L100MM DIA5MM DIL TIP ENDOPATH XCEL

## (undated) DEVICE — TROCAR ENDOSCP L100MM DIA11MM DIL TIP STBL SL DISP ENDOPATH

## (undated) DEVICE — WARMER SCP 2 ANTIFOG LAP DISP

## (undated) DEVICE — TOWEL,OR,DSP,ST,WHITE,DLX,XR,4/PK,20PK/C: Brand: MEDLINE

## (undated) DEVICE — GENERAL LAPAROSCOPY PACK-LF: Brand: MEDLINE INDUSTRIES, INC.

## (undated) DEVICE — GLOVE ORANGE PI 7 1/2   MSG9075

## (undated) DEVICE — BANDAGE ADH W1XL3IN NAT FAB WVN FLX DURABLE N ADH PD SEAL

## (undated) DEVICE — KIT,ANTI FOG,W/SPONGE & FLUID,SOFT PACK: Brand: MEDLINE

## (undated) DEVICE — GLOVE ORANGE PI 7   MSG9070

## (undated) DEVICE — BLADE LARYNSCP SZ 3 ENH DIR INTUB GLIDESCOPE MCGRATH MAC

## (undated) DEVICE — GLOVE SURG SZ 65 THK91MIL LTX FREE SYN POLYISOPRENE

## (undated) DEVICE — GLOVE ORTHO 8   MSG9480

## (undated) DEVICE — NEEDLE INSUF L120MM DIA2MM DISP FOR PNEUMOPERI ENDOPATH

## (undated) DEVICE — APPLIER CLP M L L11.4IN DIA10MM ENDOSCP ROT MULT FOR LIG

## (undated) DEVICE — SYRINGE MED 50ML LUERLOCK TIP

## (undated) DEVICE — TROCAR ENDOSCP L100MM DIA11MM STBL SL BLDELSS DISP ENDOPATH

## (undated) DEVICE — ROYAL SILK SURGICAL GOWN, XXL: Brand: CONVERTORS

## (undated) DEVICE — EXCEL 10FT (3.05 M) INSUFFLATION TUBING SET WITH 0.1 MICRON FILTER: Brand: EXCEL

## (undated) DEVICE — UNIVERSAL MONOPOLAR LAPAROSCOPIC CABLE 10FT, 4MM PIN CONNECTOR: Brand: CONMED